# Patient Record
Sex: FEMALE | Race: AMERICAN INDIAN OR ALASKA NATIVE | NOT HISPANIC OR LATINO | Employment: UNEMPLOYED | URBAN - METROPOLITAN AREA
[De-identification: names, ages, dates, MRNs, and addresses within clinical notes are randomized per-mention and may not be internally consistent; named-entity substitution may affect disease eponyms.]

---

## 2017-09-04 ENCOUNTER — HOSPITAL ENCOUNTER (INPATIENT)
Facility: HOSPITAL | Age: 22
LOS: 3 days | Discharge: HOME | DRG: 052 | End: 2017-09-09
Attending: EMERGENCY MEDICINE | Admitting: INTERNAL MEDICINE
Payer: COMMERCIAL

## 2017-09-04 ENCOUNTER — APPOINTMENT (EMERGENCY)
Dept: CT IMAGING | Facility: HOSPITAL | Age: 22
DRG: 052 | End: 2017-09-04
Payer: COMMERCIAL

## 2017-09-04 ENCOUNTER — APPOINTMENT (EMERGENCY)
Dept: RADIOLOGY | Facility: HOSPITAL | Age: 22
DRG: 052 | End: 2017-09-04
Payer: COMMERCIAL

## 2017-09-04 DIAGNOSIS — N17.9 AKI (ACUTE KIDNEY INJURY) (HCC): ICD-10-CM

## 2017-09-04 DIAGNOSIS — R41.0 CONFUSION: ICD-10-CM

## 2017-09-04 DIAGNOSIS — R79.89 ELEVATED SERUM CREATININE: ICD-10-CM

## 2017-09-04 DIAGNOSIS — R51.9 HEADACHE: ICD-10-CM

## 2017-09-04 DIAGNOSIS — S09.90XA HEAD INJURY: ICD-10-CM

## 2017-09-04 DIAGNOSIS — F44.9 CONVERSION DISORDER: ICD-10-CM

## 2017-09-04 DIAGNOSIS — R41.82 ALTERED MENTAL STATUS: Primary | ICD-10-CM

## 2017-09-04 LAB
ALBUMIN SERPL BCP-MCNC: 3.5 G/DL (ref 3.5–5)
ALP SERPL-CCNC: 82 U/L (ref 46–116)
ALT SERPL W P-5'-P-CCNC: 21 U/L (ref 12–78)
AMPHETAMINES SERPL QL SCN: NEGATIVE
ANION GAP SERPL CALCULATED.3IONS-SCNC: 9 MMOL/L (ref 4–13)
AST SERPL W P-5'-P-CCNC: 17 U/L (ref 5–45)
BACTERIA UR QL AUTO: ABNORMAL /HPF
BARBITURATES UR QL: NEGATIVE
BASOPHILS # BLD AUTO: 0.02 THOUSANDS/ΜL (ref 0–0.1)
BASOPHILS NFR BLD AUTO: 0 % (ref 0–1)
BENZODIAZ UR QL: NEGATIVE
BILIRUB SERPL-MCNC: 0.2 MG/DL (ref 0.2–1)
BILIRUB UR QL STRIP: NEGATIVE
BUN SERPL-MCNC: 11 MG/DL (ref 5–25)
CALCIUM SERPL-MCNC: 9.2 MG/DL (ref 8.3–10.1)
CHLORIDE SERPL-SCNC: 105 MMOL/L (ref 100–108)
CLARITY UR: CLEAR
CO2 SERPL-SCNC: 28 MMOL/L (ref 21–32)
COCAINE UR QL: NEGATIVE
COLOR UR: YELLOW
CREAT SERPL-MCNC: 1.36 MG/DL (ref 0.6–1.3)
CRP SERPL QL: 14.1 MG/L
EOSINOPHIL # BLD AUTO: 0.13 THOUSAND/ΜL (ref 0–0.61)
EOSINOPHIL NFR BLD AUTO: 2 % (ref 0–6)
ERYTHROCYTE [DISTWIDTH] IN BLOOD BY AUTOMATED COUNT: 15.6 % (ref 11.6–15.1)
GFR SERPL CREATININE-BSD FRML MDRD: 65 ML/MIN/1.73SQ M
GLUCOSE SERPL-MCNC: 91 MG/DL (ref 65–140)
GLUCOSE UR STRIP-MCNC: NEGATIVE MG/DL
HCG UR QL: NEGATIVE
HCT VFR BLD AUTO: 37.3 % (ref 34.8–46.1)
HGB BLD-MCNC: 11.7 G/DL (ref 11.5–15.4)
HGB UR QL STRIP.AUTO: ABNORMAL
HIV 1+2 AB+HIV1 P24 AG SERPL QL IA: NORMAL
HIV1 P24 AG SER QL: NORMAL
HYALINE CASTS #/AREA URNS LPF: ABNORMAL /LPF
KETONES UR STRIP-MCNC: NEGATIVE MG/DL
LEUKOCYTE ESTERASE UR QL STRIP: NEGATIVE
LYMPHOCYTES # BLD AUTO: 1.51 THOUSANDS/ΜL (ref 0.6–4.47)
LYMPHOCYTES NFR BLD AUTO: 25 % (ref 14–44)
MCH RBC QN AUTO: 20.3 PG (ref 26.8–34.3)
MCHC RBC AUTO-ENTMCNC: 31.4 G/DL (ref 31.4–37.4)
MCV RBC AUTO: 65 FL (ref 82–98)
METHADONE UR QL: NEGATIVE
MONOCYTES # BLD AUTO: 0.51 THOUSAND/ΜL (ref 0.17–1.22)
MONOCYTES NFR BLD AUTO: 9 % (ref 4–12)
MUCOUS THREADS UR QL AUTO: ABNORMAL
NEUTROPHILS # BLD AUTO: 3.8 THOUSANDS/ΜL (ref 1.85–7.62)
NEUTS SEG NFR BLD AUTO: 64 % (ref 43–75)
NITRITE UR QL STRIP: NEGATIVE
NON-SQ EPI CELLS URNS QL MICRO: ABNORMAL /HPF
OPIATES UR QL SCN: NEGATIVE
PCP UR QL: NEGATIVE
PH UR STRIP.AUTO: 5.5 [PH] (ref 4.5–8)
PLATELET # BLD AUTO: 278 THOUSANDS/UL (ref 149–390)
PMV BLD AUTO: 11.2 FL (ref 8.9–12.7)
POTASSIUM SERPL-SCNC: 3.9 MMOL/L (ref 3.5–5.3)
PROT SERPL-MCNC: 7.6 G/DL (ref 6.4–8.2)
PROT UR STRIP-MCNC: >=300 MG/DL
RBC # BLD AUTO: 5.75 MILLION/UL (ref 3.81–5.12)
RBC #/AREA URNS AUTO: ABNORMAL /HPF
SODIUM SERPL-SCNC: 142 MMOL/L (ref 136–145)
SP GR UR STRIP.AUTO: >=1.03 (ref 1–1.03)
THC UR QL: NEGATIVE
TSH SERPL DL<=0.05 MIU/L-ACNC: 1.72 UIU/ML (ref 0.46–3.98)
UROBILINOGEN UR QL STRIP.AUTO: 1 E.U./DL
WBC # BLD AUTO: 5.97 THOUSAND/UL (ref 4.31–10.16)
WBC #/AREA URNS AUTO: ABNORMAL /HPF

## 2017-09-04 PROCEDURE — 84443 ASSAY THYROID STIM HORMONE: CPT | Performed by: EMERGENCY MEDICINE

## 2017-09-04 PROCEDURE — 71020 HB CHEST X-RAY 2VW FRONTAL&LATL: CPT

## 2017-09-04 PROCEDURE — 36415 COLL VENOUS BLD VENIPUNCTURE: CPT | Performed by: EMERGENCY MEDICINE

## 2017-09-04 PROCEDURE — 86140 C-REACTIVE PROTEIN: CPT | Performed by: PHYSICIAN ASSISTANT

## 2017-09-04 PROCEDURE — 80307 DRUG TEST PRSMV CHEM ANLYZR: CPT | Performed by: EMERGENCY MEDICINE

## 2017-09-04 PROCEDURE — 70450 CT HEAD/BRAIN W/O DYE: CPT

## 2017-09-04 PROCEDURE — 86592 SYPHILIS TEST NON-TREP QUAL: CPT | Performed by: PHYSICIAN ASSISTANT

## 2017-09-04 PROCEDURE — 80053 COMPREHEN METABOLIC PANEL: CPT | Performed by: EMERGENCY MEDICINE

## 2017-09-04 PROCEDURE — 85025 COMPLETE CBC W/AUTO DIFF WBC: CPT | Performed by: EMERGENCY MEDICINE

## 2017-09-04 PROCEDURE — 99285 EMERGENCY DEPT VISIT HI MDM: CPT

## 2017-09-04 PROCEDURE — 85652 RBC SED RATE AUTOMATED: CPT | Performed by: PHYSICIAN ASSISTANT

## 2017-09-04 PROCEDURE — 81001 URINALYSIS AUTO W/SCOPE: CPT | Performed by: EMERGENCY MEDICINE

## 2017-09-04 PROCEDURE — 87806 HIV AG W/HIV1&2 ANTB W/OPTIC: CPT | Performed by: EMERGENCY MEDICINE

## 2017-09-04 PROCEDURE — 81025 URINE PREGNANCY TEST: CPT | Performed by: EMERGENCY MEDICINE

## 2017-09-04 RX ORDER — MAGNESIUM HYDROXIDE/ALUMINUM HYDROXICE/SIMETHICONE 120; 1200; 1200 MG/30ML; MG/30ML; MG/30ML
30 SUSPENSION ORAL EVERY 6 HOURS PRN
Status: DISCONTINUED | OUTPATIENT
Start: 2017-09-04 | End: 2017-09-09 | Stop reason: HOSPADM

## 2017-09-04 RX ORDER — METOCLOPRAMIDE HYDROCHLORIDE 5 MG/ML
5 INJECTION INTRAMUSCULAR; INTRAVENOUS EVERY 6 HOURS PRN
Status: DISCONTINUED | OUTPATIENT
Start: 2017-09-04 | End: 2017-09-09 | Stop reason: HOSPADM

## 2017-09-04 RX ORDER — ACETAMINOPHEN 325 MG/1
650 TABLET ORAL EVERY 6 HOURS PRN
Status: DISCONTINUED | OUTPATIENT
Start: 2017-09-04 | End: 2017-09-09 | Stop reason: HOSPADM

## 2017-09-04 RX ORDER — ONDANSETRON 2 MG/ML
4 INJECTION INTRAMUSCULAR; INTRAVENOUS EVERY 6 HOURS PRN
Status: DISCONTINUED | OUTPATIENT
Start: 2017-09-04 | End: 2017-09-09 | Stop reason: HOSPADM

## 2017-09-04 RX ORDER — DOCUSATE SODIUM 100 MG/1
100 CAPSULE, LIQUID FILLED ORAL 2 TIMES DAILY
Status: DISCONTINUED | OUTPATIENT
Start: 2017-09-04 | End: 2017-09-09 | Stop reason: HOSPADM

## 2017-09-04 RX ORDER — MECLIZINE HCL 12.5 MG/1
12.5 TABLET ORAL EVERY 8 HOURS PRN
Status: DISCONTINUED | OUTPATIENT
Start: 2017-09-04 | End: 2017-09-09 | Stop reason: HOSPADM

## 2017-09-04 RX ORDER — SODIUM CHLORIDE 9 MG/ML
75 INJECTION, SOLUTION INTRAVENOUS CONTINUOUS
Status: DISCONTINUED | OUTPATIENT
Start: 2017-09-04 | End: 2017-09-07

## 2017-09-04 RX ADMIN — ONDANSETRON 4 MG: 2 INJECTION INTRAMUSCULAR; INTRAVENOUS at 21:50

## 2017-09-04 RX ADMIN — SODIUM CHLORIDE 75 ML/HR: 0.9 INJECTION, SOLUTION INTRAVENOUS at 21:37

## 2017-09-04 RX ADMIN — METOCLOPRAMIDE 5 MG: 5 INJECTION, SOLUTION INTRAMUSCULAR; INTRAVENOUS at 23:07

## 2017-09-04 RX ADMIN — SODIUM CHLORIDE 1000 ML: 0.9 INJECTION, SOLUTION INTRAVENOUS at 19:52

## 2017-09-04 RX ADMIN — MECLIZINE 12.5 MG: 12.5 TABLET ORAL at 23:07

## 2017-09-05 ENCOUNTER — APPOINTMENT (OUTPATIENT)
Dept: MRI IMAGING | Facility: HOSPITAL | Age: 22
DRG: 052 | End: 2017-09-05
Payer: COMMERCIAL

## 2017-09-05 LAB
ALBUMIN SERPL BCP-MCNC: 2.8 G/DL (ref 3.5–5)
ALP SERPL-CCNC: 68 U/L (ref 46–116)
ALT SERPL W P-5'-P-CCNC: 16 U/L (ref 12–78)
ANION GAP SERPL CALCULATED.3IONS-SCNC: 9 MMOL/L (ref 4–13)
AST SERPL W P-5'-P-CCNC: 15 U/L (ref 5–45)
BASOPHILS # BLD AUTO: 0.01 THOUSANDS/ΜL (ref 0–0.1)
BASOPHILS NFR BLD AUTO: 0 % (ref 0–1)
BILIRUB SERPL-MCNC: 0.2 MG/DL (ref 0.2–1)
BUN SERPL-MCNC: 11 MG/DL (ref 5–25)
CALCIUM SERPL-MCNC: 8.7 MG/DL (ref 8.3–10.1)
CHLORIDE SERPL-SCNC: 108 MMOL/L (ref 100–108)
CO2 SERPL-SCNC: 24 MMOL/L (ref 21–32)
CREAT SERPL-MCNC: 1.25 MG/DL (ref 0.6–1.3)
EOSINOPHIL # BLD AUTO: 0.11 THOUSAND/ΜL (ref 0–0.61)
EOSINOPHIL NFR BLD AUTO: 2 % (ref 0–6)
ERYTHROCYTE [DISTWIDTH] IN BLOOD BY AUTOMATED COUNT: 15.4 % (ref 11.6–15.1)
ERYTHROCYTE [SEDIMENTATION RATE] IN BLOOD: 20 MM/HOUR (ref 0–20)
FOLATE SERPL-MCNC: 19 NG/ML (ref 3.1–17.5)
GFR SERPL CREATININE-BSD FRML MDRD: 72 ML/MIN/1.73SQ M
GLUCOSE SERPL-MCNC: 91 MG/DL (ref 65–140)
HCT VFR BLD AUTO: 34.5 % (ref 34.8–46.1)
HGB BLD-MCNC: 10.7 G/DL (ref 11.5–15.4)
LYMPHOCYTES # BLD AUTO: 1.32 THOUSANDS/ΜL (ref 0.6–4.47)
LYMPHOCYTES NFR BLD AUTO: 25 % (ref 14–44)
MCH RBC QN AUTO: 20.3 PG (ref 26.8–34.3)
MCHC RBC AUTO-ENTMCNC: 31 G/DL (ref 31.4–37.4)
MCV RBC AUTO: 65 FL (ref 82–98)
MONOCYTES # BLD AUTO: 0.41 THOUSAND/ΜL (ref 0.17–1.22)
MONOCYTES NFR BLD AUTO: 8 % (ref 4–12)
NEUTROPHILS # BLD AUTO: 3.34 THOUSANDS/ΜL (ref 1.85–7.62)
NEUTS SEG NFR BLD AUTO: 65 % (ref 43–75)
PLATELET # BLD AUTO: 249 THOUSANDS/UL (ref 149–390)
PMV BLD AUTO: 10.9 FL (ref 8.9–12.7)
POTASSIUM SERPL-SCNC: 4 MMOL/L (ref 3.5–5.3)
PROT SERPL-MCNC: 6.4 G/DL (ref 6.4–8.2)
RBC # BLD AUTO: 5.28 MILLION/UL (ref 3.81–5.12)
RPR SER QL: NORMAL
SODIUM SERPL-SCNC: 141 MMOL/L (ref 136–145)
VIT B12 SERPL-MCNC: 406 PG/ML (ref 100–900)
WBC # BLD AUTO: 5.19 THOUSAND/UL (ref 4.31–10.16)

## 2017-09-05 PROCEDURE — 85025 COMPLETE CBC W/AUTO DIFF WBC: CPT | Performed by: PHYSICIAN ASSISTANT

## 2017-09-05 PROCEDURE — 82746 ASSAY OF FOLIC ACID SERUM: CPT | Performed by: PHYSICIAN ASSISTANT

## 2017-09-05 PROCEDURE — 80053 COMPREHEN METABOLIC PANEL: CPT | Performed by: PHYSICIAN ASSISTANT

## 2017-09-05 PROCEDURE — 70551 MRI BRAIN STEM W/O DYE: CPT

## 2017-09-05 PROCEDURE — 82607 VITAMIN B-12: CPT | Performed by: PHYSICIAN ASSISTANT

## 2017-09-05 RX ORDER — KETOROLAC TROMETHAMINE 30 MG/ML
30 INJECTION, SOLUTION INTRAMUSCULAR; INTRAVENOUS EVERY 8 HOURS SCHEDULED
Status: DISPENSED | OUTPATIENT
Start: 2017-09-05 | End: 2017-09-07

## 2017-09-05 RX ORDER — DIPHENHYDRAMINE HYDROCHLORIDE 50 MG/ML
25 INJECTION INTRAMUSCULAR; INTRAVENOUS EVERY 8 HOURS
Status: DISCONTINUED | OUTPATIENT
Start: 2017-09-05 | End: 2017-09-09 | Stop reason: HOSPADM

## 2017-09-05 RX ORDER — MAGNESIUM SULFATE HEPTAHYDRATE 40 MG/ML
2 INJECTION, SOLUTION INTRAVENOUS
Status: DISCONTINUED | OUTPATIENT
Start: 2017-09-05 | End: 2017-09-06

## 2017-09-05 RX ORDER — METOCLOPRAMIDE HYDROCHLORIDE 5 MG/ML
10 INJECTION INTRAMUSCULAR; INTRAVENOUS EVERY 8 HOURS SCHEDULED
Status: DISPENSED | OUTPATIENT
Start: 2017-09-05 | End: 2017-09-07

## 2017-09-05 RX ADMIN — KETOROLAC TROMETHAMINE 30 MG: 30 INJECTION, SOLUTION INTRAMUSCULAR at 18:33

## 2017-09-05 RX ADMIN — DIPHENHYDRAMINE HYDROCHLORIDE 25 MG: 50 INJECTION, SOLUTION INTRAMUSCULAR; INTRAVENOUS at 18:32

## 2017-09-05 RX ADMIN — VALPROATE SODIUM 1000 MG: 100 INJECTION, SOLUTION INTRAVENOUS at 20:44

## 2017-09-05 RX ADMIN — METOCLOPRAMIDE 10 MG: 5 INJECTION, SOLUTION INTRAMUSCULAR; INTRAVENOUS at 18:32

## 2017-09-05 RX ADMIN — MAGNESIUM SULFATE HEPTAHYDRATE 2 G: 40 INJECTION, SOLUTION INTRAVENOUS at 22:16

## 2017-09-05 RX ADMIN — SODIUM CHLORIDE 75 ML/HR: 0.9 INJECTION, SOLUTION INTRAVENOUS at 11:39

## 2017-09-06 ENCOUNTER — APPOINTMENT (INPATIENT)
Dept: RADIOLOGY | Facility: HOSPITAL | Age: 22
DRG: 052 | End: 2017-09-06
Payer: COMMERCIAL

## 2017-09-06 LAB
APPEARANCE CSF: NORMAL
APTT PPP: 31 SECONDS (ref 23–35)
GLUCOSE CSF-MCNC: 51 MG/DL (ref 50–80)
GRAM STN SPEC: NORMAL
INR PPP: 0.99 (ref 0.86–1.16)
LYMPHOCYTES NFR CSF MANUAL: 100 %
PROT CSF-MCNC: 22 MG/DL (ref 15–45)
PROTHROMBIN TIME: 13.4 SECONDS (ref 12.1–14.4)
RBC # CSF MANUAL: 1 UL (ref 0–10)
TOTAL CELLS COUNTED BLD: NO
TOTAL CELLS COUNTED SPEC: 2
TUBE # CSF: 4
WBC # CSF AUTO: 1 /UL (ref 0–5)

## 2017-09-06 PROCEDURE — 89050 BODY FLUID CELL COUNT: CPT | Performed by: NURSE PRACTITIONER

## 2017-09-06 PROCEDURE — 87532 HHV-6 DNA AMP PROBE: CPT | Performed by: NURSE PRACTITIONER

## 2017-09-06 PROCEDURE — 87498 ENTEROVIRUS PROBE&REVRS TRNS: CPT | Performed by: NURSE PRACTITIONER

## 2017-09-06 PROCEDURE — 86341 ISLET CELL ANTIBODY: CPT

## 2017-09-06 PROCEDURE — 87798 DETECT AGENT NOS DNA AMP: CPT | Performed by: NURSE PRACTITIONER

## 2017-09-06 PROCEDURE — 85730 THROMBOPLASTIN TIME PARTIAL: CPT | Performed by: NURSE PRACTITIONER

## 2017-09-06 PROCEDURE — 87529 HSV DNA AMP PROBE: CPT | Performed by: NURSE PRACTITIONER

## 2017-09-06 PROCEDURE — 87653 STREP B DNA AMP PROBE: CPT | Performed by: NURSE PRACTITIONER

## 2017-09-06 PROCEDURE — 83519 RIA NONANTIBODY: CPT | Performed by: NURSE PRACTITIONER

## 2017-09-06 PROCEDURE — 86255 FLUORESCENT ANTIBODY SCREEN: CPT

## 2017-09-06 PROCEDURE — 89051 BODY FLUID CELL COUNT: CPT | Performed by: NURSE PRACTITIONER

## 2017-09-06 PROCEDURE — 87070 CULTURE OTHR SPECIMN AEROBIC: CPT | Performed by: NURSE PRACTITIONER

## 2017-09-06 PROCEDURE — 62270 DX LMBR SPI PNXR: CPT

## 2017-09-06 PROCEDURE — 87496 CYTOMEG DNA AMP PROBE: CPT | Performed by: NURSE PRACTITIONER

## 2017-09-06 PROCEDURE — 82945 GLUCOSE OTHER FLUID: CPT | Performed by: NURSE PRACTITIONER

## 2017-09-06 PROCEDURE — 87476 LYME DIS DNA AMP PROBE: CPT | Performed by: NURSE PRACTITIONER

## 2017-09-06 PROCEDURE — 009U3ZX DRAINAGE OF SPINAL CANAL, PERCUTANEOUS APPROACH, DIAGNOSTIC: ICD-10-PCS | Performed by: RADIOLOGY

## 2017-09-06 PROCEDURE — 85610 PROTHROMBIN TIME: CPT | Performed by: NURSE PRACTITIONER

## 2017-09-06 PROCEDURE — 84157 ASSAY OF PROTEIN OTHER: CPT | Performed by: NURSE PRACTITIONER

## 2017-09-06 RX ORDER — SODIUM CHLORIDE 9 MG/ML
125 INJECTION, SOLUTION INTRAVENOUS CONTINUOUS
Status: DISPENSED | OUTPATIENT
Start: 2017-09-06 | End: 2017-09-06

## 2017-09-06 RX ORDER — LORAZEPAM 2 MG/ML
1 INJECTION INTRAMUSCULAR
Status: DISCONTINUED | OUTPATIENT
Start: 2017-09-06 | End: 2017-09-09 | Stop reason: HOSPADM

## 2017-09-06 RX ADMIN — KETOROLAC TROMETHAMINE 30 MG: 30 INJECTION, SOLUTION INTRAMUSCULAR at 02:11

## 2017-09-06 RX ADMIN — SODIUM CHLORIDE 75 ML/HR: 0.9 INJECTION, SOLUTION INTRAVENOUS at 02:23

## 2017-09-06 RX ADMIN — DIPHENHYDRAMINE HYDROCHLORIDE 25 MG: 50 INJECTION, SOLUTION INTRAMUSCULAR; INTRAVENOUS at 02:11

## 2017-09-06 RX ADMIN — SODIUM CHLORIDE 125 ML/HR: 0.9 INJECTION, SOLUTION INTRAVENOUS at 16:53

## 2017-09-06 RX ADMIN — DOCUSATE SODIUM 100 MG: 100 CAPSULE, LIQUID FILLED ORAL at 09:28

## 2017-09-06 RX ADMIN — METOCLOPRAMIDE 10 MG: 5 INJECTION, SOLUTION INTRAMUSCULAR; INTRAVENOUS at 02:11

## 2017-09-07 PROBLEM — R41.0 CONFUSION: Status: ACTIVE | Noted: 2017-09-07

## 2017-09-07 PROBLEM — R79.89 ELEVATED SERUM CREATININE: Status: ACTIVE | Noted: 2017-09-07

## 2017-09-07 LAB
C GATTII+NEOFOR DNA CSF QL NAA+NON-PROBE: NOT DETECTED
CMV DNA CSF QL NAA+NON-PROBE: NOT DETECTED
E COLI K1 DNA CSF QL NAA+NON-PROBE: NOT DETECTED
EV RNA CSF QL NAA+NON-PROBE: NOT DETECTED
GP B STREP DNA CSF QL NAA+NON-PROBE: NOT DETECTED
HAEM INFLU DNA CSF QL NAA+NON-PROBE: NOT DETECTED
HHV6 DNA CSF QL NAA+NON-PROBE: NOT DETECTED
HSV1 DNA CSF QL NAA+NON-PROBE: NOT DETECTED
HSV2 DNA CSF QL NAA+NON-PROBE: NOT DETECTED
L MONOCYTOG DNA CSF QL NAA+NON-PROBE: NOT DETECTED
N MEN DNA CSF QL NAA+NON-PROBE: NOT DETECTED
PARECHOVIRUS A RNA CSF QL NAA+NON-PROBE: NOT DETECTED
S PNEUM DNA CSF QL NAA+NON-PROBE: NOT DETECTED
VZV DNA CSF QL NAA+NON-PROBE: NOT DETECTED

## 2017-09-07 RX ORDER — KETOROLAC TROMETHAMINE 30 MG/ML
30 INJECTION, SOLUTION INTRAMUSCULAR; INTRAVENOUS EVERY 8 HOURS SCHEDULED
Status: COMPLETED | OUTPATIENT
Start: 2017-09-07 | End: 2017-09-09

## 2017-09-07 RX ORDER — METOCLOPRAMIDE HYDROCHLORIDE 5 MG/ML
10 INJECTION INTRAMUSCULAR; INTRAVENOUS EVERY 8 HOURS SCHEDULED
Status: COMPLETED | OUTPATIENT
Start: 2017-09-07 | End: 2017-09-09

## 2017-09-07 RX ADMIN — METOCLOPRAMIDE 10 MG: 5 INJECTION, SOLUTION INTRAMUSCULAR; INTRAVENOUS at 18:29

## 2017-09-07 RX ADMIN — KETOROLAC TROMETHAMINE 30 MG: 30 INJECTION, SOLUTION INTRAMUSCULAR at 18:26

## 2017-09-07 RX ADMIN — DIPHENHYDRAMINE HYDROCHLORIDE 25 MG: 50 INJECTION, SOLUTION INTRAMUSCULAR; INTRAVENOUS at 21:26

## 2017-09-07 RX ADMIN — DIPHENHYDRAMINE HYDROCHLORIDE 25 MG: 50 INJECTION, SOLUTION INTRAMUSCULAR; INTRAVENOUS at 12:04

## 2017-09-07 RX ADMIN — MECLIZINE 12.5 MG: 12.5 TABLET ORAL at 21:26

## 2017-09-07 RX ADMIN — ACETAMINOPHEN 650 MG: 325 TABLET ORAL at 16:47

## 2017-09-07 RX ADMIN — METOCLOPRAMIDE 10 MG: 5 INJECTION, SOLUTION INTRAMUSCULAR; INTRAVENOUS at 10:00

## 2017-09-07 RX ADMIN — KETOROLAC TROMETHAMINE 30 MG: 30 INJECTION, SOLUTION INTRAMUSCULAR at 12:04

## 2017-09-07 RX ADMIN — ACETAMINOPHEN 650 MG: 325 TABLET ORAL at 08:44

## 2017-09-08 RX ORDER — BUTALBITAL, ACETAMINOPHEN AND CAFFEINE 50; 325; 40 MG/1; MG/1; MG/1
1 TABLET ORAL EVERY 4 HOURS PRN
Status: DISCONTINUED | OUTPATIENT
Start: 2017-09-08 | End: 2017-09-09 | Stop reason: HOSPADM

## 2017-09-08 RX ADMIN — KETOROLAC TROMETHAMINE 30 MG: 30 INJECTION, SOLUTION INTRAMUSCULAR at 17:34

## 2017-09-08 RX ADMIN — METOCLOPRAMIDE 10 MG: 5 INJECTION, SOLUTION INTRAMUSCULAR; INTRAVENOUS at 01:11

## 2017-09-08 RX ADMIN — KETOROLAC TROMETHAMINE 30 MG: 30 INJECTION, SOLUTION INTRAMUSCULAR at 09:07

## 2017-09-08 RX ADMIN — DIPHENHYDRAMINE HYDROCHLORIDE 25 MG: 50 INJECTION, SOLUTION INTRAMUSCULAR; INTRAVENOUS at 17:35

## 2017-09-08 RX ADMIN — METOCLOPRAMIDE 10 MG: 5 INJECTION, SOLUTION INTRAMUSCULAR; INTRAVENOUS at 09:08

## 2017-09-08 RX ADMIN — DIPHENHYDRAMINE HYDROCHLORIDE 25 MG: 50 INJECTION, SOLUTION INTRAMUSCULAR; INTRAVENOUS at 01:10

## 2017-09-08 RX ADMIN — METOCLOPRAMIDE 10 MG: 5 INJECTION, SOLUTION INTRAMUSCULAR; INTRAVENOUS at 17:34

## 2017-09-08 RX ADMIN — ACETAMINOPHEN 650 MG: 325 TABLET ORAL at 16:00

## 2017-09-08 RX ADMIN — KETOROLAC TROMETHAMINE 30 MG: 30 INJECTION, SOLUTION INTRAMUSCULAR at 01:10

## 2017-09-08 RX ADMIN — DIPHENHYDRAMINE HYDROCHLORIDE 25 MG: 50 INJECTION, SOLUTION INTRAMUSCULAR; INTRAVENOUS at 09:08

## 2017-09-09 VITALS
TEMPERATURE: 98.4 F | BODY MASS INDEX: 40.91 KG/M2 | WEIGHT: 239.64 LBS | OXYGEN SATURATION: 100 % | HEIGHT: 64 IN | DIASTOLIC BLOOD PRESSURE: 75 MMHG | SYSTOLIC BLOOD PRESSURE: 180 MMHG | RESPIRATION RATE: 18 BRPM | HEART RATE: 75 BPM

## 2017-09-09 LAB
ANION GAP SERPL CALCULATED.3IONS-SCNC: 10 MMOL/L (ref 4–13)
BACTERIA CSF CULT: NO GROWTH
BASOPHILS # BLD AUTO: 0.01 THOUSANDS/ΜL (ref 0–0.1)
BASOPHILS NFR BLD AUTO: 0 % (ref 0–1)
BUN SERPL-MCNC: 16 MG/DL (ref 5–25)
CALCIUM SERPL-MCNC: 9.2 MG/DL (ref 8.3–10.1)
CHLORIDE SERPL-SCNC: 104 MMOL/L (ref 100–108)
CO2 SERPL-SCNC: 26 MMOL/L (ref 21–32)
CREAT SERPL-MCNC: 1.36 MG/DL (ref 0.6–1.3)
EOSINOPHIL # BLD AUTO: 0.11 THOUSAND/ΜL (ref 0–0.61)
EOSINOPHIL NFR BLD AUTO: 2 % (ref 0–6)
ERYTHROCYTE [DISTWIDTH] IN BLOOD BY AUTOMATED COUNT: 15.6 % (ref 11.6–15.1)
GFR SERPL CREATININE-BSD FRML MDRD: 65 ML/MIN/1.73SQ M
GLUCOSE SERPL-MCNC: 78 MG/DL (ref 65–140)
HCT VFR BLD AUTO: 37.2 % (ref 34.8–46.1)
HGB BLD-MCNC: 11.8 G/DL (ref 11.5–15.4)
LYMPHOCYTES # BLD AUTO: 1.19 THOUSANDS/ΜL (ref 0.6–4.47)
LYMPHOCYTES NFR BLD AUTO: 21 % (ref 14–44)
MCH RBC QN AUTO: 20.4 PG (ref 26.8–34.3)
MCHC RBC AUTO-ENTMCNC: 31.7 G/DL (ref 31.4–37.4)
MCV RBC AUTO: 64 FL (ref 82–98)
MONOCYTES # BLD AUTO: 0.6 THOUSAND/ΜL (ref 0.17–1.22)
MONOCYTES NFR BLD AUTO: 11 % (ref 4–12)
NEUTROPHILS # BLD AUTO: 3.83 THOUSANDS/ΜL (ref 1.85–7.62)
NEUTS SEG NFR BLD AUTO: 66 % (ref 43–75)
PLATELET # BLD AUTO: 271 THOUSANDS/UL (ref 149–390)
PMV BLD AUTO: 11.3 FL (ref 8.9–12.7)
POTASSIUM SERPL-SCNC: 3.9 MMOL/L (ref 3.5–5.3)
RBC # BLD AUTO: 5.78 MILLION/UL (ref 3.81–5.12)
SODIUM SERPL-SCNC: 140 MMOL/L (ref 136–145)
WBC # BLD AUTO: 5.74 THOUSAND/UL (ref 4.31–10.16)

## 2017-09-09 PROCEDURE — 80048 BASIC METABOLIC PNL TOTAL CA: CPT | Performed by: INTERNAL MEDICINE

## 2017-09-09 PROCEDURE — 85025 COMPLETE CBC W/AUTO DIFF WBC: CPT | Performed by: INTERNAL MEDICINE

## 2017-09-09 RX ORDER — MECLIZINE HCL 12.5 MG/1
12.5 TABLET ORAL EVERY 8 HOURS PRN
Qty: 30 TABLET | Refills: 0 | Status: SHIPPED | OUTPATIENT
Start: 2017-09-09 | End: 2018-09-15

## 2017-09-09 RX ADMIN — METOCLOPRAMIDE 10 MG: 5 INJECTION, SOLUTION INTRAMUSCULAR; INTRAVENOUS at 02:09

## 2017-09-09 RX ADMIN — KETOROLAC TROMETHAMINE 30 MG: 30 INJECTION, SOLUTION INTRAMUSCULAR at 08:57

## 2017-09-09 RX ADMIN — KETOROLAC TROMETHAMINE 30 MG: 30 INJECTION, SOLUTION INTRAMUSCULAR at 02:09

## 2017-09-09 RX ADMIN — DIPHENHYDRAMINE HYDROCHLORIDE 25 MG: 50 INJECTION, SOLUTION INTRAMUSCULAR; INTRAVENOUS at 02:09

## 2017-09-09 RX ADMIN — DIPHENHYDRAMINE HYDROCHLORIDE 25 MG: 50 INJECTION, SOLUTION INTRAMUSCULAR; INTRAVENOUS at 08:57

## 2017-09-09 RX ADMIN — METOCLOPRAMIDE 10 MG: 5 INJECTION, SOLUTION INTRAMUSCULAR; INTRAVENOUS at 08:57

## 2017-09-10 LAB — B BURGDOR DNA SPEC QL NAA+PROBE: NEGATIVE

## 2017-09-17 ENCOUNTER — HOSPITAL ENCOUNTER (EMERGENCY)
Facility: HOSPITAL | Age: 22
Discharge: HOME | End: 2017-09-17
Attending: EMERGENCY MEDICINE | Admitting: EMERGENCY MEDICINE
Payer: COMMERCIAL

## 2017-09-17 VITALS
RESPIRATION RATE: 16 BRPM | DIASTOLIC BLOOD PRESSURE: 81 MMHG | OXYGEN SATURATION: 99 % | WEIGHT: 244.27 LBS | HEART RATE: 94 BPM | BODY MASS INDEX: 41.7 KG/M2 | SYSTOLIC BLOOD PRESSURE: 147 MMHG | HEIGHT: 64 IN | TEMPERATURE: 98.4 F

## 2017-09-17 DIAGNOSIS — S00.01XA SCALP ABRASION, INITIAL ENCOUNTER: ICD-10-CM

## 2017-09-17 DIAGNOSIS — S09.90XA HEAD INJURY, INITIAL ENCOUNTER: Primary | ICD-10-CM

## 2017-09-17 PROCEDURE — 90471 IMMUNIZATION ADMIN: CPT

## 2017-09-17 PROCEDURE — 99282 EMERGENCY DEPT VISIT SF MDM: CPT

## 2017-09-17 PROCEDURE — 90715 TDAP VACCINE 7 YRS/> IM: CPT | Performed by: EMERGENCY MEDICINE

## 2017-09-17 RX ORDER — ALBUTEROL SULFATE 90 UG/1
2 AEROSOL, METERED RESPIRATORY (INHALATION) EVERY 6 HOURS PRN
COMMUNITY
End: 2020-01-23 | Stop reason: SDUPTHER

## 2017-09-17 RX ORDER — GINSENG 100 MG
1 CAPSULE ORAL ONCE
Status: COMPLETED | OUTPATIENT
Start: 2017-09-17 | End: 2017-09-17

## 2017-09-17 RX ADMIN — BACITRACIN ZINC 1 SMALL APPLICATION: 500 OINTMENT TOPICAL at 03:18

## 2017-09-17 RX ADMIN — TETANUS TOXOID, REDUCED DIPHTHERIA TOXOID AND ACELLULAR PERTUSSIS VACCINE, ADSORBED 0.5 ML: 5; 2.5; 8; 8; 2.5 SUSPENSION INTRAMUSCULAR at 03:18

## 2017-09-17 NOTE — DISCHARGE INSTRUCTIONS
Head Injury   WHAT YOU NEED TO KNOW:   A head injury is most often caused by a blow to the head  This may occur from a fall, bicycle injury, sports injury, being struck in the head, or a motor vehicle accident  DISCHARGE INSTRUCTIONS:   Call 911 or have someone else call for any of the following:   · You cannot be woken  · You have a seizure  · You stop responding to others or you faint  · You have blurry or double vision  · Your speech becomes slurred or confused  · You have arm or leg weakness, loss of feeling, or new problems with coordination  · Your pupils are larger than usual or one pupil is a different size than the other  · You have blood or clear fluid coming out of your ears or nose  Return to the emergency department if:   · You have repeated or forceful vomiting  · You feel confused  · Your headache gets worse or becomes severe  · You or someone caring for you notices that you are harder to wake than usual   Contact your healthcare provider if:   · Your symptoms last longer than 6 weeks after the injury  · You have questions or concerns about your condition or care  Medicines:   · Acetaminophen  decreases pain  Acetaminophen is available without a doctor's order  Ask how much to take and how often to take it  Follow directions  Acetaminophen can cause liver damage if not taken correctly  · Take your medicine as directed  Contact your healthcare provider if you think your medicine is not helping or if you have side effects  Tell him or her if you are allergic to any medicine  Keep a list of the medicines, vitamins, and herbs you take  Include the amounts, and when and why you take them  Bring the list or the pill bottles to follow-up visits  Carry your medicine list with you in case of an emergency  Self-care:   · Rest  or do quiet activities for 24 to 48 hours  Limit your time watching TV, using the computer, or doing tasks that require a lot of thinking  Slowly return to your normal activities as directed  Do not play sports or do activities that may cause you to get hit in the head  Ask your healthcare provider when you can return to sports  · Apply ice  on your head for 15 to 20 minutes every hour or as directed  Use an ice pack, or put crushed ice in a plastic bag  Cover it with a towel before you apply it to your skin  Ice helps prevent tissue damage and decreases swelling and pain  · Have someone stay with you for 24 hours  or as directed  This person can monitor you for complications and call 230  When you are awake the person should ask you a few questions to see if you are thinking clearly  An example would be to ask your name or your address  Prevent another head injury:   · Wear a helmet that fits properly  Do this when you play sports, or ride a bike, scooter, or skateboard  Helmets help decrease your risk of a serious head injury  Talk to your healthcare provider about other ways you can protect yourself if you play sports  · Wear your seat belt every time you are in a car  This helps to decrease your risk for a head injury if you are in a car accident  Follow up with your healthcare provider as directed:  Write down your questions so you remember to ask them during your visits  © 2017 [address] Information is for End User's use only and may not be sold, redistributed or otherwise used for commercial purposes  All illustrations and images included in CareNotes® are the copyrighted property of [employer]  or [employer] Neil  The above information is an  only  It is not intended as medical advice for individual conditions or treatments  Talk to your doctor, nurse or pharmacist before following any medical regimen to see if it is safe and effective for you  Abrasion   WHAT YOU NEED TO KNOW:   An abrasion is a scrape on your skin  It happens when your skin rubs against a rough surface   Some examples of an abrasion include rug burn, a skinned elbow, or road rash  Abrasions can be many shapes and sizes  The wound may hurt, bleed, bruise, or swell  DISCHARGE INSTRUCTIONS:   Return to the emergency department if:   · The bleeding does not stop after 10 minutes of firm pressure  · You cannot rinse one or more foreign objects out of your wound  · You have red streaks on your skin coming from your wound  Contact your healthcare provider if:   · You have a fever or chills  · Your abrasion is red, warm, swollen, or draining pus  · You have questions or concerns about your condition or care  Care for your abrasion:   · Wash your hands and dry them with a clean towel  · Press a clean cloth against your wound to stop any bleeding  · Rinse your wound with a lot of clean water  Do not use harsh soap, alcohol, or iodine solutions  · Use a clean, wet cloth to remove any objects, such as small pieces of rocks or dirt  · Rub antibiotic ointment on your wound  This may help prevent infection and help your wound heal     · Cover the wound with a non-stick bandage  Change the bandage daily, and if gets wet or dirty  Follow up with your healthcare provider as directed:  Write down your questions so you remember to ask them during your visits  © 2017 [address]  Information is for End User's use only and may not be sold, redistributed or otherwise used for commercial purposes  All illustrations and images included in CareNotes® are the copyrighted property of [employer]  or [employer] Neil  The above information is an  only  It is not intended as medical advice for individual conditions or treatments  Talk to your doctor, nurse or pharmacist before following any medical regimen to see if it is safe and effective for you
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
## 2017-09-20 LAB — MISCELLANEOUS LAB TEST RESULT: NORMAL

## 2017-09-22 LAB — MISCELLANEOUS LAB TEST RESULT: NORMAL

## 2018-08-31 ENCOUNTER — OFFICE VISIT (OUTPATIENT)
Dept: URGENT CARE | Age: 23
End: 2018-08-31
Payer: COMMERCIAL

## 2018-08-31 VITALS
DIASTOLIC BLOOD PRESSURE: 82 MMHG | TEMPERATURE: 97.7 F | RESPIRATION RATE: 20 BRPM | WEIGHT: 244 LBS | HEIGHT: 63 IN | HEART RATE: 87 BPM | OXYGEN SATURATION: 99 % | BODY MASS INDEX: 43.23 KG/M2 | SYSTOLIC BLOOD PRESSURE: 160 MMHG

## 2018-08-31 DIAGNOSIS — M25.562 ACUTE PAIN OF LEFT KNEE: ICD-10-CM

## 2018-08-31 DIAGNOSIS — S83.92XA SPRAIN OF LEFT KNEE, UNSPECIFIED LIGAMENT, INITIAL ENCOUNTER: Primary | ICD-10-CM

## 2018-08-31 PROCEDURE — 99213 OFFICE O/P EST LOW 20 MIN: CPT | Performed by: FAMILY MEDICINE

## 2018-08-31 RX ORDER — FLUTICASONE PROPIONATE 110 UG/1
1 AEROSOL, METERED RESPIRATORY (INHALATION) 2 TIMES DAILY
COMMUNITY
Start: 2015-05-12 | End: 2019-01-30 | Stop reason: SDUPTHER

## 2018-08-31 RX ORDER — ALBUTEROL SULFATE 90 UG/1
2 AEROSOL, METERED RESPIRATORY (INHALATION)
COMMUNITY
Start: 2015-05-12 | End: 2019-01-30 | Stop reason: SDUPTHER
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
## 2018-09-01 NOTE — PATIENT INSTRUCTIONS
Rest injured body part  Ice 10-15 minutes off and on every 2-3 hours while awake for 48 hours after injury  After 48 hours you may start using warm compresses if appropriate  Compression:  ACE wrap for compression and support as needed (if indicated)  DO NOT wear ACE wrap to bed  Elevate injured body part as able to help decrease pain and swelling  May take Tylenol as needed for pain  Cane to help ambulate  Recommend you follow up with your PCP or an orthopedist for further evaluation of knee pain if no improvement over next 7-10 days  Addendum:  In light of patient's history of prior knee problems, recommend that she get in with orthopedist for further evaluation and treatment

## 2018-09-15 ENCOUNTER — APPOINTMENT (EMERGENCY)
Dept: RADIOLOGY | Facility: HOSPITAL | Age: 23
End: 2018-09-15
Payer: COMMERCIAL

## 2018-09-15 ENCOUNTER — HOSPITAL ENCOUNTER (EMERGENCY)
Facility: HOSPITAL | Age: 23
Discharge: HOME | End: 2018-09-15
Attending: EMERGENCY MEDICINE | Admitting: EMERGENCY MEDICINE
Payer: COMMERCIAL

## 2018-09-15 VITALS
DIASTOLIC BLOOD PRESSURE: 93 MMHG | RESPIRATION RATE: 20 BRPM | WEIGHT: 244 LBS | HEART RATE: 91 BPM | OXYGEN SATURATION: 98 % | TEMPERATURE: 99.5 F | BODY MASS INDEX: 43.22 KG/M2 | SYSTOLIC BLOOD PRESSURE: 163 MMHG

## 2018-09-15 DIAGNOSIS — M25.569 KNEE PAIN, ACUTE: Primary | ICD-10-CM

## 2018-09-15 PROCEDURE — 73560 X-RAY EXAM OF KNEE 1 OR 2: CPT

## 2018-09-15 PROCEDURE — 99283 EMERGENCY DEPT VISIT LOW MDM: CPT

## 2018-09-15 RX ORDER — IBUPROFEN 600 MG/1
600 TABLET ORAL EVERY 8 HOURS PRN
Qty: 15 TABLET | Refills: 0 | Status: ON HOLD | OUTPATIENT
Start: 2018-09-15 | End: 2020-01-24

## 2018-09-16 NOTE — DISCHARGE INSTRUCTIONS
Knee Pain   WHAT YOU NEED TO KNOW:   Knee pain may start suddenly, or it may be a long-term problem  You may have pain on the side, front, or back of your knee  You may have knee stiffness and swelling  You may hear popping sounds or feel like your knee is giving way or locking up as you walk  You may feel pain when you sit, stand, walk, or climb up and down stairs  Knee pain can be caused by conditions such as obesity, inflammation, or strains or tears in ligaments or tendons  DISCHARGE INSTRUCTIONS:   Follow up with your healthcare provider within 24 hours or as directed: You may need follow-up treatments, such as steroid injections to decrease pain  Write down your questions so you remember to ask them during your visits  Self-care:   · Rest  your knee so it can heal  Limit activities that increase your pain  · Ice  can help reduce swelling  Wrap ice in a towel and put it on your knee for as long and as often as directed  · Compression  with a brace or bandage can help reduce swelling  Use a brace or bandage only as directed  · Elevation  helps decrease pain and swelling  Elevate your knee while you are sitting or lying down  Prop your leg on pillows to keep your knee above the level of your heart  Medicines:   · NSAIDs  help decrease swelling and pain or fever  This medicine is available with or without a doctor's order  NSAIDs can cause stomach bleeding or kidney problems in certain people  If you take blood thinner medicine, always ask your healthcare provider if NSAIDs are safe for you  Always read the medicine label and follow directions  · Acetaminophen  decreases pain and fever  It is available without a doctor's order  Ask how much to take and when to take it  Follow directions  Acetaminophen can cause liver damage if not taken correctly  · Take your medicine as directed  Contact your healthcare provider if you think your medicine is not helping or if you have side effects   Tell him or her if you are allergic to any medicine  Keep a list of the medicines, vitamins, and herbs you take  Include the amounts, and when and why you take them  Bring the list or the pill bottles to follow-up visits  Carry your medicine list with you in case of an emergency  Exercise as directed: You may need to see a physical therapist or do recommended exercises to improve movement and decrease your pain  You may be directed to walk, swim, or ride a bike  Follow your exercise plan exactly as directed to avoid further injury  Contact your healthcare provider if:   · You have questions or concerns about your condition or care  Return to the emergency department if:   · Your pain is worse, even after treatment  · You cannot bend or straighten your leg completely  · The swelling around your knee does not go down even with treatment  · Your knee is painful and hot to the touch  © 2017 [address] Information is for End User's use only and may not be sold, redistributed or otherwise used for commercial purposes  All illustrations and images included in CareNotes® are the copyrighted property of [employer]  or [employer] Neil  The above information is an  only  It is not intended as medical advice for individual conditions or treatments  Talk to your doctor, nurse or pharmacist before following any medical regimen to see if it is safe and effective for you  Hypertension   WHAT YOU NEED TO KNOW:   Hypertension is high blood pressure (BP)  Your BP is the force of your blood moving against the walls of your arteries  Normal BP is less than 120/80  Prehypertension is between 120/80 and 139/89  Hypertension is 140/90 or higher  Hypertension causes your BP to get so high that your heart has to work much harder than normal  This can damage your heart  You can control hypertension with a healthy lifestyle or medicines   A controlled blood pressure helps protect your organs, such as your heart, lungs, brain, and kidneys  DISCHARGE INSTRUCTIONS:   Call 911 for any of the following:   · You have discomfort in your chest that feels like squeezing, pressure, fullness, or pain  · You become confused or have difficulty speaking  · You suddenly feel lightheaded or have trouble breathing  · You have pain or discomfort in your back, neck, jaw, stomach, or arm  Return to the emergency department if:   · You have a severe headache or vision loss  · You have weakness in an arm or leg  Contact your healthcare provider if:   · You feel faint, dizzy, confused, or drowsy  · You have been taking your BP medicine and your BP is still higher than your healthcare provider says it should be  · You have questions or concerns about your condition or care  Medicines: You may  need any of the following:  · Medicine  may be used to help lower your BP  You may need more than one type of medicine  Take the medicine exactly as directed  · Diuretics  help decrease extra fluid that collects in your body  This will help lower your BP  You may urinate more often while you take this medicine  · Cholesterol medicine  helps lower your cholesterol level  A low cholesterol level helps prevent heart disease and makes it easier to control your blood pressure  · Take your medicine as directed  Contact your healthcare provider if you think your medicine is not helping or if you have side effects  Tell him or her if you are allergic to any medicine  Keep a list of the medicines, vitamins, and herbs you take  Include the amounts, and when and why you take them  Bring the list or the pill bottles to follow-up visits  Carry your medicine list with you in case of an emergency  Follow up with your healthcare provider as directed: You will need to return to have your BP checked and to have other lab tests done  Write down your questions so you remember to ask them during your visits  Manage hypertension:  Talk with your healthcare provider about these and other ways to manage hypertension:  · Check your BP at home  Sit and rest for 5 minutes before you take your BP  Extend your arm and support it on a flat surface  Your arm should be at the same level as your heart  Follow the directions that came with your BP monitor  If possible, take at least 2 BP readings each time  Take your BP at least twice a day at the same times each day, such as morning and evening  Keep a record of your BP readings and bring it to your follow-up visits  Ask your healthcare provider what your BP should be  · Limit sodium (salt) as directed  Too much sodium can affect your fluid balance  Check labels to find low-sodium or no-salt-added foods  Some low-sodium foods use potassium salts for flavor  Too much potassium can also cause health problems  Your healthcare provider will tell you how much sodium and potassium are safe for you to have in a day  He or she may recommend that you limit sodium to 2,300 mg a day  · Follow the meal plan recommended by your healthcare provider  A dietitian or your provider can give you more information on low-sodium plans or the DASH (Dietary Approaches to Stop Hypertension) eating plan  The DASH plan is low in sodium, unhealthy fats, and total fat  It is high in potassium, calcium, and fiber  · Exercise to maintain a healthy weight  Exercise at least 30 minutes per day, on most days of the week  This will help decrease your blood pressure  Ask your healthcare provider about the best exercise plan for you  · Decrease stress  This may help lower your BP  Learn ways to relax, such as deep breathing or listening to music  · Limit alcohol  Women should limit alcohol to 1 drink a day  Men should limit alcohol to 2 drinks a day  A drink of alcohol is 12 ounces of beer, 5 ounces of wine, or 1½ ounces of liquor  · Do not smoke    Nicotine and other chemicals in cigarettes and cigars can increase your BP and also cause lung damage  Ask your healthcare provider for information if you currently smoke and need help to quit  E-cigarettes or smokeless tobacco still contain nicotine  Talk to your healthcare provider before you use these products  · Manage any other health conditions you have  Health conditions such as diabetes can increase your risk for hypertension  Follow your healthcare provider's instructions and take all your medicines as directed  © 2017 [address]  Information is for End User's use only and may not be sold, redistributed or otherwise used for commercial purposes  All illustrations and images included in CareNotes® are the copyrighted property of [employer]  or [employer] Neil  The above information is an  only  It is not intended as medical advice for individual conditions or treatments  Talk to your doctor, nurse or pharmacist before following any medical regimen to see if it is safe and effective for you

## 2018-09-16 NOTE — ED PROVIDER NOTES
History  Chief Complaint   Patient presents with    Knee Pain     Pt  presents to the ED with complaints of lef tknee pain that began today when she was walking with her brother  Pt  heard a crack and felt severe pain  Pt  stated that she has had prior problems with this knee but is concerned that it has worsened  History provided by:  Patient  Knee Pain   Location:  Knee  Time since incident:  1 hour  Injury: yes    Knee location:  L knee  Pain details:     Quality:  Aching    Radiates to:  Does not radiate    Severity:  Moderate    Onset quality:  Sudden    Duration:  1 hour    Timing:  Constant    Progression:  Unchanged  Chronicity:  Recurrent  Dislocation: no    Foreign body present:  No foreign bodies  Prior injury to area:  Yes  Relieved by:  None tried  Worsened by:  Bearing weight and activity  Ineffective treatments:  None tried  Associated symptoms: decreased ROM and stiffness    Associated symptoms: no fever, no muscle weakness and no swelling    Risk factors: obesity    Risk factors: no concern for non-accidental trauma, no frequent fractures, no known bone disorder and no recent illness        Prior to Admission Medications   Prescriptions Last Dose Informant Patient Reported? Taking? albuterol (PROVENTIL HFA,VENTOLIN HFA) 90 mcg/act inhaler   Yes No   Sig: Inhale 2 puffs every 6 (six) hours as needed for wheezing   albuterol (VENTOLIN HFA) 90 mcg/act inhaler   Yes No   Sig: Inhale 2 puffs   fluticasone (FLOVENT HFA) 110 MCG/ACT inhaler   Yes No   Sig: Inhale 1 puff 2 (two) times a day      Facility-Administered Medications: None       Past Medical History:   Diagnosis Date    Asthma     Eczema        Past Surgical History:   Procedure Laterality Date    CHOLECYSTECTOMY         History reviewed  No pertinent family history  I have reviewed and agree with the history as documented      Social History   Substance Use Topics    Smoking status: Never Smoker    Smokeless tobacco: Never Used  Alcohol use No        Review of Systems   Constitutional: Positive for activity change  Negative for chills and fever  HENT: Negative for congestion, dental problem, ear discharge, ear pain, mouth sores, postnasal drip, rhinorrhea and sore throat  Eyes: Negative for pain, discharge, redness and itching  Genitourinary: Negative for dysuria, frequency and urgency  Musculoskeletal: Positive for arthralgias, gait problem and stiffness  Negative for joint swelling  Skin: Negative for color change, pallor and wound  All other systems reviewed and are negative  Physical Exam  Physical Exam   Constitutional: She is oriented to person, place, and time  She appears well-developed and well-nourished  No distress  HENT:   Head: Normocephalic and atraumatic  Right Ear: External ear normal    Left Ear: External ear normal    Nose: Nose normal    Eyes: Conjunctivae are normal  Right eye exhibits no discharge  Left eye exhibits no discharge  Pulmonary/Chest: Effort normal    Musculoskeletal:   Examination of the left knee-it is atraumatic upon inspection  There is decreased range of motion secondary to pain and guarding  Patient has a positive patellar grind in addition sign  There is no joint line tenderness palpated  There is a negative anterior posterior drawer, negative Lachman's sign, collaterals are intact  Neurological: She is alert and oriented to person, place, and time  Skin: Skin is warm  Capillary refill takes less than 2 seconds  She is not diaphoretic  Psychiatric: She has a normal mood and affect  Her behavior is normal  Judgment and thought content normal    Nursing note and vitals reviewed        Vital Signs  ED Triage Vitals [09/15/18 2121]   Temperature Pulse Respirations Blood Pressure SpO2   99 5 °F (37 5 °C) 91 20 163/93 98 %      Temp Source Heart Rate Source Patient Position - Orthostatic VS BP Location FiO2 (%)   Oral Monitor Sitting Right arm --      Pain Score Worst Possible Pain           Vitals:    09/15/18 2121   BP: 163/93   Pulse: 91   Patient Position - Orthostatic VS: Sitting       Visual Acuity      ED Medications  Medications - No data to display    Diagnostic Studies  Results Reviewed     None                 XR knee 1 or 2 views left   ED Interpretation by Yuly Pardo PA-C (09/15 2203)   ADAM left knee                 Procedures  Procedures       Phone Contacts  ED Phone Contact    ED Course                               MDM  Number of Diagnoses or Management Options  Knee pain, acute: new and requires workup     Amount and/or Complexity of Data Reviewed  Tests in the radiology section of CPT®: ordered and reviewed  Tests in the medicine section of CPT®: ordered and reviewed    Risk of Complications, Morbidity, and/or Mortality  Presenting problems: moderate  Diagnostic procedures: moderate  Management options: moderate  General comments: Patient presents to the emergency room with an exacerbation of chronic left knee pain  She was seen and examined  X-rays were obtained which revealed a spiking of the tibial spines an early arthritis  I did discuss in great detail with the patient about go losing weight in regards to her knee pain as well as her elevated blood pressure  She will have a recheck blood pressure with her physician in 2 days  She will follow up with sports medicine for her chronic knee pain  She may continue to ambulate with her cane  She may continue with her Ace bandage for comfort  She was given a prescription for Motrin to take 3 times a day as needed for pain      Patient Progress  Patient progress: stable    CritCare Time    Disposition  Final diagnoses:   Knee pain, acute - Left knee     Time reflects when diagnosis was documented in both MDM as applicable and the Disposition within this note     Time User Action Codes Description Comment    9/15/2018 10:04 PM Kamille Howell Add [[id number]] Knee pain, acute     9/15/2018 10:04 PM Gigi Alamo [[id number]] Knee pain, acute Left knee      ED Disposition     ED Disposition Condition Comment    Discharge  [place] discharge to home/self care  Condition at discharge: Good        Follow-up Information     Follow up With Specialties Details Why Slime Wilkinson MD Sports Medicine, Orthopedic Surgery Schedule an appointment as soon as possible for a visit in 2 days  [id number] [address]  [facility]  [phone]      Your physician  In 2 days Repeat blood pressure           Discharge Medication List as of 9/15/2018 10:06 PM      START taking these medications    Details   ibuprofen (MOTRIN) 600 mg tablet Take 1 tablet (600 mg total) by mouth every 8 (eight) hours as needed for moderate pain for up to 15 doses, Starting Sat 9/15/2018, Normal         CONTINUE these medications which have NOT CHANGED    Details   !! albuterol (PROVENTIL HFA,VENTOLIN HFA) 90 mcg/act inhaler Inhale 2 puffs every 6 (six) hours as needed for wheezing, Historical Med      !! albuterol (VENTOLIN HFA) 90 mcg/act inhaler Inhale 2 puffs, Starting Tue 5/12/2015, Historical Med      fluticasone (FLOVENT HFA) 110 MCG/ACT inhaler Inhale 1 puff 2 (two) times a day, Starting Tue 5/12/2015, Historical Med       !! - Potential duplicate medications found  Please discuss with provider  No discharge procedures on file      ED Provider  Electronically Signed by           David Chow PA-C  09/15/18 8331

## 2018-11-01 ENCOUNTER — APPOINTMENT (EMERGENCY)
Dept: RADIOLOGY | Facility: HOSPITAL | Age: 23
End: 2018-11-01
Payer: COMMERCIAL

## 2018-11-01 ENCOUNTER — HOSPITAL ENCOUNTER (EMERGENCY)
Facility: HOSPITAL | Age: 23
Discharge: HOME | End: 2018-11-01
Attending: EMERGENCY MEDICINE | Admitting: EMERGENCY MEDICINE
Payer: COMMERCIAL

## 2018-11-01 VITALS
SYSTOLIC BLOOD PRESSURE: 185 MMHG | TEMPERATURE: 97.9 F | DIASTOLIC BLOOD PRESSURE: 113 MMHG | OXYGEN SATURATION: 100 % | BODY MASS INDEX: 43.72 KG/M2 | HEART RATE: 68 BPM | WEIGHT: 246.8 LBS | RESPIRATION RATE: 18 BRPM

## 2018-11-01 DIAGNOSIS — J45.901 ASTHMA EXACERBATION: Primary | ICD-10-CM

## 2018-11-01 DIAGNOSIS — I10 HYPERTENSION: ICD-10-CM

## 2018-11-01 PROCEDURE — 94640 AIRWAY INHALATION TREATMENT: CPT

## 2018-11-01 PROCEDURE — 99283 EMERGENCY DEPT VISIT LOW MDM: CPT

## 2018-11-01 PROCEDURE — 71046 X-RAY EXAM CHEST 2 VIEWS: CPT

## 2018-11-01 RX ORDER — PREDNISONE 20 MG/1
60 TABLET ORAL ONCE
Status: COMPLETED | OUTPATIENT
Start: 2018-11-01 | End: 2018-11-01

## 2018-11-01 RX ORDER — PREDNISONE 20 MG/1
60 TABLET ORAL DAILY
Qty: 9 TABLET | Refills: 0 | Status: SHIPPED | OUTPATIENT
Start: 2018-11-02 | End: 2018-11-05

## 2018-11-01 RX ORDER — IPRATROPIUM BROMIDE AND ALBUTEROL SULFATE 2.5; .5 MG/3ML; MG/3ML
3 SOLUTION RESPIRATORY (INHALATION)
Status: DISCONTINUED | OUTPATIENT
Start: 2018-11-01 | End: 2018-11-02 | Stop reason: HOSPADM

## 2018-11-01 RX ADMIN — PREDNISONE 60 MG: 20 TABLET ORAL at 21:54

## 2018-11-01 RX ADMIN — IPRATROPIUM BROMIDE AND ALBUTEROL SULFATE 3 ML: .5; 3 SOLUTION RESPIRATORY (INHALATION) at 22:42

## 2018-11-01 RX ADMIN — IPRATROPIUM BROMIDE AND ALBUTEROL SULFATE 3 ML: .5; 3 SOLUTION RESPIRATORY (INHALATION) at 21:54

## 2018-11-02 NOTE — ED PROVIDER NOTES
History  Chief Complaint   Patient presents with    Asthma     PT reports "I have asthma problems, SOB  I took my albuerol and it didnt work" PT c/o lightheadedness     61-year-old female with past medical history of asthma (no prior intubations or ICU stays), eczema, who presents to emergency department for shortness of breath which she feels is consistent with her previous episodes of asthma exacerbation  Patient states that she was walking to the bus stop this morning at 8 o'clock, which she gradually began to feel short of breath  States that the  shortness of breath has gradually got worse throughout the day, and she does complain of chest tightness and intermittent nonproductive cough  Patient has to attempted to relieve the shortness of breath use of her albuterol pump, which she only used twice today, with the last occurrence at 5:00 p m  Albuterol pump has only helped mildly  Denies fevers, chills, nasal congestion, rhinorrhea, sore throat  Patient suspects that this episode of asthma exacerbation is due to the sudden changes in weather  History provided by:  Patient   used: No        Prior to Admission Medications   Prescriptions Last Dose Informant Patient Reported? Taking? albuterol (PROVENTIL HFA,VENTOLIN HFA) 90 mcg/act inhaler   Yes No   Sig: Inhale 2 puffs every 6 (six) hours as needed for wheezing   albuterol (VENTOLIN HFA) 90 mcg/act inhaler   Yes No   Sig: Inhale 2 puffs   fluticasone (FLOVENT HFA) 110 MCG/ACT inhaler   Yes No   Sig: Inhale 1 puff 2 (two) times a day   ibuprofen (MOTRIN) 600 mg tablet   No No   Sig: Take 1 tablet (600 mg total) by mouth every 8 (eight) hours as needed for moderate pain for up to 15 doses      Facility-Administered Medications: None       Past Medical History:   Diagnosis Date    Asthma     Eczema        Past Surgical History:   Procedure Laterality Date    CHOLECYSTECTOMY         History reviewed   No pertinent family history  I have reviewed and agree with the history as documented  Social History   Substance Use Topics    Smoking status: Never Smoker    Smokeless tobacco: Never Used    Alcohol use No        Review of Systems   Constitutional: Negative for chills and fever  HENT: Negative for congestion, ear pain, rhinorrhea, sore throat and trouble swallowing  Respiratory: Positive for cough, chest tightness and shortness of breath  Negative for apnea, wheezing and stridor  Cardiovascular: Negative for chest pain, palpitations and leg swelling  Gastrointestinal: Negative for abdominal pain, constipation, diarrhea, nausea and vomiting  Genitourinary: Negative for dysuria, flank pain, frequency, hematuria and urgency  Musculoskeletal: Negative for arthralgias, back pain, gait problem, joint swelling, myalgias, neck pain and neck stiffness  Skin: Negative for color change, pallor, rash and wound  Neurological: Negative for dizziness, syncope, weakness, light-headedness, numbness and headaches  Psychiatric/Behavioral: Negative  Physical Exam  Physical Exam   Constitutional: She is oriented to person, place, and time  She appears well-developed and well-nourished  No distress  HENT:   Head: Normocephalic and atraumatic  Mouth/Throat: Oropharynx is clear and moist    Eyes: Pupils are equal, round, and reactive to light  Conjunctivae and EOM are normal    Neck: Normal range of motion  Neck supple  Cardiovascular: Normal rate, regular rhythm and intact distal pulses  Pulmonary/Chest: Effort normal  No respiratory distress  She has no wheezes  She has no rales  She exhibits no tenderness  Is a mild decrease air flow to bilateral lungs secondary to decreased in lung expansion  Abdominal: Soft  There is no tenderness  Musculoskeletal: Normal range of motion  She exhibits no edema or tenderness  Neurological: She is alert and oriented to person, place, and time   No cranial nerve deficit or sensory deficit  She exhibits normal muscle tone  Coordination normal    Skin: Skin is warm and dry  Capillary refill takes less than 2 seconds  She is not diaphoretic  Psychiatric: She has a normal mood and affect  Her behavior is normal    Nursing note and vitals reviewed  Vital Signs  ED Triage Vitals [11/01/18 2125]   Temperature Pulse Respirations Blood Pressure SpO2   97 9 °F (36 6 °C) 68 18 (!) 213/86 100 %      Temp Source Heart Rate Source Patient Position - Orthostatic VS BP Location FiO2 (%)   Oral Monitor Sitting Left arm --      Pain Score       --           Vitals:    11/01/18 2125 11/01/18 2145   BP: (!) 213/86 (!) 185/113   Pulse: 68 68   Patient Position - Orthostatic VS: Sitting        Visual Acuity      ED Medications  Medications   ipratropium-albuterol (DUO-NEB) 0 5-2 5 mg/3 mL inhalation solution 3 mL (3 mL Nebulization Given 11/1/18 2154)   ipratropium-albuterol (DUO-NEB) 0 5-2 5 mg/3 mL inhalation solution 3 mL (3 mL Nebulization Given 11/1/18 2242)   predniSONE tablet 60 mg (60 mg Oral Given 11/1/18 2154)       Diagnostic Studies  Results Reviewed     None                 XR chest pa & lateral   ED Interpretation by Elia Gilford, PA-C (11/01 2145)   No acute cardiopulmonary disease  Procedures  Procedures       Phone Contacts  ED Phone Contact    ED Course  ED Course as of Nov 01 2341   Thu Nov 01, 2018   2155 Repeat in the ED prior to medications is 185/113  On chart review, patient has been 170s/90s during previous visits  She is not currently on any blood pressure medications  Blood Pressure: (!) 213/86                               MDM  Number of Diagnoses or Management Options  Asthma exacerbation:   Diagnosis management comments: Differential Diagnosis includes but is not limited to:  Asthma exacerbation, allergies, bronchitis  Patient given 2 DuoNeb to the emergency department, which she states helped with shortness of breath    Patient also started on prednisone in the emergency department despite no wheezing, as she feels that this is similar to the early stages of her previous episodes of asthma exacerbation  Chest x-ray showed no acute cardiopulmonary disease  She was discharged home with vital signs showing oxygen saturation of 100% on room air  Not tachycardic or tachypneic  Patient's blood pressure was also noted to be elevated at 185/113  A previous chart review, her blood pressures have continuously read high with her systolic in the 439T or higher  Discussed these findings with the patient, and she has been encouraged to follow up with primary care doctor for blood pressure and asthma management as she does not have a PMD at this time  Amount and/or Complexity of Data Reviewed  Tests in the radiology section of CPT®: ordered and reviewed  Independent visualization of images, tracings, or specimens: yes      CritCare Time    Disposition  Final diagnoses:   Asthma exacerbation   Hypertension     Time reflects when diagnosis was documented in both MDM as applicable and the Disposition within this note     Time User Action Codes Description Comment    11/1/2018 10:46 PM Kamini Aleman [[id number]] Asthma exacerbation     11/1/2018 11:41 PM Taylor, [address] Hypertension       ED Disposition     ED Disposition Condition Comment    Discharge  [place] discharge to home/self care      Condition at discharge: Good        Follow-up Information     Follow up With Specialties Details Why Contact Info Additional [address] In 1 week As needed, If symptoms worsen for asthma and blood pressure check [address] [place] [phone]  [address] [place] [address], [phone]          Discharge Medication List as of 11/1/2018 10:49 PM      START taking these medications    Details   predniSONE 20 mg tablet Take 3 tablets (60 mg total) by mouth daily for 3 days, Starting Fri 11/2/2018, Until Mon 11/5/2018, Print         CONTINUE these medications which have NOT CHANGED    Details   !! albuterol (PROVENTIL HFA,VENTOLIN HFA) 90 mcg/act inhaler Inhale 2 puffs every 6 (six) hours as needed for wheezing, Historical Med      !! albuterol (VENTOLIN HFA) 90 mcg/act inhaler Inhale 2 puffs, Starting Tue 5/12/2015, Historical Med      fluticasone (FLOVENT HFA) 110 MCG/ACT inhaler Inhale 1 puff 2 (two) times a day, Starting Tue 5/12/2015, Historical Med      ibuprofen (MOTRIN) 600 mg tablet Take 1 tablet (600 mg total) by mouth every 8 (eight) hours as needed for moderate pain for up to 15 doses, Starting Sat 9/15/2018, Normal       !! - Potential duplicate medications found  Please discuss with provider  No discharge procedures on file      ED Provider  Electronically Signed by           David Alvarado PA-C  11/01/18 5283

## 2018-11-02 NOTE — DISCHARGE INSTRUCTIONS
Asthma, Ambulatory Care   GENERAL INFORMATION:   Asthma  is a lung disease that makes breathing difficult  Chronic inflammation and reactions to triggers narrow the airways in your lungs  Asthma can become life-threatening if it is not managed  Common symptoms include the following:   · Coughing     · Wheezing     · Shortness of breath     · Chest tightness  Seek immediate care for the following symptoms:   · Severe shortness of breath    · Blue or gray lips or nails    · Skin around your neck and ribs pulls in with each breath    · Shortness of breath, even after you take your short-term medicine as directed     · Peak flow numbers in the red zone of your asthma action plan  Treatment for asthma  will depend on how severe it is  Medicine may decrease inflammation, open airways, and make it easier to breathe  Medicines may be inhaled, taken as a pill, or injected  Short-term medicines relieve your symptoms quickly  Long-term medicines are used to prevent future attacks  You may also need medicine to help control your allergies  Manage and prevent future asthma attacks:   · Follow your asthma action pan  This is a written plan that you and your healthcare provider create  It explains which medicine you need and when to change doses if necessary  It also explains how you can monitor symptoms and use a peak flow meter  The meter measures how well your lungs are working  · Manage other health conditions , such as allergies, acid reflux, and sleep apnea  · Identify and avoid triggers  These may include pets, dust mites, mold, and cockroaches  · Do not smoke and avoid others who smoke  If you smoke, it is never too late to quit  Ask your healthcare provider if you need help quitting  · Ask about a flu vaccine  The flu can make your asthma worse  You may need a yearly flu shot  Follow up with your healthcare provider as directed:   You will need to return to make sure your medicine is working and your symptoms are controlled  You may be referred to an asthma or allergy specialist  Julito Fischer may be asked to keep a record of your peak flow values and bring it with you to your appointments  Write down your questions so you remember to ask them during your visits  CARE AGREEMENT:   You have the right to help plan your care  Learn about your health condition and how it may be treated  Discuss treatment options with your caregivers to decide what care you want to receive  You always have the right to refuse treatment  The above information is an  only  It is not intended as medical advice for individual conditions or treatments  Talk to your doctor, nurse or pharmacist before following any medical regimen to see if it is safe and effective for you  © 2014 [address] is for End User's use only and may not be sold, redistributed or otherwise used for commercial purposes  All illustrations and images included in CareNotes® are the copyrighted property of A D A M , [employer]  or Michael Neil

## 2019-01-21 ENCOUNTER — APPOINTMENT (EMERGENCY)
Dept: RADIOLOGY | Facility: HOSPITAL | Age: 24
End: 2019-01-21
Payer: COMMERCIAL

## 2019-01-21 ENCOUNTER — HOSPITAL ENCOUNTER (EMERGENCY)
Facility: HOSPITAL | Age: 24
Discharge: HOME | End: 2019-01-22
Attending: EMERGENCY MEDICINE
Payer: COMMERCIAL

## 2019-01-21 VITALS
DIASTOLIC BLOOD PRESSURE: 87 MMHG | RESPIRATION RATE: 20 BRPM | OXYGEN SATURATION: 100 % | SYSTOLIC BLOOD PRESSURE: 154 MMHG | TEMPERATURE: 98.9 F | HEART RATE: 66 BPM

## 2019-01-21 DIAGNOSIS — J45.901 ACUTE ASTHMA EXACERBATION: Primary | ICD-10-CM

## 2019-01-21 PROCEDURE — 71046 X-RAY EXAM CHEST 2 VIEWS: CPT

## 2019-01-21 PROCEDURE — 99283 EMERGENCY DEPT VISIT LOW MDM: CPT

## 2019-01-21 PROCEDURE — 94640 AIRWAY INHALATION TREATMENT: CPT

## 2019-01-21 RX ORDER — ALBUTEROL SULFATE 2.5 MG/3ML
5 SOLUTION RESPIRATORY (INHALATION) ONCE
Status: COMPLETED | OUTPATIENT
Start: 2019-01-21 | End: 2019-01-21

## 2019-01-21 RX ADMIN — PREDNISONE 50 MG: 10 TABLET ORAL at 23:26

## 2019-01-21 RX ADMIN — ALBUTEROL SULFATE 5 MG: 2.5 SOLUTION RESPIRATORY (INHALATION) at 23:26

## 2019-01-21 RX ADMIN — IPRATROPIUM BROMIDE 0.5 MG: 0.5 SOLUTION RESPIRATORY (INHALATION) at 23:26

## 2019-01-22 RX ORDER — ALBUTEROL SULFATE 2.5 MG/3ML
2.5 SOLUTION RESPIRATORY (INHALATION) EVERY 6 HOURS PRN
Qty: 30 ML | Refills: 0 | Status: SHIPPED | OUTPATIENT
Start: 2019-01-22 | End: 2019-01-30 | Stop reason: SDUPTHER

## 2019-01-22 RX ORDER — PREDNISONE 20 MG/1
20 TABLET ORAL 2 TIMES DAILY WITH MEALS
Qty: 6 TABLET | Refills: 0 | Status: SHIPPED | OUTPATIENT
Start: 2019-01-22 | End: 2019-01-24

## 2019-01-22 NOTE — DISCHARGE INSTRUCTIONS
Asthma   WHAT YOU NEED TO KNOW:   Asthma is a lung disease that makes breathing difficult  Chronic inflammation and reactions to triggers narrow the airways in the lungs  Asthma can become life-threatening if it is not managed  DISCHARGE INSTRUCTIONS:   Return to the emergency department if:   · You have severe shortness of breath  · Your lips or nails turn blue or gray  · The skin around your neck and ribs pulls in with each breath  · You have shortness of breath, even after you take your short-term medicine as directed  · Your peak flow numbers are in the red zone of your AAP  Contact your healthcare provider if:   · You run out of medicine before your next refill is due  · Your symptoms get worse  · You need to take more medicine than usual to control your symptoms  · You have questions or concerns about your condition or care  Medicines:   · Medicines  decrease inflammation, open airways, and make it easier to breathe  Medicines may be inhaled, taken as a pill, or injected  Short-term medicines relieve your symptoms quickly  Long-term medicines are used to prevent future attacks  You may also need medicine to help control your allergies  Ask your healthcare provider for more information about the medicine you are given and how to take it safely  · Take your medicine as directed  Contact your healthcare provider if you think your medicine is not helping or if you have side effects  Tell him of her if you are allergic to any medicine  Keep a list of the medicines, vitamins, and herbs you take  Include the amounts, and when and why you take them  Bring the list or the pill bottles to follow-up visits  Carry your medicine list with you in case of an emergency  Follow up with your healthcare provider as directed: You will need to return to make sure your medicine is working and your symptoms are controlled   You may be referred to an asthma specialist  Bhavna Bean may be asked to keep a record of your peak flow values and bring it with you to your appointments  Write down your questions so you remember to ask them during your visits  Manage your symptoms and prevent future attacks:   · Follow your Asthma Action Plan (AAP)  This is a written plan that you and your healthcare provider create  It explains which medicine you need and when to change doses if necessary  It also explains how you can monitor symptoms and use a peak flow meter  The meter measures how well your lungs are working  · Manage other health conditions , such as allergies, acid reflux, and sleep apnea  · Identify and avoid triggers  These may include pets, dust mites, mold, and cockroaches  · Do not smoke or be around others who smoke  Nicotine and other chemicals in cigarettes and cigars can cause lung damage  Ask your healthcare provider for information if you currently smoke and need help to quit  E-cigarettes or smokeless tobacco still contain nicotine  Talk to your healthcare provider before you use these products  · Ask about the flu vaccine  The flu can make your asthma worse  You may need a yearly flu shot  © 2017 [facility] Information is for End User's use only and may not be sold, redistributed or otherwise used for commercial purposes  All illustrations and images included in CareNotes® are the copyrighted property of A D A M [employer]  or Michael Neil  The above information is an  only  It is not intended as medical advice for individual conditions or treatments  Talk to your doctor, nurse or pharmacist before following any medical regimen to see if it is safe and effective for you

## 2019-01-24 NOTE — ED PROVIDER NOTES
Pt Name: Bull Alvarado  MRN: [id number]  YOB: 1995  Age/Sex: 26 y o  female  Date of evaluation: 1/21/2019  PCP: Hai Mccollum, [address] [place] [address]    Chief Complaint   Patient presents with    Asthma     Pt c/o asthma flare up since last wednesday  Pt used inhaler PTA but denies relief  HPI    Randee Madrigal presents to the Emergency Department complaining of asthma  72-year-old female presents due to asthma  Patient reports over the past few days she has been experiencing difficulty breathing, states this is consistent with her prior asthma attacks/exacerbations  Patient reports she has been using her albuterol inhaler more often with minimal relief, approximately 3 times per day, states that she has been experiencing some cough, though no URI symptoms  Patient reports typical asthma triggers of cold weather, weather changes  Patient reports nebulizer at home, though does not have any more albuterol  Patient reports associated chest tightness, though denies chest pain, palpitations, dizziness, lightheadedness, syncope/near-syncope feeling  Denies sputum, sinus pressure/pain, nasal congestion, and no other complaints at this time          History provided by:  Patient and parent   used: No    Asthma   Location:  N/A  Quality:  N/A  Severity:  Moderate  Onset quality:  Gradual  Timing:  Constant  Progression:  Waxing and waning  Chronicity:  Recurrent  Context:  See HPI  Relieved by:  Nothing  Worsened by:  Nothing  Ineffective treatments:  Albuterol inhaler  Associated symptoms: cough, shortness of breath and wheezing    Associated symptoms: no abdominal pain, no chest pain, no congestion, no diarrhea, no ear pain, no fatigue, no fever, no headaches, no loss of consciousness, no myalgias, no nausea, no rash, no rhinorrhea, no sore throat and no vomiting    Cough:     Cough characteristics:  Non-productive    Severity:  Moderate    Onset quality:  Gradual Timing:  Intermittent    Progression:  Waxing and waning    Chronicity:  New  Shortness of breath:     Severity:  Mild    Onset quality:  Gradual    Timing:  Intermittent    Progression:  Waxing and waning        Past Medical and Surgical History    Past Medical History:   Diagnosis Date    Asthma     Eczema        Past Surgical History:   Procedure Laterality Date    CHOLECYSTECTOMY         History reviewed  No pertinent family history  Social History   Substance Use Topics    Smoking status: Never Smoker    Smokeless tobacco: Never Used    Alcohol use No              Allergies    No Known Allergies    Home Medications:    Prior to Admission medications    Medication Sig Start Date End Date Taking? Authorizing Provider   albuterol (2 5 mg/3 mL) 0 083 % nebulizer solution Take 1 vial (2 5 mg total) by nebulization every 6 (six) hours as needed for wheezing or shortness of breath 1/22/19   Charlie Krishna PA-C   albuterol (PROVENTIL HFA,VENTOLIN HFA) 90 mcg/act inhaler Inhale 2 puffs every 6 (six) hours as needed for wheezing    Historical Provider, MD   albuterol (VENTOLIN HFA) 90 mcg/act inhaler Inhale 2 puffs 5/12/15   Historical Provider, MD   fluticasone (FLOVENT HFA) 110 MCG/ACT inhaler Inhale 1 puff 2 (two) times a day 5/12/15   Historical Provider, MD   ibuprofen (MOTRIN) 600 mg tablet Take 1 tablet (600 mg total) by mouth every 8 (eight) hours as needed for moderate pain for up to 15 doses 9/15/18   Tulio Sousa PA-C   predniSONE 20 mg tablet Take 1 tablet (20 mg total) by mouth 2 (two) times a day with meals for 2 days 1/22/19 1/24/19  Charlie Krishna PA-C           Review of Systems    Review of Systems   Constitutional: Negative for activity change, appetite change, chills, diaphoresis, fatigue and fever  HENT: Negative for congestion, dental problem, ear discharge, ear pain, nosebleeds, rhinorrhea, sinus pain, sinus pressure, sore throat, tinnitus and trouble swallowing      Eyes: Negative for pain, discharge, redness, itching and visual disturbance  Respiratory: Positive for cough, shortness of breath and wheezing  Negative for apnea, choking, chest tightness and stridor  Cardiovascular: Negative for chest pain, palpitations and leg swelling  Gastrointestinal: Negative for abdominal distention, abdominal pain, anal bleeding, blood in stool, constipation, diarrhea, nausea and vomiting  Genitourinary: Negative for decreased urine volume, difficulty urinating, dysuria and hematuria  Musculoskeletal: Negative for arthralgias, back pain, gait problem, joint swelling, myalgias, neck pain and neck stiffness  Skin: Negative for rash and wound  Neurological: Negative for dizziness, loss of consciousness, speech difficulty, light-headedness, numbness and headaches  All other systems reviewed and negative  Physical Exam      ED Triage Vitals [01/21/19 2259]   Temperature Pulse Respirations Blood Pressure SpO2   98 9 °F (37 2 °C) 66 20 154/87 100 %      Temp Source Heart Rate Source Patient Position - Orthostatic VS BP Location FiO2 (%)   Oral Monitor Sitting Right arm --      Pain Score       --               Physical Exam   Constitutional: She is oriented to person, place, and time  She appears well-developed and well-nourished  No distress  HENT:   Head: Normocephalic and atraumatic  Right Ear: External ear normal    Left Ear: External ear normal    Nose: Nose normal    Mouth/Throat: Oropharynx is clear and moist  No oropharyngeal exudate  Eyes: Pupils are equal, round, and reactive to light  Conjunctivae and EOM are normal    Neck: Normal range of motion  Neck supple  No JVD present  Cardiovascular: Normal rate, regular rhythm, normal heart sounds and intact distal pulses  Exam reveals no gallop and no friction rub  No murmur heard  Pulmonary/Chest: Effort normal  No stridor  No respiratory distress  She has wheezes  She has no rales   She exhibits no tenderness  Neurological: She is alert and oriented to person, place, and time  Skin: Skin is warm  Capillary refill takes less than 2 seconds  No rash noted  Nursing note and vitals reviewed  Assessment and Plan    Hugo Ganser is a 26 y o  female who presents with Asthma  Physical examination remarkable for Wheeze  Differential diagnosis (not completely inclusive) includes Asthma vs Bronchitis vs PNA vs Cardiac etiology  Plan will be to perform diagnostic testing and treat symptomatically  MDM  Number of Diagnoses or Management Options  Acute asthma exacerbation: new, needed workup  Diagnosis management comments: 21-year-old female presents due to asthma  Patient reports over the past few days she has been experiencing difficulty breathing, states this is consistent with her prior asthma attacks/exacerbations  Patient reports she has been using her albuterol inhaler more often with minimal relief, approximately 3 times per day, states that she has been experiencing some cough, though no URI symptoms  Patient reports typical asthma triggers of cold weather, weather changes  Patient reports nebulizer at home, though does not have any more albuterol  Patient reports associated chest tightness, though denies chest pain, palpitations, dizziness, lightheadedness, syncope/near-syncope feeling  Denies sputum, sinus pressure/pain, nasal congestion, and no other complaints at this time  Vital signs reviewed and within normal limits  Exam reveals wheeze in all lung fields, though no other significant findings  Will check chest x-ray to rule out pneumonia, acute process, give symptomatic relief of DuoNeb, steroid  Reassess           Amount and/or Complexity of Data Reviewed  Tests in the radiology section of CPT®: ordered and reviewed  Obtain history from someone other than the patient: yes  Review and summarize past medical records: yes  Independent visualization of images, tracings, or specimens: yes    Risk of Complications, Morbidity, and/or Mortality  Presenting problems: moderate  Diagnostic procedures: moderate  Management options: moderate    Patient Progress  Patient progress: improved      Diagnostic Results    All labs reviewed and utilized in the medical decision making process    Radiology:    XR chest 2 views   ED Interpretation   No acute cardiopulmonary disease      Final Result      No acute cardiopulmonary disease  Workstation performed: [id number]             All radiology studies independently viewed by me and interpreted by the radiologist     Procedure    Procedures    CritCare Time      ED Course of Care and Re-Assessments    ED Course as of Jan 24 0348 Mon Jan 21, 2019   2330 Pt seen and evaluated with mother present, wheezing audible on breath, no retractions though SAT at 100%, will give breathing tx, steroid and evaluate further afterward  200 Pt reports she has improved breathing, CXR without acute cardiopulmonary disease, still with mild wheeze though mid-way through her neb  Will reassess  Medications   albuterol inhalation solution 5 mg (5 mg Nebulization Given 1/21/19 2326)   ipratropium (ATROVENT) 0 02 % inhalation solution 0 5 mg (0 5 mg Nebulization Given 1/21/19 2326)   predniSONE tablet 50 mg (50 mg Oral Given 1/21/19 2326)           FINAL IMPRESSION    Final diagnoses:   Acute asthma exacerbation         DISPOSITION/PLAN      Time reflects when diagnosis was documented in both MDM as applicable and the Disposition within this note     Time User Action Codes Description Comment    1/22/2019 12:25 AM Shanice Lawton Add [J45 [id number]] Acute asthma exacerbation       ED Disposition     ED Disposition Condition Comment    Discharge  [place] discharge to home/self care      Condition at discharge: Good        Follow-up Information     Follow up With Specialties Details Why Contact Info Additional Information    SUZAN Vee Nurse Practitioner  As needed [facility] [address] [facility]       [address] Emergency Department Emergency Medicine  If symptoms worsen [address] [facility] [address] [phone] AN ED, [address], [place], [place], [address]            PATIENT REFERRED TO:    Joon Ho, [address]  [phone]      As needed    [facility]  [address]  [phone]    If symptoms worsen      DISCHARGE MEDICATIONS:    Discharge Medication List as of 1/22/2019 12:27 AM      START taking these medications    Details   albuterol (2 5 mg/3 mL) 0 083 % nebulizer solution Take 1 vial (2 5 mg total) by nebulization every 6 (six) hours as needed for wheezing or shortness of breath, Starting Tue 1/22/2019, Print      predniSONE 20 mg tablet Take 1 tablet (20 mg total) by mouth 2 (two) times a day with meals for 2 days, Starting Tue 1/22/2019, Until Thu 1/24/2019, Print         CONTINUE these medications which have NOT CHANGED    Details   !! albuterol (PROVENTIL HFA,VENTOLIN HFA) 90 mcg/act inhaler Inhale 2 puffs every 6 (six) hours as needed for wheezing, Historical Med      !! albuterol (VENTOLIN HFA) 90 mcg/act inhaler Inhale 2 puffs, Starting Tue 5/12/2015, Historical Med      fluticasone (FLOVENT HFA) 110 MCG/ACT inhaler Inhale 1 puff 2 (two) times a day, Starting Tue 5/12/2015, Historical Med      ibuprofen (MOTRIN) 600 mg tablet Take 1 tablet (600 mg total) by mouth every 8 (eight) hours as needed for moderate pain for up to 15 doses, Starting Sat 9/15/2018, Normal       !! - Potential duplicate medications found  Please discuss with provider  No discharge procedures on file           MARGUERITE Zavala PA-C  01/24/19 1460

## 2019-01-30 ENCOUNTER — OFFICE VISIT (OUTPATIENT)
Dept: FAMILY MEDICINE CLINIC | Facility: CLINIC | Age: 24
End: 2019-01-30

## 2019-01-30 VITALS
SYSTOLIC BLOOD PRESSURE: 126 MMHG | HEIGHT: 64 IN | HEART RATE: 96 BPM | RESPIRATION RATE: 16 BRPM | WEIGHT: 257.2 LBS | TEMPERATURE: 98.3 F | DIASTOLIC BLOOD PRESSURE: 72 MMHG | BODY MASS INDEX: 43.91 KG/M2

## 2019-01-30 DIAGNOSIS — Z23 ENCOUNTER FOR IMMUNIZATION: ICD-10-CM

## 2019-01-30 DIAGNOSIS — L20.84 INTRINSIC ECZEMA: ICD-10-CM

## 2019-01-30 DIAGNOSIS — M25.562 CHRONIC PAIN OF LEFT KNEE: ICD-10-CM

## 2019-01-30 DIAGNOSIS — L70.0 ACNE VULGARIS: ICD-10-CM

## 2019-01-30 DIAGNOSIS — J45.21 MILD INTERMITTENT ASTHMA WITH ACUTE EXACERBATION: Primary | ICD-10-CM

## 2019-01-30 DIAGNOSIS — G89.29 CHRONIC PAIN OF LEFT KNEE: ICD-10-CM

## 2019-01-30 PROBLEM — L67.8 ABNORMAL FACIAL HAIR: Status: ACTIVE | Noted: 2019-01-30

## 2019-01-30 PROCEDURE — 90471 IMMUNIZATION ADMIN: CPT | Performed by: FAMILY MEDICINE

## 2019-01-30 PROCEDURE — 90686 IIV4 VACC NO PRSV 0.5 ML IM: CPT | Performed by: FAMILY MEDICINE

## 2019-01-30 PROCEDURE — 99214 OFFICE O/P EST MOD 30 MIN: CPT | Performed by: FAMILY MEDICINE

## 2019-01-30 RX ORDER — ALBUTEROL SULFATE 2.5 MG/3ML
2.5 SOLUTION RESPIRATORY (INHALATION) EVERY 6 HOURS PRN
Qty: 30 ML | Refills: 0 | Status: SHIPPED | OUTPATIENT
Start: 2019-01-30 | End: 2020-01-22 | Stop reason: SDUPTHER

## 2019-01-30 RX ORDER — ALBUTEROL SULFATE 90 UG/1
2 AEROSOL, METERED RESPIRATORY (INHALATION) EVERY 6 HOURS PRN
Qty: 1 INHALER | Refills: 11 | Status: SHIPPED | OUTPATIENT
Start: 2019-01-30 | End: 2020-10-08 | Stop reason: SDUPTHER

## 2019-01-30 RX ORDER — FLUTICASONE PROPIONATE 110 UG/1
1 AEROSOL, METERED RESPIRATORY (INHALATION) 2 TIMES DAILY
Qty: 1 INHALER | Refills: 11 | Status: SHIPPED | OUTPATIENT
Start: 2019-01-30 | End: 2021-12-25 | Stop reason: SDUPTHER

## 2019-01-30 NOTE — PROGRESS NOTES
Assessment/Plan:    Acne vulgaris  Referral to derm    Asthma  Restarted Flovent and refilled KEYANA  Education given regarding use of inhalers  Use Flovent daily to prevent exacerbations and carry rescue inhaler at all times  Use nebulizer as needed during exacerbation    Left knee pain  Referral given to orthopaedics    Intrinsic eczema  Lotion daily   OTC hydrocortisone if needed for flare         Problem List Items Addressed This Visit     Asthma - Primary     Restarted Flovent and refilled KEYANA  Education given regarding use of inhalers  Use Flovent daily to prevent exacerbations and carry rescue inhaler at all times  Use nebulizer as needed during exacerbation         Relevant Medications    fluticasone (FLOVENT HFA) 110 MCG/ACT inhaler    albuterol (2 5 mg/3 mL) 0 083 % nebulizer solution    albuterol (VENTOLIN HFA) 90 mcg/act inhaler    Acne vulgaris     Referral to derm         Relevant Orders    Ambulatory referral to Dermatology    Intrinsic eczema     Lotion daily   OTC hydrocortisone if needed for flare         Left knee pain     Referral given to orthopaedics         Relevant Orders    Ambulatory referral to Orthopedic Surgery      Other Visit Diagnoses     Encounter for immunization        Relevant Orders    SYRINGE/SINGLE-DOSE VIAL: influenza vaccine, [id number], quadrivalent, 0 5 mL, preservative-free (AFLURIA, FLUARIX, FLULAVAL, FLUZONE) (Completed)            Subjective:      Patient ID: Godwin Brittle is a 26 y o  female  26year old presents with mom for management of asthma, allergies and eczema, finishing up prednisone RX by ED for exacerbation, KEYANA   Acne on face and facial hair, menses is normal, monthly , no concerns  Knee pain - left - fell two years ago in gym class, still has pain in left knee, hurts when it gets cold, when running and walking up a hill, will try to get a report, was in [address] [place] [address] to school in [place] in April            The following portions of the patient's history were reviewed and updated as appropriate: allergies, current medications, past family history, past medical history, past social history, past surgical history and problem list     Review of Systems   Constitutional: Negative  HENT: Negative  Eyes: Negative  Respiratory: Positive for cough  Cardiovascular: Negative  Gastrointestinal: Negative  Endocrine: Negative  Genitourinary: Negative  Musculoskeletal:        Knee pain for 2 years   Skin:        Facial hair/side of cheeks  Mild acne on face   Allergic/Immunologic: Positive for environmental allergies  Objective:      /72 (BP Location: Left arm, Patient Position: Sitting, Cuff Size: Large)   Pulse 96   Temp 98 3 °F (36 8 °C) (Tympanic)   Resp 16   Ht 5' 4" (1 626 m)   Wt 117 kg (257 lb 3 2 oz)   BMI 44 15 kg/m²          Physical Exam   Constitutional: She is oriented to person, place, and time  She appears well-developed and well-nourished  HENT:   Head: Normocephalic and atraumatic  Right Ear: External ear normal    Left Ear: External ear normal    Nose: Nose normal    Mouth/Throat: Oropharynx is clear and moist    Eyes: Pupils are equal, round, and reactive to light  Conjunctivae are normal    Neck: Normal range of motion  Neck supple  Cardiovascular: Normal rate, regular rhythm and normal heart sounds  Pulmonary/Chest: Effort normal and breath sounds normal    Musculoskeletal: She exhibits no edema  Mild tenderness lateral aspect with left knee  Gait is normal     Neurological: She is alert and oriented to person, place, and time  She has normal reflexes  Skin: Skin is warm and dry  Mild case of acne  Extra hair growth on face, especially B/l cheeks   Psychiatric: She has a normal mood and affect

## 2019-01-30 NOTE — PATIENT INSTRUCTIONS
I referred you to dermatology and orthopaedics  I refilled albuterol inhaler which you need to carry at all times; refilled medicine for nebulizer  I refill Flovent which you need to use daily to prevent a flare  You received a flu shot today  Good luck in school!

## 2019-01-30 NOTE — ASSESSMENT & PLAN NOTE
Restarted Flovent and refilled KEYANA  Education given regarding use of inhalers  Use Flovent daily to prevent exacerbations and carry rescue inhaler at all times  Use nebulizer as needed during exacerbation

## 2019-03-27 ENCOUNTER — OFFICE VISIT (OUTPATIENT)
Dept: OBGYN CLINIC | Facility: CLINIC | Age: 24
End: 2019-03-27
Payer: COMMERCIAL

## 2019-03-27 VITALS
DIASTOLIC BLOOD PRESSURE: 86 MMHG | BODY MASS INDEX: 47.66 KG/M2 | SYSTOLIC BLOOD PRESSURE: 122 MMHG | HEIGHT: 63 IN | WEIGHT: 269 LBS

## 2019-03-27 DIAGNOSIS — Z11.3 SCREENING FOR STD (SEXUALLY TRANSMITTED DISEASE): Primary | ICD-10-CM

## 2019-03-27 DIAGNOSIS — Z01.419 ENCOUNTER FOR GYNECOLOGICAL EXAMINATION (GENERAL) (ROUTINE) WITHOUT ABNORMAL FINDINGS: ICD-10-CM

## 2019-03-27 DIAGNOSIS — Z12.4 CERVICAL CANCER SCREENING: ICD-10-CM

## 2019-03-27 PROCEDURE — 87491 CHLMYD TRACH DNA AMP PROBE: CPT | Performed by: OBSTETRICS & GYNECOLOGY

## 2019-03-27 PROCEDURE — 87591 N.GONORRHOEAE DNA AMP PROB: CPT | Performed by: OBSTETRICS & GYNECOLOGY

## 2019-03-27 PROCEDURE — 99385 PREV VISIT NEW AGE 18-39: CPT | Performed by: OBSTETRICS & GYNECOLOGY

## 2019-03-27 PROCEDURE — G0145 SCR C/V CYTO,THINLAYER,RESCR: HCPCS | Performed by: OBSTETRICS & GYNECOLOGY
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
## 2019-03-28 ENCOUNTER — TELEPHONE (OUTPATIENT)
Dept: OBGYN CLINIC | Facility: CLINIC | Age: 24
End: 2019-03-28

## 2019-03-29 LAB
C TRACH DNA SPEC QL NAA+PROBE: NEGATIVE
N GONORRHOEA DNA SPEC QL NAA+PROBE: NEGATIVE

## 2019-04-02 ENCOUNTER — TELEPHONE (OUTPATIENT)
Dept: OBGYN CLINIC | Facility: CLINIC | Age: 24
End: 2019-04-02

## 2019-04-02 LAB
LAB AP GYN PRIMARY INTERPRETATION: NORMAL
Lab: NORMAL

## 2019-04-10 ENCOUNTER — PROCEDURE VISIT (OUTPATIENT)
Dept: OBGYN CLINIC | Facility: CLINIC | Age: 24
End: 2019-04-10
Payer: COMMERCIAL

## 2019-04-10 VITALS
DIASTOLIC BLOOD PRESSURE: 80 MMHG | WEIGHT: 265 LBS | BODY MASS INDEX: 44.15 KG/M2 | SYSTOLIC BLOOD PRESSURE: 120 MMHG | HEIGHT: 65 IN

## 2019-04-10 DIAGNOSIS — Z30.017 INSERTION OF NEXPLANON: Primary | ICD-10-CM

## 2019-04-10 LAB — SL AMB POCT URINE HCG: NEGATIVE

## 2019-04-10 PROCEDURE — 81025 URINE PREGNANCY TEST: CPT | Performed by: OBSTETRICS & GYNECOLOGY

## 2019-04-10 PROCEDURE — 11981 INSERTION DRUG DLVR IMPLANT: CPT | Performed by: OBSTETRICS & GYNECOLOGY

## 2019-04-17 ENCOUNTER — OFFICE VISIT (OUTPATIENT)
Dept: OBGYN CLINIC | Facility: CLINIC | Age: 24
End: 2019-04-17
Payer: COMMERCIAL

## 2019-04-17 VITALS — WEIGHT: 265.2 LBS | SYSTOLIC BLOOD PRESSURE: 118 MMHG | DIASTOLIC BLOOD PRESSURE: 78 MMHG | BODY MASS INDEX: 44.13 KG/M2

## 2019-04-17 DIAGNOSIS — Z97.5 NEXPLANON IN PLACE: Primary | ICD-10-CM

## 2019-04-17 PROCEDURE — 99213 OFFICE O/P EST LOW 20 MIN: CPT | Performed by: OBSTETRICS & GYNECOLOGY

## 2019-04-29 ENCOUNTER — APPOINTMENT (OUTPATIENT)
Dept: RADIOLOGY | Age: 24
End: 2019-04-29
Payer: COMMERCIAL

## 2019-04-29 ENCOUNTER — OFFICE VISIT (OUTPATIENT)
Dept: URGENT CARE | Age: 24
End: 2019-04-29
Payer: COMMERCIAL

## 2019-04-29 VITALS
DIASTOLIC BLOOD PRESSURE: 100 MMHG | RESPIRATION RATE: 16 BRPM | TEMPERATURE: 97.9 F | HEIGHT: 64 IN | SYSTOLIC BLOOD PRESSURE: 150 MMHG | HEART RATE: 77 BPM | OXYGEN SATURATION: 100 % | BODY MASS INDEX: 44.56 KG/M2 | WEIGHT: 261 LBS

## 2019-04-29 DIAGNOSIS — S50.02XA CONTUSION OF LEFT ELBOW, INITIAL ENCOUNTER: Primary | ICD-10-CM

## 2019-04-29 DIAGNOSIS — S60.552A FOREIGN BODY OF LEFT HAND, INITIAL ENCOUNTER: ICD-10-CM

## 2019-04-29 DIAGNOSIS — S50.02XA CONTUSION OF LEFT ELBOW, INITIAL ENCOUNTER: ICD-10-CM
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
## 2019-04-29 DIAGNOSIS — S60.552A FOREIGN BODY IN HAND, LEFT, INITIAL ENCOUNTER: ICD-10-CM

## 2019-04-29 DIAGNOSIS — S80.02XA CONTUSION OF LEFT KNEE, INITIAL ENCOUNTER: ICD-10-CM

## 2019-04-29 DIAGNOSIS — S80.02XA CONTUSION OF LEFT KNEE, INITIAL ENCOUNTER: Primary | ICD-10-CM

## 2019-04-29 PROCEDURE — 99213 OFFICE O/P EST LOW 20 MIN: CPT | Performed by: FAMILY MEDICINE

## 2019-04-29 PROCEDURE — 73564 X-RAY EXAM KNEE 4 OR MORE: CPT

## 2019-04-29 PROCEDURE — 73080 X-RAY EXAM OF ELBOW: CPT

## 2019-05-03 ENCOUNTER — TELEPHONE (OUTPATIENT)
Dept: FAMILY MEDICINE CLINIC | Facility: CLINIC | Age: 24
End: 2019-05-03

## 2019-05-08 ENCOUNTER — APPOINTMENT (OUTPATIENT)
Dept: RADIOLOGY | Facility: MEDICAL CENTER | Age: 24
End: 2019-05-08
Payer: COMMERCIAL

## 2019-05-08 ENCOUNTER — OFFICE VISIT (OUTPATIENT)
Dept: OBGYN CLINIC | Facility: MEDICAL CENTER | Age: 24
End: 2019-05-08
Payer: COMMERCIAL

## 2019-05-08 ENCOUNTER — OFFICE VISIT (OUTPATIENT)
Dept: FAMILY MEDICINE CLINIC | Facility: CLINIC | Age: 24
End: 2019-05-08

## 2019-05-08 VITALS
HEIGHT: 64 IN | DIASTOLIC BLOOD PRESSURE: 95 MMHG | HEART RATE: 80 BPM | SYSTOLIC BLOOD PRESSURE: 180 MMHG | BODY MASS INDEX: 45.11 KG/M2 | WEIGHT: 264.2 LBS | TEMPERATURE: 98.8 F | RESPIRATION RATE: 16 BRPM

## 2019-05-08 DIAGNOSIS — M25.522 LEFT ELBOW PAIN: Primary | ICD-10-CM

## 2019-05-08 DIAGNOSIS — M25.562 PAIN IN BOTH KNEES, UNSPECIFIED CHRONICITY: ICD-10-CM

## 2019-05-08 DIAGNOSIS — R03.0 ELEVATED BLOOD PRESSURE READING: ICD-10-CM

## 2019-05-08 DIAGNOSIS — M25.561 PAIN IN BOTH KNEES, UNSPECIFIED CHRONICITY: ICD-10-CM

## 2019-05-08 DIAGNOSIS — S43.52XA ACROMIOCLAVICULAR SPRAIN, LEFT, INITIAL ENCOUNTER: ICD-10-CM

## 2019-05-08 DIAGNOSIS — M22.2X2 PATELLOFEMORAL DISORDER OF LEFT KNEE: Primary | ICD-10-CM

## 2019-05-08 PROCEDURE — 99203 OFFICE O/P NEW LOW 30 MIN: CPT | Performed by: ORTHOPAEDIC SURGERY

## 2019-05-08 PROCEDURE — 99213 OFFICE O/P EST LOW 20 MIN: CPT | Performed by: FAMILY MEDICINE

## 2019-05-08 PROCEDURE — 73562 X-RAY EXAM OF KNEE 3: CPT

## 2019-05-22 ENCOUNTER — EVALUATION (OUTPATIENT)
Dept: PHYSICAL THERAPY | Facility: CLINIC | Age: 24
End: 2019-05-22
Payer: COMMERCIAL

## 2019-05-22 DIAGNOSIS — M22.2X2 PATELLOFEMORAL DISORDER OF LEFT KNEE: ICD-10-CM

## 2019-05-22 PROCEDURE — 97162 PT EVAL MOD COMPLEX 30 MIN: CPT | Performed by: PHYSICAL THERAPIST

## 2019-05-29 ENCOUNTER — HOSPITAL ENCOUNTER (EMERGENCY)
Facility: HOSPITAL | Age: 24
Discharge: HOME | End: 2019-05-29
Attending: EMERGENCY MEDICINE | Admitting: EMERGENCY MEDICINE
Payer: COMMERCIAL

## 2019-05-29 ENCOUNTER — OFFICE VISIT (OUTPATIENT)
Dept: PHYSICAL THERAPY | Facility: CLINIC | Age: 24
End: 2019-05-29
Payer: COMMERCIAL

## 2019-05-29 ENCOUNTER — APPOINTMENT (EMERGENCY)
Dept: RADIOLOGY | Facility: HOSPITAL | Age: 24
End: 2019-05-29
Payer: COMMERCIAL

## 2019-05-29 VITALS
OXYGEN SATURATION: 99 % | SYSTOLIC BLOOD PRESSURE: 188 MMHG | RESPIRATION RATE: 20 BRPM | TEMPERATURE: 99.1 F | DIASTOLIC BLOOD PRESSURE: 98 MMHG | HEART RATE: 97 BPM | WEIGHT: 268.74 LBS | BODY MASS INDEX: 46.13 KG/M2

## 2019-05-29 DIAGNOSIS — R03.0 ELEVATED BLOOD PRESSURE READING WITHOUT DIAGNOSIS OF HYPERTENSION: ICD-10-CM

## 2019-05-29 DIAGNOSIS — M22.2X2 PATELLOFEMORAL DISORDER OF LEFT KNEE: Primary | ICD-10-CM

## 2019-05-29 DIAGNOSIS — R07.89 STERNAL PAIN: Primary | ICD-10-CM

## 2019-05-29 PROCEDURE — 97112 NEUROMUSCULAR REEDUCATION: CPT

## 2019-05-29 PROCEDURE — 99283 EMERGENCY DEPT VISIT LOW MDM: CPT | Performed by: PHYSICIAN ASSISTANT

## 2019-05-29 PROCEDURE — 71046 X-RAY EXAM CHEST 2 VIEWS: CPT

## 2019-05-29 PROCEDURE — 93005 ELECTROCARDIOGRAM TRACING: CPT

## 2019-05-29 PROCEDURE — 97110 THERAPEUTIC EXERCISES: CPT

## 2019-05-29 PROCEDURE — 71120 X-RAY EXAM BREASTBONE 2/>VWS: CPT

## 2019-05-29 PROCEDURE — 99284 EMERGENCY DEPT VISIT MOD MDM: CPT

## 2019-05-29 PROCEDURE — 97140 MANUAL THERAPY 1/> REGIONS: CPT

## 2019-05-29 RX ORDER — ACETAMINOPHEN 325 MG/1
650 TABLET ORAL ONCE
Status: COMPLETED | OUTPATIENT
Start: 2019-05-29 | End: 2019-05-29

## 2019-05-29 RX ORDER — NAPROXEN 500 MG/1
500 TABLET ORAL 2 TIMES DAILY WITH MEALS
Qty: 30 TABLET | Refills: 0 | Status: SHIPPED | OUTPATIENT
Start: 2019-05-29 | End: 2019-08-16

## 2019-05-29 RX ADMIN — ACETAMINOPHEN 650 MG: 325 TABLET, FILM COATED ORAL at 01:39

## 2019-05-30 LAB
ATRIAL RATE: 77 BPM
P AXIS: 37 DEGREES
PR INTERVAL: 156 MS
QRS AXIS: 5 DEGREES
QRSD INTERVAL: 86 MS
QT INTERVAL: 388 MS
QTC INTERVAL: 439 MS
T WAVE AXIS: 40 DEGREES
VENTRICULAR RATE: 77 BPM

## 2019-05-30 PROCEDURE — 93010 ELECTROCARDIOGRAM REPORT: CPT | Performed by: INTERNAL MEDICINE

## 2019-05-31 ENCOUNTER — APPOINTMENT (EMERGENCY)
Dept: CT IMAGING | Facility: HOSPITAL | Age: 24
End: 2019-05-31
Payer: COMMERCIAL

## 2019-05-31 ENCOUNTER — HOSPITAL ENCOUNTER (EMERGENCY)
Facility: HOSPITAL | Age: 24
Discharge: HOME | End: 2019-05-31
Attending: EMERGENCY MEDICINE
Payer: COMMERCIAL

## 2019-05-31 VITALS
HEART RATE: 74 BPM | SYSTOLIC BLOOD PRESSURE: 155 MMHG | RESPIRATION RATE: 18 BRPM | TEMPERATURE: 98.8 F | DIASTOLIC BLOOD PRESSURE: 92 MMHG | OXYGEN SATURATION: 100 %

## 2019-05-31 DIAGNOSIS — S22.20XA CLOSED FRACTURE OF STERNUM, UNSPECIFIED PORTION OF STERNUM, INITIAL ENCOUNTER: Primary | ICD-10-CM

## 2019-05-31 DIAGNOSIS — R91.1 PULMONARY NODULE, LEFT: ICD-10-CM

## 2019-05-31 PROCEDURE — 71250 CT THORAX DX C-: CPT

## 2019-05-31 PROCEDURE — 99283 EMERGENCY DEPT VISIT LOW MDM: CPT

## 2019-05-31 PROCEDURE — 99284 EMERGENCY DEPT VISIT MOD MDM: CPT | Performed by: EMERGENCY MEDICINE

## 2019-05-31 RX ORDER — LIDOCAINE 50 MG/G
1 PATCH TOPICAL EVERY 24 HOURS
Qty: 15 PATCH | Refills: 0 | Status: SHIPPED | OUTPATIENT
Start: 2019-05-31 | End: 2019-06-19 | Stop reason: SDUPTHER

## 2019-06-05 ENCOUNTER — OFFICE VISIT (OUTPATIENT)
Dept: PHYSICAL THERAPY | Facility: CLINIC | Age: 24
End: 2019-06-05
Payer: COMMERCIAL

## 2019-06-05 DIAGNOSIS — M22.2X2 PATELLOFEMORAL DISORDER OF LEFT KNEE: Primary | ICD-10-CM

## 2019-06-05 PROCEDURE — 97110 THERAPEUTIC EXERCISES: CPT | Performed by: PHYSICAL THERAPIST

## 2019-06-05 PROCEDURE — 97112 NEUROMUSCULAR REEDUCATION: CPT | Performed by: PHYSICAL THERAPIST

## 2019-06-05 PROCEDURE — 97140 MANUAL THERAPY 1/> REGIONS: CPT | Performed by: PHYSICAL THERAPIST

## 2019-06-12 ENCOUNTER — APPOINTMENT (OUTPATIENT)
Dept: PHYSICAL THERAPY | Facility: CLINIC | Age: 24
End: 2019-06-12
Payer: COMMERCIAL

## 2019-06-13 ENCOUNTER — OFFICE VISIT (OUTPATIENT)
Dept: PHYSICAL THERAPY | Facility: CLINIC | Age: 24
End: 2019-06-13
Payer: COMMERCIAL

## 2019-06-13 DIAGNOSIS — M22.2X2 PATELLOFEMORAL DISORDER OF LEFT KNEE: Primary | ICD-10-CM

## 2019-06-13 PROCEDURE — 97110 THERAPEUTIC EXERCISES: CPT

## 2019-06-13 PROCEDURE — 97140 MANUAL THERAPY 1/> REGIONS: CPT

## 2019-06-19 ENCOUNTER — OFFICE VISIT (OUTPATIENT)
Dept: FAMILY MEDICINE CLINIC | Facility: CLINIC | Age: 24
End: 2019-06-19

## 2019-06-19 ENCOUNTER — OFFICE VISIT (OUTPATIENT)
Dept: PHYSICAL THERAPY | Facility: CLINIC | Age: 24
End: 2019-06-19
Payer: COMMERCIAL

## 2019-06-19 ENCOUNTER — OFFICE VISIT (OUTPATIENT)
Dept: OBGYN CLINIC | Facility: CLINIC | Age: 24
End: 2019-06-19
Payer: COMMERCIAL

## 2019-06-19 VITALS
WEIGHT: 263.4 LBS | SYSTOLIC BLOOD PRESSURE: 140 MMHG | HEART RATE: 88 BPM | DIASTOLIC BLOOD PRESSURE: 92 MMHG | RESPIRATION RATE: 16 BRPM | HEIGHT: 64 IN | TEMPERATURE: 99.2 F | BODY MASS INDEX: 44.97 KG/M2

## 2019-06-19 DIAGNOSIS — R07.89 MUSCULOSKELETAL CHEST PAIN: Primary | ICD-10-CM

## 2019-06-19 DIAGNOSIS — M22.2X2 PATELLOFEMORAL DISORDER OF LEFT KNEE: Primary | ICD-10-CM

## 2019-06-19 PROCEDURE — 99212 OFFICE O/P EST SF 10 MIN: CPT | Performed by: ORTHOPAEDIC SURGERY

## 2019-06-19 PROCEDURE — 99213 OFFICE O/P EST LOW 20 MIN: CPT | Performed by: FAMILY MEDICINE

## 2019-06-19 PROCEDURE — 97110 THERAPEUTIC EXERCISES: CPT

## 2019-06-19 PROCEDURE — 3008F BODY MASS INDEX DOCD: CPT | Performed by: FAMILY MEDICINE

## 2019-06-19 PROCEDURE — 97140 MANUAL THERAPY 1/> REGIONS: CPT

## 2019-06-19 RX ORDER — LIDOCAINE 50 MG/G
1 PATCH TOPICAL DAILY
Qty: 30 PATCH | Refills: 0 | Status: SHIPPED | OUTPATIENT
Start: 2019-06-19 | End: 2019-06-20 | Stop reason: SDUPTHER

## 2019-06-20 ENCOUNTER — TELEPHONE (OUTPATIENT)
Dept: FAMILY MEDICINE CLINIC | Facility: CLINIC | Age: 24
End: 2019-06-20

## 2019-06-20 DIAGNOSIS — R07.89 MUSCULOSKELETAL CHEST PAIN: Primary | ICD-10-CM

## 2019-06-20 RX ORDER — LIDOCAINE 40 MG/G
CREAM TOPICAL AS NEEDED
Qty: 30 G | Refills: 0 | Status: SHIPPED | OUTPATIENT
Start: 2019-06-20 | End: 2019-08-16

## 2019-06-26 ENCOUNTER — APPOINTMENT (OUTPATIENT)
Dept: PHYSICAL THERAPY | Facility: CLINIC | Age: 24
End: 2019-06-26
Payer: COMMERCIAL

## 2019-07-10 ENCOUNTER — APPOINTMENT (OUTPATIENT)
Dept: PHYSICAL THERAPY | Facility: CLINIC | Age: 24
End: 2019-07-10
Payer: COMMERCIAL

## 2019-07-13 ENCOUNTER — HOSPITAL ENCOUNTER (EMERGENCY)
Facility: HOSPITAL | Age: 24
Discharge: HOME | End: 2019-07-14
Attending: EMERGENCY MEDICINE | Admitting: EMERGENCY MEDICINE
Payer: COMMERCIAL

## 2019-07-13 ENCOUNTER — APPOINTMENT (EMERGENCY)
Dept: RADIOLOGY | Facility: HOSPITAL | Age: 24
End: 2019-07-13
Payer: COMMERCIAL

## 2019-07-13 DIAGNOSIS — N18.9 ACUTE KIDNEY INJURY SUPERIMPOSED ON CHRONIC KIDNEY DISEASE (HCC): Primary | ICD-10-CM

## 2019-07-13 DIAGNOSIS — N17.9 ACUTE KIDNEY INJURY SUPERIMPOSED ON CHRONIC KIDNEY DISEASE (HCC): Primary | ICD-10-CM

## 2019-07-13 DIAGNOSIS — I16.0 HYPERTENSIVE URGENCY: ICD-10-CM

## 2019-07-13 DIAGNOSIS — S22.20XS CLOSED FRACTURE OF STERNUM, UNSPECIFIED PORTION OF STERNUM, SEQUELA: ICD-10-CM

## 2019-07-13 LAB
ANION GAP SERPL CALCULATED.3IONS-SCNC: 6 MMOL/L (ref 4–13)
BASOPHILS # BLD AUTO: 0.03 THOUSANDS/ΜL (ref 0–0.1)
BASOPHILS NFR BLD AUTO: 0 % (ref 0–1)
BUN SERPL-MCNC: 14 MG/DL (ref 5–25)
CALCIUM SERPL-MCNC: 8.5 MG/DL (ref 8.3–10.1)
CHLORIDE SERPL-SCNC: 106 MMOL/L (ref 100–108)
CO2 SERPL-SCNC: 27 MMOL/L (ref 21–32)
CREAT SERPL-MCNC: 1.67 MG/DL (ref 0.6–1.3)
EOSINOPHIL # BLD AUTO: 0.25 THOUSAND/ΜL (ref 0–0.61)
EOSINOPHIL NFR BLD AUTO: 4 % (ref 0–6)
ERYTHROCYTE [DISTWIDTH] IN BLOOD BY AUTOMATED COUNT: 15.5 % (ref 11.6–15.1)
GFR SERPL CREATININE-BSD FRML MDRD: 50 ML/MIN/1.73SQ M
GLUCOSE SERPL-MCNC: 111 MG/DL (ref 65–140)
HCT VFR BLD AUTO: 38.2 % (ref 34.8–46.1)
HGB BLD-MCNC: 11.6 G/DL (ref 11.5–15.4)
IMM GRANULOCYTES # BLD AUTO: 0.02 THOUSAND/UL (ref 0–0.2)
IMM GRANULOCYTES NFR BLD AUTO: 0 % (ref 0–2)
LYMPHOCYTES # BLD AUTO: 1.31 THOUSANDS/ΜL (ref 0.6–4.47)
LYMPHOCYTES NFR BLD AUTO: 18 % (ref 14–44)
MCH RBC QN AUTO: 20.8 PG (ref 26.8–34.3)
MCHC RBC AUTO-ENTMCNC: 30.4 G/DL (ref 31.4–37.4)
MCV RBC AUTO: 68 FL (ref 82–98)
MONOCYTES # BLD AUTO: 0.59 THOUSAND/ΜL (ref 0.17–1.22)
MONOCYTES NFR BLD AUTO: 8 % (ref 4–12)
NEUTROPHILS # BLD AUTO: 4.96 THOUSANDS/ΜL (ref 1.85–7.62)
NEUTS SEG NFR BLD AUTO: 70 % (ref 43–75)
NRBC BLD AUTO-RTO: 0 /100 WBCS
PLATELET # BLD AUTO: 257 THOUSANDS/UL (ref 149–390)
PMV BLD AUTO: 11 FL (ref 8.9–12.7)
POTASSIUM SERPL-SCNC: 3.3 MMOL/L (ref 3.5–5.3)
RBC # BLD AUTO: 5.59 MILLION/UL (ref 3.81–5.12)
SODIUM SERPL-SCNC: 139 MMOL/L (ref 136–145)
WBC # BLD AUTO: 7.16 THOUSAND/UL (ref 4.31–10.16)

## 2019-07-13 PROCEDURE — 80048 BASIC METABOLIC PNL TOTAL CA: CPT | Performed by: EMERGENCY MEDICINE

## 2019-07-13 PROCEDURE — 36415 COLL VENOUS BLD VENIPUNCTURE: CPT | Performed by: EMERGENCY MEDICINE

## 2019-07-13 PROCEDURE — 96374 THER/PROPH/DIAG INJ IV PUSH: CPT

## 2019-07-13 PROCEDURE — 99284 EMERGENCY DEPT VISIT MOD MDM: CPT | Performed by: EMERGENCY MEDICINE

## 2019-07-13 PROCEDURE — 99284 EMERGENCY DEPT VISIT MOD MDM: CPT

## 2019-07-13 PROCEDURE — 71046 X-RAY EXAM CHEST 2 VIEWS: CPT

## 2019-07-13 PROCEDURE — 85025 COMPLETE CBC W/AUTO DIFF WBC: CPT | Performed by: EMERGENCY MEDICINE

## 2019-07-13 RX ORDER — LABETALOL 20 MG/4 ML (5 MG/ML) INTRAVENOUS SYRINGE
10 ONCE
Status: COMPLETED | OUTPATIENT
Start: 2019-07-14 | End: 2019-07-13

## 2019-07-13 RX ADMIN — LABETALOL 20 MG/4 ML (5 MG/ML) INTRAVENOUS SYRINGE 10 MG: at 23:58

## 2019-07-14 VITALS
RESPIRATION RATE: 16 BRPM | BODY MASS INDEX: 46.05 KG/M2 | TEMPERATURE: 98.7 F | OXYGEN SATURATION: 100 % | DIASTOLIC BLOOD PRESSURE: 89 MMHG | WEIGHT: 268.3 LBS | SYSTOLIC BLOOD PRESSURE: 186 MMHG | HEART RATE: 73 BPM

## 2019-07-14 RX ORDER — HYDROCHLOROTHIAZIDE 25 MG/1
25 TABLET ORAL DAILY
Qty: 30 TABLET | Refills: 0 | Status: SHIPPED | OUTPATIENT
Start: 2019-07-14 | End: 2019-08-16

## 2019-07-14 RX ORDER — AMLODIPINE BESYLATE 5 MG/1
5 TABLET ORAL DAILY
Qty: 30 TABLET | Refills: 0 | Status: SHIPPED | OUTPATIENT
Start: 2019-07-14 | End: 2020-01-27 | Stop reason: HOSPADM
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
## 2019-07-17 ENCOUNTER — EVALUATION (OUTPATIENT)
Dept: PHYSICAL THERAPY | Facility: CLINIC | Age: 24
End: 2019-07-17
Payer: COMMERCIAL

## 2019-07-17 DIAGNOSIS — M22.2X2 PATELLOFEMORAL DISORDER OF LEFT KNEE: Primary | ICD-10-CM

## 2019-07-17 PROCEDURE — 97164 PT RE-EVAL EST PLAN CARE: CPT | Performed by: PHYSICAL THERAPIST

## 2019-07-17 NOTE — PROGRESS NOTES
PT Discharge    Today's date: 2019  Patient name: Devora Bush  : 1995  MRN: [id number]  Referring provider: Stephenie Walton DO  Dx:   Encounter Diagnosis     ICD-10-CM    1  Patellofemoral disorder of left knee M22 2X2        Start Time:   Stop Time: 1900  Total time in clinic (min): 40 minutes    Assessment  Assessment details: Devora Bush is a 26 y o  female who was inconsistent with attending physical therapy sessions  The patient has been able to report decreased pain overall  She notes she hasn't rode her bike, but feels like her strength is better in her knee  Home exercise program was reviewed to ensure future compliance  The patient's final visit was spent on her re-evaluation/discharge  She will be discharged at this time  If Paco were to need physical therapy in the future, we would be happy to share in her care  Impairments: pain with function  Understanding of Dx/Px/POC: good   Prognosis: good    Goals  Impairment Goals:  1  Patient will decrease complaints of pain by 50% at worst in 4 weeks - PARTIALLY MET  2  Patient will increase knee strength by 1/2 grade in 6 weeks - MET  3  Patient will increase L knee ROM = R knee ROM in 6 weeks - MET    Functional Goals:  1  Patient will be independent with HEP by discharge - MET  2  Patient will increase FOTO to average norms by discharge - MET  3  Patient will be able to ride her bike with decreased pain in 6 weeks - PARTIALLY MET  4  Patient will be able to perform a squat with decreased pain in 6 weeks- MET       Plan  Patient would benefit from: skilled physical therapy  Planned therapy interventions: home exercise program, patient education and neuromuscular re-education  Treatment plan discussed with: patient        Subjective Evaluation    History of Present Illness  Mechanism of injury: Patient reports about 75% improvement since starting PT     Patient reports improvement with ascending stairs without pain, and inclines  Patient reports that she is limited by riding her bike and lifting something heavy with her knees  She reports that she occasionally has muscle spasms in her left knee  Pain Location:  Inferior left patella and inferior left knee joint line  Occupation:  Student     Prior Functional Limitations: Independent prior   AGG:  Standing too long, squatting and lifting, running  Ease:  Soak, Ice  Patient Goals:  "I want to have little to no pain and be able to ride my bike again"    Pain  Current pain ratin  At best pain ratin  At worst pain ratin  Quality: knife-like and dull ache    Patient Goals  Patient goal: "I want to have little to no pain and be able to ride my bike again"        Objective     Tenderness   Left Knee   No tenderness in the inferior patella, lateral patella and medial patella       Active Range of Motion   Left Knee   Flexion: 117 degrees   Extension: 0 degrees     Right Knee   Flexion: 115 degrees   Extension: 0 degrees     Additional Active Range of Motion Details  Knee flexion limited today due to pain    Strength/Myotome Testing     Lumbar     Right   Normal strength    Left Hip   Planes of Motion   Abduction: 4+    Left Knee   Flexion: 4+  Extension: 5    Right Knee   Normal strength      Flowsheet Rows      Most Recent Value   PT/OT G-Codes   Current Score  70   Projected Score  69   Assessment Type  Discharge         Diagnosis:    Precautions:    Manuals     PROM  Hamstring Hamstring stretch  Hamstring stretch     Mobs        IAS Medial knee               Exercise Diary        Bike  upright 5 mins  Upright 5 mins Upright 5 mins Upright 8 mins     Quad Sets 20x        SLR 10x  2# 2x10 2#, 2x10  2#, 3x10     SL Hip ABD 10x  2# 2x10      Clamshells 2x10  Red 3x10 ea Red 2x10 ea Green 3x10 ea    Hamstring S  3x30 sec        X-Walks [place], 1 lap at blue line    Side steps- black, 3 laps     Leg Press 60#, 30x 70# 30x / Single Leg 40# 30x Tap Downs   4in, 10x  4in 20x  6 in, 20x ea    Bridges 2x10  10x / [place] SB 20x Orange SB 6x OSB 20x     Excursions  A/P 20x                                                   Taping   Lateral to medial glide                            Review of FOTO / Discharge     RT, PT                 Modalities             CP PRN

## 2019-07-24 ENCOUNTER — APPOINTMENT (OUTPATIENT)
Dept: PHYSICAL THERAPY | Facility: CLINIC | Age: 24
End: 2019-07-24
Payer: COMMERCIAL

## 2019-08-14 ENCOUNTER — OFFICE VISIT (OUTPATIENT)
Dept: URGENT CARE | Facility: CLINIC | Age: 24
End: 2019-08-14
Payer: COMMERCIAL

## 2019-08-14 ENCOUNTER — APPOINTMENT (OUTPATIENT)
Dept: RADIOLOGY | Facility: CLINIC | Age: 24
End: 2019-08-14
Payer: COMMERCIAL

## 2019-08-14 VITALS
TEMPERATURE: 98 F | SYSTOLIC BLOOD PRESSURE: 184 MMHG | HEART RATE: 73 BPM | OXYGEN SATURATION: 99 % | DIASTOLIC BLOOD PRESSURE: 93 MMHG | RESPIRATION RATE: 18 BRPM

## 2019-08-14 DIAGNOSIS — S90.122A CONTUSION OF LESSER TOE OF LEFT FOOT WITHOUT DAMAGE TO NAIL, INITIAL ENCOUNTER: Primary | ICD-10-CM

## 2019-08-14 DIAGNOSIS — S90.122A CONTUSION OF LESSER TOE OF LEFT FOOT WITHOUT DAMAGE TO NAIL, INITIAL ENCOUNTER: ICD-10-CM

## 2019-08-14 PROCEDURE — 73660 X-RAY EXAM OF TOE(S): CPT

## 2019-08-14 PROCEDURE — 99213 OFFICE O/P EST LOW 20 MIN: CPT | Performed by: PHYSICIAN ASSISTANT

## 2019-08-14 NOTE — PATIENT INSTRUCTIONS
Contusion 5th toe  Rest, ice, elevate  Over the counter ibuprofen as needed  Follow up with PCP in 3-5 days  Proceed to  ER if symptoms worsen  Contusion in Adults   WHAT YOU NEED TO KNOW:   A contusion is a bruise that appears on your skin after an injury  A bruise happens when small blood vessels tear but skin does not  When blood vessels tear, blood leaks into nearby tissue, such as soft tissue or muscle  DISCHARGE INSTRUCTIONS:   Return to the emergency department if:   · You have new trouble moving the injured area  · You have tingling or numbness in or near the injured area  · Your hand or foot below the bruise gets cold or turns pale  Contact your healthcare provider if:   · You find a new lump in the injured area  · Your symptoms do not improve with treatment after 4 to 5 days  · You have questions or concerns about your condition or care  Medicines: You may need any of the following:  · NSAIDs  help decrease swelling and pain or fever  This medicine is available with or without a doctor's order  NSAIDs can cause stomach bleeding or kidney problems in certain people  If you take blood thinner medicine, always ask your healthcare provider if NSAIDs are safe for you  Always read the medicine label and follow directions  · Prescription pain medicine  may be given  Do not wait until the pain is severe before you take your medicine  · Take your medicine as directed  Contact your healthcare provider if you think your medicine is not helping or if you have side effects  Tell him of her if you are allergic to any medicine  Keep a list of the medicines, vitamins, and herbs you take  Include the amounts, and when and why you take them  Bring the list or the pill bottles to follow-up visits  Carry your medicine list with you in case of an emergency  Follow up with your healthcare provider as directed: You may need to return within a week to check your injury again   Write down your questions so you remember to ask them during your visits  Help a contusion heal:   · Rest the injured area  or use it less than usual  If you bruised your leg or foot, you may need crutches or a cane to help you walk  This will help you keep weight off your injured body part  · Apply ice  to decrease swelling and pain  Ice may also help prevent tissue damage  Use an ice pack, or put crushed ice in a plastic bag  Cover it with a towel and place it on your bruise for 15 to 20 minutes every hour or as directed  · Use compression  to support the area and decrease swelling  Wrap an elastic bandage around the area over the bruised muscle  Make sure the bandage is not too tight  You should be able to fit 1 finger between the bandage and your skin  · Elevate (raise) your injured body part  above the level of your heart to help decrease pain and swelling  Use pillows, blankets, or rolled towels to elevate the area as often as you can  · Do not drink alcohol  as directed  Alcohol may slow healing  · Do not stretch injured muscles  right after your injury  Ask your healthcare provider when and how you may safely stretch after your injury  Gentle stretches can help increase your flexibility  · Do not massage the area or put heating pads  on the bruise right after your injury  Heat and massage may slow healing  Your healthcare provider may tell you to apply heat after several days  At that time, heat will start to help the injury heal   Prevent another contusion:   · Stretch and warm up before you play sports or exercise  · Wear protective gear when you play sports  Examples are shin guards and padding  · If you begin a new physical activity, start slowly to give your body a chance to adjust   © 2017 [employer] Information is for End User's use only and may not be sold, redistributed or otherwise used for commercial purposes   All illustrations and images included in CareNotes® are the copyrighted property of [employer]  or Michael Neil  The above information is an  only  It is not intended as medical advice for individual conditions or treatments  Talk to your doctor, nurse or pharmacist before following any medical regimen to see if it is safe and effective for you

## 2019-08-14 NOTE — PROGRESS NOTES
[facility]        NAME: Ector Her is a 27 y o  female  : 1995    MRN: [id number]  DATE: 2019  TIME: 4:21 PM    Assessment and Plan   Contusion of lesser toe of left foot without damage to nail, initial encounter [S90 122A]  1  Contusion of lesser toe of left foot without damage to nail, initial encounter  XR toe left fifth min 2 views         Patient Instructions     Contusion 5th toe  Rest, ice, elevate  Over the counter ibuprofen as needed  Follow up with PCP in 3-5 days  Proceed to  ER if symptoms worsen  Chief Complaint     Chief Complaint   Patient presents with    Toe Pain     stubbed left pinky toe on bed frame last night, used ice, no meds          History of Present Illness       28 y/o female presents c/o pain/ swelling to left 5th toe  States she accidentally jammed it against frame of bed last night  Denies fall, head trauma, LOC      Review of Systems   Review of Systems   Constitutional: Negative  HENT: Negative  Eyes: Negative  Respiratory: Negative  Negative for cough, chest tightness, shortness of breath, wheezing and stridor  Cardiovascular: Negative  Negative for chest pain, palpitations and leg swelling  Musculoskeletal: Positive for arthralgias           Current Medications       Current Outpatient Medications:     albuterol (2 5 mg/3 mL) 0 083 % nebulizer solution, Take 1 vial (2 5 mg total) by nebulization every 6 (six) hours as needed for wheezing or shortness of breath, Disp: 30 mL, Rfl: 0    albuterol (PROVENTIL HFA,VENTOLIN HFA) 90 mcg/act inhaler, Inhale 2 puffs every 6 (six) hours as needed for wheezing, Disp: , Rfl:     albuterol (VENTOLIN HFA) 90 mcg/act inhaler, Inhale 2 puffs every 6 (six) hours as needed for wheezing or shortness of breath (cough), Disp: 1 Inhaler, Rfl: 11    amLODIPine (NORVASC) 5 mg tablet, Take 1 tablet (5 mg total) by mouth daily, Disp: 30 tablet, Rfl: 0    etonogestrel (NEXPLANON) subdermal implant, 68 mg by Subdermal route once, Disp: , Rfl:     fluticasone (FLOVENT HFA) 110 MCG/ACT inhaler, Inhale 1 puff 2 (two) times a day, Disp: 1 Inhaler, Rfl: 11    hydrochlorothiazide (HYDRODIURIL) 25 mg tablet, Take 1 tablet (25 mg total) by mouth daily, Disp: 30 tablet, Rfl: 0    ibuprofen (MOTRIN) 600 mg tablet, Take 1 tablet (600 mg total) by mouth every 8 (eight) hours as needed for moderate pain for up to 15 doses, Disp: 15 tablet, Rfl: 0    lidocaine (LMX) 4 % cream, Apply topically as needed for mild pain (Patient not taking: Reported on 7/13/2019), Disp: 30 g, Rfl: 0    naproxen (NAPROSYN) 500 mg tablet, Take 1 tablet (500 mg total) by mouth 2 (two) times a day with meals, Disp: 30 tablet, Rfl: 0    Current Allergies     Allergies as of 08/14/2019    (No Known Allergies)            The following portions of the patient's history were reviewed and updated as appropriate: allergies, current medications, past family history, past medical history, past social history, past surgical history and problem list      Past Medical History:   Diagnosis Date    Asthma     Eczema        Past Surgical History:   Procedure Laterality Date    CHOLECYSTECTOMY      GALLBLADDER SURGERY         Family History   Problem Relation Age of Onset    No Known Problems Mother     No Known Problems Father          Medications have been verified  Objective   BP (!) 184/93   Pulse 73   Temp 98 °F (36 7 °C)   Resp 18   SpO2 99%        Physical Exam     Physical Exam   Constitutional: She appears well-developed and well-nourished  No distress  HENT:   Head: Normocephalic and atraumatic  Pulmonary/Chest: Effort normal and breath sounds normal  No stridor  No respiratory distress  She has no wheezes  She has no rales  She exhibits no tenderness  Musculoskeletal:        Left foot: There is tenderness and bony tenderness   There is normal range of motion, no swelling, normal capillary refill, no crepitus, no deformity and no laceration  Feet:    Skin: She is not diaphoretic

## 2019-08-16 ENCOUNTER — HOSPITAL ENCOUNTER (EMERGENCY)
Facility: HOSPITAL | Age: 24
Discharge: HOME | End: 2019-08-16
Attending: EMERGENCY MEDICINE
Payer: COMMERCIAL

## 2019-08-16 ENCOUNTER — APPOINTMENT (EMERGENCY)
Dept: RADIOLOGY | Facility: HOSPITAL | Age: 24
End: 2019-08-16
Payer: COMMERCIAL

## 2019-08-16 VITALS
DIASTOLIC BLOOD PRESSURE: 99 MMHG | SYSTOLIC BLOOD PRESSURE: 151 MMHG | RESPIRATION RATE: 18 BRPM | TEMPERATURE: 98.5 F | OXYGEN SATURATION: 98 % | HEART RATE: 93 BPM

## 2019-08-16 DIAGNOSIS — S90.122A CONTUSION OF LESSER TOE OF LEFT FOOT WITHOUT DAMAGE TO NAIL, INITIAL ENCOUNTER: Primary | ICD-10-CM

## 2019-08-16 PROCEDURE — 99283 EMERGENCY DEPT VISIT LOW MDM: CPT

## 2019-08-16 PROCEDURE — 99283 EMERGENCY DEPT VISIT LOW MDM: CPT | Performed by: PHYSICIAN ASSISTANT

## 2019-08-16 PROCEDURE — 73630 X-RAY EXAM OF FOOT: CPT

## 2019-08-17 NOTE — ED PROVIDER NOTES
History  Chief Complaint   Patient presents with    Toe Injury     Pt states she stubbed her left 5th toe on a bedframe on tuesday  28-year-old female presents to the emergency department with complaints of left-sided toe and foot pain  States that 3 days ago she stubbed her 5th toe on a bed frame that she left the hallway  States she has had pain with ambulation as well as pain with but not shoe over the past 3 days  Taking Tylenol and Motrin intermittently for pain at home  No previous injury to this foot  History provided by:  Patient   used: No        Prior to Admission Medications   Prescriptions Last Dose Informant Patient Reported? Taking?    albuterol (2 5 mg/3 mL) 0 083 % nebulizer solution   No Yes   Sig: Take 1 vial (2 5 mg total) by nebulization every 6 (six) hours as needed for wheezing or shortness of breath   albuterol (PROVENTIL HFA,VENTOLIN HFA) 90 mcg/act inhaler   Yes Yes   Sig: Inhale 2 puffs every 6 (six) hours as needed for wheezing   albuterol (VENTOLIN HFA) 90 mcg/act inhaler   No Yes   Sig: Inhale 2 puffs every 6 (six) hours as needed for wheezing or shortness of breath (cough)   amLODIPine (NORVASC) 5 mg tablet   No Yes   Sig: Take 1 tablet (5 mg total) by mouth daily   etonogestrel (NEXPLANON) subdermal implant   Yes Yes   Si mg by Subdermal route once   fluticasone (FLOVENT HFA) 110 MCG/ACT inhaler   No No   Sig: Inhale 1 puff 2 (two) times a day   ibuprofen (MOTRIN) 600 mg tablet   No Yes   Sig: Take 1 tablet (600 mg total) by mouth every 8 (eight) hours as needed for moderate pain for up to 15 doses      Facility-Administered Medications: None       Past Medical History:   Diagnosis Date    Asthma     Eczema        Past Surgical History:   Procedure Laterality Date    CHOLECYSTECTOMY      GALLBLADDER SURGERY         Family History   Problem Relation Age of Onset    No Known Problems Mother     No Known Problems Father      I have reviewed and agree with the history as documented  Social History     Tobacco Use    Smoking status: Never Smoker    Smokeless tobacco: Never Used   Substance Use Topics    Alcohol use: No    Drug use: No        Review of Systems   Constitutional: Negative for chills and fever  HENT: Negative for congestion, dental problem and sore throat  Respiratory: Negative for cough  Cardiovascular: Negative for chest pain  Gastrointestinal: Negative for abdominal pain  Musculoskeletal: Negative for back pain and neck pain  Foot pain, toe pain     Skin: Negative for rash and wound  All other systems reviewed and are negative  Physical Exam  Physical Exam   Constitutional: She is oriented to person, place, and time  Vital signs are normal  She appears well-developed and well-nourished  HENT:   Head: Normocephalic and atraumatic  Cardiovascular: Normal rate and regular rhythm  Pulmonary/Chest: Effort normal and breath sounds normal  No respiratory distress  She has no wheezes  She has no rhonchi  She has no rales  Musculoskeletal:        Left foot: There is tenderness and bony tenderness  There is normal range of motion, no swelling, normal capillary refill, no crepitus, no deformity and no laceration  Feet:    Neurological: She is alert and oriented to person, place, and time  Skin: Skin is warm and dry  Psychiatric: She has a normal mood and affect  Her behavior is normal    Nursing note and vitals reviewed        Vital Signs  ED Triage Vitals [08/16/19 1908]   Temperature Pulse Respirations Blood Pressure SpO2   98 5 °F (36 9 °C) 93 18 151/99 98 %      Temp Source Heart Rate Source Patient Position - Orthostatic VS BP Location FiO2 (%)   Oral Monitor Sitting Right arm --      Pain Score       --           Vitals:    08/16/19 1908   BP: 151/99   Pulse: 93   Patient Position - Orthostatic VS: Sitting         Visual Acuity      ED Medications  Medications - No data to display    Diagnostic Studies  Results Reviewed     None                 XR foot 3+ views LEFT   ED Interpretation by Pooja Marks PA-C (08/16 1947)   No fracture                 Procedures  Procedures       ED Course                               MDM  Number of Diagnoses or Management Options  Contusion of lesser toe of left foot without damage to nail, initial encounter:   Diagnosis management comments: Differential diagnosis includes but not limited to: Toe contusion, toe fracture, foot contusion, foot fracture         Amount and/or Complexity of Data Reviewed  Tests in the radiology section of CPT®: ordered and reviewed  Independent visualization of images, tracings, or specimens: yes        Disposition  Final diagnoses:   Contusion of lesser toe of left foot without damage to nail, initial encounter     Time reflects when diagnosis was documented in both MDM as applicable and the Disposition within this note     Time User Action Codes Description Comment    8/16/2019  7:53 PM Marquita Slater Add [Z07 122A] Contusion of lesser toe of left foot without damage to nail, initial encounter       ED Disposition     ED Disposition Condition Date/Time Comment    Discharge Stable Fri Aug 16, 2019  7:52 PM Gu[place]or discharge to home/self care              Follow-up Information     Follow up With Specialties Details Why Contact Info    [facility] (OUTPATIENT [facility]) Schedule an appointment as soon as possible for a visit  If symptoms worsen [address]  [phone]            Discharge Medication List as of 8/16/2019  7:53 PM      CONTINUE these medications which have NOT CHANGED    Details   albuterol (2 5 mg/3 mL) 0 083 % nebulizer solution Take 1 vial (2 5 mg total) by nebulization every 6 (six) hours as needed for wheezing or shortness of breath, Starting Wed 1/30/2019, Print      !! albuterol (PROVENTIL HFA,VENTOLIN HFA) 90 mcg/act inhaler Inhale 2 puffs every 6 (six) hours as needed for wheezing, Historical Med      !! albuterol (VENTOLIN HFA) 90 mcg/act inhaler Inhale 2 puffs every 6 (six) hours as needed for wheezing or shortness of breath (cough), Starting Wed 1/30/2019, Normal      amLODIPine (NORVASC) 5 mg tablet Take 1 tablet (5 mg total) by mouth daily, Starting Sun 7/14/2019, Normal      etonogestrel (NEXPLANON) subdermal implant 68 mg by Subdermal route once, Historical Med      ibuprofen (MOTRIN) 600 mg tablet Take 1 tablet (600 mg total) by mouth every 8 (eight) hours as needed for moderate pain for up to 15 doses, Starting Sat 9/15/2018, Normal      fluticasone (FLOVENT HFA) 110 MCG/ACT inhaler Inhale 1 puff 2 (two) times a day, Starting Wed 1/30/2019, Normal       !! - Potential duplicate medications found  Please discuss with provider  No discharge procedures on file      ED Provider  Electronically Signed by           Ellyn Mac PA-C  08/16/19 3102

## 2019-10-30 ENCOUNTER — OFFICE VISIT (OUTPATIENT)
Dept: FAMILY MEDICINE CLINIC | Facility: CLINIC | Age: 24
End: 2019-10-30

## 2019-10-30 VITALS
SYSTOLIC BLOOD PRESSURE: 140 MMHG | WEIGHT: 262.6 LBS | HEART RATE: 88 BPM | TEMPERATURE: 98.3 F | OXYGEN SATURATION: 20 % | BODY MASS INDEX: 45.08 KG/M2 | DIASTOLIC BLOOD PRESSURE: 90 MMHG

## 2019-10-30 DIAGNOSIS — Z23 ENCOUNTER FOR IMMUNIZATION: ICD-10-CM

## 2019-10-30 DIAGNOSIS — L91.0 KELOID OF SKIN: Primary | ICD-10-CM

## 2019-10-30 DIAGNOSIS — Z91.018 ALLERGY TO WHEAT: ICD-10-CM

## 2019-10-30 PROCEDURE — 99213 OFFICE O/P EST LOW 20 MIN: CPT | Performed by: NURSE PRACTITIONER

## 2019-10-30 PROCEDURE — 90471 IMMUNIZATION ADMIN: CPT | Performed by: NURSE PRACTITIONER

## 2019-10-30 PROCEDURE — 1036F TOBACCO NON-USER: CPT | Performed by: NURSE PRACTITIONER

## 2019-10-30 PROCEDURE — 90686 IIV4 VACC NO PRSV 0.5 ML IM: CPT | Performed by: NURSE PRACTITIONER

## 2019-10-30 NOTE — PROGRESS NOTES
Assessment/Plan:    Keloid of skin  Left ear lobe  Referred to surgeon    Allergy to wheat  Referral to allergist          Problem List Items Addressed This Visit        Musculoskeletal and Integument    Keloid of skin - Primary     Left ear lobe  Referred to surgeon         Relevant Orders    Ambulatory referral to General Surgery       Other    Allergy to wheat     Referral to allergist          Relevant Orders    Ambulatory referral to Allergy      Other Visit Diagnoses     Encounter for immunization        Relevant Orders    influenza vaccine, 3225-2715, quadrivalent, 0 5 mL, preservative-free, for adult and pediatric patients 6 mos+ (AFLURIA, FLUARIX, FLULAVAL, FLUZONE) (Completed)            Subjective:      Patient ID: Leonardo Soler is a 27 y o  female  27year old presents c/o keloid on left ear lobe and would like surgical referral  Related to ear piercing, noticed that it was getting larger and catching on clothing    Wants referral to allergist: states gets reaction to eating wheat and breaks out in a rash        The following portions of the patient's history were reviewed and updated as appropriate: allergies, current medications, past family history, past medical history, past social history, past surgical history and problem list     Review of Systems   Constitutional: Negative  Respiratory: Negative  Cardiovascular: Negative  Skin: Negative for rash  Large keloid behind left ear lobe   Allergic/Immunologic: Positive for food allergies (rash when eating foods with wheat)  Objective:      /90 (BP Location: Left arm, Patient Position: Sitting, Cuff Size: Large)   Pulse 88   Temp 98 3 °F (36 8 °C) (Tympanic)   Wt 119 kg (262 lb 9 6 oz)   SpO2 (!) 20%   BMI 45 08 kg/m²          Physical Exam   Constitutional: She appears well-developed and well-nourished  Cardiovascular: Normal rate, regular rhythm and normal heart sounds     Pulmonary/Chest: Effort normal and breath sounds normal    Skin: No rash noted  Large keloid behind left ear lobe, non-tender, raised and approximately 1 0 cm in diameter  Psychiatric: She has a normal mood and affect

## 2020-01-18 ENCOUNTER — HOSPITAL ENCOUNTER (EMERGENCY)
Facility: HOSPITAL | Age: 25
Discharge: HOME | End: 2020-01-18
Attending: EMERGENCY MEDICINE
Payer: COMMERCIAL

## 2020-01-18 ENCOUNTER — APPOINTMENT (EMERGENCY)
Dept: RADIOLOGY | Facility: HOSPITAL | Age: 25
End: 2020-01-18
Payer: COMMERCIAL

## 2020-01-18 VITALS
HEIGHT: 64 IN | WEIGHT: 262.35 LBS | BODY MASS INDEX: 44.79 KG/M2 | HEART RATE: 72 BPM | OXYGEN SATURATION: 99 % | DIASTOLIC BLOOD PRESSURE: 100 MMHG | SYSTOLIC BLOOD PRESSURE: 171 MMHG | TEMPERATURE: 98.8 F | RESPIRATION RATE: 14 BRPM

## 2020-01-18 DIAGNOSIS — J06.9 URI (UPPER RESPIRATORY INFECTION): Primary | ICD-10-CM

## 2020-01-18 DIAGNOSIS — R05.9 COUGH: ICD-10-CM

## 2020-01-18 LAB
FLUAV RNA NPH QL NAA+PROBE: NORMAL
FLUBV RNA NPH QL NAA+PROBE: NORMAL
RSV RNA NPH QL NAA+PROBE: NORMAL
S PYO DNA THROAT QL NAA+PROBE: NORMAL

## 2020-01-18 PROCEDURE — 99284 EMERGENCY DEPT VISIT MOD MDM: CPT

## 2020-01-18 PROCEDURE — 99283 EMERGENCY DEPT VISIT LOW MDM: CPT | Performed by: PHYSICIAN ASSISTANT

## 2020-01-18 PROCEDURE — 94640 AIRWAY INHALATION TREATMENT: CPT

## 2020-01-18 PROCEDURE — 87631 RESP VIRUS 3-5 TARGETS: CPT | Performed by: PHYSICIAN ASSISTANT

## 2020-01-18 PROCEDURE — 71046 X-RAY EXAM CHEST 2 VIEWS: CPT

## 2020-01-18 PROCEDURE — 87651 STREP A DNA AMP PROBE: CPT | Performed by: PHYSICIAN ASSISTANT

## 2020-01-18 RX ORDER — ALBUTEROL SULFATE 90 UG/1
2 AEROSOL, METERED RESPIRATORY (INHALATION) ONCE
Status: COMPLETED | OUTPATIENT
Start: 2020-01-18 | End: 2020-01-18

## 2020-01-18 RX ORDER — GUAIFENESIN 600 MG
600 TABLET, EXTENDED RELEASE 12 HR ORAL ONCE
Status: COMPLETED | OUTPATIENT
Start: 2020-01-18 | End: 2020-01-18

## 2020-01-18 RX ORDER — IBUPROFEN 400 MG/1
400 TABLET ORAL EVERY 6 HOURS PRN
Qty: 12 TABLET | Refills: 0 | Status: SHIPPED | OUTPATIENT
Start: 2020-01-18 | End: 2020-01-27 | Stop reason: HOSPADM

## 2020-01-18 RX ORDER — LORATADINE 10 MG/1
10 TABLET ORAL ONCE
Status: COMPLETED | OUTPATIENT
Start: 2020-01-18 | End: 2020-01-18

## 2020-01-18 RX ORDER — IPRATROPIUM BROMIDE AND ALBUTEROL SULFATE 2.5; .5 MG/3ML; MG/3ML
3 SOLUTION RESPIRATORY (INHALATION)
Status: DISCONTINUED | OUTPATIENT
Start: 2020-01-18 | End: 2020-01-19 | Stop reason: HOSPADM

## 2020-01-18 RX ORDER — GUAIFENESIN 600 MG
600 TABLET, EXTENDED RELEASE 12 HR ORAL EVERY 12 HOURS SCHEDULED
Qty: 6 TABLET | Refills: 0 | Status: SHIPPED | OUTPATIENT
Start: 2020-01-18 | End: 2020-01-21

## 2020-01-18 RX ORDER — LORATADINE 10 MG/1
10 TABLET ORAL DAILY
Qty: 7 TABLET | Refills: 0 | Status: SHIPPED | OUTPATIENT
Start: 2020-01-18 | End: 2021-04-21

## 2020-01-18 RX ADMIN — LORATADINE 10 MG: 10 TABLET ORAL at 20:49

## 2020-01-18 RX ADMIN — ALBUTEROL SULFATE 2 PUFF: 90 AEROSOL, METERED RESPIRATORY (INHALATION) at 21:48

## 2020-01-18 RX ADMIN — GUAIFENESIN 600 MG: 600 TABLET, EXTENDED RELEASE ORAL at 20:49

## 2020-01-18 RX ADMIN — IPRATROPIUM BROMIDE AND ALBUTEROL SULFATE 3 ML: 2.5; .5 SOLUTION RESPIRATORY (INHALATION) at 20:50

## 2020-01-19 NOTE — ED NOTES
Pt  aware that urine specimen necessary  Pt  reported that she was unable to go and was unwilling to try at this time        Harry Lopez RN  01/18/20 2949

## 2020-01-19 NOTE — ED PROVIDER NOTES
History  Chief Complaint   Patient presents with    Cough     cough x 1 week    Diarrhea     pt reports 3 episodes of loose stool, reports abd pain assoc with onset of bowel movement  Patient is a 46-year-old female with history of asthma, and cholecystectomy the presents emergency department with intermittent hacking productive cough with green sputum for 1 week  Patient has associated symptomatology of right nose beginning with the current ED presentation of cough  Patient has additional concern of 3 lose stools; nonbloody  Patient denies laxative use  Patient denies recent flu shot  Patient denies palliative factors with provocative factors of swallowing  Patient denies not effective treatment of orange juice  Patient denies fevers, chills, nausea, vomiting  Patient denies diarrhea, constipation urinary symptoms  Patient denies recent fall or recent trauma  Patient affirms recent sick contacts; relatives  Patient denies recent travel  Patient denies recent antibiotic use  Patient denies recent laxative use  Patient denies recent hospital stay  Patient denies chest pain or shortness of breath, and abdominal pain  History provided by:  Patient   used: No    Cough   Cough characteristics:  Productive  Sputum characteristics:  Green  Severity:  Mild  Onset quality:  Gradual  Duration:  1 week  Timing:  Intermittent  Progression:  Worsening  Chronicity:  New  Smoker: no    Context: sick contacts    Context: not animal exposure, not upper respiratory infection, not weather changes and not with activity    Relieved by:  Nothing  Worsened by:  Nothing  Ineffective treatments: Orange juice    Associated symptoms: no chest pain, no chills, no diaphoresis, no ear fullness, no ear pain, no eye discharge, no fever, no headaches, no myalgias, no rash, no rhinorrhea, no shortness of breath, no sinus congestion, no sore throat, no weight loss and no wheezing    Risk factors: no recent infection and no recent travel        Prior to Admission Medications   Prescriptions Last Dose Informant Patient Reported? Taking? albuterol (2 5 mg/3 mL) 0 083 % nebulizer solution   No No   Sig: Take 1 vial (2 5 mg total) by nebulization every 6 (six) hours as needed for wheezing or shortness of breath   albuterol (PROVENTIL HFA,VENTOLIN HFA) 90 mcg/act inhaler   Yes No   Sig: Inhale 2 puffs every 6 (six) hours as needed for wheezing   albuterol (VENTOLIN HFA) 90 mcg/act inhaler   No No   Sig: Inhale 2 puffs every 6 (six) hours as needed for wheezing or shortness of breath (cough)   amLODIPine (NORVASC) 5 mg tablet   No No   Sig: Take 1 tablet (5 mg total) by mouth daily   etonogestrel (NEXPLANON) subdermal implant   Yes No   Si mg by Subdermal route once   fluticasone (FLOVENT HFA) 110 MCG/ACT inhaler   No No   Sig: Inhale 1 puff 2 (two) times a day   ibuprofen (MOTRIN) 600 mg tablet   No No   Sig: Take 1 tablet (600 mg total) by mouth every 8 (eight) hours as needed for moderate pain for up to 15 doses      Facility-Administered Medications: None       Past Medical History:   Diagnosis Date    Asthma     Eczema        Past Surgical History:   Procedure Laterality Date    CHOLECYSTECTOMY      GALLBLADDER SURGERY         Family History   Problem Relation Age of Onset    No Known Problems Mother     No Known Problems Father      I have reviewed and agree with the history as documented  Social History     Tobacco Use    Smoking status: Never Smoker    Smokeless tobacco: Never Used   Substance Use Topics    Alcohol use: No    Drug use: No        Review of Systems   Constitutional: Negative for activity change, appetite change, chills, diaphoresis, fever and weight loss  HENT: Negative for congestion, ear pain, postnasal drip, rhinorrhea, sinus pressure, sinus pain, sore throat and tinnitus  Eyes: Negative for photophobia, discharge and visual disturbance     Respiratory: Positive for cough  Negative for chest tightness, shortness of breath and wheezing  Cardiovascular: Negative for chest pain and palpitations  Gastrointestinal: Negative for abdominal pain, constipation, diarrhea, nausea and vomiting  Genitourinary: Negative for difficulty urinating, dysuria, flank pain, frequency and urgency  Musculoskeletal: Negative for back pain, gait problem, myalgias, neck pain and neck stiffness  Skin: Negative for pallor and rash  Allergic/Immunologic: Negative for environmental allergies and food allergies  Neurological: Negative for dizziness, weakness, numbness and headaches  Psychiatric/Behavioral: Negative for confusion  All other systems reviewed and are negative  Physical Exam  Physical Exam   Constitutional: She is oriented to person, place, and time  She appears well-developed and well-nourished  She is active and cooperative  Non-toxic appearance  She does not have a sickly appearance  She does not appear ill  BP (!) 171/100   Pulse 72   Temp 98 8 °F (37 1 °C) (Oral)   Resp 14   Ht 5' 4" (1 626 m)   Wt 119 kg (262 lb 5 6 oz)   SpO2 99%   BMI 45 03 kg/m²      HENT:   Head: Normocephalic and atraumatic  Right Ear: Hearing, tympanic membrane, external ear and ear canal normal  No drainage, swelling or tenderness  No mastoid tenderness  No decreased hearing is noted  Left Ear: Hearing, tympanic membrane, external ear and ear canal normal  No drainage, swelling or tenderness  No mastoid tenderness  No decreased hearing is noted  Nose: Mucosal edema and rhinorrhea present  Mouth/Throat: Uvula is midline, oropharynx is clear and moist and mucous membranes are normal    Eyes: Pupils are equal, round, and reactive to light  Conjunctivae, EOM and lids are normal  Right eye exhibits no discharge  Left eye exhibits no discharge  Neck: Trachea normal, normal range of motion, full passive range of motion without pain and phonation normal  Neck supple   No JVD present  No tracheal tenderness, no spinous process tenderness and no muscular tenderness present  No neck rigidity  No tracheal deviation and normal range of motion present  Cardiovascular: Normal rate, regular rhythm, normal heart sounds, intact distal pulses and normal pulses  Pulses:       Radial pulses are 2+ on the right side, and 2+ on the left side  Posterior tibial pulses are 2+ on the right side, and 2+ on the left side  Pulmonary/Chest: Effort normal and breath sounds normal  No stridor  She has no decreased breath sounds  She has no wheezes  She has no rhonchi  She has no rales  She exhibits no tenderness, no bony tenderness and no crepitus  Abdominal: Soft  Bowel sounds are normal  She exhibits no distension  There is no tenderness  There is no rigidity, no rebound, no guarding and no CVA tenderness  Musculoskeletal: Normal range of motion  Lymphadenopathy:        Head (right side): No submental, no submandibular, no tonsillar, no preauricular, no posterior auricular and no occipital adenopathy present  Head (left side): No submental, no submandibular, no tonsillar, no preauricular, no posterior auricular and no occipital adenopathy present  She has no cervical adenopathy  Right cervical: No superficial cervical, no deep cervical and no posterior cervical adenopathy present  Left cervical: No superficial cervical, no deep cervical and no posterior cervical adenopathy present  Neurological: She is alert and oriented to person, place, and time  She has normal strength and normal reflexes  No sensory deficit  GCS eye subscore is 4  GCS verbal subscore is 5  GCS motor subscore is 6  Reflex Scores:       Patellar reflexes are 2+ on the right side and 2+ on the left side  Skin: Skin is warm and intact  Capillary refill takes less than 2 seconds  She is not diaphoretic  Psychiatric: She has a normal mood and affect   Her speech is normal and behavior is normal  Judgment and thought content normal  Cognition and memory are normal    Nursing note and vitals reviewed        Vital Signs  ED Triage Vitals [01/18/20 2023]   Temperature Pulse Respirations Blood Pressure SpO2   98 8 °F (37 1 °C) 72 14 (!) 171/100 99 %      Temp Source Heart Rate Source Patient Position - Orthostatic VS BP Location FiO2 (%)   Oral -- -- -- --      Pain Score       8           Vitals:    01/18/20 2023   BP: (!) 171/100   Pulse: 72         Visual Acuity      ED Medications  Medications   guaiFENesin (MUCINEX) 12 hr tablet 600 mg (600 mg Oral Given 1/18/20 2049)   loratadine (CLARITIN) tablet 10 mg (10 mg Oral Given 1/18/20 2049)   albuterol (PROVENTIL HFA,VENTOLIN HFA) inhaler 2 puff (2 puffs Inhalation Given 1/18/20 2148)       Diagnostic Studies  Results Reviewed     Procedure Component Value Units Date/Time    Influenza A/B and RSV PCR [[id number]]  (Normal) Collected:  01/18/20 2045    Lab Status:  Final result Specimen:  Nasopharyngeal Swab Updated:  01/18/20 2131     INFLUENZA A PCR None Detected     INFLUENZA B PCR None Detected     RSV PCR None Detected    Strep A PCR [[id number]]  (Normal) Collected:  01/18/20 2045    Lab Status:  Final result Specimen:  Throat Updated:  01/18/20 2123     STREP A PCR None Detected                 XR chest 2 views   ED Interpretation by Jaquelin Salcido PA-C (01/18 2133)   No acute cardiopulmonary disease on initial read in the ED                 Procedures  Procedures         ED Course  ED Course as of Jan 19 0029   Sat Jan 18, 2020 2036 Blood Pressure(!): 171/100   2036 Temperature: 98 8 °F (37 1 °C)   2036 Temp Source: Oral   2036 Pulse: 72   2036 Respirations: 14   2036 SpO2: 99 %   2057 Blood Pressure(!): 171/100   2131 INFLU A PCR: None Detected   2131 INFLU B PCR: None Detected   2131 RSV PCR: None Detected   2131 STREP A PCR: None Detected   2140 Patient with verbal refusal of offered pregnancy test                                  MDM  Number of Diagnoses or Management Options  Cough: new and does not require workup  URI (upper respiratory infection): new and does not require workup     Amount and/or Complexity of Data Reviewed  Clinical lab tests: ordered and reviewed  Tests in the radiology section of CPT®: ordered and reviewed    Risk of Complications, Morbidity, and/or Mortality  Presenting problems: low  Diagnostic procedures: low  Management options: low    Patient Progress  Patient progress: stable  Patient is a 62-year-old female with history of asthma, and cholecystectomy the presents emergency department with intermittent hacking productive cough with green sputum for 1 week  Patient has associated symptomatology of right nose beginning with the current ED presentation of cough  Hemodynamically stable and afebrile  Urine pregnancy negative  Rapid strep negative and rapid flu A/B and RSV negative  Chest x-ray with no acute cardiopulmonary disease  Delivered DuoNeb, Mucinex, Motrin, and Claritin in the emergency department; patient verbalized decrease in cough symptomatology status post medication delivery  Prescribed Mucinex, Motrin, Claritin and counseled patient medication administration and side effects  Consume plenty of fluids electrolytes  Continue daily humidifiers  Delivered albuterol inhaler the patient given patient stated that her Ventolin inhaler at home was nearly empty  Patient with mechanical understanding of Ventolin inhaler use  Follow-up with PCP  Follow up with emergency department symptoms persist or exacerbate  Patient demonstrates verbal understanding of all clinical laboratory imaging findings, discharge instructions, follow-up with verbalized agreement with current treatment plan            Disposition  Final diagnoses:   URI (upper respiratory infection)   Cough     Time reflects when diagnosis was documented in both MDM as applicable and the Disposition within this note     Time User Action Codes Description Comment 1/18/2020  9:39 PM Brooks Crystal Add [J06 9] URI (upper respiratory infection)     1/18/2020  9:39 PM Brooks Crystal Add [R05] Cough       ED Disposition     ED Disposition Condition Date/Time Comment    Discharge Stable Sat Jan 18, 2020  9:39 PM [place] discharge to home/self care  Follow-up Information     Follow up With Specialties Details Why Contact Info Additional Information    SUZAN Garcia Nurse Practitioner Call in 1 week for further evaluation of symptoms [address] [facility] [address] [place] [id number]       [facility] Emergency Department Emergency Medicine Go to  As needed [address] [id number] [phone] [facility], [address], [place], [place], [address]          Discharge Medication List as of 1/18/2020  9:43 PM      START taking these medications    Details   guaiFENesin (MUCINEX) 600 mg 12 hr tablet Take 1 tablet (600 mg total) by mouth every 12 (twelve) hours for 3 days, Starting Sat 1/18/2020, Until Tue 1/21/2020, Print      !! ibuprofen (MOTRIN) 400 mg tablet Take 1 tablet (400 mg total) by mouth every 6 (six) hours as needed for mild pain for up to 3 days, Starting Sat 1/18/2020, Until Tue 1/21/2020, Print      loratadine (CLARITIN) 10 mg tablet Take 1 tablet (10 mg total) by mouth daily for 7 days, Starting Sat 1/18/2020, Until Sat 1/25/2020, Print       !! - Potential duplicate medications found  Please discuss with provider        CONTINUE these medications which have NOT CHANGED    Details   albuterol (2 5 mg/3 mL) 0 083 % nebulizer solution Take 1 vial (2 5 mg total) by nebulization every 6 (six) hours as needed for wheezing or shortness of breath, Starting Wed 1/30/2019, Print      !! albuterol (PROVENTIL HFA,VENTOLIN HFA) 90 mcg/act inhaler Inhale 2 puffs every 6 (six) hours as needed for wheezing, Historical Med      !! albuterol (VENTOLIN HFA) 90 mcg/act inhaler Inhale 2 puffs every 6 (six) hours as needed for wheezing or shortness of breath (cough), Starting Wed 1/30/2019, Normal      amLODIPine (NORVASC) 5 mg tablet Take 1 tablet (5 mg total) by mouth daily, Starting Sun 7/14/2019, Normal      etonogestrel (NEXPLANON) subdermal implant 68 mg by Subdermal route once, Historical Med      fluticasone (FLOVENT HFA) 110 MCG/ACT inhaler Inhale 1 puff 2 (two) times a day, Starting Wed 1/30/2019, Normal      !! ibuprofen (MOTRIN) 600 mg tablet Take 1 tablet (600 mg total) by mouth every 8 (eight) hours as needed for moderate pain for up to 15 doses, Starting Sat 9/15/2018, Normal       !! - Potential duplicate medications found  Please discuss with provider  No discharge procedures on file      ED Provider  Electronically Signed by           Nitza Ingram PA-C  01/19/20 [address] [facility], [place]  01/19/20 9924

## 2020-01-19 NOTE — DISCHARGE INSTRUCTIONS
Take Motrin, Mucinex, and Claritin as indicated  Consume plenty fluids and electrolytes  Follow-up with PCP  Follow up with emergency department symptoms persist or exacerbate

## 2020-01-22 DIAGNOSIS — J45.21 MILD INTERMITTENT ASTHMA WITH ACUTE EXACERBATION: ICD-10-CM

## 2020-01-23 ENCOUNTER — APPOINTMENT (EMERGENCY)
Dept: CT IMAGING | Facility: HOSPITAL | Age: 25
DRG: 469 | End: 2020-01-23
Payer: COMMERCIAL

## 2020-01-23 ENCOUNTER — HOSPITAL ENCOUNTER (INPATIENT)
Facility: HOSPITAL | Age: 25
LOS: 4 days | Discharge: HOME | DRG: 469 | End: 2020-01-27
Attending: EMERGENCY MEDICINE | Admitting: INTERNAL MEDICINE
Payer: COMMERCIAL

## 2020-01-23 ENCOUNTER — APPOINTMENT (EMERGENCY)
Dept: RADIOLOGY | Facility: HOSPITAL | Age: 25
DRG: 469 | End: 2020-01-23
Payer: COMMERCIAL

## 2020-01-23 DIAGNOSIS — N18.9 ACUTE-ON-CHRONIC KIDNEY INJURY (HCC): ICD-10-CM

## 2020-01-23 DIAGNOSIS — R55 SYNCOPE: ICD-10-CM

## 2020-01-23 DIAGNOSIS — I10 HYPERTENSION, UNSPECIFIED TYPE: ICD-10-CM

## 2020-01-23 DIAGNOSIS — R51.9 HEADACHE: ICD-10-CM

## 2020-01-23 DIAGNOSIS — I16.0 HYPERTENSIVE URGENCY: Primary | ICD-10-CM

## 2020-01-23 DIAGNOSIS — N18.9 ACUTE KIDNEY INJURY SUPERIMPOSED ON CHRONIC KIDNEY DISEASE (HCC): ICD-10-CM

## 2020-01-23 DIAGNOSIS — D64.9 ANEMIA: ICD-10-CM

## 2020-01-23 DIAGNOSIS — N17.9 ACUTE-ON-CHRONIC KIDNEY INJURY (HCC): ICD-10-CM

## 2020-01-23 DIAGNOSIS — N17.9 ACUTE KIDNEY INJURY SUPERIMPOSED ON CHRONIC KIDNEY DISEASE (HCC): ICD-10-CM

## 2020-01-23 LAB
ALBUMIN SERPL BCP-MCNC: 3.3 G/DL (ref 3.5–5)
ALP SERPL-CCNC: 105 U/L (ref 46–116)
ALT SERPL W P-5'-P-CCNC: 20 U/L (ref 12–78)
AMORPH URATE CRY URNS QL MICRO: ABNORMAL /HPF
ANION GAP SERPL CALCULATED.3IONS-SCNC: 8 MMOL/L (ref 4–13)
APTT PPP: 30 SECONDS (ref 23–37)
AST SERPL W P-5'-P-CCNC: 14 U/L (ref 5–45)
BACTERIA UR QL AUTO: ABNORMAL /HPF
BASOPHILS # BLD AUTO: 0.03 THOUSANDS/ΜL (ref 0–0.1)
BASOPHILS NFR BLD AUTO: 1 % (ref 0–1)
BILIRUB SERPL-MCNC: 0.15 MG/DL (ref 0.2–1)
BILIRUB UR QL STRIP: NEGATIVE
BUN SERPL-MCNC: 16 MG/DL (ref 5–25)
CALCIUM SERPL-MCNC: 9.1 MG/DL (ref 8.3–10.1)
CHLORIDE SERPL-SCNC: 104 MMOL/L (ref 100–108)
CLARITY UR: ABNORMAL
CO2 SERPL-SCNC: 28 MMOL/L (ref 21–32)
COLOR UR: YELLOW
CREAT SERPL-MCNC: 2.39 MG/DL (ref 0.6–1.3)
D DIMER PPP FEU-MCNC: 0.95 UG/ML FEU
EOSINOPHIL # BLD AUTO: 0.22 THOUSAND/ΜL (ref 0–0.61)
EOSINOPHIL NFR BLD AUTO: 4 % (ref 0–6)
ERYTHROCYTE [DISTWIDTH] IN BLOOD BY AUTOMATED COUNT: 15.4 % (ref 11.6–15.1)
EXT PREG TEST URINE: NEGATIVE
EXT. CONTROL ED NAV: NORMAL
FLUAV RNA NPH QL NAA+PROBE: NORMAL
FLUBV RNA NPH QL NAA+PROBE: NORMAL
GFR SERPL CREATININE-BSD FRML MDRD: 32 ML/MIN/1.73SQ M
GLUCOSE SERPL-MCNC: 88 MG/DL (ref 65–140)
GLUCOSE UR STRIP-MCNC: NEGATIVE MG/DL
HCT VFR BLD AUTO: 41.7 % (ref 34.8–46.1)
HGB BLD-MCNC: 12.5 G/DL (ref 11.5–15.4)
HGB UR QL STRIP.AUTO: NEGATIVE
IMM GRANULOCYTES # BLD AUTO: 0.01 THOUSAND/UL (ref 0–0.2)
IMM GRANULOCYTES NFR BLD AUTO: 0 % (ref 0–2)
INR PPP: 1 (ref 0.84–1.19)
KETONES UR STRIP-MCNC: NEGATIVE MG/DL
LEUKOCYTE ESTERASE UR QL STRIP: NEGATIVE
LYMPHOCYTES # BLD AUTO: 1.9 THOUSANDS/ΜL (ref 0.6–4.47)
LYMPHOCYTES NFR BLD AUTO: 33 % (ref 14–44)
MCH RBC QN AUTO: 20.6 PG (ref 26.8–34.3)
MCHC RBC AUTO-ENTMCNC: 30 G/DL (ref 31.4–37.4)
MCV RBC AUTO: 69 FL (ref 82–98)
MONOCYTES # BLD AUTO: 0.58 THOUSAND/ΜL (ref 0.17–1.22)
MONOCYTES NFR BLD AUTO: 10 % (ref 4–12)
MUCOUS THREADS UR QL AUTO: ABNORMAL
NEUTROPHILS # BLD AUTO: 2.98 THOUSANDS/ΜL (ref 1.85–7.62)
NEUTS SEG NFR BLD AUTO: 52 % (ref 43–75)
NITRITE UR QL STRIP: NEGATIVE
NON-SQ EPI CELLS URNS QL MICRO: ABNORMAL /HPF
NRBC BLD AUTO-RTO: 0 /100 WBCS
PH UR STRIP.AUTO: 7 [PH]
PLATELET # BLD AUTO: 257 THOUSANDS/UL (ref 149–390)
PMV BLD AUTO: 11 FL (ref 8.9–12.7)
POTASSIUM SERPL-SCNC: 4.4 MMOL/L (ref 3.5–5.3)
PROT SERPL-MCNC: 7.3 G/DL (ref 6.4–8.2)
PROT UR STRIP-MCNC: ABNORMAL MG/DL
PROTHROMBIN TIME: 12.6 SECONDS (ref 11.6–14.5)
RBC # BLD AUTO: 6.08 MILLION/UL (ref 3.81–5.12)
RBC #/AREA URNS AUTO: ABNORMAL /HPF
RSV RNA NPH QL NAA+PROBE: NORMAL
SODIUM SERPL-SCNC: 140 MMOL/L (ref 136–145)
SP GR UR STRIP.AUTO: 1.01 (ref 1–1.03)
TROPONIN I SERPL-MCNC: <0.02 NG/ML
UROBILINOGEN UR QL STRIP.AUTO: 0.2 E.U./DL
WBC # BLD AUTO: 5.72 THOUSAND/UL (ref 4.31–10.16)
WBC #/AREA URNS AUTO: ABNORMAL /HPF

## 2020-01-23 PROCEDURE — 87631 RESP VIRUS 3-5 TARGETS: CPT | Performed by: EMERGENCY MEDICINE

## 2020-01-23 PROCEDURE — 81001 URINALYSIS AUTO W/SCOPE: CPT | Performed by: EMERGENCY MEDICINE

## 2020-01-23 PROCEDURE — 82570 ASSAY OF URINE CREATININE: CPT | Performed by: NURSE PRACTITIONER

## 2020-01-23 PROCEDURE — 83036 HEMOGLOBIN GLYCOSYLATED A1C: CPT | Performed by: NURSE PRACTITIONER

## 2020-01-23 PROCEDURE — 36415 COLL VENOUS BLD VENIPUNCTURE: CPT | Performed by: EMERGENCY MEDICINE

## 2020-01-23 PROCEDURE — 84100 ASSAY OF PHOSPHORUS: CPT | Performed by: NURSE PRACTITIONER

## 2020-01-23 PROCEDURE — 85610 PROTHROMBIN TIME: CPT | Performed by: EMERGENCY MEDICINE

## 2020-01-23 PROCEDURE — 80053 COMPREHEN METABOLIC PANEL: CPT | Performed by: EMERGENCY MEDICINE

## 2020-01-23 PROCEDURE — 85379 FIBRIN DEGRADATION QUANT: CPT | Performed by: EMERGENCY MEDICINE

## 2020-01-23 PROCEDURE — 81025 URINE PREGNANCY TEST: CPT | Performed by: EMERGENCY MEDICINE

## 2020-01-23 PROCEDURE — 96361 HYDRATE IV INFUSION ADD-ON: CPT

## 2020-01-23 PROCEDURE — 93005 ELECTROCARDIOGRAM TRACING: CPT

## 2020-01-23 PROCEDURE — 85025 COMPLETE CBC W/AUTO DIFF WBC: CPT | Performed by: EMERGENCY MEDICINE

## 2020-01-23 PROCEDURE — 70450 CT HEAD/BRAIN W/O DYE: CPT

## 2020-01-23 PROCEDURE — 80307 DRUG TEST PRSMV CHEM ANLYZR: CPT | Performed by: NURSE PRACTITIONER

## 2020-01-23 PROCEDURE — 96360 HYDRATION IV INFUSION INIT: CPT

## 2020-01-23 PROCEDURE — 99285 EMERGENCY DEPT VISIT HI MDM: CPT | Performed by: EMERGENCY MEDICINE

## 2020-01-23 PROCEDURE — 84484 ASSAY OF TROPONIN QUANT: CPT | Performed by: EMERGENCY MEDICINE

## 2020-01-23 PROCEDURE — 84156 ASSAY OF PROTEIN URINE: CPT | Performed by: NURSE PRACTITIONER

## 2020-01-23 PROCEDURE — 85730 THROMBOPLASTIN TIME PARTIAL: CPT | Performed by: EMERGENCY MEDICINE

## 2020-01-23 PROCEDURE — 71046 X-RAY EXAM CHEST 2 VIEWS: CPT

## 2020-01-23 PROCEDURE — 99285 EMERGENCY DEPT VISIT HI MDM: CPT

## 2020-01-23 RX ORDER — HEPARIN SODIUM 10000 [USP'U]/100ML
3-30 INJECTION, SOLUTION INTRAVENOUS
Status: DISCONTINUED | OUTPATIENT
Start: 2020-01-23 | End: 2020-01-24

## 2020-01-23 RX ORDER — HYDRALAZINE HYDROCHLORIDE 20 MG/ML
5 INJECTION INTRAMUSCULAR; INTRAVENOUS ONCE
Status: COMPLETED | OUTPATIENT
Start: 2020-01-23 | End: 2020-01-23

## 2020-01-23 RX ORDER — HEPARIN SODIUM 1000 [USP'U]/ML
4800 INJECTION, SOLUTION INTRAVENOUS; SUBCUTANEOUS AS NEEDED
Status: DISCONTINUED | OUTPATIENT
Start: 2020-01-23 | End: 2020-01-24

## 2020-01-23 RX ORDER — HEPARIN SODIUM 1000 [USP'U]/ML
9600 INJECTION, SOLUTION INTRAVENOUS; SUBCUTANEOUS ONCE
Status: COMPLETED | OUTPATIENT
Start: 2020-01-23 | End: 2020-01-23

## 2020-01-23 RX ORDER — HEPARIN SODIUM 1000 [USP'U]/ML
9600 INJECTION, SOLUTION INTRAVENOUS; SUBCUTANEOUS AS NEEDED
Status: DISCONTINUED | OUTPATIENT
Start: 2020-01-23 | End: 2020-01-24

## 2020-01-23 RX ORDER — ALBUTEROL SULFATE 2.5 MG/3ML
2.5 SOLUTION RESPIRATORY (INHALATION) EVERY 6 HOURS PRN
Qty: 30 ML | Refills: 0 | Status: SHIPPED | OUTPATIENT
Start: 2020-01-23 | End: 2020-04-18 | Stop reason: SDUPTHER

## 2020-01-23 RX ADMIN — SODIUM CHLORIDE 1000 ML: 0.9 INJECTION, SOLUTION INTRAVENOUS at 21:23

## 2020-01-23 RX ADMIN — HYDRALAZINE HYDROCHLORIDE 5 MG: 20 INJECTION INTRAMUSCULAR; INTRAVENOUS at 23:41

## 2020-01-23 RX ADMIN — HEPARIN SODIUM AND DEXTROSE 18 UNITS/KG/HR: 10000; 5 INJECTION INTRAVENOUS at 23:40

## 2020-01-23 RX ADMIN — HEPARIN SODIUM 9600 UNITS: 1000 INJECTION INTRAVENOUS; SUBCUTANEOUS at 23:39

## 2020-01-24 ENCOUNTER — APPOINTMENT (INPATIENT)
Dept: ULTRASOUND IMAGING | Facility: HOSPITAL | Age: 25
DRG: 469 | End: 2020-01-24
Payer: COMMERCIAL

## 2020-01-24 ENCOUNTER — APPOINTMENT (INPATIENT)
Dept: NUCLEAR MEDICINE | Facility: HOSPITAL | Age: 25
DRG: 469 | End: 2020-01-24
Payer: COMMERCIAL

## 2020-01-24 PROBLEM — R55 SYNCOPE: Status: ACTIVE | Noted: 2020-01-24

## 2020-01-24 PROBLEM — I10 HYPERTENSION: Status: ACTIVE | Noted: 2020-01-24

## 2020-01-24 PROBLEM — J06.9 URI (UPPER RESPIRATORY INFECTION): Status: ACTIVE | Noted: 2020-01-24

## 2020-01-24 PROBLEM — N18.9 ACUTE KIDNEY INJURY SUPERIMPOSED ON CHRONIC KIDNEY DISEASE (HCC): Status: ACTIVE | Noted: 2017-09-04

## 2020-01-24 LAB
AMPHETAMINES SERPL QL SCN: NEGATIVE
ANION GAP SERPL CALCULATED.3IONS-SCNC: 10 MMOL/L (ref 4–13)
APTT PPP: 81 SECONDS (ref 23–37)
APTT PPP: >210 SECONDS (ref 23–37)
BARBITURATES UR QL: NEGATIVE
BASOPHILS # BLD AUTO: 0.02 THOUSANDS/ΜL (ref 0–0.1)
BASOPHILS NFR BLD AUTO: 0 % (ref 0–1)
BENZODIAZ UR QL: NEGATIVE
BUN SERPL-MCNC: 19 MG/DL (ref 5–25)
CALCIUM SERPL-MCNC: 7.7 MG/DL (ref 8.3–10.1)
CHLORIDE SERPL-SCNC: 112 MMOL/L (ref 100–108)
CO2 SERPL-SCNC: 20 MMOL/L (ref 21–32)
COCAINE UR QL: NEGATIVE
CREAT SERPL-MCNC: 1.68 MG/DL (ref 0.6–1.3)
CREAT UR-MCNC: 166 MG/DL
EOSINOPHIL # BLD AUTO: 0.29 THOUSAND/ΜL (ref 0–0.61)
EOSINOPHIL NFR BLD AUTO: 4 % (ref 0–6)
ERYTHROCYTE [DISTWIDTH] IN BLOOD BY AUTOMATED COUNT: 15.2 % (ref 11.6–15.1)
EST. AVERAGE GLUCOSE BLD GHB EST-MCNC: 108 MG/DL
GFR SERPL CREATININE-BSD FRML MDRD: 49 ML/MIN/1.73SQ M
GLUCOSE SERPL-MCNC: 95 MG/DL (ref 65–140)
HBA1C MFR BLD: 5.4 % (ref 4.2–6.3)
HCT VFR BLD AUTO: 34.8 % (ref 34.8–46.1)
HGB BLD-MCNC: 10.3 G/DL (ref 11.5–15.4)
IMM GRANULOCYTES # BLD AUTO: 0.01 THOUSAND/UL (ref 0–0.2)
IMM GRANULOCYTES NFR BLD AUTO: 0 % (ref 0–2)
LYMPHOCYTES # BLD AUTO: 2.72 THOUSANDS/ΜL (ref 0.6–4.47)
LYMPHOCYTES NFR BLD AUTO: 40 % (ref 14–44)
MCH RBC QN AUTO: 20.3 PG (ref 26.8–34.3)
MCHC RBC AUTO-ENTMCNC: 29.6 G/DL (ref 31.4–37.4)
MCV RBC AUTO: 69 FL (ref 82–98)
METHADONE UR QL: NEGATIVE
MONOCYTES # BLD AUTO: 0.56 THOUSAND/ΜL (ref 0.17–1.22)
MONOCYTES NFR BLD AUTO: 8 % (ref 4–12)
NEUTROPHILS # BLD AUTO: 3.22 THOUSANDS/ΜL (ref 1.85–7.62)
NEUTS SEG NFR BLD AUTO: 48 % (ref 43–75)
NRBC BLD AUTO-RTO: 0 /100 WBCS
OPIATES UR QL SCN: NEGATIVE
PCP UR QL: NEGATIVE
PHOSPHATE SERPL-MCNC: 3.3 MG/DL (ref 2.7–4.5)
PLATELET # BLD AUTO: 239 THOUSANDS/UL (ref 149–390)
PMV BLD AUTO: 10.9 FL (ref 8.9–12.7)
POTASSIUM SERPL-SCNC: 3.8 MMOL/L (ref 3.5–5.3)
PROT UR-MCNC: 179 MG/DL
PROT/CREAT UR: 1.08 MG/G{CREAT} (ref 0–0.1)
RBC # BLD AUTO: 5.08 MILLION/UL (ref 3.81–5.12)
SODIUM SERPL-SCNC: 142 MMOL/L (ref 136–145)
THC UR QL: NEGATIVE
WBC # BLD AUTO: 6.82 THOUSAND/UL (ref 4.31–10.16)

## 2020-01-24 PROCEDURE — 84165 PROTEIN E-PHORESIS SERUM: CPT | Performed by: PATHOLOGY

## 2020-01-24 PROCEDURE — 76770 US EXAM ABDO BACK WALL COMP: CPT

## 2020-01-24 PROCEDURE — 99223 1ST HOSP IP/OBS HIGH 75: CPT | Performed by: HOSPITALIST

## 2020-01-24 PROCEDURE — 80048 BASIC METABOLIC PNL TOTAL CA: CPT | Performed by: NURSE PRACTITIONER

## 2020-01-24 PROCEDURE — 85730 THROMBOPLASTIN TIME PARTIAL: CPT | Performed by: PHYSICIAN ASSISTANT

## 2020-01-24 PROCEDURE — 85730 THROMBOPLASTIN TIME PARTIAL: CPT | Performed by: INTERNAL MEDICINE

## 2020-01-24 PROCEDURE — 78582 LUNG VENTILAT&PERFUS IMAGING: CPT

## 2020-01-24 PROCEDURE — 85025 COMPLETE CBC W/AUTO DIFF WBC: CPT | Performed by: NURSE PRACTITIONER

## 2020-01-24 PROCEDURE — 99223 1ST HOSP IP/OBS HIGH 75: CPT | Performed by: INTERNAL MEDICINE

## 2020-01-24 PROCEDURE — 93975 VASCULAR STUDY: CPT

## 2020-01-24 PROCEDURE — 84166 PROTEIN E-PHORESIS/URINE/CSF: CPT | Performed by: PATHOLOGY

## 2020-01-24 PROCEDURE — 93975 VASCULAR STUDY: CPT | Performed by: SURGERY

## 2020-01-24 PROCEDURE — A9558 XE133 XENON 10MCI: HCPCS

## 2020-01-24 PROCEDURE — A9540 TC99M MAA: HCPCS

## 2020-01-24 PROCEDURE — 84166 PROTEIN E-PHORESIS/URINE/CSF: CPT | Performed by: NURSE PRACTITIONER

## 2020-01-24 RX ORDER — AMLODIPINE BESYLATE 5 MG/1
5 TABLET ORAL DAILY
Status: DISCONTINUED | OUTPATIENT
Start: 2020-01-24 | End: 2020-01-24

## 2020-01-24 RX ORDER — DOCUSATE SODIUM 100 MG/1
100 CAPSULE, LIQUID FILLED ORAL 2 TIMES DAILY PRN
Status: DISCONTINUED | OUTPATIENT
Start: 2020-01-24 | End: 2020-01-27 | Stop reason: HOSPADM

## 2020-01-24 RX ORDER — ONDANSETRON 2 MG/ML
4 INJECTION INTRAMUSCULAR; INTRAVENOUS EVERY 6 HOURS PRN
Status: DISCONTINUED | OUTPATIENT
Start: 2020-01-24 | End: 2020-01-27 | Stop reason: HOSPADM

## 2020-01-24 RX ORDER — GUAIFENESIN 600 MG
1200 TABLET, EXTENDED RELEASE 12 HR ORAL EVERY 12 HOURS SCHEDULED
Status: DISCONTINUED | OUTPATIENT
Start: 2020-01-24 | End: 2020-01-27 | Stop reason: HOSPADM

## 2020-01-24 RX ORDER — BENZONATATE 100 MG/1
100 CAPSULE ORAL 3 TIMES DAILY PRN
Status: DISCONTINUED | OUTPATIENT
Start: 2020-01-24 | End: 2020-01-27 | Stop reason: HOSPADM

## 2020-01-24 RX ORDER — ALBUTEROL SULFATE 90 UG/1
2 AEROSOL, METERED RESPIRATORY (INHALATION) EVERY 6 HOURS PRN
Status: DISCONTINUED | OUTPATIENT
Start: 2020-01-24 | End: 2020-01-27 | Stop reason: HOSPADM

## 2020-01-24 RX ORDER — ACETAMINOPHEN 325 MG/1
650 TABLET ORAL EVERY 6 HOURS PRN
Status: DISCONTINUED | OUTPATIENT
Start: 2020-01-24 | End: 2020-01-27 | Stop reason: HOSPADM

## 2020-01-24 RX ORDER — FLUTICASONE PROPIONATE 110 UG/1
1 AEROSOL, METERED RESPIRATORY (INHALATION) 2 TIMES DAILY
Status: DISCONTINUED | OUTPATIENT
Start: 2020-01-24 | End: 2020-01-27 | Stop reason: HOSPADM

## 2020-01-24 RX ORDER — ALBUTEROL SULFATE 2.5 MG/3ML
2.5 SOLUTION RESPIRATORY (INHALATION) EVERY 6 HOURS PRN
Status: DISCONTINUED | OUTPATIENT
Start: 2020-01-24 | End: 2020-01-27 | Stop reason: HOSPADM

## 2020-01-24 RX ORDER — LORATADINE 10 MG/1
10 TABLET ORAL DAILY
Status: DISCONTINUED | OUTPATIENT
Start: 2020-01-24 | End: 2020-01-27 | Stop reason: HOSPADM

## 2020-01-24 RX ORDER — AMLODIPINE BESYLATE 5 MG/1
5 TABLET ORAL DAILY
Status: DISCONTINUED | OUTPATIENT
Start: 2020-01-24 | End: 2020-01-26

## 2020-01-24 RX ORDER — SODIUM CHLORIDE 9 MG/ML
125 INJECTION, SOLUTION INTRAVENOUS ONCE
Status: COMPLETED | OUTPATIENT
Start: 2020-01-24 | End: 2020-01-24

## 2020-01-24 RX ORDER — HEPARIN SODIUM 5000 [USP'U]/ML
5000 INJECTION, SOLUTION INTRAVENOUS; SUBCUTANEOUS EVERY 8 HOURS SCHEDULED
Status: DISCONTINUED | OUTPATIENT
Start: 2020-01-24 | End: 2020-01-26

## 2020-01-24 RX ADMIN — HEPARIN SODIUM 5000 UNITS: 5000 INJECTION INTRAVENOUS; SUBCUTANEOUS at 21:02

## 2020-01-24 RX ADMIN — SODIUM CHLORIDE 125 ML/HR: 0.9 INJECTION, SOLUTION INTRAVENOUS at 02:09

## 2020-01-24 RX ADMIN — HEPARIN SODIUM AND DEXTROSE 15 UNITS/KG/HR: 10000; 5 INJECTION INTRAVENOUS at 13:13

## 2020-01-24 RX ADMIN — FLUTICASONE PROPIONATE 1 PUFF: 110 AEROSOL, METERED RESPIRATORY (INHALATION) at 08:23

## 2020-01-24 RX ADMIN — FLUTICASONE PROPIONATE 1 PUFF: 110 AEROSOL, METERED RESPIRATORY (INHALATION) at 18:02

## 2020-01-24 RX ADMIN — LORATADINE 10 MG: 10 TABLET ORAL at 08:23

## 2020-01-24 RX ADMIN — AMLODIPINE BESYLATE 5 MG: 5 TABLET ORAL at 08:23

## 2020-01-24 RX ADMIN — GUAIFENESIN 1200 MG: 600 TABLET, EXTENDED RELEASE ORAL at 08:23

## 2020-01-24 RX ADMIN — GUAIFENESIN 1200 MG: 600 TABLET, EXTENDED RELEASE ORAL at 21:02

## 2020-01-24 RX ADMIN — HEPARIN SODIUM 5000 UNITS: 5000 INJECTION INTRAVENOUS; SUBCUTANEOUS at 18:01

## 2020-01-24 NOTE — ASSESSMENT & PLAN NOTE
Creatinine upon admission:  2 39  Prior July 2019:  1 67  Does not follow with a nephrologist  Per chart review, patient has been hypertensive during previous hospitalizations and office visits  She was started on amlodipine July 2019 by emergency department provider, however, she no longer takes this medication  She is not able to tell me why  Will restart amlodipine  Patient reports shoulder injury (12/5/19) for which she has been taking Advil  She reports taking 2 tablets every 6 hours as needed  Reports normal urine output     Urinalysis:  2+ protein  Follow-up A1c, phosphorous, urine protein/creat  Check VAS renal artery   Consult nephrology

## 2020-01-24 NOTE — UTILIZATION REVIEW
Notification of Inpatient Admission/Inpatient Authorization Request   This is a Notification of Inpatient Admission for [address]  Be advised that this patient was admitted to our facility under Inpatient Status  Contact Ruth Ann Last at [phone] for additional admission information  [facility]  DEDICATED [phone]  Patient Name:   Phillip Parker   YOB: 1995       [address]   [address]:   [place] Pulse  Tax ID: [id number]  NPI: [id number] Attending Provider/NPI: Izabel Osborne [[id number]]   Attending Physician:  JUAN Osborne  [facility] Practioner ID- [id number]  [address] [place] [address]  Phone 1: [phone]  Fax: [phone]   Place of Service Code: 24     Place of Service Name:  [address] [place] [address]   Start Date: 1/23/20 2316     Discharge Date & Time: No discharge date for patient encounter  Type of Admission: Inpatient Status Discharge Disposition   (if discharged): Home/Self Care   Patient Diagnoses: Syncope [R55]  Hypertensive urgency [I16 0]  Acute-on-chronic kidney injury ([facility] [address] ) [N17 9, N18 9]     Orders: Admission Orders (From admission, onward)     Ordered        01/23/20 2316  Inpatient Admission  Once                    Assigned Utilization Review Contact: Ruth Ann Last  Utilization   Network Utilization Review Department  Phone: [phone]; Fax [phone]  Email: [email or web address]   ATTENTION PAYERS: Please call the assigned Utilization  directly with any questions or concerns ALL voicemails in the department are confidential  Send all requests for admission clinical reviews, approved or denied determinations and any other requests to dedicated fax number belonging to the campus where the patient is receiving treatment

## 2020-01-24 NOTE — ASSESSMENT & PLAN NOTE
· 212/115 on arrival to ED  S/p one dose hydralazine in ED    Most recent /81  · Restart amlodipine, with hold parameter for SBP < 130

## 2020-01-24 NOTE — ED NOTES
Patient transported to [address] [place] [address], [address] [place] [address]  01/23/20 4156

## 2020-01-24 NOTE — CONSULTS
[address] 27 y o  female MRN: [id number]  Unit/Bed#: S -01 Encounter: [id number]    ASSESSMENT and PLAN:  1  Acute kidney injury (POA):  · Creatinine baseline unclear  Prior creatinine 1 6 7 July 2019  Previous to that patient had acute kidney injury September 2017  · Creatinine 2 39 on admission improving to 1 89 with volume administration  A1c 5 4  · Etiology:  May be related to uncontrolled hypertension which can cause a pressure naturesis  Chronic use of NSAIDs which can contribute to pre renal injury and may also cause an interstitial nephritis which is not supported by current urine study studies  No peripheral eosinophilia Improvement of renal function with volume supports pre renal   Concern for underlying disease/CKD due to prior elevated creatinine on studies in July and presence of proteinuria  Doubt post renal   May need to check renal ultrasound to further assess  · Plan:  · Hold further IV fluids-adequately hydrated  · Check renal ultrasound to obtain baseline studies and evaluate for acute pathology  · May need renal biopsy  · Adequate control of blood pressure essential  · Avoid over-correction with goal systolic [id number] at this time  · No further NSAIDs  2  CKD:  Likely presence of underlying disease due to longstanding hypertension and NSAID use  Also concern for FSGS in the setting of elevated BMI  3  Accelerated hypertension:    · Started on amlodipine 10 mg daily  Received a dose of hydralazine in the emergency with improvement in blood pressure readings  · Renal artery Doppler evaluation results pending  · Avoid rapid correction of blood pressure  · Workup for secondary causes  · ABNER in the differential  · Continue amlodipine 10 mg daily  · Plan: Will need to be initiated on an angiotensin receptor blocker or ACE-inhibitor looking forward  Hold at this time due to acute kidney injury  4  Anemia:  Chronic, Microcytic  · Patient reports lengthy menstrual periods every month     May be related to chronic blood loss  · Check iron studies  · Workup in progress  5  Proteinuria:    · Somewhat chronic although previous urinalysis showed elevated specific gravity therefore protein levels may be skewed  · Current urinalysis specific gravity 1 015 with protein excretion 2+  · Urine protein creatinine ratio approximately 1 g  · Check SPEP, UPEP  6  Asthma:    · No evidence of acute exacerbation  Last on prednisone in early December  Management per primary team  7  Shortness of breath/questionable syncopal episode:    · Empirically started on heparin  V/Q scan pending  · CT of the head negative for acute pathology  · Prior CT May 2019 shows small pulmonary nodule  HISTORY OF PRESENT ILLNESS:  Requesting Physician: Lowell Zapata MD  Reason for Consult:  Acute kidney injury    Cat Devries is a 27 y o  female with a history of uncontrolled hypertension at least since 2017, asthma, chronic NSAID use, and contraceptive implant who was admitted to [facility] after presenting with shortness of breath and dizziness  Family reports that patient became somnolent and unresponsive on the way to the emergency room  Upon arrival to the hospital patient was mentating appropriately  She has a past history of acute kidney injury during hospitalization September 2017 in the setting of accelerated hypertension head and acute traumatic injury  Following that hospitalization patient had 1 set of labs July 13, 2019 and at that time creatinine 1 67  She presents with a creatinine of 2 39 in the setting of accelerated hypertension prompting nephrology consult  Patient was seen and examined  BMI 45  She appears euvolemic  She is no longer dizzy or lightheaded  She denies blood in her urine or foamy urine  She states that there was no change in her urine output before admission but now she seems to be urinating more    She states that she has menses 2-2 and half weeks every month  She has been chronically using NSAIDs from prior injury in 2017  She has not been on antihypertensives-there was prior records that she was started on amlodipine and HCTZ but these medications have not been continued  She is unaware of history of chronic kidney disease  She reports no knowledge of family history of hypertension or kidney disease  She states that she is able to sleep through the night  Her mother tells her she snores when she is ill  Prior traumatic injury to the forehead     No family available for further discussion    PAST MEDICAL HISTORY:  Past Medical History:   Diagnosis Date    Asthma     Eczema        PAST SURGICAL HISTORY:  Past Surgical History:   Procedure Laterality Date    CHOLECYSTECTOMY      GALLBLADDER SURGERY         ALLERGIES:  Allergies   Allergen Reactions    Wheat Bran Itching       SOCIAL HISTORY:  Social History     Substance and Sexual Activity   Alcohol Use No     Social History     Substance and Sexual Activity   Drug Use No     Social History     Tobacco Use   Smoking Status Never Smoker   Smokeless Tobacco Never Used       FAMILY HISTORY:  Family History   Problem Relation Age of Onset    No Known Problems Mother     No Known Problems Father        MEDICATIONS:    Current Facility-Administered Medications:     acetaminophen (TYLENOL) tablet 650 mg, 650 mg, Oral, Q6H PRN, SUZAN Lester    albuterol (PROVENTIL HFA,VENTOLIN HFA) inhaler 2 puff, 2 puff, Inhalation, Q6H PRN, SUZAN Lester    albuterol inhalation solution 2 5 mg, 2 5 mg, Nebulization, Q6H PRN, SUZAN Lester    amLODIPine (NORVASC) tablet 5 mg, 5 mg, Oral, Daily, SUZAN Lester, 5 mg at 01/24/20 6466    benzonatate (TESSALON PERLES) capsule 100 mg, 100 mg, Oral, TID PRN, SUZAN Lester    docusate sodium (COLACE) capsule 100 mg, 100 mg, Oral, BID PRN, SUZAN Lester    fluticasone (FLOVENT HFA) 110 MCG/ACT inhaler 1 puff, 1 puff, Inhalation, BID, Ashwini SUZAN Douglas, 1 puff at 01/24/20 0579    guaiFENesin (MUCINEX) 12 hr tablet 1,200 mg, 1,200 mg, Oral, Q12H [facility], Ashwini SUZAN Douglas, 1,200 mg at 01/24/20 0960    heparin (porcine) 25,000 units in 250 mL infusion (premix), 3-30 Units/kg/hr (Order-Specific), Intravenous, Titrated, Zuleika White, DO, Last Rate: 18 mL/hr at 01/24/20 0822, 15 Units/kg/hr at 01/24/20 0163    heparin (porcine) injection 4,800 Units, 4,800 Units, Intravenous, PRN, Zuleika White, DO    heparin (porcine) injection 9,600 Units, 9,600 Units, Intravenous, PRN, Zuleika White, DO    loratadine (CLARITIN) tablet 10 mg, 10 mg, Oral, Daily, SUZAN York, 10 mg at 01/24/20 5137    ondansetron (ZOFRAN) injection 4 mg, 4 mg, Intravenous, Q6H PRN, SUZAN York    REVIEW OF SYSTEMS:  Constitutional:  Reported being more tired than usual on the day of admission  HENT:  Intermittent dry cough  Eyes:  Wears eyeglasses  Respiratory:  Positive for cough, nonproductive  Shortness of breath  No wheezing  Cardiovascular: Negative for chest pain, palpitations and leg swelling  Gastrointestinal: Negative for abdominal pain, constipation, diarrhea, nausea and vomiting  Genitourinary: No dysuria, hematuria  Endocrine: Negative for polyuria  Musculoskeletal: Negative for arthralgias-chronic shoulder pain,   Skin: Negative for rash  Neurological: Negative for focal weakness, headaches  Positive for dizziness, lightheadedness  Hematological: Negative for easy bruising  Psychiatric/Behavioral: Negative for confusion  All the systems were reviewed and were negative except as documented on the HPI      PHYSICAL EXAM:  Current Weight: Weight - Scale: 121 kg (266 lb 9 6 oz)  First Weight: Weight - Scale: 123 kg (270 lb 1 oz)  Vitals:    01/24/20 0026 01/24/20 0139 01/24/20 0535 01/24/20 0700   BP: 142/81   169/82   BP Location: Right arm   Left arm   Pulse: 85   78   Resp: 18   18   Temp: 97 8 °F (36 6 °C)   98 4 °F (36 9 °C) TempSrc: Oral   Oral   SpO2: 98%   98%   Weight: 122 kg (269 lb 12 8 oz) 122 kg (268 lb 15 4 oz) 121 kg (266 lb 9 6 oz)    Height: 5' 4" (1 626 m)          Intake/Output Summary (Last 24 hours) at 1/24/2020 1141  Last data filed at 1/24/2020 0534  Gross per 24 hour   Intake 1000 ml   Output 500 ml   Net 500 ml     Physical Exam   Constitutional: She is oriented to person, place, and time  She appears well-developed and well-nourished  No distress  HENT:   Head: Normocephalic and atraumatic  Mouth/Throat: Oropharynx is clear and moist    Eyes: Conjunctivae and EOM are normal    Neck: Normal range of motion  Neck supple  No JVD present  No tracheal deviation present  Cardiovascular: Normal rate, regular rhythm and intact distal pulses  Exam reveals no gallop and no friction rub  No murmur heard  Pulmonary/Chest: Effort normal  No accessory muscle usage or stridor  No tachypnea and no bradypnea  No respiratory distress  She has decreased breath sounds in the right lower field and the left lower field  She has no wheezes  She has no rhonchi  She has no rales  Abdominal: Soft  Bowel sounds are normal  She exhibits no distension and no mass  There is no tenderness  There is no rebound and no guarding  Musculoskeletal: Normal range of motion  She exhibits no edema, tenderness or deformity  Neurological: She is alert and oriented to person, place, and time  Skin: Skin is warm and dry  No rash noted  She is not diaphoretic  No erythema  No pallor  Psychiatric: She has a normal mood and affect   Her behavior is normal         Invasive Devices:      Lab Results:   Results from last 7 days   Lab Units 01/24/20  0539 01/23/20  2123   WBC Thousand/uL 6 82 5 72   HEMOGLOBIN g/dL 10 3* 12 5   HEMATOCRIT % 34 8 41 7   PLATELETS Thousands/uL 239 257   POTASSIUM mmol/L 3 8 4 4   CHLORIDE mmol/L 112* 104   CO2 mmol/L 20* 28   BUN mg/dL 19 16   CREATININE mg/dL 1 68* 2 39*   CALCIUM mg/dL 7 7* 9 1   PHOSPHORUS mg/dL --  3 3   ALK PHOS U/L  --  105   ALT U/L  --  20   AST U/L  --  14     Other Studies:

## 2020-01-24 NOTE — ED PROVIDER NOTES
History  Chief Complaint   Patient presents with    Syncope     Patient unresponsive in car after feeling lightheaded  Cough x2 weeks  + fever at home, + nasal congestion  No other complaints  Patient reports cp and now states that she was sob prior to syncope  28-year-old female presents the emergency department with her mother for evaluation of possible syncopal event  Patient's mother provides most history and does not give the patient much opportunity to respond  Per mother the patient was in the backseat of the car traveling to a [facility]  She had a dry cough which has been present for approximately 2 weeks  Patient reports that she was sitting in the backseat and felt short of breath and very lightheaded  She was also feeling very tired  Is unclear if she fell asleep wreck she had a syncopal event but when the mother drove up to the hospital she had difficulty arousing the patient  Patient's youngest sibling had a cold recently and the entire household has come down with similar symptoms of cough and congestion  Patient states that 2 days ago she did have a fever of 100 5  Patient denies nausea, vomiting, diarrhea  No chest pain  Patient does have a history of asthma  She did feel like she was slightly wheezing  She has had a cough recently with blood-tinged sputum  Patient's last menstrual cycle was 2 weeks ago  She has the Nexplanon implant in her left arm  Patient also reports a recent left shoulder injury at work  History provided by:  Patient, medical records and parent   used: No    Syncope   Episode history:  Single  Most recent episode:   Today  Timing:  Unable to specify  Progression:  Resolved  Chronicity:  New  Context: dehydration and sitting down    Context: not standing up    Witnessed: yes    Relieved by:  Nothing  Worsened by:  Nothing  Ineffective treatments:  None tried  Associated symptoms: difficulty breathing, dizziness, fever, malaise/fatigue, shortness of breath and weakness    Associated symptoms: no anxiety, no chest pain, no focal weakness, no headaches, no nausea and no palpitations        Prior to Admission Medications   Prescriptions Last Dose Informant Patient Reported? Taking? albuterol (2 5 mg/3 mL) 0 083 % nebulizer solution   No No   Sig: Take 1 vial (2 5 mg total) by nebulization every 6 (six) hours as needed for wheezing or shortness of breath   albuterol (VENTOLIN HFA) 90 mcg/act inhaler   No No   Sig: Inhale 2 puffs every 6 (six) hours as needed for wheezing or shortness of breath (cough)   amLODIPine (NORVASC) 5 mg tablet   No No   Sig: Take 1 tablet (5 mg total) by mouth daily   etonogestrel (NEXPLANON) subdermal implant   Yes No   Si mg by Subdermal route once   fluticasone (FLOVENT HFA) 110 MCG/ACT inhaler   No No   Sig: Inhale 1 puff 2 (two) times a day   ibuprofen (MOTRIN) 400 mg tablet   No No   Sig: Take 1 tablet (400 mg total) by mouth every 6 (six) hours as needed for mild pain for up to 3 days   loratadine (CLARITIN) 10 mg tablet   No No   Sig: Take 1 tablet (10 mg total) by mouth daily for 7 days      Facility-Administered Medications: None       Past Medical History:   Diagnosis Date    Asthma     Eczema        Past Surgical History:   Procedure Laterality Date    CHOLECYSTECTOMY      GALLBLADDER SURGERY         Family History   Problem Relation Age of Onset    No Known Problems Mother     No Known Problems Father      I have reviewed and agree with the history as documented  Social History     Tobacco Use    Smoking status: Never Smoker    Smokeless tobacco: Never Used   Substance Use Topics    Alcohol use: No    Drug use: No        Review of Systems   Constitutional: Positive for fatigue, fever and malaise/fatigue  Negative for appetite change  Respiratory: Positive for shortness of breath  Cardiovascular: Positive for syncope  Negative for chest pain and palpitations  Gastrointestinal: Negative for nausea  Genitourinary: Negative for dysuria  Musculoskeletal: Negative for back pain  Neurological: Positive for dizziness, weakness and light-headedness  Negative for focal weakness and headaches  All other systems reviewed and are negative  Physical Exam  Physical Exam   Constitutional: She is oriented to person, place, and time  She appears well-developed and well-nourished  No distress  HENT:   Head: Normocephalic  Nose: Nose normal    Mouth/Throat: Oropharynx is clear and moist  No oropharyngeal exudate  Eyes: Pupils are equal, round, and reactive to light  Conjunctivae and EOM are normal    Neck: Normal range of motion  Neck supple  Cardiovascular: Normal rate, regular rhythm, normal heart sounds and intact distal pulses  Pulmonary/Chest: Effort normal and breath sounds normal    Abdominal: Soft  Bowel sounds are normal  She exhibits no distension  There is no tenderness  There is no rebound and no guarding  Musculoskeletal: Normal range of motion  She exhibits no edema, tenderness or deformity  Lymphadenopathy:     She has no cervical adenopathy  Neurological: She is alert and oriented to person, place, and time  She has normal strength and normal reflexes  No cranial nerve deficit or sensory deficit  She exhibits normal muscle tone  Coordination and gait normal    Skin: Skin is warm, dry and intact  No rash noted  Psychiatric: Her speech is normal and behavior is normal  Judgment and thought content normal  Her mood appears anxious  Cognition and memory are normal    Flat affect   Nursing note and vitals reviewed        Vital Signs  ED Triage Vitals [01/23/20 2058]   Temperature Pulse Respirations Blood Pressure SpO2   97 7 °F (36 5 °C) 80 20 (!) 212/115 98 %      Temp Source Heart Rate Source Patient Position - Orthostatic VS BP Location FiO2 (%)   Oral Monitor Lying Right arm --      Pain Score       Worst Possible Pain           Vitals: 01/25/20 1500 01/25/20 2200 01/26/20 0700 01/26/20 1452   BP: 143/80 168/98 154/87 146/82   Pulse: 83 90 81 91   Patient Position - Orthostatic VS: Lying Lying Lying Sitting         Visual Acuity      ED Medications  Medications   fluticasone (FLOVENT HFA) 110 MCG/ACT inhaler 1 puff (1 puff Inhalation Given 1/26/20 1732)   loratadine (CLARITIN) tablet 10 mg (10 mg Oral Given 1/26/20 0846)   albuterol inhalation solution 2 5 mg (has no administration in time range)   albuterol (PROVENTIL HFA,VENTOLIN HFA) inhaler 2 puff (has no administration in time range)   docusate sodium (COLACE) capsule 100 mg (has no administration in time range)   ondansetron (ZOFRAN) injection 4 mg (has no administration in time range)   acetaminophen (TYLENOL) tablet 650 mg (has no administration in time range)   guaiFENesin (MUCINEX) 12 hr tablet 1,200 mg (1,200 mg Oral Given 1/26/20 0846)   benzonatate (TESSALON PERLES) capsule 100 mg (has no administration in time range)   iron polysaccharides (FERREX) capsule 150 mg (150 mg Oral Given 1/26/20 1128)   amLODIPine (NORVASC) tablet 10 mg (has no administration in time range)   heparin (porcine) subcutaneous injection 5,000 Units (5,000 Units Subcutaneous Given 1/26/20 1429)   sodium chloride 0 9 % bolus 1,000 mL (0 mL Intravenous Stopped 1/23/20 2340)   hydrALAZINE (APRESOLINE) injection 5 mg (5 mg Intravenous Given 1/23/20 2341)   heparin (porcine) injection 9,600 Units (9,600 Units Intravenous Given 1/23/20 2339)   sodium chloride 0 9 % infusion (125 mL/hr Intravenous New Bag 1/24/20 0209)       Diagnostic Studies  Results Reviewed     Procedure Component Value Units Date/Time    Hemoglobin A1C w/ EAG Estimation [[id number]] Collected:  01/23/20 2123    Lab Status:  Final result Specimen:  Blood from Arm, Right Updated:  01/24/20 0547     Hemoglobin A1C 5 4 %       mg/dl     Rapid drug screen, urine [[id number]]  (Normal) Collected:  01/23/20 4287    Lab Status:  Final result Specimen:  Urine Updated:  01/24/20 0304     Amph/Meth UR Negative     Barbiturate Ur Negative     Benzodiazepine Urine Negative     Cocaine Urine Negative     Methadone Urine Negative     Opiate Urine Negative     PCP Ur Negative     THC Urine Negative    Narrative:       FOR MEDICAL PURPOSES ONLY  IF CONFIRMATION NEEDED PLEASE CONTACT THE LAB WITHIN 5 DAYS      Drug Screen Cutoff Levels:  AMPHETAMINE/METHAMPHETAMINES  1000 ng/mL  BARBITURATES     200 ng/mL  BENZODIAZEPINES     200 ng/mL  COCAINE      300 ng/mL  METHADONE      300 ng/mL  OPIATES      300 ng/mL  PHENCYCLIDINE     25 ng/mL  THC       50 ng/mL      Protein / creatinine ratio, urine [[id number]]  (Abnormal) Collected:  01/23/20 2138    Lab Status:  Final result Specimen:  Urine Updated:  01/24/20 0300     Creatinine, Ur 166 0 mg/dL      Protein Urine Random 179 mg/dL      Prot/Creat Ratio, Ur 1 08    Phosphorus [[id number]]  (Normal) Collected:  01/23/20 2123    Lab Status:  Final result Specimen:  Blood from Arm, Right Updated:  01/24/20 0226     Phosphorus 3 3 mg/dL     Protime-INR [[id number]]  (Normal) Collected:  01/23/20 2123    Lab Status:  Final result Specimen:  Blood from Arm, Right Updated:  01/23/20 2341     Protime 12 6 seconds      INR 1 00    APTT [[id number]]  (Normal) Collected:  01/23/20 2123    Lab Status:  Final result Specimen:  Blood from Arm, Right Updated:  01/23/20 2341     PTT 30 seconds     Influenza A/B and RSV PCR [[id number]]  (Normal) Collected:  01/23/20 2123    Lab Status:  Final result Specimen:  Nose Updated:  01/23/20 2241     INFLUENZA A PCR None Detected     INFLUENZA B PCR None Detected     RSV PCR None Detected    Urine Microscopic [[id number]]  (Abnormal) Collected:  01/23/20 2206    Lab Status:  Final result Specimen:  Urine, Clean Catch Updated:  01/23/20 2224     RBC, UA 0-1 /hpf      WBC, UA 2-4 /hpf      Epithelial Cells Moderate /hpf      Bacteria, UA Occasional /hpf      AMORPH URATES Occasional /hpf MUCUS THREADS Occasional    UA w Reflex to Microscopic w Reflex to Culture [[id number]]  (Abnormal) Collected:  01/23/20 2206    Lab Status:  Final result Specimen:  Urine, Clean Catch Updated:  01/23/20 2214     Color, UA Yellow     Clarity, UA Slightly Cloudy     Specific Gravity, UA 1 015     pH, UA 7 0     Leukocytes, UA Negative     Nitrite, UA Negative     Protein,  (2+) mg/dl      Glucose, UA Negative mg/dl      Ketones, UA Negative mg/dl      Urobilinogen, UA 0 2 E U /dl      Bilirubin, UA Negative     Blood, UA Negative    POCT pregnancy, urine [[id number]]  (Normal) Resulted:  01/23/20 2211    Lab Status:  Final result Updated:  01/23/20 2211     EXT PREG TEST UR (Ref: Negative) negative     Control valid    Comprehensive metabolic panel [[id number]]  (Abnormal) Collected:  01/23/20 2123    Lab Status:  Final result Specimen:  Blood from Arm, Right Updated:  01/23/20 2157     Sodium 140 mmol/L      Potassium 4 4 mmol/L      Chloride 104 mmol/L      CO2 28 mmol/L      ANION GAP 8 mmol/L      BUN 16 mg/dL      Creatinine 2 39 mg/dL      Glucose 88 mg/dL      Calcium 9 1 mg/dL      AST 14 U/L      ALT 20 U/L      Alkaline Phosphatase 105 U/L      Total Protein 7 3 g/dL      Albumin 3 3 g/dL      Total Bilirubin 0 15 mg/dL      eGFR 32 ml/min/1 73sq m     Narrative:       Megamartinez guidelines for Chronic Kidney Disease (CKD):     Stage 1 with normal or high GFR (GFR > 90 mL/min/1 73 square meters)    Stage 2 Mild CKD (GFR = 60-89 mL/min/1 73 square meters)    Stage 3A Moderate CKD (GFR = 45-59 mL/min/1 73 square meters)    Stage 3B Moderate CKD (GFR = 30-44 mL/min/1 73 square meters)    Stage 4 Severe CKD (GFR = 15-29 mL/min/1 73 square meters)    Stage 5 End Stage CKD (GFR <15 mL/min/1 73 square meters)  Note: GFR calculation is accurate only with a steady state creatinine    Troponin I [[id number]]  (Normal) Collected:  01/23/20 2123    Lab Status:  Final result Specimen: Blood from Arm, Right Updated:  01/23/20 2157     Troponin I <0 02 ng/mL     D-Dimer [[id number]]  (Abnormal) Collected:  01/23/20 2123    Lab Status:  Final result Specimen:  Blood from Arm, Right Updated:  01/23/20 2152     D-Dimer, Quant 0 95 ug/ml FEU     CBC and differential [[id number]]  (Abnormal) Collected:  01/23/20 2123    Lab Status:  Final result Specimen:  Blood from Arm, Right Updated:  01/23/20 2142     WBC 5 72 Thousand/uL      RBC 6 08 Million/uL      Hemoglobin 12 5 g/dL      Hematocrit 41 7 %      MCV 69 fL      MCH 20 6 pg      MCHC 30 0 g/dL      RDW 15 4 %      MPV 11 0 fL      Platelets 062 Thousands/uL      nRBC 0 /100 WBCs      Neutrophils Relative 52 %      Immat GRANS % 0 %      Lymphocytes Relative 33 %      Monocytes Relative 10 %      Eosinophils Relative 4 %      Basophils Relative 1 %      Neutrophils Absolute 2 98 Thousands/µL      Immature Grans Absolute 0 01 Thousand/uL      Lymphocytes Absolute 1 90 Thousands/µL      Monocytes Absolute 0 58 Thousand/µL      Eosinophils Absolute 0 22 Thousand/µL      Basophils Absolute 0 03 Thousands/µL                  US kidney and bladder   Final Result by Ryan Miller MD (01/24 1639)         1  Small echogenic kidneys bilaterally consistent with medical renal disease  No obstructive uropathy noted  Workstation performed: [id number]         NM lung ventilation / perfusion   Final Result by Jovany Sparks MD (01/24 1349)      No evidence of pulmonary embolism  Workstation performed: [id number]         VAS renal artery complete   Final Result by Isidoro Steen MD (01/24 1624)      CT head without contrast   Final Result by Yayo Rocha MD (01/23 2313)      1  No acute intracranial hemorrhage, midline shift, or mass effect  2   Extensive paranasal sinus mucosal thickening, correlate for sinusitis                    Workstation performed: [id number]         XR chest 2 views   Final Result by Landon Cervantes MD (01/24 2397)      No acute cardiopulmonary disease              Workstation performed: [id number]         IR consult    (Results Pending)              Procedures  ECG 12 Lead Documentation Only  Date/Time: 1/23/2020 9:49 PM  Performed by: Lamonte Leon DO  Authorized by: Lamonte Leon DO     Indications / Diagnosis:  Syncope  ECG reviewed by me, the ED Provider: yes    Patient location:  ED  Previous ECG:     Previous ECG:  Compared to current    Comparison ECG info:  5/19    Similarity:  No change  Interpretation:     Interpretation: normal    Rate:     ECG rate:  87    ECG rate assessment: normal    Rhythm:     Rhythm: sinus rhythm    Ectopy:     Ectopy: none    QRS:     QRS axis:  Normal  Conduction:     Conduction: normal    ST segments:     ST segments:  Normal  T waves:     T waves: normal               ED Course               PERC Rule for PE      Most Recent Value   PERC Rule for PE   Age >=50  0 Filed at: 01/23/2020 2116   HR >=100  0 Filed at: 01/23/2020 2116   O2 Sat on room air < 95%  0 Filed at: 01/23/2020 2116   History of PE or DVT  0 Filed at: 01/23/2020 2116   Recent trauma or surgery  0 Filed at: 01/23/2020 2116   Hemoptysis  1 Filed at: 01/23/2020 2116   Exogenous estrogen  0 Filed at: 01/23/2020 2116   Unilateral leg swelling  0 Filed at: 01/23/2020 2116   PERC Rule for PE Results  1 Filed at: 01/23/2020 2116                      MDM  Number of Diagnoses or Management Options  Acute-on-chronic kidney injury [facility]): new and requires workup  Hypertensive urgency: new and requires workup  Syncope: new and requires workup     Amount and/or Complexity of Data Reviewed  Clinical lab tests: ordered and reviewed  Tests in the radiology section of CPT®: ordered  Tests in the medicine section of CPT®: reviewed and ordered  Decide to obtain previous medical records or to obtain history from someone other than the patient: yes  Discuss the patient with other providers: yes  Independent visualization of images, tracings, or specimens: yes    Patient Progress  Patient progress: stable        Disposition  Final diagnoses:   Hypertensive urgency   Syncope   Acute-on-chronic kidney injury ([facility] )     Time reflects when diagnosis was documented in both MDM as applicable and the Disposition within this note     Time User Action Codes Description Comment    1/23/2020 11:14 PM White, Zuleika Add [I16 0] Hypertensive urgency     1/23/2020 11:14 PM White, Zuleika Add [R55] Syncope     1/23/2020 11:15 PM White, Zuleika Add [N17 9] ANDRE (acute kidney injury) ([facility] )     1/23/2020 11:15 PM White, Zuleika Remove [N17 9] ANDRE (acute kidney injury) ([facility] )     1/23/2020 11:15 PM White, Zuleika Add [N17 9,  N18 9] Acute-on-chronic kidney injury ([facility] )     1/23/2020 11:37 PM Earnesteen Keon Add [N17 9] ANDRE (acute kidney injury) ([facility] )     1/23/2020 11:37 PM Earnesteen Keon Remove [N17 9] ANDRE (acute kidney injury) ([facility] )     1/24/2020  1:55 AM Earnesteen Keon Modify [R55] Syncope     1/26/2020 11:24 AM Josph BarneyKarlene Add [D64 9] Anemia     1/26/2020 11:24 AM Karlene Velasquez Add [I10] Hypertension, unspecified type     1/26/2020 11:24 AM Viveca Swaledale Add [R51] Headache       ED Disposition     ED Disposition Condition Date/Time Comment    Admit Stable Thu Jan 23, 2020 11:14 PM Case was discussed with PHONG Harper and the patient's admission status was agreed to be Admission Status: inpatient status to the service of Dr Alisson Lawton     Follow up With Specialties Details Why Contact Info    SUZAN Hussein Nurse Practitioner Schedule an appointment as soon as possible for a visit in 1 week(s)  [address]      Sonia Saldivar MD Nephrology Call in 1 day(s) Call 1st thing tomorrow morning to arrange an office appointment [address] [facility]    [address] [place] [address] [phone]            Current Discharge Medication List      START taking these medications    Details   acetaminophen (TYLENOL) 325 mg tablet Take 2 tablets (650 mg total) by mouth every 6 (six) hours as needed for mild pain or headaches  Qty: 30 tablet, Refills: 0    Associated Diagnoses: Headache      docusate sodium (COLACE) 100 mg capsule Take 1 capsule (100 mg total) by mouth 2 (two) times a day as needed for constipation  Qty: 10 capsule, Refills: 0    Associated Diagnoses: Anemia      iron polysaccharides (FERREX) 150 mg capsule Take 1 capsule (150 mg total) by mouth daily  Qty: 30 capsule, Refills: 0    Associated Diagnoses: Anemia         CONTINUE these medications which have CHANGED    Details   amLODIPine (NORVASC) 10 mg tablet Take 1 tablet (10 mg total) by mouth daily  Qty: 30 tablet, Refills: 0    Associated Diagnoses: Hypertension, unspecified type         CONTINUE these medications which have NOT CHANGED    Details   albuterol (2 5 mg/3 mL) 0 083 % nebulizer solution Take 1 vial (2 5 mg total) by nebulization every 6 (six) hours as needed for wheezing or shortness of breath  Qty: 30 mL, Refills: 0    Associated Diagnoses: Mild intermittent asthma with acute exacerbation      albuterol (VENTOLIN HFA) 90 mcg/act inhaler Inhale 2 puffs every 6 (six) hours as needed for wheezing or shortness of breath (cough)  Qty: 1 Inhaler, Refills: 11    Associated Diagnoses: Mild intermittent asthma with acute exacerbation      etonogestrel (NEXPLANON) subdermal implant 68 mg by Subdermal route once      fluticasone (FLOVENT HFA) 110 MCG/ACT inhaler Inhale 1 puff 2 (two) times a day  Qty: 1 Inhaler, Refills: 11    Associated Diagnoses: Mild intermittent asthma with acute exacerbation      loratadine (CLARITIN) 10 mg tablet Take 1 tablet (10 mg total) by mouth daily for 7 days  Qty: 7 tablet, Refills: 0    Associated Diagnoses: Cough         STOP taking these medications       ibuprofen (MOTRIN) 400 mg tablet Comments:   Reason for Stopping:             Outpatient Discharge Orders   Discharge Diet     Activity as tolerated       ED Provider  Electronically Signed by           Jemima Wilcox,   01/26/20 1856

## 2020-01-24 NOTE — MALNUTRITION/BMI
This medical record reflects one or more clinical indicators suggestive of malnutrition and/or morbid obesity  BMI Findings:  BMI Classifications: Morbid Obesity 45-49 9     Body mass index is 45 76 kg/m²  See Nutrition note dated 1/24/20 for additional details  Completed nutrition assessment is viewable in the nutrition documentation

## 2020-01-24 NOTE — PLAN OF CARE
Problem: Potential for Falls  Goal: Patient will remain free of falls  Description  INTERVENTIONS:  - Assess patient frequently for physical needs  -  Identify cognitive and physical deficits and behaviors that affect risk of falls    -  [place] fall precautions as indicated by assessment   - Educate patient/family on patient safety including physical limitations  - Instruct patient to call for assistance with activity based on assessment  - Modify environment to reduce risk of injury  - Consider OT/PT consult to assist with strengthening/mobility  Outcome: Progressing     Problem: PAIN - ADULT  Goal: Verbalizes/displays adequate comfort level or baseline comfort level  Description  Interventions:  - Encourage patient to monitor pain and request assistance  - Assess pain using appropriate pain scale  - Administer analgesics based on type and severity of pain and evaluate response  - Implement non-pharmacological measures as appropriate and evaluate response  - Consider cultural and social influences on pain and pain management  - Notify physician/advanced practitioner if interventions unsuccessful or patient reports new pain  Outcome: Progressing     Problem: INFECTION - ADULT  Goal: Absence or prevention of progression during hospitalization  Description  INTERVENTIONS:  - Assess and monitor for signs and symptoms of infection  - Monitor lab/diagnostic results  - Monitor all insertion sites, i e  indwelling lines, tubes, and drains  - Monitor endotracheal if appropriate and nasal secretions for changes in amount and color  - [place] appropriate cooling/warming therapies per order  - Administer medications as ordered  - Instruct and encourage patient and family to use good hand hygiene technique  - Identify and instruct in appropriate isolation precautions for identified infection/condition  Outcome: Progressing  Goal: Absence of fever/infection during neutropenic period  Description  INTERVENTIONS:  - Monitor WBC    Outcome: Progressing     Problem: SAFETY ADULT  Goal: Maintain or return to baseline ADL function  Description  INTERVENTIONS:  -  Assess patient's ability to carry out ADLs; assess patient's baseline for ADL function and identify physical deficits which impact ability to perform ADLs (bathing, care of mouth/teeth, toileting, grooming, dressing, etc )  - Assess/evaluate cause of self-care deficits   - Assess range of motion  - Assess patient's mobility; develop plan if impaired  - Assess patient's need for assistive devices and provide as appropriate  - Encourage maximum independence but intervene and supervise when necessary  - Involve family in performance of ADLs  - Assess for home care needs following discharge   - Consider OT consult to assist with ADL evaluation and planning for discharge  - Provide patient education as appropriate  Outcome: Progressing  Goal: Maintain or return mobility status to optimal level  Description  INTERVENTIONS:  - Assess patient's baseline mobility status (ambulation, transfers, stairs, etc )    - Identify cognitive and physical deficits and behaviors that affect mobility  - Identify mobility aids required to assist with transfers and/or ambulation (gait belt, sit-to-stand, lift, walker, cane, etc )  - [place] fall precautions as indicated by assessment  - Record patient progress and toleration of activity level on Mobility SBAR; progress patient to next Phase/Stage  - Instruct patient to call for assistance with activity based on assessment  - Consider rehabilitation consult to assist with strengthening/weightbearing, etc   Outcome: Progressing     Problem: DISCHARGE PLANNING  Goal: Discharge to home or other facility with appropriate resources  Description  INTERVENTIONS:  - Identify barriers to discharge w/patient and caregiver  - Arrange for needed discharge resources and transportation as appropriate  - Identify discharge learning needs (meds, wound care, etc )  - Arrange for interpretive services to assist at discharge as needed  - Refer to Case Management Department for coordinating discharge planning if the patient needs post-hospital services based on physician/advanced practitioner order or complex needs related to functional status, cognitive ability, or social support system  Outcome: Progressing     Problem: Knowledge Deficit  Goal: Patient/family/caregiver demonstrates understanding of disease process, treatment plan, medications, and discharge instructions  Description  Complete learning assessment and assess knowledge base    Interventions:  - Provide teaching at level of understanding  - Provide teaching via preferred learning methods  Outcome: Progressing

## 2020-01-24 NOTE — ASSESSMENT & PLAN NOTE
· Home regimen: fluticasone BID, albuterol PRN    She reports using albuterol inhaler >5x daily due to recent URI   · Will start nebs PRN   · Will hold on systemic steroids at this time as she is without wheezes

## 2020-01-24 NOTE — UTILIZATION REVIEW
Initial Clinical Review    Admission: Date/Time/Statement: Inpatient Admission Orders (From admission, onward)     Ordered        01/23/20 2316  Inpatient Admission  Once                   Orders Placed This Encounter   Procedures    Inpatient Admission     Standing Status:   Standing     Number of Occurrences:   1     Order Specific Question:   Admitting Physician     Answer:   Perri Crigler [[id number]]     Order Specific Question:   Level of Care     Answer:   Med Surg [[id number]]     Order Specific Question:   Estimated length of stay     Answer:   More than 2 Midnights     Order Specific Question:   Certification     Answer:   I certify that inpatient services are medically necessary for this patient for a duration of greater than two midnights  See H&P and MD Progress Notes for additional information about the patient's course of treatment  ED Arrival Information     Expected Arrival Acuity Means of Arrival Escorted By Service Admission Type    - 1/23/2020 20:53 Emergent Walk-In Family Member Hospitalist Emergency    Arrival Complaint    cough        Chief Complaint   Patient presents with    Syncope     Patient unresponsive in car after feeling lightheaded  Cough x2 weeks  + fever at home, + nasal congestion  No other complaints  Patient reports cp and now states that she was sob prior to syncope  Assessment/Plan: 27 yr old female to the ed from home  with sob, presyncopal  Event  and chest pressure that happened earlier this pm while she was riding in the back seat of car  She has had recent uri and coughing yellow sputum with ocassional blood tinge  She has had a temp of 100 5 for two days   When she arrived at the ed her bp 212/115 htn urgency and received a dose of hydralazine   pe suspected and   due to anita will need vq scan  , she takes multiple doses of advil a day  Plan is consult nephrology- who said to check renal u /s 2nd  nsaid use   Urine + for protein , suspect underlying chronic kidney disease  Suggest renal biopsy as an op  Check protein electophoresis, iron studies, stool for ob  She is on heparin drip for possible pe  She is admitted as an inpatient with syncope, monitor off of abx at this time    ED Triage Vitals [01/23/20 2058]   Temperature Pulse Respirations Blood Pressure SpO2   97 7 °F (36 5 °C) 80 20 (!) 212/115 98 %      Temp Source Heart Rate Source Patient Position - Orthostatic VS BP Location FiO2 (%)   Oral Monitor Lying Right arm --      Pain Score       Worst Possible Pain        Wt Readings from Last 1 Encounters:   01/24/20 121 kg (266 lb 9 6 oz)     Additional Vital Signs:   0700  98 4 °F (36 9 °C)  78  18  169/82  118  98 %  None (Room air)  Lying   01/24/20 0026  97 8 °F (36 6 °C)  85  18  142/81    98 %  None (Room air)  Sitting   01/23/20 2330    74  18  178/81Abnormal     100 %  None (Room air)  Sitting   01/23/20 2300    78  20  186/88Abnormal     100 %  None (Room air)  Sitting   01/23/20 2212              None (Room air)     01/23/20 2200    62  20  164/77             Pertinent Labs/Diagnostic Test Results:   Results from last 7 days   Lab Units 01/24/20  0539 01/23/20 2123   WBC Thousand/uL 6 82 5 72   HEMOGLOBIN g/dL 10 3* 12 5   HEMATOCRIT % 34 8 41 7   PLATELETS Thousands/uL 239 257   NEUTROS ABS Thousands/µL 3 22 2 98         Results from last 7 days   Lab Units 01/24/20  0539 01/23/20 2123   SODIUM mmol/L 142 140   POTASSIUM mmol/L 3 8 4 4   CHLORIDE mmol/L 112* 104   CO2 mmol/L 20* 28   ANION GAP mmol/L 10 8   BUN mg/dL 19 16   CREATININE mg/dL 1 68* 2 39*   EGFR ml/min/1 73sq m 49 32   CALCIUM mg/dL 7 7* 9 1   PHOSPHORUS mg/dL  --  3 3     Results from last 7 days   Lab Units 01/23/20  2123   AST U/L 14   ALT U/L 20   ALK PHOS U/L 105   TOTAL PROTEIN g/dL 7 3   ALBUMIN g/dL 3 3*   TOTAL BILIRUBIN mg/dL 0 15*         Results from last 7 days   Lab Units 01/24/20  0539 01/23/20  2123   GLUCOSE RANDOM mg/dL 95 88         Results from last 7 days Lab Units 01/23/20 2123   HEMOGLOBIN A1C % 5 4   EAG mg/dl 108     Results from last 7 days   Lab Units 01/23/20 2123   TROPONIN I ng/mL <0 02     Results from last 7 days   Lab Units 01/23/20 2123   D-DIMER QUANTITATIVE ug/ml FEU 0 95*     Results from last 7 days   Lab Units 01/24/20  0539 01/23/20 2123   PROTIME seconds  --  12 6   INR   --  1 00   PTT seconds >210* 30     Results from last 7 days   Lab Units 01/23/20 2206 01/23/20 2138   CLARITY UA  Slightly Cloudy  --    COLOR UA  Yellow  --    SPEC GRAV UA  1 015  --    PH UA  7 0  --    GLUCOSE UA mg/dl Negative  --    KETONES UA mg/dl Negative  --    BLOOD UA  Negative  --    PROTEIN UA mg/dl 100 (2+)*  --    NITRITE UA  Negative  --    BILIRUBIN UA  Negative  --    UROBILINOGEN UA E U /dl 0 2  --    LEUKOCYTES UA  Negative  --    WBC UA /hpf 2-4*  --    RBC UA /hpf 0-1*  --    BACTERIA UA /hpf Occasional  --    EPITHELIAL CELLS WET PREP /hpf Moderate*  --    MUCUS THREADS  Occasional*  --    CREATININE UR mg/dL  --  166 0   PROTEIN UR mg/dL  --  179   PROT/CREAT RATIO UR   --  1 08*     Results from last 7 days   Lab Units 01/23/20 2123 01/18/20 2045   INFLUENZA A PCR  None Detected None Detected   RSV PCR  None Detected None Detected     Results from last 7 days   Lab Units 01/23/20 2138   AMPH/METH  Negative   BARBITURATE UR  Negative   BENZODIAZEPINE UR  Negative   COCAINE UR  Negative   METHADONE URINE  Negative   OPIATE UR  Negative   PCP UR  Negative   THC UR  Negative    ct of head-1   No acute intracranial hemorrhage, midline shift, or mass effect  2   Extensive paranasal sinus mucosal thickening, correlate for sinusitis  -  Chest x-ray-unremarkable  ED Treatment:   Medication Administration from 01/23/2020 2053 to 01/24/2020 0019       Date/Time Order Dose Route Action Comments     01/23/2020 2123 sodium chloride 0 9 % bolus 1,000 mL 1,000 mL Intravenous New Bag      01/23/2020 2341 hydrALAZINE (APRESOLINE) injection 5 mg 5 mg Intravenous Given      01/23/2020 2339 heparin (porcine) injection 9,600 Units 9,600 Units Intravenous Given         Past Medical History:   Diagnosis Date    Asthma     Eczema      Present on Admission:   Asthma   Acute kidney injury superimposed on chronic kidney disease ([facility] )      Admitting Diagnosis: Syncope [R55]  Hypertensive urgency [I16 0]  Acute-on-chronic kidney injury ([address] ) [N17 9, N18 9]  Age/Sex: 27 y o  female  Admission Orders:  Scheduled Medications:    Medications:  amLODIPine 5 mg Oral Daily   fluticasone 1 puff Inhalation BID   guaiFENesin 1,200 mg Oral Q12H [address]   loratadine 10 mg Oral Daily     Continuous IV Infusions:    heparin (porcine) 3-30 Units/kg/hr (Order-Specific) Intravenous Titrated     PRN Meds:    acetaminophen 650 mg Oral Q6H PRN   albuterol 2 puff Inhalation Q6H PRN   albuterol 2 5 mg Nebulization Q6H PRN   benzonatate 100 mg Oral TID PRN   docusate sodium 100 mg Oral BID PRN   heparin (porcine) 4,800 Units Intravenous PRN   heparin (porcine) 9,600 Units Intravenous PRN   ondansetron 4 mg Intravenous Q6H PRN   bladder scan q shift  Daily wt  I&0  telm  U/s kidney and bladder       IP CONSULT TO NEPHROLOGY    Network Utilization Review Department  [email or web address]  ATTENTION: Please call with any questions or concerns to [phone] and carefully listen to the prompts so that you are directed to the right person  All voicemails are confidential   Martita Morales all requests for admission clinical reviews, approved or denied determinations and any other requests to dedicated fax number below belonging to the campus where the patient is receiving treatment   List of dedicated fax numbers for the Facilities:  FACILITY NAME UR FAX NUMBER   ADMISSION DENIALS (Administrative/Medical Necessity) [phone]   [address] (Maternity/NICU/Pediatrics) [phone]   Anthony Grupo [address] [phone]   Thiago Hector [facility] [phone]   [facility] [address] [facility] [phone]   [facility]  [phone]   [address] [facility]  [phone]   [address] [place] [address] [address] [facility] [phone]

## 2020-01-24 NOTE — ASSESSMENT & PLAN NOTE
· Presents to ED with possible syncope, shortness of breath, chest pressure, light headedness that began earlier this evening while she was riding in the back seat of the car  She notes a fever of 100 5 about 2 days ago  She reports productive cough with yellow and green sputum that is slightly blood tinged at times  She does report upper respiratory illness that began a few days ago  · Nexplanon LUE since April 2019  · D-dimer 0 95, unable to have CTA due to kidney function  V/Q scan ordered  · Denies any prior PE/DVT, clotting disorders, seizure hx  Denies family hx of PE/DVT, clotting disorders, SCD, miscarriages  · Denies drug use  Follow up UDS      · Troponin <0 02  · Telemetry   · IV fluids

## 2020-01-24 NOTE — H&P
H&P- Madan Cook 1995, 27 y o  female MRN: [id number]    Unit/Bed#: S -01 Encounter: [id number]    Primary Care Provider: SUZAN Byrne   Date and time admitted to hospital: 1/23/2020  8:54 PM        * Syncope  Assessment & Plan  · Presents to ED with possible syncope, shortness of breath, chest pressure, light headedness that began earlier this evening while she was riding in the back seat of the car  She notes a fever of 100 5 about 2 days ago  She reports productive cough with yellow and green sputum that is slightly blood tinged at times  She does report upper respiratory illness that began a few days ago  · Nexplanon LUE since April 2019  · D-dimer 0 95, unable to have CTA due to kidney function  V/Q scan ordered  · Denies any prior PE/DVT, clotting disorders, seizure hx  Denies family hx of PE/DVT, clotting disorders, SCD, miscarriages  · Denies drug use  Follow up UDS  · Troponin <0 02  · Telemetry   · IV fluids    URI (upper respiratory infection)  Assessment & Plan  · Afebrile without leukocytosis  · Monitor off abx   · Supportive care     Acute kidney injury superimposed on chronic kidney disease [facility])  Assessment & Plan  Creatinine upon admission:  2 39  Prior July 2019:  1 67  Does not follow with a nephrologist  Per chart review, patient has been hypertensive during previous hospitalizations and office visits  She was started on amlodipine July 2019 by emergency department provider, however, she no longer takes this medication  She is not able to tell me why  Will restart amlodipine  Patient reports shoulder injury (12/5/19) for which she has been taking Advil  She reports taking 2 tablets every 6 hours as needed  Reports normal urine output  Urinalysis:  2+ protein  Follow-up A1c, phosphorous, urine protein/creat  Check VAS renal artery   Consult nephrology     Hypertension  Assessment & Plan  · 212/115 on arrival to ED    S/p one dose hydralazine in ED   Most recent /81  · Restart amlodipine, with hold parameter for SBP < 130     Asthma  Assessment & Plan  · Home regimen: fluticasone BID, albuterol PRN  She reports using albuterol inhaler >5x daily due to recent URI   · Will start nebs PRN   · Will hold on systemic steroids at this time as she is without wheezes    VTE Prophylaxis: Heparin Drip  / reason for no mechanical VTE prophylaxis await VQ scan   Code Status: Level 1  POLST: POLST is not applicable to this patient  Discussion with family: patient  Declined udpate to her mother since it is late  Anticipated Length of Stay:  Patient will be admitted on an Inpatient basis with an anticipated length of stay of  Greater than 2 midnights  Justification for Hospital Stay: IV heparin drip, work up for possible PE     Total Time for Visit, including Counseling / Coordination of Care: 45 minutes  Greater than 50% of this total time spent on direct patient counseling and coordination of care  Chief Complaint:   Shortness of breath, presyncopal event, lightheadedness, chest pressure    History of Present Illness:    Tonya Kirkpatrick is a 27 y o  female who presents with shortness of breath, presyncopal event, lightheadedness, chest pressure that occurred earlier this evening while she was riding in the back seat of her car with her mom  Patient reports recent cold/URI  She reports a temperature of 100 5° about 2 days ago  She reports yellow sputum that is blood tinged at times  Past medical history significant for hypertension, asthma, eczema, Nexplanon implant placed April 2019  Appears to have some developmental delays  Patient denies any family history of clotting disorders, sudden cardiac death, problems with pregnancy  On arrival to emergency department, patient was with hypertensive urgency with blood pressure 212/115  She received a dose of hydralazine in the emergency department  Most recent blood pressure is 142/81    She also presented with an acute kidney injury as well as elevated D-dimer  Due to kidney function, we are unable to evaluate PE with CTA  She will require a V/Q scan tomorrow  Review of chart reveals that patient's blood pressure has been uncontrolled for at least the last few months  She was started on amlodipine in July of 2019 by the emergency department provider  She reports she is no longer taking this medication but is unable to tell me why  She does not check her blood pressure at home  She does not follow with a nephrologist   She does report taking Advil multiple times per day due to a shoulder injury that occurred in beginning of December  Patient will be admitted as an inpatient  Consult to Nephrology is requested  Review of Systems:    Review of Systems   Constitutional: Positive for fever  Negative for activity change, chills and unexpected weight change  HENT: Positive for congestion  Negative for ear pain, sinus pain and sore throat  Eyes: Negative  Respiratory: Positive for cough, chest tightness and shortness of breath  Negative for wheezing  Cardiovascular: Negative  Gastrointestinal: Negative  Genitourinary: Negative for difficulty urinating, dysuria, flank pain, frequency, hematuria and urgency  Musculoskeletal: Positive for arthralgias (L shoulder)  Skin: Negative  Neurological: Positive for syncope and light-headedness  Negative for dizziness, tremors, seizures, facial asymmetry, speech difficulty, weakness, numbness and headaches  Hematological: Negative  Psychiatric/Behavioral: Negative  Past Medical and Surgical History:     Past Medical History:   Diagnosis Date    Asthma     Eczema        Past Surgical History:   Procedure Laterality Date    CHOLECYSTECTOMY      GALLBLADDER SURGERY         Meds/Allergies:    Prior to Admission medications    Medication Sig Start Date End Date Taking?  Authorizing Provider   albuterol (2 5 mg/3 mL) 0 083 % nebulizer solution Take 1 vial (2 5 mg total) by nebulization every 6 (six) hours as needed for wheezing or shortness of breath 1/23/20   SUZAN Gudino   albuterol (VENTOLIN HFA) 90 mcg/act inhaler Inhale 2 puffs every 6 (six) hours as needed for wheezing or shortness of breath (cough) 1/30/19   SUZAN Gudino   amLODIPine (NORVASC) 5 mg tablet Take 1 tablet (5 mg total) by mouth daily 7/14/19   Chris Zafar DO   etonogestrel (NEXPLANON) subdermal implant 68 mg by Subdermal route once    Historical Provider, MD   fluticasone (FLOVENT HFA) 110 MCG/ACT inhaler Inhale 1 puff 2 (two) times a day 1/30/19   SUZAN Gudino   ibuprofen (MOTRIN) 400 mg tablet Take 1 tablet (400 mg total) by mouth every 6 (six) hours as needed for mild pain for up to 3 days 1/18/20 1/21/20  Lilia Meyers PA-C   loratadine (CLARITIN) 10 mg tablet Take 1 tablet (10 mg total) by mouth daily for 7 days 1/18/20 1/25/20  Lilia Meyers PA-C   ibuprofen (MOTRIN) 600 mg tablet Take 1 tablet (600 mg total) by mouth every 8 (eight) hours as needed for moderate pain for up to 15 doses 9/15/18 1/24/20  Maria Fernanda Smith PA-C     I have reviewed home medications with patient personally  Allergies:    Allergies   Allergen Reactions    Wheat Bran Itching       Social History:     Marital Status: Single       Substance Use History:   Social History     Substance and Sexual Activity   Alcohol Use No     Social History     Tobacco Use   Smoking Status Never Smoker   Smokeless Tobacco Never Used     Social History     Substance and Sexual Activity   Drug Use No       Family History:  Siblings:  Asthma  Father:  Eczema  Mother:  Asthma    Physical Exam:     Vitals:   Blood Pressure: 142/81 (01/24/20 0026)  Pulse: 85 (01/24/20 0026)  Temperature: 97 8 °F (36 6 °C) (01/24/20 0026)  Temp Source: Oral (01/24/20 0026)  Respirations: 18 (01/24/20 0026)  Height: 5' 4" (162 6 cm) (01/24/20 0026)  Weight - Scale: 122 kg (268 lb 15 4 oz) (01/24/20 0139)  SpO2: 98 % (01/24/20 0026)    Physical Exam   Constitutional: She is oriented to person, place, and time  No distress  HENT:   Head: Normocephalic and atraumatic  Eyes: Pupils are equal, round, and reactive to light  EOM are normal  No scleral icterus  Neck: Normal range of motion  Neck supple  Cardiovascular: Normal rate, regular rhythm, normal heart sounds and intact distal pulses  Exam reveals no friction rub  No murmur heard  Pulmonary/Chest: Effort normal and breath sounds normal  No respiratory distress  She has no wheezes  She has no rales  Abdominal: Soft  Bowel sounds are normal  She exhibits no distension and no mass  There is no tenderness  There is no guarding  obese   Musculoskeletal: Normal range of motion  She exhibits no edema  Neurological: She is alert and oriented to person, place, and time  She has normal strength  She displays no tremor  No cranial nerve deficit or sensory deficit  GCS eye subscore is 4  GCS verbal subscore is 5  GCS motor subscore is 6  Skin: Skin is warm and dry  Capillary refill takes less than 2 seconds  Additional Data:     Lab Results: I have personally reviewed pertinent reports  Results from last 7 days   Lab Units 01/23/20 2123   WBC Thousand/uL 5 72   HEMOGLOBIN g/dL 12 5   HEMATOCRIT % 41 7   PLATELETS Thousands/uL 257   NEUTROS PCT % 52   LYMPHS PCT % 33   MONOS PCT % 10   EOS PCT % 4     Results from last 7 days   Lab Units 01/23/20 2123   SODIUM mmol/L 140   POTASSIUM mmol/L 4 4   CHLORIDE mmol/L 104   CO2 mmol/L 28   BUN mg/dL 16   CREATININE mg/dL 2 39*   ANION GAP mmol/L 8   CALCIUM mg/dL 9 1   ALBUMIN g/dL 3 3*   TOTAL BILIRUBIN mg/dL 0 15*   ALK PHOS U/L 105   ALT U/L 20   AST U/L 14   GLUCOSE RANDOM mg/dL 88     Results from last 7 days   Lab Units 01/23/20  2123   INR  1 00                   Imaging: I have personally reviewed pertinent reports        CT head without contrast   Final Result by Nivia Reardon MD (01/23 2313)      1  No acute intracranial hemorrhage, midline shift, or mass effect  2   Extensive paranasal sinus mucosal thickening, correlate for sinusitis  Workstation performed: [id number]         XR chest 2 views    (Results Pending)   NM inpatient order    (Results Pending)   VAS renal artery complete    (Results Pending)       EKG, Pathology, and Other Studies Reviewed on Admission:   · EKG:  Normal sinus rhythm, heart rate 87    Allscripts / Epic Records Reviewed: Yes     ** Please Note: This note has been constructed using a voice recognition system   **

## 2020-01-25 PROBLEM — E66.01 MORBID OBESITY WITH BMI OF 40.0-44.9, ADULT (HCC): Status: ACTIVE | Noted: 2020-01-25

## 2020-01-25 LAB
ANION GAP SERPL CALCULATED.3IONS-SCNC: 10 MMOL/L (ref 4–13)
ATRIAL RATE: 87 BPM
BASOPHILS # BLD AUTO: 0.03 THOUSANDS/ΜL (ref 0–0.1)
BASOPHILS NFR BLD AUTO: 1 % (ref 0–1)
BLD SMEAR INTERP: NORMAL
BUN SERPL-MCNC: 16 MG/DL (ref 5–25)
C3 SERPL-MCNC: 135 MG/DL (ref 90–180)
C4 SERPL-MCNC: 29 MG/DL (ref 10–40)
CALCIUM SERPL-MCNC: 9.3 MG/DL (ref 8.3–10.1)
CHLORIDE SERPL-SCNC: 107 MMOL/L (ref 100–108)
CO2 SERPL-SCNC: 22 MMOL/L (ref 21–32)
CORTIS SERPL-MCNC: 14.3 UG/DL
CREAT SERPL-MCNC: 1.95 MG/DL (ref 0.6–1.3)
EOSINOPHIL # BLD AUTO: 0.23 THOUSAND/ΜL (ref 0–0.61)
EOSINOPHIL NFR BLD AUTO: 4 % (ref 0–6)
ERYTHROCYTE [DISTWIDTH] IN BLOOD BY AUTOMATED COUNT: 15.7 % (ref 11.6–15.1)
FERRITIN SERPL-MCNC: 65 NG/ML (ref 8–388)
GFR SERPL CREATININE-BSD FRML MDRD: 41 ML/MIN/1.73SQ M
GLUCOSE SERPL-MCNC: 91 MG/DL (ref 65–140)
HCT VFR BLD AUTO: 39.1 % (ref 34.8–46.1)
HEMOCCULT STL QL: NEGATIVE
HGB BLD-MCNC: 11.4 G/DL (ref 11.5–15.4)
IGA SERPL-MCNC: 150 MG/DL (ref 70–400)
IGG SERPL-MCNC: 938 MG/DL (ref 700–1600)
IGM SERPL-MCNC: 97 MG/DL (ref 40–230)
IMM GRANULOCYTES # BLD AUTO: 0.01 THOUSAND/UL (ref 0–0.2)
IMM GRANULOCYTES NFR BLD AUTO: 0 % (ref 0–2)
IRON SATN MFR SERPL: 18 %
IRON SERPL-MCNC: 69 UG/DL (ref 50–170)
LYMPHOCYTES # BLD AUTO: 1.69 THOUSANDS/ΜL (ref 0.6–4.47)
LYMPHOCYTES NFR BLD AUTO: 28 % (ref 14–44)
MCH RBC QN AUTO: 20.1 PG (ref 26.8–34.3)
MCHC RBC AUTO-ENTMCNC: 29.2 G/DL (ref 31.4–37.4)
MCV RBC AUTO: 69 FL (ref 82–98)
MONOCYTES # BLD AUTO: 0.51 THOUSAND/ΜL (ref 0.17–1.22)
MONOCYTES NFR BLD AUTO: 9 % (ref 4–12)
NEUTROPHILS # BLD AUTO: 3.54 THOUSANDS/ΜL (ref 1.85–7.62)
NEUTS SEG NFR BLD AUTO: 58 % (ref 43–75)
NRBC BLD AUTO-RTO: 0 /100 WBCS
P AXIS: 31 DEGREES
PLATELET # BLD AUTO: 266 THOUSANDS/UL (ref 149–390)
PMV BLD AUTO: 10.7 FL (ref 8.9–12.7)
POTASSIUM SERPL-SCNC: 4.3 MMOL/L (ref 3.5–5.3)
PR INTERVAL: 152 MS
QRS AXIS: 9 DEGREES
QRSD INTERVAL: 82 MS
QT INTERVAL: 410 MS
QTC INTERVAL: 493 MS
RBC # BLD AUTO: 5.67 MILLION/UL (ref 3.81–5.12)
SODIUM SERPL-SCNC: 139 MMOL/L (ref 136–145)
T WAVE AXIS: 34 DEGREES
TIBC SERPL-MCNC: 379 UG/DL (ref 250–450)
TSH SERPL DL<=0.05 MIU/L-ACNC: 1.54 UIU/ML (ref 0.36–3.74)
VENTRICULAR RATE: 87 BPM
WBC # BLD AUTO: 6.01 THOUSAND/UL (ref 4.31–10.16)

## 2020-01-25 PROCEDURE — 82533 TOTAL CORTISOL: CPT | Performed by: INTERNAL MEDICINE

## 2020-01-25 PROCEDURE — 99232 SBSQ HOSP IP/OBS MODERATE 35: CPT | Performed by: PHYSICIAN ASSISTANT

## 2020-01-25 PROCEDURE — 82272 OCCULT BLD FECES 1-3 TESTS: CPT | Performed by: INTERNAL MEDICINE

## 2020-01-25 PROCEDURE — 82088 ASSAY OF ALDOSTERONE: CPT | Performed by: NURSE PRACTITIONER

## 2020-01-25 PROCEDURE — 82784 ASSAY IGA/IGD/IGG/IGM EACH: CPT | Performed by: INTERNAL MEDICINE

## 2020-01-25 PROCEDURE — 82728 ASSAY OF FERRITIN: CPT | Performed by: NURSE PRACTITIONER

## 2020-01-25 PROCEDURE — 84443 ASSAY THYROID STIM HORMONE: CPT | Performed by: NURSE PRACTITIONER

## 2020-01-25 PROCEDURE — 93010 ELECTROCARDIOGRAM REPORT: CPT | Performed by: INTERNAL MEDICINE

## 2020-01-25 PROCEDURE — 86160 COMPLEMENT ANTIGEN: CPT | Performed by: INTERNAL MEDICINE

## 2020-01-25 PROCEDURE — 83835 ASSAY OF METANEPHRINES: CPT | Performed by: NURSE PRACTITIONER

## 2020-01-25 PROCEDURE — 80074 ACUTE HEPATITIS PANEL: CPT | Performed by: INTERNAL MEDICINE

## 2020-01-25 PROCEDURE — 83540 ASSAY OF IRON: CPT | Performed by: NURSE PRACTITIONER

## 2020-01-25 PROCEDURE — 83550 IRON BINDING TEST: CPT | Performed by: NURSE PRACTITIONER

## 2020-01-25 PROCEDURE — 84244 ASSAY OF RENIN: CPT | Performed by: NURSE PRACTITIONER

## 2020-01-25 PROCEDURE — 99232 SBSQ HOSP IP/OBS MODERATE 35: CPT | Performed by: INTERNAL MEDICINE

## 2020-01-25 PROCEDURE — 82384 ASSAY THREE CATECHOLAMINES: CPT | Performed by: NURSE PRACTITIONER

## 2020-01-25 PROCEDURE — 85025 COMPLETE CBC W/AUTO DIFF WBC: CPT | Performed by: INTERNAL MEDICINE

## 2020-01-25 PROCEDURE — 80048 BASIC METABOLIC PNL TOTAL CA: CPT | Performed by: INTERNAL MEDICINE

## 2020-01-25 PROCEDURE — 84165 PROTEIN E-PHORESIS SERUM: CPT | Performed by: NURSE PRACTITIONER

## 2020-01-25 RX ADMIN — LORATADINE 10 MG: 10 TABLET ORAL at 08:26

## 2020-01-25 RX ADMIN — HEPARIN SODIUM 5000 UNITS: 5000 INJECTION INTRAVENOUS; SUBCUTANEOUS at 06:13

## 2020-01-25 RX ADMIN — FLUTICASONE PROPIONATE 1 PUFF: 110 AEROSOL, METERED RESPIRATORY (INHALATION) at 17:19

## 2020-01-25 RX ADMIN — HEPARIN SODIUM 5000 UNITS: 5000 INJECTION INTRAVENOUS; SUBCUTANEOUS at 14:25

## 2020-01-25 RX ADMIN — HEPARIN SODIUM 5000 UNITS: 5000 INJECTION INTRAVENOUS; SUBCUTANEOUS at 21:16

## 2020-01-25 RX ADMIN — GUAIFENESIN 1200 MG: 600 TABLET, EXTENDED RELEASE ORAL at 21:16

## 2020-01-25 RX ADMIN — FLUTICASONE PROPIONATE 1 PUFF: 110 AEROSOL, METERED RESPIRATORY (INHALATION) at 08:27

## 2020-01-25 RX ADMIN — GUAIFENESIN 1200 MG: 600 TABLET, EXTENDED RELEASE ORAL at 08:26

## 2020-01-25 RX ADMIN — AMLODIPINE BESYLATE 5 MG: 5 TABLET ORAL at 08:26

## 2020-01-25 NOTE — PLAN OF CARE
Problem: Potential for Falls  Goal: Patient will remain free of falls  Description  INTERVENTIONS:  - Assess patient frequently for physical needs  -  Identify cognitive and physical deficits and behaviors that affect risk of falls    -  [place] fall precautions as indicated by assessment   - Educate patient/family on patient safety including physical limitations  - Instruct patient to call for assistance with activity based on assessment  - Modify environment to reduce risk of injury  - Consider OT/PT consult to assist with strengthening/mobility  Outcome: Progressing     Problem: PAIN - ADULT  Goal: Verbalizes/displays adequate comfort level or baseline comfort level  Description  Interventions:  - Encourage patient to monitor pain and request assistance  - Assess pain using appropriate pain scale  - Administer analgesics based on type and severity of pain and evaluate response  - Implement non-pharmacological measures as appropriate and evaluate response  - Consider cultural and social influences on pain and pain management  - Notify physician/advanced practitioner if interventions unsuccessful or patient reports new pain  Outcome: Progressing     Problem: SAFETY ADULT  Goal: Maintain or return to baseline ADL function  Description  INTERVENTIONS:  -  Assess patient's ability to carry out ADLs; assess patient's baseline for ADL function and identify physical deficits which impact ability to perform ADLs (bathing, care of mouth/teeth, toileting, grooming, dressing, etc )  - Assess/evaluate cause of self-care deficits   - Assess range of motion  - Assess patient's mobility; develop plan if impaired  - Assess patient's need for assistive devices and provide as appropriate  - Encourage maximum independence but intervene and supervise when necessary  - Involve family in performance of ADLs  - Assess for home care needs following discharge   - Consider OT consult to assist with ADL evaluation and planning for discharge  - Provide patient education as appropriate  Outcome: Progressing  Goal: Maintain or return mobility status to optimal level  Description  INTERVENTIONS:  - Assess patient's baseline mobility status (ambulation, transfers, stairs, etc )    - Identify cognitive and physical deficits and behaviors that affect mobility  - Identify mobility aids required to assist with transfers and/or ambulation (gait belt, sit-to-stand, lift, walker, cane, etc )  - [place] fall precautions as indicated by assessment  - Record patient progress and toleration of activity level on Mobility SBAR; progress patient to next Phase/Stage  - Instruct patient to call for assistance with activity based on assessment  - Consider rehabilitation consult to assist with strengthening/weightbearing, etc   Outcome: Progressing     Problem: DISCHARGE PLANNING  Goal: Discharge to home or other facility with appropriate resources  Description  INTERVENTIONS:  - Identify barriers to discharge w/patient and caregiver  - Arrange for needed discharge resources and transportation as appropriate  - Identify discharge learning needs (meds, wound care, etc )  - Arrange for interpretive services to assist at discharge as needed  - Refer to Case Management Department for coordinating discharge planning if the patient needs post-hospital services based on physician/advanced practitioner order or complex needs related to functional status, cognitive ability, or social support system  Outcome: Progressing     Problem: Knowledge Deficit  Goal: Patient/family/caregiver demonstrates understanding of disease process, treatment plan, medications, and discharge instructions  Description  Complete learning assessment and assess knowledge base    Interventions:  - Provide teaching at level of understanding  - Provide teaching via preferred learning methods  Outcome: Progressing

## 2020-01-25 NOTE — ASSESSMENT & PLAN NOTE
Creatinine upon admission:  2 39  Prior July 2019:  1 67  Does not follow with a nephrologist outpatient  Patient reported shoulder injury (12/5/19) for which she had been taking Advil  Patient advised to avoid all NSAID use  Urinalysis:  2+ protein ? FSGS  Renal ultrasound= "Small echogenic kidneys bilaterally consistent with medical renal disease  No obstructive uropathy noted   no hydronephrosis"  Consulted nephrology-multiple blood work studies are pending and patient was recommended to have an inpatient renal artery biopsy on Monday

## 2020-01-25 NOTE — ASSESSMENT & PLAN NOTE
· With hypertensive urgency, blood pressure 212/115 on arrival to ED  S/p one dose hydralazine in ED      · Blood pressure is now running more consistently in the 801-520 systolic range  · Known history of blood pressure since age 27  · Patient was previously prescribed but was not compliant with taking amlodipine 5 mg daily; this was resumed on admission

## 2020-01-25 NOTE — H&P (VIEW-ONLY)
NEPHROLOGY PROGRESS NOTE   Michaela Simeon 27 y o  female MRN: [id number]  Unit/Bed#: S -01 Encounter: [id number]  Reason for Consult:  Abnormal renal function test/acute kidney injury    ASSESSMENT and PLAN:    1 ) Acute kidney injury versus progression of underlying chronic kidney disease  --present on admission  --creatinine has trended up to 1 9 but with better blood pressure control  --has underlying renal dysfunction has her creatinine was 1 67 in July of last year and in 2017 her creatinine was about 1 36  --renal ultrasound shows the right kidney measuring 9 4 cm in the left kidney measuring 9 cm  It should be noted that the right kidney back in 2015 measure 12 8 cm  Both kidneys appear to be echogenic and consistent with chronic kidney disease  There is no evidence of hydronephrosis  --could be possibly obesity related FSGS, 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  --urinalysis was negative for blood but showed 2+ protein    The urine protein creatinine ratio which may have not been in steady state is sub nephrotic at 1 08  --there may not be an active GN, but I suspect she has underlying chronic kidney disease  --will need Nephrology follow-up as an outpatient  --I will complete the serologic evaluation despite the lack of hematuria  --plan for renal biopsy  --patient agreeable to the renal biopsy  --discussed with primary team     2 ) Chronic kidney disease III  --creatinine in July of last year was 1 67 and in 2017 was 1 3  --possible progression of underlying disease  --excessive NSAID use with longstanding and uncontrolled hypertension, she is also obese  --reports no family history but I do have a concern of a possible APO-L1 associated nephropathy     3 ) Hypertensive urgency  --as far as I can see she has had hypertension since the age of 27  --will require a secondary workup for hypertension  --though I suspect that her hypertension in this case is likely related to her obesity and possibly underlying chronic kidney disease  --renal artery Doppler does not show any evidence of renal artery stenosis  The renal vascular resistive index of the right kidney 0 67 and the left kidney is 0 74   --TSH 1 539  --follow-up a m  Cortisol  --follow-up metanephrines and catecholamines  --follow-up renin and aldosterone  --urine toxicology was negative     4 ) Anemia  --chronic with a low MCV  --hemoglobin has stabilized and at her baseline  --follow-up serum protein electrophoresis with a free light chain assay ratio  --follow-up iron studies  --hemolysis smear shows no schistocytes  --follow up stool studies  --may have a history of heavy menstrual periods     5 ) Shortness of breath  --V/Q scan was negative for pulmonary embolism  --chest x-ray is clear  --may have been secondary to hypertensive urgency      SUBJECTIVE / INTERVAL HISTORY:    No overnight events  No chest pain or shortness of breath    OBJECTIVE:  Current Weight: Weight - Scale: 118 kg (261 lb 3 2 oz)  Vitals:    01/24/20 1500 01/24/20 2200 01/25/20 0600 01/25/20 0700   BP: 146/92 140/83  145/73   BP Location: Left arm Left arm  Right arm   Pulse: 99 79  79   Resp: 20 18  18   Temp: 98 9 °F (37 2 °C) 98 2 °F (36 8 °C)  98 1 °F (36 7 °C)   TempSrc: Oral Oral  Oral   SpO2: 98% 99%  98%   Weight:   118 kg (261 lb 3 2 oz)    Height:           Intake/Output Summary (Last 24 hours) at 1/25/2020 1043  Last data filed at 1/25/2020 0600  Gross per 24 hour   Intake 980 ml   Output 1750 ml   Net -770 ml       Review of Systems:    12 point ROS has been reviewed  Physical Exam   Constitutional: She is oriented to person, place, and time  She appears well-developed and well-nourished  No distress  HENT:   Head: Normocephalic and atraumatic  Eyes: Pupils are equal, round, and reactive to light  No scleral icterus  Neck: Normal range of motion  Neck supple     Cardiovascular: Normal rate, regular rhythm and normal heart sounds  Exam reveals no gallop and no friction rub  No murmur heard  Pulmonary/Chest: Effort normal and breath sounds normal  No respiratory distress  She has no wheezes  She has no rales  She exhibits no tenderness  Abdominal: Soft  Bowel sounds are normal  She exhibits no distension  There is no tenderness  There is no rebound  Musculoskeletal: Normal range of motion  She exhibits no edema  Neurological: She is alert and oriented to person, place, and time  Skin: No rash noted  She is not diaphoretic  Psychiatric: She has a normal mood and affect  Nursing note and vitals reviewed        Medications:    Current Facility-Administered Medications:     acetaminophen (TYLENOL) tablet 650 mg, 650 mg, Oral, Q6H PRN, Sherl Antu, CRNP    albuterol (PROVENTIL HFA,VENTOLIN HFA) inhaler 2 puff, 2 puff, Inhalation, Q6H PRN, Sherl Antu, CRNP    albuterol inhalation solution 2 5 mg, 2 5 mg, Nebulization, Q6H PRN, Sherl Antu, CRNP    amLODIPine (NORVASC) tablet 5 mg, 5 mg, Oral, Daily, Sherl Antu, CRNP, 5 mg at 01/25/20 3275    benzonatate (TESSALON PERLES) capsule 100 mg, 100 mg, Oral, TID PRN, Sherl Antu, CRNP    docusate sodium (COLACE) capsule 100 mg, 100 mg, Oral, BID PRN, Sherl Antu, CRNP    fluticasone (FLOVENT HFA) 110 MCG/ACT inhaler 1 puff, 1 puff, Inhalation, BID, Sherl Antu, CRNP, 1 puff at 01/25/20 0827    guaiFENesin (MUCINEX) 12 hr tablet 1,200 mg, 1,200 mg, Oral, Q12H [address], Sherl Antu, CRNP, 1,200 mg at 01/25/20 4302    heparin (porcine) subcutaneous injection 5,000 Units, 5,000 Units, Subcutaneous, Q8H [address], Concetta Taylor MD, 5,000 Units at 01/25/20 8049    loratadine (CLARITIN) tablet 10 mg, 10 mg, Oral, Daily, Sherl Antu, CRNP, 10 mg at 01/25/20 0826    ondansetron (ZOFRAN) injection 4 mg, 4 mg, Intravenous, Q6H PRN, SUZAN Vazquez    Laboratory Results:  Results from last 7 days   Lab Units 01/25/20  0520 01/24/20  0539 01/23/20  0487 WBC Thousand/uL 6 01 6 82 5 72   HEMOGLOBIN g/dL 11 4* 10 3* 12 5   HEMATOCRIT % 39 1 34 8 41 7   PLATELETS Thousands/uL 266 239 257   POTASSIUM mmol/L 4 3 3 8 4 4   CHLORIDE mmol/L 107 112* 104   CO2 mmol/L 22 20* 28   BUN mg/dL 16 19 16   CREATININE mg/dL 1 95* 1 68* 2 39*   CALCIUM mg/dL 9 3 7 7* 9 1   PHOSPHORUS mg/dL  --   --  3 3

## 2020-01-25 NOTE — PROGRESS NOTES
NEPHROLOGY PROGRESS NOTE   Tamiko Simeon 27 y o  female MRN: [id number]  Unit/Bed#: S -01 Encounter: [id number]  Reason for Consult:  Abnormal renal function test/acute kidney injury    ASSESSMENT and PLAN:    1 ) Acute kidney injury versus progression of underlying chronic kidney disease  --present on admission  --creatinine has trended up to 1 9 but with better blood pressure control  --has underlying renal dysfunction has her creatinine was 1 67 in July of last year and in 2017 her creatinine was about 1 36  --renal ultrasound shows the right kidney measuring 9 4 cm in the left kidney measuring 9 cm  It should be noted that the right kidney back in 2015 measure 12 8 cm  Both kidneys appear to be echogenic and consistent with chronic kidney disease  There is no evidence of hydronephrosis  --could be possibly obesity related FSGS, 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  --urinalysis was negative for blood but showed 2+ protein    The urine protein creatinine ratio which may have not been in steady state is sub nephrotic at 1 08  --there may not be an active GN, but I suspect she has underlying chronic kidney disease  --will need Nephrology follow-up as an outpatient  --I will complete the serologic evaluation despite the lack of hematuria  --plan for renal biopsy  --patient agreeable to the renal biopsy  --discussed with primary team     2 ) Chronic kidney disease III  --creatinine in July of last year was 1 67 and in 2017 was 1 3  --possible progression of underlying disease  --excessive NSAID use with longstanding and uncontrolled hypertension, she is also obese  --reports no family history but I do have a concern of a possible APO-L1 associated nephropathy     3 ) Hypertensive urgency  --as far as I can see she has had hypertension since the age of 27  --will require a secondary workup for hypertension  --though I suspect that her hypertension in this case is likely related to her obesity and possibly underlying chronic kidney disease  --renal artery Doppler does not show any evidence of renal artery stenosis  The renal vascular resistive index of the right kidney 0 67 and the left kidney is 0 74   --TSH 1 539  --follow-up a m  Cortisol  --follow-up metanephrines and catecholamines  --follow-up renin and aldosterone  --urine toxicology was negative     4 ) Anemia  --chronic with a low MCV  --hemoglobin has stabilized and at her baseline  --follow-up serum protein electrophoresis with a free light chain assay ratio  --follow-up iron studies  --hemolysis smear shows no schistocytes  --follow up stool studies  --may have a history of heavy menstrual periods     5 ) Shortness of breath  --V/Q scan was negative for pulmonary embolism  --chest x-ray is clear  --may have been secondary to hypertensive urgency      SUBJECTIVE / INTERVAL HISTORY:    No overnight events  No chest pain or shortness of breath    OBJECTIVE:  Current Weight: Weight - Scale: 118 kg (261 lb 3 2 oz)  Vitals:    01/24/20 1500 01/24/20 2200 01/25/20 0600 01/25/20 0700   BP: 146/92 140/83  145/73   BP Location: Left arm Left arm  Right arm   Pulse: 99 79  79   Resp: 20 18  18   Temp: 98 9 °F (37 2 °C) 98 2 °F (36 8 °C)  98 1 °F (36 7 °C)   TempSrc: Oral Oral  Oral   SpO2: 98% 99%  98%   Weight:   118 kg (261 lb 3 2 oz)    Height:           Intake/Output Summary (Last 24 hours) at 1/25/2020 1043  Last data filed at 1/25/2020 0600  Gross per 24 hour   Intake 980 ml   Output 1750 ml   Net -770 ml       Review of Systems:    12 point ROS has been reviewed  Physical Exam   Constitutional: She is oriented to person, place, and time  She appears well-developed and well-nourished  No distress  HENT:   Head: Normocephalic and atraumatic  Eyes: Pupils are equal, round, and reactive to light  No scleral icterus  Neck: Normal range of motion  Neck supple     Cardiovascular: Normal rate, regular rhythm and normal heart sounds  Exam reveals no gallop and no friction rub  No murmur heard  Pulmonary/Chest: Effort normal and breath sounds normal  No respiratory distress  She has no wheezes  She has no rales  She exhibits no tenderness  Abdominal: Soft  Bowel sounds are normal  She exhibits no distension  There is no tenderness  There is no rebound  Musculoskeletal: Normal range of motion  She exhibits no edema  Neurological: She is alert and oriented to person, place, and time  Skin: No rash noted  She is not diaphoretic  Psychiatric: She has a normal mood and affect  Nursing note and vitals reviewed        Medications:    Current Facility-Administered Medications:     acetaminophen (TYLENOL) tablet 650 mg, 650 mg, Oral, Q6H PRN, SUZAN Jeter    albuterol (PROVENTIL HFA,VENTOLIN HFA) inhaler 2 puff, 2 puff, Inhalation, Q6H PRN, SUZAN Jeter    albuterol inhalation solution 2 5 mg, 2 5 mg, Nebulization, Q6H PRN, SUZAN Jeter    amLODIPine (NORVASC) tablet 5 mg, 5 mg, Oral, Daily, SUZAN Jeter, 5 mg at 01/25/20 3442    benzonatate (TESSALON PERLES) capsule 100 mg, 100 mg, Oral, TID PRN, SUZAN Jeter    docusate sodium (COLACE) capsule 100 mg, 100 mg, Oral, BID PRN, SUZAN Jeter    fluticasone (FLOVENT HFA) 110 MCG/ACT inhaler 1 puff, 1 puff, Inhalation, BID, SUZAN Jeter, 1 puff at 01/25/20 0827    guaiFENesin (MUCINEX) 12 hr tablet 1,200 mg, 1,200 mg, Oral, Q12H [facility], SUZAN Jeter, 1,200 mg at 01/25/20 2016    heparin (porcine) subcutaneous injection 5,000 Units, 5,000 Units, Subcutaneous, Q8H [facility], Clementina Moe MD, 5,000 Units at 01/25/20 0429    loratadine (CLARITIN) tablet 10 mg, 10 mg, Oral, Daily, SUZAN Jeter, 10 mg at 01/25/20 0826    ondansetron (ZOFRAN) injection 4 mg, 4 mg, Intravenous, Q6H PRN, Karis Alston, CRNP    Laboratory Results:  Results from last 7 days   Lab Units 01/25/20  0520 01/24/20  0539 01/23/20  6254 WBC Thousand/uL 6 01 6 82 5 72   HEMOGLOBIN g/dL 11 4* 10 3* 12 5   HEMATOCRIT % 39 1 34 8 41 7   PLATELETS Thousands/uL 266 239 257   POTASSIUM mmol/L 4 3 3 8 4 4   CHLORIDE mmol/L 107 112* 104   CO2 mmol/L 22 20* 28   BUN mg/dL 16 19 16   CREATININE mg/dL 1 95* 1 68* 2 39*   CALCIUM mg/dL 9 3 7 7* 9 1   PHOSPHORUS mg/dL  --   --  3 3

## 2020-01-25 NOTE — PROGRESS NOTES
Progress Note - Rola Vaz Cailin 1995, 27 y o  female MRN: [id number]    Unit/Bed#: S -01 Encounter: [id number]    Primary Care Provider: SUZAN Martinez Cera   Date and time admitted to hospital: 1/23/2020  8:54 PM  * Near syncope  Assessment & Plan  · Presented to ED with near syncope, shortness of breath, chest pressure, light headedness that began while riding in the back seat of the car  · She was noting infectious symptoms including fever of 100 5 about 2 days prior, productive cough with yellow and green sputum that is slightly blood tinged at times  · D-dimer 0 95, unable to have CTA due to kidney function  V/Q scan negative for PE  Heparin gtt ordered was then stopped  · Tele unremarkable, will d/c    Hypertension  Assessment & Plan  · With hypertensive urgency, blood pressure 212/115 on arrival to ED  S/p one dose hydralazine in ED  · Blood pressure is now running more consistently in the 003-580 systolic range  · Known history of blood pressure since age 27  · Patient was previously prescribed but was not compliant with taking amlodipine 5 mg daily; this was resumed on admission    Acute kidney injury superimposed on chronic kidney disease [facility])  Assessment & Plan  Creatinine upon admission:  2 39  Prior July 2019:  1 67  Does not follow with a nephrologist outpatient  Patient reported shoulder injury (12/5/19) for which she had been taking Advil  Patient advised to avoid all NSAID use  Urinalysis:  2+ protein ? FSGS  Renal ultrasound= "Small echogenic kidneys bilaterally consistent with medical renal disease  No obstructive uropathy noted  no hydronephrosis"  Consulted nephrology-multiple blood work studies are pending and patient was recommended to have an inpatient renal artery biopsy on Monday      Asthma  Assessment & Plan  · Home regimen: fluticasone BID, albuterol PRN    She reports using albuterol inhaler >5x daily due to recent URI   · Will start nebs PRN   · Will hold on systemic steroids at this time as she is without wheezes    Morbid obesity with BMI of 40 0-44 9, adult (HCC)  Assessment & Plan  · BMI 44 8  Patient needs weight loss    URI (upper respiratory infection)  Assessment & Plan  · Afebrile without leukocytosis  · Monitor off abx   · Supportive care     VTE Pharmacologic Prophylaxis:   Pharmacologic: Heparin  Mechanical VTE Prophylaxis in Place: No    Patient Centered Rounds: I have performed bedside rounds with nursing staff today  Discussions with Specialists or Other Care Team Provider:  Spoke with my attending and directly with Nephrology    Education and Discussions with Family / Patient: confirmed permission from patient to update her mom  I called her mom with update, she has multiple concerns about the renal biopsy and  states she won't agree to it for her daughter until she gets more information about the biopsy  I did attempt to her answer her questions and correct her misinformation about the procedure but she was not satisfied  I was able to reach the IR doctor on call who is willing to call and explain the procedure further over the weekend since she will be too busy on Monday to take calls  Time Spent for Care: 30 minutes  More than 50% of total time spent on counseling and coordination of care as described above  Current Length of Stay: 2 day(s)    Current Patient Status: Inpatient   Certification Statement: The patient will continue to require additional inpatient hospital stay due to Patient was recommended to have a renal biopsy which cannot be performed until Monday    Discharge Plan:  Possible discharge after the renal biopsy if she remains stable without acute rise    Code Status: Level 1 - Full Code    Subjective:   Patient reports she is having a little headache intermittently but declines needing Tylenol at this time, and reports it does not seem to be related to her blood pressure    She is not currently reporting any increased shortness of breath or wheezing that is different than her usual asthma  She is not reporting any chest pain or dizziness  She has not had any fever or chills but reports some postnasal drip  She has had good urine output  Objective:     Vitals:   Temp (24hrs), Av 4 °F (36 9 °C), Min:98 1 °F (36 7 °C), Max:98 9 °F (37 2 °C)    Temp:  [98 1 °F (36 7 °C)-98 9 °F (37 2 °C)] 98 1 °F (36 7 °C)  HR:  [79-99] 79  Resp:  [18-20] 18  BP: (140-146)/(73-92) 145/73  SpO2:  [98 %-99 %] 98 %  Body mass index is 44 83 kg/m²  Input and Output Summary (last 24 hours): Intake/Output Summary (Last 24 hours) at 2020 1133  Last data filed at 2020 0600  Gross per 24 hour   Intake 980 ml   Output 1750 ml   Net -770 ml     Telemetry unremarkable  Physical Exam:     Physical Exam   Constitutional: She appears well-developed and well-nourished  No distress  HENT:   Head: Normocephalic  Eyes: Conjunctivae are normal  Right eye exhibits no discharge  Left eye exhibits no discharge  No scleral icterus  Cardiovascular: Normal rate, regular rhythm and normal heart sounds  Exam reveals no gallop and no friction rub  No murmur heard  Pulmonary/Chest: Effort normal and breath sounds normal  No stridor  No respiratory distress  She has no wheezes  She has no rales  Abdominal: Soft  Bowel sounds are normal  She exhibits no distension  There is no tenderness  There is no guarding  obese   Neurological: She is alert  Skin: Skin is warm and dry  No rash noted  She is not diaphoretic  No erythema  No pallor  Psychiatric: She has a normal mood and affect  Her behavior is normal  Thought content normal    Vitals reviewed      Telemetry reviewed    Additional Data:     Labs:    Results from last 7 days   Lab Units 20  0520   WBC Thousand/uL 6 01   HEMOGLOBIN g/dL 11 4*   HEMATOCRIT % 39 1   PLATELETS Thousands/uL 266   NEUTROS PCT % 58   LYMPHS PCT % 28   MONOS PCT % 9   EOS PCT % 4     Results from last 7 days   Lab Units 01/25/20  0520  01/23/20 2123   SODIUM mmol/L 139   < > 140   POTASSIUM mmol/L 4 3   < > 4 4   CHLORIDE mmol/L 107   < > 104   CO2 mmol/L 22   < > 28   BUN mg/dL 16   < > 16   CREATININE mg/dL 1 95*   < > 2 39*   ANION GAP mmol/L 10   < > 8   CALCIUM mg/dL 9 3   < > 9 1   ALBUMIN g/dL  --   --  3 3*   TOTAL BILIRUBIN mg/dL  --   --  0 15*   ALK PHOS U/L  --   --  105   ALT U/L  --   --  20   AST U/L  --   --  14   GLUCOSE RANDOM mg/dL 91   < > 88    < > = values in this interval not displayed  Results from last 7 days   Lab Units 01/23/20 2123   INR  1 00         Results from last 7 days   Lab Units 01/23/20 2123   HEMOGLOBIN A1C % 5 4           * I Have Reviewed All Lab Data Listed Above  * Additional Pertinent Lab Tests Reviewed: [facility] 68 Admission Reviewed    Imaging:    Imaging Reports Reviewed Today Include:   Imaging Personally Reviewed by Myself Includes:      Recent Cultures (last 7 days):           Last 24 Hours Medication List:     Current Facility-Administered Medications:  acetaminophen 650 mg Oral Q6H PRN [place] Gautam, CRNP   albuterol 2 puff Inhalation Q6H PRN Bella Gautam, CRNP   albuterol 2 5 mg Nebulization Q6H PRN [place] Gautam, CRNP   amLODIPine 5 mg Oral Daily [place] Gautam, CRNP   benzonatate 100 mg Oral TID PRN Bella Gautam, CRNP   docusate sodium 100 mg Oral BID PRN [place] Gautam, CRNP   fluticasone 1 puff Inhalation BID Bella Gautam, CRNP   guaiFENesin 1,200 mg Oral Q12H [facility] HOME [place] Gautam, CRNP   heparin (porcine) 5,000 Units Subcutaneous [facility] Diogenes Carrera MD   loratadine 10 mg Oral Daily [place] Gautam, BJNP   ondansetron 4 mg Intravenous Q6H PRN Bella Gautam, CRNP        Today, Patient Was Seen By: Zaid Chapman PA-C    ** Please Note: Dictation voice to text software may have been used in the creation of this document   **

## 2020-01-25 NOTE — TREATMENT PLAN
Renal    Discussed with the patient's mother over the phone at length  She is confused on what exactly is going on  I explained to her that this patient has renal dysfunction that has been ongoing for some time now and that she has hypertension for some time  I explained to her that we have a list of differential diagnosis but we need a confirm diagnosis and hence the renal biopsy  She still needs to think about this

## 2020-01-25 NOTE — ASSESSMENT & PLAN NOTE
· Presented to ED with near syncope, shortness of breath, chest pressure, light headedness that began while riding in the back seat of the car  · She was noting infectious symptoms including fever of 100 5 about 2 days prior, productive cough with yellow and green sputum that is slightly blood tinged at times  · D-dimer 0 95, unable to have CTA due to kidney function  V/Q scan negative for PE   Heparin gtt ordered was then stopped  · Tele unremarkable, will d/c

## 2020-01-26 LAB
ALBUMIN SERPL BCP-MCNC: 3.1 G/DL (ref 3.5–5)
ANION GAP SERPL CALCULATED.3IONS-SCNC: 11 MMOL/L (ref 4–13)
BUN SERPL-MCNC: 16 MG/DL (ref 5–25)
CALCIUM SERPL-MCNC: 9.6 MG/DL (ref 8.3–10.1)
CHLORIDE SERPL-SCNC: 104 MMOL/L (ref 100–108)
CO2 SERPL-SCNC: 24 MMOL/L (ref 21–32)
CREAT SERPL-MCNC: 1.97 MG/DL (ref 0.6–1.3)
GFR SERPL CREATININE-BSD FRML MDRD: 41 ML/MIN/1.73SQ M
GLUCOSE SERPL-MCNC: 88 MG/DL (ref 65–140)
INR PPP: 1.09 (ref 0.84–1.19)
PHOSPHATE SERPL-MCNC: 4.2 MG/DL (ref 2.7–4.5)
POTASSIUM SERPL-SCNC: 4.4 MMOL/L (ref 3.5–5.3)
PROTHROMBIN TIME: 13.5 SECONDS (ref 11.6–14.5)
SODIUM SERPL-SCNC: 139 MMOL/L (ref 136–145)

## 2020-01-26 PROCEDURE — 86038 ANTINUCLEAR ANTIBODIES: CPT | Performed by: INTERNAL MEDICINE

## 2020-01-26 PROCEDURE — 99232 SBSQ HOSP IP/OBS MODERATE 35: CPT | Performed by: PHYSICIAN ASSISTANT

## 2020-01-26 PROCEDURE — 86255 FLUORESCENT ANTIBODY SCREEN: CPT | Performed by: INTERNAL MEDICINE

## 2020-01-26 PROCEDURE — 83520 IMMUNOASSAY QUANT NOS NONAB: CPT | Performed by: INTERNAL MEDICINE

## 2020-01-26 PROCEDURE — 87389 HIV-1 AG W/HIV-1&-2 AB AG IA: CPT | Performed by: INTERNAL MEDICINE

## 2020-01-26 PROCEDURE — 85610 PROTHROMBIN TIME: CPT | Performed by: INTERNAL MEDICINE

## 2020-01-26 PROCEDURE — 80069 RENAL FUNCTION PANEL: CPT | Performed by: INTERNAL MEDICINE

## 2020-01-26 PROCEDURE — 99232 SBSQ HOSP IP/OBS MODERATE 35: CPT | Performed by: INTERNAL MEDICINE

## 2020-01-26 RX ORDER — DOCUSATE SODIUM 100 MG/1
100 CAPSULE, LIQUID FILLED ORAL 2 TIMES DAILY PRN
Qty: 10 CAPSULE | Refills: 0
Start: 2020-01-26 | End: 2021-03-26

## 2020-01-26 RX ORDER — HEPARIN SODIUM 5000 [USP'U]/ML
5000 INJECTION, SOLUTION INTRAVENOUS; SUBCUTANEOUS EVERY 8 HOURS SCHEDULED
Status: DISPENSED | OUTPATIENT
Start: 2020-01-26 | End: 2020-01-27

## 2020-01-26 RX ORDER — IRON POLYSACCHARIDE COMPLEX 150 MG
150 CAPSULE ORAL DAILY
Status: DISCONTINUED | OUTPATIENT
Start: 2020-01-26 | End: 2020-01-27 | Stop reason: HOSPADM

## 2020-01-26 RX ORDER — AMLODIPINE BESYLATE 10 MG/1
10 TABLET ORAL DAILY
Qty: 30 TABLET | Refills: 0 | Status: SHIPPED | OUTPATIENT
Start: 2020-01-27 | End: 2020-03-06 | Stop reason: SDUPTHER

## 2020-01-26 RX ORDER — IRON POLYSACCHARIDE COMPLEX 150 MG
150 CAPSULE ORAL DAILY
Qty: 30 CAPSULE | Refills: 0 | Status: SHIPPED | OUTPATIENT
Start: 2020-01-26 | End: 2021-03-26

## 2020-01-26 RX ORDER — ACETAMINOPHEN 325 MG/1
650 TABLET ORAL EVERY 6 HOURS PRN
Qty: 30 TABLET | Refills: 0
Start: 2020-01-26

## 2020-01-26 RX ORDER — AMLODIPINE BESYLATE 10 MG/1
10 TABLET ORAL DAILY
Status: DISCONTINUED | OUTPATIENT
Start: 2020-01-27 | End: 2020-01-27 | Stop reason: HOSPADM

## 2020-01-26 RX ADMIN — AMLODIPINE BESYLATE 5 MG: 5 TABLET ORAL at 08:46

## 2020-01-26 RX ADMIN — GUAIFENESIN 1200 MG: 600 TABLET, EXTENDED RELEASE ORAL at 08:46

## 2020-01-26 RX ADMIN — FLUTICASONE PROPIONATE 1 PUFF: 110 AEROSOL, METERED RESPIRATORY (INHALATION) at 17:32

## 2020-01-26 RX ADMIN — FLUTICASONE PROPIONATE 1 PUFF: 110 AEROSOL, METERED RESPIRATORY (INHALATION) at 08:46

## 2020-01-26 RX ADMIN — HEPARIN SODIUM 5000 UNITS: 5000 INJECTION INTRAVENOUS; SUBCUTANEOUS at 05:43

## 2020-01-26 RX ADMIN — Medication 150 MG: at 11:28

## 2020-01-26 RX ADMIN — LORATADINE 10 MG: 10 TABLET ORAL at 08:46

## 2020-01-26 RX ADMIN — GUAIFENESIN 1200 MG: 600 TABLET, EXTENDED RELEASE ORAL at 22:05

## 2020-01-26 RX ADMIN — HEPARIN SODIUM 5000 UNITS: 5000 INJECTION INTRAVENOUS; SUBCUTANEOUS at 14:29

## 2020-01-26 NOTE — PLAN OF CARE
Problem: Potential for Falls  Goal: Patient will remain free of falls  Description  INTERVENTIONS:  - Assess patient frequently for physical needs  -  Identify cognitive and physical deficits and behaviors that affect risk of falls    -  [place] fall precautions as indicated by assessment   - Educate patient/family on patient safety including physical limitations  - Instruct patient to call for assistance with activity based on assessment  - Modify environment to reduce risk of injury  - Consider OT/PT consult to assist with strengthening/mobility  Outcome: Progressing     Problem: PAIN - ADULT  Goal: Verbalizes/displays adequate comfort level or baseline comfort level  Description  Interventions:  - Encourage patient to monitor pain and request assistance  - Assess pain using appropriate pain scale  - Administer analgesics based on type and severity of pain and evaluate response  - Implement non-pharmacological measures as appropriate and evaluate response  - Consider cultural and social influences on pain and pain management  - Notify physician/advanced practitioner if interventions unsuccessful or patient reports new pain  Outcome: Progressing     Problem: SAFETY ADULT  Goal: Maintain or return to baseline ADL function  Description  INTERVENTIONS:  -  Assess patient's ability to carry out ADLs; assess patient's baseline for ADL function and identify physical deficits which impact ability to perform ADLs (bathing, care of mouth/teeth, toileting, grooming, dressing, etc )  - Assess/evaluate cause of self-care deficits   - Assess range of motion  - Assess patient's mobility; develop plan if impaired  - Assess patient's need for assistive devices and provide as appropriate  - Encourage maximum independence but intervene and supervise when necessary  - Involve family in performance of ADLs  - Assess for home care needs following discharge   - Consider OT consult to assist with ADL evaluation and planning for discharge  - Provide patient education as appropriate  Outcome: Progressing  Goal: Maintain or return mobility status to optimal level  Description  INTERVENTIONS:  - Assess patient's baseline mobility status (ambulation, transfers, stairs, etc )    - Identify cognitive and physical deficits and behaviors that affect mobility  - Identify mobility aids required to assist with transfers and/or ambulation (gait belt, sit-to-stand, lift, walker, cane, etc )  - [place] fall precautions as indicated by assessment  - Record patient progress and toleration of activity level on Mobility SBAR; progress patient to next Phase/Stage  - Instruct patient to call for assistance with activity based on assessment  - Consider rehabilitation consult to assist with strengthening/weightbearing, etc   Outcome: Progressing     Problem: DISCHARGE PLANNING  Goal: Discharge to home or other facility with appropriate resources  Description  INTERVENTIONS:  - Identify barriers to discharge w/patient and caregiver  - Arrange for needed discharge resources and transportation as appropriate  - Identify discharge learning needs (meds, wound care, etc )  - Arrange for interpretive services to assist at discharge as needed  - Refer to Case Management Department for coordinating discharge planning if the patient needs post-hospital services based on physician/advanced practitioner order or complex needs related to functional status, cognitive ability, or social support system  Outcome: Progressing     Problem: Knowledge Deficit  Goal: Patient/family/caregiver demonstrates understanding of disease process, treatment plan, medications, and discharge instructions  Description  Complete learning assessment and assess knowledge base    Interventions:  - Provide teaching at level of understanding  - Provide teaching via preferred learning methods  Outcome: Progressing     Problem: METABOLIC, FLUID AND ELECTROLYTES - ADULT  Goal: Fluid balance maintained  Description  INTERVENTIONS:  - Monitor labs   - Monitor I/O and WT  - Instruct patient on fluid and nutrition as appropriate  - Assess for signs & symptoms of volume excess or deficit  Outcome: Progressing     Problem: HEMATOLOGIC - ADULT  Goal: Maintains hematologic stability  Description  INTERVENTIONS  - Assess for signs and symptoms of bleeding or hemorrhage  - Monitor labs  - Administer supportive blood products/factors as ordered and appropriate  Outcome: Progressing

## 2020-01-26 NOTE — PROGRESS NOTES
NEPHROLOGY PROGRESS NOTE   Shantel Simeon 27 y o  female MRN: [id number]  Unit/Bed#: S -01 Encounter: [id number]  Reason for Consult:  Hypertensive emergency abnormal renal function test    ASSESSMENT and PLAN:    1 ) Acute kidney injury versus progression of underlying chronic kidney disease  --present on admission  --creatinine stable today at 1 9  --has underlying renal dysfunction has her creatinine was 1 67 in July of last year and in 2017 her creatinine was about 1 36  --renal ultrasound shows the right kidney measuring 9 4 cm in the left kidney measuring 9 cm  It should be noted that the right kidney back in 2015 measure 12 8 cm  Both kidneys appear to be echogenic and consistent with chronic kidney disease  There is no evidence of hydronephrosis  --could be possibly obesity related FSGS, 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 vs APO-L1 associated nephropathy  --urinalysis was negative for blood but showed 2+ protein   The urine protein creatinine ratio which may have not been in steady state is sub nephrotic at 1 08  --there may not be an active GN, but I suspect she has underlying chronic kidney disease  --will need Nephrology follow-up as an outpatient  --follow-up HIV, anti-glomerular basement membrane antibodies, LUCY, ANCA, hepatitis panel  --hemolysis smear was negative for schistocytes, complement C3 and C4 within normal limits, IgG IgA and IgM were normal  --I spoke to the patient's mother yesterday, she had concerns about the renal biopsy as a lot of this is new for her  She was rather shocked of what was going on  She seems unsure about the renal biopsy  I spoke to the patient this morning  The patient seems willing to go for the renal biopsy but knows that her mother is unsure  After discussions she will discuss it with her mother and then we can decide on the biopsy    At this time I will cancel the IR consult for the biopsy  --I explained to the mother that it would be important as well as the patient to get a biopsy to get a definitive diagnosis as well as prognosis of her underlying renal dysfunction      2 ) Chronic kidney disease III  --creatinine in July of last year was 1 67 and in 2017 was 1 3  --possible progression of underlying disease  --excessive NSAID use with longstanding and uncontrolled hypertension, she is also obese  --reports no family history but I do have a concern of a possible APO-L1 associated nephropathy     3 ) Hypertensive urgency  --as far as I can see she has had hypertension since the age of 27  --will require a secondary workup for hypertension  --though I suspect that her hypertension in this case is likely related to her obesity and possibly underlying chronic kidney disease  --renal artery Doppler does not show any evidence of renal artery stenosis  The renal vascular resistive index of the right kidney 0 67 and the left kidney is 0 74   --TSH 1 539  --a m  Cortisol level was normal at 14  --follow-up metanephrines and catecholamines  --follow-up renin and aldosterone  --urine toxicology was negative  --increase amlodipine to 10 mg daily     4 ) Anemia  --chronic with a low MCV  --hemoglobin has stabilized and at her baseline  --follow-up serum protein electrophoresis with a free light chain assay ratio  --iron saturation 18, ferritin 65, TIBC 379  --stool occult negative x1  --hemolysis smear shows no schistocytes  --may have a history of heavy menstrual periods  --start Niferex     5 ) Shortness of breath  --V/Q scan was negative for pulmonary embolism  --chest x-ray is clear  --may have been secondary to hypertensive urgency      SUBJECTIVE / INTERVAL HISTORY:    No overnight events      OBJECTIVE:  Current Weight: Weight - Scale: 116 kg (256 lb 12 8 oz)  Vitals:    01/25/20 1500 01/25/20 2200 01/26/20 0534 01/26/20 0700   BP: 143/80 168/98  154/87   BP Location: Left arm Left arm Left arm   Pulse: 83 90  81   Resp: 18 20  19   Temp: 98 4 °F (36 9 °C) 98 2 °F (36 8 °C)  98 2 °F (36 8 °C)   TempSrc: Oral Oral  Oral   SpO2: 98% 98%  96%   Weight:   116 kg (256 lb 12 8 oz)    Height:           Intake/Output Summary (Last 24 hours) at 1/26/2020 0941  Last data filed at 1/26/2020 0453  Gross per 24 hour   Intake 720 ml   Output 1650 ml   Net -930 ml       Review of Systems:    12 point ROS has been reviewed  Physical Exam   Constitutional: She is oriented to person, place, and time  She appears well-developed and well-nourished  No distress  HENT:   Head: Normocephalic and atraumatic  Eyes: Pupils are equal, round, and reactive to light  No scleral icterus  Neck: Normal range of motion  Neck supple  Cardiovascular: Normal rate, regular rhythm and normal heart sounds  Exam reveals no gallop and no friction rub  No murmur heard  Pulmonary/Chest: Effort normal and breath sounds normal  No respiratory distress  She has no wheezes  She has no rales  She exhibits no tenderness  Abdominal: Soft  Bowel sounds are normal  She exhibits no distension  There is no tenderness  There is no rebound  Musculoskeletal: Normal range of motion  She exhibits no edema  Neurological: She is alert and oriented to person, place, and time  Skin: No rash noted  She is not diaphoretic  Psychiatric: She has a normal mood and affect  Nursing note and vitals reviewed        Medications:    Current Facility-Administered Medications:     acetaminophen (TYLENOL) tablet 650 mg, 650 mg, Oral, Q6H PRN, Bella Gautam, CRNP    albuterol (PROVENTIL HFA,VENTOLIN HFA) inhaler 2 puff, 2 puff, Inhalation, Q6H PRN, Bella Gautam, CRNP    albuterol inhalation solution 2 5 mg, 2 5 mg, Nebulization, Q6H PRN, Bella Gautam, CRNP    amLODIPine (NORVASC) tablet 5 mg, 5 mg, Oral, Daily, Bella Gautam, CRNP, 5 mg at 01/26/20 0846    benzonatate (TESSALON PERLES) capsule 100 mg, 100 mg, Oral, TID PRN, Bella Gautam, CRNP    docusate sodium (COLACE) capsule 100 mg, 100 mg, Oral, BID PRN, SUZAN Gomez    fluticasone (FLOVENT HFA) 110 MCG/ACT inhaler 1 puff, 1 puff, Inhalation, BID, SUZAN Gomez, 1 puff at 01/26/20 0846    guaiFENesin (MUCINEX) 12 hr tablet 1,200 mg, 1,200 mg, Oral, Q12H [facility] HOME, SUZAN Gomez, 1,200 mg at 01/26/20 0846    heparin (porcine) subcutaneous injection 5,000 Units, 5,000 Units, Subcutaneous, Q8H [facility], Jen Bautista MD, 5,000 Units at 01/26/20 0543    loratadine (CLARITIN) tablet 10 mg, 10 mg, Oral, Daily, SUZAN Gomez, 10 mg at 01/26/20 0846    ondansetron (ZOFRAN) injection 4 mg, 4 mg, Intravenous, Q6H PRN, SUZAN Gomez    Laboratory Results:  Results from last 7 days   Lab Units 01/26/20  0443 01/25/20  0520 01/24/20  0539 01/23/20  2123   WBC Thousand/uL  --  6 01 6 82 5 72   HEMOGLOBIN g/dL  --  11 4* 10 3* 12 5   HEMATOCRIT %  --  39 1 34 8 41 7   PLATELETS Thousands/uL  --  266 239 257   POTASSIUM mmol/L 4 4 4 3 3 8 4 4   CHLORIDE mmol/L 104 107 112* 104   CO2 mmol/L 24 22 20* 28   BUN mg/dL 16 16 19 16   CREATININE mg/dL 1 97* 1 95* 1 68* 2 39*   CALCIUM mg/dL 9 6 9 3 7 7* 9 1   PHOSPHORUS mg/dL 4 2  --   --  3 3

## 2020-01-26 NOTE — ASSESSMENT & PLAN NOTE
Creatinine upon admission:  2 39  Prior July 2019:  1 67  Currently:1 97  Does not follow with a nephrologist outpatient  Patient reported shoulder injury (12/5/19) for which she had been taking Advil  Patient advised to avoid all NSAID use  Urinalysis:  2+ protein ? FSGS  Renal ultrasound= "Small echogenic kidneys bilaterally consistent with medical renal disease  No obstructive uropathy noted  no hydronephrosis"  Consulted nephrology-multiple blood work studies are pending  Patient was recommended to stay to have an inpatient renal biopsy on Monday (could not be done until over the weekend)  Extensive discussions ensued between myself, Nephrology, Interventional Radiology, patient's mother and with the patient herself  Patient's mother apparently remains reluctant to have her daughter proceed with the kidney biopsy but could not express specifically what concerns or questions she still has regarding the procedure    Patient initially told me she was not going to proceed if her mom was not in agreement with it but a short time later changed her mind and now wishes to proceed with biopsy even if her mom does not agree

## 2020-01-26 NOTE — PROGRESS NOTES
Progress Note - Michael Russmadhu 1995, 27 y o  female MRN: [id number]    Unit/Bed#: S -01 Encounter: [id number]    Primary Care Provider: SUZAN Oliver   Date and time admitted to hospital: 1/23/2020  8:54 PM  Acute kidney injury superimposed on chronic kidney disease [facility])  Assessment & Plan  Creatinine upon admission:  2 39  Prior July 2019:  1 67  Currently:1 97  Does not follow with a nephrologist outpatient  Patient reported shoulder injury (12/5/19) for which she had been taking Advil  Patient advised to avoid all NSAID use  Urinalysis:  2+ protein ? FSGS  Renal ultrasound= "Small echogenic kidneys bilaterally consistent with medical renal disease  No obstructive uropathy noted  no hydronephrosis"  Consulted nephrology-multiple blood work studies are pending  Patient was recommended to stay to have an inpatient renal biopsy on Monday (could not be done until over the weekend)  Extensive discussions ensued between myself, Nephrology, Interventional Radiology, patient's mother and with the patient herself  Patient's mother apparently remains reluctant to have her daughter proceed with the kidney biopsy but could not express specifically what concerns or questions she still has regarding the procedure  Patient initially told me she was not going to proceed if her mom was not in agreement with it but a short time later changed her mind and now wishes to proceed with biopsy even if her mom does not agree      * Near syncope  Assessment & Plan  · Presented to ED with near syncope, shortness of breath, chest pressure, light headedness that began while riding in the back seat of the car  · Notes feeling improved with no further events of lightheadedness  · She was noting infectious symptoms including fever of 100 5 about 2 days prior, productive cough with yellow and green sputum that is slightly blood tinged at times  · D-dimer 0 95, unable to have CTA due to kidney function   V/Q scan negative for PE  Heparin gtt ordered was then stopped  · Tele unremarkable    Hypertension  Assessment & Plan  · With hypertensive urgency, blood pressure 212/115 on arrival to ED  S/p one dose hydralazine in ED  · Blood pressure is now running more consistently in the 627-290 systolic range  · Known history of blood pressure since age 27  · Patient was previously prescribed but was not compliant with taking amlodipine 5 mg daily; this was resumed on admission and increased to 10 mg daily    Asthma  Assessment & Plan  · Home regimen: fluticasone BID, albuterol PRN  She reports using albuterol inhaler >5x daily due to recent URI   · Will hold on systemic steroids at this time as she is without wheezes    Morbid obesity with BMI of 40 0-44 9, adult (HCC)  Assessment & Plan  · BMI 44 8  Patient needs weight loss    URI (upper respiratory infection)  Assessment & Plan  · Afebrile without leukocytosis  · Monitor off abx   · Supportive care --patient is well-appearing    VTE Pharmacologic Prophylaxis:   Pharmacologic: Heparin--hold doses after today for biopsy tomorrow  Mechanical VTE Prophylaxis in Place: No    Patient Centered Rounds: I have performed bedside rounds with nursing staff today  Discussions with Specialists or Other Care Team Provider: renal    Education and Discussions with Family / Patient:  Spoke with patient on 2 separate occasions this morning, all questions and concerns addressed    Time Spent for Care: 30 min  More than 50% of total time spent on counseling and coordination of care as described above      Current Length of Stay: 3 day(s)    Current Patient Status: Inpatient   Certification Statement: The patient will continue to require additional inpatient hospital stay due to Patient needs inpatient renal biopsy per discussion with Nephrology    Discharge Plan:  Discharge home after biopsy has been performed and when cleared by Nephrology    Code Status: Level 1 - Full Code    Subjective: Patient denies any dizziness, lightheadedness, increased shortness of breath or cough, fever, chills, pain, difficulty passing urine  Objective:     Vitals:   Temp (24hrs), Av 3 °F (36 8 °C), Min:98 2 °F (36 8 °C), Max:98 4 °F (36 9 °C)    Temp:  [98 2 °F (36 8 °C)-98 4 °F (36 9 °C)] 98 2 °F (36 8 °C)  HR:  [81-90] 81  Resp:  [18-20] 19  BP: (143-168)/(80-98) 154/87  SpO2:  [96 %-98 %] 96 %  Body mass index is 44 08 kg/m²  Input and Output Summary (last 24 hours): Intake/Output Summary (Last 24 hours) at 2020 1144  Last data filed at 2020 0900  Gross per 24 hour   Intake 1070 ml   Output 2050 ml   Net -980 ml       Physical Exam:     Physical Exam   Constitutional: She appears well-developed and well-nourished  No distress  Eyes: Conjunctivae are normal  Right eye exhibits no discharge  Left eye exhibits no discharge  No scleral icterus  Cardiovascular: Normal rate, regular rhythm and normal heart sounds  Exam reveals no gallop and no friction rub  No murmur heard  Pulmonary/Chest: Effort normal and breath sounds normal  No stridor  No respiratory distress  She has no wheezes  She has no rales  Abdominal: Soft  She exhibits no distension  There is no tenderness  There is no guarding  obese   Musculoskeletal: She exhibits no edema  Neurological: She is alert  Awake alert interactive, no confusion   Skin: Skin is warm and dry  No rash noted  She is not diaphoretic  No erythema  No pallor  Psychiatric: She has a normal mood and affect  Vitals reviewed        Additional Data:     Labs:    Results from last 7 days   Lab Units 20  0520   WBC Thousand/uL 6 01   HEMOGLOBIN g/dL 11 4*   HEMATOCRIT % 39 1   PLATELETS Thousands/uL 266   NEUTROS PCT % 58   LYMPHS PCT % 28   MONOS PCT % 9   EOS PCT % 4     Results from last 7 days   Lab Units 20  0443  20  2123   SODIUM mmol/L 139   < > 140   POTASSIUM mmol/L 4 4   < > 4 4   CHLORIDE mmol/L 104   < > 104   CO2 mmol/L 24   < > 28   BUN mg/dL 16   < > 16   CREATININE mg/dL 1 97*   < > 2 39*   ANION GAP mmol/L 11   < > 8   CALCIUM mg/dL 9 6   < > 9 1   ALBUMIN g/dL 3 1*  --  3 3*   TOTAL BILIRUBIN mg/dL  --   --  0 15*   ALK PHOS U/L  --   --  105   ALT U/L  --   --  20   AST U/L  --   --  14   GLUCOSE RANDOM mg/dL 88   < > 88    < > = values in this interval not displayed  Results from last 7 days   Lab Units 01/26/20  0443   INR  1 09         Results from last 7 days   Lab Units 01/23/20  2123   HEMOGLOBIN A1C % 5 4           * I Have Reviewed All Lab Data Listed Above  * Additional Pertinent Lab Tests Reviewed: Is[address]ell 68 Admission Reviewed    Imaging:    Imaging Reports Reviewed Today Include:   Imaging Personally Reviewed by Myself Includes:      Recent Cultures (last 7 days):           Last 24 Hours Medication List:     Current Facility-Administered Medications:  acetaminophen 650 mg Oral Q6H PRN Chyna Pickerel, CRNP   albuterol 2 puff Inhalation Q6H PRN Chyna Pickerel, CRNP   albuterol 2 5 mg Nebulization Q6H PRN Chyna Pickerel, CRNP   [START ON 1/27/2020] amLODIPine 10 mg Oral Daily Joaquin Burrell MD   benzonatate 100 mg Oral TID PRN Chyna Pickerel, CRNP   docusate sodium 100 mg Oral BID PRN Chyna Pickerel, CRNP   fluticasone 1 puff Inhalation BID Chyna Pickerel, CRNP   guaiFENesin 1,200 mg Oral Q12H [address] SUZAN Hendrickson   heparin (porcine) 5,000 Units Subcutaneous Q8H [address] Guillermina Velasquez PA-C   iron polysaccharides 150 mg Oral Daily Joaquin Burrell MD   loratadine 10 mg Oral Daily Chyna Pickerel, SUZAN   ondansetron 4 mg Intravenous Q6H PRN Chyna Deandreerel, BJNP        Today, Patient Was Seen By: Moreno Mcnally PA-C    ** Please Note: Dictation voice to text software may have been used in the creation of this document   **

## 2020-01-26 NOTE — ASSESSMENT & PLAN NOTE
· Presented to ED with near syncope, shortness of breath, chest pressure, light headedness that began while riding in the back seat of the car  · Notes feeling improved with no further events of lightheadedness  · She was noting infectious symptoms including fever of 100 5 about 2 days prior, productive cough with yellow and green sputum that is slightly blood tinged at times  · D-dimer 0 95, unable to have CTA due to kidney function  V/Q scan negative for PE   Heparin gtt ordered was then stopped  · Tele unremarkable

## 2020-01-26 NOTE — DISCHARGE INSTR - AVS FIRST PAGE
Dear Lisandro Holland,     It was our pleasure to care for you here at [facility]  It is our hope that we were always able to exceed the expected standards for your care during your stay  You were hospitalized due to kidney disease, possibly acute versus chronic  You were cared for on the 3rd floor by Leslie Medellin PA-C under the service of Silvia Cerrato MD with the [address] [place] [address] [facility] who covers for your primary care physician (PCP), SUZAN Amezcua, while you were hospitalized  If you have any questions or concerns related to this hospitalization, you may contact us at [phone]  For follow up as well as any medication refills, we recommend that you follow up with your primary care physician  A registered nurse will reach out to you by phone within a few days after your discharge to answer any additional questions that you may have after going home  However, at this time we provide for you here, the most important instructions / recommendations at discharge:     · Notable Medication Adjustments -   · Norvasc 10 mg daily was added for high blood pressure  · NSAID medications were stopped  · Testing Required after Discharge -   · Repeat kidney labs in 1 week    · Important follow up information -   · Call the kidney doctor listed for an appointment as soon as possible  · Work on healthy weight loss   · Other Instructions -     · Do not take NSAID medications  · Please review this entire after visit summary as additional general instructions including medication list, appointments, activity, diet, any pertinent wound care, and other additional recommendations from your care team that may be provided for you        Sincerely,     Leslie Medellin PA-C

## 2020-01-26 NOTE — TREATMENT PLAN
Nephrology    I was notified by the medicine team that patient will not proceed with a renal biopsy unless her mother is agreeable  As the renal function is stable on blood pressure overall improved they will plan to discharge her  She will need outpatient follow-up to follow up on the secondary workup of her hypertension and underlying renal dysfunction with serologies      I strongly recommended a biopsy to get a definitive diagnosis as the renal function appears to have worsened over the last few years    I have sent a message to our office to set up a follow-up

## 2020-01-26 NOTE — TREATMENT PLAN
Nephrology    Was notified by the medicine team just now S patient was being planned for discharge with removal of her IV  The patient herself reported to the medicine team that she changed her mind and would pursue the renal biopsy and does not care for mother gets upset  Patient's discharge will be canceled and will pursue a renal biopsy  The patient is 27year old female, of sound mind would bleed can make her own medical decisions  I believe it is important to pursue a renal biopsy in this case to get a definitive diagnosis as well as for prognostic value  I believe this situation with the mother needs to be closely monitored

## 2020-01-26 NOTE — ASSESSMENT & PLAN NOTE
· Home regimen: fluticasone BID, albuterol PRN    She reports using albuterol inhaler >5x daily due to recent URI   · Will hold on systemic steroids at this time as she is without wheezes

## 2020-01-26 NOTE — ASSESSMENT & PLAN NOTE
· With hypertensive urgency, blood pressure 212/115 on arrival to ED  S/p one dose hydralazine in ED      · Blood pressure is now running more consistently in the 448-107 systolic range  · Known history of blood pressure since age 27  · Patient was previously prescribed but was not compliant with taking amlodipine 5 mg daily; this was resumed on admission and increased to 10 mg daily

## 2020-01-27 VITALS
RESPIRATION RATE: 18 BRPM | BODY MASS INDEX: 44.04 KG/M2 | WEIGHT: 257.94 LBS | SYSTOLIC BLOOD PRESSURE: 121 MMHG | OXYGEN SATURATION: 98 % | HEIGHT: 64 IN | DIASTOLIC BLOOD PRESSURE: 79 MMHG | HEART RATE: 82 BPM | TEMPERATURE: 98.4 F

## 2020-01-27 PROBLEM — D64.9 ANEMIA: Status: ACTIVE | Noted: 2020-01-27

## 2020-01-27 LAB
ANION GAP SERPL CALCULATED.3IONS-SCNC: 10 MMOL/L (ref 4–13)
BUN SERPL-MCNC: 19 MG/DL (ref 5–25)
CALCIUM SERPL-MCNC: 9.1 MG/DL (ref 8.3–10.1)
CHLORIDE SERPL-SCNC: 104 MMOL/L (ref 100–108)
CO2 SERPL-SCNC: 25 MMOL/L (ref 21–32)
CREAT SERPL-MCNC: 2.19 MG/DL (ref 0.6–1.3)
GFR SERPL CREATININE-BSD FRML MDRD: 36 ML/MIN/1.73SQ M
GLUCOSE SERPL-MCNC: 96 MG/DL (ref 65–140)
HAV IGM SER QL: NORMAL
HBV CORE IGM SER QL: NORMAL
HBV SURFACE AG SER QL: NORMAL
HCV AB SER QL: NORMAL
HIV 1+2 AB+HIV1 P24 AG SERPL QL IA: NORMAL
POTASSIUM SERPL-SCNC: 4.2 MMOL/L (ref 3.5–5.3)
RYE IGE QN: NEGATIVE
SODIUM SERPL-SCNC: 139 MMOL/L (ref 136–145)

## 2020-01-27 PROCEDURE — 99232 SBSQ HOSP IP/OBS MODERATE 35: CPT | Performed by: INTERNAL MEDICINE

## 2020-01-27 PROCEDURE — NC001 PR NO CHARGE: Performed by: STUDENT IN AN ORGANIZED HEALTH CARE EDUCATION/TRAINING PROGRAM

## 2020-01-27 PROCEDURE — 99239 HOSP IP/OBS DSCHRG MGMT >30: CPT | Performed by: NURSE PRACTITIONER

## 2020-01-27 PROCEDURE — 80048 BASIC METABOLIC PNL TOTAL CA: CPT | Performed by: PHYSICIAN ASSISTANT

## 2020-01-27 RX ADMIN — GUAIFENESIN 1200 MG: 600 TABLET, EXTENDED RELEASE ORAL at 09:27

## 2020-01-27 RX ADMIN — Medication 150 MG: at 09:27

## 2020-01-27 RX ADMIN — FLUTICASONE PROPIONATE 1 PUFF: 110 AEROSOL, METERED RESPIRATORY (INHALATION) at 09:27

## 2020-01-27 RX ADMIN — LORATADINE 10 MG: 10 TABLET ORAL at 09:27

## 2020-01-27 NOTE — PROGRESS NOTES
NEPHROLOGY PROGRESS NOTE   Néstor Simeon 27 y o  female MRN: [id number]  Unit/Bed#: S -01 Encounter: [id number]  Reason for Consult: ANDRE/CKD/HTN    ASSESSMENT/PLAN:  1  Acute Kidney Injury, POA- Unclear but potentially related to uncontrolled blood pressure with chronic NSAID use vs other intrinsic disease  - renal ultrasound: bilateral small diffusely echogenic kidneys (R 9 4x4 2cm / L 9 0x4 0cm); no hydronephrosis  - no peripheral eosinophilia  - workup: HIV _, anti GBM _, LUCY _, ANCA _, hemolysis smear negative for schistocytes, C3/C4 normal, IgG/IgA/IgM normal, hepatitis negative  - renal biopsy scheduled for 1/29/2020 at [facility]  2  Chronic Kidney Disease stage III- Unclear baseline creatinine but etiology possibly due to longstanding hypertension with chronic NSAID use vs FSGS  Plan for renal biopsy on 1/29/2020  Discussed with IR today  3  Hypertension- BP now much more controlled  Continue amlodipine 10mg daily and follow a low salt diet  - workup: renal artery doppler negative, am cortisol 14, tsh 1 5, metanephrines __, catecholamines __, renin __, aldosterone __  4  Anemia- likely secondary to iron deficiency, continue oral iron supplement  - iron saturation 18% with ferritin of 65  - workup: SPEP & UPEP pending  5  Proteinuria, non nephrotic- follow up renal biopsy  - UPC ratio 1g  - workup pending as above    Disposition:  Okay to discharge from a renal standpoint  Office appointment scheduled for 2/5 at 4:10pm  IR biopsy outpatient @ [facility] 1/29 at 7:30am arrival, 9am procedure    Discussed case with Dr Jose Maria Franco, [facility], and IR    SUBJECTIVE:  Patient without acute complaints except for a stuffy nose  I explained plan for outpatient biopsy and office follow up with us  She understands and is agreeable      OBJECTIVE:  Current Weight: Weight - Scale: 117 kg (257 lb 15 oz)  Vitals:    01/26/20 2225 01/27/20 0500 01/27/20 0600 01/27/20 0700   BP: 146/92   121/79   BP Location: Left arm   Left arm   Pulse: 94   82   Resp: 18   18   Temp: 98 °F (36 7 °C)   98 4 °F (36 9 °C)   TempSrc: Oral   Oral   SpO2: 98%   98%   Weight:  117 kg (257 lb 9 6 oz) 117 kg (257 lb 15 oz)    Height:           Intake/Output Summary (Last 24 hours) at 1/27/2020 1111  Last data filed at 1/26/2020 1425  Gross per 24 hour   Intake 350 ml   Output    Net 350 ml     General: NAD  Skin: no rash  Eyes: anicteric  ENMT: mm moist  Neck: no masses  Respiratory: CTAB  Cardiac: RRR  Extremities: no edema  GI: soft nt nd  Neuro: alert awake  Psych: mood and affect appropriate    Medications:    Current Facility-Administered Medications:     acetaminophen (TYLENOL) tablet 650 mg, 650 mg, Oral, Q6H PRN, SUZAN Lester    albuterol (PROVENTIL HFA,VENTOLIN HFA) inhaler 2 puff, 2 puff, Inhalation, Q6H PRN, SUZAN Lester    albuterol inhalation solution 2 5 mg, 2 5 mg, Nebulization, Q6H PRN, SUZAN Lester    amLODIPine (NORVASC) tablet 10 mg, 10 mg, Oral, Daily, Tayler Escobedo MD, Stopped at 01/27/20 9009    benzonatate (TESSALON PERLES) capsule 100 mg, 100 mg, Oral, TID PRN, SUZAN Lester    docusate sodium (COLACE) capsule 100 mg, 100 mg, Oral, BID PRN, SUZAN Lester    fluticasone (FLOVENT HFA) 110 MCG/ACT inhaler 1 puff, 1 puff, Inhalation, BID, SUZAN Lester, 1 puff at 01/27/20 6316    guaiFENesin (MUCINEX) 12 hr tablet 1,200 mg, 1,200 mg, Oral, Q12H [address], SUZAN Lester, 1,200 mg at 01/27/20 5641    iron polysaccharides (FERREX) capsule 150 mg, 150 mg, Oral, Daily, Tayler Escobedo MD, 150 mg at 01/27/20 7985    loratadine (CLARITIN) tablet 10 mg, 10 mg, Oral, Daily, SUZAN Lester, 10 mg at 01/27/20 1030    ondansetron (ZOFRAN) injection 4 mg, 4 mg, Intravenous, Q6H PRN, SUZAN Lester    Laboratory Results:  Results from last 7 days   Lab Units 01/27/20  0607 01/26/20  0443 01/25/20  0520 01/24/20  0539 01/23/20  2123   WBC Thousand/uL  --   --  6 01 6 82 5 72   HEMOGLOBIN g/dL  --   --  11 4* 10 3* 12 5   HEMATOCRIT %  --   --  39 1 34 8 41 7   PLATELETS Thousands/uL  --   --  266 239 257   POTASSIUM mmol/L 4 2 4 4 4 3 3 8 4 4   CHLORIDE mmol/L 104 104 107 112* 104   CO2 mmol/L 25 24 22 20* 28   BUN mg/dL 19 16 16 19 16   CREATININE mg/dL 2 19* 1 97* 1 95* 1 68* 2 39*   CALCIUM mg/dL 9 1 9 6 9 3 7 7* 9 1   PHOSPHORUS mg/dL  --  4 2  --   --  3 3

## 2020-01-27 NOTE — ASSESSMENT & PLAN NOTE
Creatinine upon admission:  2 39  Prior July 2019:  1 67  Currently:1 97  Does not follow with a nephrologist outpatient  Unclear etiology but potentially related to uncontrolled blood pressure with chronic NSAID use versus other intrinsic disease  Patient reported shoulder injury (12/5/19) for which she had been taking Advil  Patient advised to avoid all NSAID use  Urinalysis:  2+ protein ? FSGS  Renal ultrasound= "Small echogenic kidneys bilaterally consistent with medical renal disease  No obstructive uropathy noted  no hydronephrosis"  Consulted nephrology-multiple blood work studies are pending  Patient is cleared for discharge from a renal perspective    She is to obtain outpatient blood work, undergo a renal biopsy at [facility] on 1/29 at 9am, and follow up with Nephro in the office on 2/5/2020  Avoid NSAID  Increase Norvasc to 10 mg daily for better blood pressure control

## 2020-01-27 NOTE — ASSESSMENT & PLAN NOTE
· With hypertensive urgency, blood pressure 212/115 on arrival to ED  S/p one dose hydralazine in ED      · Blood pressure is now running more consistently in the 679-029 systolic range  · Known history of blood pressure since age 27  · Patient was previously prescribed but was not compliant with taking amlodipine 5 mg daily; this was resumed on admission and increased to 10 mg daily

## 2020-01-27 NOTE — DISCHARGE INSTRUCTIONS
Acute kidney injury:    · Renal biopsy scheduled for 1/29/2020 and 9am at [place] [address]    Please arrive at 7:30am  · Obtain blood work and follow-up with nephrology in the office on 2/5/2020 at 4:10pm  · Avoid Motrin, Ibuprofen, Advil, Aleve, and aspirin 1 possible    High blood pressure:    · Increase Norvasc to 10 mg daily    Iron deficiency anemia:    · Take an iron supplement daily with Colace for a stool softener as iron will make you constipated

## 2020-01-27 NOTE — ASSESSMENT & PLAN NOTE
Likely iron deficiency anemia  · Continue oral iron supplement  · Follow-up SPEP and UPEP  · Outpatient Nephrology follow-up

## 2020-01-27 NOTE — PLAN OF CARE
Problem: Potential for Falls  Goal: Patient will remain free of falls  Description  INTERVENTIONS:  - Assess patient frequently for physical needs  -  Identify cognitive and physical deficits and behaviors that affect risk of falls    -  [place] fall precautions as indicated by assessment   - Educate patient/family on patient safety including physical limitations  - Instruct patient to call for assistance with activity based on assessment  - Modify environment to reduce risk of injury  - Consider OT/PT consult to assist with strengthening/mobility  Outcome: Progressing     Problem: PAIN - ADULT  Goal: Verbalizes/displays adequate comfort level or baseline comfort level  Description  Interventions:  - Encourage patient to monitor pain and request assistance  - Assess pain using appropriate pain scale  - Administer analgesics based on type and severity of pain and evaluate response  - Implement non-pharmacological measures as appropriate and evaluate response  - Consider cultural and social influences on pain and pain management  - Notify physician/advanced practitioner if interventions unsuccessful or patient reports new pain  Outcome: Progressing     Problem: SAFETY ADULT  Goal: Maintain or return to baseline ADL function  Description  INTERVENTIONS:  -  Assess patient's ability to carry out ADLs; assess patient's baseline for ADL function and identify physical deficits which impact ability to perform ADLs (bathing, care of mouth/teeth, toileting, grooming, dressing, etc )  - Assess/evaluate cause of self-care deficits   - Assess range of motion  - Assess patient's mobility; develop plan if impaired  - Assess patient's need for assistive devices and provide as appropriate  - Encourage maximum independence but intervene and supervise when necessary  - Involve family in performance of ADLs  - Assess for home care needs following discharge   - Consider OT consult to assist with ADL evaluation and planning for discharge  - Provide patient education as appropriate  Outcome: Progressing  Goal: Maintain or return mobility status to optimal level  Description  INTERVENTIONS:  - Assess patient's baseline mobility status (ambulation, transfers, stairs, etc )    - Identify cognitive and physical deficits and behaviors that affect mobility  - Identify mobility aids required to assist with transfers and/or ambulation (gait belt, sit-to-stand, lift, walker, cane, etc )  - [place] fall precautions as indicated by assessment  - Record patient progress and toleration of activity level on Mobility SBAR; progress patient to next Phase/Stage  - Instruct patient to call for assistance with activity based on assessment  - Consider rehabilitation consult to assist with strengthening/weightbearing, etc   Outcome: Progressing     Problem: DISCHARGE PLANNING  Goal: Discharge to home or other facility with appropriate resources  Description  INTERVENTIONS:  - Identify barriers to discharge w/patient and caregiver  - Arrange for needed discharge resources and transportation as appropriate  - Identify discharge learning needs (meds, wound care, etc )  - Arrange for interpretive services to assist at discharge as needed  - Refer to Case Management Department for coordinating discharge planning if the patient needs post-hospital services based on physician/advanced practitioner order or complex needs related to functional status, cognitive ability, or social support system  Outcome: Progressing     Problem: Knowledge Deficit  Goal: Patient/family/caregiver demonstrates understanding of disease process, treatment plan, medications, and discharge instructions  Description  Complete learning assessment and assess knowledge base    Interventions:  - Provide teaching at level of understanding  - Provide teaching via preferred learning methods  Outcome: Progressing     Problem: METABOLIC, FLUID AND ELECTROLYTES - ADULT  Goal: Fluid balance maintained  Description  INTERVENTIONS:  - Monitor labs   - Monitor I/O and WT  - Instruct patient on fluid and nutrition as appropriate  - Assess for signs & symptoms of volume excess or deficit  Outcome: Progressing     Problem: HEMATOLOGIC - ADULT  Goal: Maintains hematologic stability  Description  INTERVENTIONS  - Assess for signs and symptoms of bleeding or hemorrhage  - Monitor labs  - Administer supportive blood products/factors as ordered and appropriate  Outcome: Progressing

## 2020-01-27 NOTE — PLAN OF CARE
Problem: PAIN - ADULT  Goal: Verbalizes/displays adequate comfort level or baseline comfort level  Description  Interventions:  - Encourage patient to monitor pain and request assistance  - Assess pain using appropriate pain scale  - Administer analgesics based on type and severity of pain and evaluate response  - Implement non-pharmacological measures as appropriate and evaluate response  - Consider cultural and social influences on pain and pain management  - Notify physician/advanced practitioner if interventions unsuccessful or patient reports new pain  Outcome: Progressing     Problem: SAFETY ADULT  Goal: Maintain or return to baseline ADL function  Description  INTERVENTIONS:  -  Assess patient's ability to carry out ADLs; assess patient's baseline for ADL function and identify physical deficits which impact ability to perform ADLs (bathing, care of mouth/teeth, toileting, grooming, dressing, etc )  - Assess/evaluate cause of self-care deficits   - Assess range of motion  - Assess patient's mobility; develop plan if impaired  - Assess patient's need for assistive devices and provide as appropriate  - Encourage maximum independence but intervene and supervise when necessary  - Involve family in performance of ADLs  - Assess for home care needs following discharge   - Consider OT consult to assist with ADL evaluation and planning for discharge  - Provide patient education as appropriate  Outcome: Progressing  Goal: Maintain or return mobility status to optimal level  Description  INTERVENTIONS:  - Assess patient's baseline mobility status (ambulation, transfers, stairs, etc )    - Identify cognitive and physical deficits and behaviors that affect mobility  - Identify mobility aids required to assist with transfers and/or ambulation (gait belt, sit-to-stand, lift, walker, cane, etc )  - Bruce fall precautions as indicated by assessment  - Record patient progress and toleration of activity level on Mobility SBAR; progress patient to next Phase/Stage  - Instruct patient to call for assistance with activity based on assessment  - Consider rehabilitation consult to assist with strengthening/weightbearing, etc   Outcome: Progressing     Problem: DISCHARGE PLANNING  Goal: Discharge to home or other facility with appropriate resources  Description  INTERVENTIONS:  - Identify barriers to discharge w/patient and caregiver  - Arrange for needed discharge resources and transportation as appropriate  - Identify discharge learning needs (meds, wound care, etc )  - Arrange for interpretive services to assist at discharge as needed  - Refer to Case Management Department for coordinating discharge planning if the patient needs post-hospital services based on physician/advanced practitioner order or complex needs related to functional status, cognitive ability, or social support system  Outcome: Progressing     Problem: Knowledge Deficit  Goal: Patient/family/caregiver demonstrates understanding of disease process, treatment plan, medications, and discharge instructions  Description  Complete learning assessment and assess knowledge base    Interventions:  - Provide teaching at level of understanding  - Provide teaching via preferred learning methods  Outcome: Progressing     Problem: METABOLIC, FLUID AND ELECTROLYTES - ADULT  Goal: Fluid balance maintained  Description  INTERVENTIONS:  - Monitor labs   - Monitor I/O and WT  - Instruct patient on fluid and nutrition as appropriate  - Assess for signs & symptoms of volume excess or deficit  Outcome: Progressing     Problem: HEMATOLOGIC - ADULT  Goal: Maintains hematologic stability  Description  INTERVENTIONS  - Assess for signs and symptoms of bleeding or hemorrhage  - Monitor labs  - Administer supportive blood products/factors as ordered and appropriate  Outcome: Progressing

## 2020-01-27 NOTE — DISCHARGE SUMMARY
Discharge- Darell Greenfield 1995, 27 y o  female MRN: [id number]    Unit/Bed#: S -01 Encounter: [id number]    Primary Care Provider: SUZAN Fernandez   Date and time admitted to hospital: 1/23/2020  8:54 PM        * Near syncope  Assessment & Plan  · Presented to ED with near syncope, shortness of breath, chest pressure, light headedness that began while riding in the back seat of the car  · Notes feeling improved with no further events of lightheadedness  · She was noting infectious symptoms including fever of 100 5 about 2 days prior, productive cough with yellow and green sputum that is slightly blood tinged at times  · D-dimer 0 95, unable to have CTA due to kidney function  V/Q scan negative for PE  Heparin gtt ordered was then stopped  · Tele unremarkable  · Recommend remaining well hydrated controlling blood pressure    Acute kidney injury superimposed on chronic kidney disease [facility])  Assessment & Plan  Creatinine upon admission:  2 39  Prior July 2019:  1 67  Currently:1 97  Does not follow with a nephrologist outpatient  Unclear etiology but potentially related to uncontrolled blood pressure with chronic NSAID use versus other intrinsic disease  Patient reported shoulder injury (12/5/19) for which she had been taking Advil  Patient advised to avoid all NSAID use  Urinalysis:  2+ protein ? FSGS  Renal ultrasound= "Small echogenic kidneys bilaterally consistent with medical renal disease  No obstructive uropathy noted  no hydronephrosis"  Consulted nephrology-multiple blood work studies are pending  Patient is cleared for discharge from a renal perspective  She is to obtain outpatient blood work, undergo a renal biopsy at [facility] on 1/29 at 9am, and follow up with Nephro in the office on 2/5/2020  Avoid NSAID  Increase Norvasc to 10 mg daily for better blood pressure control    Hypertension  Assessment & Plan  · With hypertensive urgency, blood pressure 212/115 on arrival to ED    S/p one dose hydralazine in ED  · Blood pressure is now running more consistently in the 422-758 systolic range  · Known history of blood pressure since age 27  · Patient was previously prescribed but was not compliant with taking amlodipine 5 mg daily; this was resumed on admission and increased to 10 mg daily    Anemia  Assessment & Plan  Likely iron deficiency anemia  · Continue oral iron supplement  · Follow-up SPEP and UPEP  · Outpatient Nephrology follow-up    Morbid obesity with BMI of 40 0-44 9, adult ([facility] [address] )  Assessment & Plan  · BMI 44 8  Patient needs weight loss    URI (upper respiratory infection)  Assessment & Plan  · Afebrile without leukocytosis  · Monitor off abx   · Supportive care --patient is well-appearing    Asthma  Assessment & Plan  · Home regimen: fluticasone BID, albuterol PRN  She reports using albuterol inhaler >5x daily due to recent URI   · Will hold on systemic steroids at this time as she is without wheezes        Discharging Physician / Practitioner: Mari Roland  PCP: Mari Augustin  Admission Date:   Admission Orders (From admission, onward)     Ordered        01/23/20 2316  Inpatient Admission  Once                   Discharge Date: 01/27/20    Resolved Problems  Date Reviewed: 1/27/2020    None          Consultations During Hospital Stay:  · Nephrology     Procedures Performed:   · CXR: No acute cardiopulmonary disease  · CT head: 1  No acute intracranial hemorrhage, midline shift, or mass effect  2   Extensive paranasal sinus mucosal thickening, correlate for sinusitis  · Renal artery doppler: No evidence of significant arterial occlusive disease in the main renal artery bilaterally  · VQ scan: No evidence of pulmonary embolism  · Renal US: 1  Small echogenic kidneys bilaterally consistent with medical renal disease  No obstructive uropathy noted       Significant Findings / Test Results:   · ANDRE 2 39 -> 2 19  · CKD  · Anemia  · HTN  · Proteinuria    Incidental Findings:   · As above      Test Results Pending at Discharge (will require follow up): · None     Outpatient Tests Requested:  · Per renal - labs and outpatient renal biopsy     Complications:  None     Reason for Admission: ANDRE    Hospital Course:     Ravinder Mccord is a 27 y o  female patient with a past medical history including chronic kidney disease, hypertension and asthma who originally presented to the hospital on 1/23/2020 due to shortness of breath, presyncopal event, lightheadedness, and chest pressure  She reported a recent cold/upper respiratory tract infection  Workup in the emergency room revealed hypertensive urgency with a blood pressure of 212/115 and an acute kidney injury with a creatinine of 2 39  Patient was admitted for further evaluation and treatment  Nephrology was following  Creatinine improved with hydration  Patient's blood pressure improved with increasing Norvasc from 5 mg to 10 mg  She has been advised to discontinue NSAIDs  She is to follow-up as an outpatient with Nephrology  She will need outpatient blood work and a renal biopsy which will be arranged for this week  Please see above list of diagnoses and related plan for additional information  Condition at Discharge: stable     Discharge Day Visit / Exam:     Subjective:  Resting comfortably out of bed in chair  No complaints  Eating well  No headache, dizziness, lightheadedness  Wants to go home today  Vitals: Blood Pressure: 121/79 (01/27/20 0700)  Pulse: 82 (01/27/20 0700)  Temperature: 98 4 °F (36 9 °C) (01/27/20 0700)  Temp Source: Oral (01/27/20 0700)  Respirations: 18 (01/27/20 0700)  Height: 5' 4" (162 6 cm) (01/24/20 0026)  Weight - Scale: 117 kg (257 lb 15 oz) (01/27/20 0600)  SpO2: 98 % (01/27/20 0700)  Exam:   Physical Exam   Constitutional: She is oriented to person, place, and time  She appears well-developed  No distress  Pleasant female resting comfortably out of bed in chair on room air, mildly obese   HENT:   Head: Normocephalic  Neck: Normal range of motion  Cardiovascular: Normal rate and regular rhythm  Pulmonary/Chest: Effort normal  No accessory muscle usage  No tachypnea  No respiratory distress  She has decreased breath sounds in the right lower field and the left lower field  She has no wheezes  She has no rhonchi  She has no rales  Abdominal: Soft  Bowel sounds are normal  She exhibits no distension  There is no tenderness  Musculoskeletal: Normal range of motion  She exhibits no edema  Neurological: She is alert and oriented to person, place, and time  Skin: Skin is warm and dry  Capillary refill takes less than 2 seconds  She is not diaphoretic  Psychiatric: She has a normal mood and affect  Judgment normal    Nursing note and vitals reviewed  Discussion with Family:  Called mom a but her cell phone mailbox was full on was unable to leave a voicemail  Will hopefully see and talk to mom when she comes to  patient  Discharge instructions/Information to patient and family:   See after visit summary for information provided to patient and family  Provisions for Follow-Up Care:  See after visit summary for information related to follow-up care and any pertinent home health orders  Disposition:     Home    For Discharges to [facility] SNF:   · Not Applicable to this Patient - Not Applicable to this Patient    Planned Readmission: None     Discharge Statement:  I spent > 30 minutes discharging the patient  This time was spent on the day of discharge  I had direct contact with the patient on the day of discharge  Greater than 50% of the total time was spent examining patient, answering all patient questions, arranging and discussing plan of care with patient as well as directly providing post-discharge instructions    Additional time then spent on discharge activities  Discharge Medications:  See after visit summary for reconciled discharge medications provided to patient and family        ** Please Note: This note has been constructed using a voice recognition system **

## 2020-01-27 NOTE — ASSESSMENT & PLAN NOTE
· Presented to ED with near syncope, shortness of breath, chest pressure, light headedness that began while riding in the back seat of the car  · Notes feeling improved with no further events of lightheadedness  · She was noting infectious symptoms including fever of 100 5 about 2 days prior, productive cough with yellow and green sputum that is slightly blood tinged at times  · D-dimer 0 95, unable to have CTA due to kidney function  V/Q scan negative for PE   Heparin gtt ordered was then stopped  · Tele unremarkable  · Recommend remaining well hydrated controlling blood pressure

## 2020-01-27 NOTE — PROGRESS NOTES
Interventional Radiology Telephone Consultation Note:    I was called by Dr J Luis Lopes to discuss the care of Aden  I have been been consulted to determine the appropriate procedure, whether or not a procedure can and should be performed, and to place the correct procedure orders  This is a woman with a new ANDRE of unclear etiology  IR was consulted regarding possible nontargeted renal biopsy  At this point, there is no indication for an inpatient biopsy, specifically if this will delay discharge  I have reached out to my schedulers to arrange an expedited outpatient biopsy, which should take place in the next few days  Dr J Luis Lopes is agreeable to this plan  I spent 21 minutes in coordinating this patient's clinical care, reviewing old imaging, notes, and laboratory values, coordinating with our schedulers, and conveying information back to the primary team     Thank you for allowing me to participate in the care of Aden  Please don't hesitate to call, text, email, or TigerText with any questions  Dana Mac MD  Interventional Radiologist  [facility]  Personal Cell: [phone]  [email or web address]

## 2020-01-28 ENCOUNTER — TELEPHONE (OUTPATIENT)
Dept: RADIOLOGY | Facility: HOSPITAL | Age: 25
End: 2020-01-28

## 2020-01-28 ENCOUNTER — TRANSITIONAL CARE MANAGEMENT (OUTPATIENT)
Dept: FAMILY MEDICINE CLINIC | Facility: CLINIC | Age: 25
End: 2020-01-28

## 2020-01-28 LAB
ALBUMIN SERPL ELPH-MCNC: 3.35 G/DL (ref 3.5–5)
ALBUMIN SERPL ELPH-MCNC: 54.1 % (ref 52–65)
ALBUMIN UR ELPH-MCNC: 77.8 %
ALPHA1 GLOB MFR UR ELPH: 4.7 %
ALPHA1 GLOB SERPL ELPH-MCNC: 0.35 G/DL (ref 0.1–0.4)
ALPHA1 GLOB SERPL ELPH-MCNC: 5.7 % (ref 2.5–5)
ALPHA2 GLOB MFR UR ELPH: 3.7 %
ALPHA2 GLOB SERPL ELPH-MCNC: 0.87 G/DL (ref 0.4–1.2)
ALPHA2 GLOB SERPL ELPH-MCNC: 14.1 % (ref 7–13)
B-GLOBULIN MFR UR ELPH: 5.8 %
BETA GLOB ABNORMAL SERPL ELPH-MCNC: 0.36 G/DL (ref 0.4–0.8)
BETA1 GLOB SERPL ELPH-MCNC: 5.8 % (ref 5–13)
BETA2 GLOB SERPL ELPH-MCNC: 5.6 % (ref 2–8)
BETA2+GAMMA GLOB SERPL ELPH-MCNC: 0.35 G/DL (ref 0.2–0.5)
GAMMA GLOB ABNORMAL SERPL ELPH-MCNC: 0.91 G/DL (ref 0.5–1.6)
GAMMA GLOB MFR UR ELPH: 8 %
GAMMA GLOB SERPL ELPH-MCNC: 14.7 % (ref 12–22)
IGG/ALB SER: 1.18 {RATIO} (ref 1.1–1.8)
PROT PATTERN SERPL ELPH-IMP: ABNORMAL
PROT PATTERN UR ELPH-IMP: ABNORMAL
PROT SERPL-MCNC: 6.2 G/DL (ref 6.4–8.2)
PROT UR-MCNC: 116 MG/DL

## 2020-01-28 RX ORDER — SODIUM CHLORIDE 9 MG/ML
75 INJECTION, SOLUTION INTRAVENOUS CONTINUOUS
Status: CANCELLED | OUTPATIENT
Start: 2020-01-28

## 2020-01-29 ENCOUNTER — HOSPITAL ENCOUNTER (OUTPATIENT)
Dept: RADIOLOGY | Facility: HOSPITAL | Age: 25
Discharge: HOME | End: 2020-01-29
Attending: STUDENT IN AN ORGANIZED HEALTH CARE EDUCATION/TRAINING PROGRAM | Admitting: STUDENT IN AN ORGANIZED HEALTH CARE EDUCATION/TRAINING PROGRAM
Payer: COMMERCIAL

## 2020-01-29 VITALS
RESPIRATION RATE: 18 BRPM | SYSTOLIC BLOOD PRESSURE: 137 MMHG | OXYGEN SATURATION: 100 % | DIASTOLIC BLOOD PRESSURE: 85 MMHG | HEART RATE: 88 BPM

## 2020-01-29 LAB
C-ANCA TITR SER IF: ABNORMAL TITER
GBM AB SER IA-ACNC: 2 UNITS (ref 0–20)
MYELOPEROXIDASE AB SER IA-ACNC: <9 U/ML (ref 0–9)
P-ANCA ATYPICAL TITR SER IF: ABNORMAL TITER
P-ANCA TITR SER IF: ABNORMAL TITER
PROTEINASE3 AB SER IA-ACNC: 5.4 U/ML (ref 0–3.5)

## 2020-01-29 PROCEDURE — 99152 MOD SED SAME PHYS/QHP 5/>YRS: CPT | Performed by: RADIOLOGY

## 2020-01-29 PROCEDURE — 99153 MOD SED SAME PHYS/QHP EA: CPT

## 2020-01-29 PROCEDURE — 88305 TISSUE EXAM BY PATHOLOGIST: CPT | Performed by: INTERNAL MEDICINE

## 2020-01-29 PROCEDURE — 88350 IMFLUOR EA ADDL 1ANTB STN PX: CPT | Performed by: INTERNAL MEDICINE

## 2020-01-29 PROCEDURE — 50200 RENAL BIOPSY PERQ: CPT | Performed by: RADIOLOGY

## 2020-01-29 PROCEDURE — 76942 ECHO GUIDE FOR BIOPSY: CPT | Performed by: RADIOLOGY

## 2020-01-29 PROCEDURE — 99152 MOD SED SAME PHYS/QHP 5/>YRS: CPT

## 2020-01-29 PROCEDURE — 88346 IMFLUOR 1ST 1ANTB STAIN PX: CPT | Performed by: INTERNAL MEDICINE

## 2020-01-29 PROCEDURE — 88300 SURGICAL PATH GROSS: CPT | Performed by: PATHOLOGY

## 2020-01-29 PROCEDURE — 50200 RENAL BIOPSY PERQ: CPT

## 2020-01-29 PROCEDURE — 88348 ELECTRON MICROSCOPY DX: CPT | Performed by: INTERNAL MEDICINE

## 2020-01-29 PROCEDURE — 77012 CT SCAN FOR NEEDLE BIOPSY: CPT

## 2020-01-29 PROCEDURE — 88313 SPECIAL STAINS GROUP 2: CPT | Performed by: INTERNAL MEDICINE

## 2020-01-29 RX ORDER — SODIUM CHLORIDE 9 MG/ML
75 INJECTION, SOLUTION INTRAVENOUS CONTINUOUS
Status: DISCONTINUED | OUTPATIENT
Start: 2020-01-29 | End: 2020-01-29 | Stop reason: HOSPADM

## 2020-01-29 RX ORDER — DIPHENHYDRAMINE HYDROCHLORIDE 50 MG/ML
INJECTION INTRAMUSCULAR; INTRAVENOUS CODE/TRAUMA/SEDATION MEDICATION
Status: COMPLETED | OUTPATIENT
Start: 2020-01-29 | End: 2020-01-29

## 2020-01-29 RX ORDER — MIDAZOLAM HYDROCHLORIDE 2 MG/2ML
INJECTION, SOLUTION INTRAMUSCULAR; INTRAVENOUS CODE/TRAUMA/SEDATION MEDICATION
Status: COMPLETED | OUTPATIENT
Start: 2020-01-29 | End: 2020-01-29

## 2020-01-29 RX ORDER — FENTANYL CITRATE 50 UG/ML
INJECTION, SOLUTION INTRAMUSCULAR; INTRAVENOUS CODE/TRAUMA/SEDATION MEDICATION
Status: COMPLETED | OUTPATIENT
Start: 2020-01-29 | End: 2020-01-29

## 2020-01-29 RX ORDER — LIDOCAINE WITH 8.4% SOD BICARB 0.9%(10ML)
SYRINGE (ML) INJECTION CODE/TRAUMA/SEDATION MEDICATION
Status: COMPLETED | OUTPATIENT
Start: 2020-01-29 | End: 2020-01-29

## 2020-01-29 RX ADMIN — FENTANYL CITRATE 50 MCG: 50 INJECTION, SOLUTION INTRAMUSCULAR; INTRAVENOUS at 13:17

## 2020-01-29 RX ADMIN — FENTANYL CITRATE 50 MCG: 50 INJECTION, SOLUTION INTRAMUSCULAR; INTRAVENOUS at 13:06

## 2020-01-29 RX ADMIN — MIDAZOLAM 1 MG: 1 INJECTION INTRAMUSCULAR; INTRAVENOUS at 13:15

## 2020-01-29 RX ADMIN — FENTANYL CITRATE 50 MCG: 50 INJECTION, SOLUTION INTRAMUSCULAR; INTRAVENOUS at 13:28

## 2020-01-29 RX ADMIN — MIDAZOLAM 1 MG: 1 INJECTION INTRAMUSCULAR; INTRAVENOUS at 13:26

## 2020-01-29 RX ADMIN — MIDAZOLAM 1 MG: 1 INJECTION INTRAMUSCULAR; INTRAVENOUS at 13:11

## 2020-01-29 RX ADMIN — FENTANYL CITRATE 50 MCG: 50 INJECTION, SOLUTION INTRAMUSCULAR; INTRAVENOUS at 13:01

## 2020-01-29 RX ADMIN — MIDAZOLAM 1 MG: 1 INJECTION INTRAMUSCULAR; INTRAVENOUS at 13:01

## 2020-01-29 RX ADMIN — DIPHENHYDRAMINE HYDROCHLORIDE 50 MG: 50 INJECTION, SOLUTION INTRAMUSCULAR; INTRAVENOUS at 13:08

## 2020-01-29 RX ADMIN — MIDAZOLAM 1 MG: 1 INJECTION INTRAMUSCULAR; INTRAVENOUS at 13:06

## 2020-01-29 RX ADMIN — SODIUM CHLORIDE 75 ML/HR: 0.9 INJECTION, SOLUTION INTRAVENOUS at 11:21

## 2020-01-29 RX ADMIN — LIDOCAINE HYDROCHLORIDE 10 ML: 10 INJECTION, SOLUTION INFILTRATION; PERINEURAL at 13:14

## 2020-01-29 RX ADMIN — MIDAZOLAM 1 MG: 1 INJECTION INTRAMUSCULAR; INTRAVENOUS at 13:20

## 2020-01-29 NOTE — DISCHARGE INSTRUCTIONS
Percutaneous Kidney Biopsy   WHAT YOU NEED TO KNOW:   A percutaneous kidney biopsy is a procedure to remove a small sample of kidney tissue  It may also be done to check for kidney disease or cancer  DISCHARGE INSTRUCTIONS:   Follow up with your healthcare provider as directed:  Write down your questions so you remember to ask them during your visits  Wound care: The Band-Aid may be removed in 24 hours  For more information:   · National Kidney and Urologic Diseases Information Clearinghouse  missy [address] [address]  Phone: 8- [phone] - [phone]  Web Address: http://kidney  niddk nih gov/   Care after your procedure:    1  Limit your activities for 36 hours after your biopsy  2  No driving day of biopsy  3  Return to your normal diet  Flat sips of flat soda helps with mild nausea  4  Remove band-aid or dressing 24 hours after procedure  Contact Interventional Radiology at [phone]    [facility] PATIENTS: Contact Interventional Radiology at [phone]) Comfort Pisano PATIENTS: Contact Interventional Radiology at [phone]) if:    1  Difficulty breathing, nausea or vomiting  2  You feel weak or dizzy  3  Chills or fever above 101 degrees F      4  You have severe pain in your abdomen or where your procedure was done  5  You have blood in your urine  You urinate small amounts or not at all  6  Develop any redness, swelling, heat, unusual drainage, heavy bruising or bleeding from biopsy site

## 2020-01-29 NOTE — SEDATION DOCUMENTATION
Left kidney biopsy done by Dr Yeni Martinez  Patient tolerated well and denies pain  Four hour bedrest per Dr Siva Little  Report given to short stay RN

## 2020-01-29 NOTE — INTERVAL H&P NOTE
Update: (This section must be completed if the H&P was completed greater than 24 hrs to procedure or admission)    H&P reviewed  After examining the patient, I find no changed to the H&P since it had been written  LMP 01/09/2020 (Approximate)      Patient re-evaluated   Accept as history and physical     Assessment/Plan     28 yo female with ANDRE of unknown etiology suspected from ibuprofen for image guided renal core biopsy    Ulises Demarco MD/January 29, 2020/12:37 PM

## 2020-01-29 NOTE — BRIEF OP NOTE (RAD/CATH)
CT NEEDLE BIOPSY KIDNEY Procedure Note    PATIENT NAME: Manjinder Payment  : 1995  MRN: [id number]    Pre-op Diagnosis: No diagnosis found  Post-op Diagnosis: No diagnosis found  Surgeon:   Lizy Yin MD  Assistants:     No qualified resident was available, Resident is only observing    Estimated Blood Loss: minimal  Findings: CT guided renal core biopsy for [place] kit with 18 gauge core needle x 3 and Dstat      Specimens: Left kidney    Complications:  none    Anesthesia: Conscious sedation and Local    Lizy Yin MD     Date: 2020  Time: 1:35 PM

## 2020-01-30 LAB — ALDOST SERPL-MCNC: 11.6 NG/DL (ref 0–30)

## 2020-01-31 LAB
METANEPH FREE SERPL-MCNC: <10 PG/ML (ref 0–62)
NORMETANEPHRINE SERPL-MCNC: 27 PG/ML (ref 0–145)
RENIN PLAS-CCNC: 0.94 NG/ML/HR (ref 0.17–5.38)

## 2020-02-01 LAB
DOPAMINE 24H UR-MRATE: <30 PG/ML (ref 0–48)
EPINEPH PLAS-MCNC: <15 PG/ML (ref 0–62)
NOREPINEPH PLAS-MCNC: 151 PG/ML (ref 0–874)

## 2020-02-03 ENCOUNTER — APPOINTMENT (OUTPATIENT)
Dept: LAB | Facility: HOSPITAL | Age: 25
End: 2020-02-03
Payer: COMMERCIAL

## 2020-02-03 DIAGNOSIS — N17.9 ACUTE KIDNEY INJURY SUPERIMPOSED ON CHRONIC KIDNEY DISEASE (HCC): ICD-10-CM

## 2020-02-03 DIAGNOSIS — N18.9 ACUTE KIDNEY INJURY SUPERIMPOSED ON CHRONIC KIDNEY DISEASE (HCC): ICD-10-CM

## 2020-02-03 LAB
ANION GAP SERPL CALCULATED.3IONS-SCNC: 7 MMOL/L (ref 4–13)
BUN SERPL-MCNC: 16 MG/DL (ref 5–25)
CALCIUM SERPL-MCNC: 9.2 MG/DL (ref 8.3–10.1)
CHLORIDE SERPL-SCNC: 109 MMOL/L (ref 100–108)
CO2 SERPL-SCNC: 23 MMOL/L (ref 21–32)
CREAT SERPL-MCNC: 1.98 MG/DL (ref 0.6–1.3)
GFR SERPL CREATININE-BSD FRML MDRD: 40 ML/MIN/1.73SQ M
GLUCOSE SERPL-MCNC: 86 MG/DL (ref 65–140)
POTASSIUM SERPL-SCNC: 3.6 MMOL/L (ref 3.5–5.3)
SODIUM SERPL-SCNC: 139 MMOL/L (ref 136–145)

## 2020-02-03 PROCEDURE — 36415 COLL VENOUS BLD VENIPUNCTURE: CPT

## 2020-02-03 PROCEDURE — 80048 BASIC METABOLIC PNL TOTAL CA: CPT

## 2020-02-04 NOTE — PROGRESS NOTES
Assessment and Plan:    Juan Moreno was seen today for follow-up  Diagnoses and all orders for this visit:    Stage 3 chronic kidney disease ([facility] [address] )  -     Ambulatory referral to ckd education program; Future  -     Magnesium; Future  -     Phosphorus; Future  -     Protein / creatinine ratio, urine; Future  -     CBC; Future  -     PTH, intact; Future  -     Vitamin D 25 hydroxy; Future  -     Comprehensive metabolic panel; Future  -     Urinalysis with microscopic; Future    Benign hypertension with CKD (chronic kidney disease) stage III ([facility])  -     labetalol (NORMODYNE) 100 mg tablet;  Take 1 tablet (100 mg total) by mouth 2 (two) times a day    Iron deficiency anemia, unspecified iron deficiency anemia type    Persistent proteinuria      Chronic Kidney Disease stage III  - renal biopsy 1/29/2020: severe diffuse global glomerulosclerosis, severe tubular atrophy with interstitial fibrosis and inflammation, mild arteriosclerosis leading to the likely cause of renal dysfunction being hypertensive arterionephrosclerosis and chronic tubulointerstitial nephropathy (? due to NSAID use)  - renal ultrasound: bilateral small diffusely echogenic kidneys (R 9 4x4 2cm / L 9 0x4 0cm); no hydronephrosis  - workup: HIV nonreactive, anti GBM negative, LUCY negative, ANCA negative but WI-3 elevated to 5 4 hemolysis smear negative for schistocytes, C3/C4 normal, IgG/IgA/IgM normal, hepatitis negative, no peripheral eosinophilia  - continue to monitor renal function to trend baseline creatinine but currently baseline creatinine appears to be around 2  - electrolytes stable  - schedule kidney smart class    Hypertension  - antihypertensive regimen includes amlodipine 10mg daily  - follow a low sodium diet  - avoid nonsteroidal medications such as advil, aleve, ibuprofen, motrin, naproxen, mobic, indomethacin, celebrex  - workup: renal artery doppler negative, am cortisol 14, tsh 1 5, metanephrines normal, catecholamines negative, renin 0 935, aldosterone 11 6  - BP elevated today, add labetalol 100mg twice a day  - obtain an OMRON BP cuff and log your blood pressures at home    Anemia  - likely secondary to iron deficiency, continue oral iron supplement  - iron saturation 18% with ferritin of 65  - workup: SPEP & UPEP negative    Proteinuria, non nephrotic  - UPC ratio 1g  - workup as above    Bone Mineral Disorder  - check studies before next visit    INSTRUCTIONS  - get OMRON blood pressure cuff (large size)  - take blood pressures twice a day and record  - start labetalol 100mg twice a day  - kidney smart class to be scheduled  - low salt diet  - weight loss  - blood work before next appointment    Follow up with Dr Ada Wallace in 1 month  Please call the office with any questions or concerns  Reason for Visit: Follow-up (hospital follow up)    HPI: Neptali Akers is a 27 y o  female who is here for follow up after hospitalization at [address] [place] [address] in January for chest pain and shortness of breath  She was diagnosed with hypertensive urgency with a BP of 212/115  She also has a history of chronic kidney disease and hypertension  She was taking chronic NSAIDs prior to coming into the hospital   She underwent a renal biopsy as an outpatient on 1/29/20  She denies all complaints of shortness of breath, headache, blurry vision, lightheadedness, dizziness, N/V/D, LE edema, urinary retention  She states she does not eat frozen TV dinners or chinese food  She tries to follow a relatively healthy diet  She was taking ibuprofen at least weekly since the age of 14  She denies knowledge of any kidney disease in her family  She is compliant with taking her medications  She helps take care of her niece and siblings as well as works and is helping her mother repair her kitchen  We discussed progression of CKD during this visit      ROS: A complete review of systems was performed and was negative unless otherwise noted in the history of present illness  Allergies:   Wheat bran    Medications:     Current Outpatient Medications:     acetaminophen (TYLENOL) 325 mg tablet, Take 2 tablets (650 mg total) by mouth every 6 (six) hours as needed for mild pain or headaches, Disp: 30 tablet, Rfl: 0    albuterol (2 5 mg/3 mL) 0 083 % nebulizer solution, Take 1 vial (2 5 mg total) by nebulization every 6 (six) hours as needed for wheezing or shortness of breath, Disp: 30 mL, Rfl: 0    albuterol (VENTOLIN HFA) 90 mcg/act inhaler, Inhale 2 puffs every 6 (six) hours as needed for wheezing or shortness of breath (cough), Disp: 1 Inhaler, Rfl: 11    amLODIPine (NORVASC) 10 mg tablet, Take 1 tablet (10 mg total) by mouth daily, Disp: 30 tablet, Rfl: 0    etonogestrel (NEXPLANON) subdermal implant, 68 mg by Subdermal route once, Disp: , Rfl:     fluticasone (FLOVENT HFA) 110 MCG/ACT inhaler, Inhale 1 puff 2 (two) times a day, Disp: 1 Inhaler, Rfl: 11    guaiFENesin (MUCINEX PO), Take by mouth, Disp: , Rfl:     iron polysaccharides (FERREX) 150 mg capsule, Take 1 capsule (150 mg total) by mouth daily, Disp: 30 capsule, Rfl: 0    docusate sodium (COLACE) 100 mg capsule, Take 1 capsule (100 mg total) by mouth 2 (two) times a day as needed for constipation (Patient not taking: Reported on 2/5/2020), Disp: 10 capsule, Rfl: 0    labetalol (NORMODYNE) 100 mg tablet, Take 1 tablet (100 mg total) by mouth 2 (two) times a day, Disp: 60 tablet, Rfl: 3    loratadine (CLARITIN) 10 mg tablet, Take 1 tablet (10 mg total) by mouth daily for 7 days, Disp: 7 tablet, Rfl: 0    Past Medical History:   Diagnosis Date    Asthma     Eczema      Past Surgical History:   Procedure Laterality Date    CHOLECYSTECTOMY      CT NEEDLE BIOPSY KIDNEY  1/29/2020    GALLBLADDER SURGERY       Family History   Problem Relation Age of Onset    No Known Problems Mother     No Known Problems Father       reports that she has never smoked   She has never used smokeless tobacco  She reports that she does not drink alcohol or use drugs  Physical Exam:   Vitals:    02/05/20 1346 02/05/20 1410   BP:  160/90   BP Location:  Right arm   Patient Position:  Sitting   Cuff Size:  Large   Pulse:  68   Weight: 119 kg (263 lb)    Height: 5' 4" (1 626 m)      Body mass index is 45 14 kg/m²  General: NAD  Neuro: AAO  Skin: no rash  Eyes: anicteric  ENMT: mm moist  Neck: no masses  Respiratory: CTAB  Cardiovascular: RRR  Extremities: no edema  Gastrointestinal: soft nt nd    Procedure:  No results found for this or any previous visit  Labs reviewed      Lab Results   Component Value Date    GLUCOSE 86 06/12/2015    CALCIUM 9 2 02/03/2020     06/12/2015    K 3 6 02/03/2020    CO2 23 02/03/2020     (H) 02/03/2020    BUN 16 02/03/2020    CREATININE 1 98 (H) 02/03/2020       Results from last 7 days   Lab Units 02/03/20  1512   POTASSIUM mmol/L 3 6   CHLORIDE mmol/L 109*   CO2 mmol/L 23   BUN mg/dL 16   CREATININE mg/dL 1 98*   CALCIUM mg/dL 9 2

## 2020-02-05 ENCOUNTER — OFFICE VISIT (OUTPATIENT)
Dept: NEPHROLOGY | Facility: CLINIC | Age: 25
End: 2020-02-05
Payer: COMMERCIAL

## 2020-02-05 VITALS
HEART RATE: 68 BPM | WEIGHT: 263 LBS | HEIGHT: 64 IN | DIASTOLIC BLOOD PRESSURE: 90 MMHG | SYSTOLIC BLOOD PRESSURE: 160 MMHG | BODY MASS INDEX: 44.9 KG/M2

## 2020-02-05 DIAGNOSIS — R80.1 PERSISTENT PROTEINURIA: ICD-10-CM

## 2020-02-05 DIAGNOSIS — N18.30 BENIGN HYPERTENSION WITH CKD (CHRONIC KIDNEY DISEASE) STAGE III (HCC): ICD-10-CM

## 2020-02-05 DIAGNOSIS — I12.9 BENIGN HYPERTENSION WITH CKD (CHRONIC KIDNEY DISEASE) STAGE III (HCC): ICD-10-CM

## 2020-02-05 DIAGNOSIS — D50.9 IRON DEFICIENCY ANEMIA, UNSPECIFIED IRON DEFICIENCY ANEMIA TYPE: ICD-10-CM

## 2020-02-05 DIAGNOSIS — N18.30 STAGE 3 CHRONIC KIDNEY DISEASE (HCC): Primary | ICD-10-CM

## 2020-02-05 PROCEDURE — 1111F DSCHRG MED/CURRENT MED MERGE: CPT | Performed by: PHYSICIAN ASSISTANT

## 2020-02-05 PROCEDURE — 3080F DIAST BP >= 90 MM HG: CPT | Performed by: PHYSICIAN ASSISTANT

## 2020-02-05 PROCEDURE — 99214 OFFICE O/P EST MOD 30 MIN: CPT | Performed by: PHYSICIAN ASSISTANT

## 2020-02-05 PROCEDURE — 1036F TOBACCO NON-USER: CPT | Performed by: PHYSICIAN ASSISTANT

## 2020-02-05 PROCEDURE — 3077F SYST BP >= 140 MM HG: CPT | Performed by: PHYSICIAN ASSISTANT

## 2020-02-05 PROCEDURE — 3008F BODY MASS INDEX DOCD: CPT | Performed by: PHYSICIAN ASSISTANT

## 2020-02-05 RX ORDER — LABETALOL 100 MG/1
100 TABLET, FILM COATED ORAL 2 TIMES DAILY
Qty: 60 TABLET | Refills: 3 | Status: SHIPPED | OUTPATIENT
Start: 2020-02-05 | End: 2021-11-19 | Stop reason: SDUPTHER

## 2020-02-05 NOTE — PATIENT INSTRUCTIONS
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 due to NSAID use)  - renal ultrasound: bilateral small diffusely echogenic kidneys (R 9 4x4 2cm / L 9 0x4 0cm); no hydronephrosis  - workup: HIV nonreactive, anti GBM negative, LUCY negative, ANCA negative but FL-3 elevated to 5 4 hemolysis smear negative for schistocytes, C3/C4 normal, IgG/IgA/IgM normal, hepatitis negative, no peripheral eosinophilia  - continue to monitor renal function to trend baseline creatinine but currently baseline creatinine appears to be around 2  - electrolytes stable    Hypertension  - antihypertensive regimen includes amlodipine 10mg daily  - follow a low sodium diet  - avoid nonsteroidal medications such as advil, aleve, ibuprofen, motrin, naproxen, mobic, indomethacin, celebrex  - workup: renal artery doppler negative, am cortisol 14, tsh 1 5, metanephrines normal, catecholamines negative, renin 0 935, aldosterone 11 6    Anemia  - likely secondary to iron deficiency, continue oral iron supplement  - iron saturation 18% with ferritin of 65  - workup: SPEP & UPEP negative    Proteinuria, non nephrotic  - UPC ratio 1g  - workup as above    Bone Mineral Disorder  - check studies before next visit    INSTRUCTIONS  - get OMRON blood pressure cuff (large size)  - take blood pressures twice a day and record  - start labetalol 100mg twice a day  - kidney smart class to be scheduled  - low salt diet  - weight loss  - blood work before next appointment    Follow up with Dr Rosa Elena Emery in 1 month  Please call the office with any questions or concerns

## 2020-02-11 LAB — SCAN RESULT: NORMAL

## 2020-02-28 ENCOUNTER — TELEPHONE (OUTPATIENT)
Dept: FAMILY MEDICINE CLINIC | Facility: CLINIC | Age: 25
End: 2020-02-28

## 2020-02-28 NOTE — TELEPHONE ENCOUNTER
Voice message requesting refill of Amlodopine 10mg  Last seen October 2019, needs BP f/u  Please call to schedule  Thanks!

## 2020-03-02 ENCOUNTER — OFFICE VISIT (OUTPATIENT)
Dept: PLASTIC SURGERY | Facility: CLINIC | Age: 25
End: 2020-03-02
Payer: COMMERCIAL

## 2020-03-02 VITALS
WEIGHT: 265 LBS | SYSTOLIC BLOOD PRESSURE: 163 MMHG | RESPIRATION RATE: 18 BRPM | HEIGHT: 64 IN | HEART RATE: 64 BPM | BODY MASS INDEX: 45.24 KG/M2 | TEMPERATURE: 98.8 F | DIASTOLIC BLOOD PRESSURE: 96 MMHG

## 2020-03-02 DIAGNOSIS — L91.0 KELOID SCAR: Primary | ICD-10-CM

## 2020-03-02 PROCEDURE — 1036F TOBACCO NON-USER: CPT | Performed by: PHYSICIAN ASSISTANT

## 2020-03-02 PROCEDURE — 1111F DSCHRG MED/CURRENT MED MERGE: CPT | Performed by: PHYSICIAN ASSISTANT

## 2020-03-02 PROCEDURE — 3077F SYST BP >= 140 MM HG: CPT | Performed by: PHYSICIAN ASSISTANT

## 2020-03-02 PROCEDURE — 3080F DIAST BP >= 90 MM HG: CPT | Performed by: PHYSICIAN ASSISTANT

## 2020-03-02 PROCEDURE — 3008F BODY MASS INDEX DOCD: CPT | Performed by: PHYSICIAN ASSISTANT

## 2020-03-02 PROCEDURE — 99204 OFFICE O/P NEW MOD 45 MIN: CPT | Performed by: PHYSICIAN ASSISTANT

## 2020-03-02 NOTE — PROGRESS NOTES
Assessment/Plan:  Oliver Lopez is a 60-year-old female who presents for evaluation of a left posterior ear keloid scar  She is self-referred  Please see HPI  I discussed with her that her best chance to prevent recurrence be for excision with concurrent radiation treatment  She agreed  We will arrange for consultation with Radiation Oncology  I discussed with her excision of the left posterior ear keloid scar including flap reconstruction  We discussed with the patient the options, benefits, and risks of surgery such as anesthesia, bleeding, infection, scarring and the need for additional procedures  Consent was obtained and all questions answered to her satisfaction  We will plan for surgery at her earliest convenience  Diagnoses and all orders for this visit:    Keloid scar          Subjective:      Patient ID: Lisandro Holland is a 27 y o  female  HPI   She reports having a keloid scar behind the left ear that is been present for several years  She states that she had her ears pierced back when she was 19  She did have an we pierced several times  Shortly after the last piercing she developed a keloid scar  She did not try any topical treatments or injections  She does report that it is painful at times it frequently gets caught on her clothing  The following portions of the patient's history were reviewed and updated as appropriate: She  has a past medical history of Asthma and Eczema  She  has a past surgical history that includes Cholecystectomy; Gallbladder surgery; and CT needle biopsy kidney (1/29/2020)  Her family history includes No Known Problems in her father and mother  She  reports that she has never smoked  She has never used smokeless tobacco  She reports that she does not drink alcohol or use drugs       Review of Systems   HENT: Negative for hearing loss  Eyes: Negative for visual disturbance  Respiratory: Negative for shortness of breath      Cardiovascular: Negative for chest pain  Gastrointestinal: Negative for abdominal pain, blood in stool, constipation, diarrhea, nausea and vomiting  Genitourinary: Negative for hematuria  Musculoskeletal: Negative for gait problem  Skin:        As per HPI  Neurological: Negative for seizures and headaches  Hematological: Does not bruise/bleed easily  Psychiatric/Behavioral: The patient is not nervous/anxious  Objective: There were no vitals taken for this visit  Physical Exam   Constitutional: She is oriented to person, place, and time  She appears well-developed and well-nourished  No distress  HENT:   Head: Normocephalic and atraumatic  Eyes: Pupils are equal, round, and reactive to light  EOM are normal  No scleral icterus  Neck: Neck supple  No tracheal deviation present  No thyromegaly present  Cardiovascular: Normal rate and regular rhythm  Exam reveals no gallop and no friction rub  No murmur heard  Pulmonary/Chest: Effort normal and breath sounds normal  She has no wheezes  She has no rales  Abdominal: Soft  Bowel sounds are normal  She exhibits no distension  There is no tenderness  There is no rebound and no guarding  Musculoskeletal: Normal range of motion  Lymphadenopathy:     She has no cervical adenopathy  Neurological: She is alert and oriented to person, place, and time  No cranial nerve deficit  Skin:   Left posterior ear keloid scar measuring 1x1 5cm and tender to exam  Please see photo  Psychiatric: She has a normal mood and affect

## 2020-03-06 DIAGNOSIS — I10 HYPERTENSION, UNSPECIFIED TYPE: ICD-10-CM

## 2020-03-06 RX ORDER — AMLODIPINE BESYLATE 10 MG/1
10 TABLET ORAL DAILY
Qty: 30 TABLET | Refills: 0 | Status: SHIPPED | OUTPATIENT
Start: 2020-03-06 | End: 2020-04-22 | Stop reason: SDUPTHER

## 2020-03-06 NOTE — TELEPHONE ENCOUNTER
Reviewed note from Nephrology 2/5/20 and needs FU, BP was elevated and started on another medication  We can refill but needs to schedule appointment soon

## 2020-03-06 NOTE — TELEPHONE ENCOUNTER
This still hasn't been addressed, pt is having trouble finding time to come in but said she will call us to schedule can 30 day supply be sent?

## 2020-03-12 PROBLEM — L91.0 HYPERTROPHIC SCAR: Status: ACTIVE | Noted: 2020-03-12

## 2020-03-15 ENCOUNTER — OFFICE VISIT (OUTPATIENT)
Dept: URGENT CARE | Age: 25
End: 2020-03-15
Payer: COMMERCIAL

## 2020-03-15 VITALS
OXYGEN SATURATION: 97 % | RESPIRATION RATE: 18 BRPM | SYSTOLIC BLOOD PRESSURE: 130 MMHG | DIASTOLIC BLOOD PRESSURE: 70 MMHG | HEIGHT: 64 IN | HEART RATE: 73 BPM | TEMPERATURE: 98.5 F | BODY MASS INDEX: 44.73 KG/M2 | WEIGHT: 262 LBS

## 2020-03-15 DIAGNOSIS — R21 RASH: Primary | ICD-10-CM

## 2020-03-15 PROCEDURE — 99213 OFFICE O/P EST LOW 20 MIN: CPT | Performed by: NURSE PRACTITIONER

## 2020-03-15 RX ORDER — PREDNISONE 20 MG/1
20 TABLET ORAL 2 TIMES DAILY WITH MEALS
Qty: 10 TABLET | Refills: 0 | Status: SHIPPED | OUTPATIENT
Start: 2020-03-15 | End: 2020-03-20

## 2020-03-16 ENCOUNTER — OFFICE VISIT (OUTPATIENT)
Dept: NEPHROLOGY | Facility: CLINIC | Age: 25
End: 2020-03-16
Payer: COMMERCIAL

## 2020-03-16 VITALS
BODY MASS INDEX: 44.9 KG/M2 | DIASTOLIC BLOOD PRESSURE: 78 MMHG | WEIGHT: 263 LBS | SYSTOLIC BLOOD PRESSURE: 122 MMHG | HEIGHT: 64 IN

## 2020-03-16 DIAGNOSIS — E66.01 MORBID OBESITY WITH BMI OF 40.0-44.9, ADULT (HCC): ICD-10-CM

## 2020-03-16 DIAGNOSIS — I12.9 BENIGN HYPERTENSION WITH CKD (CHRONIC KIDNEY DISEASE) STAGE III (HCC): Primary | ICD-10-CM

## 2020-03-16 DIAGNOSIS — N18.30 STAGE 3 CHRONIC KIDNEY DISEASE (HCC): ICD-10-CM

## 2020-03-16 DIAGNOSIS — D50.9 IRON DEFICIENCY ANEMIA, UNSPECIFIED IRON DEFICIENCY ANEMIA TYPE: ICD-10-CM

## 2020-03-16 DIAGNOSIS — N18.30 BENIGN HYPERTENSION WITH CKD (CHRONIC KIDNEY DISEASE) STAGE III (HCC): Primary | ICD-10-CM

## 2020-03-16 PROCEDURE — 1036F TOBACCO NON-USER: CPT | Performed by: INTERNAL MEDICINE

## 2020-03-16 PROCEDURE — 3074F SYST BP LT 130 MM HG: CPT | Performed by: INTERNAL MEDICINE

## 2020-03-16 PROCEDURE — 3008F BODY MASS INDEX DOCD: CPT | Performed by: INTERNAL MEDICINE

## 2020-03-16 PROCEDURE — 1111F DSCHRG MED/CURRENT MED MERGE: CPT | Performed by: INTERNAL MEDICINE

## 2020-03-16 PROCEDURE — 99214 OFFICE O/P EST MOD 30 MIN: CPT | Performed by: INTERNAL MEDICINE

## 2020-03-16 PROCEDURE — 3078F DIAST BP <80 MM HG: CPT | Performed by: INTERNAL MEDICINE

## 2020-03-16 RX ORDER — BLOOD PRESSURE TEST KIT
KIT MISCELLANEOUS DAILY
Qty: 1 EACH | Refills: 0 | Status: SHIPPED | OUTPATIENT
Start: 2020-03-16 | End: 2020-06-20 | Stop reason: SDUPTHER

## 2020-03-16 NOTE — PATIENT INSTRUCTIONS
Acute Rash   WHAT YOU NEED TO KNOW:   A rash is irritation, redness, or itchiness in the skin or mucus membranes  Mucus membranes are areas such as the lining of your nose or throat  Acute means the rash starts suddenly, worsens quickly, and lasts a short time  An acute rash may be caused by a disease, such as hepatitis or vasculitis  The rash may be a reaction to something you are allergic to, such as certain foods, or latex  Certain medicines, including antibiotics, NSAIDs, prescription pain medicine, and aspirin can also cause a rash  DISCHARGE INSTRUCTIONS:   Return to the emergency department if:   · You have sudden trouble breathing or chest pain  · You are vomiting, have a headache or muscle aches, and your throat hurts  Contact your healthcare provider if:   · You have a fever  · You get open wounds from scratching your skin, or you have a wound that is red, swollen, or painful  · Your rash lasts longer than 3 months  · You have swelling or pain in your joints  · You have questions or concerns about your condition or care  Medicines:  If your rash does not go away on its own, you may need the following medicines:  · Antihistamines  may be given to help decrease itching  · Steroids  may be given to decrease inflammation  · Antibiotics  help fight or prevent a bacterial infection  · Take your medicine as directed  Contact your healthcare provider if you think your medicine is not helping or if you have side effects  Tell him of her if you are allergic to any medicine  Keep a list of the medicines, vitamins, and herbs you take  Include the amounts, and when and why you take them  Bring the list or the pill bottles to follow-up visits  Carry your medicine list with you in case of an emergency  Prevent a rash or care for your skin when you have a rash:  Dry skin can lead to more problems  Do not scratch your skin if it itches  You may cause a skin infection by scratching   The following may prevent dry skin, and help your skin look better:  · Use thick cream lotions or petroleum jelly to help soothe your rash  These products work well on areas with thick skin, such as your feet  Cool compresses may also be used to soothe your skin  Apply a cool compress or a cool, wet towel, and then cover it with a dry towel  · Use lukewarm water when you bathe  Hot water may damage your skin more  Pat your skin dry  Do not rub your skin with a towel  · Use detergents, soaps, shampoos, and bubble baths made for sensitive skin  Wear clothes that are made of cotton instead of nylon or wool  Cotton is softer, so it will not hurt your skin as much  Follow up with your healthcare provider as directed: You may need to see a dermatologist if healthcare providers do not know what is causing your rash  You may also need to see a dermatologist if your rash does not get better even with treatment  You may need to see a dietitian if you have allergies to foods  Write down your questions so you remember to ask them during your visits  © 2017 [facility] Information is for End User's use only and may not be sold, redistributed or otherwise used for commercial purposes  All illustrations and images included in CareNotes® are the copyrighted property of [employer]  or Michael [employer]  The above information is an  only  It is not intended as medical advice for individual conditions or treatments  Talk to your doctor, nurse or pharmacist before following any medical regimen to see if it is safe and effective for you

## 2020-03-16 NOTE — PROGRESS NOTES
NEPHROLOGY OFFICE VISIT   Yadira Simeon 27 y o  female MRN: [id number]  3/16/2020    Reason for Visit:  Chronic kidney disease    History of Present Illness (HPI):    I had the pleasure of seeing Scarlet Maguire) today in the renal clinic for the continued management of chronic kidney disease  She was last seen by me in the hospital and most recently by our physician assistant few weeks ago  She was recently in urgent care for rash on her arms  She was given a prescription for prednisone which she has not started as of yet  She was due for some blood work also for this visit but has not obtained that blood work as of yet  Reports no shortness of breath  Has not yet got a blood pressure cuff at this time  Reports no dizziness or lightheadedness  No chest pain  I did speak to her attending a kidney Smart class  We also briefly discussed about renal transplant potentially in the future for renal function was to worsen  ASSESSMENT and PLAN:      1 ) Chronic Kidney Disease Stage III  --Imaging:  Right kidney 9 4 cm, left kidney 9 cm  It should be noted that there was a decrease in the right kidney size compared to 2015 ultrasound where it was 12 8 cm  Both kidneys are diffusely echogenic consistent with chronic kidney disease  --Urinalysis:  Negative blood  2+ protein  --Proteinuria:  Her last urine protein creatinine ratio was 1 08  --Baseline creatinine: 1 7-2 3 mg/dL  --Etiology:  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  --serologies: HIV nonreactive, anti GBM negative, LUCY negative, ANCA negative but AK-3 elevated to 5 4 hemolysis smear negative for schistocytes, C3/C4 normal, IgG/IgA/IgM normal, hepatitis negative, no peripheral eosinophilia  --Biopsy Proven:  Diffuse global glomerular sclerosis which is severe with severe tubular atrophy interstitial fibrosis and interstitial inflammation  Mild arterial sclerosis    Severe chronic pathologic changes, irreversible with poor prognostic sign  --Status:  Renal function stable from early February  She is due for repeat blood work  --Management:  Follow-up repeat blood work  Weight loss strongly encouraged  Refer to Chronic Kidney Disease education class  Blood pressure control  Avoid all NSAIDs     --Reducing Cardiovascular Risk Factors:  Simvastatin+ Ezetimibe reduced atherosclerotic events in CKD (SHARP trial); Low dose aspirin safe (if no contraindications exist); smoking is an independent risk factor for Chronic Kidney Disease and progression, strongly recommend smoking cessation    2 ) Hypertension  -- Stage II (American College of Cardiology/American Heart Association)  -- with underlying chronic kidney disease and morbid obesity  -- Body mass index is 45 14 kg/m²  -- Volume status: Euvolemic  -- Etiology: Morbid obesity, underlying chronic kidney disease  -- Secondary Work-Up:  Renal artery Doppler was negative, a m   Cortisol 14, TSH 1 5, normal metanephrines and catecholamine levels, renin 0 935, Aldosterone 11 6  -- Target Goal: < 130/80 (ACC/AHA, CKD with proteinuria, CKD in diabetics) ; if tolerated can target SBP < 120 mmHg in high cardiovascular risk ( Age > 75, CKD, CVD, No Diabetics SPRINT)  -- Lifestyle Modifications: DASH Diet, Weight Loss for ideal body weight, 45 mins of cardiovascular exercise 3 times a week as tolerated, and if no contraindications exist, smoking cessation, limit alcohol use, avoid NSAIDS, monitor blood pressure at home  -- Status: Blood pressure at target  -- Current antihypertensive regiment:  Amlodipine 10 mg daily, labetalol 100 mg twice a day  -- Changes:  Continue current regiment, weight loss strongly encourage    3 ) Morbid obesity  --BMI 45  --educated on a diet exercise weight loss program      PATIENT INSTRUCTIONS:    Patient Instructions   COVID-19 Home Care Guidelines    Your healthcare provider and/or public health staff have evaluated you and have determined that you do not need to be hospitalized at this time  At this time you can be isolated at home where you will be monitored by staff from your local or state health department  You should carefully follow the prevention and isolation steps below until a healthcare provider or local or state health department says that you can return to your normal activities  Stay home except to get medical care    People who are mildly ill with COVID-19 are able to isolate at home during their illness  You should restrict activities outside your home, except for getting medical care  Do not go to work, school, or public areas  Avoid using public transportation, ride-sharing, or taxis  Separate yourself from other people and animals in your home    People: As much as possible, you should stay in a specific room and away from other people in your home  Also, you should use a separate bathroom, if available  Animals: You should restrict contact with pets and other animals while you are sick with COVID-19, just like you would around other people  Although there have not been reports of pets or other animals becoming sick with COVID-19, it is still recommended that people sick with COVID-19 limit contact with animals until more information is known about the virus  When possible, have another member of your household care for your animals while you are sick  If you are sick with COVID-19, avoid contact with your pet, including petting, snuggling, being kissed or licked, and sharing food  If you must care for your pet or be around animals while you are sick, wash your hands before and after you interact with pets and wear a facemask  See COVID-19 and Animals for more information  Call ahead before visiting your doctor    If you have a medical appointment, call the healthcare provider and tell them that you have or may have COVID-19   This will help the healthcare providers office take steps to keep other people from getting infected or exposed  Wear a facemask    You should wear a facemask when you are around other people (e g , sharing a room or vehicle) or pets and before you enter a healthcare providers office  If you are not able to wear a facemask (for example, because it causes trouble breathing), then people who live with you should not stay in the same room with you, or they should wear a facemask if they enter your room  Cover your coughs and sneezes    Cover your mouth and nose with a tissue when you cough or sneeze  Throw used tissues in a lined trash can  Immediately wash your hands with soap and water for at least 20 seconds or, if soap and water are not available, clean your hands with an alcohol-based hand  that contains at least 60% alcohol  Clean your hands often    Wash your hands often with soap and water for at least 20 seconds, especially after blowing your nose, coughing, or sneezing; going to the bathroom; and before eating or preparing food  If soap and water are not readily available, use an alcohol-based hand  with at least 60% alcohol, covering all surfaces of your hands and rubbing them together until they feel dry  Soap and water are the best option if hands are visibly dirty  Avoid touching your eyes, nose, and mouth with unwashed hands  Avoid sharing personal household items    You should not share dishes, drinking glasses, cups, eating utensils, towels, or bedding with other people or pets in your home  After using these items, they should be washed thoroughly with soap and water  Clean all high-touch surfaces everyday    High touch surfaces include counters, tabletops, doorknobs, bathroom fixtures, toilets, phones, keyboards, tablets, and bedside tables  Also, clean any surfaces that may have blood, stool, or body fluids on them  Use a household cleaning spray or wipe, according to the label instructions   Labels contain instructions for safe and effective use of the cleaning product including precautions you should take when applying the product, such as wearing gloves and making sure you have good ventilation during use of the product  Monitor your symptoms    Seek prompt medical attention if your illness is worsening (e g , difficulty breathing)  Before seeking care, call your healthcare provider and tell them that you have, or are being evaluated for, COVID-19  Put on a facemask before you enter the facility  These steps will help the healthcare providers office to keep other people in the office or waiting room from getting infected or exposed  Ask your healthcare provider to call the local or state health department  Persons who are placed under active monitoring or facilitated self-monitoring should follow instructions provided by their local health department or occupational health professionals, as appropriate  If you have a medical emergency and need to call 911, notify the dispatch personnel that you have, or are being evaluated for COVID-19  If possible, put on a facemask before emergency medical services arrive  Discontinuing home isolation    Patients with confirmed COVID-19 should remain under home isolation precautions until the risk of secondary transmission to others is thought to be low  The decision to discontinue home isolation precautions should be made on a case-by-case basis, in consultation with healthcare providers and [facility] and [facility] health departments      Source: [employer]    1 )  Low 2 g sodium diet    2 )  Monitor weights at home    3 )  Avoid NSAIDs (ibuprofen, Motrin, Advil, Aleve, naproxen)    4 )  Monitor blood pressure at home, call if blood pressure greater than 150/90 persistently              Orders Placed This Encounter   Procedures    Comprehensive metabolic panel     Standing Status:   Future     Standing Expiration Date:   3/16/2021    CBC Standing Status:   Future     Standing Expiration Date:   3/16/2021    PTH, intact     Standing Status:   Future     Standing Expiration Date:   3/16/2021    Vitamin D 25 hydroxy     Standing Status:   Future     Standing Expiration Date:   3/16/2021    Protein / creatinine ratio, urine     Standing Status:   Future     Standing Expiration Date:   3/16/2021    Iron Saturation %     Standing Status:   Future     Standing Expiration Date:   3/16/2021    Ferritin     Standing Status:   Future     Standing Expiration Date:   3/16/2021    Ambulatory referral to ckd education program     Standing Status:   Future     Standing Expiration Date:   3/16/2021     Referral Priority:   Routine     Referral Type:   Program     Referral Reason:   Specialty Services Required     Number of Visits Requested:   1     Expiration Date:   3/16/2021       OBJECTIVE:  Current Weight: Weight - Scale: 119 kg (263 lb)  Vitals:    03/16/20 1303 03/16/20 1326   BP:  122/78   Weight: 119 kg (263 lb)    Height: 5' 4" (1 626 m)     Body mass index is 45 14 kg/m²  REVIEW OF SYSTEMS:    Review of Systems   Constitutional: Negative for activity change and fever  Respiratory: Negative for cough, chest tightness, shortness of breath and wheezing  Cardiovascular: Negative for chest pain and leg swelling  Gastrointestinal: Negative for abdominal pain, diarrhea, nausea and vomiting  Endocrine: Negative for polyuria  Genitourinary: Negative for difficulty urinating, dysuria, flank pain, frequency and urgency  Skin: Negative for rash  Neurological: Negative for dizziness, syncope, light-headedness and headaches  PHYSICAL EXAM:      Physical Exam   Constitutional: She is oriented to person, place, and time  She appears well-developed and well-nourished  No distress  HENT:   Head: Normocephalic and atraumatic  Eyes: Pupils are equal, round, and reactive to light  No scleral icterus  Neck: Normal range of motion   Neck supple  Cardiovascular: Normal rate, regular rhythm and normal heart sounds  Exam reveals no gallop and no friction rub  No murmur heard  Pulmonary/Chest: Effort normal and breath sounds normal  No respiratory distress  She has no wheezes  She has no rales  She exhibits no tenderness  Abdominal: Soft  Bowel sounds are normal  She exhibits no distension  There is no tenderness  There is no rebound  Musculoskeletal: Normal range of motion  She exhibits no edema  Neurological: She is alert and oriented to person, place, and time  Skin: No rash noted  She is not diaphoretic  Psychiatric: She has a normal mood and affect  Nursing note and vitals reviewed        Medications:    Current Outpatient Medications:     albuterol (2 5 mg/3 mL) 0 083 % nebulizer solution, Take 1 vial (2 5 mg total) by nebulization every 6 (six) hours as needed for wheezing or shortness of breath, Disp: 30 mL, Rfl: 0    albuterol (VENTOLIN HFA) 90 mcg/act inhaler, Inhale 2 puffs every 6 (six) hours as needed for wheezing or shortness of breath (cough), Disp: 1 Inhaler, Rfl: 11    amLODIPine (NORVASC) 10 mg tablet, Take 1 tablet (10 mg total) by mouth daily, Disp: 30 tablet, Rfl: 0    etonogestrel (NEXPLANON) subdermal implant, 68 mg by Subdermal route once, Disp: , Rfl:     fluticasone (FLOVENT HFA) 110 MCG/ACT inhaler, Inhale 1 puff 2 (two) times a day, Disp: 1 Inhaler, Rfl: 11    iron polysaccharides (FERREX) 150 mg capsule, Take 1 capsule (150 mg total) by mouth daily, Disp: 30 capsule, Rfl: 0    labetalol (NORMODYNE) 100 mg tablet, Take 1 tablet (100 mg total) by mouth 2 (two) times a day, Disp: 60 tablet, Rfl: 3    predniSONE 20 mg tablet, Take 1 tablet (20 mg total) by mouth 2 (two) times a day with meals for 5 days, Disp: 10 tablet, Rfl: 0    acetaminophen (TYLENOL) 325 mg tablet, Take 2 tablets (650 mg total) by mouth every 6 (six) hours as needed for mild pain or headaches, Disp: 30 tablet, Rfl: 0    Blood Pressure KIT, by Does not apply route daily, Disp: 1 each, Rfl: 0    docusate sodium (COLACE) 100 mg capsule, Take 1 capsule (100 mg total) by mouth 2 (two) times a day as needed for constipation (Patient not taking: Reported on 3/16/2020), Disp: 10 capsule, Rfl: 0    guaiFENesin (MUCINEX PO), Take by mouth, Disp: , Rfl:     loratadine (CLARITIN) 10 mg tablet, Take 1 tablet (10 mg total) by mouth daily for 7 days, Disp: 7 tablet, Rfl: 0    Laboratory Results:        Invalid input(s): ALBUMIN    Results for orders placed or performed in visit on 85/55/85   Basic metabolic panel   Result Value Ref Range    Sodium 139 136 - 145 mmol/L    Potassium 3 6 3 5 - 5 3 mmol/L    Chloride 109 (H) 100 - 108 mmol/L    CO2 23 21 - 32 mmol/L    ANION GAP 7 4 - 13 mmol/L    BUN 16 5 - 25 mg/dL    Creatinine 1 98 (H) 0 60 - 1 30 mg/dL    Glucose 86 65 - 140 mg/dL    Calcium 9 2 8 3 - 10 1 mg/dL    eGFR 40 ml/min/1 73sq m

## 2020-03-16 NOTE — PROGRESS NOTES
[address]        NAME: Adri Cruz is a 27 y o  female  : 1995    MRN: [id number]  DATE: March 15, 2020  TIME: 8:23 PM    Assessment and Plan   Rash [R21]  1  Rash  predniSONE 20 mg tablet         Patient Instructions     Take prednisone as directed  If rash persists follow-up with dermatologist  Follow up with PCP in 5-7 days  Proceed to  ER if symptoms worsen  Chief Complaint     Chief Complaint   Patient presents with    Rash     Pt states he rface was itching for 2 days now, also in her arms  Pt has tried benadryl  History of Present Illness       HPI   Presents to clinic with complaint of rash on the face and arms  Duration 2 days  States she has allergy to wheat and it bread that contain wheat  States then has had rash on the face and on her arms  The rash is persisting  Denies any shortness of breath, wheezing or dizziness  No trouble swallowing  Review of Systems   Review of Systems   Constitutional: Negative for chills and fever  HENT: Negative for trouble swallowing  Respiratory: Negative for shortness of breath and wheezing  Cardiovascular: Negative for chest pain  Neurological: Negative for dizziness and headaches           Current Medications       Current Outpatient Medications:     acetaminophen (TYLENOL) 325 mg tablet, Take 2 tablets (650 mg total) by mouth every 6 (six) hours as needed for mild pain or headaches, Disp: 30 tablet, Rfl: 0    albuterol (2 5 mg/3 mL) 0 083 % nebulizer solution, Take 1 vial (2 5 mg total) by nebulization every 6 (six) hours as needed for wheezing or shortness of breath, Disp: 30 mL, Rfl: 0    albuterol (VENTOLIN HFA) 90 mcg/act inhaler, Inhale 2 puffs every 6 (six) hours as needed for wheezing or shortness of breath (cough), Disp: 1 Inhaler, Rfl: 11    amLODIPine (NORVASC) 10 mg tablet, Take 1 tablet (10 mg total) by mouth daily, Disp: 30 tablet, Rfl: 0    docusate sodium (COLACE) 100 mg capsule, Take 1 capsule (100 mg total) by mouth 2 (two) times a day as needed for constipation, Disp: 10 capsule, Rfl: 0    etonogestrel (NEXPLANON) subdermal implant, 68 mg by Subdermal route once, Disp: , Rfl:     fluticasone (FLOVENT HFA) 110 MCG/ACT inhaler, Inhale 1 puff 2 (two) times a day, Disp: 1 Inhaler, Rfl: 11    guaiFENesin (MUCINEX PO), Take by mouth, Disp: , Rfl:     iron polysaccharides (FERREX) 150 mg capsule, Take 1 capsule (150 mg total) by mouth daily, Disp: 30 capsule, Rfl: 0    labetalol (NORMODYNE) 100 mg tablet, Take 1 tablet (100 mg total) by mouth 2 (two) times a day, Disp: 60 tablet, Rfl: 3    loratadine (CLARITIN) 10 mg tablet, Take 1 tablet (10 mg total) by mouth daily for 7 days, Disp: 7 tablet, Rfl: 0    predniSONE 20 mg tablet, Take 1 tablet (20 mg total) by mouth 2 (two) times a day with meals for 5 days, Disp: 10 tablet, Rfl: 0    Current Allergies     Allergies as of 03/15/2020 - Reviewed 03/15/2020   Allergen Reaction Noted    Wheat bran Itching 12/19/2019            The following portions of the patient's history were reviewed and updated as appropriate: allergies, current medications, past family history, past medical history, past social history, past surgical history and problem list      Past Medical History:   Diagnosis Date    Asthma     Eczema        Past Surgical History:   Procedure Laterality Date    CHOLECYSTECTOMY      CT NEEDLE BIOPSY KIDNEY  1/29/2020    GALLBLADDER SURGERY         Family History   Problem Relation Age of Onset    No Known Problems Mother     No Known Problems Father          Medications have been verified  Objective   /70   Pulse 73   Temp 98 5 °F (36 9 °C) (Oral)   Resp 18   Ht 5' 4" (1 626 m)   Wt 119 kg (262 lb)   LMP 03/14/2020   SpO2 97%   BMI 44 97 kg/m²        Physical Exam     Physical Exam   Constitutional: She appears well-developed  No distress  Cardiovascular: Normal rate and regular rhythm     Pulmonary/Chest: Effort normal and breath sounds normal  No respiratory distress  She has no wheezes  Lymphadenopathy:     She has no cervical adenopathy  Skin: Rash (Rashes on the arm and face and some on the neck  2 millimeters in diameter  Elevated  No redness or drainage  No erythema ) noted

## 2020-03-16 NOTE — PATIENT INSTRUCTIONS
[address]    Your healthcare provider and/or public health staff have evaluated you and have determined that you do not need to be hospitalized at this time  At this time you can be isolated at home where you will be monitored by staff from your local or state health department  You should carefully follow the prevention and isolation steps below until a healthcare provider or local or state health department says that you can return to your normal activities  Stay home except to get medical care    People who are mildly ill with COVID-19 are able to isolate at home during their illness  You should restrict activities outside your home, except for getting medical care  Do not go to work, school, or public areas  Avoid using public transportation, ride-sharing, or taxis  Separate yourself from other people and animals in your home    People: As much as possible, you should stay in a specific room and away from other people in your home  Also, you should use a separate bathroom, if available  Animals: You should restrict contact with pets and other animals while you are sick with COVID-19, just like you would around other people  Although there have not been reports of pets or other animals becoming sick with COVID-19, it is still recommended that people sick with COVID-19 limit contact with animals until more information is known about the virus  When possible, have another member of your household care for your animals while you are sick  If you are sick with COVID-19, avoid contact with your pet, including petting, snuggling, being kissed or licked, and sharing food  If you must care for your pet or be around animals while you are sick, wash your hands before and after you interact with pets and wear a facemask  See COVID-19 and Animals for more information      Call ahead before visiting your doctor    If you have a medical appointment, call the healthcare provider and tell them that you have or may have COVID-19  This will help the healthcare providers office take steps to keep other people from getting infected or exposed  Wear a facemask    You should wear a facemask when you are around other people (e g , sharing a room or vehicle) or pets and before you enter a healthcare providers office  If you are not able to wear a facemask (for example, because it causes trouble breathing), then people who live with you should not stay in the same room with you, or they should wear a facemask if they enter your room  Cover your coughs and sneezes    Cover your mouth and nose with a tissue when you cough or sneeze  Throw used tissues in a lined trash can  Immediately wash your hands with soap and water for at least 20 seconds or, if soap and water are not available, clean your hands with an alcohol-based hand  that contains at least 60% alcohol  Clean your hands often    Wash your hands often with soap and water for at least 20 seconds, especially after blowing your nose, coughing, or sneezing; going to the bathroom; and before eating or preparing food  If soap and water are not readily available, use an alcohol-based hand  with at least 60% alcohol, covering all surfaces of your hands and rubbing them together until they feel dry  Soap and water are the best option if hands are visibly dirty  Avoid touching your eyes, nose, and mouth with unwashed hands  Avoid sharing personal household items    You should not share dishes, drinking glasses, cups, eating utensils, towels, or bedding with other people or pets in your home  After using these items, they should be washed thoroughly with soap and water  Clean all high-touch surfaces everyday    High touch surfaces include counters, tabletops, doorknobs, bathroom fixtures, toilets, phones, keyboards, tablets, and bedside tables  Also, clean any surfaces that may have blood, stool, or body fluids on them   Use a household cleaning spray or wipe, according to the label instructions  Labels contain instructions for safe and effective use of the cleaning product including precautions you should take when applying the product, such as wearing gloves and making sure you have good ventilation during use of the product  Monitor your symptoms    Seek prompt medical attention if your illness is worsening (e g , difficulty breathing)  Before seeking care, call your healthcare provider and tell them that you have, or are being evaluated for, COVID-19  Put on a facemask before you enter the facility  These steps will help the healthcare providers office to keep other people in the office or waiting room from getting infected or exposed  Ask your healthcare provider to call the local or state health department  Persons who are placed under active monitoring or facilitated self-monitoring should follow instructions provided by their local health department or occupational health professionals, as appropriate  If you have a medical emergency and need to call 911, notify the dispatch personnel that you have, or are being evaluated for COVID-19  If possible, put on a facemask before emergency medical services arrive  Discontinuing home isolation    Patients with confirmed COVID-19 should remain under home isolation precautions until the risk of secondary transmission to others is thought to be low  The decision to discontinue home isolation precautions should be made on a case-by-case basis, in consultation with healthcare providers and [facility] health departments      Source: [employer]    1 )  Low 2 g sodium diet    2 )  Monitor weights at home    3 )  Avoid NSAIDs (ibuprofen, Motrin, Advil, Aleve, naproxen)    4 )  Monitor blood pressure at home, call if blood pressure greater than 150/90 persistently

## 2020-04-06 ENCOUNTER — TELEPHONE (OUTPATIENT)
Dept: FAMILY MEDICINE CLINIC | Facility: CLINIC | Age: 25
End: 2020-04-06

## 2020-04-10 ENCOUNTER — TELEPHONE (OUTPATIENT)
Dept: NEPHROLOGY | Facility: CLINIC | Age: 25
End: 2020-04-10

## 2020-04-15 ENCOUNTER — TELEPHONE (OUTPATIENT)
Dept: NEPHROLOGY | Facility: CLINIC | Age: 25
End: 2020-04-15

## 2020-04-15 ENCOUNTER — HOSPITAL ENCOUNTER (EMERGENCY)
Facility: HOSPITAL | Age: 25
Discharge: HOME | End: 2020-04-15
Attending: EMERGENCY MEDICINE | Admitting: EMERGENCY MEDICINE
Payer: COMMERCIAL

## 2020-04-15 VITALS
BODY MASS INDEX: 45.41 KG/M2 | RESPIRATION RATE: 22 BRPM | DIASTOLIC BLOOD PRESSURE: 103 MMHG | SYSTOLIC BLOOD PRESSURE: 176 MMHG | TEMPERATURE: 98.7 F | WEIGHT: 264.55 LBS | OXYGEN SATURATION: 99 % | HEART RATE: 82 BPM

## 2020-04-15 DIAGNOSIS — J45.901 ASTHMA EXACERBATION: Primary | ICD-10-CM

## 2020-04-15 PROCEDURE — 99283 EMERGENCY DEPT VISIT LOW MDM: CPT

## 2020-04-15 PROCEDURE — 94644 CONT INHLJ TX 1ST HOUR: CPT

## 2020-04-15 PROCEDURE — 99284 EMERGENCY DEPT VISIT MOD MDM: CPT | Performed by: EMERGENCY MEDICINE

## 2020-04-15 RX ORDER — SODIUM CHLORIDE FOR INHALATION 0.9 %
12 VIAL, NEBULIZER (ML) INHALATION ONCE
Status: COMPLETED | OUTPATIENT
Start: 2020-04-15 | End: 2020-04-15

## 2020-04-15 RX ADMIN — ALBUTEROL SULFATE 10 MG: 2.5 SOLUTION RESPIRATORY (INHALATION) at 19:10

## 2020-04-15 RX ADMIN — ISODIUM CHLORIDE 12 ML: 0.03 SOLUTION RESPIRATORY (INHALATION) at 19:10

## 2020-04-15 RX ADMIN — IPRATROPIUM BROMIDE 1 MG: 0.5 SOLUTION RESPIRATORY (INHALATION) at 19:10

## 2020-04-18 DIAGNOSIS — J45.21 MILD INTERMITTENT ASTHMA WITH ACUTE EXACERBATION: ICD-10-CM

## 2020-04-20 RX ORDER — ALBUTEROL SULFATE 2.5 MG/3ML
2.5 SOLUTION RESPIRATORY (INHALATION) EVERY 6 HOURS PRN
Qty: 30 ML | Refills: 0 | Status: SHIPPED | OUTPATIENT
Start: 2020-04-20 | End: 2020-06-20 | Stop reason: SDUPTHER

## 2020-04-22 DIAGNOSIS — I10 HYPERTENSION, UNSPECIFIED TYPE: ICD-10-CM

## 2020-04-22 RX ORDER — AMLODIPINE BESYLATE 10 MG/1
10 TABLET ORAL DAILY
Qty: 30 TABLET | Refills: 5 | Status: SHIPPED | OUTPATIENT
Start: 2020-04-22 | End: 2020-06-20 | Stop reason: SDUPTHER

## 2020-05-28 ENCOUNTER — APPOINTMENT (OUTPATIENT)
Dept: URGENT CARE | Facility: CLINIC | Age: 25
End: 2020-05-28
Payer: OTHER MISCELLANEOUS

## 2020-05-28 PROCEDURE — 99213 OFFICE O/P EST LOW 20 MIN: CPT | Performed by: FAMILY MEDICINE

## 2020-06-02 ENCOUNTER — APPOINTMENT (OUTPATIENT)
Dept: URGENT CARE | Facility: CLINIC | Age: 25
End: 2020-06-02
Payer: OTHER MISCELLANEOUS

## 2020-06-02 PROCEDURE — 99213 OFFICE O/P EST LOW 20 MIN: CPT | Performed by: NURSE PRACTITIONER

## 2020-06-20 DIAGNOSIS — N18.30 BENIGN HYPERTENSION WITH CKD (CHRONIC KIDNEY DISEASE) STAGE III (HCC): ICD-10-CM

## 2020-06-20 DIAGNOSIS — I12.9 BENIGN HYPERTENSION WITH CKD (CHRONIC KIDNEY DISEASE) STAGE III (HCC): ICD-10-CM

## 2020-06-20 DIAGNOSIS — I10 HYPERTENSION, UNSPECIFIED TYPE: ICD-10-CM

## 2020-06-20 DIAGNOSIS — J45.21 MILD INTERMITTENT ASTHMA WITH ACUTE EXACERBATION: ICD-10-CM

## 2020-06-20 DIAGNOSIS — N18.30 STAGE 3 CHRONIC KIDNEY DISEASE (HCC): ICD-10-CM

## 2020-06-22 RX ORDER — AMLODIPINE BESYLATE 10 MG/1
10 TABLET ORAL DAILY
Qty: 30 TABLET | Refills: 0 | Status: SHIPPED | OUTPATIENT
Start: 2020-06-22 | End: 2021-12-07

## 2020-06-22 RX ORDER — ALBUTEROL SULFATE 2.5 MG/3ML
2.5 SOLUTION RESPIRATORY (INHALATION) EVERY 6 HOURS PRN
Qty: 30 ML | Refills: 0 | Status: SHIPPED | OUTPATIENT
Start: 2020-06-22 | End: 2020-10-08 | Stop reason: SDUPTHER

## 2020-06-24 RX ORDER — BLOOD PRESSURE TEST KIT
KIT MISCELLANEOUS DAILY
Qty: 1 EACH | Refills: 0 | Status: SHIPPED | OUTPATIENT
Start: 2020-06-24

## 2020-07-08 ENCOUNTER — TELEPHONE (OUTPATIENT)
Dept: FAMILY MEDICINE CLINIC | Facility: CLINIC | Age: 25
End: 2020-07-08

## 2020-07-09 ENCOUNTER — OFFICE VISIT (OUTPATIENT)
Dept: FAMILY MEDICINE CLINIC | Facility: CLINIC | Age: 25
End: 2020-07-09
Payer: COMMERCIAL

## 2020-07-09 VITALS
WEIGHT: 265.9 LBS | DIASTOLIC BLOOD PRESSURE: 100 MMHG | SYSTOLIC BLOOD PRESSURE: 150 MMHG | OXYGEN SATURATION: 99 % | RESPIRATION RATE: 18 BRPM | BODY MASS INDEX: 45.4 KG/M2 | TEMPERATURE: 97.8 F | HEIGHT: 64 IN | HEART RATE: 97 BPM

## 2020-07-09 DIAGNOSIS — Z13.220 SCREENING FOR LIPID DISORDERS: ICD-10-CM

## 2020-07-09 DIAGNOSIS — N18.30 STAGE 3 CHRONIC KIDNEY DISEASE (HCC): ICD-10-CM

## 2020-07-09 DIAGNOSIS — J45.909 ASTHMA, UNSPECIFIED ASTHMA SEVERITY, UNSPECIFIED WHETHER COMPLICATED, UNSPECIFIED WHETHER PERSISTENT: Primary | ICD-10-CM

## 2020-07-09 DIAGNOSIS — F07.81 POST CONCUSSION SYNDROME: ICD-10-CM

## 2020-07-09 DIAGNOSIS — N18.30 BENIGN HYPERTENSION WITH CKD (CHRONIC KIDNEY DISEASE) STAGE III (HCC): ICD-10-CM

## 2020-07-09 DIAGNOSIS — I12.9 BENIGN HYPERTENSION WITH CKD (CHRONIC KIDNEY DISEASE) STAGE III (HCC): ICD-10-CM

## 2020-07-09 PROBLEM — S06.0X0A CONCUSSION WITH NO LOSS OF CONSCIOUSNESS: Status: ACTIVE | Noted: 2020-07-09

## 2020-07-09 PROCEDURE — 3080F DIAST BP >= 90 MM HG: CPT | Performed by: FAMILY MEDICINE

## 2020-07-09 PROCEDURE — 3077F SYST BP >= 140 MM HG: CPT | Performed by: FAMILY MEDICINE

## 2020-07-09 PROCEDURE — 1036F TOBACCO NON-USER: CPT | Performed by: FAMILY MEDICINE

## 2020-07-09 PROCEDURE — 3008F BODY MASS INDEX DOCD: CPT | Performed by: FAMILY MEDICINE

## 2020-07-09 PROCEDURE — 99214 OFFICE O/P EST MOD 30 MIN: CPT | Performed by: FAMILY MEDICINE

## 2020-07-09 NOTE — PROGRESS NOTES
Family Medicine New Patient Office Visit  Coye Jeans 23 y o  female   MRN: [id number] : 1995  ENCOUNTER: 2020 4:24 PM    Assessment and Plan   Asthma  · Controlled on current regimen  Continue to use Fluticasone twice daily and albuterol inhaler prn  · Pt has not needed rescue inhaler but maybe once more month if she over does it a bit  · Refills of Albuterol Inhaler 90 mcg were entered previously, pt will check at pharmacy and call back if needed  Benign hypertension with CKD (chronic kidney disease) stage III (HCC)  · B/P in office today 172/102, second reading 150/100  · Pt did not take b/p meds today  · Discussed tracking b/p at home with home cuff which she can get at the pharmacy  · Discussed importance of remembering to take b/p medications  · Will consider hydralazine at follow up if there is no change  · Discussed lifestyle changes could be discussed at next visit if she is interested  · Follow up in our office for recheck in two weeks  Post concussion syndrome  · Hx of two prior concussions - last one being 4 years ago  · Pt having headaches and short term memory loss episodes  · Discussed cognitive therapy and exercises with puzzles and games  · Referral to Occupational therapy for baseline testing and therapy  · Will follow up with patient next week if she has not received call from OT  Chief Complaint     Chief Complaint   Patient presents with   [address]     wants help with memory loss  states she has had memory loss every 3 weeks since 2nd concussion at age 25       History of Present Illness   Raven Shabazz is a 23y o -year-old female who presents today to [facility] care  Patient has past medical history of asthma, hypertension, and CKD stage 3 and concussion  Asthma:  Pt has been dx with asthma since she was baby  Pt states last asthma attack was about 2 years ago  She has needed nebulizer about 1x per month   Pts rescue inhaler is   Pt uses Flovent as directed daily  Pt has had no night time symptoms  She states shortness of breath maybe once per month  Pt denies any current symptoms  Hypertension:  Pt states she has been taking her amlodipine 10 mg PO daily as prescribed and labetelol 100 mg PO twice per day as prescribed  Pt admits to missing meds today - BP in office 172/102  Pt states eats mainly lean cuisine meals for lunch and states dinner is usually a sandwich  Pt does get to walk a lot, approx one hour per day watching her 11year old cousin  Pt does not have b/p cuff at home and thus does not monitor at home  Pt was seen by Nephrology for CKD stage 3 in 2020 who continued current b/p meds  Post Concussion:  Pt states her mother is worried she is having some memory loss  Pt states it does not seem to be long-term, but more of a short term loss of conversations or recent events  She has hx of two concussions - last one was 4 years ago  Pt denies any recent trauma to her head  Pt admits she does get headaches a few times a month lasting up to 1 hour  Pt states there is no real inciting event and she does not know when they will come on  When she gets them they are throbbing, and usually on one side of her head  As there progress they wrap the around head to feel like "tight helmet "  Pt denies nausea, vomiting  Pt admits to occasional sensitivity to light and noise during the headaches  Pt also admits to having glasses, however they are not up to date and needs new prescription  She take tylenol for her headaches sometimes which does seem to help usually  Review of Systems   Review of Systems   Constitutional: Negative for activity change, appetite change, fatigue and fever  HENT: Negative for congestion, hearing loss, sinus pain, sore throat and tinnitus  Eyes: Negative for photophobia, pain and visual disturbance  Respiratory: Negative for cough, shortness of breath and wheezing  Cardiovascular: Negative for chest pain, palpitations and leg swelling  Gastrointestinal: Negative for abdominal pain, constipation, diarrhea, nausea and vomiting  Endocrine: Negative for polyuria  Genitourinary: Negative for dysuria, menstrual problem and urgency  Skin: Negative for rash  Neurological: Positive for headaches  Negative for dizziness, speech difficulty and numbness  Short term memory loss  Psychiatric/Behavioral: Negative for suicidal ideas  The patient is not nervous/anxious  Active Problem List     Patient Active Problem List   Diagnosis    Asthma    Altered mental status    Headache    Head injury    Acute kidney injury superimposed on chronic kidney disease ([facility] [address] )    Confusion    Elevated serum creatinine    Abnormal facial hair    Acne vulgaris    Intrinsic eczema    Left knee pain    Patellofemoral disorder of left knee    Musculoskeletal chest pain    Keloid of skin    Allergy to wheat    Near syncope    Benign hypertension with CKD (chronic kidney disease) stage III ([facility])    URI (upper respiratory infection)    Morbid obesity with BMI of 40 0-44 9, adult ([facility])    Iron deficiency anemia    Stage 3 chronic kidney disease ([facility])    Persistent proteinuria    Post concussion syndrome       Past Medical History, Past Surgical History, Family History, and Social History were reviewed and updated today as appropriate  Objective   /100   Pulse 97   Temp 97 8 °F (36 6 °C) (Tympanic)   Resp 18   Ht 5' 3 5" (1 613 m)   Wt 121 kg (265 lb 14 4 oz)   SpO2 99%   BMI 46 36 kg/m²     Physical Exam   Constitutional: She is oriented to person, place, and time  She appears well-developed and well-nourished  No distress  HENT:   Head: Normocephalic and atraumatic  Nose: Nose normal    Eyes: Pupils are equal, round, and reactive to light  Conjunctivae and EOM are normal  Right eye exhibits no discharge  Left eye exhibits no discharge   No scleral icterus  Neck: Normal range of motion  No tracheal deviation present  Cardiovascular: Normal rate, regular rhythm, normal heart sounds and intact distal pulses  Exam reveals no gallop and no friction rub  No murmur heard  Pulmonary/Chest: Effort normal and breath sounds normal  No stridor  No respiratory distress  She has no wheezes  She has no rales  Abdominal: Soft  Bowel sounds are normal  She exhibits no distension and no mass  There is no tenderness  There is no guarding  Musculoskeletal: She exhibits no edema or tenderness  Neurological: She is alert and oriented to person, place, and time  No cranial nerve deficit  Skin: Skin is warm and dry  Capillary refill takes less than 2 seconds  No rash noted  She is not diaphoretic  No erythema  Psychiatric: She has a normal mood and affect  Thought content normal    Nursing note and vitals reviewed        Pertinent Laboratory/Diagnostic Studies:  Lab Results   Component Value Date    GLUCOSE 86 06/12/2015    BUN 16 02/03/2020    CREATININE 1 98 (H) 02/03/2020    CALCIUM 9 2 02/03/2020     06/12/2015    K 3 6 02/03/2020    CO2 23 02/03/2020     (H) 02/03/2020     Lab Results   Component Value Date    ALT 20 01/23/2020    AST 14 01/23/2020    ALKPHOS 105 01/23/2020    BILITOT 0 36 02/25/2015       Lab Results   Component Value Date    WBC 6 01 01/25/2020    HGB 11 4 (L) 01/25/2020    HCT 39 1 01/25/2020    MCV 69 (L) 01/25/2020     01/25/2020       No results found for: TSH    No results found for: CHOL  No results found for: TRIG  No results found for: HDL  No results found for: [address]  Lab Results   Component Value Date    HGBA1C 5 4 01/23/2020       Results for orders placed or performed in visit on 64/10/77   Basic metabolic panel   Result Value Ref Range    Sodium 139 136 - 145 mmol/L    Potassium 3 6 3 5 - 5 3 mmol/L    Chloride 109 (H) 100 - 108 mmol/L    CO2 23 21 - 32 mmol/L    ANION GAP 7 4 - 13 mmol/L    BUN 16 5 - 25 mg/dL    Creatinine 1 98 (H) 0 60 - 1 30 mg/dL    Glucose 86 65 - 140 mg/dL    Calcium 9 2 8 3 - 10 1 mg/dL    eGFR 40 ml/min/1 73sq m       Orders Placed This Encounter   Procedures    Lipid panel    Ambulatory referral to Occupational Therapy         Current Medications     Current Outpatient Medications   Medication Sig Dispense Refill    acetaminophen (TYLENOL) 325 mg tablet Take 2 tablets (650 mg total) by mouth every 6 (six) hours as needed for mild pain or headaches 30 tablet 0    albuterol (2 5 mg/3 mL) 0 083 % nebulizer solution Take 1 vial (2 5 mg total) by nebulization every 6 (six) hours as needed for wheezing or shortness of breath 30 mL 0    albuterol (VENTOLIN HFA) 90 mcg/act inhaler Inhale 2 puffs every 6 (six) hours as needed for wheezing or shortness of breath (cough) 1 Inhaler 11    amLODIPine (NORVASC) 10 mg tablet Take 1 tablet (10 mg total) by mouth daily 30 tablet 0    etonogestrel (NEXPLANON) subdermal implant 68 mg by Subdermal route once      fluticasone (FLOVENT HFA) 110 MCG/ACT inhaler Inhale 1 puff 2 (two) times a day 1 Inhaler 11    guaiFENesin (MUCINEX PO) Take by mouth      iron polysaccharides (FERREX) 150 mg capsule Take 1 capsule (150 mg total) by mouth daily 30 capsule 0    labetalol (NORMODYNE) 100 mg tablet Take 1 tablet (100 mg total) by mouth 2 (two) times a day 60 tablet 3    Blood Pressure KIT by Does not apply route daily (Patient not taking: Reported on 7/9/2020) 1 each 0    docusate sodium (COLACE) 100 mg capsule Take 1 capsule (100 mg total) by mouth 2 (two) times a day as needed for constipation (Patient not taking: Reported on 3/16/2020) 10 capsule 0    loratadine (CLARITIN) 10 mg tablet Take 1 tablet (10 mg total) by mouth daily for 7 days 7 tablet 0     No current facility-administered medications for this visit          ALLERGIES:  Allergies   Allergen Reactions    Wheat Bran Itching       Health Maintenance     Health Maintenance   Topic Date Due    Pneumococcal Vaccine: Pediatrics (0 to 5 Years) and At-Risk Patients (6 to 59 Years) (1 of 3 - PCV13) 05/24/2003    Hepatitis A Vaccine (2 of 2 - 2-dose series) 12/28/2013    BMI: Followup Plan  05/24/2015    Annual Physical  03/27/2020    Depression Screening PHQ  05/22/2020    Influenza Vaccine  07/01/2020    BMI: Adult  07/09/2021    Cervical Cancer Screening  03/27/2022    DTaP,Tdap,and Td Vaccines (8 - Td) 09/17/2027    Pneumococcal Vaccine: 65+ Years (1 of 2 - PCV13) 05/24/2062    HIV Screening  Completed    Hepatitis C Screening  Completed    HIB Vaccine  Completed    Hepatitis B Vaccine  Completed    IPV Vaccine  Completed    HPV Vaccine  Completed    Meningococcal ACWY Vaccine  Aged Out     Immunization History   Administered Date(s) Administered    DTaP / HiB 1995, 1995, 1995, 12/30/1998, 06/04/2001    DTaP 5 1995, 1995, 1997, 12/30/1998, 06/04/2001    H1N1, All Formulations 01/18/2010    HPV Quadrivalent 01/18/2010, 03/19/2010, 06/22/2012    Hep A, adult 06/28/2013    Hep B, adult 1995, 1995, 1995    Hepatitis B 12/17/2001    IPV 1995, 1995, 1997, 12/30/1998, 06/04/2001    Influenza TIV (IM) 01/06/2009    Influenza, injectable, quadrivalent, preservative free 0 5 mL 01/30/2019, 10/30/2019    MMR 07/01/1998, 06/04/2001    Meningococcal, Unknown Serogroups 01/18/2010, 06/28/2013    Polio, Unspecified 1995, 1997, 1997, 12/30/1998, 06/04/2001    Tdap 01/18/2010, 09/17/2017    Varicella 07/01/1998, 01/18/2010         Andrew Patel,    [address]  7/9/2020  4:24 PM    Parts of this note were dictated using [employer] dictation software and may have sounds-like errors due to variation in pronunciation

## 2020-07-09 NOTE — ASSESSMENT & PLAN NOTE
· B/P in office today 172/102, second reading 150/100  · Pt did not take b/p meds today  · Discussed tracking b/p at home with home cuff which she can get at the pharmacy  · Discussed importance of remembering to take b/p medications  · Will consider hydralazine at follow up if there is no change  · Discussed lifestyle changes could be discussed at next visit if she is interested  · Follow up in our office for recheck in two weeks

## 2020-07-09 NOTE — ASSESSMENT & PLAN NOTE
· Controlled on current regimen  Continue to use Fluticasone twice daily and albuterol inhaler prn  · Pt has not needed rescue inhaler but maybe once more month if she over does it a bit  · Refills of Albuterol Inhaler 90 mcg were entered previously, pt will check at pharmacy and call back if needed

## 2020-07-09 NOTE — ASSESSMENT & PLAN NOTE
· Hx of two prior concussions - last one being 4 years ago  · Pt having headaches and short term memory loss episodes  · Discussed cognitive therapy and exercises with puzzles and games  · Referral to Occupational therapy for baseline testing and therapy  · Will follow up with patient next week if she has not received call from OT

## 2020-07-27 ENCOUNTER — EVALUATION (OUTPATIENT)
Dept: OCCUPATIONAL THERAPY | Facility: CLINIC | Age: 25
End: 2020-07-27
Payer: COMMERCIAL

## 2020-07-27 ENCOUNTER — TRANSCRIBE ORDERS (OUTPATIENT)
Dept: OCCUPATIONAL THERAPY | Facility: CLINIC | Age: 25
End: 2020-07-27

## 2020-07-27 DIAGNOSIS — F07.81 POST CONCUSSION SYNDROME: Primary | ICD-10-CM

## 2020-07-27 DIAGNOSIS — F07.81 POSTCONCUSSION SYNDROME: Primary | ICD-10-CM

## 2020-07-27 PROCEDURE — 97166 OT EVAL MOD COMPLEX 45 MIN: CPT

## 2020-07-27 NOTE — PROGRESS NOTES
Occupational Therapy Concussion Evaluation:    Today's Date: 2020  Patient Name: Carrie Bronson  : 1995  MRN: [id number]  Referring Provider: Zuleyma Garcia DO  Dx: Post concussion syndrome [F07 81]    Active Problem List:   Patient Active Problem List   Diagnosis    Asthma    Altered mental status    Headache    Head injury    Acute kidney injury superimposed on chronic kidney disease ([facility] [address] )    Confusion    Elevated serum creatinine    Abnormal facial hair    Acne vulgaris    Intrinsic eczema    Left knee pain    Patellofemoral disorder of left knee    Musculoskeletal chest pain    Keloid of skin    Allergy to wheat    Near syncope    Benign hypertension with CKD (chronic kidney disease) stage III ([facility] [address] )    URI (upper respiratory infection)    Morbid obesity with BMI of 40 0-44 9, adult (HCC)    Iron deficiency anemia    Stage 3 chronic kidney disease (HCC)    Persistent proteinuria    Post concussion syndrome     Past Medical Hx:   Past Medical History:   Diagnosis Date    Asthma     Chronic kidney disease     Eczema     Hypertension     Kidney disease      Past Surgical Hx:   Past Surgical History:   Procedure Laterality Date    CHOLECYSTECTOMY      CT NEEDLE BIOPSY KIDNEY  2020    GALLBLADDER SURGERY        Pain Levels:   Restin    With Activity:  0    Subjective/Patient Goal: "My memory is bad"    History of Present Illness:  Pt is a pleasant, active, employed full time 23 y o  female seen for OT eval s/p referred to [address] [place] [address] s/p seen by PCP for f/u re: asthma, HTN, CKD  And post concussion syndrome  Pt reports most recent concussion was 4 years ago, persistent memory loss, c/o HA, photophobia, phonophobia, now seen for OT eval, currently dx'd w/ post concussion syndrome, comorbidities as listed above      Lifestyle Performance Model:  Autonomy: Pt was I w/ I/ADLS, does not drive originally from [facility] has been studying for her 's test though cannot pass "because I can't remember anything it's annoying", & required no use of DME PTA  Reciprocal Relationships: Supportive 24 y/o brother  Service to Others: 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  Intrinsic Gratification: Enjoys watching tv, Star wars  Home Setup: Pt lives in [place] w/ parents and younger brother  Objective  Impairments Section:   1  Convergence Insufficiency Symptom Survey (CISS): 13/60 PASS    2  Concussion Cognitive Checklist:  *Patient indicated that she is experiencing difficulties in the following areas:    · Memory: Remembering the date/day of the week    · Attention: Keeping your attention/concentrating on a task    · Processing: Responding to questions in a timely manner     · Executive Functions: Organizing your thoughts    · Communication: Word finding in conversation and Expressing thoughts and ideas into writing    · Visual: Change in handwriting (i e , sloppier)    · Emotional: Sleep changes    · Increased Sensitivities to: Noise, Touch and Smell     3  Contextual Memory Test (CMT): Pt reports has not noticed a change in her memory, rates her memory capacity at 75%, if studied 20 objects for 90 seconds would recall 20 of them, would have recalled 20 of them pre injury, frequently forgets things that happened the day before 50% of the time, forgets important details 25% of the time, frequently forgets things people have told her 25% of the time, frequently forgets things that happened a few minutes ago 25% of the time, would not remember facts about this form a week from now  IMMEDIATE RECALL:   14/20  score falls  in WFL/WNL range    DELAYED RECALL:  12/20 score falls in suspect deficit range   RECOGNITION:  20/20 with 0 confabulations resulting in score 20/20     4   [place] Cognitive Assessment Version 8 1 (MoCA V8 1)  Visuospatial/executive functioning:  3/5  Naming:  3/3  Memory: 1st trial:  4/5, 2nd trial:   Attention/concentration: 22  List of letters: 0/1  Serial Seven Subtraction:  3/3 w/ 1 errors  Language/sentence repetition:  2  Language Fluency:  0/1  Abstract/Correlational Thinkin/2  Delayed Recall:    Orientation:     Memory Index Score: 15/15  MoCA V1 8 1 Raw Score:  25/30, MIS:  15/15, indicative of mild neurocognitive impairments  5  Vision Screening Recording Form:   vision screen: + glasses for reading only not present for vision screen  near acuity: R 20/30, L 20/30  binocularity far: orthophoria  binocularity near: orthophoria  red green fusion: PLRG @ 2"  near point of convergence: diplopia @ 2"  yaquelin string: alignment  Pursuits: smooth all planes  Saccades: accurate all planes  ocular ROM: intact/full  visual perceptual midline shift: @ midline and @ horizon    6  Anxiety/Depression:  Pt engaged in the Neuro QOL Anxiety Short Form and Neuro QOL Depression Short Form in order to screen for anxiety and depressive s/s  Pt reported the following:  Anxiety- Short Form:   --used to measure anxious s/s  For scoring purposes, scores of 25+ indicate the threshold for treatment per neurology/SLIM  Score:  Pt most often chose the response never when asked about feeling anxious s/s  Depression- Short Form:   --used to measure depressive s/s  For scoring purposes, scores of 25+ indicate the threshold for treatment per neurology/SLIM  Score:  Pt most often chose the response never when asked about feeling depressive s/s  Assessment/Plan  Occupational Therapy Skilled Analysis Assessment and Plan of Care:  Pt requires overall mod I for ADLs/self care and mod I for fx'l mobility w/o DME   Pt is currently demonstrating the following occupational deficits: limited 2* flat affect, depressed mood, psychogenic conversation w/ poor volition to skew results, difficulty w/ working memory, STM/immediate delayed recall, attention/concentration, divided attention, EF skills, mental manipulation, auditory processing  Based on the aforementioned OT evaluation, functional performance deficits, and assessments, pt has been identified as a moderate complexity evaluation  Pt to continue to benefit from outpatient skilled OT services to address the following goals 2x/wk Short Term Goals for 4 weeks (2020), Long Term Goals for 8 weeks (2020), and POC to  w/in 90 days (10/27/2020) with special focus on self-care management, pt education,  and VM training as well as motor training to improve above defiicits and enhance overall QOL/function      Goals:  Short Term Goals: (4 weeks)  Cognition:  - Pt will increase auditory processing to take notes while listening in multi-modal environment symptom free at baseline performance for improved role performance, once returned as applicable  - Pt will increase attention to 2+ tasks for improved role performance (once returned) and engagement in salient tasks as applicable  - Pt will increase temporal awareness for keeping to schedule within 5 min increments, recall appointments, functional addition of time with 80% accuracy  - Pt will increase verbal and written direction following with processing time of <2 min and 80% accuracy for improved role performance, once returned as applicable  - Pt will demo good carryover of internal and external memory aides for improved recall of daily events, improved executive functioning with 80% accuracy  - Pt will increase insight into deficits for improved carryover of recommendations/ accommodations  Long Term Goals: (8 weeks)  Cognition:  - Pt will increase attention to 3+ tasks for improved divided attention with occupational roles and pre driving roles as applicable  - Pt will increase verbal and written direction following with processing time of <1 min and 100% accuracy  - Pt will tolerate multimodal envt x 60 min symptom free for return to occupational roles  - Pt will demo with decreased anxiety and frustration for improved insight into process and rate of recovery      INTERVENTION COMMENTS:  Diagnosis: PCS  Precautions:near syncope, HTN w/ CKD, anemia  FOTO: 93 with 7% limitation brain injury  Insurance: [employer] JAZMYN  1 of _ visits, PN due 8/27/2020    Thank you for the consult!   Please call if you have any questions: [phone]  Jasmine Aguilar, SCOT, EULOGIO, OTR/L, C-GCM, CSRS, CBIS  Director of Outpatient Neuro Occupational Therapy

## 2020-07-27 NOTE — LETTER
2020    Bhavna Sosa DO  [address] [address]    Patient: Ahsan Alvarado   YOB: 1995   Date of Visit: 2020     Encounter Diagnosis     ICD-10-CM    1  Post concussion syndrome F07 81 Ambulatory referral to Occupational Therapy       Dear Dr Shira Alvarado: Thank you for your recent referral of Ahsan Alvarado  Please review the attached evaluation summary from [facility] recent visit  Please verify that you agree with the plan of care by signing the attached order  If you have any questions or concerns, please do not hesitate to call  I sincerely appreciate the opportunity to share in the care of one of your patients and hope to have another opportunity to work with you in the near future  Sincerely,    Gloria Ngo OT      Referring Provider:     I certify that I have read the below Plan of Care and certify the need for these services furnished under this plan of treatment while under my care                      Bhavna Sosa DO  [address] [address]  VIA Facsimile: [phone]            Occupational Therapy Concussion Evaluation:    Today's Date: 2020  Patient Name: Ahsan Alvarado  : 1995  MRN: [id number]  Referring Provider: Sherman Anderson DO  Dx: Post concussion syndrome [F07 81]    Active Problem List:   Patient Active Problem List   Diagnosis    Asthma    Altered mental status    Headache    Head injury    Acute kidney injury superimposed on chronic kidney disease ([address] )    Confusion    Elevated serum creatinine    Abnormal facial hair    Acne vulgaris    Intrinsic eczema    Left knee pain    Patellofemoral disorder of left knee    Musculoskeletal chest pain    Keloid of skin    Allergy to wheat    Near syncope    Benign hypertension with CKD (chronic kidney disease) stage III ([address] )    URI (upper respiratory infection)    Morbid obesity with BMI of 40 0-44 9, adult [facility])    Iron deficiency anemia    Stage 3 chronic kidney disease (HCC)    Persistent proteinuria    Post concussion syndrome     Past Medical Hx:   Past Medical History:   Diagnosis Date    Asthma     Chronic kidney disease     Eczema     Hypertension     Kidney disease      Past Surgical Hx:   Past Surgical History:   Procedure Laterality Date    CHOLECYSTECTOMY      CT NEEDLE BIOPSY KIDNEY  2020    GALLBLADDER SURGERY        Pain Levels:   Restin    With Activity:  0    Subjective/Patient Goal: "My memory is bad"    History of Present Illness:  Pt is a pleasant, active, employed full time 23 y o  female seen for OT eval s/p referred to [address] [place] [address] s/p seen by PCP for f/u re: asthma, HTN, CKD  And post concussion syndrome  Pt reports most recent concussion was 4 years ago, persistent memory loss, c/o HA, photophobia, phonophobia, now seen for OT eval, currently dx'd w/ post concussion syndrome, comorbidities as listed above  Lifestyle Performance Model:  Autonomy: Pt was I w/ I/ADLS, does not drive originally from [facility] has been studying for her 's test though cannot pass "because I can't remember anything it's annoying", & required no use of DME PTA  Reciprocal Relationships: Supportive 26 y/o brother  Service to Others: 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  Intrinsic Gratification: Enjoys watching tv, Star wars  Home Setup: Pt lives in [place] w/ parents and younger brother  Objective  Impairments Section:   1  Convergence Insufficiency Symptom Survey (CISS):  PASS    2   Concussion Cognitive Checklist:  *Patient indicated that she is experiencing difficulties in the following areas:    · Memory: Remembering the date/day of the week    · Attention: Keeping your attention/concentrating on a task    · Processing: Responding to questions in a timely manner     · Executive Functions: Organizing your thoughts    · Communication: Word finding in conversation and Expressing thoughts and ideas into writing    · Visual: Change in handwriting (i e , sloppier)    · Emotional: Sleep changes    · Increased Sensitivities to: Noise, Touch and Smell     3  Contextual Memory Test (CMT): Pt reports has not noticed a change in her memory, rates her memory capacity at 75%, if studied 20 objects for 90 seconds would recall 20 of them, would have recalled 20 of them pre injury, frequently forgets things that happened the day before 50% of the time, forgets important details 25% of the time, frequently forgets things people have told her 25% of the time, frequently forgets things that happened a few minutes ago 25% of the time, would not remember facts about this form a week from now  IMMEDIATE RECALL:     score falls  in WFL/WNL range    DELAYED RECALL:   score falls in suspect deficit range   RECOGNITION:  20/20 with 0 confabulations resulting in score 20/20     4  [place] Cognitive Assessment Version 8 1 (MoCA V8 1)  Visuospatial/executive functioning:  3/5  Naming:  3/3  Memory: 1st trial:  5, 2nd trial:    Attention/concentration: 2/2  List of letters: 0/1  Serial Seven Subtraction:  3/3 w/ 1 errors  Language/sentence repetition:  2/2  Language Fluency:  0/1  Abstract/Correlational Thinkin/2  Delayed Recall:    Orientation:     Memory Index Score: 1515  MoCA V1 8 1 Raw Score:  25/30, MIS:  15/15, indicative of mild neurocognitive impairments  5  Vision Screening Recording Form:   vision screen: + glasses for reading only not present for vision screen  near acuity: R 20/30, L 20/30  binocularity far: orthophoria  binocularity near: orthophoria  red green fusion: PLRG @ 2"  near point of convergence: diplopia @ 2"  yaquelin string: alignment  Pursuits: smooth all planes  Saccades: accurate all planes  ocular ROM: intact/full  visual perceptual midline shift: @ midline and @ horizon    6   Anxiety/Depression:  Pt engaged in the Neuro QOL Anxiety Short Form and Neuro QOL Depression Short Form in order to screen for anxiety and depressive s/s  Pt reported the following:  Anxiety- Short Form:   --used to measure anxious s/s  For scoring purposes, scores of 25+ indicate the threshold for treatment per neurology/SLIM  Score:  Pt most often chose the response never when asked about feeling anxious s/s  Depression- Short Form:   --used to measure depressive s/s  For scoring purposes, scores of 25+ indicate the threshold for treatment per neurology/SLIM  Score:  Pt most often chose the response never when asked about feeling depressive s/s  Assessment/Plan  Occupational Therapy Skilled Analysis Assessment and Plan of Care:  Pt requires overall mod I for ADLs/self care and mod I for fx'l mobility w/o DME  Pt is currently demonstrating the following occupational deficits: limited 2* flat affect, depressed mood, psychogenic conversation w/ poor volition to skew results, difficulty w/ working memory, STM/immediate delayed recall, attention/concentration, divided attention, EF skills, mental manipulation, auditory processing  Based on the aforementioned OT evaluation, functional performance deficits, and assessments, pt has been identified as a moderate complexity evaluation  Pt to continue to benefit from outpatient skilled OT services to address the following goals 2x/wk Short Term Goals for 4 weeks (2020), Long Term Goals for 8 weeks (2020), and POC to  w/in 90 days (10/27/2020) with special focus on self-care management, pt education,  and VM training as well as motor training to improve above defiicits and enhance overall QOL/function      Goals:  Short Term Goals: (4 weeks)  Cognition:  - Pt will increase auditory processing to take notes while listening in multi-modal environment symptom free at baseline performance for improved role performance, once returned as applicable  - Pt will increase attention to 2+ tasks for improved role performance (once returned) and engagement in salient tasks as applicable  - Pt will increase temporal awareness for keeping to schedule within 5 min increments, recall appointments, functional addition of time with 80% accuracy  - Pt will increase verbal and written direction following with processing time of <2 min and 80% accuracy for improved role performance, once returned as applicable  - Pt will demo good carryover of internal and external memory aides for improved recall of daily events, improved executive functioning with 80% accuracy  - Pt will increase insight into deficits for improved carryover of recommendations/ accommodations  Long Term Goals: (8 weeks)  Cognition:  - Pt will increase attention to 3+ tasks for improved divided attention with occupational roles and pre driving roles as applicable  - Pt will increase verbal and written direction following with processing time of <1 min and 100% accuracy  - Pt will tolerate multimodal envt x 60 min symptom free for return to occupational roles  - Pt will demo with decreased anxiety and frustration for improved insight into process and rate of recovery      INTERVENTION COMMENTS:  Diagnosis: PCS  Precautions:near syncope, HTN w/ CKD, anemia  FOTO: 93 with 7% limitation brain injury  Ins[place]: Shashi Harley [place] [facility] [place]  1 of _ visits, PN due 8/27/2020    Thank you for the consult!   Please call if you have any questions: [phone]  Tay Grover OTLAURA, EULOGIO, OTR/L, C-GCM, CSRS, CBIS  Director of Outpatient Neuro Occupational Therapy

## 2020-08-05 ENCOUNTER — OFFICE VISIT (OUTPATIENT)
Dept: OCCUPATIONAL THERAPY | Facility: CLINIC | Age: 25
End: 2020-08-05
Payer: COMMERCIAL

## 2020-08-06 ENCOUNTER — OFFICE VISIT (OUTPATIENT)
Dept: OCCUPATIONAL THERAPY | Facility: CLINIC | Age: 25
End: 2020-08-06
Payer: COMMERCIAL

## 2020-08-06 DIAGNOSIS — F07.81 POST CONCUSSION SYNDROME: Primary | ICD-10-CM

## 2020-08-06 PROCEDURE — 97150 GROUP THERAPEUTIC PROCEDURES: CPT

## 2020-08-06 PROCEDURE — 97535 SELF CARE MNGMENT TRAINING: CPT

## 2020-08-06 NOTE — PROGRESS NOTES
Daily Note     Today's date: 2020  Patient name: Cale Cates  : 1995  MRN: [id number]  Referring provider: Leonel Hodgkins, DO  Dx:   Encounter Diagnosis   Name Primary?  Post concussion syndrome Yes                  Subjective: "This confuses me"  Objective: See treatment diary below    Pt participated in skilled OT focusing on attention, memory and direction follow in multimodal environment lending to improved functional cognitive abilities in work and home environment  Pt engaged in matrix activity initiating task with shape card placed on slant board facilitating board to table copy w/memory recall to hold place on card followed by alternating attention between visual closure/discrimination cards  Difficulty for pt to hold place on cards utilizing markers as visual cue      IQ fit initiating task with starter card 1, therapist graded activity to card 7 focusing on multi step direction follow, sustained attention and problem solving w/new task learning  Assessment: Tolerated treatment well  Pt reported 0/10 HA upon arrival to OT session increasing to 5/10 post session as reported by pt  Pt initiated task requiring min assist for comprehension of direction follow, pt demo-independent follow though of task  Pt progressed from card 1 to 7 completing IQ fit w/confusion noted with directionality of puzzle pieces during assembly  Patient would benefit from continued OT      Plan: Continued skilled OT per POC with focus on memory strategy education and relaxation techniques for anxiety mngt      INTERVENTION COMMENTS:  Diagnosis: PCS  Precautions:near syncope, HTN w/ CKD, anemia  FOTO: 93 with 7% limitation brain injury  Insurance: Cristel Lebron MA O  2 of _ visits, PN due 2020:  100-125-1:1  [id number]  [phone]

## 2020-08-07 ENCOUNTER — OFFICE VISIT (OUTPATIENT)
Dept: OCCUPATIONAL THERAPY | Facility: CLINIC | Age: 25
End: 2020-08-07
Payer: COMMERCIAL

## 2020-08-07 DIAGNOSIS — F07.81 POST CONCUSSION SYNDROME: Primary | ICD-10-CM

## 2020-08-07 PROCEDURE — 97535 SELF CARE MNGMENT TRAINING: CPT

## 2020-08-07 NOTE — PROGRESS NOTES
Daily Note     Today's date: 2020  Patient name: Janice Euceda  : 1995  MRN: [id number]  Referring provider: Jonnathan Gonzales, DO  Dx:   Encounter Diagnosis   Name Primary?  Post concussion syndrome Yes                  Subjective: "My mom asked me what I had to eat at [facility] and I couldn't remember"  Objective: See treatment diary below    Pt participated in skilled OT focusing on memory recall utilizing internal/external memory strategies to improve functional abilities in home and work environment  Educated pt on internal/external memory strategies focusing, chunking utilizing categorization, memory recall of beach scene  to facilitate improvement in immediate and delayed recall and mind maps and spider diagrams recalling details of story using 5 W's  Assessment: Tolerated treatment well  Pt demo G memory recall chunking w/categorization, 12/12 items  Memory recall of beach scene w/90 sec study time for, pt able to recall 14/15 items utilizing chunking/categorization strategy   Pt issued memory book to trial, instructed to bring along next session for review/ Patient would benefit from continued OT    Plan: Continued skilled OT per POC     INTERVENTION COMMENTS:  Diagnosis: PCS  Precautions:near syncope, HTN w/ CKD, anemia  FOTO: 93 with 7% limitation brain injury  Insurance: [place] MA JAZMYN  3 of_ visits, PN due 2020

## 2020-08-10 ENCOUNTER — TELEPHONE (OUTPATIENT)
Dept: FAMILY MEDICINE CLINIC | Facility: CLINIC | Age: 25
End: 2020-08-10

## 2020-08-11 ENCOUNTER — OFFICE VISIT (OUTPATIENT)
Dept: URGENT CARE | Facility: CLINIC | Age: 25
End: 2020-08-11
Payer: COMMERCIAL

## 2020-08-11 NOTE — PROGRESS NOTES
Occupational Therapy Daily Note:    Today's date: 2020  Patient name: Coye Jeans  : 1995  MRN: [id number]  Referring provider: Casper Morris DO  Dx:   Encounter Diagnosis   Name Primary?  Post concussion syndrome Yes     Subjective: "I'm tired"  Objective: Of note, pt arrived 20 minutes late to session  Pt seen for OT treatment session focusing on working memory, sequencing, problem solving, visual tracking, scanning, pursuits, screen tolerance, direction following, and attention concentration via BITS: visual scanning, pursuits, trailmaking  Assessment: Tolerated treatment well  Patient would benefit from continued skilled OT  Plan: Continued skilled OT per POC      INTERVENTION COMMENTS:  Diagnosis: PCS  Precautions:near syncope, HTN w/ CKD, anemia  FOTO: 93 with 7% limitation brain injury  Insurance: [facility]  4 of10 visits, PN due 2020    Thank you for the consult!   Please call if you have any questions: [phone]  Marylee Squires, OTD, EULOGIO, OTR/L, C-GCM, CSRS, CBIS  Director of Outpatient Neuro Occupational Therapy

## 2020-08-13 ENCOUNTER — HOSPITAL ENCOUNTER (EMERGENCY)
Facility: HOSPITAL | Age: 25
Discharge: HOME | End: 2020-08-14
Attending: EMERGENCY MEDICINE
Payer: COMMERCIAL

## 2020-08-13 ENCOUNTER — APPOINTMENT (EMERGENCY)
Dept: RADIOLOGY | Facility: HOSPITAL | Age: 25
End: 2020-08-13
Payer: COMMERCIAL

## 2020-08-13 ENCOUNTER — OFFICE VISIT (OUTPATIENT)
Dept: OCCUPATIONAL THERAPY | Facility: CLINIC | Age: 25
End: 2020-08-13
Payer: COMMERCIAL

## 2020-08-13 DIAGNOSIS — M25.522 LEFT ELBOW PAIN: ICD-10-CM

## 2020-08-13 DIAGNOSIS — M25.532 LEFT WRIST PAIN: ICD-10-CM

## 2020-08-13 DIAGNOSIS — R03.0 ELEVATED BLOOD PRESSURE READING: ICD-10-CM

## 2020-08-13 DIAGNOSIS — N18.30 STAGE 3 CHRONIC KIDNEY DISEASE (HCC): ICD-10-CM

## 2020-08-13 DIAGNOSIS — F07.81 POST CONCUSSION SYNDROME: Primary | ICD-10-CM

## 2020-08-13 DIAGNOSIS — J45.901 ASTHMA EXACERBATION: Primary | ICD-10-CM

## 2020-08-13 DIAGNOSIS — I10 HYPERTENSION: ICD-10-CM

## 2020-08-13 LAB
ALBUMIN SERPL BCP-MCNC: 3.3 G/DL (ref 3.5–5)
ALP SERPL-CCNC: 107 U/L (ref 46–116)
ALT SERPL W P-5'-P-CCNC: 17 U/L (ref 12–78)
ANION GAP SERPL CALCULATED.3IONS-SCNC: 9 MMOL/L (ref 4–13)
AST SERPL W P-5'-P-CCNC: 21 U/L (ref 5–45)
BASOPHILS # BLD AUTO: 0.02 THOUSANDS/ΜL (ref 0–0.1)
BASOPHILS NFR BLD AUTO: 0 % (ref 0–1)
BILIRUB SERPL-MCNC: 0.2 MG/DL (ref 0.2–1)
BUN SERPL-MCNC: 15 MG/DL (ref 5–25)
CALCIUM SERPL-MCNC: 8.6 MG/DL (ref 8.3–10.1)
CHLORIDE SERPL-SCNC: 106 MMOL/L (ref 100–108)
CO2 SERPL-SCNC: 26 MMOL/L (ref 21–32)
CREAT SERPL-MCNC: 2.55 MG/DL (ref 0.6–1.3)
EOSINOPHIL # BLD AUTO: 0.22 THOUSAND/ΜL (ref 0–0.61)
EOSINOPHIL NFR BLD AUTO: 4 % (ref 0–6)
ERYTHROCYTE [DISTWIDTH] IN BLOOD BY AUTOMATED COUNT: 15.7 % (ref 11.6–15.1)
GFR SERPL CREATININE-BSD FRML MDRD: 26 ML/MIN/1.73SQ M
GLUCOSE SERPL-MCNC: 74 MG/DL (ref 65–140)
HCT VFR BLD AUTO: 36.8 % (ref 34.8–46.1)
HGB BLD-MCNC: 11 G/DL (ref 11.5–15.4)
IMM GRANULOCYTES # BLD AUTO: 0.01 THOUSAND/UL (ref 0–0.2)
IMM GRANULOCYTES NFR BLD AUTO: 0 % (ref 0–2)
LYMPHOCYTES # BLD AUTO: 1.62 THOUSANDS/ΜL (ref 0.6–4.47)
LYMPHOCYTES NFR BLD AUTO: 26 % (ref 14–44)
MCH RBC QN AUTO: 20.7 PG (ref 26.8–34.3)
MCHC RBC AUTO-ENTMCNC: 29.9 G/DL (ref 31.4–37.4)
MCV RBC AUTO: 69 FL (ref 82–98)
MONOCYTES # BLD AUTO: 0.41 THOUSAND/ΜL (ref 0.17–1.22)
MONOCYTES NFR BLD AUTO: 7 % (ref 4–12)
NEUTROPHILS # BLD AUTO: 4.07 THOUSANDS/ΜL (ref 1.85–7.62)
NEUTS SEG NFR BLD AUTO: 63 % (ref 43–75)
NRBC BLD AUTO-RTO: 0 /100 WBCS
PLATELET # BLD AUTO: 256 THOUSANDS/UL (ref 149–390)
PMV BLD AUTO: 11.7 FL (ref 8.9–12.7)
POTASSIUM SERPL-SCNC: 3.7 MMOL/L (ref 3.5–5.3)
PROT SERPL-MCNC: 7 G/DL (ref 6.4–8.2)
RBC # BLD AUTO: 5.31 MILLION/UL (ref 3.81–5.12)
SODIUM SERPL-SCNC: 141 MMOL/L (ref 136–145)
TROPONIN I SERPL-MCNC: <0.02 NG/ML
WBC # BLD AUTO: 6.35 THOUSAND/UL (ref 4.31–10.16)

## 2020-08-13 PROCEDURE — 81025 URINE PREGNANCY TEST: CPT | Performed by: PHYSICIAN ASSISTANT

## 2020-08-13 PROCEDURE — 85025 COMPLETE CBC W/AUTO DIFF WBC: CPT | Performed by: PHYSICIAN ASSISTANT

## 2020-08-13 PROCEDURE — 96365 THER/PROPH/DIAG IV INF INIT: CPT

## 2020-08-13 PROCEDURE — 94640 AIRWAY INHALATION TREATMENT: CPT

## 2020-08-13 PROCEDURE — 84484 ASSAY OF TROPONIN QUANT: CPT | Performed by: PHYSICIAN ASSISTANT

## 2020-08-13 PROCEDURE — 97112 NEUROMUSCULAR REEDUCATION: CPT

## 2020-08-13 PROCEDURE — 93005 ELECTROCARDIOGRAM TRACING: CPT

## 2020-08-13 PROCEDURE — 99284 EMERGENCY DEPT VISIT MOD MDM: CPT | Performed by: PHYSICIAN ASSISTANT

## 2020-08-13 PROCEDURE — 99284 EMERGENCY DEPT VISIT MOD MDM: CPT

## 2020-08-13 PROCEDURE — 36415 COLL VENOUS BLD VENIPUNCTURE: CPT | Performed by: PHYSICIAN ASSISTANT

## 2020-08-13 PROCEDURE — 96361 HYDRATE IV INFUSION ADD-ON: CPT

## 2020-08-13 PROCEDURE — 80053 COMPREHEN METABOLIC PANEL: CPT | Performed by: PHYSICIAN ASSISTANT

## 2020-08-13 PROCEDURE — 71045 X-RAY EXAM CHEST 1 VIEW: CPT

## 2020-08-13 RX ORDER — ALBUTEROL SULFATE 2.5 MG/3ML
SOLUTION RESPIRATORY (INHALATION)
Status: COMPLETED
Start: 2020-08-13 | End: 2020-08-13

## 2020-08-13 RX ORDER — PREDNISONE 20 MG/1
60 TABLET ORAL ONCE
Status: COMPLETED | OUTPATIENT
Start: 2020-08-13 | End: 2020-08-13

## 2020-08-13 RX ORDER — MAGNESIUM SULFATE HEPTAHYDRATE 40 MG/ML
2 INJECTION, SOLUTION INTRAVENOUS ONCE
Status: COMPLETED | OUTPATIENT
Start: 2020-08-13 | End: 2020-08-13

## 2020-08-13 RX ORDER — HYDRALAZINE HYDROCHLORIDE 10 MG/1
10 TABLET, FILM COATED ORAL ONCE
Status: COMPLETED | OUTPATIENT
Start: 2020-08-14 | End: 2020-08-14

## 2020-08-13 RX ORDER — LABETALOL 100 MG/1
100 TABLET, FILM COATED ORAL ONCE
Status: DISCONTINUED | OUTPATIENT
Start: 2020-08-14 | End: 2020-08-13

## 2020-08-13 RX ORDER — IPRATROPIUM BROMIDE AND ALBUTEROL SULFATE 2.5; .5 MG/3ML; MG/3ML
3 SOLUTION RESPIRATORY (INHALATION)
Status: DISCONTINUED | OUTPATIENT
Start: 2020-08-13 | End: 2020-08-14

## 2020-08-13 RX ORDER — ALBUTEROL SULFATE 90 UG/1
2 AEROSOL, METERED RESPIRATORY (INHALATION) ONCE
Status: COMPLETED | OUTPATIENT
Start: 2020-08-13 | End: 2020-08-13

## 2020-08-13 RX ADMIN — SODIUM CHLORIDE 1000 ML: 0.9 INJECTION, SOLUTION INTRAVENOUS at 23:30

## 2020-08-13 RX ADMIN — PREDNISONE 60 MG: 20 TABLET ORAL at 22:36

## 2020-08-13 RX ADMIN — ALBUTEROL SULFATE 2 PUFF: 90 AEROSOL, METERED RESPIRATORY (INHALATION) at 22:37

## 2020-08-13 RX ADMIN — IPRATROPIUM BROMIDE AND ALBUTEROL SULFATE 3 ML: 2.5; .5 SOLUTION RESPIRATORY (INHALATION) at 22:22

## 2020-08-13 RX ADMIN — MAGNESIUM SULFATE IN WATER 2 G: 40 INJECTION, SOLUTION INTRAVENOUS at 22:49

## 2020-08-13 RX ADMIN — IPRATROPIUM BROMIDE: 0.5 SOLUTION RESPIRATORY (INHALATION) at 22:30

## 2020-08-13 RX ADMIN — ALBUTEROL SULFATE: 2.5 SOLUTION RESPIRATORY (INHALATION) at 22:30

## 2020-08-14 ENCOUNTER — APPOINTMENT (EMERGENCY)
Dept: RADIOLOGY | Facility: HOSPITAL | Age: 25
End: 2020-08-14
Payer: COMMERCIAL

## 2020-08-14 VITALS
HEART RATE: 70 BPM | OXYGEN SATURATION: 99 % | SYSTOLIC BLOOD PRESSURE: 170 MMHG | DIASTOLIC BLOOD PRESSURE: 90 MMHG | HEIGHT: 64 IN | BODY MASS INDEX: 45.84 KG/M2 | RESPIRATION RATE: 18 BRPM | TEMPERATURE: 98.3 F | WEIGHT: 268.52 LBS

## 2020-08-14 LAB
ANION GAP SERPL CALCULATED.3IONS-SCNC: 7 MMOL/L (ref 4–13)
ATRIAL RATE: 64 BPM
BUN SERPL-MCNC: 16 MG/DL (ref 5–25)
CALCIUM SERPL-MCNC: 8.4 MG/DL (ref 8.3–10.1)
CHLORIDE SERPL-SCNC: 108 MMOL/L (ref 100–108)
CO2 SERPL-SCNC: 25 MMOL/L (ref 21–32)
CREAT SERPL-MCNC: 2.4 MG/DL (ref 0.6–1.3)
EXT PREG TEST URINE: NEGATIVE
EXT. CONTROL ED NAV: NORMAL
GFR SERPL CREATININE-BSD FRML MDRD: 28 ML/MIN/1.73SQ M
GLUCOSE SERPL-MCNC: 123 MG/DL (ref 65–140)
P AXIS: 44 DEGREES
POTASSIUM SERPL-SCNC: 3.5 MMOL/L (ref 3.5–5.3)
PR INTERVAL: 166 MS
QRS AXIS: -3 DEGREES
QRSD INTERVAL: 82 MS
QT INTERVAL: 440 MS
QTC INTERVAL: 453 MS
SODIUM SERPL-SCNC: 140 MMOL/L (ref 136–145)
T WAVE AXIS: 27 DEGREES
VENTRICULAR RATE: 64 BPM

## 2020-08-14 PROCEDURE — 73110 X-RAY EXAM OF WRIST: CPT

## 2020-08-14 PROCEDURE — 80048 BASIC METABOLIC PNL TOTAL CA: CPT | Performed by: PHYSICIAN ASSISTANT

## 2020-08-14 PROCEDURE — 73080 X-RAY EXAM OF ELBOW: CPT

## 2020-08-14 PROCEDURE — 96361 HYDRATE IV INFUSION ADD-ON: CPT

## 2020-08-14 PROCEDURE — 93010 ELECTROCARDIOGRAM REPORT: CPT | Performed by: INTERNAL MEDICINE

## 2020-08-14 PROCEDURE — 36415 COLL VENOUS BLD VENIPUNCTURE: CPT | Performed by: PHYSICIAN ASSISTANT

## 2020-08-14 RX ORDER — PREDNISONE 20 MG/1
40 TABLET ORAL DAILY
Qty: 8 TABLET | Refills: 0 | Status: SHIPPED | OUTPATIENT
Start: 2020-08-14 | End: 2020-08-18

## 2020-08-14 RX ADMIN — HYDRALAZINE HYDROCHLORIDE 10 MG: 10 TABLET, FILM COATED ORAL at 00:12

## 2020-08-14 NOTE — DISCHARGE INSTRUCTIONS
Take prednisone as indicated  Use Ventolin inhaler as instructed by ED RN  Take antihypertensives as instructed by PCP  Follow-up with PCP  Follow up with emergency department symptoms persist or exacerbate

## 2020-08-14 NOTE — ED PROVIDER NOTES
History  Chief Complaint   Patient presents with    Asthma     Pt states "I'm having an asthma attack " Hasnt used inhaler/nebs due to not being able to get them filled  did not take BP meds today  Patient is a 27-year-old female with history of asthma, chronic kidney disease, hypertension and cholecystectomy that presents emergency department with gradual onset shortness of breath and chest tightness beginning 3 hours prior to current ED presentation  Patient denies associated symptomatology  Patient states that she was around individuals who had on strong perfume and patient also reported that it got progressively harder to breathe  Patient denies having rescue inhaler  Patient verbalizes symptoms similar to current with history of asthma exacerbations  Patient denies currently on steroids  Patient denies new medications  Patient denies cough or upper respiratory tract infection of symptomatology  Patient denies of factors with provocative factors of activity  Patient denies not effective treatment  Patient's fevers, chills, nausea, vomiting, diarrhea, constipation urinary symptoms  Patient's recent fall or recent trauma  Patient sick contacts or travel  Patient denies chest pain, shortness breath, abdominal pain  History provided by:  Patient   used: No    Asthma   Severity:  Mild  Onset quality:  Gradual  Duration:  3 hours  Timing:  Constant  Progression:  Worsening  Chronicity:  Recurrent  Context:  Patient states that she was around individuals who had on strong perfume and patient also reported that it got progressively harder to breathe  Relieved by:  Nothing  Worsened by:   Activity  Ineffective treatments:  Nothing  Associated symptoms: shortness of breath    Associated symptoms: no abdominal pain, no chest pain, no congestion, no cough, no diarrhea, no fever, no headaches, no nausea, no rash, no rhinorrhea, no sore throat and no vomiting        Prior to Admission Medications   Prescriptions Last Dose Informant Patient Reported? Taking?    Blood Pressure KIT  Self No No   Sig: by Does not apply route daily   Patient not taking: Reported on 2020   acetaminophen (TYLENOL) 325 mg tablet  Self No No   Sig: Take 2 tablets (650 mg total) by mouth every 6 (six) hours as needed for mild pain or headaches   albuterol (2 5 mg/3 mL) 0 083 % nebulizer solution  Self No No   Sig: Take 1 vial (2 5 mg total) by nebulization every 6 (six) hours as needed for wheezing or shortness of breath   albuterol (VENTOLIN HFA) 90 mcg/act inhaler  Self No No   Sig: Inhale 2 puffs every 6 (six) hours as needed for wheezing or shortness of breath (cough)   amLODIPine (NORVASC) 10 mg tablet  Self No No   Sig: Take 1 tablet (10 mg total) by mouth daily   docusate sodium (COLACE) 100 mg capsule  Self No No   Sig: Take 1 capsule (100 mg total) by mouth 2 (two) times a day as needed for constipation   Patient not taking: Reported on 3/16/2020   etonogestrel (NEXPLANON) subdermal implant  Self Yes No   Si mg by Subdermal route once   fluticasone (FLOVENT HFA) 110 MCG/ACT inhaler  Self No No   Sig: Inhale 1 puff 2 (two) times a day   guaiFENesin (MUCINEX PO)  Self Yes No   Sig: Take by mouth   iron polysaccharides (FERREX) 150 mg capsule  Self No No   Sig: Take 1 capsule (150 mg total) by mouth daily   labetalol (NORMODYNE) 100 mg tablet  Self No No   Sig: Take 1 tablet (100 mg total) by mouth 2 (two) times a day   loratadine (CLARITIN) 10 mg tablet   No No   Sig: Take 1 tablet (10 mg total) by mouth daily for 7 days      Facility-Administered Medications: None       Past Medical History:   Diagnosis Date    Asthma     Chronic kidney disease     Eczema     Hypertension     Kidney disease        Past Surgical History:   Procedure Laterality Date    CHOLECYSTECTOMY      CT NEEDLE BIOPSY KIDNEY  2020    GALLBLADDER SURGERY         Family History   Problem Relation Age of Onset    Asthma Mother     No Known Problems Father      I have reviewed and agree with the history as documented  E-Cigarette/Vaping    E-Cigarette Use Never User      E-Cigarette/Vaping Substances    Nicotine No     THC No     CBD No     Flavoring No     Other No     Unknown No      Social History     Tobacco Use    Smoking status: Never Smoker    Smokeless tobacco: Never Used   Substance Use Topics    Alcohol use: Yes     Frequency: Monthly or less     Drinks per session: 1 or 2     Binge frequency: Never     Comment: occassionally on social events    Drug use: No       Review of Systems   Constitutional: Negative for activity change, appetite change, chills and fever  HENT: Negative for congestion, postnasal drip, rhinorrhea, sinus pressure, sinus pain, sore throat and tinnitus  Eyes: Negative for photophobia and visual disturbance  Respiratory: Positive for chest tightness and shortness of breath  Negative for cough  Cardiovascular: Negative for chest pain and palpitations  Gastrointestinal: Negative for abdominal pain, constipation, diarrhea, nausea and vomiting  Genitourinary: Negative for difficulty urinating, dysuria, flank pain, frequency and urgency  Musculoskeletal: Negative for back pain, gait problem, neck pain and neck stiffness  Skin: Negative for pallor and rash  Allergic/Immunologic: Negative for environmental allergies and food allergies  Neurological: Negative for dizziness, weakness, numbness and headaches  Psychiatric/Behavioral: Negative for confusion  All other systems reviewed and are negative  Physical Exam  Physical Exam  Vitals signs and nursing note reviewed  Constitutional:       General: She is awake  Appearance: Normal appearance  She is well-developed  She is not ill-appearing, toxic-appearing or diaphoretic  HENT:      Head: Normocephalic and atraumatic  Right Ear: Hearing and external ear normal  No decreased hearing noted   No drainage, swelling or tenderness  No mastoid tenderness  Left Ear: Hearing and external ear normal  No decreased hearing noted  No drainage, swelling or tenderness  No mastoid tenderness  Nose: Nose normal       Mouth/Throat:      Lips: Pink  Mouth: Mucous membranes are moist       Pharynx: Oropharynx is clear  Uvula midline  Eyes:      General: Lids are normal  Vision grossly intact  Right eye: No discharge  Left eye: No discharge  Extraocular Movements: Extraocular movements intact  Conjunctiva/sclera: Conjunctivae normal       Pupils: Pupils are equal, round, and reactive to light  Neck:      Musculoskeletal: Full passive range of motion without pain, normal range of motion and neck supple  Normal range of motion  No neck rigidity, spinous process tenderness or muscular tenderness  Vascular: No JVD  Trachea: Trachea and phonation normal  No tracheal tenderness or tracheal deviation  Cardiovascular:      Rate and Rhythm: Normal rate and regular rhythm  Pulses: Normal pulses  Radial pulses are 2+ on the right side and 2+ on the left side  Posterior tibial pulses are 2+ on the right side and 2+ on the left side  Heart sounds: Normal heart sounds  Pulmonary:      Effort: Pulmonary effort is normal       Breath sounds: Normal breath sounds  No stridor  No decreased breath sounds, wheezing, rhonchi or rales  Chest:      Chest wall: No tenderness  Abdominal:      General: Abdomen is flat  Bowel sounds are normal  There is no distension  Palpations: Abdomen is soft  Abdomen is not rigid  Tenderness: There is no abdominal tenderness  There is no guarding or rebound  Musculoskeletal: Normal range of motion  Right lower leg: Normal       Left lower leg: Normal    Lymphadenopathy:      Head:      Right side of head: No submental, submandibular, tonsillar, preauricular, posterior auricular or occipital adenopathy  Left side of head: No submental, submandibular, tonsillar, preauricular, posterior auricular or occipital adenopathy  Cervical: No cervical adenopathy  Right cervical: No superficial, deep or posterior cervical adenopathy  Left cervical: No superficial, deep or posterior cervical adenopathy  Skin:     General: Skin is warm  Capillary Refill: Capillary refill takes less than 2 seconds  Neurological:      General: No focal deficit present  Mental Status: She is alert and oriented to person, place, and time  GCS: GCS eye subscore is 4  GCS verbal subscore is 5  GCS motor subscore is 6  Sensory: No sensory deficit  Deep Tendon Reflexes: Reflexes are normal and symmetric  Reflex Scores:       Patellar reflexes are 2+ on the right side and 2+ on the left side  Psychiatric:         Mood and Affect: Mood normal          Speech: Speech normal          Behavior: Behavior normal  Behavior is cooperative  Thought Content:  Thought content normal          Judgment: Judgment normal          Vital Signs  ED Triage Vitals   Temperature Pulse Respirations Blood Pressure SpO2   08/13/20 2237 08/13/20 2147 08/13/20 2147 08/13/20 2147 08/13/20 2147   98 5 °F (36 9 °C) 75 18 (!) 208/117 99 %      Temp Source Heart Rate Source Patient Position - Orthostatic VS BP Location FiO2 (%)   08/13/20 2237 08/13/20 2147 08/13/20 2147 08/13/20 2147 --   Oral Monitor Sitting Right arm       Pain Score       08/13/20 2237       3           Vitals:    08/13/20 2330 08/14/20 0000 08/14/20 0101 08/14/20 0207   BP: (S) (!) 201/96 (S) (!) 220/99 (!) 179/99 170/90   Pulse: 68 70  70   Patient Position - Orthostatic VS: Sitting Sitting Sitting Sitting         Visual Acuity      ED Medications  Medications   albuterol (2 5 mg/3 mL) 0 083 % inhalation solution **ADS Override Pull** (  Given 8/13/20 2230)   ipratropium (ATROVENT) 0 02 % inhalation solution **ADS Override Pull** (  Given 8/13/20 2230) magnesium sulfate 2 g/50 mL IVPB (premix) 2 g (0 g Intravenous Stopped 8/13/20 2314)   predniSONE tablet 60 mg (60 mg Oral Given 8/13/20 2236)   albuterol (PROVENTIL HFA,VENTOLIN HFA) inhaler 2 puff (2 puffs Inhalation Given 8/13/20 2237)   sodium chloride 0 9 % bolus 1,000 mL (0 mL Intravenous Stopped 8/14/20 0133)   hydrALAZINE (APRESOLINE) tablet 10 mg (10 mg Oral Given 8/14/20 0012)       Diagnostic Studies  Results Reviewed     Procedure Component Value Units Date/Time    Basic metabolic panel [[id number]]  (Abnormal) Collected:  08/14/20 0140    Lab Status:  Final result Specimen:  Blood Updated:  08/14/20 0152     Sodium 140 mmol/L      Potassium 3 5 mmol/L      Chloride 108 mmol/L      CO2 25 mmol/L      ANION GAP 7 mmol/L      BUN 16 mg/dL      Creatinine 2 40 mg/dL      Glucose 123 mg/dL      Calcium 8 4 mg/dL      eGFR 28 ml/min/1 73sq m     Narrative:       Karen guidelines for Chronic Kidney Disease (CKD):     Stage 1 with normal or high GFR (GFR > 90 mL/min/1 73 square meters)    Stage 2 Mild CKD (GFR = 60-89 mL/min/1 73 square meters)    Stage 3A Moderate CKD (GFR = 45-59 mL/min/1 73 square meters)    Stage 3B Moderate CKD (GFR = 30-44 mL/min/1 73 square meters)    Stage 4 Severe CKD (GFR = 15-29 mL/min/1 73 square meters)    Stage 5 End Stage CKD (GFR <15 mL/min/1 73 square meters)  Note: GFR calculation is accurate only with a steady state creatinine    POCT pregnancy, urine [[id number]]  (Normal) Resulted:  08/14/20 0048    Lab Status:  Final result Updated:  08/14/20 0048     EXT PREG TEST UR (Ref: Negative) Negative     Control Valid    Troponin I [[id number]]  (Normal) Collected:  08/13/20 2214    Lab Status:  Final result Specimen:  Blood from Arm, Right Updated:  08/13/20 2252     Troponin I <0 02 ng/mL     Comprehensive metabolic panel [[id number]]  (Abnormal) Collected:  08/13/20 2214    Lab Status:  Final result Specimen:  Blood from Arm, Right Updated: 08/13/20 2249     Sodium 141 mmol/L      Potassium 3 7 mmol/L      Chloride 106 mmol/L      CO2 26 mmol/L      ANION GAP 9 mmol/L      BUN 15 mg/dL      Creatinine 2 55 mg/dL      Glucose 74 mg/dL      Calcium 8 6 mg/dL      AST 21 U/L      ALT 17 U/L      Alkaline Phosphatase 107 U/L      Total Protein 7 0 g/dL      Albumin 3 3 g/dL      Total Bilirubin 0 20 mg/dL      eGFR 26 ml/min/1 73sq m     Narrative:       National Kidney Disease Foundation guidelines for Chronic Kidney Disease (CKD):     Stage 1 with normal or high GFR (GFR > 90 mL/min/1 73 square meters)    Stage 2 Mild CKD (GFR = 60-89 mL/min/1 73 square meters)    Stage 3A Moderate CKD (GFR = 45-59 mL/min/1 73 square meters)    Stage 3B Moderate CKD (GFR = 30-44 mL/min/1 73 square meters)    Stage 4 Severe CKD (GFR = 15-29 mL/min/1 73 square meters)    Stage 5 End Stage CKD (GFR <15 mL/min/1 73 square meters)  Note: GFR calculation is accurate only with a steady state creatinine    CBC and differential [[id number]]  (Abnormal) Collected:  08/13/20 2214    Lab Status:  Final result Specimen:  Blood from Arm, Right Updated:  08/13/20 2224     WBC 6 35 Thousand/uL      RBC 5 31 Million/uL      Hemoglobin 11 0 g/dL      Hematocrit 36 8 %      MCV 69 fL      MCH 20 7 pg      MCHC 29 9 g/dL      RDW 15 7 %      MPV 11 7 fL      Platelets 760 Thousands/uL      nRBC 0 /100 WBCs      Neutrophils Relative 63 %      Immat GRANS % 0 %      Lymphocytes Relative 26 %      Monocytes Relative 7 %      Eosinophils Relative 4 %      Basophils Relative 0 %      Neutrophils Absolute 4 07 Thousands/µL      Immature Grans Absolute 0 01 Thousand/uL      Lymphocytes Absolute 1 62 Thousands/µL      Monocytes Absolute 0 41 Thousand/µL      Eosinophils Absolute 0 22 Thousand/µL      Basophils Absolute 0 02 Thousands/µL                  XR wrist 3+ views LEFT   ED Interpretation by Veto Miller PA-C (08/14 0041)   No acute osseous abnormality on initial read      XR elbow 3+ vw LEFT   ED Interpretation by Sonny Chin PA-C (08/14 0041)   No acute osseous abnormality on initial read      XR chest 1 view portable   ED Interpretation by Sonny Chin PA-C (08/14 2610)   No acute osseous abnormality on initial read           no acute cardiopulmonary disease on initial read of chest x-ray      Procedures  Procedures         ED Course  ED Course as of Aug 14 0245   Thu Aug 13, 2020   2222 ECG with normal sinus rhythm; ventricular rate 64      Fri Aug 14, 2020   0002 Patient verbalized missing labetalol and amlodipine doses today      0037 Discussed at length proper dosing of antihypertensive medications for patient  Patient states that she had missed her daily dose of antihypertensive medications  Deliver 10 mg of hydralazine and will trend blood pressures  0144 Blood Pressure(!): 179/99   0154 Creatinine(!): 2 40       US AUDIT      Most Recent Value   Initial Alcohol Screen: US AUDIT-C    1  How often do you have a drink containing alcohol?  0 Filed at: 08/13/2020 2207   2  How many drinks containing alcohol do you have on a typical day you are drinking? 0 Filed at: 08/13/2020 2207   3a  Male UNDER 65: How often do you have five or more drinks on one occasion? 0 Filed at: 08/13/2020 2207   3b  FEMALE Any Age, or MALE 65+: How often do you have 4 or more drinks on one occassion? 0 Filed at: 08/13/2020 2207   Audit-C Score  0 Filed at: 08/13/2020 2207                  CELY/DAST-10      Most Recent Value   How many times in the past year have you    Used an illegal drug or used a prescription medication for non-medical reasons?   Never Filed at: 08/13/2020 2207                    Wells' Criteria for PE      Most Recent Value   Wells' Criteria for PE   Clinical signs and symptoms of DVT  0 Filed at: 08/14/2020 0038   PE is primary diagnosis or equally likely  0 Filed at: 08/14/2020 0038   HR >100  0 Filed at: 08/14/2020 0038   Immobilization at least 3 days or Surgery in the previous 4 weeks  0 Filed at: 08/14/2020 0038   Previous, objectively diagnosed PE or DVT  0 Filed at: 08/14/2020 0038   Hemoptysis  0 Filed at: 08/14/2020 0038   Malignancy with treatment within 6 months or palliative  0 Filed at: 08/14/2020 6296   Judith Lambkarlo' Criteria Total  0 Filed at: 08/14/2020 0038                  MDM  Number of Diagnoses or Management Options  Asthma exacerbation: new and does not require workup  Elevated blood pressure reading: new and does not require workup  Hypertension:   Left elbow pain: new and does not require workup  Left wrist pain: new and does not require workup  Stage 3 chronic kidney disease ([facility] [address] ):      Amount and/or Complexity of Data Reviewed  Clinical lab tests: ordered and reviewed  Tests in the radiology section of CPT®: ordered and reviewed  Review and summarize past medical records: yes  Independent visualization of images, tracings, or specimens: yes    Risk of Complications, Morbidity, and/or Mortality  Presenting problems: moderate  Diagnostic procedures: moderate  Management options: low    Patient Progress  Patient progress: stable    Patient is a 57-year-old female with history of asthma, chronic kidney disease, hypertension and cholecystectomy that presents emergency department with gradual onset shortness of breath and chest tightness beginning 3 hours prior to current ED presentation  Patient denies associated symptomatology  Patient initially with blood pressures systolically between 405-423; to which 10 hydralazine delivered  Patient also verbalized that she had not taken her daily dose of antihypertensive medications today  Patient has history of CKD with initial creatinine of 2 55 which responded to 1 L bolus of normal saline solution with final serum creatinine 2 4     Delivered DuoNeb, magnesium, prednisone, and normal saline solution; patient verbalized decrease in chest tightness symptomatology status post medication delivery  Negative wells doubt PE; and patient responded well to aforementioned treatment  Chest x-ray with no acute cardiopulmonary disease on initial read  Patient also verbalized that she had fallen a couple weeks prior to current ED presentation requested elbow and wrist x-rays of left upper extremity; left elbow and left wrist with no acute osseous abnormality  Negative urine pregnancy  Patient with ED to hospital admission 01/23/2020 for hypertensive urgency and was delivered 1 dose of hydralazine in ED with control of pressures and was found that patient has not been compliant with taking amlodipine 5 mg daily and increased to 10 mg daily of amlodipine  Prescribed prednisone and counseled patient medication administration and side effects  Patient demonstrates verbal understanding and mechanical understanding of Ventolin inhaler use  Follow-up with PCP  Follow up emergency department symptoms persist or exacerbate  Patient demonstrates verbal understanding all clinical laboratory imaging findings, discharge instructions, follow-up and verbalized agreement with current treatment plan      Disposition  Final diagnoses:   Asthma exacerbation   Elevated blood pressure reading   Stage 3 [facility] ([facility] )   Hypertension   Left wrist pain   Left elbow pain     Time reflects when diagnosis was documented in both MDM as applicable and the Disposition within this note     Time User Action Codes Description Comment    8/14/2020  1:54 AM Loman Isacc Add [J45 901] Asthma exacerbation     8/14/2020  1:55 AM Loman Isacc Add [R03 0] Elevated blood pressure reading     8/14/2020  1:55 AM Loman Isacc Add [N18 3] Stage 3 chronic kidney disease ([facility] [place] [address] )     8/14/2020  1:56 AM Loman Isacc Add [I10] Hypertension     8/14/2020  1:58 AM Loman Isacc Add [M25 532] Left wrist pain     8/14/2020  1:58 AM Loman Isacc Add [M25 522] Left elbow pain       ED Disposition     ED Disposition Condition Date/Time Comment    Discharge Stable Fri Aug 14, 2020  1:54 QIAN Samaniego discharge to home/self care              Follow-up Information     Follow up With Specialties Details Why Contact Info Additional [facility] Medicine Call in 1 week for further evaluation of symptoms [address] [id number]  [id number] , [facility] [address], [place], [place], [address] Emergency Department Emergency Medicine Go to  As needed [address]  [phone] [facility] ED, [address], [place], [address] [facility], [address]          Discharge Medication List as of 8/14/2020  1:58 AM      START taking these medications    Details   predniSONE 20 mg tablet Take 2 tablets (40 mg total) by mouth daily for 4 days, Starting Fri 8/14/2020, Until Tue 8/18/2020, Normal         CONTINUE these medications which have NOT CHANGED    Details   acetaminophen (TYLENOL) 325 mg tablet Take 2 tablets (650 mg total) by mouth every 6 (six) hours as needed for mild pain or headaches, Starting Sun 1/26/2020, No Print      albuterol (2 5 mg/3 mL) 0 083 % nebulizer solution Take 1 vial (2 5 mg total) by nebulization every 6 (six) hours as needed for wheezing or shortness of breath, Starting Mon 6/22/2020, Normal      albuterol (VENTOLIN HFA) 90 mcg/act inhaler Inhale 2 puffs every 6 (six) hours as needed for wheezing or shortness of breath (cough), Starting Wed 1/30/2019, Normal      amLODIPine (NORVASC) 10 mg tablet Take 1 tablet (10 mg total) by mouth daily, Starting Mon 6/22/2020, Normal      Blood Pressure KIT by Does not apply route daily, Starting Wed 6/24/2020, Normal      docusate sodium (COLACE) 100 mg capsule Take 1 capsule (100 mg total) by mouth 2 (two) times a day as needed for constipation, Starting Sun 1/26/2020, No Print      etonogestrel (NEXPLANON) subdermal implant 68 mg by Subdermal route once, Historical Med fluticasone (FLOVENT HFA) 110 MCG/ACT inhaler Inhale 1 puff 2 (two) times a day, Starting Wed 1/30/2019, Normal      guaiFENesin (MUCINEX PO) Take by mouth, Historical Med      iron polysaccharides (FERREX) 150 mg capsule Take 1 capsule (150 mg total) by mouth daily, Starting Sun 1/26/2020, Normal      labetalol (NORMODYNE) 100 mg tablet Take 1 tablet (100 mg total) by mouth 2 (two) times a day, Starting Wed 2/5/2020, Normal      loratadine (CLARITIN) 10 mg tablet Take 1 tablet (10 mg total) by mouth daily for 7 days, Starting Sat 1/18/2020, Until Sat 1/25/2020, Print           No discharge procedures on file      PDMP Review     None          ED Provider  Electronically Signed by           Vincent Allison PA-C  08/14/20 1744

## 2020-08-18 ENCOUNTER — OFFICE VISIT (OUTPATIENT)
Dept: OCCUPATIONAL THERAPY | Facility: CLINIC | Age: 25
End: 2020-08-18
Payer: COMMERCIAL

## 2020-08-18 DIAGNOSIS — F07.81 POST CONCUSSION SYNDROME: Primary | ICD-10-CM

## 2020-08-18 PROCEDURE — 97530 THERAPEUTIC ACTIVITIES: CPT

## 2020-08-18 NOTE — PROGRESS NOTES
Daily Note     Today's date: 2020  Patient name: Avtar Burnham  : 1995  MRN: [id number]  Referring provider: Petty Lopez DO  Dx:   Encounter Diagnosis   Name Primary?  Post concussion syndrome Yes                  Subjective: "I noticed at work my memory is getting better"  Objective: See treatment diary below    Pt participated in skilled OT focusing on working memory, immediate/delayed memory recall, sustained/alternating attention and verbal/written direction follow in multimodal environment to improve occupational performance in home and work environment  Auditory processing with 3 min delayed recall of 4 word sequences w/ sentence formation engaging in mind manipulation task while at same time completing [employer] "Project Planning" task by placing items in graphed boxes per written direction follow  Assessment: Tolerated treatment well  Pt able to terminate task with 3 min timed delay and switch attention to alternate activity w/o cues demo-G immediate recall to sequence 4 words into sentences followed by delayed recall  Pt completed planning activity following written direction attending to task with environmental stimuli        Patient would benefit from continued OT      Plan: Continued skilled OT per POC     INTERVENTION COMMENTS:  Diagnosis: PCS  Precautions:near syncope, HTN w/ CKD, anemia  FOTO: 93 with 7% limitation brain injury  Insurance: [employer]  5 of10 visits, PN due 2020

## 2020-08-20 ENCOUNTER — OFFICE VISIT (OUTPATIENT)
Dept: OCCUPATIONAL THERAPY | Facility: CLINIC | Age: 25
End: 2020-08-20
Payer: COMMERCIAL

## 2020-08-20 DIAGNOSIS — F07.81 POST CONCUSSION SYNDROME: Primary | ICD-10-CM

## 2020-08-20 PROCEDURE — 97530 THERAPEUTIC ACTIVITIES: CPT

## 2020-08-20 NOTE — PROGRESS NOTES
Daily Note     Today's date: 2020  Patient name: Ricardo Ellison  : 1995  MRN: [id number]  Referring provider: Latonia Lo DO  Dx:   Encounter Diagnosis   Name Primary?  Post concussion syndrome Yes       Start Time:   Stop Time:   Total time in clinic (min): 45 minutes    Subjective:       Objective: See treatment diary below    Pt participated in skilled OT focusing on memory and attention to increase functional cognitive skills to improve safety awareness and accuracy in worker role  Auditory processing with 3 min delayed recall to short story focusing on sequencing details of story for 6 trials simutanouly engaging in "on the dot" performing dual tasking  Assessment: Tolerated treatment well  Pt attended to dual tasking successfully initiating recall of story w/50% accuracy for 1st trial recalling 2nd half of story, increasing recall in sequential order w/each trial w/min cues    Patient would benefit from continued OT      Plan: Continued skilled OT per POC     INTERVENTION COMMENTS:  Diagnosis: PCS  Precautions:near syncope, HTN w/ CKD, anemia  FOTO: 93 with 7% limitation brain injury  Insurance: [employer]  7 [id number], PN due 2020

## 2020-08-25 ENCOUNTER — OFFICE VISIT (OUTPATIENT)
Dept: OCCUPATIONAL THERAPY | Facility: CLINIC | Age: 25
End: 2020-08-25
Payer: COMMERCIAL

## 2020-08-25 DIAGNOSIS — F07.81 POST CONCUSSION SYNDROME: Primary | ICD-10-CM

## 2020-08-25 PROCEDURE — 97530 THERAPEUTIC ACTIVITIES: CPT

## 2020-08-25 NOTE — PROGRESS NOTES
Daily Note     Today's date: 2020  Patient name: Lee Tobar  : 1995  MRN: [id number]  Referring provider: Oksana Becker DO  Dx:   Encounter Diagnosis   Name Primary?  Post concussion syndrome Yes       Start Time: 1140  Stop Time: 6465  Total time in clinic (min): 55 minutes    Subjective: "I'm not good with knowing cities"  Objective: See treatment diary below    Pt participated in skilled OT focusing on memory recall, direction follow and attention to improve functional cog skills and improve engagement in lifes roles  Pt engaged in Organize the Hour activity focusing on task organization, verbal direction follow, sustained/alternating attention and immediate memory recall in multimodal environment  Assessment: Tolerated treatment well  Pt organized 9/9 tasks per verbal direction follow with G follow through  Pt demo-difficulty to follow verbal directions w/frequent repeat when completing word puzzles  Visual memory recall, 4/4 boxed information with 30 sec study time  Pt completed 3/9 activities on 40 min   Patient would benefit from continued OT    Plan: Continued skilled OT per POC     INTERVENTION COMMENTS:  Diagnosis: PCS  Precautions:near syncope, HTN w/ CKD, anemia  FOTO: 93 with 7% limitation brain injury  Insurance: [employer] JAZMYN  9 [id number] JUAN ANTONIO PN due 2020

## 2020-08-27 ENCOUNTER — EVALUATION (OUTPATIENT)
Dept: OCCUPATIONAL THERAPY | Facility: CLINIC | Age: 25
End: 2020-08-27
Payer: COMMERCIAL

## 2020-08-27 ENCOUNTER — TRANSCRIBE ORDERS (OUTPATIENT)
Dept: OCCUPATIONAL THERAPY | Facility: CLINIC | Age: 25
End: 2020-08-27

## 2020-08-27 DIAGNOSIS — F07.81 POST CONCUSSION SYNDROME: Primary | ICD-10-CM

## 2020-08-27 PROCEDURE — 97530 THERAPEUTIC ACTIVITIES: CPT

## 2020-08-27 NOTE — LETTER
2020    Alex Samson DO  [address] [address]    Patient: Lee Tobar   YOB: 1995   Date of Visit: 2020     Encounter Diagnosis     ICD-10-CM    1  Post concussion syndrome  F07 81        Dear Dr Tyler Sake: Thank you for your recent referral of Lee Tobar  Please review the attached evaluation summary from [facility] recent visit  Please verify that you agree with the plan of care by signing the attached order  If you have any questions or concerns, please do not hesitate to call  I sincerely appreciate the opportunity to share in the care of one of your patients and hope to have another opportunity to work with you in the near future  Sincerely,    Maddy Chandra OT      Referring Provider:     I certify that I have read the below Plan of Care and certify the need for these services furnished under this plan of treatment while under my care                      Alex Samson DO  [address] [address]  VIA Facsimile: [phone]         Occupational Therapy Concussion Re- Evaluation:    Today's Date: 2020  Patient Name: Lee Tobar  : 1995  MRN: [id number]  Referring Provider: Oksana Becker DO  Dx: Post concussion syndrome [F07 81]    Active Problem List:   Patient Active Problem List   Diagnosis    Asthma    Altered mental status    Headache    Head injury    Acute kidney injury superimposed on chronic kidney disease ([address] )    Confusion    Elevated serum creatinine    Abnormal facial hair    Acne vulgaris    Intrinsic eczema    Left knee pain    Patellofemoral disorder of left knee    Musculoskeletal chest pain    Keloid of skin    Allergy to wheat    Near syncope    Benign hypertension with CKD (chronic kidney disease) stage III ([address] )    URI (upper respiratory infection)    Morbid obesity with BMI of 40 0-44 9, adult (HCC)    Iron deficiency anemia    Stage 3 chronic kidney disease (HCC)    Persistent proteinuria    Post concussion syndrome     Past Medical Hx:   Past Medical History:   Diagnosis Date    Asthma     Chronic kidney disease     Eczema     Hypertension     Kidney disease      Past Surgical Hx:   Past Surgical History:   Procedure Laterality Date    CHOLECYSTECTOMY      CT NEEDLE BIOPSY KIDNEY  2020    GALLBLADDER SURGERY        Pain Levels:   Restin    With Activity:  0    Subjective/Patient Goal: "I feel like my memory got better but its not perfect"      Objective  Impairments Section:   1  Convergence Insufficiency Symptom Survey (CISS):  PASS    2  Concussion Cognitive Checklist:  *Patient indicated that she is experiencing difficulties in the following areas:    · Memory: Remembering the date/day of the week    · Attention: Keeping your attention/concentrating on a task    · Processing: Responding to questions in a timely manner     · Executive Functions: Organizing your thoughts    · Communication: Word finding in conversation and Expressing thoughts and ideas into writing    · Visual: Change in handwriting (i e , sloppier)    · Emotional: Sleep changes    · Increased Sensitivities to: Noise, Touch and Smell     3  Contextual Memory Test (CMT): Pt reports has not noticed a change in her memory, rates her memory capacity at 75%, if studied 20 objects for 90 seconds would recall 20 of them, would have recalled 20 of them pre injury, frequently forgets things that happened the day before 50% of the time, forgets important details 25% of the time, frequently forgets things people have told her 25% of the time, frequently forgets things that happened a few minutes ago 25% of the time, would not remember facts about this form a week from now      IMMEDIATE RECALL:   16/20  score falls  in WFL/WNL range    DELAYED RECALL:  14/20 score falls in wfl/wnl deficit range   RECOGNITION:  20/20 with 0 confabulations resulting in score      4  [place] Cognitive Assessment Version 8 2 (MoCA V8 2)  Visuospatial/executive functionin/5  Naming:  3/3  Memory: 1st trial:  , 2nd trial:    Attention/concentration:   List of letters:   Serial Seven Subtraction:  0/3 w/ 4 errors  Language/sentence repetition:  1/2  Language Fluency:  0/1  Abstract/Correlational Thinkin/2  Delayed Recall:    Orientation:     Memory Index Score: 13/15  MoCA V1 8 2 Raw Score:  , MIS:  13/15, indicative of mild neurocognitive impairments  5  Vision Screening Recording Form:   Defer 2* WNL testing on IE      Assessment/Plan  Occupational Therapy Skilled Analysis Assessment and Plan of Care:  Pt arrived 30 min late to OT re-evaluation and progress update today  Pt has been consistently at least 10-15 minutes late or more to OT appointments with little improvement in attendance despite multiple education sessions and direction  Pt reports that since participation in skilled OT she has noticed a "little" improvement in her memory stating "you guys are helping me remember more"  Pt following standardized tests of CMT and MOCA and demo improvement in visual memory but inc difficulties with  mental manipulation, processing, and EF skills  Pt will continue to benefit from skilled OT services to address goals as listed below  Pt edu on attendance policy and agreeable to adjust tx times in future to ensure attendance          Goals:  Short Term Goals: (4 weeks)  Cognition:  - Pt will increase auditory processing to take notes while listening in multi-modal environment symptom free at baseline performance for improved role performance, once returned as applicable: NMET  - Pt will increase attention to 2+ tasks for improved role performance (once returned) and engagement in salient tasks as applicable: NOT MET  - Pt will increase temporal awareness for keeping OT to schedule within 5 min increments, recall appointments, functional addition of time with 80% accuracy: PARTIALLY MET  - Pt will increase verbal and written direction following with processing time of <2 min and 80% accuracy for improved role performance, once returned as applicable: NOT MET  - Pt will demo good carryover of internal and external memory aides for improved recall of daily events, improved executive functioning with 80% accuracy: MET  - Pt will increase insight into deficits for improved carryover of recommendations/ accommodations: MET  Long Term Goals: (8 weeks)  Cognition:  - Pt will increase attention to 3+ tasks for improved divided attention with occupational roles and pre driving roles as applicable: NOT MET  - Pt will increase verbal and written direction following with processing time of <1 min and 100% accuracy: NOT MET  - Pt will tolerate multimodal envt x 60 min symptom free for return to occupational roles: NOT MET  - Pt will demo with decreased anxiety and frustration for improved insight into process and rate of recovery: NOT MET      INTERVENTION COMMENTS:    Diagnosis: PCS  Precautions:near syncope, HTN w/ CKD, anemia  FOTO: 93 with 7% limitation brain injury  Insurance: [employer]  1 [id number] [facility] [place] 8/27/2020

## 2020-08-27 NOTE — PROGRESS NOTES
Occupational Therapy Concussion Re- Evaluation:    Today's Date: 2020  Patient Name: Bull Alvarado  : 1995  MRN: [id number]  Referring Provider: Yue Viera DO  Dx: Post concussion syndrome [F07 81]    Active Problem List:   Patient Active Problem List   Diagnosis    Asthma    Altered mental status    Headache    Head injury    Acute kidney injury superimposed on chronic kidney disease ([facility] [address] [facility] )    Confusion    Elevated serum creatinine    Abnormal facial hair    Acne vulgaris    Intrinsic eczema    Left knee pain    Patellofemoral disorder of left knee    Musculoskeletal chest pain    Keloid of skin    Allergy to wheat    Near syncope    Benign hypertension with CKD (chronic kidney disease) stage III ([facility] [address] )    URI (upper respiratory infection)    Morbid obesity with BMI of 40 0-44 9, adult (HCC)    Iron deficiency anemia    Stage 3 chronic kidney disease (HCC)    Persistent proteinuria    Post concussion syndrome     Past Medical Hx:   Past Medical History:   Diagnosis Date    Asthma     Chronic kidney disease     Eczema     Hypertension     Kidney disease      Past Surgical Hx:   Past Surgical History:   Procedure Laterality Date    CHOLECYSTECTOMY      CT NEEDLE BIOPSY KIDNEY  2020    GALLBLADDER SURGERY        Pain Levels:   Restin    With Activity:  0    Subjective/Patient Goal: "I feel like my memory got better but its not perfect"      Objective  Impairments Section:   1  Convergence Insufficiency Symptom Survey (CISS):  PASS    2   Concussion Cognitive Checklist:  *Patient indicated that she is experiencing difficulties in the following areas:    · Memory: Remembering the date/day of the week    · Attention: Keeping your attention/concentrating on a task    · Processing: Responding to questions in a timely manner     · Executive Functions: Organizing your thoughts    · Communication: Word finding in conversation and Expressing thoughts and ideas into writing    · Visual: Change in handwriting (i e , sloppier)    · Emotional: Sleep changes    · Increased Sensitivities to: Noise, Touch and Smell     3  Contextual Memory Test (CMT): Pt reports has not noticed a change in her memory, rates her memory capacity at 75%, if studied 20 objects for 90 seconds would recall 20 of them, would have recalled 20 of them pre injury, frequently forgets things that happened the day before 50% of the time, forgets important details 25% of the time, frequently forgets things people have told her 25% of the time, frequently forgets things that happened a few minutes ago 25% of the time, would not remember facts about this form a week from now  IMMEDIATE RECALL:     score falls  in WFL/WNL range    DELAYED RECALL:   score falls in wfl/wnl deficit range   RECOGNITION:   with 0 confabulations resulting in score 20/20     4  [place] Cognitive Assessment Version 8 2 (MoCA V8 2)  Visuospatial/executive functionin/5  Naming:  3/3  Memory: 1st trial:  4/5, 2nd trial:  4/5  Attention/concentration: 1/2  List of letters: 1/1  Serial Seven Subtraction:  0/3 w/ 4 errors  Language/sentence repetition:  1/2  Language Fluency:  0/1  Abstract/Correlational Thinkin/2  Delayed Recall:  4/5  Orientation:  5/6   Memory Index Score: 13/15  MoCA V1 8 2 Raw Score:  22/30, MIS:  13/15, indicative of mild neurocognitive impairments  5  Vision Screening Recording Form:   Defer 2* WNL testing on IE      Assessment/Plan  Occupational Therapy Skilled Analysis Assessment and Plan of Care:  Pt arrived 30 min late to OT re-evaluation and progress update today  Pt has been consistently at least 10-15 minutes late or more to OT appointments with little improvement in attendance despite multiple education sessions and direction   Pt reports that since participation in skilled OT she has noticed a "little" improvement in her memory stating "you guys are helping me remember more"  Pt following standardized tests of CMT and MOCA and demo improvement in visual memory but inc difficulties with  mental manipulation, processing, and EF skills  Pt will continue to benefit from skilled OT services to address goals as listed below  Pt edu on attendance policy and agreeable to adjust tx times in future to ensure attendance          Goals:  Short Term Goals: (4 weeks)  Cognition:  - Pt will increase auditory processing to take notes while listening in multi-modal environment symptom free at baseline performance for improved role performance, once returned as applicable: NMET  - Pt will increase attention to 2+ tasks for improved role performance (once returned) and engagement in salient tasks as applicable: NOT MET  - Pt will increase temporal awareness for keeping OT to schedule within 5 min increments, recall appointments, functional addition of time with 80% accuracy: PARTIALLY MET  - Pt will increase verbal and written direction following with processing time of <2 min and 80% accuracy for improved role performance, once returned as applicable: NOT MET  - Pt will demo good carryover of internal and external memory aides for improved recall of daily events, improved executive functioning with 80% accuracy: MET  - Pt will increase insight into deficits for improved carryover of recommendations/ accommodations: MET  Long Term Goals: (8 weeks)  Cognition:  - Pt will increase attention to 3+ tasks for improved divided attention with occupational roles and pre driving roles as applicable: NOT MET  - Pt will increase verbal and written direction following with processing time of <1 min and 100% accuracy: NOT MET  - Pt will tolerate multimodal envt x 60 min symptom free for return to occupational roles: NOT MET  - Pt will demo with decreased anxiety and frustration for improved insight into process and rate of recovery: NOT MET      INTERVENTION COMMENTS:    Diagnosis: PCS  Precautions:near syncope, HTN w/ CKD, anemia  FOTO: 93 with 7% limitation brain injury  Insurance: [employer] JAZMYN  5 [id number] JOSÉ LUIS BREWER due 8/27/2020

## 2020-08-29 ENCOUNTER — APPOINTMENT (EMERGENCY)
Dept: RADIOLOGY | Facility: HOSPITAL | Age: 25
End: 2020-08-29
Payer: COMMERCIAL

## 2020-08-29 ENCOUNTER — HOSPITAL ENCOUNTER (EMERGENCY)
Facility: HOSPITAL | Age: 25
Discharge: HOME | End: 2020-08-29
Attending: EMERGENCY MEDICINE
Payer: COMMERCIAL

## 2020-08-29 VITALS
DIASTOLIC BLOOD PRESSURE: 97 MMHG | SYSTOLIC BLOOD PRESSURE: 178 MMHG | TEMPERATURE: 98.2 F | OXYGEN SATURATION: 100 % | RESPIRATION RATE: 18 BRPM | HEART RATE: 92 BPM

## 2020-08-29 DIAGNOSIS — S90.129A TOE CONTUSION: Primary | ICD-10-CM

## 2020-08-29 PROCEDURE — 99283 EMERGENCY DEPT VISIT LOW MDM: CPT

## 2020-08-29 PROCEDURE — 99284 EMERGENCY DEPT VISIT MOD MDM: CPT | Performed by: EMERGENCY MEDICINE

## 2020-08-29 PROCEDURE — 73630 X-RAY EXAM OF FOOT: CPT

## 2020-08-29 NOTE — DISCHARGE INSTRUCTIONS
DIAGNOSIS; RIGHT 2ND TOE CONTUSION- BRUISE    - ACTIVITY AS TOLERATED     - CAN TAEP TOES TOGETHER AS NEEDED FOR SUPPORT    - FOR PAIN- OVER THE COUNTER TYLENOL 500 MG EVERY 4 HRS    - IF YOU ARE STILL  HAVING SIGNIFICANT PAIN IN TOES AFTER SEVERAL WEEKS - PLEASE CALL  [facility] TO SCHEDULE AN APPOINTMENT

## 2020-09-01 ENCOUNTER — OFFICE VISIT (OUTPATIENT)
Dept: OCCUPATIONAL THERAPY | Facility: CLINIC | Age: 25
End: 2020-09-01
Payer: COMMERCIAL

## 2020-09-01 DIAGNOSIS — F07.81 POST CONCUSSION SYNDROME: Primary | ICD-10-CM

## 2020-09-01 PROCEDURE — 97530 THERAPEUTIC ACTIVITIES: CPT

## 2020-09-01 PROCEDURE — 97150 GROUP THERAPEUTIC PROCEDURES: CPT

## 2020-09-01 NOTE — PROGRESS NOTES
Daily Note     Today's date: 2020  Patient name: Gustabo Garber  : 1995  MRN: [id number]  Referring provider: Art Vo, DO  Dx:   Encounter Diagnosis   Name Primary?  Post concussion syndrome Yes                  Subjective: "I just did a case like this with my professor"  Objective: See treatment diary below    Pt participated in skilled OT focusing on attention, memory recall, direction follow and problem solving improving functional cog engaging in worker role and college education  Pt engaged in visual memory and sustained attention task with 1 min study time to compare/contrast between 2 pictures followed by deductive reasoning activity and reading comprehension with immediate recall to answer questions  Assessment: Tolerated treatment well  Pt presented with G comprehension, attention and memory recall engaging in "crime scene" puzzles relating to field of study in college   Patient would benefit from continued OT      Plan: Continued skilled OT per POC     INTERVENTION COMMENTS:     Diagnosis: PCS  Precautions:near syncope, HTN w/ CKD, anemia  FOTO: 93 with 7% limitation brain injury  Insurance: [facility]  3 [id number], PN due 2020:  [id number]  [id number]  [id number]

## 2020-09-01 NOTE — ED PROVIDER NOTES
History  Chief Complaint   Patient presents with    Toe Injury     Patient reports injuring 2nd toe on right foot 1 week ago on the stairs  Took tylenol at 2pm today  25 yr frmae bending injury to r 2nd/3rd toe 1 week ago c/o pain mostly in 2 nd toe-- no other comps or injuries      History provided by:  Patient   used: No        Prior to Admission Medications   Prescriptions Last Dose Informant Patient Reported? Taking?    Blood Pressure KIT  Self No No   Sig: by Does not apply route daily   Patient not taking: Reported on 2020   acetaminophen (TYLENOL) 325 mg tablet  Self No No   Sig: Take 2 tablets (650 mg total) by mouth every 6 (six) hours as needed for mild pain or headaches   albuterol (2 5 mg/3 mL) 0 083 % nebulizer solution  Self No No   Sig: Take 1 vial (2 5 mg total) by nebulization every 6 (six) hours as needed for wheezing or shortness of breath   albuterol (VENTOLIN HFA) 90 mcg/act inhaler  Self No No   Sig: Inhale 2 puffs every 6 (six) hours as needed for wheezing or shortness of breath (cough)   amLODIPine (NORVASC) 10 mg tablet  Self No No   Sig: Take 1 tablet (10 mg total) by mouth daily   docusate sodium (COLACE) 100 mg capsule  Self No No   Sig: Take 1 capsule (100 mg total) by mouth 2 (two) times a day as needed for constipation   Patient not taking: Reported on 3/16/2020   etonogestrel (NEXPLANON) subdermal implant  Self Yes No   Si mg by Subdermal route once   fluticasone (FLOVENT HFA) 110 MCG/ACT inhaler  Self No No   Sig: Inhale 1 puff 2 (two) times a day   guaiFENesin (MUCINEX PO)  Self Yes No   Sig: Take by mouth   iron polysaccharides (FERREX) 150 mg capsule  Self No No   Sig: Take 1 capsule (150 mg total) by mouth daily   labetalol (NORMODYNE) 100 mg tablet  Self No No   Sig: Take 1 tablet (100 mg total) by mouth 2 (two) times a day   loratadine (CLARITIN) 10 mg tablet   No No   Sig: Take 1 tablet (10 mg total) by mouth daily for 7 days Facility-Administered Medications: None       Past Medical History:   Diagnosis Date    Asthma     Chronic kidney disease     Eczema     Hypertension     Kidney disease        Past Surgical History:   Procedure Laterality Date    CHOLECYSTECTOMY      CT NEEDLE BIOPSY KIDNEY  1/29/2020    GALLBLADDER SURGERY         Family History   Problem Relation Age of Onset    Asthma Mother     No Known Problems Father      I have reviewed and agree with the history as documented  E-Cigarette/Vaping    E-Cigarette Use Never User      E-Cigarette/Vaping Substances    Nicotine No     THC No     CBD No     Flavoring No     Other No     Unknown No      Social History     Tobacco Use    Smoking status: Never Smoker    Smokeless tobacco: Never Used   Substance Use Topics    Alcohol use: Yes     Frequency: Monthly or less     Drinks per session: 1 or 2     Binge frequency: Never     Comment: occassionally on social events    Drug use: No       Review of Systems   Constitutional: Negative  HENT: Negative  Eyes: Negative  Respiratory: Negative  Cardiovascular: Negative  Gastrointestinal: Negative  Endocrine: Negative  Genitourinary: Negative  Musculoskeletal: Negative  R 2nd/3rdtoe injury   Skin: Negative  Allergic/Immunologic: Negative  Neurological: Negative  Hematological: Negative  Psychiatric/Behavioral: Negative  Physical Exam  Physical Exam  Vitals signs and nursing note reviewed  Constitutional:       General: She is not in acute distress  Appearance: Normal appearance  She is obese  She is not ill-appearing, toxic-appearing or diaphoretic  Comments: avss- htnsive-- pulse ox 100 % on ra- interpretation is normal- no intervention    Musculoskeletal:      Comments: r ankle/foot- nt- mild r 2nd / 3rd toe tenderness- no deformity -normal rom/normal sensation/ cap refill   Neurological:      Mental Status: She is alert           Vital Signs  ED Triage Vitals [08/29/20 1534]   Temperature Pulse Respirations Blood Pressure SpO2   98 2 °F (36 8 °C) 92 18 (!) 178/97 100 %      Temp Source Heart Rate Source Patient Position - Orthostatic VS BP Location FiO2 (%)   Oral Monitor Sitting Right arm --      Pain Score       6           Vitals:    08/29/20 1534   BP: (!) 178/97   Pulse: 92   Patient Position - Orthostatic VS: Sitting         Visual Acuity      ED Medications  Medications - No data to display    Diagnostic Studies  Results Reviewed     None                 XR foot 3+ vw right   ED Interpretation by Keke Lucio MD (08/29 1645)   NO FX      Final Result by Dean Kong MD (08/29 1926)      No acute osseous abnormality  Workstation performed: [id number]                    Procedures  Procedures         ED Course  ED Course as of Sep 01 1104   Sat Aug 29, 2020   1644 R FOOT - XRAY -ATT 2ND/3RD TOES- NO FX                                                 MDM      Disposition  Final diagnoses: Toe contusion     Time reflects when diagnosis was documented in both MDM as applicable and the Disposition within this note     Time User Action Codes Description Comment    8/29/2020  4:45 PM Carlee Jack Add [L85 877Z] Toe contusion       ED Disposition     ED Disposition Condition Date/Time Comment    Discharge Stable Sat Aug 29, 2020  4:45 PM [place] discharge to home/self care              Follow-up Information     Follow up With Specialties Details Why Contact Info    [facility] (OUTPATIENT [facility]) Call  As needed [address] [place] [address]  [phone]            Discharge Medication List as of 8/29/2020  4:47 PM      CONTINUE these medications which have NOT CHANGED    Details   acetaminophen (TYLENOL) 325 mg tablet Take 2 tablets (650 mg total) by mouth every 6 (six) hours as needed for mild pain or headaches, Starting Sun 1/26/2020, No Print      albuterol (2 5 mg/3 mL) 0 083 % nebulizer solution Take 1 vial (2 5 mg total) by nebulization every 6 (six) hours as needed for wheezing or shortness of breath, Starting Mon 6/22/2020, Normal      albuterol (VENTOLIN HFA) 90 mcg/act inhaler Inhale 2 puffs every 6 (six) hours as needed for wheezing or shortness of breath (cough), Starting Wed 1/30/2019, Normal      amLODIPine (NORVASC) 10 mg tablet Take 1 tablet (10 mg total) by mouth daily, Starting Mon 6/22/2020, Normal      Blood Pressure KIT by Does not apply route daily, Starting Wed 6/24/2020, Normal      docusate sodium (COLACE) 100 mg capsule Take 1 capsule (100 mg total) by mouth 2 (two) times a day as needed for constipation, Starting Sun 1/26/2020, No Print      etonogestrel (NEXPLANON) subdermal implant 68 mg by Subdermal route once, Historical Med      fluticasone (FLOVENT HFA) 110 MCG/ACT inhaler Inhale 1 puff 2 (two) times a day, Starting Wed 1/30/2019, Normal      guaiFENesin (MUCINEX PO) Take by mouth, Historical Med      iron polysaccharides (FERREX) 150 mg capsule Take 1 capsule (150 mg total) by mouth daily, Starting Sun 1/26/2020, Normal      labetalol (NORMODYNE) 100 mg tablet Take 1 tablet (100 mg total) by mouth 2 (two) times a day, Starting Wed 2/5/2020, Normal      loratadine (CLARITIN) 10 mg tablet Take 1 tablet (10 mg total) by mouth daily for 7 days, Starting Sat 1/18/2020, Until Sat 1/25/2020, Print           No discharge procedures on file      PDMP Review     None          ED Provider  Electronically Signed by           Tim Dc MD  09/01/20 5873

## 2020-09-03 ENCOUNTER — OFFICE VISIT (OUTPATIENT)
Dept: URGENT CARE | Facility: CLINIC | Age: 25
End: 2020-09-03
Payer: COMMERCIAL

## 2020-09-03 ENCOUNTER — OFFICE VISIT (OUTPATIENT)
Dept: OCCUPATIONAL THERAPY | Facility: CLINIC | Age: 25
End: 2020-09-03
Payer: COMMERCIAL

## 2020-09-03 VITALS
OXYGEN SATURATION: 100 % | SYSTOLIC BLOOD PRESSURE: 148 MMHG | RESPIRATION RATE: 16 BRPM | WEIGHT: 268.8 LBS | BODY MASS INDEX: 45.89 KG/M2 | HEIGHT: 64 IN | TEMPERATURE: 98.4 F | HEART RATE: 84 BPM | DIASTOLIC BLOOD PRESSURE: 81 MMHG

## 2020-09-03 DIAGNOSIS — F07.81 POST CONCUSSION SYNDROME: Primary | ICD-10-CM

## 2020-09-03 DIAGNOSIS — S99.922A FOOT INJURY, LEFT, INITIAL ENCOUNTER: Primary | ICD-10-CM

## 2020-09-03 PROCEDURE — 97530 THERAPEUTIC ACTIVITIES: CPT

## 2020-09-03 PROCEDURE — 99213 OFFICE O/P EST LOW 20 MIN: CPT | Performed by: NURSE PRACTITIONER

## 2020-09-03 NOTE — PROGRESS NOTES
[facility]        NAME: Han Dumas is a 23 y o  female  : 1995    MRN: [id number]  DATE: September 3, 2020  TIME: 3:35 PM    Assessment and Plan   Foot injury, left, initial encounter [O18 092Z]  1  Foot injury, left, initial encounter  XR foot 3+ vw left    Ambulatory referral to Orthopedic Surgery         Patient Instructions     Use crutches, wear soft cast boot, avoid weight on foot until cleared by ortho  Motrin as needed for pain  ER for worsening  Chief Complaint     Chief Complaint   Patient presents with    Foot Injury     left foot injury sustained when a full paint can fell on her foot  History of Present Illness         Complaining of left foot pain since injuring two days ago  Full can of 1 gallon paint fell approximately 3 ft while at home, landing on left lateral aspect of foot  Significant pain since, entirety of lateral aspect, increased with weight bearing  Possible swelling, not sure  No bleeding  Intermittent mild numbness of pinky toe  No numbness otherwise  No pain elsewhere in foot, including dorsal midfoot, heel  No ankle pain  Rates pain 8/10 at present (seated)  Taking Tylenol with minimal benefit  No ice or heat  No prior foot injuries  Review of Systems   Review of Systems   Constitutional: Negative for fever  Musculoskeletal: Positive for arthralgias  Skin: Negative for wound           Current Medications       Current Outpatient Medications:     acetaminophen (TYLENOL) 325 mg tablet, Take 2 tablets (650 mg total) by mouth every 6 (six) hours as needed for mild pain or headaches, Disp: 30 tablet, Rfl: 0    albuterol (2 5 mg/3 mL) 0 083 % nebulizer solution, Take 1 vial (2 5 mg total) by nebulization every 6 (six) hours as needed for wheezing or shortness of breath, Disp: 30 mL, Rfl: 0    albuterol (VENTOLIN HFA) 90 mcg/act inhaler, Inhale 2 puffs every 6 (six) hours as needed for wheezing or shortness of breath (cough), Disp: 1 Inhaler, Rfl: 11    amLODIPine (NORVASC) 10 mg tablet, Take 1 tablet (10 mg total) by mouth daily, Disp: 30 tablet, Rfl: 0    Blood Pressure KIT, by Does not apply route daily, Disp: 1 each, Rfl: 0    docusate sodium (COLACE) 100 mg capsule, Take 1 capsule (100 mg total) by mouth 2 (two) times a day as needed for constipation, Disp: 10 capsule, Rfl: 0    etonogestrel (NEXPLANON) subdermal implant, 68 mg by Subdermal route once, Disp: , Rfl:     fluticasone (FLOVENT HFA) 110 MCG/ACT inhaler, Inhale 1 puff 2 (two) times a day, Disp: 1 Inhaler, Rfl: 11    guaiFENesin (MUCINEX PO), Take by mouth, Disp: , Rfl:     iron polysaccharides (FERREX) 150 mg capsule, Take 1 capsule (150 mg total) by mouth daily, Disp: 30 capsule, Rfl: 0    labetalol (NORMODYNE) 100 mg tablet, Take 1 tablet (100 mg total) by mouth 2 (two) times a day, Disp: 60 tablet, Rfl: 3    loratadine (CLARITIN) 10 mg tablet, Take 1 tablet (10 mg total) by mouth daily for 7 days, Disp: 7 tablet, Rfl: 0    Current Allergies     Allergies as of 09/03/2020 - Reviewed 09/03/2020   Allergen Reaction Noted    Wheat bran Itching 12/19/2019            The following portions of the patient's history were reviewed and updated as appropriate: allergies, current medications, past family history, past medical history, past social history, past surgical history and problem list      Past Medical History:   Diagnosis Date    Asthma     Chronic kidney disease     Eczema     Hypertension     Kidney disease        Past Surgical History:   Procedure Laterality Date    CHOLECYSTECTOMY      CT NEEDLE BIOPSY KIDNEY  1/29/2020    GALLBLADDER SURGERY         Family History   Problem Relation Age of Onset    Asthma Mother     No Known Problems Father          Medications have been verified          Objective   /81   Pulse 84   Temp 98 4 °F (36 9 °C)   Resp 16   Ht 5' 4" (1 626 m)   Wt 122 kg (268 lb 12 8 oz)   LMP 08/05/2020 SpO2 100%   BMI 46 14 kg/m²        Physical Exam     Physical Exam  Constitutional:       General: She is not in acute distress  Appearance: She is not ill-appearing or diaphoretic  Pulmonary:      Effort: Pulmonary effort is normal    Musculoskeletal:         General: Tenderness present  No deformity  Comments: Left foot with normal appearance  No swelling, bruising, deformity  Tender along the entirety of lateral aspect of foot, from calcaneous to 5th phalanx  Most prominent mid foot (lateral aspect of 5th metatarsal)  Less tender dorsally and on plantar surface  No dorsal midfoot, ankle tenderness  No tenderness to other digits of left foot  Normal, painless ankle ROM  Significantly decreased AROM flexion left 5th digit, with pain  Sensation, temperature, color, intact to 5th digit and elsewhere in foot  Neurological:      Mental Status: She is alert  Sensory: No sensory deficit     Psychiatric:         Mood and Affect: Mood normal

## 2020-09-03 NOTE — PATIENT INSTRUCTIONS
Use crutches, wear soft cast boot, avoid weight on foot until cleared by ortho  Motrin as needed for pain  ER for worsening

## 2020-09-03 NOTE — PROGRESS NOTES
Daily Note     Today's date: 9/3/2020  Patient name: Jeana Carrera  : 1995  MRN: [id number]  Referring provider: Brannon Marshall, DO  Dx:   Encounter Diagnosis   Name Primary?  Post concussion syndrome Yes                  Subjective: " Oh, I can do this "      Objective: See treatment diary below    Pt arrived 15 min late to OT session  Pt participated in skilled OT focusing on immediate and working memory, sustained/alternating attention, direction follow and task initiation with follow through to improve functional cog for successful return to school  Pt initiated session with continuation of OTH demo-G recall to independently follow through from where she ended last session  Pt required additional time to complete alternating attention task w/cog load  BITS engaging in memory task sequencing 5 items w/o difficulty followed by design copy using template w/5 sec flash time with min difficulty copying more complex designs  Assessment: Tolerated treatment fair   Patient would benefit from continued OT      Plan: Continued skilled OT per POC     Diagnosis: PCS  Precautions:near syncope, HTN w/ CKD, anemia  FOTO: 93 with 7% limitation brain injury  Insurance: [employer]  1 of 6 visits, PN due 2020

## 2020-09-05 ENCOUNTER — HOSPITAL ENCOUNTER (EMERGENCY)
Facility: HOSPITAL | Age: 25
Discharge: HOME | End: 2020-09-06
Attending: EMERGENCY MEDICINE
Payer: COMMERCIAL

## 2020-09-05 DIAGNOSIS — S90.32XD CONTUSION OF LEFT FOOT, SUBSEQUENT ENCOUNTER: Primary | ICD-10-CM

## 2020-09-05 PROCEDURE — 99283 EMERGENCY DEPT VISIT LOW MDM: CPT

## 2020-09-06 ENCOUNTER — APPOINTMENT (EMERGENCY)
Dept: RADIOLOGY | Facility: HOSPITAL | Age: 25
End: 2020-09-06
Payer: COMMERCIAL

## 2020-09-06 VITALS
TEMPERATURE: 98.6 F | SYSTOLIC BLOOD PRESSURE: 171 MMHG | RESPIRATION RATE: 18 BRPM | WEIGHT: 274.03 LBS | BODY MASS INDEX: 46.78 KG/M2 | OXYGEN SATURATION: 100 % | DIASTOLIC BLOOD PRESSURE: 97 MMHG | HEART RATE: 99 BPM | HEIGHT: 64 IN

## 2020-09-06 PROCEDURE — 99282 EMERGENCY DEPT VISIT SF MDM: CPT | Performed by: EMERGENCY MEDICINE

## 2020-09-06 PROCEDURE — 73630 X-RAY EXAM OF FOOT: CPT

## 2020-09-06 RX ORDER — ACETAMINOPHEN 325 MG/1
975 TABLET ORAL ONCE
Status: COMPLETED | OUTPATIENT
Start: 2020-09-06 | End: 2020-09-06

## 2020-09-06 RX ADMIN — ACETAMINOPHEN 975 MG: 325 TABLET, FILM COATED ORAL at 00:38

## 2020-09-06 NOTE — ED PROVIDER NOTES
History  Chief Complaint   Patient presents with    Foot Injury     Pt presents to the ED with c/o left foot pain/injury  Dropped a paint can on left foot this past tuesday, seen at urgent care and told it was bruised  Pt having continued pain, took motrin 1600 today  Patient returns to the emergency department for evaluation of increased left dorsal foot pain  Patient was seen 2 days ago at local urgent care after she dropped a gal paint can on her left foot  She had x-rays done which showed no fracture in the foot bones  She was placed in a postop shoe and given crutches  She has been taken Tylenol with only minimal relief  She refuses stronger pain medicine  Today she bag and the same foot on a car door while exiting the car  She thinks that exacerbated her pain  She has no other orthopedic complaints at this time  Prior to Admission Medications   Prescriptions Last Dose Informant Patient Reported? Taking?    Blood Pressure KIT  Self No No   Sig: by Does not apply route daily   acetaminophen (TYLENOL) 325 mg tablet  Self No No   Sig: Take 2 tablets (650 mg total) by mouth every 6 (six) hours as needed for mild pain or headaches   albuterol (2 5 mg/3 mL) 0 083 % nebulizer solution  Self No No   Sig: Take 1 vial (2 5 mg total) by nebulization every 6 (six) hours as needed for wheezing or shortness of breath   albuterol (VENTOLIN HFA) 90 mcg/act inhaler  Self No No   Sig: Inhale 2 puffs every 6 (six) hours as needed for wheezing or shortness of breath (cough)   amLODIPine (NORVASC) 10 mg tablet  Self No No   Sig: Take 1 tablet (10 mg total) by mouth daily   docusate sodium (COLACE) 100 mg capsule  Self No No   Sig: Take 1 capsule (100 mg total) by mouth 2 (two) times a day as needed for constipation   etonogestrel (NEXPLANON) subdermal implant  Self Yes No   Si mg by Subdermal route once   fluticasone (FLOVENT HFA) 110 MCG/ACT inhaler  Self No No   Sig: Inhale 1 puff 2 (two) times a day guaiFENesin (MUCINEX PO)  Self Yes No   Sig: Take by mouth   iron polysaccharides (FERREX) 150 mg capsule  Self No No   Sig: Take 1 capsule (150 mg total) by mouth daily   labetalol (NORMODYNE) 100 mg tablet  Self No No   Sig: Take 1 tablet (100 mg total) by mouth 2 (two) times a day   loratadine (CLARITIN) 10 mg tablet   No No   Sig: Take 1 tablet (10 mg total) by mouth daily for 7 days      Facility-Administered Medications: None       Past Medical History:   Diagnosis Date    Asthma     Chronic kidney disease     Eczema     Hypertension     Kidney disease        Past Surgical History:   Procedure Laterality Date    CHOLECYSTECTOMY      CT NEEDLE BIOPSY KIDNEY  1/29/2020    GALLBLADDER SURGERY         Family History   Problem Relation Age of Onset    Asthma Mother     No Known Problems Father      I have reviewed and agree with the history as documented  E-Cigarette/Vaping    E-Cigarette Use Never User      E-Cigarette/Vaping Substances    Nicotine No     THC No     CBD No     Flavoring No     Other No     Unknown No      Social History     Tobacco Use    Smoking status: Never Smoker    Smokeless tobacco: Never Used   Substance Use Topics    Alcohol use: Yes     Frequency: Monthly or less     Drinks per session: 1 or 2     Binge frequency: Never     Comment: occassionally on social events    Drug use: No       Review of Systems   Constitutional: Negative  HENT: Negative  Eyes: Negative  Respiratory: Negative  Cardiovascular: Negative  Gastrointestinal: Negative  Endocrine: Negative  Genitourinary: Negative  Musculoskeletal: Positive for arthralgias and gait problem  Skin: Negative  Allergic/Immunologic: Negative  Neurological: Negative for weakness and numbness  Hematological: Negative  Does not bruise/bleed easily  Psychiatric/Behavioral: Negative  Physical Exam  Physical Exam  Vitals signs and nursing note reviewed     Constitutional: General: She is not in acute distress  Appearance: Normal appearance  She is not ill-appearing, toxic-appearing or diaphoretic  Musculoskeletal:         General: Tenderness present  No swelling, deformity or signs of injury  Right lower leg: No edema  Left lower leg: No edema  Comments: Tenderness to the dorsum of left foot the 4th and 5th metatarsal area  Mild ecchymosis  No gross bony deformity  Normal cap refill  Skin:     General: Skin is warm  Capillary Refill: Capillary refill takes less than 2 seconds  Coloration: Skin is not jaundiced or pale  Findings: No bruising, erythema, lesion or rash  Neurological:      General: No focal deficit present  Mental Status: She is alert  Mental status is at baseline  Cranial Nerves: No cranial nerve deficit  Sensory: No sensory deficit  Motor: No weakness  Coordination: Coordination normal       Gait: Gait normal       Deep Tendon Reflexes: Reflexes normal    Psychiatric:         Mood and Affect: Mood normal          Behavior: Behavior normal          Thought Content:  Thought content normal          Judgment: Judgment normal          Vital Signs  ED Triage Vitals   Temperature Pulse Respirations Blood Pressure SpO2   09/06/20 0000 09/05/20 2359 09/05/20 2359 09/06/20 0000 09/05/20 2359   98 6 °F (37 °C) 99 18 (!) 171/97 100 %      Temp Source Heart Rate Source Patient Position - Orthostatic VS BP Location FiO2 (%)   09/05/20 2359 09/05/20 2359 -- 09/05/20 2359 --   Oral Monitor  Right arm       Pain Score       09/06/20 0038       Worst Possible Pain           Vitals:    09/05/20 2359 09/06/20 0000   BP:  (!) 171/97   Pulse: 99          Visual Acuity      ED Medications  Medications   acetaminophen (TYLENOL) tablet 975 mg (975 mg Oral Given 9/6/20 0038)       Diagnostic Studies  Results Reviewed     None                 XR foot 3+ views LEFT   ED Interpretation by Ben Gregorio MD (09/06 0049)   No acute disease  No obvious fracture dislocation  Procedures  Procedures         ED Course  ED Course as of Sep 06 0101   Ambika Negro Sep 06, 2020   0032 Tylenol ordered for this patient  Repeat x-ray shows no fracture  Patient be referred to orthopedics and she is advised to continue using postop shoe along with crutches  MDM      Disposition  Final diagnoses:   Contusion of left foot, subsequent encounter     Time reflects when diagnosis was documented in both MDM as applicable and the Disposition within this note     Time User Action Codes Description Comment    9/6/2020 12:32 AM Darion Sadler Add [S90 32XD] Contusion of left foot, subsequent encounter       ED Disposition     ED Disposition Condition Date/Time Comment    Discharge Stable Sun Sep 6, 2020 12:33 AM [place] discharge to home/self care  Follow-up Information     Follow up With Specialties Details Why [address], DO Orthopedic Surgery Schedule an appointment as soon as possible for a visit  Call Orthopedics on Tuesday to schedule follow-up appointment  [address]  [address]            Patient's Medications   Discharge Prescriptions    No medications on file     No discharge procedures on file      PDMP Review     None          ED Provider  Electronically Signed by           Romana Gonzalez MD  09/06/20 5773

## 2020-09-08 ENCOUNTER — APPOINTMENT (OUTPATIENT)
Dept: OCCUPATIONAL THERAPY | Facility: CLINIC | Age: 25
End: 2020-09-08
Payer: COMMERCIAL

## 2020-09-08 NOTE — PROGRESS NOTES
Occupational Therapy Daily Note:    Today's date: 2020  Patient name: Fannie Wheeler  : 1995  MRN: [id number]  Referring provider: Travis Baca DO  Dx: No diagnosis found  Subjective: ***    Objective: Pt seen for OT treatment session focusing on ***    Assessment: Tolerated treatment {Tolerated treatment :[id number]}  Patient would benefit from continued skilled OT  Plan: Continued skilled OT per POC  INTERVENTION COMMENTS:  Diagnosis: ***  Precautions: ***  FOTO: ***  Insurance: ***  1 of *** visits, PN due ***    Thank you for the consult!   Please call if you have any questions: [phone]  Rico Guan, OTLAURA, EULOGIO, OTR/L, C-GCM, CSRS, CBIS  Director of Outpatient Neuro Occupational Therapy

## 2020-09-09 ENCOUNTER — OFFICE VISIT (OUTPATIENT)
Dept: OCCUPATIONAL THERAPY | Facility: CLINIC | Age: 25
End: 2020-09-09
Payer: COMMERCIAL

## 2020-09-09 DIAGNOSIS — F07.81 POST CONCUSSION SYNDROME: Primary | ICD-10-CM

## 2020-09-09 PROCEDURE — 97535 SELF CARE MNGMENT TRAINING: CPT

## 2020-09-09 PROCEDURE — 97150 GROUP THERAPEUTIC PROCEDURES: CPT

## 2020-09-09 NOTE — PROGRESS NOTES
Daily Note     Today's date: 2020  Patient name: Abrahan Bustos  : 1995  MRN: [id number]  Referring provider: Nataly Ibarra DO  Dx:   Encounter Diagnosis   Name Primary?  Post concussion syndrome Yes       Start Time: 1135  Stop Time: 1235  Total time in clinic (min): 60 minutes    Subjective: "A paint can fell on my foot so now I have to wear this shoe"  Objective: See treatment diary below    Pt participated in skilled OT focusing on memory recall and attention in multimodal envorionment  Pt completed OTH with mod assist for word [place] completion,  visual cues required  Pt completed deductive reasoning puzzle w/2 errors, additional time for task completion  Pt attended to activities in multimodal environment  Assessment: Tolerated treatment well   Patient would benefit from continued OT      Plan: Continued skilled OT per POC     Diagnosis: PCS  Precautions:near syncope, HTN w/ CKD, anemia  FOTO: 93 with 7% limitation brain injury  Insurance: [place] [employer]  2 of 6 visits, PN due 2020:  [id number]  [id number]

## 2020-09-10 ENCOUNTER — OFFICE VISIT (OUTPATIENT)
Dept: OCCUPATIONAL THERAPY | Facility: CLINIC | Age: 25
End: 2020-09-10
Payer: COMMERCIAL

## 2020-09-10 DIAGNOSIS — F07.81 POST CONCUSSION SYNDROME: Primary | ICD-10-CM

## 2020-09-10 PROCEDURE — 97112 NEUROMUSCULAR REEDUCATION: CPT

## 2020-09-10 NOTE — PROGRESS NOTES
Occupational Therapy Daily Note:    Today's date: 9/10/2020  Patient name: Norma Cota  : 1995  MRN: [id number]  Referring provider: Elver Perez DO  Dx:   Encounter Diagnosis   Name Primary?  Post concussion syndrome Yes       Subjective: "I hope I did it right"  Objective: Pt seen for OT treatment session focusing on logical solutions, deductive reasoning, problem solving, EF skills, temporal awareness and orientation t/o Functional reasoning worksheets: map task, Angelita's schedule, and completion of prior deductive reasoning worksheets  Pt able to complete all tasks w/ 90% accuracy w/o need for cues  Assessment: Tolerated treatment well  Patient would benefit from continued skilled OT  Plan: Continued skilled OT per POC  Diagnosis: PCS  Precautions:near syncope, HTN w/ CKD, anemia  FOTO: 93 with 7% limitation brain injury  Insurance: [facility]  3 of 6 visits, PN due 2020    Thank you for the consult!   Please call if you have any questions: [phone]  SCOT Houser MBA, OTR/L, C-GCM, CSRS, CBIS  Director of Outpatient Neuro Occupational Therapy

## 2020-09-14 NOTE — PROGRESS NOTES
Occupational Therapy Daily Note:    Today's date: 9/15/2020  Patient name: Bull Alvarado  : 1995  MRN: [id number]  Referring provider: Yue Viera DO  Dx:   Encounter Diagnosis   Name Primary?  Post concussion syndrome Yes       Subjective: "I hurt my foot and have to use these crutches now"    Objective: Pt seen for OT treatment session focusing on working memory, sustained attention, and fxnl problem solving/deductive reasoning  Pt engaged in Intel task to address visual auditory memory and sustained attention in cluttered environment  Deductive/logical reasoning word puzzles to address working memory, multistep direction following/processing  Assessment: Tolerated treatment well  Pt completed repeat/rehearse strategy to recall 3-4 items to search with >95% recall  Pt required overall Min VCs for deductive reasoning task with improved attention to task today as compared to previous OT tx sessions Patient would benefit from continued skilled OT  Plan: Continued skilled OT per POC      INTERVENTION COMMENTS:  Diagnosis: PCS  Precautions:near syncope, HTN w/ CKD, anemia  FOTO: 93 with 7% limitation brain injury  Insurance: [employer]  3 of 6 visits, PN due -      [id number]: group  [id number]: 1:1  [id number]: unsup

## 2020-09-15 ENCOUNTER — OFFICE VISIT (OUTPATIENT)
Dept: OCCUPATIONAL THERAPY | Facility: CLINIC | Age: 25
End: 2020-09-15
Payer: COMMERCIAL

## 2020-09-15 DIAGNOSIS — F07.81 POST CONCUSSION SYNDROME: Primary | ICD-10-CM

## 2020-09-15 PROCEDURE — 97530 THERAPEUTIC ACTIVITIES: CPT

## 2020-09-15 PROCEDURE — 97150 GROUP THERAPEUTIC PROCEDURES: CPT

## 2020-09-18 ENCOUNTER — OFFICE VISIT (OUTPATIENT)
Dept: FAMILY MEDICINE CLINIC | Facility: CLINIC | Age: 25
End: 2020-09-18
Payer: COMMERCIAL

## 2020-09-18 VITALS
HEIGHT: 64 IN | BODY MASS INDEX: 45.93 KG/M2 | TEMPERATURE: 99.2 F | HEART RATE: 95 BPM | DIASTOLIC BLOOD PRESSURE: 90 MMHG | WEIGHT: 269 LBS | SYSTOLIC BLOOD PRESSURE: 160 MMHG | OXYGEN SATURATION: 100 %

## 2020-09-18 DIAGNOSIS — I12.9 BENIGN HYPERTENSION WITH CKD (CHRONIC KIDNEY DISEASE) STAGE III (HCC): ICD-10-CM

## 2020-09-18 DIAGNOSIS — N18.30 STAGE 3 CHRONIC KIDNEY DISEASE (HCC): ICD-10-CM

## 2020-09-18 DIAGNOSIS — M79.672 FOOT PAIN, LEFT: Primary | ICD-10-CM

## 2020-09-18 DIAGNOSIS — N18.30 BENIGN HYPERTENSION WITH CKD (CHRONIC KIDNEY DISEASE) STAGE III (HCC): ICD-10-CM

## 2020-09-18 PROCEDURE — 99213 OFFICE O/P EST LOW 20 MIN: CPT | Performed by: FAMILY MEDICINE

## 2020-09-18 RX ORDER — BLOOD PRESSURE TEST KIT
KIT MISCELLANEOUS DAILY
Qty: 1 EACH | Refills: 0 | Status: CANCELLED | OUTPATIENT
Start: 2020-09-18

## 2020-09-18 RX ORDER — HYDROCORTISONE 0.5 %
CREAM (GRAM) TOPICAL
Qty: 30 G | Refills: 0 | Status: SHIPPED | OUTPATIENT
Start: 2020-09-18 | End: 2021-11-17 | Stop reason: HOSPADM

## 2020-09-18 NOTE — PROGRESS NOTES
Assessment/Plan:       Problem List Items Addressed This Visit        Other    Foot pain, left - Primary     -Blunt trauma with paint can falling on L dorsal foot 9/1/20  -XRs done 9/3/20 and 9/6/20 both unremarkable  -Patient complaining of persistent pain despite using Tylenol, boot and crutches  -No point tenderness on exam, no swelling, but patient reports pain at rest and with use  -Low suspicion for occult fracture, but patient at risk for bone mineral density disease 2/2 CKD  -If no improvement in 2 weeks, will make referral to Orthopedics and consider MRI  -Bengay sent to pharmacy, advised on proper application of ice, tylenol dosing for analgesia  -Discussed she may change crutches to cane if she wishes, WBAT   -However, encouraged mobility to avoid stiffness from lack of use         Relevant Medications    Menthol-Methyl Salicylate (NASH BOWLES GREASELESS) 10-15 % greaseless cream            Subjective:      Patient ID: Carrie Bronson is a 23 y o  female  HPI    -L foot pain s/p injury: On 9/1/20, patient says she bumped into can fell from hip height, paint can fell onto side of the L foot and pinkie toe, went to urgent care on 9/3/20 and XR done 9/3/20 unremarkable  A few days later she also hit her foot on the door, and wanted to make sure she didn't have any fractures so she went to ED; repeat XR 9/6/20 also unremarkable  Has been wearing boot and using crutches as directed by ED  Says Tylenol not helping; cannot take NSAIDs due to Stage III CKD (last Cr 2 40, follows with Nephrology, thought to be related to HTN)  Reports around the house not using crutches and says she fell twice when not using crutches, but would like to change to a cane, which she has at home  Wearing boot but not at night, has tried without the boot and feels unsteady on her feet  Reports foot is painful at rest and and trying to put ice on it and it doesn't help   Tries to move it around but says hurts too much, but she can bear weight for a bit  Also asks about unemployment forms, would like to apply, but says her mother is too busy to help her with it  The following portions of the patient's history were reviewed and updated as appropriate: allergies, current medications, past family history, past medical history, past social history, past surgical history and problem list     Review of Systems   Constitutional: Negative for chills and fever  Respiratory: Negative for chest tightness and shortness of breath  Cardiovascular: Negative for chest pain, palpitations and leg swelling  Gastrointestinal: Negative for abdominal pain, diarrhea, nausea and vomiting  Musculoskeletal: Positive for arthralgias and gait problem  Neurological: Negative for syncope, weakness and light-headedness  All other systems reviewed and are negative  Objective:    /90   Pulse 95   Temp 99 2 °F (37 3 °C)   Ht 5' 4" (1 626 m)   Wt 122 kg (269 lb)   SpO2 100%   BMI 46 17 kg/m²      Physical Exam  Vitals signs reviewed  Constitutional:       General: She is not in acute distress  Appearance: She is obese  HENT:      Head: Normocephalic and atraumatic  Eyes:      General:         Right eye: No discharge  Left eye: No discharge  Conjunctiva/sclera: Conjunctivae normal    Musculoskeletal: Normal range of motion  General: No swelling or deformity  Right lower leg: No edema  Left lower leg: No edema  Comments: L foot without point tenderness or swelling, mild tenderness over diffuse dorsal surface of foot but no ecchymoses, no gross abnormalities but does appear patient often weight bears more medially due to valgus deformity of knees 2/2 obese habitus, as calluses seen along lateral surface of L foot  Able to move all toes with FROM but patient unwilling to weight bear more than a few steps due to subjective pain   Skin:     General: Skin is warm and dry        Capillary Refill: Capillary refill takes less than 2 seconds  Neurological:      Mental Status: She is alert and oriented to person, place, and time  Mental status is at baseline  Sensory: No sensory deficit  Psychiatric:         Mood and Affect: Mood normal          Thought Content:  Thought content normal            No point tenderness to palpation

## 2020-09-22 ENCOUNTER — OFFICE VISIT (OUTPATIENT)
Dept: OCCUPATIONAL THERAPY | Facility: CLINIC | Age: 25
End: 2020-09-22
Payer: COMMERCIAL

## 2020-09-22 DIAGNOSIS — F07.81 POST CONCUSSION SYNDROME: Primary | ICD-10-CM

## 2020-09-22 PROCEDURE — 97530 THERAPEUTIC ACTIVITIES: CPT

## 2020-09-22 PROCEDURE — 97150 GROUP THERAPEUTIC PROCEDURES: CPT

## 2020-09-22 NOTE — PROGRESS NOTES
Daily Note     Today's date: 2020  Patient name: Bull Alvarado  : 1995  MRN: [id number]  Referring provider: Yue Viera DO  Dx:   Encounter Diagnosis   Name Primary?  Post concussion syndrome Yes                  Subjective: "I think I still need therapy here "      Objective: See treatment diary below    Pt participated in skilled OT focusing on attention, memory recall and direction follow to improve safety awareness and fxl cog in home and work environment  Pt completed "family tree" and mind maipulation task adding and subtracting letters for word/phrase formation without difficulty  Pt engaged in [place] focusing on visual memory with difficulty for recall of more complex shapes  Encouraged pt to utilize coding strategy with min improvement for carryover  Assessment: Tolerated treatment well  Patient would benefit from continued OT      Plan: Continued skilled OT per POC focusing on dual tasking       INTERVENTION COMMENTS:  Diagnosis: PCS  Precautions:near syncope, HTN w/ CKD, anemia  FOTO: 93 with 7% limitation brain injury  Insurance: [facility]  4 of 6 visits, PN due -      :  [id number]:1  [id number]-TW  [id number]:1

## 2020-09-24 ENCOUNTER — OFFICE VISIT (OUTPATIENT)
Dept: OCCUPATIONAL THERAPY | Facility: CLINIC | Age: 25
End: 2020-09-24
Payer: COMMERCIAL

## 2020-09-24 DIAGNOSIS — F07.81 POST CONCUSSION SYNDROME: Primary | ICD-10-CM

## 2020-09-24 PROCEDURE — 97535 SELF CARE MNGMENT TRAINING: CPT

## 2020-09-24 PROCEDURE — 97530 THERAPEUTIC ACTIVITIES: CPT

## 2020-09-24 NOTE — PROGRESS NOTES
Occupational Therapy Daily Note:    Today's date: 2020  Patient name: Cassidy Patel  : 1995  MRN: [id number]  Referring provider: Tariq Lagunas,   Dx:   Encounter Diagnosis   Name Primary?  Post concussion syndrome Yes       Subjective: "I Would try to do some things at home"    Objective: Pt seen for OT treatment session focusing on sustained attention, working memory, visual memory, and education on memory strategies  Pt engaged in "Criminal Minds" visual memory worksheet provided with 1 min study time, followed by deductive reason worksheet during 2 min time delay  to inc visual recall and sustained visual/cognitive attention  Pt completed 3 trails with <30% accuracy on initial trial, improving to 100% accuracy with repetition  Calendar Study worksheets to address STM, utilization of memory strategies to inc pt's indep and engagement in ADL/IADL fxn  Pt provided with calendars  With uprgraded details with education on associations/story telling memory strategies  Pt provided with 1 min study time and able to answer open ended questions with >90% accuracy  Assessment: Tolerated treatment well  Provided with HEP for logical reasoning and working memory  Pt able to initiate use of association strategies with G success  Patient would benefit from continued skilled OT  Plan: Continued skilled OT per POC      INTERVENTION COMMENTS:  Diagnosis: PCS  Precautions:near syncope, HTN w/ CKD, anemia  FOTO: 93 with 7% limitation brain injury  Insurance: [employer]  5 of 6 visits, PN due -

## 2020-09-28 NOTE — PROGRESS NOTES
Occupational Therapy Progress Note #2:    Today's Date: 2020  Patient Name: Han Dumas  : 1995  MRN: [id number]  Referring Provider: Aleta Squires DO  Dx: Post concussion syndrome [F07 81]    Active Problem List:   Patient Active Problem List   Diagnosis    Asthma    Altered mental status    Headache    Head injury    Acute kidney injury superimposed on chronic kidney disease ([facility] [facility] )    Confusion    Elevated serum creatinine    Abnormal facial hair    Acne vulgaris    Intrinsic eczema    Left knee pain    Patellofemoral disorder of left knee    Musculoskeletal chest pain    Keloid of skin    Allergy to wheat    Near syncope    Benign hypertension with CKD (chronic kidney disease) stage III ([facility] [address] )    URI (upper respiratory infection)    Morbid obesity with BMI of 40 0-44 9, adult (HCC)    Iron deficiency anemia    Stage 3 chronic kidney disease (HCC)    Persistent proteinuria    Post concussion syndrome    Foot pain, left     Past Medical Hx:   Past Medical History:   Diagnosis Date    Asthma     Chronic kidney disease     Eczema     Hypertension     Kidney disease      Past Surgical Hx:   Past Surgical History:   Procedure Laterality Date    CHOLECYSTECTOMY      CT NEEDLE BIOPSY KIDNEY  2020    GALLBLADDER SURGERY        Pain Levels:  Restin    With Activity:  10    Subjective/Patient Goal: "I still think I need a little more"    Objective  Impairments Section:   Cognition:  1   Cognitive Checklist:  *Patient indicated that she is experiencing difficulties in the following areas:    · Memory: Remembering what people have told you, Remembering the date/day of the week and Keeping track of your appointments    · Attention: Keeping your attention/concentrating on a task and Losing your train of thought    · Processing: Processing new information  and Responding to questions in a timely manner     · Executive Functions: Organizing your thoughts and Self-correcting or recognizing mistakes    · Communication: Word finding in conversation and Expressing thoughts and ideas fluently    · Visual: Losing spot on the page when reading and Change in handwriting (i e , sloppier)    · Emotional: Increased anxiety and Increased depression    · Increased Sensitivities to: Noise and Crowds or busy environments    2  Logan Cognitive Assessment (MoCA)    SCORE DEFICIT RANGE Comments                                         COGNITIVE FXN                    MOCA (8 2)         Education Level > 12 years     Visuospatial/executive functioning      Naming 3/3     Memory: 1st trial:      Memory: 2nd trial:      Attention/concentration      List of letters:       Serial Seven Subtraction: 3/3     Language/sentence repetition:      Language Fluency:  0     Abstract/Correlational Thinkin/2     Delayed Recall:      Orientation:      Memory Index Score 13/15                                        Total Score /30 WFL/WNL neurocognitive functioning             3  Contextual Memory Test (CMT)  CONTEXTUAL MEMORY TEST (CMT)        Pt reports has noticed a change in her memory, rates her memory capacity at 25%, if studied 20 objects for 90 seconds would recall 16 of them, would have recalled 19 of them pre injury, frequently forgets things that happened the day before 50% of the time, forgets important details 25% of the time, frequently forgets things people have told her 25% of the time, frequently forgets things that happened a few minutes ago 25% of the time, would remember facts about this form a week from now  Immediate Recall  WFL/WNL neurocognitive functioning    Delayed Recall  Suspect deficit range    Visual Recognition 20/20 with 0 errors = 20/20                4  Vision Screen: Deferred 2* WFL/WNL on I E      Assessment/Plan  Occupational Therapy Skilled Analysis Assessment and Plan of Care:  Pt seen for OT re-eval/progress note/status update  Pt continues to demonstrate mild distractibility, difficulty w/ attention/concentration, STM/immediate delayed recall, EF skills, temporal orientation, auditory processing  Continue to recommend ongoing treatment to address the following goals 2x/wk for 2 more weeks to address divided attention, working memory, EF skills, high level processing, stations in preparation for d/c in 2 weeks      Goals:  Short Term Goals: (4 weeks)  Cognition:  · Pt will increase auditory processing to take notes while listening in multi-modal environment symptom free at baseline performance for improved role performance, once returned as applicable: MET  · Pt will increase attention to 2+ tasks for improved role performance (once returned) and engagement in salient tasks as applicable: MET  · Pt will increase temporal awareness for keeping OT to schedule within 5 min increments, recall appointments, functional addition of time with 80% accuracy: MET  · Pt will increase verbal and written direction following with processing time of <2 min and 80% accuracy for improved role performance, once returned as applicable: MET  · Pt will demo good carryover of internal and external memory aides for improved recall of daily events, improved executive functioning with 80% accuracy: MET  · Pt will increase insight into deficits for improved carryover of recommendations/ accommodations: MET  Long Term Goals: (8 weeks)  Cognition:  · Pt will increase attention to 3+ tasks for improved divided attention with occupational roles and pre driving roles as applicable: PARTIALLY MET  · Pt will increase verbal and written direction following with processing time of <1 min and 100% accuracy: PARTIALLY MET  · Pt will tolerate multimodal envt x 60 min symptom free for return to occupational roles: PARTIALLY MET  · Pt will demo with decreased anxiety and frustration for improved insight into process and rate of recovery: PARTIALLY MET    INTERVENTION COMMENTS:  Diagnosis: PCS  Precautions:near syncope, HTN w/ CKD, anemia  FOTO: 77 with 23% limitation brain injury  [facility]  6 of 6 visits, PN due 10/15/2020    Thank you for the consult!   Please call if you have any questions: [phone]  Tay Grover, OTD, OTR/L, C-GCM, CSRS  Director of Outpatient Neuro Occupational Therapy

## 2020-09-29 ENCOUNTER — EVALUATION (OUTPATIENT)
Dept: OCCUPATIONAL THERAPY | Facility: CLINIC | Age: 25
End: 2020-09-29
Payer: COMMERCIAL

## 2020-09-29 DIAGNOSIS — F07.81 POST CONCUSSION SYNDROME: Primary | ICD-10-CM

## 2020-09-29 PROCEDURE — 97530 THERAPEUTIC ACTIVITIES: CPT

## 2020-10-06 ENCOUNTER — OFFICE VISIT (OUTPATIENT)
Dept: OCCUPATIONAL THERAPY | Facility: CLINIC | Age: 25
End: 2020-10-06
Payer: COMMERCIAL

## 2020-10-06 DIAGNOSIS — F07.81 POST CONCUSSION SYNDROME: Primary | ICD-10-CM

## 2020-10-06 PROCEDURE — 97530 THERAPEUTIC ACTIVITIES: CPT

## 2020-10-08 ENCOUNTER — OFFICE VISIT (OUTPATIENT)
Dept: FAMILY MEDICINE CLINIC | Facility: CLINIC | Age: 25
End: 2020-10-08
Payer: COMMERCIAL

## 2020-10-08 VITALS
HEIGHT: 64 IN | HEART RATE: 98 BPM | BODY MASS INDEX: 45.75 KG/M2 | TEMPERATURE: 97.6 F | DIASTOLIC BLOOD PRESSURE: 110 MMHG | RESPIRATION RATE: 18 BRPM | SYSTOLIC BLOOD PRESSURE: 184 MMHG | WEIGHT: 268 LBS

## 2020-10-08 DIAGNOSIS — I12.9 BENIGN HYPERTENSION WITH CKD (CHRONIC KIDNEY DISEASE) STAGE III (HCC): ICD-10-CM

## 2020-10-08 DIAGNOSIS — N18.30 BENIGN HYPERTENSION WITH CKD (CHRONIC KIDNEY DISEASE) STAGE III (HCC): ICD-10-CM

## 2020-10-08 DIAGNOSIS — Z62.819 HISTORY OF ABUSE IN CHILDHOOD: ICD-10-CM

## 2020-10-08 DIAGNOSIS — L91.0 KELOID: ICD-10-CM

## 2020-10-08 DIAGNOSIS — J45.20 MILD INTERMITTENT ASTHMA WITHOUT COMPLICATION: ICD-10-CM

## 2020-10-08 DIAGNOSIS — M79.672 FOOT PAIN, LEFT: Primary | ICD-10-CM

## 2020-10-08 PROCEDURE — 99214 OFFICE O/P EST MOD 30 MIN: CPT | Performed by: FAMILY MEDICINE

## 2020-10-08 RX ORDER — ALBUTEROL SULFATE 2.5 MG/3ML
2.5 SOLUTION RESPIRATORY (INHALATION) EVERY 6 HOURS PRN
Qty: 30 ML | Refills: 3 | Status: SHIPPED | OUTPATIENT
Start: 2020-10-08 | End: 2020-11-22 | Stop reason: SDUPTHER

## 2020-10-08 RX ORDER — ALBUTEROL SULFATE 90 UG/1
2 AEROSOL, METERED RESPIRATORY (INHALATION) EVERY 6 HOURS PRN
Qty: 1 INHALER | Refills: 11 | Status: SHIPPED | OUTPATIENT
Start: 2020-10-08 | End: 2021-07-24 | Stop reason: SDUPTHER

## 2020-10-09 ENCOUNTER — PATIENT OUTREACH (OUTPATIENT)
Dept: CASE MANAGEMENT | Facility: HOSPITAL | Age: 25
End: 2020-10-09

## 2020-10-13 ENCOUNTER — APPOINTMENT (OUTPATIENT)
Dept: OCCUPATIONAL THERAPY | Facility: CLINIC | Age: 25
End: 2020-10-13
Payer: COMMERCIAL

## 2020-10-15 ENCOUNTER — EVALUATION (OUTPATIENT)
Dept: OCCUPATIONAL THERAPY | Facility: CLINIC | Age: 25
End: 2020-10-15
Payer: COMMERCIAL

## 2020-10-15 ENCOUNTER — TRANSCRIBE ORDERS (OUTPATIENT)
Dept: OCCUPATIONAL THERAPY | Facility: CLINIC | Age: 25
End: 2020-10-15

## 2020-10-15 DIAGNOSIS — F07.81 POST CONCUSSION SYNDROME: Primary | ICD-10-CM

## 2020-10-15 PROCEDURE — 97530 THERAPEUTIC ACTIVITIES: CPT

## 2020-10-16 ENCOUNTER — TELEPHONE (OUTPATIENT)
Dept: FAMILY MEDICINE CLINIC | Facility: CLINIC | Age: 25
End: 2020-10-16

## 2020-10-20 ENCOUNTER — APPOINTMENT (OUTPATIENT)
Dept: OCCUPATIONAL THERAPY | Facility: CLINIC | Age: 25
End: 2020-10-20
Payer: COMMERCIAL

## 2020-10-23 ENCOUNTER — CONSULT (OUTPATIENT)
Dept: OBGYN CLINIC | Facility: CLINIC | Age: 25
End: 2020-10-23
Payer: COMMERCIAL

## 2020-10-23 VITALS
DIASTOLIC BLOOD PRESSURE: 99 MMHG | SYSTOLIC BLOOD PRESSURE: 159 MMHG | HEART RATE: 80 BPM | BODY MASS INDEX: 46 KG/M2 | HEIGHT: 64 IN

## 2020-10-23 DIAGNOSIS — M79.672 FOOT PAIN, LEFT: ICD-10-CM

## 2020-10-23 DIAGNOSIS — T14.8XXA CONTUSION OF BONE: Primary | ICD-10-CM

## 2020-10-23 DIAGNOSIS — S97.122A CRUSHING INJURY OF FIFTH TOE OF LEFT FOOT, INITIAL ENCOUNTER: ICD-10-CM

## 2020-10-23 PROCEDURE — 3080F DIAST BP >= 90 MM HG: CPT | Performed by: ORTHOPAEDIC SURGERY

## 2020-10-23 PROCEDURE — 1036F TOBACCO NON-USER: CPT | Performed by: ORTHOPAEDIC SURGERY

## 2020-10-23 PROCEDURE — 3077F SYST BP >= 140 MM HG: CPT | Performed by: ORTHOPAEDIC SURGERY

## 2020-10-23 PROCEDURE — 99243 OFF/OP CNSLTJ NEW/EST LOW 30: CPT | Performed by: ORTHOPAEDIC SURGERY

## 2020-11-09 ENCOUNTER — OPTICAL OFFICE (OUTPATIENT)
Dept: URBAN - METROPOLITAN AREA CLINIC 143 | Facility: CLINIC | Age: 25
Setting detail: OPHTHALMOLOGY
End: 2020-11-09
Payer: COMMERCIAL

## 2020-11-09 ENCOUNTER — DOCTOR'S OFFICE (OUTPATIENT)
Dept: URBAN - METROPOLITAN AREA CLINIC 136 | Facility: CLINIC | Age: 25
Setting detail: OPHTHALMOLOGY
End: 2020-11-09
Payer: COMMERCIAL

## 2020-11-09 DIAGNOSIS — H52.223: ICD-10-CM

## 2020-11-09 DIAGNOSIS — H52.13: ICD-10-CM

## 2020-11-09 PROCEDURE — 92015 DETERMINE REFRACTIVE STATE: CPT | Performed by: OPTOMETRIST

## 2020-11-09 PROCEDURE — V2020 VISION SVCS FRAMES PURCHASES: HCPCS | Performed by: OPTOMETRIST

## 2020-11-09 PROCEDURE — V2103 SPHEROCYLINDR 4.00D/12-2.00D: HCPCS | Performed by: OPTOMETRIST

## 2020-11-09 PROCEDURE — V2784 LENS POLYCARB OR EQUAL: HCPCS | Performed by: OPTOMETRIST

## 2020-11-09 PROCEDURE — 92004 COMPRE OPH EXAM NEW PT 1/>: CPT | Performed by: OPTOMETRIST

## 2020-11-09 ASSESSMENT — VISUAL ACUITY
OS_BCVA: 20/30+1
OD_BCVA: 20/25-2

## 2020-11-09 ASSESSMENT — REFRACTION_MANIFEST
OD_SPHERE: -0.50
OD_CYLINDER: -1.25
OD_AXIS: 180
OS_AXIS: 180
OS_VA1: 20/20
OD_VA1: 20/20
OS_CYLINDER: -0.75
OU_VA: 20/20
OS_SPHERE: -0.50

## 2020-11-09 ASSESSMENT — REFRACTION_AUTOREFRACTION
OD_SPHERE: -0.50
OS_AXIS: 176
OS_CYLINDER: -0.75
OD_CYLINDER: -1.50
OS_SPHERE: -0.50
OD_AXIS: 005

## 2020-11-09 ASSESSMENT — SPHEQUIV_DERIVED
OS_SPHEQUIV: -0.875
OS_SPHEQUIV: -0.875
OD_SPHEQUIV: -1.125
OD_SPHEQUIV: -1.25

## 2020-11-09 ASSESSMENT — CONFRONTATIONAL VISUAL FIELD TEST (CVF)
OD_FINDINGS: FULL
OS_FINDINGS: FULL

## 2020-11-22 DIAGNOSIS — J45.20 MILD INTERMITTENT ASTHMA WITHOUT COMPLICATION: ICD-10-CM

## 2020-11-23 RX ORDER — ALBUTEROL SULFATE 2.5 MG/3ML
2.5 SOLUTION RESPIRATORY (INHALATION) EVERY 6 HOURS PRN
Qty: 30 ML | Refills: 0 | Status: SHIPPED | OUTPATIENT
Start: 2020-11-23 | End: 2021-07-24 | Stop reason: SDUPTHER

## 2020-12-10 ENCOUNTER — TELEMEDICINE (OUTPATIENT)
Dept: FAMILY MEDICINE CLINIC | Facility: CLINIC | Age: 25
End: 2020-12-10
Payer: COMMERCIAL

## 2020-12-10 DIAGNOSIS — Z20.822 EXPOSURE TO COVID-19 VIRUS: Primary | ICD-10-CM

## 2020-12-10 PROCEDURE — 99212 OFFICE O/P EST SF 10 MIN: CPT | Performed by: FAMILY MEDICINE

## 2021-01-06 ENCOUNTER — OFFICE VISIT (OUTPATIENT)
Dept: URGENT CARE | Facility: CLINIC | Age: 26
End: 2021-01-06
Payer: COMMERCIAL

## 2021-01-06 VITALS
HEART RATE: 87 BPM | RESPIRATION RATE: 16 BRPM | DIASTOLIC BLOOD PRESSURE: 81 MMHG | WEIGHT: 275 LBS | HEIGHT: 64 IN | SYSTOLIC BLOOD PRESSURE: 165 MMHG | BODY MASS INDEX: 46.95 KG/M2 | TEMPERATURE: 97.7 F

## 2021-01-06 DIAGNOSIS — T30.0 BURN: Primary | ICD-10-CM

## 2021-01-06 PROCEDURE — 99213 OFFICE O/P EST LOW 20 MIN: CPT | Performed by: NURSE PRACTITIONER

## 2021-01-06 NOTE — PATIENT INSTRUCTIONS
Keep area clean and dry   Use soap and water  Apply bactroban ointment 3 times a day   Use non stick gauze to prevent skin from sticking to clothing   Avoid use of tape on breast  Monitor for signs of infection: redness or drainage- if these occur follow up with your PCP  Tylenol and Motrin as needed for pain    Second-Degree Burn   WHAT YOU NEED TO KNOW:   A second-degree burn is also called a partial-thickness burn  A second-degree burn occurs when the first layer and some of the second layer of skin are burned  A superficial second-degree burn usually heals within 2 to 3 weeks with some scarring  A deep second-degree burn can take longer to heal  A second-degree burn can also get worse after a few days and become a third-degree burn  DISCHARGE INSTRUCTIONS:   Return to the emergency department if:   · You have a fast heartbeat or breathing  · You are not urinating  Call your doctor or burn specialist if:   · You have a fever  · You have increased redness, numbness, or swelling in the burn area  · Your wound or bandage is leaking pus and has a bad smell  · Your pain does not get better, or gets worse, even after you take pain medicine  · You have a dry mouth or eyes  · You are overly thirsty or tired  · You have dark yellow urine or urinate less than usual     · You have a headache or feel dizzy  · You have questions or concerns about your condition or care  Medicines:   · Medicines  may be given to decrease pain, prevent infection, or help your burn heal  They may be given as a pill or as an ointment applied to your skin  · Take your medicine as directed  Contact your healthcare provider if you think your medicine is not helping or if you have side effects  Tell him or her if you are allergic to any medicine  Keep a list of the medicines, vitamins, and herbs you take  Include the amounts, and when and why you take them  Bring the list or the pill bottles to follow-up visits  Carry your medicine list with you in case of an emergency  Burn care:   · Wash your hands with soap and water  Dry your hands with a clean towel or paper towel  · Remove old bandages  You may need to soak the bandage in water before you remove it so it will not stick to your wound  · Gently clean the burned area daily with mild soap and water  Pat the area dry  Look for any swelling or redness around the burn  Do not break closed blisters  You may cause a skin infection  · Apply cream or ointment to the burn with a cotton swab  Place a nonstick bandage over your burn  · Wrap a layer of gauze around the bandage to hold it in place  The wrap should be snug but not tight  It is too tight if you feel tingling or lose feeling in that area  · Apply gentle pressure for a few minutes if bleeding occurs  · Elevate your burned arm or leg above the level of your heart as often as you can  This will help decrease swelling and pain  Prop your burned arm or leg on pillows or blankets to keep it elevated comfortably  Self-care:   · Drink liquids as directed  You may need to drink extra liquid to help prevent dehydration  Ask how much liquid to drink each day and which liquids are best for you  · Go to physical therapy, if directed  Your muscles and joints may not work well after a second-degree burn  A physical therapist teaches you exercises to help improve movement and strength, and to decrease pain  Prevent second-degree burns:   · Do not leave cups, mugs, or bowls containing hot liquids at the edge of a table  Keep pot handles turned away from the stove front  · Do not leave a lit cigarette  Make sure it is no longer lit  Then dispose of it safely  · Store dangerous items out of the reach of children  Store cigarette lighters, matches, and chemicals where children cannot reach them  Use child safety latches on the door of the safe storage area           · Keep your water heater setting to low or medium  (90°F to 120°F, or 32°C to 48°C)  · Wear sunscreen that has a sun protectant factor (SPF) of 15 or higher  The sunscreen should also have ultraviolet A (UVA) and ultraviolet B (UVB) protection  Follow the directions on the label when you use sunscreen  Put on more sunscreen if you are in the sun for more than an hour  Reapply sunscreen often if you go swimming or are sweating  Follow up with your doctor or burn specialist as directed: You may need to return to have your wound checked and your bandage changed  Write down your questions so you remember to ask them during your visits  © Copyright [address] Information is for End User's use only and may not be sold, redistributed or otherwise used for commercial purposes  All illustrations and images included in CareNotes® are the copyrighted property of A D A M [employer]  or Danitza Velazquez   The above information is an  only  It is not intended as medical advice for individual conditions or treatments  Talk to your doctor, nurse or pharmacist before following any medical regimen to see if it is safe and effective for you
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
## 2021-03-23 PROCEDURE — NC001 PR NO CHARGE: Performed by: PHYSICIAN ASSISTANT

## 2021-03-23 NOTE — H&P
Assessment/Plan:  Karen Dickinson is a 22-year-old female who presents for evaluation of a left posterior ear keloid scar  She is self-referred  Please see HPI  I discussed with her that her best chance to prevent recurrence be for excision with concurrent radiation treatment  She agreed  We will arrange for consultation with Radiation Oncology  I discussed with her excision of the left posterior ear keloid scar including flap reconstruction  We discussed with the patient the options, benefits, and risks of surgery such as anesthesia, bleeding, infection, scarring and the need for additional procedures  Consent was obtained and all questions answered to her satisfaction  We will plan for surgery at her earliest convenience       Diagnoses and all orders for this visit:     Keloid scar            Subjective:       Patient ID: Godwin Brittle is a 27 y o  female      HPI   She reports having a keloid scar behind the left ear that is been present for several years  She states that she had her ears pierced back when she was 19  She did have an we pierced several times  Shortly after the last piercing she developed a keloid scar  She did not try any topical treatments or injections  She does report that it is painful at times it frequently gets caught on her clothing      The following portions of the patient's history were reviewed and updated as appropriate: She  has a past medical history of Asthma and Eczema  She  has a past surgical history that includes Cholecystectomy; Gallbladder surgery; and CT needle biopsy kidney (1/29/2020)  Her family history includes No Known Problems in her father and mother  She  reports that she has never smoked  She has never used smokeless tobacco  She reports that she does not drink alcohol or use drugs        Review of Systems   HENT: Negative for hearing loss  Eyes: Negative for visual disturbance  Respiratory: Negative for shortness of breath      Cardiovascular: Negative for chest pain  Gastrointestinal: Negative for abdominal pain, blood in stool, constipation, diarrhea, nausea and vomiting  Genitourinary: Negative for hematuria  Musculoskeletal: Negative for gait problem  Skin:        As per HPI  Neurological: Negative for seizures and headaches  Hematological: Does not bruise/bleed easily  Psychiatric/Behavioral: The patient is not nervous/anxious            Objective:        There were no vitals taken for this visit             Physical Exam   Constitutional: She is oriented to person, place, and time  She appears well-developed and well-nourished  No distress  HENT:   Head: Normocephalic and atraumatic  Eyes: Pupils are equal, round, and reactive to light  EOM are normal  No scleral icterus  Neck: Neck supple  No tracheal deviation present  No thyromegaly present  Cardiovascular: Normal rate and regular rhythm  Exam reveals no gallop and no friction rub  No murmur heard  Pulmonary/Chest: Effort normal and breath sounds normal  She has no wheezes  She has no rales  Abdominal: Soft  Bowel sounds are normal  She exhibits no distension  There is no tenderness  There is no rebound and no guarding  Musculoskeletal: Normal range of motion  Lymphadenopathy:     She has no cervical adenopathy  Neurological: She is alert and oriented to person, place, and time  No cranial nerve deficit  Skin:   Left posterior ear keloid scar measuring 1x1 5cm and tender to exam  Please see photo  Psychiatric: She has a normal mood and affect
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
## 2021-03-26 DIAGNOSIS — Z91.89 AT RISK FOR OBSTRUCTIVE SLEEP APNEA: Primary | ICD-10-CM

## 2021-03-26 NOTE — PRE-PROCEDURE INSTRUCTIONS
Pre-Surgery Instructions:   Medication Instructions    acetaminophen (TYLENOL) 325 mg tablet -    albuterol (2 5 mg/3 mL) 0 083 % nebulizer solution -    albuterol (Ventolin HFA) 90 mcg/act inhaler -    amLODIPine (NORVASC) 10 mg tablet -    etonogestrel (NEXPLANON) subdermal implant -    fluticasone (FLOVENT HFA) 110 MCG/ACT inhaler -    labetalol (NORMODYNE) 100 mg tablet -    Menthol-Methyl Salicylate (NASH BOWLES GREASELESS) 10-15 % greaseless cream -    Pt is only having local anesthesia  [facility] preop instructions reviewed with pt  Pt will get surgical soap

## 2021-03-30 ENCOUNTER — HOSPITAL ENCOUNTER (OUTPATIENT)
Facility: HOSPITAL | Age: 26
Setting detail: OUTPATIENT SURGERY
End: 2021-03-30
Attending: SURGERY | Admitting: SURGERY
Payer: COMMERCIAL

## 2021-03-30 ENCOUNTER — HOSPITAL ENCOUNTER (OUTPATIENT)
Facility: HOSPITAL | Age: 26
Setting detail: OUTPATIENT SURGERY
Discharge: HOME | End: 2021-03-30
Attending: SURGERY | Admitting: SURGERY
Payer: COMMERCIAL

## 2021-03-30 VITALS
OXYGEN SATURATION: 100 % | RESPIRATION RATE: 18 BRPM | DIASTOLIC BLOOD PRESSURE: 114 MMHG | HEIGHT: 64 IN | HEART RATE: 84 BPM | BODY MASS INDEX: 46.95 KG/M2 | SYSTOLIC BLOOD PRESSURE: 181 MMHG | WEIGHT: 275 LBS | TEMPERATURE: 97.7 F

## 2021-03-30 VITALS
RESPIRATION RATE: 17 BRPM | HEART RATE: 62 BPM | TEMPERATURE: 98 F | OXYGEN SATURATION: 100 % | DIASTOLIC BLOOD PRESSURE: 106 MMHG | SYSTOLIC BLOOD PRESSURE: 222 MMHG

## 2021-03-30 PROCEDURE — 88302 TISSUE EXAM BY PATHOLOGIST: CPT | Performed by: PATHOLOGY

## 2021-03-30 PROCEDURE — 14060 TIS TRNFR E/N/E/L 10 SQ CM/<: CPT | Performed by: SURGERY

## 2021-03-30 RX ORDER — LIDOCAINE HYDROCHLORIDE AND EPINEPHRINE 10; 10 MG/ML; UG/ML
INJECTION, SOLUTION INFILTRATION; PERINEURAL AS NEEDED
Status: DISCONTINUED | OUTPATIENT
Start: 2021-03-30 | End: 2021-03-30 | Stop reason: HOSPADM

## 2021-03-30 RX ORDER — MAGNESIUM HYDROXIDE 1200 MG/15ML
LIQUID ORAL AS NEEDED
Status: DISCONTINUED | OUTPATIENT
Start: 2021-03-30 | End: 2021-03-30 | Stop reason: HOSPADM

## 2021-03-30 RX ORDER — GINSENG 100 MG
CAPSULE ORAL AS NEEDED
Status: DISCONTINUED | OUTPATIENT
Start: 2021-03-30 | End: 2021-03-30 | Stop reason: HOSPADM

## 2021-03-30 NOTE — INTERIM OP NOTE
POSTERIOR EAR EXCISION KELOID, FLAP RECONSTRUCTION  Postoperative Note  PATIENT NAME: Cale Cates  : 1995  MRN: [id number]  [facility] OR ROOM 02    Surgery Date: 3/30/2021    Preop Diagnosis:  * No pre-op diagnosis entered *    * No Diagnosis Codes entered *    Procedure(s) (LRB):  POSTERIOR EAR EXCISION KELOID, FLAP RECONSTRUCTION (Left)    Surgeon(s) and Role:     * Sandoval Bolden MD - Primary    Specimens:  ID Type Source Tests Collected by Time Destination   1 : Left ear keloid  Tissue Ear, Left TISSUE EXAM Sandoval Bolden MD 3/30/2021 1203        Estimated Blood Loss:   Minimal    Anesthesia Type:   Local     Findings:    None  Complications:   None    SIGNATURE: Barney Naylor PA-C   DATE: 2021   TIME: 12:07 PM

## 2021-03-30 NOTE — PROGRESS NOTES
Pt stated she did not take her BP medication this a m  RN instructed pt to take her BP medication as soon as she went home  Pt verbally agreed  Hypertension and control of htn was encouraged  Patient verbally agreed that controlling her blood pressure was important

## 2021-03-30 NOTE — DISCHARGE INSTRUCTIONS
[facility]  Dr Peoples [address], [address]  Phone: [phone]     Postoperative Instructions for Outpatient Surgery     These instructions are being provided by your doctor to give you basic guidelines during your post-op recovery  Please let our office know if your contact information has changed       Please call the office today for an appointment in 10-14 days for suture removal       Dressings:  Removed yellow Xeroform gauze in 24 hours  Then, apply bacitracin ointment 3 times daily for 3 days      Activity Restrictions:  Nothing strenuous for least 48 hours      Bathing:  May shower after dressing removal tomorrow  Pat incision dry afterwards      Medications:    Resume pre-op medications  You may take tylenol, aleve, or ibuprofen for pain control                 Other:  Elevate head of bed at night to sleep over the next 48 hours  May use ice a cold compress at 10 minutes intervals over the next 48 hours while awake

## 2021-03-30 NOTE — OP NOTE
OPERATIVE REPORT  PATIENT NAME: Radha Child    :  1995  MRN: [id number]  Pt Location: [facility] OR ROOM 02    SURGERY DATE: 3/30/2021    Surgeon(s) and Role:     Fidelia Smith MD - Primary    Preop Diagnosis:  * No pre-op diagnosis entered *    * No Diagnosis Codes entered *    Procedure:  1  Excision keloid left ear 2 6 cm 2  Adjacent tissue transfer/local flap reconstruction left ear defect 2 6 x 1 point 2 cm   Specimen(s):  ID Type Source Tests Collected by Time Destination   1 : Left ear keloid  Tissue Ear, Left TISSUE EXAM Gino Garcia MD 3/30/2021 1203        Estimated Blood Loss:   Minimal    Drains:  * No LDAs found *    Anesthesia Type:   Local    Operative Indications:  * No pre-op diagnosis entered *  Keloid left ear    Operative Findings:  As above    Complications:   None    Procedure and Technique:  Raven was seen preoperatively in the holding area and the surgical site was marked with her participation  I reviewed the planned procedure, as well as potential risks, complications limitations  We specifically discussed the benefit of radiation combined with excision to minimize the risk of recurrence  She understands, is not interested in radiation  She was taken to the operating room, the surgical site was prepped and draped in sterile fashion and a proper time-out was performed  2 5 loupe magnification was used to aid in visualization  The area was carefully marked for excision and W plasty/local flap repair  Local anesthesia was administered, the skin incisions were created with a 15 blade  These were carried down through the dermis and the keloid was excised utilizing a combination of dissection with a 15 blade, and curved iris scissors  Hemostasis was assured with the Bovie cautery and the wound was irrigated    The flaps were then interdigitated, and deep closure was accomplished utilizing 5 O Vicryl buried at the level of the deep dermis, after the flaps had been transposed into position, skin closure was then completed utilizing multiple interrupted 6 0 nylon sutures  The flap margins appeared to be fully viable and the wound was dressed with bacitracin and Xeroform  The patient was then transferred to the recovery area     I was present for the entire procedure    Patient Disposition:  PACU     SIGNATURE: Umair English MD  DATE: March 30, 2021  TIME: 12:32 PM

## 2021-03-31 ENCOUNTER — TELEPHONE (OUTPATIENT)
Dept: PLASTIC SURGERY | Facility: CLINIC | Age: 26
End: 2021-03-31

## 2021-03-31 NOTE — TELEPHONE ENCOUNTER
Patient called and asked if she had a weight restrictions  S/W Dr Dayami Turner and he stated that she did not have any weight restrictions  Called and informed patient and she verbalized understanding

## 2021-04-13 ENCOUNTER — OFFICE VISIT (OUTPATIENT)
Dept: PLASTIC SURGERY | Facility: CLINIC | Age: 26
End: 2021-04-13

## 2021-04-13 VITALS — TEMPERATURE: 96 F | BODY MASS INDEX: 45.82 KG/M2 | HEIGHT: 64 IN | WEIGHT: 268.38 LBS

## 2021-04-13 DIAGNOSIS — L91.0 KELOID: Primary | ICD-10-CM

## 2021-04-13 PROCEDURE — 99024 POSTOP FOLLOW-UP VISIT: CPT | Performed by: PHYSICIAN ASSISTANT

## 2021-04-13 NOTE — PROGRESS NOTES
Assessment/Plan:   Penny Sheth is a 61-year-old female who is 2 weeks status post excision of a posterior left ear keloid with local flap  Please see HPI  Pathology revealed a keloid scar  Sutures were removed today  She opted out of radiation therapy  She was given instructions on how to use silicone scar gel or patches  We will see her back in 4-6 weeks  Diagnoses and all orders for this visit:    Keloid          Subjective:     Patient ID: Hugo Ganser is a 23 y o  female  HPI   She denies any complaints regarding the left ear  Review of Systems   Skin:        As per HPI  Objective:     Physical Exam  Skin:     Comments: Left posterior ear incision is clean, dry and intact  Sutures were removed

## 2021-04-21 ENCOUNTER — HOSPITAL ENCOUNTER (EMERGENCY)
Facility: HOSPITAL | Age: 26
Discharge: HOME | End: 2021-04-21
Attending: EMERGENCY MEDICINE | Admitting: EMERGENCY MEDICINE
Payer: COMMERCIAL

## 2021-04-21 VITALS
SYSTOLIC BLOOD PRESSURE: 163 MMHG | RESPIRATION RATE: 16 BRPM | HEIGHT: 64 IN | HEART RATE: 61 BPM | TEMPERATURE: 98.2 F | BODY MASS INDEX: 45.33 KG/M2 | OXYGEN SATURATION: 100 % | WEIGHT: 265.5 LBS | DIASTOLIC BLOOD PRESSURE: 118 MMHG

## 2021-04-21 DIAGNOSIS — T78.40XA ALLERGIC REACTION: Primary | ICD-10-CM

## 2021-04-21 DIAGNOSIS — I10 UNCONTROLLED HYPERTENSION: ICD-10-CM

## 2021-04-21 PROCEDURE — 96374 THER/PROPH/DIAG INJ IV PUSH: CPT

## 2021-04-21 PROCEDURE — 96361 HYDRATE IV INFUSION ADD-ON: CPT

## 2021-04-21 PROCEDURE — 94640 AIRWAY INHALATION TREATMENT: CPT

## 2021-04-21 PROCEDURE — 99284 EMERGENCY DEPT VISIT MOD MDM: CPT | Performed by: EMERGENCY MEDICINE

## 2021-04-21 PROCEDURE — 96375 TX/PRO/DX INJ NEW DRUG ADDON: CPT

## 2021-04-21 PROCEDURE — 99283 EMERGENCY DEPT VISIT LOW MDM: CPT

## 2021-04-21 RX ORDER — METHYLPREDNISOLONE SODIUM SUCCINATE 125 MG/2ML
125 INJECTION, POWDER, LYOPHILIZED, FOR SOLUTION INTRAMUSCULAR; INTRAVENOUS ONCE
Status: COMPLETED | OUTPATIENT
Start: 2021-04-21 | End: 2021-04-21

## 2021-04-21 RX ORDER — FAMOTIDINE 20 MG/1
20 TABLET, FILM COATED ORAL 2 TIMES DAILY
Qty: 30 TABLET | Refills: 0 | Status: SHIPPED | OUTPATIENT
Start: 2021-04-21 | End: 2022-02-28

## 2021-04-21 RX ORDER — IPRATROPIUM BROMIDE AND ALBUTEROL SULFATE 2.5; .5 MG/3ML; MG/3ML
3 SOLUTION RESPIRATORY (INHALATION) ONCE
Status: COMPLETED | OUTPATIENT
Start: 2021-04-21 | End: 2021-04-21

## 2021-04-21 RX ORDER — CLONIDINE HYDROCHLORIDE 0.1 MG/1
0.2 TABLET ORAL ONCE
Status: COMPLETED | OUTPATIENT
Start: 2021-04-21 | End: 2021-04-21

## 2021-04-21 RX ORDER — DIPHENHYDRAMINE HYDROCHLORIDE 50 MG/ML
50 INJECTION INTRAMUSCULAR; INTRAVENOUS ONCE
Status: COMPLETED | OUTPATIENT
Start: 2021-04-21 | End: 2021-04-21

## 2021-04-21 RX ORDER — PREDNISONE 20 MG/1
40 TABLET ORAL DAILY
Qty: 10 TABLET | Refills: 0 | Status: SHIPPED | OUTPATIENT
Start: 2021-04-21 | End: 2021-04-26

## 2021-04-21 RX ADMIN — IPRATROPIUM BROMIDE AND ALBUTEROL SULFATE 3 ML: 2.5; .5 SOLUTION RESPIRATORY (INHALATION) at 21:27

## 2021-04-21 RX ADMIN — METHYLPREDNISOLONE SODIUM SUCCINATE 125 MG: 125 INJECTION, POWDER, FOR SOLUTION INTRAMUSCULAR; INTRAVENOUS at 21:27

## 2021-04-21 RX ADMIN — DIPHENHYDRAMINE HYDROCHLORIDE 50 MG: 50 INJECTION, SOLUTION INTRAMUSCULAR; INTRAVENOUS at 21:27

## 2021-04-21 RX ADMIN — CLONIDINE HYDROCHLORIDE 0.2 MG: 0.1 TABLET ORAL at 22:50

## 2021-04-21 RX ADMIN — FAMOTIDINE 20 MG: 10 INJECTION, SOLUTION INTRAVENOUS at 21:35

## 2021-04-21 RX ADMIN — SODIUM CHLORIDE 1000 ML: 0.9 INJECTION, SOLUTION INTRAVENOUS at 21:31

## 2021-04-22 NOTE — ED PROVIDER NOTES
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 Taking?    Blood Pressure KIT  Self No No   Sig: by Does not apply route daily   Patient not taking: Reported on 1/6/2021   Menthol-Methyl Salicylate (NASH BOWLES GREASELESS) 10-15 % greaseless cream Not Taking at Unknown time Self No No   Sig: Apply topically daily at bedtime   Patient not taking: Reported on 4/21/2021   acetaminophen (TYLENOL) 325 mg tablet Past Month at Unknown time Self No Yes   Sig: Take 2 tablets (650 mg total) by mouth every 6 (six) hours as needed for mild pain or headaches   albuterol (2 5 mg/3 mL) 0 083 % nebulizer solution More than a month at Unknown time Self No No   Sig: Take 1 vial (2 5 mg total) by nebulization every 6 (six) hours as needed for wheezing or shortness of breath   albuterol (Ventolin HFA) 90 mcg/act inhaler More than a month at Unknown time Self No No   Sig: Inhale 2 puffs every 6 (six) hours as needed for wheezing or shortness of breath (cough)   amLODIPine (NORVASC) 10 mg tablet 4/20/2021 at Unknown time Self No Yes   Sig: Take 1 tablet (10 mg total) by mouth daily   etonogestrel (NEXPLANON) subdermal implant Unknown at Unknown time Self Yes No   Si mg by Subdermal route once   fluticasone (FLOVENT HFA) 110 MCG/ACT inhaler Not Taking at Unknown time Self No No   Sig: Inhale 1 puff 2 (two) times a day   Patient not taking: Reported on 2021   labetalol (NORMODYNE) 100 mg tablet 2021 at Unknown time Self No Yes   Sig: Take 1 tablet (100 mg total) by mouth 2 (two) times a day   mupirocin (BACTROBAN) 2 % ointment Not Taking at Unknown time  No No   Sig: Apply topically 3 (three) times a day   Patient not taking: Reported on 2021      Facility-Administered Medications: None       Past Medical History:   Diagnosis Date    Asthma     Chronic kidney disease     Eczema     Headache     Hypertension     Wears glasses        Past Surgical History:   Procedure Laterality Date    CHOLECYSTECTOMY      CT NEEDLE BIOPSY KIDNEY  2020    KELOID EXCISION Left 3/30/2021    Procedure: POSTERIOR EAR EXCISION KELOID, FLAP RECONSTRUCTION;  Surgeon: Cheryl Valdes MD;  Location: [facility];  Service: Plastics       Family History   Problem Relation Age of Onset    Asthma Mother     No Known Problems Father      I have reviewed and agree with the history as documented  E-Cigarette/Vaping    E-Cigarette Use Never User      E-Cigarette/Vaping Substances    Nicotine No     THC No     CBD No     Flavoring No     Other No     Unknown No      Social History     Tobacco Use    Smoking status: Never Smoker    Smokeless tobacco: Never Used   Substance Use Topics    Alcohol use: Yes     Frequency: Monthly or less     Drinks per session: 1 or 2     Binge frequency: Never     Comment: occassionally on social events    Drug use: No       Review of Systems   Constitutional: Negative for activity change, appetite change, chills, diaphoresis, fatigue, fever and unexpected weight change     HENT: Negative for congestion, ear discharge, ear pain, mouth sores, sinus pressure, sinus pain, sneezing, sore throat, trouble swallowing and voice change  Eyes: Negative for photophobia, pain, discharge, redness, itching and visual disturbance  Respiratory: Positive for shortness of breath  Negative for cough and chest tightness  Cardiovascular: Negative for chest pain, palpitations and leg swelling  Gastrointestinal: Negative for abdominal pain, constipation, nausea and vomiting  Endocrine: Negative for cold intolerance, heat intolerance, polydipsia, polyphagia and polyuria  Genitourinary: Negative for decreased urine volume, difficulty urinating, dysuria, frequency, hematuria and urgency  Musculoskeletal: Negative for arthralgias, back pain, gait problem, joint swelling, myalgias, neck pain and neck stiffness  Skin: Negative for color change and rash  See HPI   Allergic/Immunologic: Negative for immunocompromised state  Neurological: Negative for dizziness, tremors, seizures, syncope, speech difficulty, weakness, light-headedness, numbness and headaches  Hematological: Does not bruise/bleed easily  Psychiatric/Behavioral: Negative for behavioral problems and suicidal ideas  Physical Exam  Physical Exam  Vitals signs and nursing note reviewed  Constitutional:       General: She is not in acute distress  Appearance: Normal appearance  She is well-developed  She is obese  She is not ill-appearing, toxic-appearing or diaphoretic  HENT:      Head: Normocephalic and atraumatic  Right Ear: Tympanic membrane, ear canal and external ear normal  There is no impacted cerumen  Left Ear: Tympanic membrane, ear canal and external ear normal  There is no impacted cerumen  Nose: Nose normal  No congestion or rhinorrhea  Mouth/Throat:      Mouth: Mucous membranes are moist       Pharynx: Oropharynx is clear  No oropharyngeal exudate or posterior oropharyngeal erythema  Eyes:      General: No scleral icterus  Right eye: No discharge  Left eye: No discharge        Extraocular Movements: Extraocular movements intact  Conjunctiva/sclera: Conjunctivae normal       Pupils: Pupils are equal, round, and reactive to light  Neck:      Musculoskeletal: Normal range of motion and neck supple  No neck rigidity or muscular tenderness  Thyroid: No thyromegaly  Vascular: No hepatojugular reflux or JVD  Trachea: No tracheal deviation  Cardiovascular:      Rate and Rhythm: Normal rate and regular rhythm  Pulses: Normal pulses  Carotid pulses are 2+ on the right side and 2+ on the left side  Radial pulses are 2+ on the right side and 2+ on the left side  Dorsalis pedis pulses are 2+ on the right side and 2+ on the left side  Posterior tibial pulses are 2+ on the right side and 2+ on the left side  Heart sounds: Normal heart sounds  No murmur  Pulmonary:      Effort: Pulmonary effort is normal  No accessory muscle usage or respiratory distress  Breath sounds: Normal breath sounds  No wheezing or rales  Chest:      Chest wall: No mass, deformity, tenderness, crepitus or edema  Abdominal:      General: Bowel sounds are normal  There is no distension or abdominal bruit  Palpations: Abdomen is soft  There is no hepatomegaly, splenomegaly or mass  Tenderness: There is no abdominal tenderness  There is no right CVA tenderness, left CVA tenderness, guarding or rebound  Hernia: No hernia is present  Musculoskeletal: Normal range of motion  General: No tenderness  Right lower leg: She exhibits no tenderness  No edema  Left lower leg: She exhibits no tenderness  No edema  Lymphadenopathy:      Cervical: No cervical adenopathy  Skin:     General: Skin is warm and dry  Capillary Refill: Capillary refill takes less than 2 seconds  Findings: No ecchymosis or rash  Neurological:      General: No focal deficit present  Mental Status: She is alert and oriented to person, place, and time        Cranial Nerves: No cranial nerve deficit  Sensory: No sensory deficit  Motor: No weakness or abnormal muscle tone  Deep Tendon Reflexes: Reflexes normal    Psychiatric:         Mood and Affect: Mood normal          Behavior: Behavior normal          Thought Content: Thought content normal          Judgment: Judgment normal          Vital Signs  ED Triage Vitals [04/21/21 2040]   Temperature Pulse Respirations Blood Pressure SpO2   98 2 °F (36 8 °C) 74 18 (!) 157/131 100 %      Temp Source Heart Rate Source Patient Position - Orthostatic VS BP Location FiO2 (%)   Oral Monitor Sitting Left arm --      Pain Score       9           Vitals:    04/21/21 2040 04/21/21 2250 04/21/21 2252   BP: (!) 157/131 (!) 163/118 (!) 163/118   Pulse: 74  61   Patient Position - Orthostatic VS: Sitting  Sitting         Visual Acuity      ED Medications  Medications   diphenhydrAMINE (BENADRYL) injection 50 mg (50 mg Intravenous Given 4/21/21 2127)   methylPREDNISolone sodium succinate (Solu-MEDROL) injection 125 mg (125 mg Intravenous Given 4/21/21 2127)   ipratropium-albuterol (DUO-NEB) 0 5-2 5 mg/3 mL inhalation solution 3 mL (3 mL Nebulization Given 4/21/21 2127)   famotidine (PEPCID) injection 20 mg (20 mg Intravenous Given 4/21/21 2135)   sodium chloride 0 9 % bolus 1,000 mL (0 mL Intravenous Stopped 4/21/21 2240)   cloNIDine (CATAPRES) tablet 0 2 mg (0 2 mg Oral Given 4/21/21 2250)       Diagnostic Studies  Results Reviewed     None                 No orders to display              Procedures  Procedures         ED Course                                           MDM  Number of Diagnoses or Management Options  Allergic reaction: new and requires workup  Uncontrolled hypertension: established and worsening  Diagnosis management comments: This is a 61-year-old female who presents emergency department reporting that she was having a allergic reaction to week even though she was states she was not near any week    On physical exam she had diminished bilateral air movement  Patient was given 1 DuoNeb with an increase in air movement  There is no wheezing  Medicated patient IV Benadryl, IV Pepcid and IV Solu-Medrol  Hydrated with a L of normal saline  Patient reported that she no longer felt the symptoms of the allergic reaction  Her airway remains clear  No edema of the uvula or the tongue  Patient was able to eat and drink prior to leaving  Ordered a burst of steroids and Pepcid for 7 days discharge  Patient's blood pressure remained elevated she had not taken her medications  Patient given clonidine 0 2 mg prior to discharge  Patient's blood pressure did start to improve at discharge  Patient was reexamined at this time and informed of laboratory and/or imaging results and was found to be stable for discharge  Return to emergency department criteria was reviewed with the patient who verbalized understanding and was agreeable to discharge and the treatment plan at this time  Portions of the record may have been created with voice recognition software  Occasional wrong word or "sound a like" substitutions may have occurred due to the inherent limitations of voice recognition software  Read the chart carefully and recognize, using context, where substitutions have occurred  Risk of Complications, Morbidity, and/or Mortality  Presenting problems: high  Diagnostic procedures: moderate  Management options: moderate    Patient Progress  Patient progress: improved      Disposition  Final diagnoses:    Allergic reaction   Uncontrolled hypertension     Time reflects when diagnosis was documented in both MDM as applicable and the Disposition within this note     Time User Action Codes Description Comment    4/21/2021 10:24 PM Chidi Aleman [T78 40XA] Allergic reaction     4/21/2021 10:44 PM Chidi Aleman [I10] Uncontrolled hypertension       ED Disposition     ED Disposition Condition Date/Time Comment    Discharge Stable Wed Apr 21, 2021 10:24 PM [place] discharge to home/self care  Follow-up Information    None         Discharge Medication List as of 4/21/2021 10:45 PM      START taking these medications    Details   famotidine (PEPCID) 20 mg tablet Take 1 tablet (20 mg total) by mouth 2 (two) times a day, Starting Wed 4/21/2021, Normal      predniSONE 20 mg tablet Take 2 tablets (40 mg total) by mouth daily for 5 days, Starting Wed 4/21/2021, Until Mon 4/26/2021, Normal         CONTINUE these medications which have NOT CHANGED    Details   acetaminophen (TYLENOL) 325 mg tablet Take 2 tablets (650 mg total) by mouth every 6 (six) hours as needed for mild pain or headaches, Starting Sun 1/26/2020, No Print      amLODIPine (NORVASC) 10 mg tablet Take 1 tablet (10 mg total) by mouth daily, Starting Mon 6/22/2020, Normal      labetalol (NORMODYNE) 100 mg tablet Take 1 tablet (100 mg total) by mouth 2 (two) times a day, Starting Wed 2/5/2020, Normal      albuterol (2 5 mg/3 mL) 0 083 % nebulizer solution Take 1 vial (2 5 mg total) by nebulization every 6 (six) hours as needed for wheezing or shortness of breath, Starting Mon 11/23/2020, Normal      albuterol (Ventolin HFA) 90 mcg/act inhaler Inhale 2 puffs every 6 (six) hours as needed for wheezing or shortness of breath (cough), Starting Thu 10/8/2020, Normal      Blood Pressure KIT by Does not apply route daily, Starting Wed 6/24/2020, Normal      etonogestrel (NEXPLANON) subdermal implant 68 mg by Subdermal route once, Historical Med      fluticasone (FLOVENT HFA) 110 MCG/ACT inhaler Inhale 1 puff 2 (two) times a day, Starting Wed 1/30/2019, Normal      Menthol-Methyl Salicylate (NASH BOWLES GREASELESS) 10-15 % greaseless cream Apply topically daily at bedtime, Starting Fri 9/18/2020, Normal      mupirocin (BACTROBAN) 2 % ointment Apply topically 3 (three) times a day, Starting Wed 1/6/2021, Normal           No discharge procedures on file      PDMP Review Value Time User    PDMP Reviewed  Yes 4/21/2021 10:24 PM Florencio Carrera MD          ED Provider  Electronically Signed by           Florencio Carrera MD  04/22/21 6275

## 2021-07-04 ENCOUNTER — HOSPITAL ENCOUNTER (EMERGENCY)
Facility: HOSPITAL | Age: 26
Discharge: HOME | End: 2021-07-04
Attending: EMERGENCY MEDICINE
Payer: COMMERCIAL

## 2021-07-04 ENCOUNTER — APPOINTMENT (EMERGENCY)
Dept: RADIOLOGY | Facility: HOSPITAL | Age: 26
End: 2021-07-04
Payer: COMMERCIAL

## 2021-07-04 VITALS — TEMPERATURE: 99 F | WEIGHT: 270.73 LBS | BODY MASS INDEX: 46.47 KG/M2 | OXYGEN SATURATION: 100 % | HEART RATE: 89 BPM

## 2021-07-04 DIAGNOSIS — S40.012A CONTUSION OF LEFT SHOULDER, INITIAL ENCOUNTER: Primary | ICD-10-CM

## 2021-07-04 PROCEDURE — 99282 EMERGENCY DEPT VISIT SF MDM: CPT | Performed by: EMERGENCY MEDICINE

## 2021-07-04 PROCEDURE — 73030 X-RAY EXAM OF SHOULDER: CPT

## 2021-07-04 PROCEDURE — 99283 EMERGENCY DEPT VISIT LOW MDM: CPT
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
## 2021-10-27 ENCOUNTER — HOSPITAL ENCOUNTER (EMERGENCY)
Facility: HOSPITAL | Age: 26
Discharge: HOME | End: 2021-10-27
Attending: EMERGENCY MEDICINE
Payer: COMMERCIAL

## 2021-10-27 VITALS
BODY MASS INDEX: 45.33 KG/M2 | WEIGHT: 265.5 LBS | SYSTOLIC BLOOD PRESSURE: 175 MMHG | HEIGHT: 64 IN | TEMPERATURE: 99 F | HEART RATE: 72 BPM | OXYGEN SATURATION: 100 % | DIASTOLIC BLOOD PRESSURE: 100 MMHG | RESPIRATION RATE: 18 BRPM

## 2021-10-27 DIAGNOSIS — R20.0 NUMBNESS AND TINGLING IN RIGHT HAND: Primary | ICD-10-CM

## 2021-10-27 DIAGNOSIS — R20.2 NUMBNESS AND TINGLING IN RIGHT HAND: Primary | ICD-10-CM

## 2021-10-27 DIAGNOSIS — I10 HYPERTENSION, UNSPECIFIED TYPE: ICD-10-CM

## 2021-10-27 PROCEDURE — 99283 EMERGENCY DEPT VISIT LOW MDM: CPT

## 2021-10-27 PROCEDURE — 99284 EMERGENCY DEPT VISIT MOD MDM: CPT | Performed by: EMERGENCY MEDICINE

## 2021-11-13 ENCOUNTER — APPOINTMENT (INPATIENT)
Dept: CT IMAGING | Facility: HOSPITAL | Age: 26
DRG: 141 | End: 2021-11-13
Payer: COMMERCIAL

## 2021-11-13 ENCOUNTER — APPOINTMENT (EMERGENCY)
Dept: RADIOLOGY | Facility: HOSPITAL | Age: 26
DRG: 141 | End: 2021-11-13
Payer: COMMERCIAL

## 2021-11-13 ENCOUNTER — HOSPITAL ENCOUNTER (INPATIENT)
Facility: HOSPITAL | Age: 26
LOS: 4 days | Discharge: HOME | DRG: 141 | End: 2021-11-17
Attending: EMERGENCY MEDICINE | Admitting: INTERNAL MEDICINE
Payer: COMMERCIAL

## 2021-11-13 DIAGNOSIS — R89.9 ABNORMAL LABORATORY TEST: ICD-10-CM

## 2021-11-13 DIAGNOSIS — I12.9 BENIGN HYPERTENSION WITH CKD (CHRONIC KIDNEY DISEASE) STAGE III (HCC): ICD-10-CM

## 2021-11-13 DIAGNOSIS — D50.9 IRON DEFICIENCY ANEMIA, UNSPECIFIED IRON DEFICIENCY ANEMIA TYPE: ICD-10-CM

## 2021-11-13 DIAGNOSIS — N18.30 BENIGN HYPERTENSION WITH CKD (CHRONIC KIDNEY DISEASE) STAGE III (HCC): ICD-10-CM

## 2021-11-13 DIAGNOSIS — J45.901 MILD ASTHMA WITH EXACERBATION, UNSPECIFIED WHETHER PERSISTENT: ICD-10-CM

## 2021-11-13 DIAGNOSIS — N18.9 ACUTE KIDNEY INJURY SUPERIMPOSED ON CHRONIC KIDNEY DISEASE (HCC): Primary | ICD-10-CM

## 2021-11-13 DIAGNOSIS — N17.9 ACUTE KIDNEY INJURY SUPERIMPOSED ON CHRONIC KIDNEY DISEASE (HCC): Primary | ICD-10-CM

## 2021-11-13 PROBLEM — R41.3 MEMORY LOSS: Status: ACTIVE | Noted: 2021-11-13

## 2021-11-13 PROBLEM — E79.0 HYPERURICEMIA: Status: ACTIVE | Noted: 2021-11-13

## 2021-11-13 PROBLEM — R74.8 ALKALINE PHOSPHATASE ELEVATION: Status: ACTIVE | Noted: 2021-11-13

## 2021-11-13 PROBLEM — E87.6 HYPOKALEMIA: Status: ACTIVE | Noted: 2021-11-13

## 2021-11-13 LAB
ALBUMIN SERPL BCP-MCNC: 3.6 G/DL (ref 3.5–5)
ALBUMIN SERPL BCP-MCNC: 3.8 G/DL (ref 3.5–5)
ALP SERPL-CCNC: 122 U/L (ref 46–116)
ALP SERPL-CCNC: 132 U/L (ref 46–116)
ALT SERPL W P-5'-P-CCNC: 17 U/L (ref 12–78)
ALT SERPL W P-5'-P-CCNC: 17 U/L (ref 12–78)
ANION GAP SERPL CALCULATED.3IONS-SCNC: 14 MMOL/L (ref 4–13)
ANION GAP SERPL CALCULATED.3IONS-SCNC: 15 MMOL/L (ref 4–13)
AST SERPL W P-5'-P-CCNC: 9 U/L (ref 5–45)
AST SERPL W P-5'-P-CCNC: 9 U/L (ref 5–45)
B-HCG SERPL-ACNC: <2 MIU/ML
BACTERIA UR QL AUTO: NORMAL /HPF
BASOPHILS # BLD AUTO: 0.03 THOUSANDS/ΜL (ref 0–0.1)
BASOPHILS NFR BLD AUTO: 0 % (ref 0–1)
BILIRUB SERPL-MCNC: 0.16 MG/DL (ref 0.2–1)
BILIRUB SERPL-MCNC: 0.19 MG/DL (ref 0.2–1)
BILIRUB UR QL STRIP: NEGATIVE
BLD SMEAR INTERP: NORMAL
BUN SERPL-MCNC: 19 MG/DL (ref 5–25)
BUN SERPL-MCNC: 19 MG/DL (ref 5–25)
C3 SERPL-MCNC: 144 MG/DL (ref 90–180)
C4 SERPL-MCNC: 35 MG/DL (ref 10–40)
CALCIUM SERPL-MCNC: 8.6 MG/DL (ref 8.3–10.1)
CALCIUM SERPL-MCNC: 8.6 MG/DL (ref 8.3–10.1)
CHLORIDE SERPL-SCNC: 105 MMOL/L (ref 100–108)
CHLORIDE SERPL-SCNC: 105 MMOL/L (ref 100–108)
CLARITY UR: CLEAR
CO2 SERPL-SCNC: 20 MMOL/L (ref 21–32)
CO2 SERPL-SCNC: 21 MMOL/L (ref 21–32)
COLOR UR: YELLOW
CREAT SERPL-MCNC: 4.11 MG/DL (ref 0.6–1.3)
CREAT SERPL-MCNC: 4.13 MG/DL (ref 0.6–1.3)
CREAT UR-MCNC: 100 MG/DL
EOSINOPHIL # BLD AUTO: 0.12 THOUSAND/ΜL (ref 0–0.61)
EOSINOPHIL NFR BLD AUTO: 2 % (ref 0–6)
ERYTHROCYTE [DISTWIDTH] IN BLOOD BY AUTOMATED COUNT: 16.1 % (ref 11.6–15.1)
ERYTHROCYTE [DISTWIDTH] IN BLOOD BY AUTOMATED COUNT: 16.4 % (ref 11.6–15.1)
EST. AVERAGE GLUCOSE BLD GHB EST-MCNC: 105 MG/DL
FERRITIN SERPL-MCNC: 104 NG/ML (ref 8–388)
GFR SERPL CREATININE-BSD FRML MDRD: 14 ML/MIN/1.73SQ M
GFR SERPL CREATININE-BSD FRML MDRD: 14 ML/MIN/1.73SQ M
GLUCOSE SERPL-MCNC: 174 MG/DL (ref 65–140)
GLUCOSE SERPL-MCNC: 91 MG/DL (ref 65–140)
GLUCOSE UR STRIP-MCNC: NEGATIVE MG/DL
HBA1C MFR BLD: 5.3 %
HCT VFR BLD AUTO: 33.7 % (ref 34.8–46.1)
HCT VFR BLD AUTO: 37.5 % (ref 34.8–46.1)
HGB BLD-MCNC: 10.1 G/DL (ref 11.5–15.4)
HGB BLD-MCNC: 10.8 G/DL (ref 11.5–15.4)
HGB UR QL STRIP.AUTO: ABNORMAL
HIV 1+2 AB+HIV1 P24 AG SERPL QL IA: NORMAL
HIV1 P24 AG SER QL: NORMAL
IMM GRANULOCYTES # BLD AUTO: 0.02 THOUSAND/UL (ref 0–0.2)
IMM GRANULOCYTES NFR BLD AUTO: 0 % (ref 0–2)
IRON SATN MFR SERPL: 11 % (ref 15–50)
IRON SERPL-MCNC: 23 UG/DL (ref 50–170)
KETONES UR STRIP-MCNC: NEGATIVE MG/DL
LEUKOCYTE ESTERASE UR QL STRIP: NEGATIVE
LYMPHOCYTES # BLD AUTO: 1.54 THOUSANDS/ΜL (ref 0.6–4.47)
LYMPHOCYTES NFR BLD AUTO: 19 % (ref 14–44)
MAGNESIUM SERPL-MCNC: 2.5 MG/DL (ref 1.6–2.6)
MCH RBC QN AUTO: 20.1 PG (ref 26.8–34.3)
MCH RBC QN AUTO: 20.7 PG (ref 26.8–34.3)
MCHC RBC AUTO-ENTMCNC: 28.8 G/DL (ref 31.4–37.4)
MCHC RBC AUTO-ENTMCNC: 30 G/DL (ref 31.4–37.4)
MCV RBC AUTO: 69 FL (ref 82–98)
MCV RBC AUTO: 70 FL (ref 82–98)
MONOCYTES # BLD AUTO: 0.6 THOUSAND/ΜL (ref 0.17–1.22)
MONOCYTES NFR BLD AUTO: 7 % (ref 4–12)
NEUTROPHILS # BLD AUTO: 5.9 THOUSANDS/ΜL (ref 1.85–7.62)
NEUTS SEG NFR BLD AUTO: 72 % (ref 43–75)
NITRITE UR QL STRIP: NEGATIVE
NON-SQ EPI CELLS URNS QL MICRO: NORMAL /HPF
NRBC BLD AUTO-RTO: 0 /100 WBCS
NT-PROBNP SERPL-MCNC: 369 PG/ML
PH UR STRIP.AUTO: 6 [PH]
PHOSPHATE SERPL-MCNC: 2.4 MG/DL (ref 2.7–4.5)
PLATELET # BLD AUTO: 260 THOUSANDS/UL (ref 149–390)
PLATELET # BLD AUTO: 269 THOUSANDS/UL (ref 149–390)
PMV BLD AUTO: 10.2 FL (ref 8.9–12.7)
PMV BLD AUTO: 11.2 FL (ref 8.9–12.7)
POTASSIUM SERPL-SCNC: 3.4 MMOL/L (ref 3.5–5.3)
POTASSIUM SERPL-SCNC: 3.9 MMOL/L (ref 3.5–5.3)
PROT SERPL-MCNC: 7.5 G/DL (ref 6.4–8.2)
PROT SERPL-MCNC: 7.8 G/DL (ref 6.4–8.2)
PROT UR STRIP-MCNC: ABNORMAL MG/DL
PROT UR-MCNC: 235 MG/DL
PROT/CREAT UR: 2.35 MG/G{CREAT} (ref 0–0.1)
RBC # BLD AUTO: 4.87 MILLION/UL (ref 3.81–5.12)
RBC # BLD AUTO: 5.38 MILLION/UL (ref 3.81–5.12)
RBC #/AREA URNS AUTO: NORMAL /HPF
SODIUM SERPL-SCNC: 140 MMOL/L (ref 136–145)
SODIUM SERPL-SCNC: 140 MMOL/L (ref 136–145)
SP GR UR STRIP.AUTO: 1.02 (ref 1–1.03)
TIBC SERPL-MCNC: 218 UG/DL (ref 250–450)
URATE SERPL-MCNC: 7.9 MG/DL (ref 2–6.8)
UROBILINOGEN UR QL STRIP.AUTO: 0.2 E.U./DL
WBC # BLD AUTO: 10.66 THOUSAND/UL (ref 4.31–10.16)
WBC # BLD AUTO: 8.21 THOUSAND/UL (ref 4.31–10.16)
WBC #/AREA URNS AUTO: NORMAL /HPF

## 2021-11-13 PROCEDURE — 80053 COMPREHEN METABOLIC PANEL: CPT | Performed by: INTERNAL MEDICINE

## 2021-11-13 PROCEDURE — 94644 CONT INHLJ TX 1ST HOUR: CPT

## 2021-11-13 PROCEDURE — 83520 IMMUNOASSAY QUANT NOS NONAB: CPT | Performed by: INTERNAL MEDICINE

## 2021-11-13 PROCEDURE — 94640 AIRWAY INHALATION TREATMENT: CPT

## 2021-11-13 PROCEDURE — 83540 ASSAY OF IRON: CPT | Performed by: INTERNAL MEDICINE

## 2021-11-13 PROCEDURE — 84100 ASSAY OF PHOSPHORUS: CPT | Performed by: INTERNAL MEDICINE

## 2021-11-13 PROCEDURE — 99223 1ST HOSP IP/OBS HIGH 75: CPT | Performed by: INTERNAL MEDICINE

## 2021-11-13 PROCEDURE — 83735 ASSAY OF MAGNESIUM: CPT | Performed by: INTERNAL MEDICINE

## 2021-11-13 PROCEDURE — 71045 X-RAY EXAM CHEST 1 VIEW: CPT

## 2021-11-13 PROCEDURE — 87806 HIV AG W/HIV1&2 ANTB W/OPTIC: CPT | Performed by: INTERNAL MEDICINE

## 2021-11-13 PROCEDURE — 99291 CRITICAL CARE FIRST HOUR: CPT | Performed by: EMERGENCY MEDICINE

## 2021-11-13 PROCEDURE — 94664 DEMO&/EVAL PT USE INHALER: CPT

## 2021-11-13 PROCEDURE — 94760 N-INVAS EAR/PLS OXIMETRY 1: CPT

## 2021-11-13 PROCEDURE — 83036 HEMOGLOBIN GLYCOSYLATED A1C: CPT | Performed by: INTERNAL MEDICINE

## 2021-11-13 PROCEDURE — 84702 CHORIONIC GONADOTROPIN TEST: CPT | Performed by: PHYSICIAN ASSISTANT

## 2021-11-13 PROCEDURE — 84550 ASSAY OF BLOOD/URIC ACID: CPT | Performed by: INTERNAL MEDICINE

## 2021-11-13 PROCEDURE — 82728 ASSAY OF FERRITIN: CPT | Performed by: INTERNAL MEDICINE

## 2021-11-13 PROCEDURE — 82570 ASSAY OF URINE CREATININE: CPT | Performed by: INTERNAL MEDICINE

## 2021-11-13 PROCEDURE — 83880 ASSAY OF NATRIURETIC PEPTIDE: CPT | Performed by: INTERNAL MEDICINE

## 2021-11-13 PROCEDURE — 99285 EMERGENCY DEPT VISIT HI MDM: CPT

## 2021-11-13 PROCEDURE — 83883 ASSAY NEPHELOMETRY NOT SPEC: CPT | Performed by: INTERNAL MEDICINE

## 2021-11-13 PROCEDURE — 85025 COMPLETE CBC W/AUTO DIFF WBC: CPT | Performed by: PHYSICIAN ASSISTANT

## 2021-11-13 PROCEDURE — 36415 COLL VENOUS BLD VENIPUNCTURE: CPT | Performed by: PHYSICIAN ASSISTANT

## 2021-11-13 PROCEDURE — 74176 CT ABD & PELVIS W/O CONTRAST: CPT

## 2021-11-13 PROCEDURE — 93005 ELECTROCARDIOGRAM TRACING: CPT

## 2021-11-13 PROCEDURE — 84156 ASSAY OF PROTEIN URINE: CPT | Performed by: INTERNAL MEDICINE

## 2021-11-13 PROCEDURE — 83550 IRON BINDING TEST: CPT | Performed by: INTERNAL MEDICINE

## 2021-11-13 PROCEDURE — 86255 FLUORESCENT ANTIBODY SCREEN: CPT | Performed by: INTERNAL MEDICINE

## 2021-11-13 PROCEDURE — 84165 PROTEIN E-PHORESIS SERUM: CPT | Performed by: PATHOLOGY

## 2021-11-13 PROCEDURE — G1004 CDSM NDSC: HCPCS

## 2021-11-13 PROCEDURE — 96375 TX/PRO/DX INJ NEW DRUG ADDON: CPT

## 2021-11-13 PROCEDURE — 99222 1ST HOSP IP/OBS MODERATE 55: CPT | Performed by: INTERNAL MEDICINE

## 2021-11-13 PROCEDURE — 86160 COMPLEMENT ANTIGEN: CPT | Performed by: INTERNAL MEDICINE

## 2021-11-13 PROCEDURE — 96361 HYDRATE IV INFUSION ADD-ON: CPT

## 2021-11-13 PROCEDURE — 80053 COMPREHEN METABOLIC PANEL: CPT | Performed by: PHYSICIAN ASSISTANT

## 2021-11-13 PROCEDURE — 84165 PROTEIN E-PHORESIS SERUM: CPT | Performed by: INTERNAL MEDICINE

## 2021-11-13 PROCEDURE — 96365 THER/PROPH/DIAG IV INF INIT: CPT

## 2021-11-13 PROCEDURE — 84166 PROTEIN E-PHORESIS/URINE/CSF: CPT | Performed by: PATHOLOGY

## 2021-11-13 PROCEDURE — 86038 ANTINUCLEAR ANTIBODIES: CPT | Performed by: INTERNAL MEDICINE

## 2021-11-13 PROCEDURE — 85027 COMPLETE CBC AUTOMATED: CPT | Performed by: INTERNAL MEDICINE

## 2021-11-13 PROCEDURE — 81001 URINALYSIS AUTO W/SCOPE: CPT | Performed by: EMERGENCY MEDICINE

## 2021-11-13 RX ORDER — METHYLPREDNISOLONE SODIUM SUCCINATE 125 MG/2ML
80 INJECTION, POWDER, LYOPHILIZED, FOR SOLUTION INTRAMUSCULAR; INTRAVENOUS DAILY
Status: DISCONTINUED | OUTPATIENT
Start: 2021-11-13 | End: 2021-11-13

## 2021-11-13 RX ORDER — FAMOTIDINE 20 MG/1
10 TABLET, FILM COATED ORAL DAILY
Status: DISCONTINUED | OUTPATIENT
Start: 2021-11-13 | End: 2021-11-17 | Stop reason: HOSPADM

## 2021-11-13 RX ORDER — POTASSIUM CHLORIDE 20 MEQ/1
40 TABLET, EXTENDED RELEASE ORAL ONCE
Status: DISCONTINUED | OUTPATIENT
Start: 2021-11-13 | End: 2021-11-13

## 2021-11-13 RX ORDER — HYDRALAZINE HYDROCHLORIDE 10 MG/1
10 TABLET, FILM COATED ORAL EVERY MORNING
Status: DISCONTINUED | OUTPATIENT
Start: 2021-11-13 | End: 2021-11-13

## 2021-11-13 RX ORDER — SODIUM CHLORIDE FOR INHALATION 0.9 %
3 VIAL, NEBULIZER (ML) INHALATION ONCE
Status: COMPLETED | OUTPATIENT
Start: 2021-11-13 | End: 2021-11-13

## 2021-11-13 RX ORDER — FERROUS SULFATE 325(65) MG
325 TABLET ORAL EVERY OTHER DAY
Status: DISCONTINUED | OUTPATIENT
Start: 2021-11-13 | End: 2021-11-15

## 2021-11-13 RX ORDER — LEVALBUTEROL INHALATION SOLUTION 0.63 MG/3ML
0.63 SOLUTION RESPIRATORY (INHALATION)
Status: DISCONTINUED | OUTPATIENT
Start: 2021-11-13 | End: 2021-11-14

## 2021-11-13 RX ORDER — ALBUTEROL SULFATE 90 UG/1
2 AEROSOL, METERED RESPIRATORY (INHALATION) ONCE
Status: DISCONTINUED | OUTPATIENT
Start: 2021-11-13 | End: 2021-11-13

## 2021-11-13 RX ORDER — AMLODIPINE BESYLATE 10 MG/1
10 TABLET ORAL DAILY
Status: DISCONTINUED | OUTPATIENT
Start: 2021-11-13 | End: 2021-11-15

## 2021-11-13 RX ORDER — HYDRALAZINE HYDROCHLORIDE 20 MG/ML
5 INJECTION INTRAMUSCULAR; INTRAVENOUS EVERY 6 HOURS PRN
Status: DISCONTINUED | OUTPATIENT
Start: 2021-11-13 | End: 2021-11-13

## 2021-11-13 RX ORDER — PREDNISONE 20 MG/1
40 TABLET ORAL DAILY
Status: DISCONTINUED | OUTPATIENT
Start: 2021-11-14 | End: 2021-11-17 | Stop reason: HOSPADM

## 2021-11-13 RX ORDER — HEPARIN SODIUM 5000 [USP'U]/ML
5000 INJECTION, SOLUTION INTRAVENOUS; SUBCUTANEOUS EVERY 8 HOURS SCHEDULED
Status: DISCONTINUED | OUTPATIENT
Start: 2021-11-13 | End: 2021-11-17 | Stop reason: HOSPADM

## 2021-11-13 RX ORDER — MAGNESIUM SULFATE HEPTAHYDRATE 40 MG/ML
2 INJECTION, SOLUTION INTRAVENOUS ONCE
Status: COMPLETED | OUTPATIENT
Start: 2021-11-13 | End: 2021-11-13

## 2021-11-13 RX ORDER — METOPROLOL TARTRATE 5 MG/5ML
5 INJECTION INTRAVENOUS EVERY 6 HOURS PRN
Status: DISCONTINUED | OUTPATIENT
Start: 2021-11-13 | End: 2021-11-17 | Stop reason: HOSPADM

## 2021-11-13 RX ORDER — POTASSIUM CHLORIDE 20 MEQ/1
20 TABLET, EXTENDED RELEASE ORAL ONCE
Status: COMPLETED | OUTPATIENT
Start: 2021-11-13 | End: 2021-11-13

## 2021-11-13 RX ADMIN — HEPARIN SODIUM 5000 UNITS: 5000 INJECTION INTRAVENOUS; SUBCUTANEOUS at 06:33

## 2021-11-13 RX ADMIN — IPRATROPIUM BROMIDE 0.5 MG: 0.5 SOLUTION RESPIRATORY (INHALATION) at 19:24

## 2021-11-13 RX ADMIN — POTASSIUM CHLORIDE 20 MEQ: 1500 TABLET, EXTENDED RELEASE ORAL at 09:03

## 2021-11-13 RX ADMIN — ISODIUM CHLORIDE 3 ML: 0.03 SOLUTION RESPIRATORY (INHALATION) at 00:50

## 2021-11-13 RX ADMIN — IPRATROPIUM BROMIDE 0.5 MG: 0.5 SOLUTION RESPIRATORY (INHALATION) at 14:26

## 2021-11-13 RX ADMIN — LEVALBUTEROL HYDROCHLORIDE 0.63 MG: 0.63 SOLUTION RESPIRATORY (INHALATION) at 09:06

## 2021-11-13 RX ADMIN — HEPARIN SODIUM 5000 UNITS: 5000 INJECTION INTRAVENOUS; SUBCUTANEOUS at 14:23

## 2021-11-13 RX ADMIN — LEVALBUTEROL HYDROCHLORIDE 0.63 MG: 0.63 SOLUTION RESPIRATORY (INHALATION) at 19:24

## 2021-11-13 RX ADMIN — FAMOTIDINE 10 MG: 20 TABLET ORAL at 09:04

## 2021-11-13 RX ADMIN — IPRATROPIUM BROMIDE 0.5 MG: 0.5 SOLUTION RESPIRATORY (INHALATION) at 09:05

## 2021-11-13 RX ADMIN — HYDRALAZINE HYDROCHLORIDE 5 MG: 20 INJECTION, SOLUTION INTRAMUSCULAR; INTRAVENOUS at 06:38

## 2021-11-13 RX ADMIN — LEVALBUTEROL HYDROCHLORIDE 0.63 MG: 0.63 SOLUTION RESPIRATORY (INHALATION) at 14:25

## 2021-11-13 RX ADMIN — ALBUTEROL SULFATE 10 MG: 2.5 SOLUTION RESPIRATORY (INHALATION) at 00:50

## 2021-11-13 RX ADMIN — SODIUM CHLORIDE 1000 ML: 0.9 INJECTION, SOLUTION INTRAVENOUS at 00:48

## 2021-11-13 RX ADMIN — SODIUM CHLORIDE, SODIUM LACTATE, POTASSIUM CHLORIDE, AND CALCIUM CHLORIDE 1000 ML: .6; .31; .03; .02 INJECTION, SOLUTION INTRAVENOUS at 03:32

## 2021-11-13 RX ADMIN — RACEPINEPHRINE HYDROCHLORIDE 0.5 ML: 11.25 SOLUTION RESPIRATORY (INHALATION) at 00:50

## 2021-11-13 RX ADMIN — HEPARIN SODIUM 5000 UNITS: 5000 INJECTION INTRAVENOUS; SUBCUTANEOUS at 22:45

## 2021-11-13 RX ADMIN — AMLODIPINE BESYLATE 10 MG: 10 TABLET ORAL at 09:04

## 2021-11-13 RX ADMIN — MAGNESIUM SULFATE IN WATER 2 G: 40 INJECTION, SOLUTION INTRAVENOUS at 00:48

## 2021-11-13 RX ADMIN — FERROUS SULFATE TAB 325 MG (65 MG ELEMENTAL FE) 325 MG: 325 (65 FE) TAB at 16:23

## 2021-11-13 RX ADMIN — METHYLPREDNISOLONE SODIUM SUCCINATE 80 MG: 125 INJECTION, POWDER, FOR SOLUTION INTRAMUSCULAR; INTRAVENOUS at 01:37

## 2021-11-13 RX ADMIN — METOPROLOL TARTRATE 5 MG: 5 INJECTION, SOLUTION INTRAVENOUS at 17:18

## 2021-11-13 RX ADMIN — METHYLPREDNISOLONE SODIUM SUCCINATE 80 MG: 125 INJECTION, POWDER, FOR SOLUTION INTRAMUSCULAR; INTRAVENOUS at 09:04

## 2021-11-14 ENCOUNTER — APPOINTMENT (INPATIENT)
Dept: NON INVASIVE DIAGNOSTICS | Facility: HOSPITAL | Age: 26
DRG: 141 | End: 2021-11-14
Payer: COMMERCIAL

## 2021-11-14 PROBLEM — E87.5 HYPERKALEMIA: Status: ACTIVE | Noted: 2021-11-14

## 2021-11-14 PROBLEM — D72.829 LEUKOCYTOSIS: Status: ACTIVE | Noted: 2021-11-14

## 2021-11-14 PROBLEM — E87.6 HYPOKALEMIA: Status: RESOLVED | Noted: 2021-11-13 | Resolved: 2021-11-14

## 2021-11-14 PROBLEM — R03.0 ELEVATED BP WITHOUT DIAGNOSIS OF HYPERTENSION: Status: ACTIVE | Noted: 2021-11-14

## 2021-11-14 LAB
ALBUMIN SERPL BCP-MCNC: 4.2 G/DL (ref 3.5–5)
ALP SERPL-CCNC: 129 U/L (ref 46–116)
ALT SERPL W P-5'-P-CCNC: 17 U/L (ref 12–78)
ANION GAP SERPL CALCULATED.3IONS-SCNC: 12 MMOL/L (ref 4–13)
AST SERPL W P-5'-P-CCNC: 11 U/L (ref 5–45)
BILIRUB SERPL-MCNC: 0.17 MG/DL (ref 0.2–1)
BUN SERPL-MCNC: 27 MG/DL (ref 5–25)
CALCIUM SERPL-MCNC: 9.7 MG/DL (ref 8.3–10.1)
CHLORIDE SERPL-SCNC: 104 MMOL/L (ref 100–108)
CO2 SERPL-SCNC: 19 MMOL/L (ref 21–32)
CREAT SERPL-MCNC: 4.44 MG/DL (ref 0.6–1.3)
ERYTHROCYTE [DISTWIDTH] IN BLOOD BY AUTOMATED COUNT: 16.7 % (ref 11.6–15.1)
GFR SERPL CREATININE-BSD FRML MDRD: 13 ML/MIN/1.73SQ M
GLUCOSE SERPL-MCNC: 106 MG/DL (ref 65–140)
HBV CORE AB SER QL: NORMAL
HBV CORE IGM SER QL: NORMAL
HBV SURFACE AG SER QL: NORMAL
HCT VFR BLD AUTO: 36.7 % (ref 34.8–46.1)
HCV AB SER QL: NORMAL
HGB BLD-MCNC: 10.7 G/DL (ref 11.5–15.4)
MCH RBC QN AUTO: 20.2 PG (ref 26.8–34.3)
MCHC RBC AUTO-ENTMCNC: 29.2 G/DL (ref 31.4–37.4)
MCV RBC AUTO: 69 FL (ref 82–98)
PLATELET # BLD AUTO: 351 THOUSANDS/UL (ref 149–390)
PMV BLD AUTO: 12 FL (ref 8.9–12.7)
POTASSIUM SERPL-SCNC: 5.4 MMOL/L (ref 3.5–5.3)
POTASSIUM SERPL-SCNC: 5.4 MMOL/L (ref 3.5–5.3)
POTASSIUM SERPL-SCNC: 6.3 MMOL/L (ref 3.5–5.3)
PROT SERPL-MCNC: 8.4 G/DL (ref 6.4–8.2)
RBC # BLD AUTO: 5.29 MILLION/UL (ref 3.81–5.12)
SODIUM SERPL-SCNC: 135 MMOL/L (ref 136–145)
WBC # BLD AUTO: 20.39 THOUSAND/UL (ref 4.31–10.16)

## 2021-11-14 PROCEDURE — 86704 HEP B CORE ANTIBODY TOTAL: CPT | Performed by: INTERNAL MEDICINE

## 2021-11-14 PROCEDURE — 86803 HEPATITIS C AB TEST: CPT | Performed by: INTERNAL MEDICINE

## 2021-11-14 PROCEDURE — 99232 SBSQ HOSP IP/OBS MODERATE 35: CPT | Performed by: INTERNAL MEDICINE

## 2021-11-14 PROCEDURE — 86705 HEP B CORE ANTIBODY IGM: CPT | Performed by: INTERNAL MEDICINE

## 2021-11-14 PROCEDURE — 93306 TTE W/DOPPLER COMPLETE: CPT | Performed by: INTERNAL MEDICINE

## 2021-11-14 PROCEDURE — 85027 COMPLETE CBC AUTOMATED: CPT | Performed by: INTERNAL MEDICINE

## 2021-11-14 PROCEDURE — 84132 ASSAY OF SERUM POTASSIUM: CPT

## 2021-11-14 PROCEDURE — 87340 HEPATITIS B SURFACE AG IA: CPT | Performed by: INTERNAL MEDICINE

## 2021-11-14 PROCEDURE — 93306 TTE W/DOPPLER COMPLETE: CPT

## 2021-11-14 PROCEDURE — 80053 COMPREHEN METABOLIC PANEL: CPT | Performed by: INTERNAL MEDICINE

## 2021-11-14 PROCEDURE — 94640 AIRWAY INHALATION TREATMENT: CPT

## 2021-11-14 PROCEDURE — 94760 N-INVAS EAR/PLS OXIMETRY 1: CPT

## 2021-11-14 PROCEDURE — 84166 PROTEIN E-PHORESIS/URINE/CSF: CPT | Performed by: INTERNAL MEDICINE

## 2021-11-14 RX ORDER — SODIUM POLYSTYRENE SULFONATE 4.1 MEQ/G
15 POWDER, FOR SUSPENSION ORAL; RECTAL ONCE
Status: COMPLETED | OUTPATIENT
Start: 2021-11-14 | End: 2021-11-14

## 2021-11-14 RX ORDER — LEVALBUTEROL INHALATION SOLUTION 0.63 MG/3ML
0.63 SOLUTION RESPIRATORY (INHALATION) EVERY 6 HOURS PRN
Status: DISCONTINUED | OUTPATIENT
Start: 2021-11-14 | End: 2021-11-17 | Stop reason: HOSPADM

## 2021-11-14 RX ORDER — CALCIUM GLUCONATE 20 MG/ML
1 INJECTION, SOLUTION INTRAVENOUS ONCE
Status: COMPLETED | OUTPATIENT
Start: 2021-11-14 | End: 2021-11-14

## 2021-11-14 RX ORDER — SODIUM POLYSTYRENE SULFONATE 4.1 MEQ/G
15 POWDER, FOR SUSPENSION ORAL; RECTAL ONCE
Status: DISCONTINUED | OUTPATIENT
Start: 2021-11-14 | End: 2021-11-14

## 2021-11-14 RX ORDER — DEXTROSE MONOHYDRATE 25 G/50ML
25 INJECTION, SOLUTION INTRAVENOUS ONCE
Status: COMPLETED | OUTPATIENT
Start: 2021-11-14 | End: 2021-11-14

## 2021-11-14 RX ORDER — ALBUTEROL SULFATE 2.5 MG/3ML
10 SOLUTION RESPIRATORY (INHALATION) ONCE
Status: DISCONTINUED | OUTPATIENT
Start: 2021-11-14 | End: 2021-11-14

## 2021-11-14 RX ADMIN — SODIUM BICARBONATE 50 MEQ: 84 INJECTION, SOLUTION INTRAVENOUS at 09:48

## 2021-11-14 RX ADMIN — AMLODIPINE BESYLATE 10 MG: 10 TABLET ORAL at 09:01

## 2021-11-14 RX ADMIN — PREDNISONE 40 MG: 20 TABLET ORAL at 09:01

## 2021-11-14 RX ADMIN — HEPARIN SODIUM 5000 UNITS: 5000 INJECTION INTRAVENOUS; SUBCUTANEOUS at 15:27

## 2021-11-14 RX ADMIN — FAMOTIDINE 10 MG: 20 TABLET ORAL at 09:01

## 2021-11-14 RX ADMIN — METOPROLOL TARTRATE 5 MG: 5 INJECTION, SOLUTION INTRAVENOUS at 00:45

## 2021-11-14 RX ADMIN — HEPARIN SODIUM 5000 UNITS: 5000 INJECTION INTRAVENOUS; SUBCUTANEOUS at 21:44

## 2021-11-14 RX ADMIN — DEXTROSE MONOHYDRATE 25 ML: 25 INJECTION, SOLUTION INTRAVENOUS at 09:11

## 2021-11-14 RX ADMIN — LEVALBUTEROL HYDROCHLORIDE 0.63 MG: 0.63 SOLUTION RESPIRATORY (INHALATION) at 08:35

## 2021-11-14 RX ADMIN — INSULIN HUMAN 10 UNITS: 100 INJECTION, SOLUTION PARENTERAL at 09:05

## 2021-11-14 RX ADMIN — HEPARIN SODIUM 5000 UNITS: 5000 INJECTION INTRAVENOUS; SUBCUTANEOUS at 06:17

## 2021-11-14 RX ADMIN — SODIUM POLYSTYRENE SULFONATE 15 G: 1 POWDER ORAL; RECTAL at 11:49

## 2021-11-14 RX ADMIN — CALCIUM GLUCONATE 1 G: 20 INJECTION, SOLUTION INTRAVENOUS at 09:04

## 2021-11-14 RX ADMIN — IPRATROPIUM BROMIDE 0.5 MG: 0.5 SOLUTION RESPIRATORY (INHALATION) at 08:34

## 2021-11-15 ENCOUNTER — TELEPHONE (OUTPATIENT)
Dept: NEPHROLOGY | Facility: CLINIC | Age: 26
End: 2021-11-15

## 2021-11-15 PROBLEM — E87.5 HYPERKALEMIA: Status: RESOLVED | Noted: 2021-11-14 | Resolved: 2021-11-15

## 2021-11-15 LAB
ALBUMIN SERPL ELPH-MCNC: 4.04 G/DL (ref 3.5–5)
ALBUMIN SERPL ELPH-MCNC: 55.4 % (ref 52–65)
ALBUMIN UR ELPH-MCNC: 78.7 %
ALPHA1 GLOB MFR UR ELPH: 4.8 %
ALPHA1 GLOB SERPL ELPH-MCNC: 0.42 G/DL (ref 0.1–0.4)
ALPHA1 GLOB SERPL ELPH-MCNC: 5.8 % (ref 2.5–5)
ALPHA2 GLOB MFR UR ELPH: 4.1 %
ALPHA2 GLOB SERPL ELPH-MCNC: 0.98 G/DL (ref 0.4–1.2)
ALPHA2 GLOB SERPL ELPH-MCNC: 13.4 % (ref 7–13)
ANION GAP SERPL CALCULATED.3IONS-SCNC: 12 MMOL/L (ref 4–13)
AORTIC ROOT: 3.2 CM
APICAL FOUR CHAMBER EJECTION FRACTION: 63 %
ASCENDING AORTA: 2.6 CM
B-GLOBULIN MFR UR ELPH: 5.2 %
BETA GLOB ABNORMAL SERPL ELPH-MCNC: 0.34 G/DL (ref 0.4–0.8)
BETA1 GLOB SERPL ELPH-MCNC: 4.6 % (ref 5–13)
BETA2 GLOB SERPL ELPH-MCNC: 5.2 % (ref 2–8)
BETA2+GAMMA GLOB SERPL ELPH-MCNC: 0.38 G/DL (ref 0.2–0.5)
BUN SERPL-MCNC: 39 MG/DL (ref 5–25)
CALCIUM SERPL-MCNC: 8.7 MG/DL (ref 8.3–10.1)
CHLORIDE SERPL-SCNC: 105 MMOL/L (ref 100–108)
CO2 SERPL-SCNC: 24 MMOL/L (ref 21–32)
CREAT SERPL-MCNC: 4.67 MG/DL (ref 0.6–1.3)
E WAVE DECELERATION TIME: 222 MS
ERYTHROCYTE [DISTWIDTH] IN BLOOD BY AUTOMATED COUNT: 16.6 % (ref 11.6–15.1)
FRACTIONAL SHORTENING: 31 % (ref 28–44)
GAMMA GLOB ABNORMAL SERPL ELPH-MCNC: 1.14 G/DL (ref 0.5–1.6)
GAMMA GLOB MFR UR ELPH: 7.2 %
GAMMA GLOB SERPL ELPH-MCNC: 15.6 % (ref 12–22)
GFR SERPL CREATININE-BSD FRML MDRD: 12 ML/MIN/1.73SQ M
GLUCOSE SERPL-MCNC: 97 MG/DL (ref 65–140)
HCT VFR BLD AUTO: 36.4 % (ref 34.8–46.1)
HGB BLD-MCNC: 10.5 G/DL (ref 11.5–15.4)
IGG/ALB SER: 1.24 {RATIO} (ref 1.1–1.8)
INTERVENTRICULAR SEPTUM IN DIASTOLE (PARASTERNAL SHORT AXIS VIEW): 1.3 CM
LEFT ATRIUM AREA SYSTOLE SINGLE PLANE A4C: 23.2 CM2
LEFT INTERNAL DIMENSION IN SYSTOLE: 3.4 CM (ref 2.1–4)
LEFT VENTRICULAR INTERNAL DIMENSION IN DIASTOLE: 4.9 CM (ref 8.76–13.06)
LEFT VENTRICULAR POSTERIOR WALL IN END DIASTOLE: 0.9 CM
LEFT VENTRICULAR STROKE VOLUME: 66 ML
MCH RBC QN AUTO: 20 PG (ref 26.8–34.3)
MCHC RBC AUTO-ENTMCNC: 28.8 G/DL (ref 31.4–37.4)
MCV RBC AUTO: 69 FL (ref 82–98)
MV E'TISSUE VEL-SEP: 9 CM/S
MV PEAK A VEL: 1.09 M/S
MV PEAK E VEL: 130 CM/S
MV STENOSIS PRESSURE HALF TIME: 0 MS
PLATELET # BLD AUTO: 308 THOUSANDS/UL (ref 149–390)
PMV BLD AUTO: 10.8 FL (ref 8.9–12.7)
POTASSIUM SERPL-SCNC: 4.5 MMOL/L (ref 3.5–5.3)
PROT PATTERN SERPL ELPH-IMP: ABNORMAL
PROT PATTERN UR ELPH-IMP: ABNORMAL
PROT SERPL-MCNC: 7.3 G/DL (ref 6.4–8.2)
PROT UR-MCNC: 115 MG/DL
PROTEINASE3 AB SER IA-ACNC: 3.6 U/ML (ref 0–3.5)
RBC # BLD AUTO: 5.25 MILLION/UL (ref 3.81–5.12)
RIGHT ATRIUM AREA SYSTOLE A4C: 12 CM2
RIGHT VENTRICLE ID DIMENSION: 2.6 CM
RYE IGE QN: NEGATIVE
SL CV LV EF: 60
SL CV PED ECHO LEFT VENTRICLE DIASTOLIC VOLUME (MOD BIPLANE) 2D: 112 ML
SL CV PED ECHO LEFT VENTRICLE SYSTOLIC VOLUME (MOD BIPLANE) 2D: 47 ML
SODIUM SERPL-SCNC: 141 MMOL/L (ref 136–145)
TRICUSPID VALVE PEAK REGURGITATION VELOCITY: 3.08 M/S
TRICUSPID VALVE S': 1.3 CM/S
TV PEAK GRADIENT: 38 MMHG
WBC # BLD AUTO: 11.82 THOUSAND/UL (ref 4.31–10.16)
Z-SCORE OF LEFT VENTRICULAR DIMENSION IN END SYSTOLE: -7.81

## 2021-11-15 PROCEDURE — 80048 BASIC METABOLIC PNL TOTAL CA: CPT | Performed by: INTERNAL MEDICINE

## 2021-11-15 PROCEDURE — 99233 SBSQ HOSP IP/OBS HIGH 50: CPT | Performed by: INTERNAL MEDICINE

## 2021-11-15 PROCEDURE — 85027 COMPLETE CBC AUTOMATED: CPT

## 2021-11-15 PROCEDURE — 99232 SBSQ HOSP IP/OBS MODERATE 35: CPT | Performed by: INTERNAL MEDICINE

## 2021-11-15 RX ORDER — AMLODIPINE BESYLATE 10 MG/1
10 TABLET ORAL DAILY
Status: DISCONTINUED | OUTPATIENT
Start: 2021-11-16 | End: 2021-11-17 | Stop reason: HOSPADM

## 2021-11-15 RX ORDER — SODIUM BICARBONATE 650 MG/1
650 TABLET ORAL
Status: DISCONTINUED | OUTPATIENT
Start: 2021-11-15 | End: 2021-11-17 | Stop reason: HOSPADM

## 2021-11-15 RX ORDER — FERROUS SULFATE 325(65) MG
325 TABLET ORAL
Status: DISCONTINUED | OUTPATIENT
Start: 2021-11-16 | End: 2021-11-17 | Stop reason: HOSPADM

## 2021-11-15 RX ADMIN — FAMOTIDINE 10 MG: 20 TABLET ORAL at 09:03

## 2021-11-15 RX ADMIN — HEPARIN SODIUM 5000 UNITS: 5000 INJECTION INTRAVENOUS; SUBCUTANEOUS at 15:00

## 2021-11-15 RX ADMIN — PREDNISONE 40 MG: 20 TABLET ORAL at 09:03

## 2021-11-15 RX ADMIN — FERROUS SULFATE TAB 325 MG (65 MG ELEMENTAL FE) 325 MG: 325 (65 FE) TAB at 09:03

## 2021-11-15 RX ADMIN — AMLODIPINE BESYLATE 10 MG: 10 TABLET ORAL at 09:03

## 2021-11-15 RX ADMIN — SODIUM BICARBONATE 650 MG: 650 TABLET ORAL at 12:14

## 2021-11-15 RX ADMIN — SODIUM BICARBONATE 650 MG: 650 TABLET ORAL at 16:55

## 2021-11-15 RX ADMIN — HEPARIN SODIUM 5000 UNITS: 5000 INJECTION INTRAVENOUS; SUBCUTANEOUS at 05:21

## 2021-11-15 RX ADMIN — HEPARIN SODIUM 5000 UNITS: 5000 INJECTION INTRAVENOUS; SUBCUTANEOUS at 21:36

## 2021-11-16 LAB
ANION GAP SERPL CALCULATED.3IONS-SCNC: 12 MMOL/L (ref 4–13)
ATRIAL RATE: 92 BPM
BUN SERPL-MCNC: 44 MG/DL (ref 5–25)
C-ANCA TITR SER IF: ABNORMAL TITER
CALCIUM SERPL-MCNC: 8.9 MG/DL (ref 8.3–10.1)
CHLORIDE SERPL-SCNC: 105 MMOL/L (ref 100–108)
CO2 SERPL-SCNC: 24 MMOL/L (ref 21–32)
CREAT SERPL-MCNC: 4.98 MG/DL (ref 0.6–1.3)
GFR SERPL CREATININE-BSD FRML MDRD: 11 ML/MIN/1.73SQ M
GLUCOSE SERPL-MCNC: 91 MG/DL (ref 65–140)
KAPPA LC FREE SER-MCNC: 83.8 MG/L (ref 3.3–19.4)
KAPPA LC FREE/LAMBDA FREE SER: 1.84 {RATIO} (ref 0.26–1.65)
LAMBDA LC FREE SERPL-MCNC: 45.6 MG/L (ref 5.7–26.3)
MYELOPEROXIDASE AB SER IA-ACNC: <9 U/ML (ref 0–9)
P AXIS: 20 DEGREES
P-ANCA ATYPICAL TITR SER IF: ABNORMAL TITER
P-ANCA TITR SER IF: ABNORMAL TITER
POTASSIUM SERPL-SCNC: 4.4 MMOL/L (ref 3.5–5.3)
PR INTERVAL: 132 MS
PROTEINASE3 AB SER IA-ACNC: 4.3 U/ML (ref 0–3.5)
QRS AXIS: 22 DEGREES
QRSD INTERVAL: 84 MS
QT INTERVAL: 348 MS
QTC INTERVAL: 430 MS
SODIUM SERPL-SCNC: 141 MMOL/L (ref 136–145)
T WAVE AXIS: 14 DEGREES
VENTRICULAR RATE: 92 BPM

## 2021-11-16 PROCEDURE — 93010 ELECTROCARDIOGRAM REPORT: CPT | Performed by: INTERNAL MEDICINE

## 2021-11-16 PROCEDURE — 99233 SBSQ HOSP IP/OBS HIGH 50: CPT | Performed by: INTERNAL MEDICINE

## 2021-11-16 PROCEDURE — 99232 SBSQ HOSP IP/OBS MODERATE 35: CPT | Performed by: HOSPITALIST

## 2021-11-16 PROCEDURE — 80048 BASIC METABOLIC PNL TOTAL CA: CPT | Performed by: INTERNAL MEDICINE

## 2021-11-16 RX ORDER — SODIUM CHLORIDE, SODIUM GLUCONATE, SODIUM ACETATE, POTASSIUM CHLORIDE, MAGNESIUM CHLORIDE, SODIUM PHOSPHATE, DIBASIC, AND POTASSIUM PHOSPHATE .53; .5; .37; .037; .03; .012; .00082 G/100ML; G/100ML; G/100ML; G/100ML; G/100ML; G/100ML; G/100ML
50 INJECTION, SOLUTION INTRAVENOUS CONTINUOUS
Status: DISCONTINUED | OUTPATIENT
Start: 2021-11-16 | End: 2021-11-17

## 2021-11-16 RX ORDER — LABETALOL 100 MG/1
100 TABLET, FILM COATED ORAL 2 TIMES DAILY
Status: DISCONTINUED | OUTPATIENT
Start: 2021-11-16 | End: 2021-11-17 | Stop reason: HOSPADM

## 2021-11-16 RX ADMIN — SODIUM BICARBONATE 650 MG: 650 TABLET ORAL at 17:00

## 2021-11-16 RX ADMIN — PREDNISONE 40 MG: 20 TABLET ORAL at 09:54

## 2021-11-16 RX ADMIN — AMLODIPINE BESYLATE 10 MG: 10 TABLET ORAL at 09:54

## 2021-11-16 RX ADMIN — LABETALOL HYDROCHLORIDE 100 MG: 100 TABLET ORAL at 09:53

## 2021-11-16 RX ADMIN — SODIUM CHLORIDE, SODIUM GLUCONATE, SODIUM ACETATE, POTASSIUM CHLORIDE, MAGNESIUM CHLORIDE, SODIUM PHOSPHATE, DIBASIC, AND POTASSIUM PHOSPHATE 50 ML/HR: .53; .5; .37; .037; .03; .012; .00082 INJECTION, SOLUTION INTRAVENOUS at 09:55

## 2021-11-16 RX ADMIN — HEPARIN SODIUM 5000 UNITS: 5000 INJECTION INTRAVENOUS; SUBCUTANEOUS at 14:29

## 2021-11-16 RX ADMIN — LABETALOL HYDROCHLORIDE 100 MG: 100 TABLET ORAL at 17:00

## 2021-11-16 RX ADMIN — FERROUS SULFATE TAB 325 MG (65 MG ELEMENTAL FE) 325 MG: 325 (65 FE) TAB at 09:53

## 2021-11-16 RX ADMIN — HEPARIN SODIUM 5000 UNITS: 5000 INJECTION INTRAVENOUS; SUBCUTANEOUS at 21:09

## 2021-11-16 RX ADMIN — SODIUM BICARBONATE 650 MG: 650 TABLET ORAL at 09:54

## 2021-11-16 RX ADMIN — HEPARIN SODIUM 5000 UNITS: 5000 INJECTION INTRAVENOUS; SUBCUTANEOUS at 05:50

## 2021-11-17 VITALS
HEART RATE: 76 BPM | DIASTOLIC BLOOD PRESSURE: 62 MMHG | HEIGHT: 64 IN | RESPIRATION RATE: 16 BRPM | SYSTOLIC BLOOD PRESSURE: 147 MMHG | BODY MASS INDEX: 44.53 KG/M2 | OXYGEN SATURATION: 98 % | WEIGHT: 260.8 LBS | TEMPERATURE: 98.6 F

## 2021-11-17 DIAGNOSIS — I12.9 BENIGN HYPERTENSION WITH CKD (CHRONIC KIDNEY DISEASE) STAGE III (HCC): Primary | ICD-10-CM

## 2021-11-17 DIAGNOSIS — N18.9 ACUTE KIDNEY INJURY SUPERIMPOSED ON CHRONIC KIDNEY DISEASE (HCC): ICD-10-CM

## 2021-11-17 DIAGNOSIS — N18.30 BENIGN HYPERTENSION WITH CKD (CHRONIC KIDNEY DISEASE) STAGE III (HCC): Primary | ICD-10-CM

## 2021-11-17 DIAGNOSIS — N17.9 ACUTE KIDNEY INJURY SUPERIMPOSED ON CHRONIC KIDNEY DISEASE (HCC): ICD-10-CM

## 2021-11-17 LAB
ALBUMIN SERPL BCP-MCNC: 3.4 G/DL (ref 3.5–5)
ALP SERPL-CCNC: 118 U/L (ref 46–116)
ALT SERPL W P-5'-P-CCNC: 14 U/L (ref 12–78)
ANION GAP SERPL CALCULATED.3IONS-SCNC: 13 MMOL/L (ref 4–13)
AST SERPL W P-5'-P-CCNC: 8 U/L (ref 5–45)
BILIRUB SERPL-MCNC: 0.19 MG/DL (ref 0.2–1)
BUN SERPL-MCNC: 44 MG/DL (ref 5–25)
CALCIUM ALBUM COR SERPL-MCNC: 9 MG/DL (ref 8.3–10.1)
CALCIUM SERPL-MCNC: 8.5 MG/DL (ref 8.3–10.1)
CHLORIDE SERPL-SCNC: 107 MMOL/L (ref 100–108)
CO2 SERPL-SCNC: 23 MMOL/L (ref 21–32)
CREAT SERPL-MCNC: 4.85 MG/DL (ref 0.6–1.3)
GFR SERPL CREATININE-BSD FRML MDRD: 12 ML/MIN/1.73SQ M
GLUCOSE SERPL-MCNC: 95 MG/DL (ref 65–140)
POTASSIUM SERPL-SCNC: 4.4 MMOL/L (ref 3.5–5.3)
PROT SERPL-MCNC: 7.2 G/DL (ref 6.4–8.2)
SODIUM SERPL-SCNC: 143 MMOL/L (ref 136–145)

## 2021-11-17 PROCEDURE — 80053 COMPREHEN METABOLIC PANEL: CPT | Performed by: INTERNAL MEDICINE

## 2021-11-17 PROCEDURE — 99232 SBSQ HOSP IP/OBS MODERATE 35: CPT | Performed by: INTERNAL MEDICINE

## 2021-11-17 PROCEDURE — 99239 HOSP IP/OBS DSCHRG MGMT >30: CPT | Performed by: HOSPITALIST

## 2021-11-17 RX ORDER — PREDNISONE 20 MG/1
40 TABLET ORAL DAILY
Qty: 2 TABLET | Refills: 0 | Status: SHIPPED | OUTPATIENT
Start: 2021-11-18 | End: 2021-11-19

## 2021-11-17 RX ORDER — SODIUM BICARBONATE 650 MG/1
650 TABLET ORAL
Qty: 60 TABLET | Refills: 0 | Status: SHIPPED | OUTPATIENT
Start: 2021-11-17 | End: 2022-01-14 | Stop reason: SDUPTHER

## 2021-11-17 RX ORDER — FERROUS SULFATE 325(65) MG
325 TABLET ORAL
Qty: 30 TABLET | Refills: 0 | Status: SHIPPED | OUTPATIENT
Start: 2021-11-18 | End: 2022-01-14 | Stop reason: SDUPTHER

## 2021-11-17 RX ADMIN — FAMOTIDINE 10 MG: 20 TABLET ORAL at 08:23

## 2021-11-17 RX ADMIN — SODIUM BICARBONATE 650 MG: 650 TABLET ORAL at 08:23

## 2021-11-17 RX ADMIN — AMLODIPINE BESYLATE 10 MG: 10 TABLET ORAL at 08:23

## 2021-11-17 RX ADMIN — HEPARIN SODIUM 5000 UNITS: 5000 INJECTION INTRAVENOUS; SUBCUTANEOUS at 05:39

## 2021-11-17 RX ADMIN — FERROUS SULFATE TAB 325 MG (65 MG ELEMENTAL FE) 325 MG: 325 (65 FE) TAB at 08:23

## 2021-11-17 RX ADMIN — PREDNISONE 40 MG: 20 TABLET ORAL at 08:23

## 2021-11-17 RX ADMIN — LABETALOL HYDROCHLORIDE 100 MG: 100 TABLET ORAL at 08:23

## 2021-11-18 ENCOUNTER — TRANSITIONAL CARE MANAGEMENT (OUTPATIENT)
Dept: FAMILY MEDICINE CLINIC | Facility: CLINIC | Age: 26
End: 2021-11-18

## 2021-11-19 RX ORDER — LABETALOL 100 MG/1
100 TABLET, FILM COATED ORAL 2 TIMES DAILY
Qty: 60 TABLET | Refills: 0 | Status: SHIPPED | OUTPATIENT
Start: 2021-11-19 | End: 2022-01-14 | Stop reason: SDUPTHER

## 2021-11-23 ENCOUNTER — TELEMEDICINE (OUTPATIENT)
Dept: FAMILY MEDICINE CLINIC | Facility: CLINIC | Age: 26
End: 2021-11-23
Payer: COMMERCIAL

## 2021-11-23 ENCOUNTER — TELEPHONE (OUTPATIENT)
Dept: FAMILY MEDICINE CLINIC | Facility: CLINIC | Age: 26
End: 2021-11-23

## 2021-11-23 DIAGNOSIS — N18.4 STAGE 4 CHRONIC KIDNEY DISEASE (HCC): Primary | ICD-10-CM

## 2021-11-23 DIAGNOSIS — J45.21 MILD INTERMITTENT ASTHMA WITH EXACERBATION: ICD-10-CM

## 2021-11-23 PROCEDURE — 99213 OFFICE O/P EST LOW 20 MIN: CPT | Performed by: FAMILY MEDICINE

## 2021-11-26 PROBLEM — N18.4 STAGE 4 CHRONIC KIDNEY DISEASE (HCC): Status: ACTIVE | Noted: 2020-02-05

## 2021-12-07 ENCOUNTER — TELEPHONE (OUTPATIENT)
Dept: NEPHROLOGY | Facility: CLINIC | Age: 26
End: 2021-12-07

## 2021-12-07 ENCOUNTER — OFFICE VISIT (OUTPATIENT)
Dept: NEPHROLOGY | Facility: CLINIC | Age: 26
End: 2021-12-07
Payer: COMMERCIAL

## 2021-12-07 VITALS
DIASTOLIC BLOOD PRESSURE: 88 MMHG | BODY MASS INDEX: 44.25 KG/M2 | HEART RATE: 92 BPM | WEIGHT: 259.2 LBS | HEIGHT: 64 IN | SYSTOLIC BLOOD PRESSURE: 146 MMHG

## 2021-12-07 DIAGNOSIS — I10 HYPERTENSION, UNSPECIFIED TYPE: ICD-10-CM

## 2021-12-07 DIAGNOSIS — N18.5 CKD (CHRONIC KIDNEY DISEASE) STAGE 5, GFR LESS THAN 15 ML/MIN (HCC): Primary | ICD-10-CM

## 2021-12-07 PROCEDURE — 3008F BODY MASS INDEX DOCD: CPT | Performed by: PHYSICIAN ASSISTANT

## 2021-12-07 PROCEDURE — 1111F DSCHRG MED/CURRENT MED MERGE: CPT | Performed by: PHYSICIAN ASSISTANT

## 2021-12-07 PROCEDURE — 99214 OFFICE O/P EST MOD 30 MIN: CPT | Performed by: PHYSICIAN ASSISTANT

## 2021-12-07 PROCEDURE — 1036F TOBACCO NON-USER: CPT | Performed by: PHYSICIAN ASSISTANT

## 2021-12-07 RX ORDER — AMLODIPINE BESYLATE 10 MG/1
5 TABLET ORAL 2 TIMES DAILY
Qty: 30 TABLET | Refills: 0 | Status: SHIPPED | OUTPATIENT
Start: 2021-12-07 | End: 2022-01-04

## 2021-12-16 DIAGNOSIS — I12.9 BENIGN HYPERTENSION WITH CKD (CHRONIC KIDNEY DISEASE) STAGE III (HCC): ICD-10-CM

## 2021-12-16 DIAGNOSIS — N18.30 BENIGN HYPERTENSION WITH CKD (CHRONIC KIDNEY DISEASE) STAGE III (HCC): ICD-10-CM

## 2021-12-17 DIAGNOSIS — N18.9 ACUTE KIDNEY INJURY SUPERIMPOSED ON CHRONIC KIDNEY DISEASE (HCC): ICD-10-CM

## 2021-12-17 DIAGNOSIS — N17.9 ACUTE KIDNEY INJURY SUPERIMPOSED ON CHRONIC KIDNEY DISEASE (HCC): ICD-10-CM

## 2021-12-25 DIAGNOSIS — J45.21 MILD INTERMITTENT ASTHMA WITH ACUTE EXACERBATION: ICD-10-CM

## 2022-01-03 RX ORDER — DEXAMETHASONE 4 MG/1
1 TABLET ORAL 2 TIMES DAILY
Qty: 12 G | Refills: 0 | Status: SHIPPED | OUTPATIENT
Start: 2022-01-03

## 2022-01-04 DIAGNOSIS — I10 HYPERTENSION, UNSPECIFIED TYPE: ICD-10-CM

## 2022-01-04 RX ORDER — AMLODIPINE BESYLATE 10 MG/1
TABLET ORAL
Qty: 30 TABLET | Refills: 0 | Status: SHIPPED | OUTPATIENT
Start: 2022-01-04 | End: 2022-01-28

## 2022-01-14 DIAGNOSIS — N17.9 ACUTE KIDNEY INJURY SUPERIMPOSED ON CHRONIC KIDNEY DISEASE (HCC): ICD-10-CM

## 2022-01-14 DIAGNOSIS — D50.9 IRON DEFICIENCY ANEMIA, UNSPECIFIED IRON DEFICIENCY ANEMIA TYPE: ICD-10-CM

## 2022-01-14 DIAGNOSIS — I12.9 BENIGN HYPERTENSION WITH CKD (CHRONIC KIDNEY DISEASE) STAGE III (HCC): ICD-10-CM

## 2022-01-14 DIAGNOSIS — N18.30 BENIGN HYPERTENSION WITH CKD (CHRONIC KIDNEY DISEASE) STAGE III (HCC): ICD-10-CM

## 2022-01-14 DIAGNOSIS — N18.9 ACUTE KIDNEY INJURY SUPERIMPOSED ON CHRONIC KIDNEY DISEASE (HCC): ICD-10-CM

## 2022-01-14 RX ORDER — SODIUM BICARBONATE 650 MG/1
650 TABLET ORAL
Qty: 60 TABLET | Refills: 0 | Status: SHIPPED | OUTPATIENT
Start: 2022-01-14 | End: 2022-02-24 | Stop reason: SDUPTHER

## 2022-01-14 RX ORDER — LABETALOL 100 MG/1
100 TABLET, FILM COATED ORAL 2 TIMES DAILY
Qty: 60 TABLET | Refills: 0 | Status: SHIPPED | OUTPATIENT
Start: 2022-01-14 | End: 2022-02-24 | Stop reason: SDUPTHER

## 2022-01-14 RX ORDER — FERROUS SULFATE 325(65) MG
325 TABLET ORAL
Qty: 30 TABLET | Refills: 0 | Status: SHIPPED | OUTPATIENT
Start: 2022-01-14 | End: 2022-02-24 | Stop reason: SDUPTHER

## 2022-01-14 NOTE — TELEPHONE ENCOUNTER
Patient requesting refill on the attached medications  Patient is completely out  Please advise  Thank you

## 2022-01-18 ENCOUNTER — OFFICE VISIT (OUTPATIENT)
Dept: URGENT CARE | Age: 27
End: 2022-01-18
Payer: MEDICARE

## 2022-01-18 ENCOUNTER — APPOINTMENT (OUTPATIENT)
Dept: RADIOLOGY | Age: 27
End: 2022-01-18
Payer: MEDICARE

## 2022-01-18 VITALS — RESPIRATION RATE: 18 BRPM | HEART RATE: 90 BPM | TEMPERATURE: 98.9 F | OXYGEN SATURATION: 99 %
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
## 2022-01-18 DIAGNOSIS — M54.50 ACUTE LEFT-SIDED LOW BACK PAIN WITHOUT SCIATICA: ICD-10-CM
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
## 2022-01-18 DIAGNOSIS — M62.838 MUSCLE SPASM: ICD-10-CM

## 2022-01-18 DIAGNOSIS — M25.552 LEFT HIP PAIN: ICD-10-CM

## 2022-01-18 PROCEDURE — 72100 X-RAY EXAM L-S SPINE 2/3 VWS: CPT

## 2022-01-18 PROCEDURE — 73502 X-RAY EXAM HIP UNI 2-3 VIEWS: CPT

## 2022-01-18 PROCEDURE — 99213 OFFICE O/P EST LOW 20 MIN: CPT

## 2022-01-18 RX ORDER — METHOCARBAMOL 500 MG/1
500 TABLET, FILM COATED ORAL 3 TIMES DAILY PRN
Qty: 25 TABLET | Refills: 0 | Status: SHIPPED | OUTPATIENT
Start: 2022-01-18 | End: 2022-06-18

## 2022-01-18 NOTE — PROGRESS NOTES
[facility]        NAME: Main Gaspar is a 27 y o  female  : 1995    MRN: [id number]  DATE: 2022  TIME: 3:02 PM    Assessment and Plan   Fall due to slipping on ice or snow, initial encounter [W00  9XXA]  1  Fall due to slipping on ice or snow, initial encounter  XR hip/pelv 2-3 vws left if performed    XR spine lumbar 2 or 3 views injury    methocarbamol (ROBAXIN) 500 mg tablet   2  Muscle spasm     3  Acute left-sided low back pain without sciatica     4  Left hip pain           Patient Instructions     Please take Tylenol as needed for hip and back pain  Please take muscle relaxer as discussed for muscle spasms  Follow up with PCP in 3-5 days  Proceed to  ER if symptoms worsen  Chief Complaint     Chief Complaint   Patient presents with    Back Pain     left    Fall     slipped on ice, this AM, denies hitting head and LOC     Hip Pain     left         History of Present Illness       Patient presenting with left hip and low back pain  Patient states approximately 9:00 a m  this morning she slipped and fell on ice  She denies hitting her head at the time of injury or losing consciousness  She states that she fell directly on her lower back and left hip  She denies any numbness or tingling in the area  She states she has full sensation in her left lower extremity  She describes the pain as aching and sharp at times  She states the pain is worse with movement  She denies any previous back or hip injuries  Review of Systems   Review of Systems   Constitutional: Negative for chills and fever  HENT: Negative for ear pain and sore throat  Eyes: Negative for pain and visual disturbance  Respiratory: Negative for cough and shortness of breath  Cardiovascular: Negative for chest pain and palpitations  Gastrointestinal: Negative for abdominal pain and vomiting  Genitourinary: Negative for dysuria and hematuria     Musculoskeletal: Positive for arthralgias and back pain  Skin: Negative for color change and rash  Neurological: Negative for dizziness, light-headedness and headaches  All other systems reviewed and are negative          Current Medications       Current Outpatient Medications:     acetaminophen (TYLENOL) 325 mg tablet, Take 2 tablets (650 mg total) by mouth every 6 (six) hours as needed for mild pain or headaches, Disp: 30 tablet, Rfl: 0    albuterol (2 5 mg/3 mL) 0 083 % nebulizer solution, Take 3 mL (2 5 mg total) by nebulization every 6 (six) hours as needed for wheezing or shortness of breath, Disp: 30 mL, Rfl: 0    albuterol (Ventolin HFA) 90 mcg/act inhaler, Inhale 2 puffs every 6 (six) hours as needed for wheezing or shortness of breath (cough), Disp: 18 g, Rfl: 0    amLODIPine (NORVASC) 10 mg tablet, TAKE 1/2 TABLET BY MOUTH 2 TIMES DAILY, Disp: 30 tablet, Rfl: 0    Blood Pressure KIT, by Does not apply route daily (Patient not taking: Reported on 12/7/2021 ), Disp: 1 each, Rfl: 0    etonogestrel (NEXPLANON) subdermal implant, 68 mg by Subdermal route once, Disp: , Rfl:     famotidine (PEPCID) 20 mg tablet, Take 1 tablet (20 mg total) by mouth 2 (two) times a day (Patient not taking: Reported on 11/13/2021 ), Disp: 30 tablet, Rfl: 0    ferrous sulfate 325 (65 Fe) mg tablet, Take 1 tablet (325 mg total) by mouth daily with breakfast, Disp: 30 tablet, Rfl: 0    fluticasone (Flovent HFA) 110 MCG/ACT inhaler, Inhale 1 puff 2 (two) times a day, Disp: 12 g, Rfl: 0    labetalol (NORMODYNE) 100 mg tablet, Take 1 tablet (100 mg total) by mouth 2 (two) times a day, Disp: 60 tablet, Rfl: 0    methocarbamol (ROBAXIN) 500 mg tablet, Take 1 tablet (500 mg total) by mouth 3 (three) times a day as needed for muscle spasms, Disp: 25 tablet, Rfl: 0    sodium bicarbonate 650 mg tablet, Take 1 tablet (650 mg total) by mouth 2 (two) times daily after meals, Disp: 60 tablet, Rfl: 0    Current Allergies     Allergies as of 01/18/2022 - Reviewed 01/18/2022   Allergen Reaction Noted    Wheat bran - food allergy Itching 12/19/2019            The following portions of the patient's history were reviewed and updated as appropriate: allergies, current medications, past family history, past medical history, past social history, past surgical history and problem list      Past Medical History:   Diagnosis Date    Asthma     Chronic kidney disease     Eczema     Headache     Hypertension     Wears glasses        Past Surgical History:   Procedure Laterality Date    CHOLECYSTECTOMY      CT NEEDLE BIOPSY KIDNEY  1/29/2020    KELOID EXCISION Left 3/30/2021    Procedure: POSTERIOR EAR EXCISION KELOID, FLAP RECONSTRUCTION;  Surgeon: Kiki Guzman MD;  [facility];  Service: Plastics       Family History   Problem Relation Age of Onset    Asthma Mother     No Known Problems Father          Medications have been verified  Objective   Pulse 90   Temp 98 9 °F (37 2 °C)   Resp 18   SpO2 99%        Physical Exam     Physical Exam  Vitals and nursing note reviewed  Constitutional:       General: She is not in acute distress  Appearance: Normal appearance  She is not ill-appearing  HENT:      Head: Normocephalic and atraumatic  Right Ear: Tympanic membrane normal       Left Ear: Tympanic membrane normal       Nose: No congestion or rhinorrhea  Mouth/Throat:      Mouth: Mucous membranes are moist       Pharynx: Oropharynx is clear  No oropharyngeal exudate or posterior oropharyngeal erythema  Eyes:      Extraocular Movements: Extraocular movements intact  Conjunctiva/sclera: Conjunctivae normal       Pupils: Pupils are equal, round, and reactive to light  Cardiovascular:      Rate and Rhythm: Normal rate and regular rhythm  Pulses: Normal pulses  Heart sounds: Normal heart sounds  No murmur heard  No friction rub  No gallop  Pulmonary:      Effort: No respiratory distress        Breath sounds: Normal breath sounds  No wheezing, rhonchi or rales  Abdominal:      General: Bowel sounds are normal       Palpations: Abdomen is soft  Tenderness: There is no abdominal tenderness  Musculoskeletal:         General: Tenderness and signs of injury present  Cervical back: Normal range of motion and neck supple  Lumbar back: Spasms and tenderness present  No swelling, signs of trauma, lacerations or bony tenderness  Normal range of motion  Negative right straight leg raise test and negative left straight leg raise test         Back:       Left hip: Tenderness present  No deformity  Decreased range of motion  Normal strength  Legs:    Skin:     General: Skin is warm and dry  Capillary Refill: Capillary refill takes less than 2 seconds  Neurological:      General: No focal deficit present  Mental Status: She is alert and oriented to person, place, and time  Psychiatric:         Mood and Affect: Mood normal          Behavior: Behavior normal      X-ray of lumbar spine and hip and pelvis reviewed  No acute osseous abnormalities noted  Awaiting final read per radiology department  Discussed with patient use of Tylenol and muscle relaxer as needed  Discussed following up with primary care provider if symptoms do not subside side in the next  Patient agreed with current plan or care  Patient states that she had mild tingling in her foot during x-ray while she was leaning on her left side  Spoke to patient about possible muscle spasm, and treatment for spasm  All questions and concerns were addressed at this time

## 2022-01-18 NOTE — PATIENT INSTRUCTIONS
Hip Pain   WHAT YOU NEED TO KNOW:   What causes hip pain? Hip pain can be caused by a number of conditions, such as bursitis, arthritis, or muscle or tendon strain  You may have swelling in the fluid-filled sacs that protect your muscles and tendons  Hip pain can also be caused by a lower back problem  Hip pain may be caused by trauma, playing sports, or running  Pain may start in your hip and go to your thigh, buttock, or groin  How can I manage hip pain? You may need x-rays to make sure there are no broken bones that need to be treated  · Rest  your injured hip so that it can heal  You may need to avoid putting any weight on your hip for at least 48 hours  Return to normal activities as directed  · Ice  the injury for 20 minutes every 4 hours, or as directed  Use an ice pack, or put crushed ice in a plastic bag  Cover it with a towel to protect your skin  Ice helps prevent tissue damage and decreases swelling and pain  · Elevate  your injured hip above the level of your heart as often as you can  This will help decrease swelling and pain  If possible, prop your hip and leg on pillows or blankets to keep the area elevated comfortably  · NSAIDs , such as ibuprofen, help decrease swelling, pain, and fever  This medicine is available with or without a doctor's order  NSAIDs can cause stomach bleeding or kidney problems in certain people  If you take blood thinner medicine, always ask your healthcare provider if NSAIDs are safe for you  Always read the medicine label and follow directions  · Maintain a healthy weight  Extra body weight can cause pressure and pain in your hip, knee, and ankle joints  Ask your healthcare provider how much you should weigh  Ask him or her to help you create a weight loss plan if you are overweight  · Use assistive devices as directed  You may need to use a cane or crutches  Assistive devices help decrease pain and pressure on your hip when you walk   Ask your healthcare provider for more information about assistive devices and how to use them correctly  When should I seek immediate care? · Your pain gets worse  · You have numbness in your leg or toes  · You cannot put any weight on or move your hip  When should I contact my healthcare provider? · You have a fever  · Your pain does not decrease, even after treatment  · You have questions or concerns about your condition or care  CARE AGREEMENT:   You have the right to help plan your care  Learn about your health condition and how it may be treated  Discuss treatment options with your healthcare providers to decide what care you want to receive  You always have the right to refuse treatment  The above information is an  only  It is not intended as medical advice for individual conditions or treatments  Talk to your doctor, nurse or pharmacist before following any medical regimen to see if it is safe and effective for you  © Copyright [employer] 2021 Information is for End User's use only and may not be sold, redistributed or otherwise used for commercial purposes  All illustrations and images included in CareNotes® are the copyrighted property of [employer]  or [facility] Lewis Velazquez     Acute Low Back Pain, Ambulatory Care   GENERAL INFORMATION:   Acute low back pain  is discomfort in your lower back area that lasts for less than 12 weeks  The word acute is used to describe pain that starts suddenly, worsens quickly, and lasts for a short time    Common symptoms include the following:   · Back stiffness or spasms    · Pain down the back or side of one leg    · Holding yourself in an unusual position or posture to decrease your back pain    · Not being able to find a sitting position that is comfortable    · Slow increase in your pain for 24 to 48 hours after you stress your back    · Tenderness on your lower back or severe pain when you move your back  Seek immediate care for the following symptoms:   · Severe pain    · Sudden stiffness and heaviness in both buttocks down to both legs    · Numbness or weakness in one leg, or pain in both legs    · Numbness in your genital area or across your lower back    · Unable to control your urine or bowel movements  Treatment for acute low back pain  may include any of the following:  · Medicines:      ¨ NSAIDs  help decrease swelling and pain or fever  This medicine is available with or without a doctor's order  NSAIDs can cause stomach bleeding or kidney problems in certain people  If you take blood thinner medicine, always ask your healthcare provider if NSAIDs are safe for you  Always read the medicine label and follow directions  ¨ Muscle relaxers  help decrease muscle spasms pain  ¨ Prescription pain medicine  may be given  Ask how to take this medicine safely  · Surgery  may be needed if your pain is severe and other treatments do not work  Surgery may be needed for conditions of the lumbar spine, such as herniated disc or spinal stenosis  Manage your symptoms:   · Sleep on a firm mattress  If you do not have a firm mattress, have someone move your mattress to the floor for a few days  A piece of plywood under your mattress can also help make it firmer  · Apply ice  on your lower back for 15 to 20 minutes every hour or as directed  Use an ice pack, or put crushed ice in a plastic bag  Cover it with a towel  Ice helps prevent tissue damage and decreases swelling and pain  You can alternate ice and heat  · Apply heat  on your lower back for 20 to 30 minutes every 2 hours for as many days as directed  Heat helps decrease pain and muscle spasms  · Go to physical therapy  A physical therapist teaches you exercises to help improve movement and strength, and to decrease pain  Prevent acute low back pain:   · Use proper body mechanics  ¨ Bend at the hips and knees when you  objects   Do not bend from the waist  Use your leg muscles as you lift the load  Do not use your back  Keep the object close to your chest as you lift it  Try not to twist or lift anything above your waist     ¨ Change your position often when you stand for long periods of time  Rest one foot on a small box or footrest, and then switch to the other foot often  ¨ Try not to sit for long periods of time  When you do, sit in a straight-backed chair with your feet flat on the floor  Never reach, pull, or push while you are sitting  · Exercise regularly  Warm up before you exercise  Do exercises that strengthen your back muscles  Ask about the best exercise plan for you  · Maintain a healthy weight  Ask your healthcare provider how much you should weigh  Ask him to help you create a weight loss plan if you are overweight  Follow up with your healthcare provider as directed:  Return for a follow-up visit if you still have pain after 1 to 3 weeks of treatment  You may need to visit an orthopedist if your back pain lasts more than 6 to 12 weeks  Write down your questions so you remember to ask them during your visits  CARE AGREEMENT:   You have the right to help plan your care  Learn about your health condition and how it may be treated  Discuss treatment options with your caregivers to decide what care you want to receive  You always have the right to refuse treatment  The above information is an  only  It is not intended as medical advice for individual conditions or treatments  Talk to your doctor, nurse or pharmacist before following any medical regimen to see if it is safe and effective for you  © 2014 [address] is for End User's use only and may not be sold, redistributed or otherwise used for commercial purposes  All illustrations and images included in CareNotes® are the copyrighted property of [employer]  or Michael Neil    Muscle Spasm   WHAT YOU NEED TO KNOW:   A muscle spasm is a sudden contraction of any muscle or group of muscles  A muscle cramp is a painful muscle spasm  Muscle cramps commonly occur after intense exercise or during pregnancy  They may also be caused by certain medications, dehydration, low calcium or magnesium levels, or another medical condition  DISCHARGE INSTRUCTIONS:   Medicines: You may need the following:  · NSAIDs  help decrease swelling and pain or fever  This medicine is available with or without a doctor's order  NSAIDs can cause stomach bleeding or kidney problems in certain people  If you take blood thinner medicine, always ask your healthcare provider if NSAIDs are safe for you  Always read the medicine label and follow directions  · Take your medicine as directed  Contact your healthcare provider if you think your medicine is not helping or if you have side effects  Tell him of her if you are allergic to any medicine  Keep a list of the medicines, vitamins, and herbs you take  Include the amounts, and when and why you take them  Bring the list or the pill bottles to follow-up visits  Carry your medicine list with you in case of an emergency  Follow up with your healthcare provider as directed: You may need other tests or treatment  You may also be referred to a physical therapist or other specialist  Write down your questions so you remember to ask them during your visits  Self-care:   · Stretch  your muscle to help relieve the cramp  It may be helpful to keep your muscle in the stretched position until the cramp is gone  · Apply heat  to help decrease pain and muscle spasms  Apply heat on the area for 20 to 30 minutes every 2 hours for as many days as directed  · Apply ice  to help decrease swelling and pain  Ice may also help prevent tissue damage  Use an ice pack, or put crushed ice in a plastic bag  Cover it with a towel and place it on your muscle for 15 to 20 minutes every hour or as directed      · Drink more liquids  to help prevent muscle cramps caused by dehydration  Sports drinks may help replace electrolytes you lose through sweat during exercise  Ask your healthcare provider how much liquid to drink each day and which liquids are best for you  · Eat healthy foods , such as fruits, vegetables, whole grains, low-fat dairy products, and lean proteins (meat, beans, and fish)  If you are pregnant, ask your healthcare provider about foods that are high in magnesium and sodium  They may help to relieve cramps during pregnancy  · Massage your muscle  to help relieve the cramp  · Take frequent deep breaths  until the cramp feels better  Lie down while you take the deep breaths so you do not get dizzy or lightheaded  Contact your healthcare provider if:   · You have signs of dehydration, such as a headache, dark yellow urine, dry eyes or mouth, or a fast heartbeat  · You have questions or concerns about your condition or care  Please go  to the emergency department if:   · You have warmth, swelling, or redness in the cramping muscle  · You have frequent or unrelieved muscle cramps in several different muscles  · You have muscle cramps with numbness, tingling, and burning in your hands and feet  © Copyright [employer] 2021 Information is for End User's use only and may not be sold, redistributed or otherwise used for commercial purposes  All illustrations and images included in CareNotes® are the copyrighted property of [employer]  or Danitza Mireles  The above information is an  only  It is not intended as medical advice for individual conditions or treatments  Talk to your doctor, nurse or pharmacist before following any medical regimen to see if it is safe and effective for you

## 2022-01-21 RX ORDER — SODIUM BICARBONATE 650 MG/1
650 TABLET ORAL
Qty: 60 TABLET | Refills: 0 | OUTPATIENT
Start: 2022-01-21

## 2022-01-21 NOTE — TELEPHONE ENCOUNTER
Heather Handley is not a patient of the  [facility]  No longer under the care of Dr Jessica Sanabria  Medication was reordered on 01/14/2022 by [facility]  Will refuse refill

## 2022-01-25 ENCOUNTER — OFFICE VISIT (OUTPATIENT)
Dept: FAMILY MEDICINE CLINIC | Facility: CLINIC | Age: 27
End: 2022-01-25
Payer: MEDICARE

## 2022-01-25 VITALS
BODY MASS INDEX: 44.05 KG/M2 | HEART RATE: 94 BPM | DIASTOLIC BLOOD PRESSURE: 80 MMHG | WEIGHT: 258 LBS | HEIGHT: 64 IN | TEMPERATURE: 98.4 F | SYSTOLIC BLOOD PRESSURE: 130 MMHG | OXYGEN SATURATION: 100 %

## 2022-01-25 DIAGNOSIS — N18.5 CHRONIC KIDNEY DISEASE (CKD), ACTIVE MEDICAL MANAGEMENT WITHOUT DIALYSIS, STAGE 5 (HCC): Primary | ICD-10-CM
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
## 2022-01-25 PROCEDURE — 3008F BODY MASS INDEX DOCD: CPT | Performed by: PHYSICIAN ASSISTANT

## 2022-01-25 PROCEDURE — 99213 OFFICE O/P EST LOW 20 MIN: CPT | Performed by: CHIROPRACTOR
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
## 2022-01-25 NOTE — ASSESSMENT & PLAN NOTE
Patient fell last Tuesday 1/18/21 and is having residual pain in her left hip, thigh, leg as well as her lower back on the left side  Plane films were negative for fracture  On examination patient shows spinous process, SI joint, and thigh tenderness on the left side  She is also exhibiting abnormal gait, limping as she walks and shortly difficulties moving in general   She also states that she gets left leg numbness after she sits for a long time  She states that this pain has been slowly getting better  Patient was counseled today that she should be icing and heating the spots that she feels pain, and that she should also should be stretching consistently  She was advised that taking hot baths may help as well  Follow-up in 4 weeks

## 2022-01-25 NOTE — ASSESSMENT & PLAN NOTE
Lab Results   Component Value Date    EGFR 12 11/17/2021    EGFR 11 11/16/2021    EGFR 12 11/15/2021    CREATININE 4 85 (H) 11/17/2021    CREATININE 4 98 (H) 11/16/2021    CREATININE 4 67 (H) 11/15/2021     Patient is here originally for follow-up on her kidney status  Patient  was diagnosed recently with stage 5 CKD, and was advised she needed dialysis or a transplant by nephrology  Patient was set to get lab work before coming into today's visit, however she did not complete the necessary lab work  She was advised today that she must be closely be monitored by her labs and that she should get these completed within the next week  Patient was advised and was told to follow closely with Nephrology  Patient's mother states that they have an appointment scheduled soon with the nephrologist to discuss dialysis and possible renal, transplant candidacy   Follow-up here in 4 weeks or PRN

## 2022-01-28 DIAGNOSIS — I10 HYPERTENSION, UNSPECIFIED TYPE: ICD-10-CM

## 2022-01-28 RX ORDER — AMLODIPINE BESYLATE 10 MG/1
TABLET ORAL
Qty: 30 TABLET | Refills: 0 | Status: SHIPPED | OUTPATIENT
Start: 2022-01-28 | End: 2022-02-21 | Stop reason: SDUPTHER

## 2022-02-02 RX ORDER — LABETALOL 100 MG/1
TABLET, FILM COATED ORAL
Qty: 60 TABLET | Refills: 0 | OUTPATIENT
Start: 2022-02-02

## 2022-02-02 NOTE — TELEPHONE ENCOUNTER
Angella Wade is not a patient of the [address]  No longer under the care of Dr Mel Sun  Requested prescription was reordered on 01/14/2022 by Koren Spatz  Will route to PCP

## 2022-02-21 DIAGNOSIS — I10 HYPERTENSION, UNSPECIFIED TYPE: ICD-10-CM

## 2022-02-23 ENCOUNTER — TELEPHONE (OUTPATIENT)
Dept: NEPHROLOGY | Facility: CLINIC | Age: 27
End: 2022-02-23

## 2022-02-23 RX ORDER — AMLODIPINE BESYLATE 10 MG/1
5 TABLET ORAL 2 TIMES DAILY
Qty: 30 TABLET | Refills: 0 | Status: SHIPPED | OUTPATIENT
Start: 2022-02-23 | End: 2022-03-22

## 2022-02-23 NOTE — TELEPHONE ENCOUNTER
Spoke with patient to schedule pre-transplant eval  Patient stated she is staring a new job soon and will schedule appt when she knows her work schedule

## 2022-02-24 DIAGNOSIS — I12.9 BENIGN HYPERTENSION WITH CKD (CHRONIC KIDNEY DISEASE) STAGE III (HCC): ICD-10-CM

## 2022-02-24 DIAGNOSIS — N18.9 ACUTE KIDNEY INJURY SUPERIMPOSED ON CHRONIC KIDNEY DISEASE (HCC): ICD-10-CM

## 2022-02-24 DIAGNOSIS — N18.30 BENIGN HYPERTENSION WITH CKD (CHRONIC KIDNEY DISEASE) STAGE III (HCC): ICD-10-CM

## 2022-02-24 DIAGNOSIS — D50.9 IRON DEFICIENCY ANEMIA, UNSPECIFIED IRON DEFICIENCY ANEMIA TYPE: ICD-10-CM

## 2022-02-24 DIAGNOSIS — N17.9 ACUTE KIDNEY INJURY SUPERIMPOSED ON CHRONIC KIDNEY DISEASE (HCC): ICD-10-CM

## 2022-02-24 RX ORDER — LABETALOL 100 MG/1
100 TABLET, FILM COATED ORAL 2 TIMES DAILY
Qty: 60 TABLET | Refills: 0 | Status: SHIPPED | OUTPATIENT
Start: 2022-02-24 | End: 2022-04-04

## 2022-02-24 RX ORDER — SODIUM BICARBONATE 650 MG/1
650 TABLET ORAL
Qty: 60 TABLET | Refills: 0 | Status: SHIPPED | OUTPATIENT
Start: 2022-02-24 | End: 2022-04-18

## 2022-02-25 ENCOUNTER — TELEPHONE (OUTPATIENT)
Dept: NEPHROLOGY | Facility: CLINIC | Age: 27
End: 2022-02-25

## 2022-02-25 RX ORDER — FERROUS SULFATE 325(65) MG
325 TABLET ORAL
Qty: 30 TABLET | Refills: 0 | Status: SHIPPED | OUTPATIENT
Start: 2022-02-25 | End: 2022-03-29

## 2022-02-25 NOTE — TELEPHONE ENCOUNTER
COVID Screening   When was screening done? Upon Check In    Does patient have any symptoms? no    Was patient told to let us know if they develop symptoms? yes    Patient voiced understanding   Appointment Confirmation   Person confirmed appointment with  If not patient, name of the person LM 2/25    Date and time of appointment 2/28  10:30    Patient acknowledged and will be at appointment? no    Did you advise the patient that they will need a urine sample if they are a new patient?  N/A    Did you advise the patient to bring their current medications for verification? (including any OTC) Yes    Additional Information

## 2022-02-28 ENCOUNTER — APPOINTMENT (OUTPATIENT)
Dept: LAB | Facility: CLINIC | Age: 27
End: 2022-02-28
Payer: MEDICARE

## 2022-02-28 ENCOUNTER — OFFICE VISIT (OUTPATIENT)
Dept: NEPHROLOGY | Facility: CLINIC | Age: 27
End: 2022-02-28
Payer: MEDICARE

## 2022-02-28 ENCOUNTER — TELEPHONE (OUTPATIENT)
Dept: NEPHROLOGY | Facility: CLINIC | Age: 27
End: 2022-02-28

## 2022-02-28 VITALS
WEIGHT: 253 LBS | HEIGHT: 64 IN | HEART RATE: 76 BPM | SYSTOLIC BLOOD PRESSURE: 148 MMHG | DIASTOLIC BLOOD PRESSURE: 90 MMHG | BODY MASS INDEX: 43.19 KG/M2

## 2022-02-28 DIAGNOSIS — E87.2 METABOLIC ACIDOSIS: ICD-10-CM

## 2022-02-28 DIAGNOSIS — R80.1 PERSISTENT PROTEINURIA: ICD-10-CM

## 2022-02-28 DIAGNOSIS — M89.9 CHRONIC KIDNEY DISEASE-MINERAL AND BONE DISORDER: ICD-10-CM

## 2022-02-28 DIAGNOSIS — N18.9 CHRONIC KIDNEY DISEASE-MINERAL AND BONE DISORDER: ICD-10-CM

## 2022-02-28 DIAGNOSIS — D50.9 IRON DEFICIENCY ANEMIA, UNSPECIFIED IRON DEFICIENCY ANEMIA TYPE: ICD-10-CM

## 2022-02-28 DIAGNOSIS — N18.5 CKD (CHRONIC KIDNEY DISEASE) STAGE 5, GFR LESS THAN 15 ML/MIN (HCC): ICD-10-CM

## 2022-02-28 DIAGNOSIS — N18.5 BENIGN HYPERTENSION WITH CKD (CHRONIC KIDNEY DISEASE) STAGE V (HCC): ICD-10-CM

## 2022-02-28 DIAGNOSIS — N18.5 CHRONIC KIDNEY DISEASE (CKD), ACTIVE MEDICAL MANAGEMENT WITHOUT DIALYSIS, STAGE 5 (HCC): Primary | ICD-10-CM

## 2022-02-28 DIAGNOSIS — E83.9 CHRONIC KIDNEY DISEASE-MINERAL AND BONE DISORDER: ICD-10-CM

## 2022-02-28 DIAGNOSIS — I12.0 BENIGN HYPERTENSION WITH CKD (CHRONIC KIDNEY DISEASE) STAGE V (HCC): ICD-10-CM

## 2022-02-28 PROBLEM — E87.20 METABOLIC ACIDOSIS: Status: ACTIVE | Noted: 2022-02-28

## 2022-02-28 LAB
ANION GAP SERPL CALCULATED.3IONS-SCNC: 13 MMOL/L (ref 4–13)
BUN SERPL-MCNC: 36 MG/DL (ref 5–25)
CALCIUM SERPL-MCNC: 6.9 MG/DL (ref 8.3–10.1)
CHLORIDE SERPL-SCNC: 105 MMOL/L (ref 100–108)
CO2 SERPL-SCNC: 21 MMOL/L (ref 21–32)
CREAT SERPL-MCNC: 7.48 MG/DL (ref 0.6–1.3)
CREAT UR-MCNC: 96 MG/DL
ERYTHROCYTE [DISTWIDTH] IN BLOOD BY AUTOMATED COUNT: 15 % (ref 11.6–15.1)
FERRITIN SERPL-MCNC: 173 NG/ML (ref 8–388)
GFR SERPL CREATININE-BSD FRML MDRD: 6 ML/MIN/1.73SQ M
GLUCOSE P FAST SERPL-MCNC: 85 MG/DL (ref 65–99)
HCT VFR BLD AUTO: 30 % (ref 34.8–46.1)
HGB BLD-MCNC: 9 G/DL (ref 11.5–15.4)
IRON SATN MFR SERPL: 24 % (ref 15–50)
IRON SERPL-MCNC: 44 UG/DL (ref 50–170)
MAGNESIUM SERPL-MCNC: 1.8 MG/DL (ref 1.6–2.6)
MCH RBC QN AUTO: 20.8 PG (ref 26.8–34.3)
MCHC RBC AUTO-ENTMCNC: 30 G/DL (ref 31.4–37.4)
MCV RBC AUTO: 69 FL (ref 82–98)
PHOSPHATE SERPL-MCNC: 4.9 MG/DL (ref 2.7–4.5)
PLATELET # BLD AUTO: 281 THOUSANDS/UL (ref 149–390)
PMV BLD AUTO: 11 FL (ref 8.9–12.7)
POTASSIUM SERPL-SCNC: 3.7 MMOL/L (ref 3.5–5.3)
PROT UR-MCNC: 302 MG/DL
PROT/CREAT UR: 3.15 MG/G{CREAT} (ref 0–0.1)
PTH-INTACT SERPL-MCNC: 643 PG/ML (ref 18.4–80.1)
RBC # BLD AUTO: 4.33 MILLION/UL (ref 3.81–5.12)
SODIUM SERPL-SCNC: 139 MMOL/L (ref 136–145)
TIBC SERPL-MCNC: 183 UG/DL (ref 250–450)
WBC # BLD AUTO: 7.12 THOUSAND/UL (ref 4.31–10.16)

## 2022-02-28 PROCEDURE — 80048 BASIC METABOLIC PNL TOTAL CA: CPT

## 2022-02-28 PROCEDURE — 82306 VITAMIN D 25 HYDROXY: CPT

## 2022-02-28 PROCEDURE — 83540 ASSAY OF IRON: CPT

## 2022-02-28 PROCEDURE — 83550 IRON BINDING TEST: CPT

## 2022-02-28 PROCEDURE — 83735 ASSAY OF MAGNESIUM: CPT

## 2022-02-28 PROCEDURE — 83970 ASSAY OF PARATHORMONE: CPT

## 2022-02-28 PROCEDURE — 82728 ASSAY OF FERRITIN: CPT

## 2022-02-28 PROCEDURE — 84156 ASSAY OF PROTEIN URINE: CPT

## 2022-02-28 PROCEDURE — 84100 ASSAY OF PHOSPHORUS: CPT

## 2022-02-28 PROCEDURE — 36415 COLL VENOUS BLD VENIPUNCTURE: CPT

## 2022-02-28 PROCEDURE — 85027 COMPLETE CBC AUTOMATED: CPT

## 2022-02-28 PROCEDURE — 99214 OFFICE O/P EST MOD 30 MIN: CPT | Performed by: PHYSICIAN ASSISTANT

## 2022-02-28 PROCEDURE — 82570 ASSAY OF URINE CREATININE: CPT

## 2022-02-28 NOTE — TELEPHONE ENCOUNTER
Nephrology    Patient underwent blood work this morning along with having her advanced care meeting this morning  Spoke to the mother at length, as her blood work came back showing a creatinine worsening to 7 4 mg/dL  Electrolytes are all stable  Mother reports to me that the patient is asymptomatic  Patient's mother expressed to me her concerned about renal replacement therapy and explained the importance about some of the complications that could arise without dialysis if indicated  She is also interested in donating her kidney to the daughter as well  She start a new job soon  I will send a message to transplant team to see if there is a earlier follow-up available  I told him to go the emergency room if she is starting to have any symptoms of uremia which I discussed with them

## 2022-02-28 NOTE — PATIENT INSTRUCTIONS
Chronic Kidney Disease stage 5- Repeat blood work pending  Kidney Smart Class: completed over the phone  Modality: unsure, consider TCU  Transplant: will set up with Dr Sae Schneider for evaluation     Hypertension- Antihypertensive regimen includes amlodipine 5mg twice a day and labetalol 100mg twice a day  Please avoid salt in your diet  Avoid NSAIDs  Lose weight  Check your blood pressures at home  We recommend OMRON blood pressure cuff and definitely a large arm cuff rather than a wrist cuff )  Goal blood pressure is 695/81      Metabolic Acidosis- She takes sodium bicarbonate tablets 650mg twice a day        Anemia- Continue to take an oral iron supplement daily      Bone Mineral Disorder- PTH and vitamin D pending from this morning      Proteinuria- UPC ratio pending from this morning  Follow up with Dr Garland Smalls in 1 month  Please call the office with any questions or concerns

## 2022-02-28 NOTE — PROGRESS NOTES
Assessment and Plan:    Goyo Bradley was seen today for follow-up  Diagnoses and all orders for this visit:    Chronic kidney disease (CKD), active medical management without dialysis, stage 5 ([address] )  -     CBC; Future  -     Phosphorus; Future  -     Magnesium; Future  -     PTH, intact; Future  -     Protein / creatinine ratio, urine; Future  -     Comprehensive metabolic panel; Future    Persistent proteinuria    Iron deficiency anemia, unspecified iron deficiency anemia type    Chronic kidney disease-mineral and bone disorder    Benign hypertension with CKD (chronic kidney disease) stage V (HCC)    Metabolic acidosis      Chronic Kidney Disease stage 5- Check blood work ASAP  Creatinine significantly elevated to 7 48 with stable BUN of 36 showing chronicity of disease  Electrolytes stable and within normal limits  Repeat blood work in 1 month  Renal biopsy 1/29/2020: severe diffuse global glomerulosclerosis, severe tubular atrophy with interstitial fibrosis and inflammation, mild arteriosclerosis leading to the likely cause of renal dysfunction being 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 due to NSAID use)  Workup: HIV nonreactive, anti GBM negative, LUCY negative, ANCA negative but GA-3 elevated to 5 4 hemolysis smear negative for schistocytes, C3/C4 normal, IgG/IgA/IgM normal, hepatitis negative, no peripheral eosinophilia  Kidney Smart Class: completed over the phone  Modality: unsure, consider TCU  Transplant: will set up with Dr Michelle Walsh for evaluation (mother declined to schedule due to not knowing her work schedule at this time, there are no appointments available in next two weeks for transplant evaluation)  Barriers: Patient has poor insight into her disease  Mother helps to manage however her mother is reluctant to plan for dialysis      Hypertension- Antihypertensive regimen includes amlodipine 5mg twice a day and labetalol 100mg twice a day    Please avoid salt in your diet  Avoid NSAIDs  Lose weight  Check your blood pressures at home  We recommend OMRON blood pressure cuff and definitely a large arm cuff rather than a wrist cuff )  Goal blood pressure is 130/80  Workup: renal artery doppler negative, am cortisol 14, tsh 1 5, metanephrines normal, catecholamines negative, renin 0 935, CUULYKCCVSF 18 6     Metabolic Acidosis- She takes sodium bicarbonate tablets 650mg twice a day        Anemia- Continue to take an oral iron supplement daily  HGB worsening  If iron stores remain low, consider IV iron at the infusion center        Bone Mineral Disorder- PTH and vitamin D pending from this morning  Phos slightly elevated  Will monitor  Recommend  Low phosphorus diet and if rises >5, would start binders      Proteinuria- UPC ratio pending from this morning  UPC ratio worsening  Unable to use RAAS inhibition with ACEI/ARB due to progressive and advanced kidney disease  Follow up with Dr Rios Jung in 1 month  Please call the office with any questions or concerns  Addendum: Reveiwed blood work which resulted after patient left  See above in red  Noted Dr Ceferino Cota phone call to patient's mother earlier today  Reason for Visit: Follow-up (CKD5)    HPI: Chloe Martinez is a 27 y o  female who is here for follow up of advanced chronic kidney disease  She last saw me in December 2021  She had her blood work drawn only 30 minutes prior to her appointment today  She denies acute complaints such as headache, lightheadedness, shortness of breath, chest pain, abdominal pain, N/V/D, urinary complaints, LE edema, burps or hiccups, bad taste in her mouth, decreased appetite, worsening fatigue, tremors  She has no acute uremic symptoms  We discussed the eventual need for dialysis and my recommendation for her to do PD at home since she is so young and would like to continue to work    I discussed the indications for dialysis with her mother and informed them of the unfortunate reality that besides transplant, this is all that is available for kidney failure at this time  We discussed that they have yet to schedule a transplant evaluation and they need to do this first if she would like to be a transplant candidate in the future  ROS: A complete review of systems was performed and was negative unless otherwise noted in the history of present illness      Allergies:   Wheat bran - food allergy    Medications:     Current Outpatient Medications:     acetaminophen (TYLENOL) 325 mg tablet, Take 2 tablets (650 mg total) by mouth every 6 (six) hours as needed for mild pain or headaches, Disp: 30 tablet, Rfl: 0    albuterol (2 5 mg/3 mL) 0 083 % nebulizer solution, Take 3 mL (2 5 mg total) by nebulization every 6 (six) hours as needed for wheezing or shortness of breath, Disp: 30 mL, Rfl: 0    albuterol (Ventolin HFA) 90 mcg/act inhaler, Inhale 2 puffs every 6 (six) hours as needed for wheezing or shortness of breath (cough), Disp: 18 g, Rfl: 0    amLODIPine (NORVASC) 10 mg tablet, Take 0 5 tablets (5 mg total) by mouth 2 (two) times a day, Disp: 30 tablet, Rfl: 0    etonogestrel (NEXPLANON) subdermal implant, 68 mg by Subdermal route once, Disp: , Rfl:     ferrous sulfate 325 (65 Fe) mg tablet, Take 1 tablet (325 mg total) by mouth daily with breakfast, Disp: 30 tablet, Rfl: 0    fluticasone (Flovent HFA) 110 MCG/ACT inhaler, Inhale 1 puff 2 (two) times a day, Disp: 12 g, Rfl: 0    labetalol (NORMODYNE) 100 mg tablet, Take 1 tablet (100 mg total) by mouth 2 (two) times a day, Disp: 60 tablet, Rfl: 0    methocarbamol (ROBAXIN) 500 mg tablet, Take 1 tablet (500 mg total) by mouth 3 (three) times a day as needed for muscle spasms, Disp: 25 tablet, Rfl: 0    sodium bicarbonate 650 mg tablet, Take 1 tablet (650 mg total) by mouth 2 (two) times daily after meals, Disp: 60 tablet, Rfl: 0    Blood Pressure KIT, by Does not apply route daily (Patient not taking: Reported on 12/7/2021 ), Disp: 1 each, Rfl: 0    Past Medical History:   Diagnosis Date    Asthma     Chronic kidney disease     Eczema     Headache     Hypertension     Wears glasses      Past Surgical History:   Procedure Laterality Date    CHOLECYSTECTOMY      CT NEEDLE BIOPSY KIDNEY  1/29/2020    KELOID EXCISION Left 3/30/2021    Procedure: POSTERIOR EAR EXCISION KELOID, FLAP RECONSTRUCTION;  Surgeon: Galina Vyas MD;  Location: [facility];  Service: Plastics     Family History   Problem Relation Age of Onset    Asthma Mother     No Known Problems Father       reports that she has never smoked  She has never used smokeless tobacco  She reports current alcohol use  She reports that she does not use drugs  Physical Exam:   Vitals:    02/28/22 1022 02/28/22 1043   BP:  148/90   BP Location:  Right arm   Patient Position:  Sitting   Cuff Size:  Large   Pulse:  76   Weight: 115 kg (253 lb)    Height: 5' 4" (1 626 m)      Body mass index is 43 43 kg/m²  General: NAD  Neuro: AAO  Skin: no rash  Eyes: anicteric  ENMT: mm moist  Neck: no masses  Respiratory: CTAB  Cardiovascular: RRR  Extremities: no edema  Gastrointestinal: soft nt nd    Procedure:  No results found for this or any previous visit  Lab Results   Component Value Date    GLUCOSE 86 06/12/2015    CALCIUM 6 9 (L) 02/28/2022     06/12/2015    K 3 7 02/28/2022    CO2 21 02/28/2022     02/28/2022    BUN 36 (H) 02/28/2022    CREATININE 7 48 (H) 02/28/2022   I have personally reviewed the blood work as stated above and in my note  I have personally reviewed last office note

## 2022-03-01 DIAGNOSIS — N25.81 SECONDARY HYPERPARATHYROIDISM OF RENAL ORIGIN (HCC): Primary | ICD-10-CM

## 2022-03-01 LAB — 25(OH)D3 SERPL-MCNC: 11.4 NG/ML (ref 30–100)

## 2022-03-01 RX ORDER — CALCITRIOL 0.25 UG/1
0.25 CAPSULE, LIQUID FILLED ORAL DAILY
Qty: 30 CAPSULE | Refills: 3 | Status: SHIPPED | OUTPATIENT
Start: 2022-03-01 | End: 2022-05-07 | Stop reason: SDUPTHER

## 2022-03-02 DIAGNOSIS — E55.9 VITAMIN D DEFICIENCY: Primary | ICD-10-CM

## 2022-03-02 RX ORDER — ERGOCALCIFEROL (VITAMIN D2) 1250 MCG
50000 CAPSULE ORAL WEEKLY
Qty: 12 CAPSULE | Refills: 0 | Status: SHIPPED | OUTPATIENT
Start: 2022-03-02 | End: 2022-06-06

## 2022-03-03 ENCOUNTER — TELEPHONE (OUTPATIENT)
Dept: NEPHROLOGY | Facility: CLINIC | Age: 27
End: 2022-03-03

## 2022-03-03 NOTE — TELEPHONE ENCOUNTER
Message left on patient's VM to start Vitamin D 50,000 weekly for low  Vitamin D level per nay       ----- Message from [facility], MARGUERITE sent at 3/2/2022  6:46 AM EST -----  Vitamin D level returned very low  Please have patient start ergocalciferol 50,000 units weekly x12 weeks  I placed this order to her pharmacy  Thank you

## 2022-03-03 NOTE — TELEPHONE ENCOUNTER
Message left on patient's VM to start Calcitriol 0 25 mcg QD for elevated PTH level per Marivel Dhillon       ----- Message from [place], [place] sent at 3/1/2022  3:47 PM EST -----  Please have patient start calcitriol 0 25mcg daily for an elevated PTH  I sent to pharmacy  Thank you

## 2022-03-04 ENCOUNTER — DOCUMENTATION (OUTPATIENT)
Dept: NEPHROLOGY | Facility: CLINIC | Age: 27
End: 2022-03-04

## 2022-03-15 ENCOUNTER — APPOINTMENT (OUTPATIENT)
Dept: LAB | Facility: CLINIC | Age: 27
End: 2022-03-15
Payer: MEDICARE

## 2022-03-15 ENCOUNTER — OFFICE VISIT (OUTPATIENT)
Dept: FAMILY MEDICINE CLINIC | Facility: CLINIC | Age: 27
End: 2022-03-15
Payer: MEDICARE

## 2022-03-15 VITALS
OXYGEN SATURATION: 100 % | TEMPERATURE: 98.2 F | SYSTOLIC BLOOD PRESSURE: 138 MMHG | HEART RATE: 83 BPM | WEIGHT: 254.25 LBS | BODY MASS INDEX: 43.64 KG/M2 | DIASTOLIC BLOOD PRESSURE: 86 MMHG

## 2022-03-15 DIAGNOSIS — N18.5 CHRONIC KIDNEY DISEASE (CKD), ACTIVE MEDICAL MANAGEMENT WITHOUT DIALYSIS, STAGE 5 (HCC): ICD-10-CM

## 2022-03-15 DIAGNOSIS — N18.5 CHRONIC KIDNEY DISEASE (CKD), ACTIVE MEDICAL MANAGEMENT WITHOUT DIALYSIS, STAGE 5 (HCC): Primary | ICD-10-CM

## 2022-03-15 DIAGNOSIS — M25.552 LEFT HIP PAIN: ICD-10-CM

## 2022-03-15 DIAGNOSIS — F07.81 POST-CONCUSSION SYNDROME: ICD-10-CM

## 2022-03-15 DIAGNOSIS — N18.9 CHRONIC KIDNEY DISEASE-MINERAL AND BONE DISORDER: ICD-10-CM

## 2022-03-15 DIAGNOSIS — Z23 ENCOUNTER FOR IMMUNIZATION: ICD-10-CM

## 2022-03-15 DIAGNOSIS — N18.5 CHRONIC KIDNEY DISEASE (CKD) STAGE G5/A2, GLOMERULAR FILTRATION RATE (GFR) LESS THAN OR EQUAL TO 15 ML/MIN/1.73 SQUARE METER AND ALBUMINURIA CREATININE RATIO BETWEEN 30-299 MG/G (HCC): ICD-10-CM

## 2022-03-15 DIAGNOSIS — N18.5 ANEMIA DUE TO STAGE 5 CHRONIC KIDNEY DISEASE, NOT ON CHRONIC DIALYSIS (HCC): ICD-10-CM

## 2022-03-15 DIAGNOSIS — M89.9 CHRONIC KIDNEY DISEASE-MINERAL AND BONE DISORDER: ICD-10-CM

## 2022-03-15 DIAGNOSIS — E83.9 CHRONIC KIDNEY DISEASE-MINERAL AND BONE DISORDER: ICD-10-CM

## 2022-03-15 DIAGNOSIS — D63.1 ANEMIA DUE TO STAGE 5 CHRONIC KIDNEY DISEASE, NOT ON CHRONIC DIALYSIS (HCC): ICD-10-CM

## 2022-03-15 DIAGNOSIS — J45.20 MILD INTERMITTENT ASTHMA WITHOUT COMPLICATION: ICD-10-CM

## 2022-03-15 PROBLEM — D64.9 ANEMIA: Status: ACTIVE | Noted: 2022-03-15

## 2022-03-15 LAB
ALBUMIN SERPL BCP-MCNC: 3.4 G/DL (ref 3.5–5)
ALP SERPL-CCNC: 119 U/L (ref 46–116)
ALT SERPL W P-5'-P-CCNC: 15 U/L (ref 12–78)
ANION GAP SERPL CALCULATED.3IONS-SCNC: 14 MMOL/L (ref 4–13)
AST SERPL W P-5'-P-CCNC: 10 U/L (ref 5–45)
BILIRUB SERPL-MCNC: 0.25 MG/DL (ref 0.2–1)
BUN SERPL-MCNC: 27 MG/DL (ref 5–25)
CALCIUM ALBUM COR SERPL-MCNC: 8 MG/DL (ref 8.3–10.1)
CALCIUM SERPL-MCNC: 7.5 MG/DL (ref 8.3–10.1)
CHLORIDE SERPL-SCNC: 107 MMOL/L (ref 100–108)
CO2 SERPL-SCNC: 22 MMOL/L (ref 21–32)
CREAT SERPL-MCNC: 7.91 MG/DL (ref 0.6–1.3)
CREAT UR-MCNC: 150 MG/DL
ERYTHROCYTE [DISTWIDTH] IN BLOOD BY AUTOMATED COUNT: 15.4 % (ref 11.6–15.1)
GFR SERPL CREATININE-BSD FRML MDRD: 6 ML/MIN/1.73SQ M
GLUCOSE SERPL-MCNC: 85 MG/DL (ref 65–140)
HCT VFR BLD AUTO: 28.5 % (ref 34.8–46.1)
HGB BLD-MCNC: 9 G/DL (ref 11.5–15.4)
MAGNESIUM SERPL-MCNC: 1.6 MG/DL (ref 1.6–2.6)
MCH RBC QN AUTO: 21.2 PG (ref 26.8–34.3)
MCHC RBC AUTO-ENTMCNC: 31.6 G/DL (ref 31.4–37.4)
MCV RBC AUTO: 67 FL (ref 82–98)
PHOSPHATE SERPL-MCNC: 3.9 MG/DL (ref 2.7–4.5)
PLATELET # BLD AUTO: 286 THOUSANDS/UL (ref 149–390)
PMV BLD AUTO: 11.5 FL (ref 8.9–12.7)
POTASSIUM SERPL-SCNC: 4.2 MMOL/L (ref 3.5–5.3)
PROT SERPL-MCNC: 7 G/DL (ref 6.4–8.2)
PROT UR-MCNC: 351 MG/DL
PROT/CREAT UR: 2.34 MG/G{CREAT} (ref 0–0.1)
PTH-INTACT SERPL-MCNC: 581 PG/ML (ref 18.4–80.1)
RBC # BLD AUTO: 4.24 MILLION/UL (ref 3.81–5.12)
SODIUM SERPL-SCNC: 143 MMOL/L (ref 136–145)
WBC # BLD AUTO: 6.48 THOUSAND/UL (ref 4.31–10.16)

## 2022-03-15 PROCEDURE — 84100 ASSAY OF PHOSPHORUS: CPT

## 2022-03-15 PROCEDURE — 83735 ASSAY OF MAGNESIUM: CPT

## 2022-03-15 PROCEDURE — 90686 IIV4 VACC NO PRSV 0.5 ML IM: CPT | Performed by: FAMILY MEDICINE

## 2022-03-15 PROCEDURE — 99213 OFFICE O/P EST LOW 20 MIN: CPT | Performed by: FAMILY MEDICINE

## 2022-03-15 PROCEDURE — 82570 ASSAY OF URINE CREATININE: CPT

## 2022-03-15 PROCEDURE — 90471 IMMUNIZATION ADMIN: CPT | Performed by: FAMILY MEDICINE

## 2022-03-15 PROCEDURE — 85027 COMPLETE CBC AUTOMATED: CPT

## 2022-03-15 PROCEDURE — 80053 COMPREHEN METABOLIC PANEL: CPT

## 2022-03-15 PROCEDURE — 36415 COLL VENOUS BLD VENIPUNCTURE: CPT

## 2022-03-15 PROCEDURE — 84156 ASSAY OF PROTEIN URINE: CPT

## 2022-03-15 PROCEDURE — 83970 ASSAY OF PARATHORMONE: CPT

## 2022-03-15 RX ORDER — ALBUTEROL SULFATE 2.5 MG/3ML
2.5 SOLUTION RESPIRATORY (INHALATION) EVERY 6 HOURS PRN
Qty: 30 ML | Refills: 0 | Status: SHIPPED | OUTPATIENT
Start: 2022-03-15

## 2022-03-15 NOTE — PATIENT INSTRUCTIONS
Hip Bursitis Exercises   AMBULATORY CARE:   Hip bursitis exercises  help strengthen the muscles in your hip and keep the joint flexible  Strong muscles can help reduce pain, prevent injury, and keep the joint stable  The exercises can also help increase the range of motion in your hip joint  What you need to know about exercise safety:   · Move slowly and smoothly  Avoid fast or jerky motions  This will help prevent an injury  · Breathe normally  Do not hold your breath  It is important to breathe in and out so you do not tense up during exercise  Tension could prevent you from moving your joint in a full range of motion  · Do the exercises and stretches on both legs  Do this so both hips remain strong and flexible  · Stop if you feel sharp pain or an increase in pain  Contact your healthcare provider or physical therapist  It is normal to feel some discomfort during exercise  Regular exercise will help decrease your discomfort over time  · Warm up before you stretch and exercise  Walk or ride a stationary bike for 5 to 10 minutes  How to do hip stretches: Your healthcare provider or physical therapist will tell you how many times to do each stretch  Do the stretch on both sides before you move to the next stretch  · Standing iliotibial band stretch:  Stand with the leg on your injured side behind your other leg  Bend sideways toward the side that is not injured  Stop when you feel a stretch in your outer hip  Hold for 5 to 10 seconds  Then return to the starting position  · Lying iliotibial band stretch:  Lie on your back  Bend the knee on your injured side toward your chest  Place your hands on the outside of your knee and thigh  Slowly pull the knee across your body  Stop when you feel a stretch in your hip and outer thigh  Hold for 5 to 10 seconds  Return your leg to the starting position  · Hip stretch:  Lie on your back with both legs straight and on the [facility] the knee on your injured side toward your chest until you can reach your lower leg  Place both hands on your shin and pull your knee toward your chest  Hold for 5 to 10 seconds  Return your leg to the starting position  · Knee to chest:  Lie on your back with both knees bent and feet flat on the floor  Bend the knee on your injured side toward your chest until you can reach your lower leg  Place both hands on your shin and pull your knee toward your chest  Hold for 5 to 10 seconds  Return your leg to the starting position  · Internal hip rotator stretch:  You will do this exercise on a table  Lie on your side with the injured hip on top  You may be told to keep a pillow between your thighs  Move the top leg so the foot hangs below the edge of the table  Rotate your hip to raise your foot in the opposite direction of the bottom shoulder  Raise your foot as high as you can so you feel a stretch in the back of your thigh  Hold for 5 seconds  Then slowly lower your foot to the starting position  · External hip rotator stretch:  You will do this exercise on a table  Lie on your side with the injured hip on the bottom  You do not need a pillow between your thighs for this exercise  Move the bottom leg so the foot is off the edge of the table  Rotate your hip to lift the foot in the opposite direction of the bottom shoulder  Raise your foot as high as you can so you feel a stretch in your buttock  Hold for 5 seconds  Then slowly lower your foot to the starting position  · Kneeling hip flexor stretch:  Kneel on your knee on the injured side  Place the foot of your other leg on the floor so the knee is bent  Put both hands on top of your thigh  Keep your back straight and abdominal muscles tight  Lean forward until you feel a stretch in your other thigh  Hold the stretch for 10 seconds  Return to the starting position  How to do hip strengthening exercises:   Your healthcare provider or physical therapist will tell you how many times to do each exercise  Do the exercise on both sides before you move to the next exercise  · Straight leg lift to the side: This may also be called hip abduction  Lie on your side with straight legs, with the injured hip on top  Slowly raise your top leg toward the ceiling as high as you can  Keep your foot pointed  Hold for 5 seconds  Then slowly lower your leg to the starting position  · Inner thigh lift: This may also be called hip adduction  Lie on your side with straight legs, with the injured hip on the bottom  Cross your top leg over your bottom leg  Put the foot of your top leg on the floor in front of you  Raise your bottom leg until it touches the top leg  Hold for 5 seconds  Then slowly lower the leg to the floor  · Clam exercise:  Lie on your side so your injured side is on top  Bend your knees  Keep your heels together during this exercise  Slowly raise your top knee toward the ceiling  Then lower your leg so your knees are together  Call your doctor if:   · You have sharp pain during exercise or at rest     · You have questions or concerns about the stretches or exercises  © Copyright [employer] 2022 Information is for End User's use only and may not be sold, redistributed or otherwise used for commercial purposes  All illustrations and images included in CareNotes® are the copyrighted property of [employer]  or Danitza Mireles  The above information is an  only  It is not intended as medical advice for individual conditions or treatments  Talk to your doctor, nurse or pharmacist before following any medical regimen to see if it is safe and effective for you

## 2022-03-15 NOTE — ASSESSMENT & PLAN NOTE
- Anemia of CKD - HBG stable at 9 today  - Denies any fatigue, sob, no evidence of bleeding  Plan:  - Continue Iron supplementation for now     - Consider IV iron
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
## 2022-03-15 NOTE — ASSESSMENT & PLAN NOTE
Blood Pressure: 138/86    - Stable  Pt denies taking her b/ps at home  Advised Mother and Pt to get a blood pressure monitor to help evaluate home pressures as well  Advised to keep a log for two weeks prior to follow up to review  - Denies any chest pain, no palpitations, no diaphoresis, no sob, no headaches, no visual changes or tinnitus  Plan:  - Continue Amlodipine 5 mg BID  - Continue Labetalol 100 mg BID  - Continue to avoid salt in your diet  - Discussed need to increase physical activity as tolerated  Recommend starting to walk and working up to at least 30 min of dedicated walking per day  - Follow up in office in 6 weeks or prior as needed

## 2022-03-15 NOTE — ASSESSMENT & PLAN NOTE
- Previous hx of concussions with last one in Jan of 2021  Pt was participating in OT for her short term memory and cognitive therapy  - Denies any recurrent headaches recently, no visual changes, no dizziness, no tinnitus  - Discussed cognitive exercises  - Referral placed to comprehensive concussion program for re-assessment and to re-establish as pt stopped attending previously

## 2022-03-15 NOTE — ASSESSMENT & PLAN NOTE
Lab Results   Component Value Date    EGFR 6 03/15/2022    EGFR 6 02/28/2022    EGFR 12 11/17/2021    CREATININE 7 91 (H) 03/15/2022    CREATININE 7 48 (H) 02/28/2022    CREATININE 4 85 (H) 11/17/2021     - Cr continues to be significantly elevated with labs done today showing elevation to 7 91 from 7 48 with BUN at 27  GFR is 6  Pt states she continues to make urine  Electrolytes stable, Mg and Phos WNL today  Further discussed and reiterated Nephrology recommendations to plan for Dialysis  Pt and Mom show some resistance to the idea  Concern for pt wanted to work  Discussed PD is likely the right choice if they are amenable as per Nephros conversation on 2/28  - Pt denies any acute complaints - no f/c/n/v/d, no chest pain, no sob, no lower ext edema, no abdominal distension, no new confusion or fatigue, no tremors, no abnormal taste in mouth  Plan:  - Continue to follow with Nephrology - next appt 3/31  - Recommend f/u Dr Dottie Anne for transplant evaluation    - Avoid NSAIDS     - Continue Sodium bicarb 650mg BID

## 2022-03-17 ENCOUNTER — TELEPHONE (OUTPATIENT)
Dept: NEPHROLOGY | Facility: CLINIC | Age: 27
End: 2022-03-17

## 2022-03-21 DIAGNOSIS — I10 HYPERTENSION, UNSPECIFIED TYPE: ICD-10-CM

## 2022-03-22 RX ORDER — AMLODIPINE BESYLATE 10 MG/1
TABLET ORAL
Qty: 30 TABLET | Refills: 0 | Status: SHIPPED | OUTPATIENT
Start: 2022-03-22 | End: 2022-03-31

## 2022-03-24 NOTE — TELEPHONE ENCOUNTER
Jose Argueta MD           Patient Information  Date:3/24/2022  Name : Mackenzie Bean 30 y.o.     YOB: 1985           History of present illness    Mackenzie Bean is a 30 y.o. female  here for follow-up of thyroid cancer  Papillary thyroid carcinoma T3N1 2.1 cm, status post total thyroidectomy at [facility] by Dr. Rustam Connor, in October 2011. She had metastatic carcinoma in 6 of 7 lymph nodes level VI nodes with vascular and lymphatic invasion. 2 parathyroid glands were removed right inferior and right superior. Thyrogen stimulated ablation February 2012 with 167 mCi with a TG of 3.6. Neck ultrasound in October was negative, initial TG was 0.8, increased to 1.1. Thyrogen stimulated TG was 40 with a negative whole-body scan. She had neck ultrasound at [facility] in December which was negative for recurrent disease. PET scan showed uptake in one axillary node in the mediastinum. She saw Dr. Margret Little at [facility],     November 2011: TSH 1.3, TG 6.6 (postop, PreI-131)  May 31, 2012, TSH less than 0.01, TG 0.8, TG antibody less than 20  October 16, 2012: TG 1.1, TG antibody less than 20, TSH 0.042  November 30, 2012: Thyrogen stimulated TG 40, TG antibody less than 20  January 18, 2013: TG 1.5, TG antibody less than 20, TSH 0.019 (on Synthroid 125 mcg)  April 2013: TG 1.5, TSH 0.45  February 2015: TG 2.2, TSH 0.3  September 2015: TSH 0.08, TG 4.6  2016: TG 6.8, TSH 0.495    Thyrogen whole-body scan: January 31, 2012  Anterior and lateral pinhole images of the neck reveal 3 small round foci of tracer uptake within the neck. One in the right neck, adjacent to the midline and 2 in the left neck located to the cephalad and lateral to the midline    Thyrogen whole-body scan iodine-131 treatment 167 mCi February 2012  Post iodine-131 treatment whole-body scan radioiodine avid thyroid tissue confined to the neck.   No imaging evidence of distant Pt mother advised that CR rising slowly, now at 7 91  Has f/u visit with Dr Obi De La Cruz on 3/31/22, per [facility] blood work to be discussed in detail at that time  ----- Message from [place], [place] sent at 3/16/2022  4:22 PM EDT -----  Creatinine continues to rise slowly  Can discuss blood work in detail at Dr Randee Potst appointment later this month  No immediate changes needed  metastatic cancer. TG was 3.6  PET/CT 12/14/2012  0.7 cm right axillary lymph node exhibits increased radiotracer activity. Small lymph nodes in the axillary are normal.  Soft tissue in the anterior mediastinum is more prominent than expected for a patient of this age. There is a focus of increased tracer activity in the anterior mediastinal soft tissue on the right with a max SUV of 4.8. Soft tissue in the left aspect of the anterior mediastinum exhibits a max SUV of 3.8. No mediastinal lymphadenopathy. Impression soft tissue in the anterior mediastinum with increased metabolic activity. Right axillary lymph node with increased metabolic activity. Metastatic disease cannot be excluded. No abnormal activity in the expected location of the thyroid gland    CT chest January 2013  Anterior mediastinal opacities as described on the left rather than right. Left-sided lesion is slightly smaller than the CT images of the PET scan December 2012. The etiology of the posterior in the right anterior mediastinum is indeterminate. It could be thymic tissue. There are 2 soft tissue densities extending inferiorly along the great vessel to form a band of opacity which corresponds to the thickened pericardial surface adjacent to the great vessels. Should be noted that neither the right-sided focus in the anterior mediastinum not apparent pericardial thickening was identified on the recent PET scan. Scattered tiny nodular opacities majority of which are peripheral distribution and probably reflect small lymph nodes. She is on 150 mcg the longest, celiac was negative in the past    She is postpartum, delivered July 2021  She is taking Synthroid consistently,  No change in the size of the neck or neck pain. No dysphagia,dysphonia or dyspnea.   Not able to lose weight, tried keto diet,    Wt Readings from Last 3 Encounters:   03/24/22 179 lb (81.2 kg)   10/18/21 175 lb (79.4 kg)   09/13/21 182 lb 9.6 oz (82.8 kg) Past Medical History:   Diagnosis Date    Abnormal Pap smear of cervix 10/05/2017; 10/30/18    Negative, HR HPV+, 16+, 18 negative; TONE/Neg HPV    Anxiety     Arthritis     Bursitis     and scapulothoracic dyskinesia, right shoulder    Fatigue     Headache     History of colposcopy 12/21/2018    Benign    History of endometrial biopsy 12/21/2018    Benign    Joint pain     Kidney stones     Muscle pain     Pap smear for cervical cancer screening 05/14/2014; 11/4/2019    negative; negative, HPV negative    Psychiatric problem     Anxiety    Shoulder bursitis     right    Thyroid cancer ([address] 1/10/2013    Complete Thyroidectomy 2012. Current Outpatient Medications   Medication Sig    Synthroid 150 mcg tablet Take 1 Tablet by mouth Daily (before breakfast). Half a tablet on Sunday    ibuprofen (MOTRIN) 600 mg tablet Take 1 Tablet by mouth every six (6) hours as needed for Pain. Take with food.  diphenhydrAMINE (BenadryL) 25 mg capsule Take 25 mg by mouth every six (6) hours as needed.  PNV Comb #2-Iron-FA-Omega 3 29-1-400 mg cmpk Take 1 Tablet by mouth daily.  cholecalciferol (VITAMIN D3) (5000 Units/125 mcg) tab tablet Take 10,000 Units by mouth every seven (7) days. No current facility-administered medications for this visit. Allergies   Allergen Reactions    Amoxicillin Hives         Review of Systems: Per HPI    Physical Examination:  Blood pressure 113/71, pulse 93, temperature 97.9 °F (36.6 °C), temperature source Temporal, resp. rate 16, height 4' 11\" (1.499 m), weight 179 lb (81.2 kg), SpO2 96 %, currently breastfeeding. General: pleasant, no distress, good eye contact  HEENT: no exophthalmos, no periorbital edema, EOMI  Neck: No  neck mass  RS: Normal respiratory effort  Musculoskeletal: no tremors  Neurological: alert and oriented  Psychiatric: normal mood and affect  Skin: Normal color  Data Reviewed:     [x] Reviewed labs      Assessment/Plan:     1. Postablative hypothyroidism    2. Thyroid cancer ([address]    3. Weight gain    4. Non morbid obesity        Metastatic papillary thyroid cancer status post total thyroidectomy as well as central neck lymph node dissection in 2011 at [facility]. GRANADOS  2012 with 167 mCi, with Tg of 3.6  She had TG positive, scan negative disease without any radiological evidence. TG November 2019 less than 5, stable  Ultrasound neck May 2020 - neg  2021: Thyroglobulin 10, ultrasound neck negative,  CT chest November 2021 - LUNGS: Multiple pulmonary nodules, measuring up to 3 mm in the right upper lobe up to 4 mm in the right middle lobe , up to 4 mm in the right lower lobe  Up to 1 to 2 mm in the lingula  Up to 3 mm in the left lower lobe. INCIDENTALLY IMAGED UPPER ABDOMEN: Nonobstructing 4 mm left renal calculus. Segments VI 6 x 3.6 cm enhancing mass (series 2, image 45). Segment VI 11 x 11  mm enhancing mass (image 51). More central right hepatic 19 mm and 10 mm  enhancing masses (series 2, image 54). BONES: No destructive bone lesion. PET/CT  2021 /11 -Diffuse FDG uptake in the breasts, possibly related to lactation. Clinical  correlation recommended. 2.  Pulmonary nodules, indeterminate. 2.  No FDG uptake associated with liver lesions. The last CT chest was in 2013 at [facility], there was some nodular opacities then, , she had a PET/CT in 2012 either at [facility] or [address] [place] [address].   Small nodular densities in the lung, indeterminate, too small to biopsy  Got the images on a CD from [facility], patient dropped off at [facility] to compare it with latest CT    Enhancing lesion on the liver on CT has no FDG uptake on PET, differential hemangioma, focal nodular hyperplasia, ultrasound liver ordered, not completed    Thyrogen stimulated radioactive iodine ablation discussed, low iodine therapy, pregnancy test, isolation, post therapy scan    Post ablation hypothyroidism  Synthroid 1 tablet Monday through Saturday, half a tablet Sunday    Vitamin D deficiency: 10,000 units week    Non morbid obesity: Hypothyroid, rule out Cushing's    Spent > 40 minutes on the day of the visit reviewing chart, examining, ordering/reviewing labs, counseling, discussing therapeutics and documentation in the medical record    Patient Instructions   Care Diabetes & Endocrinology  Radioactive Iodine Ablation for Thyroid Cancer Protocol     As we have discussed together, the best next step in the management of your thyroid condition is to treat any remaining thyroid cells present in your body. This treatment is called radioactive iodine. This process takes a full week to complete and requires meticulous coordination to complete properly. For this reason, please use this document as a guide to help you accomplish this successfully. You should be notified by the scheduling team regarding the following appointments. MONDAY: Report to the Outpatient [facility]) to receive first injection of Thyrogen April 11 th  Before you receive this injection, ask the nurse to check with the nuclear medicine team to make sure that the Wednesday iodine tablet is prepared for you. Do not receive this injection if the nuclear medicine team is not ready to provide the radioactive iodine tablet on Wednesday. TUESDAY: Report to the Outpatient [facility]) to receive second injection of Thyrogen on April 12 th     [facility]: Plan accordingly with the Nuclear Medicine Department to receive a Treatment dose of radioactive iodine (GRANADOS) which will aid in detecting and destroying any remaining thyroid cells. This should be received on this day unless otherwise advised by the Nuclear Medicine team of a different date/time. More than likely this will take place at the [facility] location. 1 Week later you will have a whole body scan to determine if there is any residual thyroid tissue in the body.  This should be exactly 1 week after the day you received the radioactive iodine. This will also be scheduled by the same nuclear medicine department and also will likely take place at the [facility] location. Be advised that although your endocrinologist will request and coordinate the week, it will be important for you to stay in touch with these departments (Outpatient infusion center & the [facility] nuclear medicine department) to make certain that your appointments are lined up accordingly for everything to go smoothly. Discuss with them the above instructions provided to you to assure that everyone is planning on the same. Pregnancy test 72 hours before iodine treatment   Thyroid labs on the day of the iodine treatment         General Information About the Low-Iodine Diet   1. LOW IODINE has NOTHING TO DO WITH SODIUM. Sodium in any form is OK, as long as it is not provided as IODIZED salt. NON-IODIZED salt is OK for the diet. 2. NO MILK or MILK PRODUCTS are permissable because milk is an INTRINSIC site for the biological concentration of iodine. In addition, commercial milking machines are often cleansed with iodine solutions, as are containers & cows' teats. 3. Most commercial vitamin preparations have IODINE ADDED as an essential nutrient. The only preparation which I have found to be fine for the diet is Vicon Forte=AE. Additional Guidelines   1. Avoid restaurant foods since there is no reasonable way to determine which restaurants use iodized salt. 2. Non-iodized salt may be used as desired. Important Note   Food prepared from any fresh meats, fresh poultry, fresh or frozen vegetables, and fresh fruits should be fine for this diet, provided that you do not add any of the ingredients listed above to avoid. Instructions for salivary cortisol test    1. Do not brush teeth or floss  before collecting specimen. 2. Do not eat or drink for 30 minutes prior to specimen collection.   3. 24 hours before collection - do not use any creams or lotion that contains steroids such as         hydrocortisone or use any steroid inhalers. These products may contaminate the absorbent. 4. Avoid activities that may cause gum bleeding before collection      Night 1- Put the absorbent pad under your tongue between 11p-midnight for four (4) minutes, label with name, date of birth, collection date and collection time. Place pad in the freezer.     Night 5- Put the absorbent pad under your tongue between 11p-midnight for four (4) minutes, label with name, date of birth, collection date and collection time. Place pad in the freezer.      Absorbent pads are to remain frozen until you can drop them off to [facility] with orders or our lab in the office. Follow-up and Dispositions    · Return in about 6 weeks (around 5/5/2022). Patient /caregiver verbalized understanding   Voice-recognition software was used to generate this report, which may result in some phonetic-based errors in the grammar and contents. Even though attempts were made to correct all the mistakes, some may have been missed and remained in the body of the report.

## 2022-03-28 DIAGNOSIS — D50.9 IRON DEFICIENCY ANEMIA, UNSPECIFIED IRON DEFICIENCY ANEMIA TYPE: ICD-10-CM

## 2022-03-29 RX ORDER — FERROUS SULFATE 325(65) MG
TABLET ORAL
Qty: 30 TABLET | Refills: 0 | Status: SHIPPED | OUTPATIENT
Start: 2022-03-29 | End: 2022-03-31 | Stop reason: SDUPTHER

## 2022-03-30 ENCOUNTER — TELEPHONE (OUTPATIENT)
Dept: NEPHROLOGY | Facility: CLINIC | Age: 27
End: 2022-03-30

## 2022-03-30 NOTE — TELEPHONE ENCOUNTER
Appointment Confirmation   Person confirmed appointment with  If not patient, name of the person LM 3/30    Date and time of appointment 3/31   10:30     Patient acknowledged and will be at appointment? no    Did you advise the patient that they will need a urine sample if they are a new patient?  N/A    Did you advise the patient to bring their current medications for verification? (including any OTC) Yes    Additional Information

## 2022-03-31 ENCOUNTER — OFFICE VISIT (OUTPATIENT)
Dept: NEPHROLOGY | Facility: CLINIC | Age: 27
End: 2022-03-31
Payer: MEDICARE

## 2022-03-31 VITALS — HEIGHT: 64 IN | WEIGHT: 251 LBS | BODY MASS INDEX: 42.85 KG/M2

## 2022-03-31 DIAGNOSIS — D50.9 IRON DEFICIENCY ANEMIA, UNSPECIFIED IRON DEFICIENCY ANEMIA TYPE: ICD-10-CM

## 2022-03-31 DIAGNOSIS — M89.9 CHRONIC KIDNEY DISEASE-MINERAL AND BONE DISORDER: ICD-10-CM

## 2022-03-31 DIAGNOSIS — N18.5 BENIGN HYPERTENSION WITH CKD (CHRONIC KIDNEY DISEASE) STAGE V (HCC): Primary | ICD-10-CM

## 2022-03-31 DIAGNOSIS — I10 HYPERTENSION, UNSPECIFIED TYPE: ICD-10-CM

## 2022-03-31 DIAGNOSIS — N18.5 CHRONIC KIDNEY DISEASE (CKD), ACTIVE MEDICAL MANAGEMENT WITHOUT DIALYSIS, STAGE 5 (HCC): ICD-10-CM

## 2022-03-31 DIAGNOSIS — N25.81 SECONDARY HYPERPARATHYROIDISM OF RENAL ORIGIN (HCC): ICD-10-CM

## 2022-03-31 DIAGNOSIS — N18.9 CHRONIC KIDNEY DISEASE-MINERAL AND BONE DISORDER: ICD-10-CM

## 2022-03-31 DIAGNOSIS — R80.1 PERSISTENT PROTEINURIA: ICD-10-CM

## 2022-03-31 DIAGNOSIS — E83.9 CHRONIC KIDNEY DISEASE-MINERAL AND BONE DISORDER: ICD-10-CM

## 2022-03-31 DIAGNOSIS — I12.0 BENIGN HYPERTENSION WITH CKD (CHRONIC KIDNEY DISEASE) STAGE V (HCC): Primary | ICD-10-CM

## 2022-03-31 PROBLEM — R41.82 ALTERED MENTAL STATUS: Status: RESOLVED | Noted: 2017-09-04 | Resolved: 2022-03-31

## 2022-03-31 PROBLEM — R79.89 ELEVATED SERUM CREATININE: Status: RESOLVED | Noted: 2017-09-07 | Resolved: 2022-03-31

## 2022-03-31 PROBLEM — N17.9 ACUTE KIDNEY INJURY SUPERIMPOSED ON CHRONIC KIDNEY DISEASE (HCC): Status: RESOLVED | Noted: 2017-09-04 | Resolved: 2022-03-31

## 2022-03-31 PROCEDURE — 99214 OFFICE O/P EST MOD 30 MIN: CPT | Performed by: INTERNAL MEDICINE

## 2022-03-31 RX ORDER — FERROUS SULFATE 325(65) MG
1 TABLET ORAL
Qty: 90 TABLET | Refills: 3 | Status: SHIPPED | OUTPATIENT
Start: 2022-03-31

## 2022-03-31 RX ORDER — AMLODIPINE BESYLATE 5 MG/1
5 TABLET ORAL 2 TIMES DAILY
Qty: 90 TABLET | Refills: 3 | Status: SHIPPED | OUTPATIENT
Start: 2022-03-31

## 2022-03-31 NOTE — PROGRESS NOTES
NEPHROLOGY OFFICE VISIT   Jasmin Simeon 27 y o  female MRN: [id number]  3/31/2022    Reason for Visit:  Advanced chronic kidney disease    History of Present Illness (HPI):    I had the pleasure of seeing Karen Dickinson today in the renal clinic for the continued management of advanced chronic kidney disease  Since our last visit, there has been no ER visits or hospitilizations  He currently has no complaints at this time and is feeling well  Patient denies any chest pain, shortness of breath and swelling  The last blood work was done on March, which we have reviewed together  She currently feels well and has no complaints  She reports she still makes good amounts of urine  She does not check her blood pressure at home  She reports no metallic taste no tremors of the hands, no swelling the legs no shortness of breath no chest pain no nausea no vomiting  She essentially has no overt uremic symptoms  They have yet to make a point with transplant  I continue to encourage them to follow-up with transplant team to start the evaluation initiated  I have sent multiple messages to transplant team in the past   We are just awaiting for them to make the final appointment  They already underwent Chronic Kidney Disease education class over the telephone  I did discuss renal replacement therapy peritoneal dialysis and hemodialysis  I explained to the patient that PD would probably be a better option for her  There is no urgent indication for renal placement therapy at this time but her renal function will need to be closely monitored  I explained this to her and I discussed some of the symptoms of uremia that she needs to look out for and when she should go to the emergency room  I also recommended she attend our Nephrology advanced care meeting with her mother to establish goals of care      I also encouraged to to set up a tor of down stairs PD unit, she wants to discuss this with her mother 1st     ASSESSMENT and PLAN:    1 ) Chronic Kidney Disease Stage V  --Imaging:  Right kidney 9 4 cm, left kidney 9 cm  It should be noted that there was a decrease in the right kidney size compared to 2015 ultrasound where it was 12 8 cm  Both kidneys are diffusely echogenic consistent with chronic kidney disease  --Urinalysis:  Negative blood  2+ protein  --Proteinuria:  Her last urine protein creatinine ratio was 1 08  --Baseline creatinine:  her renal function has progressively been worsening but her BUN remains stable at 27 with electrolytes  --Etiology:  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  --serologies: HIV nonreactive, anti GBM negative, LUCY negative, ANCA negative but TN-3 elevated to 5 4 hemolysis smear negative for schistocytes, C3/C4 normal, IgG/IgA/IgM normal, hepatitis negative, no peripheral eosinophilia  --Biopsy Proven:  Diffuse global glomerular sclerosis which is severe with severe tubular atrophy interstitial fibrosis and interstitial inflammation  Mild arterial sclerosis  Severe chronic pathologic changes, irreversible with poor prognostic sign  --Status:  Creatinine continues to worsen, BUN remains normal, electrolytes are stable including acid-base status and she has no symptoms of uremia  --Management:  recommended PD clinic tour, they completed Chronic Kidney Disease education class over the telephone, gave her referral for Nephrology advanced care meeting, recommended that they make an appointment with the transplant team, recommended peritoneal dialysis at the modality of choice for her, will continue need close follow-up  --Reducing Cardiovascular Risk Factors:  Simvastatin+ Ezetimibe reduced atherosclerotic events in CKD (SHARP trial);  Low dose aspirin safe (if no contraindications exist); smoking is an independent risk factor for Chronic Kidney Disease and progression, strongly recommend smoking cessation     2 ) Hypertension  -- Stage II (American College of Cardiology/American Heart Association)  -- with underlying chronic kidney disease and morbid obesity  -- Body mass index is 45 14 kg/m²  -- Volume status: Euvolemic  -- Etiology: Morbid obesity, underlying chronic kidney disease  -- Secondary Work-Up:  Renal artery Doppler was negative, a m   Cortisol 14, TSH 1 5, normal metanephrines and catecholamine levels, renin 0 935, Aldosterone 11 6  -- Target Goal: < 130/80 (ACC/AHA, CKD with proteinuria, CKD in diabetics) ; if tolerated can target SBP < 120 mmHg in high cardiovascular risk ( Age > 76, CKD, CVD, No Diabetics SPRINT)  -- Lifestyle Modifications: DASH Diet, Weight Loss for ideal body weight, 45 mins of cardiovascular exercise 3 times a week as tolerated, and if no contraindications exist, smoking cessation, limit alcohol use, avoid NSAIDS, monitor blood pressure at home  -- Status: Blood pressure at target  -- Current antihypertensive regiment:  Amlodipine 5 mg twice a day, labetalol 100 mg twice a day  -- Changes:  she takes amlodipine 10 mg half a tablet twice a day and tolerates that better than taking it 10 mg at on, she asked if I could adjust the dosage to make it 5 mg twice a day so that she does not have to cut it which I did     3 ) Morbid obesity  --BMI 43, she is losing weight  --educated on a diet exercise weight loss program    4 )  Mineral bone disorder-chronic kidney disease  --secondary hyperparathyroidism renal origin  --deficiency with most recent vitamin-D 25 hydroxy level of 11  --PTH is trending down  --phosphorus is normal  --continue calcitriol 0 25 mcg daily  --continue Ergocalciferol 50K weekly    5 )  Anemia of chronic kidney disease  --hemoglobin below goal but stable  --once her hemoglobin close below 8 5, would likely recommend Epogen dosing  --iron stores are adequate    6 )  Vitamin-D deficiency  --please see above    7 )  Low bicarb level--resolved  --continue oral sodium bicarbonate         PATIENT INSTRUCTIONS:    Patient Instructions   1 )  Low 2 g sodium diet    2 )  Monitor weights at home    3 )  Avoid NSAIDs (ibuprofen, Motrin, Advil, Aleve, naproxen)    4 )  Monitor blood pressure at home, call if blood pressure greater than 150/90 persistently    5 ) I will plan to discuss all results including blood work, and/or imaging at our next visit, unless there is an urgent indication, in which case I will call you earlier  If you have any questions or concerns about your results, please feel free to call our office  6 )  Continue renal transplant evaluation    7 )  Increase calcitriol to 0 5 mcg daily    8 )  Continue close follow-up with Nephrology to monitor your renal function, 68 weeks with repeat blood work before your next visit    9 )  If you have any symptoms of persistent nausea/vomiting, metallic taste on the tongue or tremors in the hand or worsening shortness of breath, chest pain or swelling the legs please call me or go to the emergency room    10 )  Recommend peritoneal dialysis, for when you need to start dialysis    11 )  Recommend a tour of the dialysis unit    12 )  Recommend our Kidney Care meeting, to have advanced talks in regards to overall goals of care    13 )  Continue vitamin-D, Ergocalciferol 50,000 units weekly        Orders Placed This Encounter   Procedures    Cystatin C With eGFR     Standing Status:   Future     Standing Expiration Date:   3/31/2023    CBC     Standing Status:   Future     Standing Expiration Date:   3/31/2023    PTH, intact     Standing Status:   Future     Standing Expiration Date:   3/31/2023    Calcium, ionized     Standing Status:   Future     Standing Expiration Date:   3/31/2023    Comprehensive metabolic panel     This is a patient instruction: Patient fasting for 8 hours or longer recommended       Standing Status:   Future     Standing Expiration Date:   3/31/2023    Ambulatory referral to advanced care planning Standing Status:   Future     Standing Expiration Date:   3/31/2023     Referral Priority:   Routine     Referral Type:   Program     Referral Reason:   Specialty Services Required     Number of Visits Requested:   1     Expiration Date:   3/31/2023       OBJECTIVE:  Current Weight: Weight - Scale: 114 kg (251 lb)  Vitals:    03/31/22 1040   Weight: 114 kg (251 lb)   Height: 5' 4" (1 626 m)    Body mass index is 43 08 kg/m²  REVIEW OF SYSTEMS:    Review of Systems   Constitutional: Negative for activity change and fever  Respiratory: Negative for cough, chest tightness, shortness of breath and wheezing  Cardiovascular: Negative for chest pain and leg swelling  Gastrointestinal: Negative for abdominal pain, diarrhea, nausea and vomiting  Endocrine: Negative for polyuria  Genitourinary: Negative for difficulty urinating, dysuria, flank pain, frequency and urgency  Skin: Negative for rash  Neurological: Negative for dizziness, syncope, light-headedness and headaches  PHYSICAL EXAM:      Physical Exam  Vitals and nursing note reviewed  Constitutional:       General: She is not in acute distress  Appearance: She is well-developed  She is obese  She is not diaphoretic  HENT:      Head: Normocephalic and atraumatic  Eyes:      General: No scleral icterus  Pupils: Pupils are equal, round, and reactive to light  Cardiovascular:      Rate and Rhythm: Normal rate and regular rhythm  Heart sounds: Normal heart sounds  No murmur heard  No friction rub  No gallop  Pulmonary:      Effort: Pulmonary effort is normal  No respiratory distress  Breath sounds: Normal breath sounds  No wheezing or rales  Chest:      Chest wall: No tenderness  Abdominal:      General: Bowel sounds are normal  There is no distension  Palpations: Abdomen is soft  Tenderness: There is no abdominal tenderness  There is no rebound  Musculoskeletal:         General: Normal range of motion  Cervical back: Normal range of motion and neck supple  Skin:     Findings: No rash  Neurological:      Mental Status: She is alert and oriented to person, place, and time           Medications:    Current Outpatient Medications:     acetaminophen (TYLENOL) 325 mg tablet, Take 2 tablets (650 mg total) by mouth every 6 (six) hours as needed for mild pain or headaches, Disp: 30 tablet, Rfl: 0    albuterol (2 5 mg/3 mL) 0 083 % nebulizer solution, Take 3 mL (2 5 mg total) by nebulization every 6 (six) hours as needed for wheezing or shortness of breath, Disp: 30 mL, Rfl: 0    albuterol (Ventolin HFA) 90 mcg/act inhaler, Inhale 2 puffs every 6 (six) hours as needed for wheezing or shortness of breath (cough), Disp: 18 g, Rfl: 0    calcitriol (ROCALTROL) 0 25 mcg capsule, Take 1 capsule (0 25 mcg total) by mouth daily, Disp: 30 capsule, Rfl: 3    ergocalciferol (ERGOCALCIFEROL) 1 25 MG (67806 UT) capsule, Take 1 capsule (50,000 Units total) by mouth once a week, Disp: 12 capsule, Rfl: 0    etonogestrel (NEXPLANON) subdermal implant, 68 mg by Subdermal route once, Disp: , Rfl:     ferrous sulfate 325 (65 Fe) mg tablet, Take 1 tablet (325 mg total) by mouth daily with breakfast, Disp: 90 tablet, Rfl: 3    fluticasone (Flovent HFA) 110 MCG/ACT inhaler, Inhale 1 puff 2 (two) times a day, Disp: 12 g, Rfl: 0    labetalol (NORMODYNE) 100 mg tablet, Take 1 tablet (100 mg total) by mouth 2 (two) times a day, Disp: 60 tablet, Rfl: 0    methocarbamol (ROBAXIN) 500 mg tablet, Take 1 tablet (500 mg total) by mouth 3 (three) times a day as needed for muscle spasms, Disp: 25 tablet, Rfl: 0    sodium bicarbonate 650 mg tablet, Take 1 tablet (650 mg total) by mouth 2 (two) times daily after meals, Disp: 60 tablet, Rfl: 0    amLODIPine (NORVASC) 5 mg tablet, Take 1 tablet (5 mg total) by mouth 2 (two) times a day, Disp: 90 tablet, Rfl: 3    Blood Pressure KIT, by Does not apply route daily (Patient not taking: Reported on 12/7/2021 ), Disp: 1 each, Rfl: 0    Laboratory Results:        Invalid input(s): ALBUMIN    Results for orders placed or performed in visit on 03/15/22   CBC   Result Value Ref Range    WBC 6 48 4 31 - 10 16 Thousand/uL    RBC 4 24 3 81 - 5 12 Million/uL    Hemoglobin 9 0 (L) 11 5 - 15 4 g/dL    Hematocrit 28 5 (L) 34 8 - 46 1 %    MCV 67 (L) 82 - 98 fL    MCH 21 2 (L) 26 8 - 34 3 pg    MCHC 31 6 31 4 - 37 4 g/dL    RDW 15 4 (H) 11 6 - 15 1 %    Platelets 924 641 - 670 Thousands/uL    MPV 11 5 8 9 - 12 7 fL   Phosphorus   Result Value Ref Range    Phosphorus 3 9 2 7 - 4 5 mg/dL   Magnesium   Result Value Ref Range    Magnesium 1 6 1 6 - 2 6 mg/dL   PTH, intact   Result Value Ref Range     0 (H) 18 4 - 80 1 pg/mL   Protein / creatinine ratio, urine   Result Value Ref Range    Creatinine, Ur 150 0 mg/dL    Protein Urine Random 351 mg/dL    Prot/Creat Ratio, Ur 2 34 (H) 0 00 - 0 10   Comprehensive metabolic panel   Result Value Ref Range    Sodium 143 136 - 145 mmol/L    Potassium 4 2 3 5 - 5 3 mmol/L    Chloride 107 100 - 108 mmol/L    CO2 22 21 - 32 mmol/L    ANION GAP 14 (H) 4 - 13 mmol/L    BUN 27 (H) 5 - 25 mg/dL    Creatinine 7 91 (H) 0 60 - 1 30 mg/dL    Glucose 85 65 - 140 mg/dL    Calcium 7 5 (L) 8 3 - 10 1 mg/dL    Corrected Calcium 8 0 (L) 8 3 - 10 1 mg/dL    AST 10 5 - 45 U/L    ALT 15 12 - 78 U/L    Alkaline Phosphatase 119 (H) 46 - 116 U/L    Total Protein 7 0 6 4 - 8 2 g/dL    Albumin 3 4 (L) 3 5 - 5 0 g/dL    Total Bilirubin 0 25 0 20 - 1 00 mg/dL    eGFR 6 ml/min/1 73sq m

## 2022-04-02 DIAGNOSIS — I12.9 BENIGN HYPERTENSION WITH CKD (CHRONIC KIDNEY DISEASE) STAGE III (HCC): ICD-10-CM

## 2022-04-02 DIAGNOSIS — N18.30 BENIGN HYPERTENSION WITH CKD (CHRONIC KIDNEY DISEASE) STAGE III (HCC): ICD-10-CM

## 2022-04-04 RX ORDER — LABETALOL 100 MG/1
TABLET, FILM COATED ORAL
Qty: 60 TABLET | Refills: 0 | Status: SHIPPED | OUTPATIENT
Start: 2022-04-04 | End: 2022-05-05

## 2022-04-07 ENCOUNTER — TELEPHONE (OUTPATIENT)
Dept: NEPHROLOGY | Facility: CLINIC | Age: 27
End: 2022-04-07

## 2022-04-15 ENCOUNTER — TELEPHONE (OUTPATIENT)
Dept: NEPHROLOGY | Facility: CLINIC | Age: 27
End: 2022-04-15

## 2022-04-15 DIAGNOSIS — N18.9 ACUTE KIDNEY INJURY SUPERIMPOSED ON CHRONIC KIDNEY DISEASE (HCC): ICD-10-CM

## 2022-04-15 DIAGNOSIS — N17.9 ACUTE KIDNEY INJURY SUPERIMPOSED ON CHRONIC KIDNEY DISEASE (HCC): ICD-10-CM

## 2022-04-18 ENCOUNTER — TELEPHONE (OUTPATIENT)
Dept: NEPHROLOGY | Facility: CLINIC | Age: 27
End: 2022-04-18

## 2022-04-18 RX ORDER — SODIUM BICARBONATE 650 MG/1
TABLET ORAL
Qty: 60 TABLET | Refills: 0 | Status: SHIPPED | OUTPATIENT
Start: 2022-04-18 | End: 2022-05-25 | Stop reason: SDUPTHER

## 2022-04-19 ENCOUNTER — TELEPHONE (OUTPATIENT)
Dept: OBGYN CLINIC | Facility: HOSPITAL | Age: 27
End: 2022-04-19

## 2022-04-19 NOTE — TELEPHONE ENCOUNTER
Patient is asking if tomorrows appt can be done Virtual  She has no ride  Please advise patient   Thanks

## 2022-05-05 DIAGNOSIS — I12.9 BENIGN HYPERTENSION WITH CKD (CHRONIC KIDNEY DISEASE) STAGE III (HCC): ICD-10-CM

## 2022-05-05 DIAGNOSIS — N18.30 BENIGN HYPERTENSION WITH CKD (CHRONIC KIDNEY DISEASE) STAGE III (HCC): ICD-10-CM

## 2022-05-05 RX ORDER — LABETALOL 100 MG/1
TABLET, FILM COATED ORAL
Qty: 60 TABLET | Refills: 0 | Status: SHIPPED | OUTPATIENT
Start: 2022-05-05 | End: 2022-06-20 | Stop reason: SDUPTHER

## 2022-05-07 ENCOUNTER — NURSE TRIAGE (OUTPATIENT)
Dept: OTHER | Facility: OTHER | Age: 27
End: 2022-05-07

## 2022-05-07 DIAGNOSIS — N25.81 SECONDARY HYPERPARATHYROIDISM OF RENAL ORIGIN (HCC): ICD-10-CM

## 2022-05-07 RX ORDER — CALCITRIOL 0.25 UG/1
0.25 CAPSULE, LIQUID FILLED ORAL DAILY
Qty: 7 CAPSULE | Refills: 0 | Status: SHIPPED | OUTPATIENT
Start: 2022-05-07 | End: 2022-05-25 | Stop reason: SDUPTHER

## 2022-05-07 NOTE — TELEPHONE ENCOUNTER
Regarding: calcitriol (ROCALTROL) 0 25 mcg capsule refill request   ----- Message from [facility] sent at 5/7/2022  2:09 PM EDT -----  "I am out of calcitriol (ROCALTROL) 0 25 mcg capsule and need a refill"

## 2022-05-07 NOTE — TELEPHONE ENCOUNTER
Reason for Disposition   [1] Caller requesting a prescription renewal (no refills left), no triage required, AND [2] triager able to renew prescription per department policy    Answer Assessment - Initial Assessment Questions  1  NAME of MEDICATION: "What medicine are you calling about?"      Calcitrol  2  QUESTION: "What is your question?" (e g , medication refill, side effect)      Medication refill? 3  PRESCRIBING HCP: "Who prescribed it?" Reason: if prescribed by specialist, call should be referred to that group  nephrology  4  SYMPTOMS: "Do you have any symptoms?"      None   5  SEVERITY: If symptoms are present, ask "Are they mild, moderate or severe?"      n/a  6   PREGNANCY:  "Is there any chance that you are pregnant?" "When was your last menstrual period?"      unknown    Protocols used: MEDICATION QUESTION CALL-ADULT-

## 2022-05-13 ENCOUNTER — TELEMEDICINE (OUTPATIENT)
Dept: NEPHROLOGY | Facility: CLINIC | Age: 27
End: 2022-05-13
Payer: MEDICARE

## 2022-05-13 VITALS — HEIGHT: 64 IN | BODY MASS INDEX: 43.08 KG/M2

## 2022-05-13 DIAGNOSIS — Z01.818 PRE-TRANSPLANT EVALUATION FOR CKD (CHRONIC KIDNEY DISEASE): Primary | ICD-10-CM

## 2022-05-13 PROCEDURE — 99215 OFFICE O/P EST HI 40 MIN: CPT | Performed by: INTERNAL MEDICINE

## 2022-05-13 NOTE — LETTER
May 13, 2022     Liseth Vanegas MD  [address] [facility] [address] [facility] [address]    Patient: Damion Lombardo   YOB: 1995   Date of Visit: 5/13/2022       Dear Dr Ceci Love:    Thank you for referring Jesus Alberto Cabral to me for evaluation  Below are my notes for this consultation  If you have questions, please do not hesitate to call me  I look forward to following your patient along with you  Sincerely,        Ray Alvarenga MD        CC: Junito Allison, DO  Ray Alvarenga MD  5/13/2022  8:38 AM  Sign when Signing Visit    Virtual Regular Visit    Verification of patient location:    Patient is located in the following state in which I hold an active license [place]      Assessment/Plan:    Problem List Items Addressed This Visit    None              Reason for visit is   Chief Complaint   Patient presents with    Follow-up    Chronic Kidney Disease    Virtual Regular Visit        Encounter provider Ray Alvarenga MD    Provider located at [address] [place] [address]  [address] [place] [id number]      Recent Visits  No visits were found meeting these conditions  Showing recent visits within past 7 days and meeting all other requirements  Today's Visits  Date Type Provider Dept   05/13/22 Telemedicine Ray Alvarenga MD [facility]   Showing today's visits and meeting all other requirements  Future Appointments  No visits were found meeting these conditions  Showing future appointments within next 150 days and meeting all other requirements       The patient was identified by name and date of birth  Damion Lombardo was informed that this is a telemedicine visit and that the visit is being conducted through [address] [place] [address] Now and patient was informed that this is a secure, HIPAA-compliant platform  She agrees to proceed     My office door was closed  No one else was in the room    She acknowledged consent and understanding of privacy and security of the video platform  The patient has agreed to participate and understands they can discontinue the visit at any time  Patient is aware this is a billable service  Laurence Oswald is a 27 y o  female presents for initial transplant evaluation  Presents virtually over video  Mother is present on video call  Bria CABEZAS     Past Medical History:   Diagnosis Date    Asthma     Chronic kidney disease     Eczema     Headache     Hypertension     Wears glasses        Past Surgical History:   Procedure Laterality Date    CHOLECYSTECTOMY      CT NEEDLE BIOPSY KIDNEY  1/29/2020    KELOID EXCISION Left 3/30/2021    Procedure: POSTERIOR EAR EXCISION KELOID, FLAP RECONSTRUCTION;  Surgeon: Yolande Fritz MD;  Location: [facility];  Service: Plastics       Current Outpatient Medications   Medication Sig Dispense Refill    acetaminophen (TYLENOL) 325 mg tablet Take 2 tablets (650 mg total) by mouth every 6 (six) hours as needed for mild pain or headaches 30 tablet 0    albuterol (2 5 mg/3 mL) 0 083 % nebulizer solution Take 3 mL (2 5 mg total) by nebulization every 6 (six) hours as needed for wheezing or shortness of breath 30 mL 0    albuterol (Ventolin HFA) 90 mcg/act inhaler Inhale 2 puffs every 6 (six) hours as needed for wheezing or shortness of breath (cough) 18 g 0    amLODIPine (NORVASC) 5 mg tablet Take 1 tablet (5 mg total) by mouth 2 (two) times a day 90 tablet 3    calcitriol (ROCALTROL) 0 25 mcg capsule Take 1 capsule (0 25 mcg total) by mouth daily for 7 days 7 capsule 0    ergocalciferol (ERGOCALCIFEROL) 1 25 MG (76220 UT) capsule Take 1 capsule (50,000 Units total) by mouth once a week 12 capsule 0    etonogestrel (NEXPLANON) subdermal implant 68 mg by Subdermal route once      ferrous sulfate 325 (65 Fe) mg tablet Take 1 tablet (325 mg total) by mouth daily with breakfast 90 tablet 3    fluticasone (Flovent HFA) 110 MCG/ACT inhaler Inhale 1 puff 2 (two) times a day 12 g 0    labetalol (NORMODYNE) 100 mg tablet TAKE 1 TABLET BY MOUTH TWICE A DAY 60 tablet 0    sodium bicarbonate 650 mg tablet TAKE 1 TABLET BY MOUTH 2 TIMES A DAY AFTER MEALS 60 tablet 0    Blood Pressure KIT by Does not apply route daily (Patient not taking: No sig reported) 1 each 0    methocarbamol (ROBAXIN) 500 mg tablet Take 1 tablet (500 mg total) by mouth 3 (three) times a day as needed for muscle spasms (Patient not taking: Reported on 5/13/2022) 25 tablet 0     No current facility-administered medications for this visit  Allergies   Allergen Reactions    Wheat Bran - Food Allergy Itching       Review of Systems    Video Exam    Vitals:    05/13/22 0751   Height: 5' 4" (1 626 m)     Assessment     Cat Devries is a 27 y o  female  referred by Dr Venu Chowdhury, CKD stave V with most recent eGFR 6, Creat 7 9 mg/dL, native disease chronic GN, HTN (first diagnosed 1/2020); asthma, non smoker, no ETOH, no drugs, seen in the [facility]rology [facility] for pre-transplant kidney evaluation  Renal ultrasound right kidney 9 4 cm, left kidney 9 cm, decreased size since 2015  Diffusely echogenic  ECHO 11/2021 - EF 60%; mild dilated atrium, mild mitral regurg, mild TR    Renal biopsy-diffuse glomerular sclerosis severe, severe tubular atrophy and interstitial fibrosis and interstitial inflammation, mild arterial sclerosis  Plan   1  Visit was a virtual visit; patient's case will be presented to transplant committee  Likely can be evaluated further but pt will need weight loss  has chronic GN biopsy proven of unclear etiology  Has potential living related donors and therefore both donors and recipient may benefit from genetic testing given pt young age at time of diagnosis of kidney disease  Pt will be attempting weight loss  2  The need to avoid blood transfusion to decrease allosensitization was explained to the patient    3  The advantages of a living donor over a  donor was explained to the patient and family  I strongly encouraged the patient and family to pursue a living donor  4  BMI - 43  5  Pap smear - needs to be updated for this year  6  History of concussion - has memory loss issues; mother helps pt remember to take medications  7  Potential living donor - 33 yo cousin; mother    I have discussed with Dany Fonseca and family at length about the risk and benefits of kidney transplantation  I have strongly encouraged her to pursue living donation, and explained to her the benefits of living donation over  donor transplantation  We have briefly discussed the surgical procedure  We have discussed about the need for life long immunosuppression and the importance of compliance  We have discussed the side effects of immunosuppression including but not limited to infection, malignancies and developing/worsening diabetes control  Dany Fonseca verbalized understanding and is interested in pursuing transplant  During this visit, I spent 60 minutes with the patient; more than 50% of the time I counseled the patient regarding the risks and benefits of kidney transplantation, the immediate and long-term complications of kidney transplantation, and the advantage of receiving a living donor kidney transplant  It was a pleasure evaluating your patient in the office today  Thank you for allowing our team to participate in the care of Ms Dany Fonseca  Please do not hesitate to contact our team if further issues/questions shall arise in the interim  HISTORY OF PRESENT ILLNESS    Dany Fonseca is a 27 y o  female seen in the Nephrology Clinic for evaluation for kidney transplant  CKD V due to diffuse global slcerosis, not on dialysis, S Cr 7 9 mg/dl, eGFR 6 ml/min  Renal disease is biopsy proven       Primary Nephrologist is Dr Brooke Vincent     HD unit - N/A      Native Urine Output: yes  Hx of voiding difficulty: no  Hx of hematuria: no  Hx of proteinuria: yes  Hx of nephrolithiasis: no  Hx of recurrent urinary tract infection: no    HTN: onset first diagnosed jan 2020,  hx of malignant HTN: no, admission for HTN emergencies: no    DM type n/a    PAD/PVD: no, Claudication: no    Cardiac history - CAD: no, MI: no; none in family    Primary Cardiologist: n/a    Exercise tolerance: minimal    Hx of CVA: no    Hx of DVT/PE: no    Viral Infection:    HIV: no  Hep B: no  Hep C: no    Cancer: History of cancer: no    Health maintenance and other pertinent history:    If AA, history of sickle cell: older sister with sickle cell trait    Female:    Colonoscopy: no  PAPs: due for this year  Mammogram: n/a    GYN History:    none    Blood transfusion: no    Last admission nov 2021    Review Of Systems:     Constitutional: nl appetite  no fevers, chills, involuntary weight gain or weight loss  Eyes: no eye disease, double vision  ENT: no ear disease, epistaxis or oral ulcers  Respiratory: no SOB, cough, hemoptysis, ARTEAGA     Cardiovascular: no CP, palpitations, claudication, edema  GI: no N/V/D/C, abdominal pain, melena, BRBPR  : see HPI  Endocrine: no thyroid disease  Heme: none bleeding or clotting disorders or swollen lymph nodes  MS: no joint effusions or deformities    Skin: no skin disease  Neuro: no HA, seizures, numbness, tingling, focal weakness  Psych: no depression, anxiety    Lives with mother and siblings (3); father; no pets    Employment history: not currently working; finished high school    HD Access: n/a  Abdominal Surgeries: cholecystectomy (at 17 yo due to "inflammation")    Smoke: none  ETOH: none  Drugs: none    Past Medical History:   Diagnosis Date    Asthma     Chronic kidney disease     Eczema     Headache     Hypertension     Wears glasses      Past Surgical History:   Procedure Laterality Date    CHOLECYSTECTOMY      CT NEEDLE BIOPSY KIDNEY  1/29/2020    KELOID EXCISION Left 3/30/2021    Procedure: POSTERIOR EAR EXCISION KELOID, FLAP RECONSTRUCTION;  Surgeon: Leta Frey MD;  [facility];  Service: Plastics       Family History   Problem Relation Age of Onset    Asthma Mother     No Known Problems Father      Social History     Socioeconomic History    Marital status: Single     Spouse name: None    Number of children: None    Years of education: None    Highest education level: None   Occupational History    Occupation: [employer]   Tobacco Use    Smoking status: Never Smoker    Smokeless tobacco: Never Used   Vaping Use    Vaping Use: Never used   Substance and Sexual Activity    Alcohol use: Yes     Comment: occassionally on social events    Drug use: No    Sexual activity: None   Other Topics Concern    None   Social History Narrative    None     Social Determinants of Health     Financial Resource Strain: Not on file   Food Insecurity: Not on file   Transportation Needs: Not on file   Physical Activity: Not on file   Stress: Not on file   Social Connections: Not on file   Intimate Partner Violence: Not on file   Housing Stability: Not on file         Living donors: cousin and mother are interested    Current Outpatient Medications   Medication Sig Dispense Refill    acetaminophen (TYLENOL) 325 mg tablet Take 2 tablets (650 mg total) by mouth every 6 (six) hours as needed for mild pain or headaches 30 tablet 0    albuterol (2 5 mg/3 mL) 0 083 % nebulizer solution Take 3 mL (2 5 mg total) by nebulization every 6 (six) hours as needed for wheezing or shortness of breath 30 mL 0    albuterol (Ventolin HFA) 90 mcg/act inhaler Inhale 2 puffs every 6 (six) hours as needed for wheezing or shortness of breath (cough) 18 g 0    amLODIPine (NORVASC) 5 mg tablet Take 1 tablet (5 mg total) by mouth 2 (two) times a day 90 tablet 3    calcitriol (ROCALTROL) 0 25 mcg capsule Take 1 capsule (0 25 mcg total) by mouth daily for 7 days 7 capsule 0    ergocalciferol (ERGOCALCIFEROL) 1 25 MG (23919 UT) capsule Take 1 capsule (50,000 Units total) by mouth once a week 12 capsule 0    etonogestrel (NEXPLANON) subdermal implant 68 mg by Subdermal route once      ferrous sulfate 325 (65 Fe) mg tablet Take 1 tablet (325 mg total) by mouth daily with breakfast 90 tablet 3    fluticasone (Flovent HFA) 110 MCG/ACT inhaler Inhale 1 puff 2 (two) times a day 12 g 0    labetalol (NORMODYNE) 100 mg tablet TAKE 1 TABLET BY MOUTH TWICE A DAY 60 tablet 0    sodium bicarbonate 650 mg tablet TAKE 1 TABLET BY MOUTH 2 TIMES A DAY AFTER MEALS 60 tablet 0    Blood Pressure KIT by Does not apply route daily (Patient not taking: No sig reported) 1 each 0    methocarbamol (ROBAXIN) 500 mg tablet Take 1 tablet (500 mg total) by mouth 3 (three) times a day as needed for muscle spasms (Patient not taking: Reported on 5/13/2022) 25 tablet 0     No current facility-administered medications for this visit  Wheat bran - food allergy    Physical Exam:    Ht 5' 4" (1 626 m)   BMI 43 08 kg/m²     General : NAD    HEENT:  Anicteric sclera  Neuro:none noted over video focal neuro deficits   Skin:  No rash on exposed skin  VIRTUAL VISIT [id number] [facility] verbally agrees to participate in [employer]  Pt is aware that [employer] could be limited without vital signs or the ability to perform a full hands-on physical Maggie Hartman understands she or the provider may request at any time to terminate the video visit and request the patient to seek care or treatment in person

## 2022-05-13 NOTE — PROGRESS NOTES
Virtual Regular Visit    Verification of patient location:    Patient is located in the following state in which I hold an active license [place]      Assessment/Plan:    Problem List Items Addressed This Visit    None              Reason for visit is   Chief Complaint   Patient presents with    Follow-up    Chronic Kidney Disease    Virtual Regular Visit        Encounter provider Mark Og MD    Provider located at [address] [place] [address]  [address] [place] [id number]      Recent Visits  No visits were found meeting these conditions  Showing recent visits within past 7 days and meeting all other requirements  Today's Visits  Date Type Provider Dept   05/13/22 Telemedicine Mark Og MD [facility]   Showing today's visits and meeting all other requirements  Future Appointments  No visits were found meeting these conditions  Showing future appointments within next 150 days and meeting all other requirements       The patient was identified by name and date of birth  Anabel Fernandez was informed that this is a telemedicine visit and that the visit is being conducted through [address] [place] [address] Now and patient was informed that this is a secure, HIPAA-compliant platform  She agrees to proceed     My office door was closed  No one else was in the room  She acknowledged consent and understanding of privacy and security of the video platform  The patient has agreed to participate and understands they can discontinue the visit at any time  Patient is aware this is a billable service  Yumiko Isbell is a 27 y o  female presents for initial transplant evaluation  Presents virtually over video  Mother is present on video call  Rosangela CABEZAS     Past Medical History:   Diagnosis Date    Asthma     Chronic kidney disease     Eczema     Headache     Hypertension     Wears glasses        Past Surgical History:   Procedure Laterality Date    CHOLECYSTECTOMY      CT NEEDLE BIOPSY KIDNEY  1/29/2020    KELOID EXCISION Left 3/30/2021    Procedure: POSTERIOR EAR EXCISION KELOID, FLAP RECONSTRUCTION;  Surgeon: Adrienne Ibarra MD;  [facility];  Service: Plastics       Current Outpatient Medications   Medication Sig Dispense Refill    acetaminophen (TYLENOL) 325 mg tablet Take 2 tablets (650 mg total) by mouth every 6 (six) hours as needed for mild pain or headaches 30 tablet 0    albuterol (2 5 mg/3 mL) 0 083 % nebulizer solution Take 3 mL (2 5 mg total) by nebulization every 6 (six) hours as needed for wheezing or shortness of breath 30 mL 0    albuterol (Ventolin HFA) 90 mcg/act inhaler Inhale 2 puffs every 6 (six) hours as needed for wheezing or shortness of breath (cough) 18 g 0    amLODIPine (NORVASC) 5 mg tablet Take 1 tablet (5 mg total) by mouth 2 (two) times a day 90 tablet 3    calcitriol (ROCALTROL) 0 25 mcg capsule Take 1 capsule (0 25 mcg total) by mouth daily for 7 days 7 capsule 0    ergocalciferol (ERGOCALCIFEROL) 1 25 MG (68896 UT) capsule Take 1 capsule (50,000 Units total) by mouth once a week 12 capsule 0    etonogestrel (NEXPLANON) subdermal implant 68 mg by Subdermal route once      ferrous sulfate 325 (65 Fe) mg tablet Take 1 tablet (325 mg total) by mouth daily with breakfast 90 tablet 3    fluticasone (Flovent HFA) 110 MCG/ACT inhaler Inhale 1 puff 2 (two) times a day 12 g 0    labetalol (NORMODYNE) 100 mg tablet TAKE 1 TABLET BY MOUTH TWICE A DAY 60 tablet 0    sodium bicarbonate 650 mg tablet TAKE 1 TABLET BY MOUTH 2 TIMES A DAY AFTER MEALS 60 tablet 0    Blood Pressure KIT by Does not apply route daily (Patient not taking: No sig reported) 1 each 0    methocarbamol (ROBAXIN) 500 mg tablet Take 1 tablet (500 mg total) by mouth 3 (three) times a day as needed for muscle spasms (Patient not taking: Reported on 5/13/2022) 25 tablet 0     No current facility-administered medications for this visit  Allergies   Allergen Reactions    Wheat Bran - Food Allergy Itching       Review of Systems    Video Exam    Vitals:    22 0751   Height: 5' 4" (1 626 m)     Assessment     Steven Rashid is a 27 y o  female  referred by Dr Sarah Araujo, CKD stave V with most recent eGFR 6, Creat 7 9 mg/dL, native disease chronic GN, HTN (first diagnosed 2020); asthma, non smoker, no ETOH, no drugs, seen in the Nephrology Clinic for pre-transplant kidney evaluation  Renal ultrasound right kidney 9 4 cm, left kidney 9 cm, decreased size since   Diffusely echogenic  ECHO 2021 - EF 60%; mild dilated atrium, mild mitral regurg, mild TR    Renal biopsy-diffuse glomerular sclerosis severe, severe tubular atrophy and interstitial fibrosis and interstitial inflammation, mild arterial sclerosis  Plan   1  Visit was a virtual visit; patient's case will be presented to transplant committee  Likely can be evaluated further but pt will need weight loss  has chronic GN biopsy proven of unclear etiology  Has potential living related donors and therefore both donors and recipient may benefit from genetic testing given pt young age at time of diagnosis of kidney disease  Pt will be attempting weight loss  2  The need to avoid blood transfusion to decrease allosensitization was explained to the patient  3  The advantages of a living donor over a  donor was explained to the patient and family  I strongly encouraged the patient and family to pursue a living donor  4  BMI - 43  5  Pap smear - needs to be updated for this year  6  History of concussion - has memory loss issues; mother helps pt remember to take medications  7  Potential living donor - 33 yo cousin; mother    I have discussed with Steven Rashid and family at length about the risk and benefits of kidney transplantation   I have strongly encouraged her to pursue living donation, and explained to her the benefits of living donation over  donor transplantation  We have briefly discussed the surgical procedure  We have discussed about the need for life long immunosuppression and the importance of compliance  We have discussed the side effects of immunosuppression including but not limited to infection, malignancies and developing/worsening diabetes control  dAen verbalized understanding and is interested in pursuing transplant  During this visit, I spent 60 minutes with the patient; more than 50% of the time I counseled the patient regarding the risks and benefits of kidney transplantation, the immediate and long-term complications of kidney transplantation, and the advantage of receiving a living donor kidney transplant  It was a pleasure evaluating your patient in the office today  Thank you for allowing our team to participate in the care of Ms Samaniego  Please do not hesitate to contact our team if further issues/questions shall arise in the interim  HISTORY OF PRESENT ILLNESS    Aden is a 27 y o  female seen in the Nephrology Clinic for evaluation for kidney transplant  CKD V due to diffuse global slcerosis, not on dialysis, S Cr 7 9 mg/dl, eGFR 6 ml/min  Renal disease is biopsy proven  Primary Nephrologist is Dr Luna Merritt     HD unit - N/A      Native Urine Output: yes  Hx of voiding difficulty: no  Hx of hematuria: no  Hx of proteinuria: yes  Hx of nephrolithiasis: no  Hx of recurrent urinary tract infection: no    HTN: onset first diagnosed 2020,  hx of malignant HTN: no, admission for HTN emergencies: no    DM type n/a    PAD/PVD: no, Claudication: no    Cardiac history - CAD: no, MI: no; none in family    Primary Cardiologist: n/a    Exercise tolerance: minimal    Hx of CVA: no    Hx of DVT/PE: no    Viral Infection:    HIV: no  Hep B: no  Hep C: no    Cancer: History of cancer: no    Health maintenance and other pertinent history:     If AA, history of sickle cell: older sister with sickle cell trait    Female:    Colonoscopy: no  PAPs: due for this year  Mammogram: n/a    GYN History:    none    Blood transfusion: no    Last admission nov 2021    Review Of Systems:     Constitutional: nl appetite  no fevers, chills, involuntary weight gain or weight loss  Eyes: no eye disease, double vision  ENT: no ear disease, epistaxis or oral ulcers  Respiratory: no SOB, cough, hemoptysis, ARTEAGA     Cardiovascular: no CP, palpitations, claudication, edema  GI: no N/V/D/C, abdominal pain, melena, BRBPR  : see HPI  Endocrine: no thyroid disease  Heme: none bleeding or clotting disorders or swollen lymph nodes  MS: no joint effusions or deformities    Skin: no skin disease  Neuro: no HA, seizures, numbness, tingling, focal weakness  Psych: no depression, anxiety    Lives with mother and siblings (3); father; no pets    Employment history: not currently working; finished high school    HD Access: n/a  Abdominal Surgeries: cholecystectomy (at 17 yo due to "inflammation")    Smoke: none  ETOH: none  Drugs: none    Past Medical History:   Diagnosis Date    Asthma     Chronic kidney disease     Eczema     Headache     Hypertension     Wears glasses      Past Surgical History:   Procedure Laterality Date    CHOLECYSTECTOMY      CT NEEDLE BIOPSY KIDNEY  1/29/2020    KELOID EXCISION Left 3/30/2021    Procedure: POSTERIOR EAR EXCISION KELOID, FLAP RECONSTRUCTION;  Surgeon: Adams Marquez MD;  [facility];  Service: Plastics       Family History   Problem Relation Age of Onset    Asthma Mother     No Known Problems Father      Social History     Socioeconomic History    Marital status: Single     Spouse name: None    Number of children: None    Years of education: None    Highest education level: None   Occupational History    Occupation: [employer]   Tobacco Use    Smoking status: Never Smoker    Smokeless tobacco: Never Used   Vaping Use  Vaping Use: Never used   Substance and Sexual Activity    Alcohol use: Yes     Comment: occassionally on social events    Drug use: No    Sexual activity: None   Other Topics Concern    None   Social History Narrative    None     Social Determinants of Health     Financial Resource Strain: Not on file   Food Insecurity: Not on file   Transportation Needs: Not on file   Physical Activity: Not on file   Stress: Not on file   Social Connections: Not on file   Intimate Partner Violence: Not on file   Housing Stability: Not on file         Living donors: cousin and mother are interested    Current Outpatient Medications   Medication Sig Dispense Refill    acetaminophen (TYLENOL) 325 mg tablet Take 2 tablets (650 mg total) by mouth every 6 (six) hours as needed for mild pain or headaches 30 tablet 0    albuterol (2 5 mg/3 mL) 0 083 % nebulizer solution Take 3 mL (2 5 mg total) by nebulization every 6 (six) hours as needed for wheezing or shortness of breath 30 mL 0    albuterol (Ventolin HFA) 90 mcg/act inhaler Inhale 2 puffs every 6 (six) hours as needed for wheezing or shortness of breath (cough) 18 g 0    amLODIPine (NORVASC) 5 mg tablet Take 1 tablet (5 mg total) by mouth 2 (two) times a day 90 tablet 3    calcitriol (ROCALTROL) 0 25 mcg capsule Take 1 capsule (0 25 mcg total) by mouth daily for 7 days 7 capsule 0    ergocalciferol (ERGOCALCIFEROL) 1 25 MG (04419 UT) capsule Take 1 capsule (50,000 Units total) by mouth once a week 12 capsule 0    etonogestrel (NEXPLANON) subdermal implant 68 mg by Subdermal route once      ferrous sulfate 325 (65 Fe) mg tablet Take 1 tablet (325 mg total) by mouth daily with breakfast 90 tablet 3    fluticasone (Flovent HFA) 110 MCG/ACT inhaler Inhale 1 puff 2 (two) times a day 12 g 0    labetalol (NORMODYNE) 100 mg tablet TAKE 1 TABLET BY MOUTH TWICE A DAY 60 tablet 0    sodium bicarbonate 650 mg tablet TAKE 1 TABLET BY MOUTH 2 TIMES A DAY AFTER MEALS 60 tablet 0  Blood Pressure KIT by Does not apply route daily (Patient not taking: No sig reported) 1 each 0    methocarbamol (ROBAXIN) 500 mg tablet Take 1 tablet (500 mg total) by mouth 3 (three) times a day as needed for muscle spasms (Patient not taking: Reported on 5/13/2022) 25 tablet 0     No current facility-administered medications for this visit  Wheat bran - food allergy    Physical Exam:    Ht 5' 4" (1 626 m)   BMI 43 08 kg/m²     General : NAD    HEENT:  Anicteric sclera  Neuro:none noted over video focal neuro deficits   Skin:  No rash on exposed skin  VIRTUAL VISIT [id number] [facility] verbally agrees to participate in [facility]  Pt is aware that [facility] could be limited without vital signs or the ability to perform a full hands-on physical Maggie Ponce understands she or the provider may request at any time to terminate the video visit and request the patient to seek care or treatment in person

## 2022-05-13 NOTE — PATIENT INSTRUCTIONS
1) We will review your case at the transplant committee meeting and will review our decision with you in approximately 4 weeks over the phone  2) If you do not receive a call from [facility] within 4 weeks, please call our local Nephrology office  3) From a renal transplant evaluation purpose, we will see you in our local office for medical follow-up in 6 months  4) Once we call you with our decision regarding further evaluation, you will receive further instruction over the phone and in the mail regarding the next steps to complete the transplant evaluation

## 2022-05-24 DIAGNOSIS — N25.81 SECONDARY HYPERPARATHYROIDISM OF RENAL ORIGIN (HCC): ICD-10-CM

## 2022-05-24 DIAGNOSIS — N17.9 ACUTE KIDNEY INJURY SUPERIMPOSED ON CHRONIC KIDNEY DISEASE (HCC): ICD-10-CM

## 2022-05-24 DIAGNOSIS — N18.9 ACUTE KIDNEY INJURY SUPERIMPOSED ON CHRONIC KIDNEY DISEASE (HCC): ICD-10-CM

## 2022-05-27 RX ORDER — CALCITRIOL 0.25 UG/1
0.25 CAPSULE, LIQUID FILLED ORAL DAILY
Qty: 7 CAPSULE | Refills: 0 | Status: SHIPPED | OUTPATIENT
Start: 2022-05-27 | End: 2022-06-20 | Stop reason: SDUPTHER

## 2022-05-27 RX ORDER — SODIUM BICARBONATE 650 MG/1
650 TABLET ORAL 2 TIMES DAILY
Qty: 60 TABLET | Refills: 0 | Status: SHIPPED | OUTPATIENT
Start: 2022-05-27 | End: 2022-06-28 | Stop reason: SDUPTHER

## 2022-06-06 ENCOUNTER — APPOINTMENT (OUTPATIENT)
Dept: LAB | Facility: CLINIC | Age: 27
End: 2022-06-06
Payer: MEDICARE

## 2022-06-06 DIAGNOSIS — N18.5 CHRONIC KIDNEY DISEASE (CKD), ACTIVE MEDICAL MANAGEMENT WITHOUT DIALYSIS, STAGE 5 (HCC): ICD-10-CM

## 2022-06-06 DIAGNOSIS — R80.9 PROTEINURIA, UNSPECIFIED TYPE: ICD-10-CM

## 2022-06-06 DIAGNOSIS — D50.9 IRON DEFICIENCY ANEMIA, UNSPECIFIED IRON DEFICIENCY ANEMIA TYPE: ICD-10-CM

## 2022-06-06 DIAGNOSIS — R80.1 PERSISTENT PROTEINURIA: ICD-10-CM

## 2022-06-06 DIAGNOSIS — E55.9 VITAMIN D DEFICIENCY: ICD-10-CM

## 2022-06-06 DIAGNOSIS — N18.9 CHRONIC KIDNEY DISEASE-MINERAL AND BONE DISORDER: ICD-10-CM

## 2022-06-06 DIAGNOSIS — N25.81 SECONDARY HYPERPARATHYROIDISM OF RENAL ORIGIN (HCC): ICD-10-CM

## 2022-06-06 DIAGNOSIS — N18.4 CHRONIC KIDNEY DISEASE (CKD) STAGE G4/A3, SEVERELY DECREASED GLOMERULAR FILTRATION RATE (GFR) BETWEEN 15-29 ML/MIN/1.73 SQUARE METER AND ALBUMINURIA CREATININE RATIO GREATER THAN 300 MG/G (HCC): ICD-10-CM

## 2022-06-06 DIAGNOSIS — M89.9 CHRONIC KIDNEY DISEASE-MINERAL AND BONE DISORDER: ICD-10-CM

## 2022-06-06 DIAGNOSIS — E83.9 CHRONIC KIDNEY DISEASE-MINERAL AND BONE DISORDER: ICD-10-CM

## 2022-06-06 DIAGNOSIS — N18.5 BENIGN HYPERTENSION WITH CKD (CHRONIC KIDNEY DISEASE) STAGE V (HCC): ICD-10-CM

## 2022-06-06 DIAGNOSIS — I12.0 BENIGN HYPERTENSION WITH CKD (CHRONIC KIDNEY DISEASE) STAGE V (HCC): ICD-10-CM

## 2022-06-06 DIAGNOSIS — I10 HYPERTENSION, UNSPECIFIED TYPE: ICD-10-CM

## 2022-06-06 LAB
ALBUMIN SERPL BCP-MCNC: 4.1 G/DL (ref 3.5–5)
ALP SERPL-CCNC: 69 U/L (ref 34–104)
ALT SERPL W P-5'-P-CCNC: 5 U/L (ref 7–52)
ANION GAP SERPL CALCULATED.3IONS-SCNC: 9 MMOL/L (ref 4–13)
AST SERPL W P-5'-P-CCNC: 7 U/L (ref 13–39)
BILIRUB SERPL-MCNC: 0.29 MG/DL (ref 0.2–1)
BUN SERPL-MCNC: 32 MG/DL (ref 5–25)
CA-I BLD-SCNC: 1.1 MMOL/L (ref 1.12–1.32)
CALCIUM SERPL-MCNC: 8.4 MG/DL (ref 8.4–10.2)
CHLORIDE SERPL-SCNC: 107 MMOL/L (ref 96–108)
CO2 SERPL-SCNC: 25 MMOL/L (ref 21–32)
CREAT SERPL-MCNC: 9 MG/DL (ref 0.6–1.3)
ERYTHROCYTE [DISTWIDTH] IN BLOOD BY AUTOMATED COUNT: 15.2 % (ref 11.6–15.1)
GFR SERPL CREATININE-BSD FRML MDRD: 5 ML/MIN/1.73SQ M
GLUCOSE SERPL-MCNC: 87 MG/DL (ref 65–140)
HCT VFR BLD AUTO: 28.8 % (ref 34.8–46.1)
HGB BLD-MCNC: 8.6 G/DL (ref 11.5–15.4)
MCH RBC QN AUTO: 21.2 PG (ref 26.8–34.3)
MCHC RBC AUTO-ENTMCNC: 29.9 G/DL (ref 31.4–37.4)
MCV RBC AUTO: 71 FL (ref 82–98)
PLATELET # BLD AUTO: 225 THOUSANDS/UL (ref 149–390)
PMV BLD AUTO: 9.6 FL (ref 8.9–12.7)
POTASSIUM SERPL-SCNC: 4.1 MMOL/L (ref 3.5–5.3)
PROT SERPL-MCNC: 7 G/DL (ref 6.4–8.4)
PTH-INTACT SERPL-MCNC: 618.9 PG/ML (ref 18.4–80.1)
RBC # BLD AUTO: 4.05 MILLION/UL (ref 3.81–5.12)
SODIUM SERPL-SCNC: 141 MMOL/L (ref 135–147)
WBC # BLD AUTO: 6.18 THOUSAND/UL (ref 4.31–10.16)

## 2022-06-06 PROCEDURE — 82610 CYSTATIN C: CPT

## 2022-06-06 PROCEDURE — 83970 ASSAY OF PARATHORMONE: CPT

## 2022-06-06 PROCEDURE — 82330 ASSAY OF CALCIUM: CPT

## 2022-06-06 PROCEDURE — 36415 COLL VENOUS BLD VENIPUNCTURE: CPT

## 2022-06-06 PROCEDURE — 80053 COMPREHEN METABOLIC PANEL: CPT

## 2022-06-06 PROCEDURE — 85027 COMPLETE CBC AUTOMATED: CPT

## 2022-06-06 RX ORDER — ERGOCALCIFEROL 1.25 MG/1
CAPSULE ORAL
Qty: 12 CAPSULE | Refills: 0 | Status: SHIPPED | OUTPATIENT
Start: 2022-06-06

## 2022-06-08 ENCOUNTER — TELEPHONE (OUTPATIENT)
Dept: NEPHROLOGY | Facility: CLINIC | Age: 27
End: 2022-06-08

## 2022-06-08 DIAGNOSIS — N18.5 CHRONIC KIDNEY DISEASE (CKD), ACTIVE MEDICAL MANAGEMENT WITHOUT DIALYSIS, STAGE 5 (HCC): Primary | ICD-10-CM

## 2022-06-08 NOTE — TELEPHONE ENCOUNTER
----- Message from Lauro sent at 6/7/2022  4:20 PM EDT -----  Labs reviewed  Significant increase in creatinine  Please inquire as to how she is feeling  If she is monitoring blood pressure at home please collect data  If she is feeling okay than we should repeat BMP in approximately 1 week  Upcoming appointment noted  If she is feeling ill she should be seen in the emergency room  Thank you

## 2022-06-08 NOTE — TELEPHONE ENCOUNTER
Spoke with patient's mother, Lulú Ann, about the following, she said patient is feeling okay so I told her to go for lab work in 1 week and if she starts to feel ill to go to the ER  She expressed understanding and thanked us for the call:    Labs reviewed  Significant increase in creatinine  Please inquire as to how she is feeling  If she is monitoring blood pressure at home please collect data  If she is feeling okay than we should repeat BMP in approximately 1 week  Upcoming appointment noted  If she is feeling ill she should be seen in the emergency room  Thank you

## 2022-06-10 LAB — MISCELLANEOUS LAB TEST RESULT: NORMAL

## 2022-06-18 ENCOUNTER — APPOINTMENT (INPATIENT)
Dept: CT IMAGING | Facility: HOSPITAL | Age: 27
DRG: 470 | End: 2022-06-18
Payer: MEDICARE

## 2022-06-18 ENCOUNTER — HOSPITAL ENCOUNTER (INPATIENT)
Facility: HOSPITAL | Age: 27
LOS: 1 days | Discharge: HOME | DRG: 470 | End: 2022-06-19
Attending: EMERGENCY MEDICINE | Admitting: INTERNAL MEDICINE
Payer: MEDICARE

## 2022-06-18 ENCOUNTER — APPOINTMENT (EMERGENCY)
Dept: RADIOLOGY | Facility: HOSPITAL | Age: 27
DRG: 470 | End: 2022-06-18
Payer: MEDICARE

## 2022-06-18 DIAGNOSIS — N18.9 CHRONIC KIDNEY DISEASE: ICD-10-CM

## 2022-06-18 DIAGNOSIS — N18.5 CKD (CHRONIC KIDNEY DISEASE) STAGE 5, GFR LESS THAN 15 ML/MIN (HCC): ICD-10-CM

## 2022-06-18 DIAGNOSIS — N19: Primary | ICD-10-CM

## 2022-06-18 PROBLEM — D63.8 ANEMIA, CHRONIC DISEASE: Status: ACTIVE | Noted: 2020-01-27

## 2022-06-18 LAB
ALBUMIN SERPL BCP-MCNC: 4.2 G/DL (ref 3.5–5)
ALP SERPL-CCNC: 82 U/L (ref 34–104)
ALT SERPL W P-5'-P-CCNC: 5 U/L (ref 7–52)
ANION GAP SERPL CALCULATED.3IONS-SCNC: 10 MMOL/L (ref 4–13)
AST SERPL W P-5'-P-CCNC: 9 U/L (ref 13–39)
ATRIAL RATE: 84 BPM
BACTERIA UR QL AUTO: NORMAL /HPF
BASOPHILS # BLD AUTO: 0.03 THOUSANDS/ΜL (ref 0–0.1)
BASOPHILS NFR BLD AUTO: 0 % (ref 0–1)
BILIRUB DIRECT SERPL-MCNC: 0.03 MG/DL (ref 0–0.2)
BILIRUB SERPL-MCNC: 0.23 MG/DL (ref 0.2–1)
BILIRUB UR QL STRIP: NEGATIVE
BUN SERPL-MCNC: 32 MG/DL (ref 5–25)
CALCIUM SERPL-MCNC: 7.9 MG/DL (ref 8.4–10.2)
CHLORIDE SERPL-SCNC: 105 MMOL/L (ref 96–108)
CLARITY UR: CLEAR
CO2 SERPL-SCNC: 23 MMOL/L (ref 21–32)
COLOR UR: YELLOW
CREAT SERPL-MCNC: 8.03 MG/DL (ref 0.6–1.3)
EOSINOPHIL # BLD AUTO: 0.18 THOUSAND/ΜL (ref 0–0.61)
EOSINOPHIL NFR BLD AUTO: 3 % (ref 0–6)
ERYTHROCYTE [DISTWIDTH] IN BLOOD BY AUTOMATED COUNT: 15 % (ref 11.6–15.1)
EXT PREG TEST URINE: NEGATIVE
EXT. CONTROL ED NAV: NORMAL
GFR SERPL CREATININE-BSD FRML MDRD: 6 ML/MIN/1.73SQ M
GLUCOSE SERPL-MCNC: 81 MG/DL (ref 65–140)
GLUCOSE UR STRIP-MCNC: NEGATIVE MG/DL
HCT VFR BLD AUTO: 26.8 % (ref 34.8–46.1)
HGB BLD-MCNC: 8 G/DL (ref 11.5–15.4)
HGB UR QL STRIP.AUTO: ABNORMAL
IMM GRANULOCYTES # BLD AUTO: 0.02 THOUSAND/UL (ref 0–0.2)
IMM GRANULOCYTES NFR BLD AUTO: 0 % (ref 0–2)
KETONES UR STRIP-MCNC: NEGATIVE MG/DL
LEUKOCYTE ESTERASE UR QL STRIP: NEGATIVE
LYMPHOCYTES # BLD AUTO: 1.69 THOUSANDS/ΜL (ref 0.6–4.47)
LYMPHOCYTES NFR BLD AUTO: 25 % (ref 14–44)
MAGNESIUM SERPL-MCNC: 1.8 MG/DL (ref 1.9–2.7)
MCH RBC QN AUTO: 21 PG (ref 26.8–34.3)
MCHC RBC AUTO-ENTMCNC: 29.9 G/DL (ref 31.4–37.4)
MCV RBC AUTO: 70 FL (ref 82–98)
MONOCYTES # BLD AUTO: 0.48 THOUSAND/ΜL (ref 0.17–1.22)
MONOCYTES NFR BLD AUTO: 7 % (ref 4–12)
NEUTROPHILS # BLD AUTO: 4.28 THOUSANDS/ΜL (ref 1.85–7.62)
NEUTS SEG NFR BLD AUTO: 65 % (ref 43–75)
NITRITE UR QL STRIP: NEGATIVE
NON-SQ EPI CELLS URNS QL MICRO: NORMAL /HPF
NRBC BLD AUTO-RTO: 0 /100 WBCS
P AXIS: 45 DEGREES
PH UR STRIP.AUTO: 7 [PH] (ref 4.5–8)
PHOSPHATE SERPL-MCNC: 4.4 MG/DL (ref 2.7–4.5)
PLATELET # BLD AUTO: 226 THOUSANDS/UL (ref 149–390)
PMV BLD AUTO: 9.6 FL (ref 8.9–12.7)
POTASSIUM SERPL-SCNC: 3.9 MMOL/L (ref 3.5–5.3)
PR INTERVAL: 156 MS
PROT SERPL-MCNC: 7.1 G/DL (ref 6.4–8.4)
PROT UR STRIP-MCNC: >=300 MG/DL
QRS AXIS: 5 DEGREES
QRSD INTERVAL: 96 MS
QT INTERVAL: 394 MS
QTC INTERVAL: 465 MS
RBC # BLD AUTO: 3.81 MILLION/UL (ref 3.81–5.12)
RBC #/AREA URNS AUTO: NORMAL /HPF
SODIUM SERPL-SCNC: 138 MMOL/L (ref 135–147)
SP GR UR STRIP.AUTO: 1.01 (ref 1–1.03)
T WAVE AXIS: 54 DEGREES
TSH SERPL DL<=0.05 MIU/L-ACNC: 3.5 UIU/ML (ref 0.45–4.5)
UROBILINOGEN UR QL STRIP.AUTO: 0.2 E.U./DL
VENTRICULAR RATE: 84 BPM
WBC # BLD AUTO: 6.68 THOUSAND/UL (ref 4.31–10.16)
WBC #/AREA URNS AUTO: NORMAL /HPF

## 2022-06-18 PROCEDURE — 93005 ELECTROCARDIOGRAM TRACING: CPT

## 2022-06-18 PROCEDURE — 81001 URINALYSIS AUTO W/SCOPE: CPT

## 2022-06-18 PROCEDURE — 81025 URINE PREGNANCY TEST: CPT | Performed by: EMERGENCY MEDICINE

## 2022-06-18 PROCEDURE — 99285 EMERGENCY DEPT VISIT HI MDM: CPT

## 2022-06-18 PROCEDURE — 99223 1ST HOSP IP/OBS HIGH 75: CPT | Performed by: INTERNAL MEDICINE

## 2022-06-18 PROCEDURE — 93010 ELECTROCARDIOGRAM REPORT: CPT | Performed by: INTERNAL MEDICINE

## 2022-06-18 PROCEDURE — 84443 ASSAY THYROID STIM HORMONE: CPT | Performed by: EMERGENCY MEDICINE

## 2022-06-18 PROCEDURE — G1004 CDSM NDSC: HCPCS

## 2022-06-18 PROCEDURE — 99285 EMERGENCY DEPT VISIT HI MDM: CPT | Performed by: EMERGENCY MEDICINE

## 2022-06-18 PROCEDURE — 80048 BASIC METABOLIC PNL TOTAL CA: CPT | Performed by: EMERGENCY MEDICINE

## 2022-06-18 PROCEDURE — 84100 ASSAY OF PHOSPHORUS: CPT | Performed by: EMERGENCY MEDICINE

## 2022-06-18 PROCEDURE — 70450 CT HEAD/BRAIN W/O DYE: CPT

## 2022-06-18 PROCEDURE — 83735 ASSAY OF MAGNESIUM: CPT | Performed by: EMERGENCY MEDICINE

## 2022-06-18 PROCEDURE — 36415 COLL VENOUS BLD VENIPUNCTURE: CPT | Performed by: EMERGENCY MEDICINE

## 2022-06-18 PROCEDURE — 71045 X-RAY EXAM CHEST 1 VIEW: CPT

## 2022-06-18 PROCEDURE — 99222 1ST HOSP IP/OBS MODERATE 55: CPT | Performed by: INTERNAL MEDICINE

## 2022-06-18 PROCEDURE — 85025 COMPLETE CBC W/AUTO DIFF WBC: CPT | Performed by: EMERGENCY MEDICINE

## 2022-06-18 PROCEDURE — 80076 HEPATIC FUNCTION PANEL: CPT | Performed by: EMERGENCY MEDICINE

## 2022-06-18 RX ORDER — FERROUS SULFATE 325(65) MG
325 TABLET ORAL
Status: DISCONTINUED | OUTPATIENT
Start: 2022-06-18 | End: 2022-06-19 | Stop reason: HOSPADM

## 2022-06-18 RX ORDER — ENOXAPARIN SODIUM 100 MG/ML
40 INJECTION SUBCUTANEOUS EVERY 12 HOURS SCHEDULED
Status: DISCONTINUED | OUTPATIENT
Start: 2022-06-18 | End: 2022-06-19 | Stop reason: HOSPADM

## 2022-06-18 RX ORDER — FLUTICASONE PROPIONATE 110 UG/1
1 AEROSOL, METERED RESPIRATORY (INHALATION) 2 TIMES DAILY
Status: DISCONTINUED | OUTPATIENT
Start: 2022-06-18 | End: 2022-06-19 | Stop reason: HOSPADM

## 2022-06-18 RX ORDER — ENOXAPARIN SODIUM 100 MG/ML
40 INJECTION SUBCUTANEOUS DAILY
Status: DISCONTINUED | OUTPATIENT
Start: 2022-06-18 | End: 2022-06-18

## 2022-06-18 RX ORDER — ALBUTEROL SULFATE 90 UG/1
2 AEROSOL, METERED RESPIRATORY (INHALATION) EVERY 6 HOURS PRN
Status: DISCONTINUED | OUTPATIENT
Start: 2022-06-18 | End: 2022-06-19 | Stop reason: HOSPADM

## 2022-06-18 RX ORDER — HYDRALAZINE HYDROCHLORIDE 20 MG/ML
5 INJECTION INTRAMUSCULAR; INTRAVENOUS EVERY 6 HOURS PRN
Status: DISCONTINUED | OUTPATIENT
Start: 2022-06-18 | End: 2022-06-19 | Stop reason: HOSPADM

## 2022-06-18 RX ORDER — ACETAMINOPHEN 325 MG/1
650 TABLET ORAL EVERY 6 HOURS PRN
Status: DISCONTINUED | OUTPATIENT
Start: 2022-06-18 | End: 2022-06-19 | Stop reason: HOSPADM

## 2022-06-18 RX ORDER — AMLODIPINE BESYLATE 5 MG/1
5 TABLET ORAL 2 TIMES DAILY
Status: DISCONTINUED | OUTPATIENT
Start: 2022-06-18 | End: 2022-06-19 | Stop reason: HOSPADM

## 2022-06-18 RX ORDER — ONDANSETRON 2 MG/ML
4 INJECTION INTRAMUSCULAR; INTRAVENOUS EVERY 6 HOURS PRN
Status: DISCONTINUED | OUTPATIENT
Start: 2022-06-18 | End: 2022-06-19 | Stop reason: HOSPADM

## 2022-06-18 RX ORDER — CALCITRIOL 0.25 UG/1
0.25 CAPSULE, LIQUID FILLED ORAL DAILY
Status: DISCONTINUED | OUTPATIENT
Start: 2022-06-18 | End: 2022-06-19 | Stop reason: HOSPADM

## 2022-06-18 RX ORDER — SODIUM BICARBONATE 650 MG/1
650 TABLET ORAL 2 TIMES DAILY
Status: DISCONTINUED | OUTPATIENT
Start: 2022-06-18 | End: 2022-06-19 | Stop reason: HOSPADM

## 2022-06-18 RX ORDER — LABETALOL 100 MG/1
100 TABLET, FILM COATED ORAL 2 TIMES DAILY
Status: DISCONTINUED | OUTPATIENT
Start: 2022-06-18 | End: 2022-06-19 | Stop reason: HOSPADM

## 2022-06-18 RX ORDER — ALBUTEROL SULFATE 2.5 MG/3ML
2.5 SOLUTION RESPIRATORY (INHALATION) EVERY 6 HOURS PRN
Status: DISCONTINUED | OUTPATIENT
Start: 2022-06-18 | End: 2022-06-19 | Stop reason: HOSPADM

## 2022-06-18 RX ADMIN — AMLODIPINE BESYLATE 5 MG: 5 TABLET ORAL at 20:16

## 2022-06-18 RX ADMIN — AMLODIPINE BESYLATE 5 MG: 5 TABLET ORAL at 08:02

## 2022-06-18 RX ADMIN — ENOXAPARIN SODIUM 40 MG: 40 INJECTION SUBCUTANEOUS at 20:16

## 2022-06-18 RX ADMIN — FERROUS SULFATE TAB 325 MG (65 MG ELEMENTAL FE) 325 MG: 325 (65 FE) TAB at 08:02

## 2022-06-18 RX ADMIN — ENOXAPARIN SODIUM 40 MG: 40 INJECTION SUBCUTANEOUS at 08:03

## 2022-06-18 RX ADMIN — SODIUM BICARBONATE 650 MG: 650 TABLET ORAL at 17:39

## 2022-06-18 RX ADMIN — LABETALOL HYDROCHLORIDE 100 MG: 100 TABLET, FILM COATED ORAL at 08:02

## 2022-06-18 RX ADMIN — SODIUM BICARBONATE 650 MG: 650 TABLET ORAL at 08:02

## 2022-06-18 RX ADMIN — FLUTICASONE PROPIONATE 1 PUFF: 110 AEROSOL, METERED RESPIRATORY (INHALATION) at 08:47

## 2022-06-18 RX ADMIN — LABETALOL HYDROCHLORIDE 100 MG: 100 TABLET, FILM COATED ORAL at 17:39

## 2022-06-18 RX ADMIN — CALCITRIOL 0.25 MCG: 0.25 CAPSULE, LIQUID FILLED ORAL at 08:47

## 2022-06-18 RX ADMIN — FLUTICASONE PROPIONATE 1 PUFF: 110 AEROSOL, METERED RESPIRATORY (INHALATION) at 17:42

## 2022-06-18 NOTE — PLAN OF CARE
Problem: Potential for Falls  Goal: Patient will remain free of falls  Description: INTERVENTIONS:  - Educate patient/family on patient safety including physical limitations  - Instruct patient to call for assistance with activity   - Consult OT/PT to assist with strengthening/mobility   - Keep Call bell within reach  - Keep bed low and locked with side rails adjusted as appropriate  - Keep care items and personal belongings within reach  - Initiate and maintain comfort rounds  - Make Fall Risk Sign visible to staff  - Offer Toileting every 2 Hours, in advance of need  - Apply yellow socks and bracelet for high fall risk patients  - Consider moving patient to room near nurses station  Outcome: Progressing     Problem: INFECTION - ADULT  Goal: Absence or prevention of progression during hospitalization  Description: INTERVENTIONS:  - Assess and monitor for signs and symptoms of infection  - Monitor lab/diagnostic results  - Monitor all insertion sites, i e  indwelling lines, tubes, and drains  - Monitor endotracheal if appropriate and nasal secretions for changes in amount and color  - [place] appropriate cooling/warming therapies per order  - Administer medications as ordered  - Instruct and encourage patient and family to use good hand hygiene technique  - Identify and instruct in appropriate isolation precautions for identified infection/condition  Outcome: Progressing     Problem: DISCHARGE PLANNING  Goal: Discharge to home or other facility with appropriate resources  Description: INTERVENTIONS:  - Identify barriers to discharge w/patient and caregiver  - Arrange for needed discharge resources and transportation as appropriate  - Identify discharge learning needs (meds, wound care, etc )  - Arrange for interpretive services to assist at discharge as needed  - Refer to Case Management Department for coordinating discharge planning if the patient needs post-hospital services based on physician/advanced practitioner order or complex needs related to functional status, cognitive ability, or social support system  Outcome: Progressing     Problem: Knowledge Deficit  Goal: Patient/family/caregiver demonstrates understanding of disease process, treatment plan, medications, and discharge instructions  Description: Complete learning assessment and assess knowledge base    Interventions:  - Provide teaching at level of understanding  - Provide teaching via preferred learning methods  Outcome: Progressing

## 2022-06-18 NOTE — ASSESSMENT & PLAN NOTE
Patient comes in with a blood pressure of 167/75  Unfortunately patient is unable to monitor blood pressure at home  Discussed with CM and no financial support available   Pt would need to buy at pharmacy  Home meds include:  Amlodipine 5 mg b i d , labetalol 100 mg b i d   Continue home medications

## 2022-06-18 NOTE — ED PROVIDER NOTES
History  Chief Complaint   Patient presents with    Medical Problem     Pt CKD patient, reports blurry vision, unbalanced, nausea, onset of symptoms Tuesday, not on dialysis     This is a 25 y/o female with stage V renal disease who presents with SOB, feeling of anxiety, nausea and intermittent episodes of vision changes since Tuesday  Patient had bloodwork on 22 that showed creatinine of 9  Was advised to have repeat bloodwork in one week  Has not yet had repeat labs  Some chest tightness  Symptoms moderate in severity  Worse with exertion  No recent medication changes  No change in PO intake  Urinating normally per patient and states urinated just prior to arrival  Has not been checking blood pressures at home as mother states she does not have BP cuff  Symptoms moderate in severity  DDx: renal failure, electrolyte abnormality, uremia  Prior to Admission Medications   Prescriptions Last Dose Informant Patient Reported? Taking?    Blood Pressure KIT  Self No No   Sig: by Does not apply route daily   Patient not taking: No sig reported   acetaminophen (TYLENOL) 325 mg tablet  Self No No   Sig: Take 2 tablets (650 mg total) by mouth every 6 (six) hours as needed for mild pain or headaches   albuterol (2 5 mg/3 mL) 0 083 % nebulizer solution  Self No No   Sig: Take 3 mL (2 5 mg total) by nebulization every 6 (six) hours as needed for wheezing or shortness of breath   albuterol (Ventolin HFA) 90 mcg/act inhaler  Self No No   Sig: Inhale 2 puffs every 6 (six) hours as needed for wheezing or shortness of breath (cough)   amLODIPine (NORVASC) 5 mg tablet   No No   Sig: Take 1 tablet (5 mg total) by mouth 2 (two) times a day   ergocalciferol (VITAMIN D2) 50,000 units   No No   Sig: TAKE 1 CAPSULE BY MOUTH ONE TIME PER WEEK   etonogestrel (NEXPLANON) subdermal implant  Self Yes No   Si mg by Subdermal route once   ferrous sulfate 325 (65 Fe) mg tablet   No No   Sig: Take 1 tablet (325 mg total) by mouth daily with breakfast   fluticasone (Flovent HFA) 110 MCG/ACT inhaler  Self No No   Sig: Inhale 1 puff 2 (two) times a day      Facility-Administered Medications: None       Past Medical History:   Diagnosis Date    Asthma     Chronic kidney disease     Eczema     Headache     Hypertension     Wears glasses        Past Surgical History:   Procedure Laterality Date    CHOLECYSTECTOMY      CT NEEDLE BIOPSY KIDNEY  1/29/2020    KELOID EXCISION Left 3/30/2021    Procedure: POSTERIOR EAR EXCISION KELOID, FLAP RECONSTRUCTION;  Surgeon: Fady Amaro MD;  Location: [facility];  Service: Plastics       Family History   Problem Relation Age of Onset    Asthma Mother     No Known Problems Father      I have reviewed and agree with the history as documented  E-Cigarette/Vaping    E-Cigarette Use Never User      E-Cigarette/Vaping Substances    Nicotine No     THC No     CBD No     Flavoring No     Other No     Unknown No      Social History     Tobacco Use    Smoking status: Never Smoker    Smokeless tobacco: Never Used   Vaping Use    Vaping Use: Never used   Substance Use Topics    Alcohol use: Yes     Comment: occassionally on social events    Drug use: No       Review of Systems   Constitutional: Negative for activity change, appetite change and fever  HENT: Negative for congestion, ear pain, rhinorrhea and sore throat  Eyes: Positive for visual disturbance  Negative for pain, discharge and redness  Respiratory: Positive for chest tightness and shortness of breath  Negative for cough and wheezing  Cardiovascular: Negative for chest pain and palpitations  Gastrointestinal: Negative for abdominal pain, diarrhea, nausea and vomiting  Endocrine: Negative for polyuria  Genitourinary: Negative for difficulty urinating, dysuria, frequency and urgency  Musculoskeletal: Negative for arthralgias and myalgias  Skin: Negative for color change, pallor, rash and wound  Allergic/Immunologic: Negative for immunocompromised state  Neurological: Positive for dizziness and light-headedness  Negative for syncope and numbness  Hematological: Does not bruise/bleed easily  Psychiatric/Behavioral: Negative for confusion  All other systems reviewed and are negative  Physical Exam  Physical Exam  Vitals and nursing note reviewed  Constitutional:       General: She is not in acute distress  Appearance: She is well-developed  HENT:      Head: Normocephalic and atraumatic  Nose: Nose normal    Eyes:      General: No scleral icterus  Conjunctiva/sclera: Conjunctivae normal    Cardiovascular:      Rate and Rhythm: Normal rate and regular rhythm  Heart sounds: Normal heart sounds  Pulmonary:      Effort: Pulmonary effort is normal  No respiratory distress  Breath sounds: Normal breath sounds  No stridor  No wheezing  Abdominal:      General: There is no distension  Palpations: Abdomen is soft  Tenderness: There is no abdominal tenderness  There is no guarding or rebound  Musculoskeletal:         General: No deformity  Cervical back: Normal range of motion and neck supple  Skin:     General: Skin is warm and dry  Findings: No rash  Neurological:      Mental Status: She is alert and oriented to person, place, and time  Psychiatric:         Thought Content:  Thought content normal          Vital Signs  ED Triage Vitals   Temperature Pulse Respirations Blood Pressure SpO2   06/18/22 0021 06/18/22 0021 06/18/22 0021 06/18/22 0021 06/18/22 0021   98 9 °F (37 2 °C) 93 20 167/75 100 %      Temp Source Heart Rate Source Patient Position - Orthostatic VS BP Location FiO2 (%)   06/18/22 0021 06/18/22 0021 06/18/22 0021 06/18/22 0021 --   Oral Monitor Sitting Right arm       Pain Score       06/18/22 0409       No Pain           Vitals:    06/18/22 0724 06/18/22 1525 06/18/22 2152 06/19/22 0806   BP: 140/80 133/65 164/82 140/81   Pulse: 89 77 82 81   Patient Position - Orthostatic VS: Lying Lying Lying Lying         Visual Acuity      ED Medications  Medications - No data to display    Diagnostic Studies  Results Reviewed     Procedure Component Value Units Date/Time    Urine Microscopic [[id number]]  (Normal) Collected: 06/18/22 0242    Lab Status: Final result Specimen: Urine, Clean Catch Updated: 06/18/22 0321     RBC, UA 0-1 /hpf      WBC, UA 1-2 /hpf      Epithelial Cells Occasional /hpf      Bacteria, UA None Seen /hpf     POCT urinalysis dipstick [[id number]]  (Normal) Resulted: 06/18/22 0250    Lab Status: Final result Specimen: Urine Updated: 06/18/22 0250    POCT pregnancy, urine [[id number]]  (Normal) Resulted: 06/18/22 0250    Lab Status: Final result Updated: 06/18/22 0250     EXT PREG TEST UR (Ref: Negative) negative     Control valid    Urine Macroscopic, POC [[id number]]  (Abnormal) Collected: 06/18/22 0242    Lab Status: Final result Specimen: Urine Updated: 06/18/22 0243     Color, UA Yellow     Clarity, UA Clear     pH, UA 7 0     Leukocytes, UA Negative     Nitrite, UA Negative     Protein, UA >=300 mg/dl      Glucose, UA Negative mg/dl      Ketones, UA Negative mg/dl      Urobilinogen, UA 0 2 E U /dl      Bilirubin, UA Negative     Occult Blood, UA Small     Specific [place], UA 1 015    Narrative:      CLINITEK RESULT    TSH [[id number]]  (Normal) Collected: 06/18/22 0057    Lab Status: Final result Specimen: Blood from Arm, Right Updated: 06/18/22 0159     TSH 3RD GENERATON 3 495 uIU/mL     Narrative:      Patients undergoing fluorescein dye angiography may retain small amounts of fluorescein in the body for 48-72 hours post procedure  Samples containing fluorescein can produce falsely depressed TSH values  If the patient had this procedure,a specimen should be resubmitted post fluorescein clearance        Basic metabolic panel [[id number]]  (Abnormal) Collected: 06/18/22 0057    Lab Status: Final result Specimen: Blood from Arm, Right Updated: 06/18/22 0129     Sodium 138 mmol/L      Potassium 3 9 mmol/L      Chloride 105 mmol/L      CO2 23 mmol/L      ANION GAP 10 mmol/L      BUN 32 mg/dL      Creatinine 8 03 mg/dL      Glucose 81 mg/dL      Calcium 7 9 mg/dL      eGFR 6 ml/min/1 73sq m     Narrative:      National Kidney Disease Foundation guidelines for Chronic Kidney Disease (CKD):     Stage 1 with normal or high GFR (GFR > 90 mL/min/1 73 square meters)    Stage 2 Mild CKD (GFR = 60-89 mL/min/1 73 square meters)    Stage 3A Moderate CKD (GFR = 45-59 mL/min/1 73 square meters)    Stage 3B Moderate CKD (GFR = 30-44 mL/min/1 73 square meters)    Stage 4 Severe CKD (GFR = 15-29 mL/min/1 73 square meters)    Stage 5 End Stage CKD (GFR <15 mL/min/1 73 square meters)  Note: GFR calculation is accurate only with a steady state creatinine    Hepatic function panel [[id number]]  (Abnormal) Collected: 06/18/22 0057    Lab Status: Final result Specimen: Blood from Arm, Right Updated: 06/18/22 0129     Total Bilirubin 0 23 mg/dL      Bilirubin, Direct 0 03 mg/dL      Alkaline Phosphatase 82 U/L      AST 9 U/L      ALT 5 U/L      Total Protein 7 1 g/dL      Albumin 4 2 g/dL     Magnesium [[id number]]  (Abnormal) Collected: 06/18/22 0057    Lab Status: Final result Specimen: Blood from Arm, Right Updated: 06/18/22 0129     Magnesium 1 8 mg/dL     Phosphorus [[id number]]  (Normal) Collected: 06/18/22 0057    Lab Status: Final result Specimen: Blood from Arm, Right Updated: 06/18/22 0129     Phosphorus 4 4 mg/dL     CBC and differential [[id number]]  (Abnormal) Collected: 06/18/22 0057    Lab Status: Final result Specimen: Blood from Arm, Right Updated: 06/18/22 0110     WBC 6 68 Thousand/uL      RBC 3 81 Million/uL      Hemoglobin 8 0 g/dL      Hematocrit 26 8 %      MCV 70 fL      MCH 21 0 pg      MCHC 29 9 g/dL      RDW 15 0 %      MPV 9 6 fL      Platelets 059 Thousands/uL      nRBC 0 /100 WBCs      Neutrophils Relative 65 %      Immat GRANS % 0 % Lymphocytes Relative 25 %      Monocytes Relative 7 %      Eosinophils Relative 3 %      Basophils Relative 0 %      Neutrophils Absolute 4 28 Thousands/µL      Immature Grans Absolute 0 02 Thousand/uL      Lymphocytes Absolute 1 69 Thousands/µL      Monocytes Absolute 0 48 Thousand/µL      Eosinophils Absolute 0 18 Thousand/µL      Basophils Absolute 0 03 Thousands/µL                  CT head wo contrast   Final Result by Adonis Arce MD (06/18 1948)      No acute intracranial or orbital abnormality  Workstation performed: [id number]         XR chest 1 view portable   Final Result by Nabeel Smith MD (06/18 1839)      No acute cardiopulmonary disease  Workstation performed: [id number]                    Procedures  ECG 12 Lead Documentation Only    Date/Time: 6/18/2022 1:02 AM  Performed by: Juventino Dinero MD  Authorized by: Juventino Dinero MD     Indications / Diagnosis:  Sob, chest tightness  ECG reviewed by me, the ED Provider: yes    Patient location:  ED  Rate:     ECG rate assessment: normal    Rhythm:     Rhythm: sinus rhythm    Ectopy:     Ectopy: none    QRS:     QRS axis:  Normal    QRS intervals:  Normal  Conduction:     Conduction: normal    ST segments:     ST segments:  Normal  T waves:     T waves: normal               ED Course  ED Course as of 07/05/22 1135   Sat Jun 18, 2022   0056 Bedside US - no pericardial effusion  Images in media tab                               SBIRT 20yo+    Flowsheet Row Most Recent Value   SBIRT (23 yo +)    In order to provide better care to our patients, we are screening all of our patients for alcohol and drug use  Would it be okay to ask you these screening questions?  Unable to answer at this time Filed at: 06/18/2022 0610                    MDM    Disposition  Final diagnoses:   Uremic syndrome   Chronic kidney disease     Time reflects when diagnosis was documented in both MDM as applicable and the Disposition within this note     Time User Action Codes Description Comment    6/18/2022  2:56 AM Piedad Kelso Add [N19] Renal failure     6/18/2022  2:56 AM Piedad Kelso Add [N19] Uremic syndrome     6/18/2022  2:56 AM Piedad Kelso Modify [N19] Uremic syndrome     6/18/2022  2:56 AM Piedad Kelso Remove [N19] Renal failure     6/18/2022  2:57 AM Piedad Kelso Add [N18 9] Chronic kidney disease     6/18/2022  3:55 AM Erin Hark Add [N18 5] CKD (chronic kidney disease) stage 5, GFR less than 15 ml/min [facility])       ED Disposition     ED Disposition   Admit    Condition   Stable    Date/Time   Sat Jun 18, 2022  2:45 AM    Comment   Case was discussed with Medical resident and the patient's admission status was agreed to be Admission Status: inpatient status to the service of Dr Wendy Sauceda up With Specialties Details Why Contact Info Additional Information    [facility] Follow up on 7/5/2022  [id number]  [phone] [facility] [address] [place] [address], [address] [place] [address] [facility]    Higinio Velasquez, [address] [place] [address] Schedule an appointment as soon as possible for a visit in 1 week(s)  [facility]  [phone]             Discharge Medication List as of 6/19/2022 12:18 PM      CONTINUE these medications which have NOT CHANGED    Details   acetaminophen (TYLENOL) 325 mg tablet Take 2 tablets (650 mg total) by mouth every 6 (six) hours as needed for mild pain or headaches, Starting Sun 1/26/2020, No Print      albuterol (2 5 mg/3 mL) 0 083 % nebulizer solution Take 3 mL (2 5 mg total) by nebulization every 6 (six) hours as needed for wheezing or shortness of breath, Starting Tue 3/15/2022, Normal      albuterol (Ventolin HFA) 90 mcg/act inhaler Inhale 2 puffs every 6 (six) hours as needed for wheezing or shortness of breath (cough), Starting Tue 7/27/2021, Normal      amLODIPine (NORVASC) 5 mg tablet Take 1 tablet (5 mg total) by mouth 2 (two) times a day, Starting Thu 3/31/2022, Normal      Blood Pressure KIT by Does not apply route daily, Starting Wed 6/24/2020, Normal      ergocalciferol (VITAMIN D2) 50,000 units TAKE 1 CAPSULE BY MOUTH ONE TIME PER WEEK, Normal      etonogestrel (NEXPLANON) subdermal implant 68 mg by Subdermal route once, Historical Med      ferrous sulfate 325 (65 Fe) mg tablet Take 1 tablet (325 mg total) by mouth daily with breakfast, Starting Thu 3/31/2022, Normal      fluticasone (Flovent HFA) 110 MCG/ACT inhaler Inhale 1 puff 2 (two) times a day, Starting Mon 1/3/2022, Normal      calcitriol (ROCALTROL) 0 25 mcg capsule Take 1 capsule (0 25 mcg total) by mouth daily for 7 days, Starting Fri 5/27/2022, Until Fri 6/3/2022, Normal      labetalol (NORMODYNE) 100 mg tablet TAKE 1 TABLET BY MOUTH TWICE A DAY, Normal      sodium bicarbonate 650 mg tablet Take 1 tablet (650 mg total) by mouth 2 (two) times a day, Starting Fri 5/27/2022, Normal             Outpatient Discharge Orders   Basic metabolic panel   Standing Status: Future Standing Exp   Date: 06/19/23     Call provider for:  persistent nausea or vomiting     Call provider for:  difficulty breathing, headache or visual disturbances     Call provider for:  persistent dizziness or light-headedness     Call provider for:  extreme fatigue       PDMP Review       Value Time User    PDMP Reviewed  Yes 4/21/2021 10:24 PM Ramon Maher MD          ED Provider  Electronically Signed by           Danika Catherine MD  07/05/22 3177

## 2022-06-18 NOTE — ASSESSMENT & PLAN NOTE
Lab Results   Component Value Date    HGB 8 0 (L) 06/18/2022    HGB 8 6 (L) 06/06/2022    HGB 9 0 (L) 03/15/2022     POA:  HGB 8 0  Patient seems to have down trending hemoglobin since November last year where patient had a hemoglobin of 10  Has been worked up as outpatient, iron saturation 24, TIBC 1831, iron 44, ferritin 173  Per pre transplantation nodes, patient needs avoid blood transfusion to decrease allosensitization  Continue taking ferrous sulfate  Will likely need Epogen since hemoglobin less than 8 5    In the setting of CKD stage 5

## 2022-06-18 NOTE — H&P
[address] [facility] ([place], [place]),[id number], 24 y o  female MRN: [id number]  Unit/Bed#: S -01 Encounter: [id number]  Primary Care Provider: Vee Aguilar DO   Date and time admitted to hospital: 6/18/2022 12:16 AM    * Chronic kidney disease (CKD), active medical management without dialysis, stage 5 [facility])  Assessment & Plan  Lab Results   Component Value Date    EGFR 6 06/18/2022    EGFR 5 06/06/2022    EGFR 6 03/15/2022    CREATININE 8 03 (H) 06/18/2022    CREATININE 9 00 (H) 06/06/2022    CREATININE 7 91 (H) 03/15/2022   Has history of recent progression of CKD currently following Dr Connie Bishop as outpatient  Recently had pre transplant evaluation  Patient has 4 days history of intermittent blurry vision, and feels unstable when standing with associated lightheadedness, anxiety, nausea  Episodes would only last a few minutes been patient would be back to baseline  Noted to have good appetite  During my assessment patient was back to normal  Patient and mother has  negative outlook regarding dialysis  Would prefer to wait for transplantation  Ultrasound in ED showed no pericardial effusion  Plan:  Nephrology consult  Encourage oral intake  Low-sodium diet,  I&O, daily weights  Continue calcitriol 0 5 mg daily  Telemetry    Hypertension  Assessment & Plan  Patient comes in with a blood pressure of 167/75  Unfortunately patient is unable to monitor blood pressure at home  Home meds include:  Amlodipine 5 mg b i d , labetalol 100 mg b i d  Per nephrology patient notes, goal blood pressure is less than 130/80  Continue home medications  P r n   Hydralazine for BP greater than 160, hold for SBP less than 130    Anemia, chronic disease  Assessment & Plan  Lab Results   Component Value Date    HGB 8 0 (L) 06/18/2022    HGB 8 6 (L) 06/06/2022    HGB 9 0 (L) 03/15/2022     POA:  HGB 8 0  Patient seems to have down trending hemoglobin since November last year where patient had a hemoglobin of 10  Has been worked up as outpatient, iron saturation 24, TIBC 1831, iron 44, ferritin 173  Per pre transplantation nodes, patient needs avoid blood transfusion to decrease allosensitization  Continue taking ferrous sulfate  Will likely need Epogen since hemoglobin less than 8 5  In the setting of CKD stage 5    Morbid obesity with BMI of 40 0-44 9, adult [facility])  Assessment & Plan  Lifestyle modification    VTE Pharmacologic Prophylaxis: VTE Score: 3 Moderate Risk (Score 3-4) - Pharmacological DVT Prophylaxis Ordered: enoxaparin (Lovenox)  Code Status: Level 1 - Full Code   Discussion with family: Updated  (mother) at bedside  Anticipated Length of Stay: Patient will be admitted on an inpatient basis with an anticipated length of stay of greater than 2 midnights secondary to CKD  Chief Complaint:  Blurriness of vision, unstable walking    History of Present Illness:  Tonya Kikrpatrick is a 24 y o  female with a PMH of CKD stage 5, hypertension, asthma, anemia who presents with blurriness of vision, weakness  Patient has been seen nephrologist as outpatient  She has had recent progression of her CKD  She recently got a pre transplant evaluation  Both her and the mother are not open to doing dialysis  Four days prior started noticing intermittent blurring of vision, with unstable walking  Associated with nausea and fatigue  No itching, no confusion, no loss of appetite noted  Patient had another episode today which prompted consult  In the emergency department patient presented hypertensive with blood pressure of 167/78  Creatinine looks slightly less at 8 compared to 9 0 on 06/06  BUN was 32  ED reached out to Nephrology who deemed not necessary to have urgent dialysis  Patient will be admitted for further workup and treatment    Review of Systems:  Review of Systems   Constitutional: Negative for chills and fever  HENT: Negative for ear pain and sore throat  Eyes: Negative for pain and visual disturbance  Respiratory: Negative for cough and shortness of breath  Cardiovascular: Negative for chest pain and palpitations  Gastrointestinal: Negative for abdominal pain and vomiting  Genitourinary: Negative for dysuria and hematuria  Musculoskeletal: Negative for arthralgias and back pain  Skin: Negative for color change and rash  Neurological: Negative for seizures and syncope  All other systems reviewed and are negative  Past Medical and Surgical History:   Past Medical History:   Diagnosis Date    Asthma     Chronic kidney disease     Eczema     Headache     Hypertension     Wears glasses        Past Surgical History:   Procedure Laterality Date    CHOLECYSTECTOMY      CT NEEDLE BIOPSY KIDNEY  1/29/2020    KELOID EXCISION Left 3/30/2021    Procedure: POSTERIOR EAR EXCISION KELOID, FLAP RECONSTRUCTION;  Surgeon: Heath Castro MD;  [facility]: [facility];  Service: Plastics       Meds/Allergies:  Prior to Admission medications    Medication Sig Start Date End Date Taking?  Authorizing Provider   acetaminophen (TYLENOL) 325 mg tablet Take 2 tablets (650 mg total) by mouth every 6 (six) hours as needed for mild pain or headaches 1/26/20   Guillermina Velasquez PA-C   albuterol (2 5 mg/3 mL) 0 083 % nebulizer solution Take 3 mL (2 5 mg total) by nebulization every 6 (six) hours as needed for wheezing or shortness of breath 3/15/22   Driss Morales DO   albuterol (Ventolin HFA) 90 mcg/act inhaler Inhale 2 puffs every 6 (six) hours as needed for wheezing or shortness of breath (cough) 7/27/21   Driss Morales DO   amLODIPine (NORVASC) 5 mg tablet Take 1 tablet (5 mg total) by mouth 2 (two) times a day 3/31/22   Shankar Ferraro MD   Blood Pressure KIT by Does not apply route daily  Patient not taking: No sig reported 6/24/20   SUZAN Finnegan   calcitriol (ROCALTROL) 0 25 mcg capsule Take 1 capsule (0 25 mcg total) by mouth daily for 7 days 5/27/22 6/3/22  Cherise Coil, DO   ergocalciferol (VITAMIN D2) 50,000 units TAKE 1 CAPSULE BY MOUTH ONE TIME PER WEEK 6/6/22   [facility], MARGUERITE   etonogestrel (NEXPLANON) subdermal implant 68 mg by Subdermal route once    Historical Provider, MD   ferrous sulfate 325 (65 Fe) mg tablet Take 1 tablet (325 mg total) by mouth daily with breakfast 3/31/22   Era Paniagua MD   fluticasone (Flovent HFA) 110 MCG/ACT inhaler Inhale 1 puff 2 (two) times a day 1/3/22   Cherise Coil, DO   labetalol (NORMODYNE) 100 mg tablet TAKE 1 TABLET BY MOUTH TWICE A DAY 5/5/22   Cherise Coil, DO   sodium bicarbonate 650 mg tablet Take 1 tablet (650 mg total) by mouth 2 (two) times a day 5/27/22   Cherise Coil, DO   methocarbamol (ROBAXIN) 500 mg tablet Take 1 tablet (500 mg total) by mouth 3 (three) times a day as needed for muscle spasms  Patient not taking: Reported on 5/13/2022 1/18/22 6/18/22  SUZAN Levin     I have reviewed home medications with patient personally  Allergies:    Allergies   Allergen Reactions    Wheat Bran - Food Allergy Itching       Social History:  Marital Status: Single   Occupation:  None  Patient Pre-hospital Living Situation: Home  Patient Pre-hospital Level of Mobility: walks  Patient Pre-hospital Diet Restrictions:  None  Substance Use History:   Social History     Substance and Sexual Activity   Alcohol Use Yes    Comment: occassionally on social events     Social History     Tobacco Use   Smoking Status Never Smoker   Smokeless Tobacco Never Used     Social History     Substance and Sexual Activity   Drug Use No       Family History:  Family History   Problem Relation Age of Onset    Asthma Mother     No Known Problems Father        Physical Exam:     Vitals:   Blood Pressure: 160/88 (06/18/22 0246)  Pulse: 74 (06/18/22 0246)  Temperature: 98 9 °F (37 2 °C) (06/18/22 0021)  Temp Source: Oral (06/18/22 0021)  Respirations: 20 (06/18/22 0246)  SpO2: 100 % (06/18/22 0246)    Physical Exam  Vitals and nursing note reviewed  Constitutional:       General: She is not in acute distress  Appearance: She is well-developed  She is obese  Comments: Patient appears euvolemic on physical exam, appears comfortable , no respiratory distress   HENT:      Head: Normocephalic and atraumatic  Nose: Nose normal       Mouth/Throat:      Mouth: Mucous membranes are moist    Eyes:      Conjunctiva/sclera: Conjunctivae normal    Cardiovascular:      Rate and Rhythm: Normal rate and regular rhythm  Heart sounds: No murmur heard  Pulmonary:      Effort: Pulmonary effort is normal  No respiratory distress  Breath sounds: Normal breath sounds  Abdominal:      Palpations: Abdomen is soft  Tenderness: There is no abdominal tenderness  Musculoskeletal:      Cervical back: Neck supple  Right lower leg: No edema  Left lower leg: No edema  Skin:     General: Skin is warm and dry  Capillary Refill: Capillary refill takes less than 2 seconds  Neurological:      General: No focal deficit present  Mental Status: She is alert and oriented to person, place, and time  Cranial Nerves: No cranial nerve deficit  Sensory: No sensory deficit  Motor: No weakness        Coordination: Coordination normal       Gait: Gait normal       Deep Tendon Reflexes: Reflexes normal       Comments: Patient oriented x3   Psychiatric:         Mood and Affect: Mood normal           Additional Data:     Lab Results:  Results from last 7 days   Lab Units 06/18/22  0057   WBC Thousand/uL 6 68   HEMOGLOBIN g/dL 8 0*   HEMATOCRIT % 26 8*   PLATELETS Thousands/uL 226   NEUTROS PCT % 65   LYMPHS PCT % 25   MONOS PCT % 7   EOS PCT % 3     Results from last 7 days   Lab Units 06/18/22  0057   SODIUM mmol/L 138   POTASSIUM mmol/L 3 9   CHLORIDE mmol/L 105   CO2 mmol/L 23   BUN mg/dL 32*   CREATININE mg/dL 8 03*   ANION GAP mmol/L 10   CALCIUM mg/dL 7 9*   ALBUMIN g/dL 4 2   TOTAL BILIRUBIN mg/dL 0 23   ALK PHOS U/L 82   ALT U/L 5*   AST U/L 9*   GLUCOSE RANDOM mg/dL 81                       Imaging: No pertinent imaging reviewed  XR chest 1 view portable    (Results Pending)       EKG and Other Studies Reviewed on Admission:   · EKG: NSR  HR Eighty  ** Please Note: This note has been constructed using a voice recognition system   **

## 2022-06-18 NOTE — ASSESSMENT & PLAN NOTE
Patient comes in with a blood pressure of 167/75  Unfortunately patient is unable to monitor blood pressure at home  Home meds include:  Amlodipine 5 mg b i d , labetalol 100 mg b i d  Per nephrology patient notes, goal blood pressure is less than 130/80  Continue home medications  P r n   Hydralazine for BP greater than 160, hold for SBP less than 130

## 2022-06-18 NOTE — ASSESSMENT & PLAN NOTE
Lab Results   Component Value Date    EGFR 6 06/18/2022    EGFR 5 06/06/2022    EGFR 6 03/15/2022    CREATININE 8 03 (H) 06/18/2022    CREATININE 9 00 (H) 06/06/2022    CREATININE 7 91 (H) 03/15/2022   Has history of recent progression of CKD currently following Dr Jus Savage as outpatient  Recently had pre transplant evaluation  Patient has 4 days history of intermittent blurry vision, and feels unstable when standing with associated lightheadedness, anxiety, nausea  Episodes would only last a few minutes been patient would be back to baseline  Noted to have good appetite  During my assessment patient was back to normal  Patient and mother has  negative outlook regarding dialysis    Would prefer to wait for transplantation  Plan:  Nephrology consult  Encourage oral intake  Low-sodium diet,  I&O, daily weights  Continue calcitriol 0 5 mg daily

## 2022-06-18 NOTE — ASSESSMENT & PLAN NOTE
Lab Results   Component Value Date    EGFR 6 06/18/2022    EGFR 5 06/06/2022    EGFR 6 03/15/2022    CREATININE 8 03 (H) 06/18/2022    CREATININE 9 00 (H) 06/06/2022    CREATININE 7 91 (H) 03/15/2022   Has history of recent progression of CKD currently following Dr Renee Squires as outpatient  Recently had pre transplant evaluation  Patient has 4 days history of intermittent blurry vision, and feels unstable when standing with associated lightheadedness, anxiety, nausea  Episodes would only last a few minutes been patient would be back to baseline  Noted to have good appetite  During my assessment patient was back to normal  Patient and mother has  negative outlook regarding dialysis    Would prefer to wait for transplantation  Ultrasound in ED showed no pericardial effusion      Plan:  Follow up with Nephrology outpatient  Repeat BMP in 1 week  Continue home meds  Close return precautions

## 2022-06-18 NOTE — CONSULTS
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  -previously had serological workup per Dr Erich Saint   It appears that Anca panel was negative but PR3 was elevated to 5 4  Status post kidney biopsy which showed diffuse global glomerulosclerosis which is severe with severe tubular atrophy and interstitial fibrosis  -patient has attended kidney education class but has not met with PD nurse yet  She is not decided about dialysis modality yet  -GFR has been around 5-6 since March 2022  Creatinine has been around 8 mg/dL  -her symptoms of nausea could be due to uremia and I discussed about starting on dialysis but she is not interested in starting dialysis yet and wants to wait  She also wants to discuss with her mother  She mentions though that her symptoms are better today and would like to wait before started on dialysis  Will monitor tonight  If symptoms improved, can hold off on starting HD versus PD for now  -overall volume status is euvolemic and electrolytes are stable  No urgent indication to start dialysis today  She does complain of blurring vision, cause is not clear, discussed with primary team and noted plan for CT head and possible outpatient ophthalmology consult  -continue to monitor for uremic symptoms    Primary hypertension with chronic kidney disease stage 5  -blood pressure currently acceptable, continue home medication-amlodipine and labetalol    Secondary hyperparathyroidism of renal origin currently on calcitriol 0 25 mcg daily  Monitor PTH as outpatient    Chronic metabolic acidosis:  on oral sodium bicarbonate tablet 650 mg b i d , bicarb level currently stable and at goal    Anemia of chronic kidney disease  -hemoglobin was 8 0  Trending down, continue to monitor  -iron saturation 24% in February  Recheck iron stores  -patient is currently undergoing workup for kidney transplantation  If need for PRBC recommend CMV negative, irradiated, leukocyte reduced PRBCs  Continue oral ferrous sulfate    Discussed with primary team      HISTORY OF PRESENT ILLNESS:  Requesting Physician: Izzy Cota MD  Reason for Consult:  Chronic kidney disease stage 5    Humaira Gore is a 24 y o  female chronic kidney disease stage 5, hypertension, anemia, metabolic acidosis who was admitted to [facility] after presenting with nausea generalized weakness and blurry vision which is intermittent for last 4 days  She mentions her appetite is okay  She is untable while walking  Denies any intake of NSAIDs  Nephrology consulted for elevated creatinine, creatinine admission was 9 0 compared to recent creatinine March of 7 9    Today improving to 8 0 with oral hydration    PAST MEDICAL HISTORY:  Past Medical History:   Diagnosis Date    Asthma     Chronic kidney disease     Eczema     Headache     Hypertension     Wears glasses        PAST SURGICAL HISTORY:  Past Surgical History:   Procedure Laterality Date    CHOLECYSTECTOMY      CT NEEDLE BIOPSY KIDNEY  1/29/2020    KELOID EXCISION Left 3/30/2021    Procedure: POSTERIOR EAR EXCISION KELOID, FLAP RECONSTRUCTION;  Surgeon: Fady Amaro MD;  [facility]: [facility];  Service: Plastics       SOCIAL HISTORY:  Social History     Substance and Sexual Activity   Alcohol Use Yes    Comment: occassionally on social events     Social History     Substance and Sexual Activity   Drug Use No     Social History     Tobacco Use   Smoking Status Never Smoker   Smokeless Tobacco Never Used       FAMILY HISTORY:  Family History   Problem Relation Age of Onset    Asthma Mother     No Known Problems Father        ALLERGIES:  Allergies   Allergen Reactions    Wheat Bran - Food Allergy Itching MEDICATIONS:    Current Facility-Administered Medications:     acetaminophen (TYLENOL) tablet 650 mg, 650 mg, Oral, Q6H PRN, Laverne Chaudhary MD    albuterol (PROVENTIL HFA,VENTOLIN HFA) inhaler 2 puff, 2 puff, Inhalation, Q6H PRN, Laverne Chaudhary MD    albuterol inhalation solution 2 5 mg, 2 5 mg, Nebulization, Q6H PRN, Laverne Chaudhary MD    amLODIPine [facility]) tablet 5 mg, 5 mg, Oral, BID, Laverne Chaudhary MD, 5 mg at 06/18/22 0802    calcitriol (ROCALTROL) capsule 0 25 mcg, 0 25 mcg, Oral, Daily, Laverne Chaudhary MD, 0 25 mcg at 06/18/22 0847    enoxaparin (LOVENOX) subcutaneous injection 40 mg, 40 mg, Subcutaneous, Q12H [facility], Laverne Chaudhary MD, 40 mg at 06/18/22 0803    ferrous sulfate tablet 325 mg, 325 mg, Oral, Daily With Breakfast, Laverne Chaudhary MD, 325 mg at 06/18/22 0802    fluticasone (FLOVENT HFA) 110 MCG/ACT inhaler 1 puff, 1 puff, Inhalation, BID, Laverne Chaudhary MD, 1 puff at 06/18/22 0847    hydrALAZINE (APRESOLINE) injection 5 mg, 5 mg, Intravenous, Q6H PRN, Laverne Chaudhary MD    labetalol (NORMODYNE) tablet 100 mg, 100 mg, Oral, BID, Laverne Chaudhary MD, 100 mg at 06/18/22 0802    ondansetron [facility]) injection 4 mg, 4 mg, Intravenous, Q6H PRN, Laverne Chaudhary MD    sodium bicarbonate tablet 650 mg, 650 mg, Oral, BID, Laverne Chaudhary MD, 650 mg at 06/18/22 0802    REVIEW OF SYSTEMS:   Review of Systems   Constitutional: Negative for activity change, appetite change, chills, diaphoresis, fatigue and fever  HENT: Negative for congestion, facial swelling and nosebleeds  Eyes: Positive for visual disturbance (Blurry vision)  Negative for pain  Respiratory: Negative for cough, chest tightness and shortness of breath  Cardiovascular: Negative for chest pain and palpitations  Gastrointestinal: Positive for nausea   Negative for abdominal distention, abdominal pain, diarrhea and vomiting  Genitourinary: Negative for difficulty urinating, dysuria, flank pain, frequency and hematuria  Musculoskeletal: Negative for arthralgias, back pain and joint swelling  Skin: Negative for rash  Neurological: Negative for dizziness, seizures, syncope, weakness and headaches  Psychiatric/Behavioral: Negative for agitation and confusion  The patient is not nervous/anxious  All the systems were reviewed and were negative except as documented on the HPI  PHYSICAL EXAM:  Current Weight:    First Weight:    Vitals:    06/18/22 0021 06/18/22 0246 06/18/22 0724   BP: 167/75 160/88 140/80   BP Location: Right arm  Left arm   Pulse: 93 74 89   Resp: 20 20 17   Temp: 98 9 °F (37 2 °C)  98 4 °F (36 9 °C)   TempSrc: Oral  Oral   SpO2: 100% 100% 100%       Intake/Output Summary (Last 24 hours) at 6/18/2022 1300  Last data filed at 6/18/2022 0900  Gross per 24 hour   Intake 360 ml   Output 800 ml   Net -440 ml     Physical Exam  Constitutional:       General: She is not in acute distress  Appearance: Normal appearance  She is well-developed  HENT:      Head: Normocephalic and atraumatic  Nose: Nose normal       Mouth/Throat:      Mouth: Mucous membranes are moist    Eyes:      General: No scleral icterus  Conjunctiva/sclera: Conjunctivae normal       Pupils: Pupils are equal, round, and reactive to light  Neck:      Thyroid: No thyromegaly  Vascular: No JVD  Cardiovascular:      Rate and Rhythm: Normal rate and regular rhythm  Heart sounds: Normal heart sounds  No murmur heard  No friction rub  Pulmonary:      Effort: Pulmonary effort is normal  No respiratory distress  Breath sounds: Normal breath sounds  No wheezing or rales  Abdominal:      General: Bowel sounds are normal  There is no distension  Palpations: Abdomen is soft  Tenderness: There is no abdominal tenderness  Musculoskeletal:         General: No deformity        Cervical back: Neck supple  Right lower leg: No edema  Left lower leg: No edema  Skin:     General: Skin is warm and dry  Findings: No rash  Neurological:      Mental Status: She is alert and oriented to person, place, and time  Psychiatric:         Mood and Affect: Mood normal          Behavior: Behavior normal          Thought Content: Thought content normal            Invasive Devices:        Lab Results:   Results from last 7 days   Lab Units 06/18/22  0057   WBC Thousand/uL 6 68   HEMOGLOBIN g/dL 8 0*   HEMATOCRIT % 26 8*   PLATELETS Thousands/uL 226   POTASSIUM mmol/L 3 9   CHLORIDE mmol/L 105   CO2 mmol/L 23   BUN mg/dL 32*   CREATININE mg/dL 8 03*   CALCIUM mg/dL 7 9*   MAGNESIUM mg/dL 1 8*   PHOSPHORUS mg/dL 4 4   ALK PHOS U/L 82   ALT U/L 5*   AST U/L 9*       Other Studies: CHEST      INDICATION:   chest tightness      COMPARISON:  11/13/2021     EXAM PERFORMED/VIEWS:  XR CHEST PORTABLE  1 image     FINDINGS:     Cardiomediastinal silhouette appears unremarkable      The lungs are clear  No pneumothorax or pleural effusion      Osseous structures appear within normal limits for patient age      IMPRESSION:     No acute cardiopulmonary disease          Portions of the record may have been created with voice recognition software  Occasional wrong word or "sound a like" substitutions may have occurred due to the inherent limitations of voice recognition software  Read the chart carefully and recognize, using context, where substitutions have occurred  If you have any questions, please contact the dictating provider

## 2022-06-18 NOTE — ASSESSMENT & PLAN NOTE
Lab Results   Component Value Date    HGB 8 0 (L) 06/18/2022    HGB 8 6 (L) 06/06/2022    HGB 9 0 (L) 03/15/2022     POA:  HGB 8 0  Patient seems to have down trending hemoglobin since November last year where patient had a hemoglobin of 10  Has been worked up as outpatient, iron saturation 24, TIBC 1831, iron 44, ferritin 173  Per pre transplantation nodes, patient needs avoid blood transfusion to decrease allosensitization  Continue taking ferrous sulfate  Will likely need Epogen since hemoglobin less than 8 5    In the setting of CKD stage 5  Follow up with nephrology outpatient

## 2022-06-19 VITALS
TEMPERATURE: 98.6 F | OXYGEN SATURATION: 96 % | WEIGHT: 256.39 LBS | DIASTOLIC BLOOD PRESSURE: 81 MMHG | HEART RATE: 81 BPM | BODY MASS INDEX: 44.01 KG/M2 | RESPIRATION RATE: 18 BRPM | SYSTOLIC BLOOD PRESSURE: 140 MMHG

## 2022-06-19 LAB
ANION GAP SERPL CALCULATED.3IONS-SCNC: 11 MMOL/L (ref 4–13)
BUN SERPL-MCNC: 36 MG/DL (ref 5–25)
CALCIUM SERPL-MCNC: 8.4 MG/DL (ref 8.4–10.2)
CHLORIDE SERPL-SCNC: 109 MMOL/L (ref 96–108)
CO2 SERPL-SCNC: 21 MMOL/L (ref 21–32)
CREAT SERPL-MCNC: 8.58 MG/DL (ref 0.6–1.3)
ERYTHROCYTE [DISTWIDTH] IN BLOOD BY AUTOMATED COUNT: 15.1 % (ref 11.6–15.1)
FERRITIN SERPL-MCNC: 154 NG/ML (ref 8–388)
GFR SERPL CREATININE-BSD FRML MDRD: 5 ML/MIN/1.73SQ M
GLUCOSE SERPL-MCNC: 82 MG/DL (ref 65–140)
HCT VFR BLD AUTO: 26.7 % (ref 34.8–46.1)
HGB BLD-MCNC: 8.1 G/DL (ref 11.5–15.4)
IRON SATN MFR SERPL: 16 % (ref 15–50)
IRON SERPL-MCNC: 34 UG/DL (ref 50–170)
MCH RBC QN AUTO: 21.5 PG (ref 26.8–34.3)
MCHC RBC AUTO-ENTMCNC: 30.3 G/DL (ref 31.4–37.4)
MCV RBC AUTO: 71 FL (ref 82–98)
PLATELET # BLD AUTO: 233 THOUSANDS/UL (ref 149–390)
PMV BLD AUTO: 10.7 FL (ref 8.9–12.7)
POTASSIUM SERPL-SCNC: 4.2 MMOL/L (ref 3.5–5.3)
RBC # BLD AUTO: 3.76 MILLION/UL (ref 3.81–5.12)
SODIUM SERPL-SCNC: 141 MMOL/L (ref 135–147)
TIBC SERPL-MCNC: 211 UG/DL (ref 250–450)
WBC # BLD AUTO: 6.44 THOUSAND/UL (ref 4.31–10.16)

## 2022-06-19 PROCEDURE — 99239 HOSP IP/OBS DSCHRG MGMT >30: CPT | Performed by: INTERNAL MEDICINE

## 2022-06-19 PROCEDURE — 80048 BASIC METABOLIC PNL TOTAL CA: CPT

## 2022-06-19 PROCEDURE — 99232 SBSQ HOSP IP/OBS MODERATE 35: CPT | Performed by: INTERNAL MEDICINE

## 2022-06-19 PROCEDURE — 83540 ASSAY OF IRON: CPT | Performed by: INTERNAL MEDICINE

## 2022-06-19 PROCEDURE — 85027 COMPLETE CBC AUTOMATED: CPT

## 2022-06-19 PROCEDURE — 83550 IRON BINDING TEST: CPT | Performed by: INTERNAL MEDICINE

## 2022-06-19 PROCEDURE — 82728 ASSAY OF FERRITIN: CPT | Performed by: INTERNAL MEDICINE

## 2022-06-19 RX ADMIN — AMLODIPINE BESYLATE 5 MG: 5 TABLET ORAL at 08:48

## 2022-06-19 RX ADMIN — LABETALOL HYDROCHLORIDE 100 MG: 100 TABLET, FILM COATED ORAL at 08:48

## 2022-06-19 RX ADMIN — CALCITRIOL 0.25 MCG: 0.25 CAPSULE, LIQUID FILLED ORAL at 08:49

## 2022-06-19 RX ADMIN — FLUTICASONE PROPIONATE 1 PUFF: 110 AEROSOL, METERED RESPIRATORY (INHALATION) at 08:50

## 2022-06-19 RX ADMIN — FERROUS SULFATE TAB 325 MG (65 MG ELEMENTAL FE) 325 MG: 325 (65 FE) TAB at 08:48

## 2022-06-19 RX ADMIN — SODIUM BICARBONATE 650 MG: 650 TABLET ORAL at 08:48

## 2022-06-19 RX ADMIN — ENOXAPARIN SODIUM 40 MG: 40 INJECTION SUBCUTANEOUS at 08:50

## 2022-06-19 NOTE — DISCHARGE INSTR - AVS FIRST PAGE
Dear Cat Devries,     It was our [facility] to care for you here at [facility] [address] [facility]  It is our hope that we were always able to exceed the expected standards for your care during your stay  You were hospitalized due to worsening kidney function  You were cared for on the s3 floor by Mane Malik DO under the service of Amy Martin MD with the [address] [place] [address] [facility] who covers for your primary care physician (PCP), Yang Polk DO, while you were hospitalized  If you have any questions or concerns related to this hospitalization, you may contact us at [phone]  For follow up as well as any medication refills, we recommend that you follow up with your primary care physician  A registered nurse will reach out to you by phone within a few days after your discharge to answer any additional questions that you may have after going home  However, at this time we provide for you here, the most important instructions / recommendations at discharge:     Notable Medication Adjustments -   none  Testing Required after Discharge -   BMP in 1 week  Important follow up information -   Please follow up with your nephrologist and pcp  Other Instructions -   none  Please review this entire after visit summary as additional general instructions including medication list, appointments, activity, diet, any pertinent wound care, and other additional recommendations from your care team that may be provided for you        Sincerely,     Mane Malik DO

## 2022-06-19 NOTE — PLAN OF CARE
Problem: Potential for Falls  Goal: Patient will remain free of falls  Description: INTERVENTIONS:  - Educate patient/family on patient safety including physical limitations  - Instruct patient to call for assistance with activity   - Consult OT/PT to assist with strengthening/mobility   - Keep Call bell within reach  - Keep bed low and locked with side rails adjusted as appropriate  - Keep care items and personal belongings within reach  - Initiate and maintain comfort rounds  - Make Fall Risk Sign visible to staff  - Offer Toileting every 2 Hours, in advance of need  - Apply yellow socks and bracelet for high fall risk patients  - Consider moving patient to room near nurses station  Outcome: Progressing     Problem: INFECTION - ADULT  Goal: Absence or prevention of progression during hospitalization  Description: INTERVENTIONS:  - Assess and monitor for signs and symptoms of infection  - Monitor lab/diagnostic results  - Monitor all insertion sites, i e  indwelling lines, tubes, and drains  - Monitor endotracheal if appropriate and nasal secretions for changes in amount and color  - [place] appropriate cooling/warming therapies per order  - Administer medications as ordered  - Instruct and encourage patient and family to use good hand hygiene technique  - Identify and instruct in appropriate isolation precautions for identified infection/condition  Outcome: Progressing     Problem: DISCHARGE PLANNING  Goal: Discharge to home or other facility with appropriate resources  Description: INTERVENTIONS:  - Identify barriers to discharge w/patient and caregiver  - Arrange for needed discharge resources and transportation as appropriate  - Identify discharge learning needs (meds, wound care, etc )  - Arrange for interpretive services to assist at discharge as needed  - Refer to Case Management Department for coordinating discharge planning if the patient needs post-hospital services based on physician/advanced practitioner order or complex needs related to functional status, cognitive ability, or social support system  Outcome: Progressing     Problem: Knowledge Deficit  Goal: Patient/family/caregiver demonstrates understanding of disease process, treatment plan, medications, and discharge instructions  Description: Complete learning assessment and assess knowledge base    Interventions:  - Provide teaching at level of understanding  - Provide teaching via preferred learning methods  Outcome: Progressing

## 2022-06-19 NOTE — PROGRESS NOTES
NEPHROLOGY PROGRESS NOTE   Yadira Simeon 24 y o  female MRN: [id number]  Unit/Bed#: S -01 Encounter: [id number]    ASSESSMENT & PLAN:  24 y o  female chronic kidney disease stage 5, hypertension, anemia, metabolic acidosis who was admitted to [facility] after presenting with nausea generalized weakness and blurry vision which is intermittent for last 4 days    Chronic kidney disease stage 5  -follows with Dr Stephanie Romero  -suspected to be due to hypertensive nephrosclerosis and chronic tubular interstitial nephropathy  -previously had serological workup per Dr Stephanie Romero   It appears that Anca panel was negative but PR3 was elevated to 5 4  Status post kidney biopsy which showed diffuse global glomerulosclerosis which is severe with severe tubular atrophy and interstitial fibrosis  -patient has attended kidney education class but has not met with PD nurse yet  She is not decided about dialysis modality yet  -GFR has been around 5-6 since March 2022  Creatinine has been around 8 mg/dL  -her symptoms of nausea could be due to uremia and I discussed about starting on dialysis but she is not interested in starting dialysis yet and wants to wait  renal function has slightly worsened to creatinine 8 58 mg/dL today, EGFR 5 with stable potassium  She mentions that nausea resolved and not interested in starting dialysis yet wants to go home  Discussed about uremic symptoms and stressed on coming the ED if she develops any of the uremic symptoms and may have to start on dialysis  She verbalized understanding  -currently no urgent indications start on dialysis but I am concerned about mild uremic symptoms like nausea  Okay for discharge from Nephrology side as patient does not want to start dialysis yet    -will need close outpatient monitoring, office informed for follow-up and repeat BMP in 1 week     Primary hypertension with chronic kidney disease stage 5  -blood pressure currently stable and at goal  continue home medication-amlodipine and labetalol     Secondary hyperparathyroidism of renal origin currently on calcitriol 0 25 mcg daily  Monitor PTH as outpatient     Chronic metabolic acidosis:  on oral sodium bicarbonate tablet 650 mg b i d , bicarb level currently stable and at goal     Anemia of chronic kidney disease  -hemoglobin was 8 1  Trending down, continue to monitor  -iron saturation 24% in February  Recheck iron stores-currently under process   -patient is currently undergoing workup for kidney transplantation  If need for PRBC recommend CMV negative, irradiated, leukocyte reduced PRBCs  Continue oral ferrous sulfate    Discussed with primary team resident  Office informed     SUBJECTIVE:  No nausea or blurry vision anymore  Denies any shortness of breath, appetite normal    OBJECTIVE:  Current Weight: Weight - Scale: 116 kg (256 lb 6 3 oz)  Vitals:    06/19/22 0806   BP: 140/81   Pulse: 81   Resp: 18   Temp: 98 6 °F (37 °C)   SpO2: 96%       Intake/Output Summary (Last 24 hours) at 6/19/2022 1138  Last data filed at 6/19/2022 0901  Gross per 24 hour   Intake 120 ml   Output 1400 ml   Net -1280 ml       Physical Exam  General:  Ill looking, awake  Eyes: Conjunctivae pink,  Sclera anicteric  ENT: lips and mucous membranes moist  Neck: supple   Chest: Clear to Auscultation both lungs,  no crackles, ronchus or wheezing  CVS: S1 & S2 present, normal rate, regular rhythm, no murmur    Abdomen: soft, non-tender, non-distended, Bowel sounds normoactive  Extremities: no edema of  legs  Skin: no rash  Neuro: awake, alert, oriented x 3   Psych: Mood and affect appropriate     Medications:    Current Facility-Administered Medications:     acetaminophen (TYLENOL) tablet 650 mg, 650 mg, Oral, Q6H PRN, Radha Rizo MD    albuterol (PROVENTIL HFA,VENTOLIN HFA) inhaler 2 puff, 2 puff, Inhalation, Q6H PRN, Radha Rizo MD    albuterol inhalation solution 2 5 mg, 2 5 mg, Nebulization, Q6H PRN, Hugh Spears MD    amLODIPine [facility]) tablet 5 mg, 5 mg, Oral, BID, Hugh Spears MD, 5 mg at 06/19/22 0848    calcitriol (ROCALTROL) capsule 0 25 mcg, 0 25 mcg, Oral, Daily, Hugh Spears MD, 0 25 mcg at 06/19/22 0849    enoxaparin (LOVENOX) subcutaneous injection 40 mg, 40 mg, Subcutaneous, Q12H [address], Hugh Spears MD, 40 mg at 06/19/22 0850    ferrous sulfate tablet 325 mg, 325 mg, Oral, Daily With Breakfast, Hugh Spears MD, 325 mg at 06/19/22 0848    fluticasone (FLOVENT HFA) 110 MCG/ACT inhaler 1 puff, 1 puff, Inhalation, BID, Hugh Spears MD, 1 puff at 06/19/22 0850    hydrALAZINE (APRESOLINE) injection 5 mg, 5 mg, Intravenous, Q6H PRN, Hugh Spears MD    labetalol (NORMODYNE) tablet 100 mg, 100 mg, Oral, BID, Hugh Spears MD, 100 mg at 06/19/22 0848    ondansetron (ZOFRAN) injection 4 mg, 4 mg, Intravenous, Q6H PRN, Hugh Spears MD    sodium bicarbonate tablet 650 mg, 650 mg, Oral, BID, Hugh Spears MD, 650 mg at 06/19/22 0848    Invasive Devices:        Lab Results:   Results from last 7 days   Lab Units 06/19/22  0506 06/18/22  0057   WBC Thousand/uL 6 44 6 68   HEMOGLOBIN g/dL 8 1* 8 0*   HEMATOCRIT % 26 7* 26 8*   PLATELETS Thousands/uL 233 226   POTASSIUM mmol/L 4 2 3 9   CHLORIDE mmol/L 109* 105   CO2 mmol/L 21 23   BUN mg/dL 36* 32*   CREATININE mg/dL 8 58* 8 03*   CALCIUM mg/dL 8 4 7 9*   MAGNESIUM mg/dL  --  1 8*   PHOSPHORUS mg/dL  --  4 4   ALK PHOS U/L  --  82   ALT U/L  --  5*   AST U/L  --  9*       Previous work up:         Portions of the record may have been created with voice recognition software  Occasional wrong word or "sound a like" substitutions may have occurred due to the inherent limitations of voice recognition software  Read the chart carefully and recognize, using context, where substitutions have occurred  If you have any questions, please contact the dictating provider

## 2022-06-19 NOTE — DISCHARGE SUMMARY
[facility]  Discharge- Melford Solo 1995, 24 y o  female MRN: [id number]  Unit/Bed#: S -01 Encounter: [id number]  Primary Care Provider: Radha Hurtado DO   Date and time admitted to hospital: 6/18/2022 12:16 AM    * Chronic kidney disease (CKD), active medical management without dialysis, stage 5 [facility])  Assessment & Plan  Lab Results   Component Value Date    EGFR 6 06/18/2022    EGFR 5 06/06/2022    EGFR 6 03/15/2022    CREATININE 8 03 (H) 06/18/2022    CREATININE 9 00 (H) 06/06/2022    CREATININE 7 91 (H) 03/15/2022   Has history of recent progression of CKD currently following Dr Michael Dailey as outpatient  Recently had pre transplant evaluation  Patient has 4 days history of intermittent blurry vision, and feels unstable when standing with associated lightheadedness, anxiety, nausea  Episodes would only last a few minutes been patient would be back to baseline  Noted to have good appetite  During my assessment patient was back to normal  Patient and mother has  negative outlook regarding dialysis  Would prefer to wait for transplantation  Ultrasound in ED showed no pericardial effusion      Plan:  Follow up with Nephrology outpatient  Repeat BMP in 1 week  Continue home meds  Close return precautions    Anemia, chronic disease  Assessment & Plan  Lab Results   Component Value Date    HGB 8 0 (L) 06/18/2022    HGB 8 6 (L) 06/06/2022    HGB 9 0 (L) 03/15/2022     POA:  HGB 8 0  Patient seems to have down trending hemoglobin since November last year where patient had a hemoglobin of 10  Has been worked up as outpatient, iron saturation 24, TIBC 1831, iron 44, ferritin 173  Per pre transplantation nodes, patient needs avoid blood transfusion to decrease allosensitization  Continue taking ferrous sulfate  Will likely need Epogen since hemoglobin less than 8 5    In the setting of CKD stage 5  Follow up with nephrology outpatient    Morbid obesity with BMI of 40 0-44 9, adult [facility])  Assessment & Plan  Lifestyle modification    Hypertension  Assessment & Plan  Patient comes in with a blood pressure of 167/75  Unfortunately patient is unable to monitor blood pressure at home  Discussed with CM and no financial support available  Pt would need to buy at pharmacy  Home meds include:  Amlodipine 5 mg b i d , labetalol 100 mg b i d   Continue home medications        Medical Problems             Resolved Problems  Date Reviewed: 6/19/2022   None               Discharging Resident: Leonardo Castro DO  Discharging Attending: No att  providers found  PCP: Shelton Rose DO  Admission Date:   Admission Orders (From admission, onward)     Ordered        06/18/22 0247  INPATIENT ADMISSION  Once                      Discharge Date: 06/19/22    Consultations During Hospital Stay:  · Nephrology    Procedures Performed:   · Non    Significant Findings / Test Results:   · CKD 5    Incidental Findings:   · none     Test Results Pending at Discharge (will require follow up):  · none     Outpatient Tests Requested:  · BMP 1 week    Complications:  none    Reason for Admission: CKD    Hospital Course:   Madan Cook is a 24 y o  female patient who originally presented to the hospital on 6/18/2022 due to blurriness of vision and weakness  CT head showed No acute intracranial or orbital abnormality  Pt reports reports that she usually wears glasses but not has not been using them regularly recently  During this admission, her vision improved with generalized weakness resolved  Nephrology was consulted who did not think pt required urgent dialysis  Pt and her mother were not agreeable to getting permacath and dialysis during this admission  They were agreeable to following up with nephrology outpatient with close return precaution  Please see above list of diagnoses and related plan for additional information       Condition at Discharge: stable    Discharge Day Visit / Exam:   Subjective: Patient doing well at bedside with no new complaints  She states her vision has improved  Denies any headache, N/V/D, dizziness, lightheadedness  She states that she still not ready for dialysis and would like to go home  Vitals: Blood Pressure: 140/81 (06/19/22 0806)  Pulse: 81 (06/19/22 0806)  Temperature: 98 6 °F (37 °C) (06/19/22 0806)  Temp Source: Oral (06/19/22 0806)  Respirations: 18 (06/19/22 0806)  Weight - Scale: 116 kg (256 lb 6 3 oz) (06/19/22 0600)  SpO2: 96 % (06/19/22 0806)  Exam:   Physical Exam  Vitals and nursing note reviewed  Constitutional:       Appearance: She is obese  She is not diaphoretic  Cardiovascular:      Rate and Rhythm: Normal rate and regular rhythm  Pulses: Normal pulses  Heart sounds: Normal heart sounds  No murmur heard  No friction rub  Pulmonary:      Effort: Pulmonary effort is normal  No respiratory distress  Breath sounds: Normal breath sounds  No wheezing or rales  Abdominal:      Palpations: Abdomen is soft  Tenderness: There is no abdominal tenderness  There is no guarding  Musculoskeletal:         General: No deformity  Right lower leg: No edema  Left lower leg: No edema  Skin:     General: Skin is warm  Coloration: Skin is not jaundiced or pale  Neurological:      Mental Status: She is alert  Discussion with Family: Attempted to update  (mother) via phone  Unable to contact  Discharge instructions/Information to patient and family:   See after visit summary for information provided to patient and family  Provisions for Follow-Up Care:  See after visit summary for information related to follow-up care and any pertinent home health orders  Disposition:   Home    Planned Readmission:  None    Discharge Medications:  See after visit summary for reconciled discharge medications provided to patient and/or family        **Please Note: This note may have been constructed using a voice recognition system**

## 2022-06-19 NOTE — UTILIZATION REVIEW
Initial Clinical Review    Admission: Date/Time/Statement:   Admission Orders (From admission, onward)     Ordered        06/18/22 0247  INPATIENT ADMISSION  Once                      Orders Placed This Encounter   Procedures    INPATIENT ADMISSION     Standing Status:   Standing     Number of Occurrences:   1     Order Specific Question:   Level of Care     Answer:   Med Surg [16]     Order Specific Question:   Estimated length of stay     Answer:   More than 2 Midnights     Order Specific Question:   Certification     Answer:   I certify that inpatient services are medically necessary for this patient for a duration of greater than two midnights  See H&P and MD Progress Notes for additional information about the patient's course of treatment  ED Arrival Information     Expected   -    Arrival   6/18/2022 00:09    Acuity   Urgent            Means of arrival   Walk-In    Escorted by   Family Member    Service   Hospitalist    Admission type   Urgent            Arrival complaint   Chills, nausea after taking meds            Chief Complaint   Patient presents with    Medical Problem     Pt CKD patient, reports blurry vision, unbalanced, nausea, onset of symptoms Tuesday, not on dialysis       Initial Presentation: 24 y o  female presents to ED from home due to intermittent blurring of vision , unstable walking , nausea, fatigue episodes starting 2 days ago  PMH of CKD stage 5, hypertension, asthma, anemia  CKD has recently progresses  She had a pre-transplant evaluation Both patient and her mother are not open to doing dialysis  In ED pt is hypertensive /78  Creatinine is 8, decreased from 9 on 6/6   BUN 32  H/H 8/26 8  Exam notes pt appears euvolemic, no respiratory distress, abdomen soft, alert, oriented , no edema  US in ED no pericardial effusion Admitted as Inpatient to med surg for CKD stage 5,anemia Hypertension Plan Nephrology consult, encourage PO intake, Low sodium diet, I/O daily weight, telemetry , calctriol 0 5 mg daily, Goal BP is 130/80, continue home meds, add prn hydralazine for SBP >160, Continue ferrous sulfate, needs to avoid blood transfusion to decrease allosensitization, will likely need epogen, enc lifestyle modification for MO      Nephrology 6/18  CKD stage 5, suspected to be hypertensive nephrosclerosis and chronic tubular interstitial nephropathy  her symptoms of nausea could be due to uremia and I discussed about starting on dialysis but she is not interested in starting dialysis yet and wants to wait  She also wants to discuss with her mother  She mentions though that her symptoms are better today and would like to wait before started on dialysis  Will monitor tonight  If symptoms improved, can hold off on starting HD versus PD for now  -overall volume status is euvolemic and electrolytes are stable  No urgent indication to start dialysis today  She does complain of blurring vision, cause is not clear, discussed with primary team and noted plan for CT head and possible outpatient ophthalmology consult    Date: 6/19   Day 2   No nausea or blurry vision anymore    Denies any shortness of breath, appetite normal   Exam ill looking, awake, no edema, alert , oriented x 3, lungs CTA, S1, S2, normal rate , rhythm, no murmur    data filed at 6/19/2022 0901      Gross per 24 hour   Intake 120 ml   Output 1400 ml   Net -1280 ml          ED Triage Vitals   Temperature Pulse Respirations Blood Pressure SpO2   06/18/22 0021 06/18/22 0021 06/18/22 0021 06/18/22 0021 06/18/22 0021   98 9 °F (37 2 °C) 93 20 167/75 100 %      Temp Source Heart Rate Source Patient Position - Orthostatic VS BP Location FiO2 (%)   06/18/22 0021 06/18/22 0021 06/18/22 0021 06/18/22 0021 --   Oral Monitor Sitting Right arm       Pain Score       06/18/22 0409       No Pain          Wt Readings from Last 1 Encounters:   06/19/22 116 kg (256 lb 6 3 oz)     Additional Vital Signs:     Date/Time Temp Pulse Resp BP MAP (mmHg) SpO2   06/19/22 0806 98 6 °F (37 °C) 81 18 140/81 104 96 %   06/18/22 2152 -- 82 16 164/82 114 95 %   06/18/22 1525 98 5 °F (36 9 °C) 77 16 133/65 94 99 %     06/19/22 0600 116 kg (256 lb 6 3 oz) Bed scale   06/19/22 0510 113 kg (248 lb 0 3 oz) Standing scale       Pertinent Labs/Diagnostic Test Results:     6/18 EKG    Normal sinus rhythm  Normal ECG  CT head wo contrast   Final Result by Ivan June MD (06/18 1948)      No acute intracranial or orbital abnormality  Workstation performed: [id number]         XR chest 1 view portable   Final Result by Henrietta Peoples MD (06/18 1664)      No acute cardiopulmonary disease                    Workstation performed: [id number]               Results from last 7 days   Lab Units 06/19/22  0506 06/18/22  0057   WBC Thousand/uL 6 44 6 68   HEMOGLOBIN g/dL 8 1* 8 0*   HEMATOCRIT % 26 7* 26 8*   PLATELETS Thousands/uL 233 226   NEUTROS ABS Thousands/µL  --  4 28         Results from last 7 days   Lab Units 06/19/22  0506 06/18/22  0057   SODIUM mmol/L 141 138   POTASSIUM mmol/L 4 2 3 9   CHLORIDE mmol/L 109* 105   CO2 mmol/L 21 23   ANION GAP mmol/L 11 10   BUN mg/dL 36* 32*   CREATININE mg/dL 8 58* 8 03*   EGFR ml/min/1 73sq m 5 6   CALCIUM mg/dL 8 4 7 9*   MAGNESIUM mg/dL  --  1 8*   PHOSPHORUS mg/dL  --  4 4     Results from last 7 days   Lab Units 06/18/22  0057   AST U/L 9*   ALT U/L 5*   ALK PHOS U/L 82   TOTAL PROTEIN g/dL 7 1   ALBUMIN g/dL 4 2   TOTAL BILIRUBIN mg/dL 0 23   BILIRUBIN DIRECT mg/dL 0 03         Results from last 7 days   Lab Units 06/19/22  0506 06/18/22  0057   GLUCOSE RANDOM mg/dL 82 81             No results found for: BETA-HYDROXYBUTYRATE                               Results from last 7 days   Lab Units 06/18/22  0057   TSH 3RD GENERATON uIU/mL 3 495                         Results from last 7 days   Lab Units 06/19/22  0506   FERRITIN ng/mL 154                         Results from last 7 days   Lab Units 06/18/22  0242   CLARITY UA  Clear   COLOR UA  Yellow   SPEC GRAV UA  1 015   PH UA  7 0   GLUCOSE UA mg/dl Negative   KETONES UA mg/dl Negative   BLOOD UA  Small*   PROTEIN UA mg/dl >=300*   NITRITE UA  Negative   BILIRUBIN UA  Negative   UROBILINOGEN UA E U /dl 0 2   LEUKOCYTES UA  Negative   WBC UA /hpf 1-2   RBC UA /hpf 0-1   BACTERIA UA /hpf None Seen   EPITHELIAL CELLS WET PREP /hpf Occasional                                               ED Treatment:   Medication Administration from 06/18/2022 0009 to 06/18/2022 0344     None        Past Medical History:   Diagnosis Date    Asthma     Chronic kidney disease     Eczema     Headache     Hypertension     Wears glasses      Present on Admission:   Chronic kidney disease (CKD), active medical management without dialysis, stage 5 ([facility] [address] )   Hypertension   Anemia, chronic disease      Admitting Diagnosis: Uremic syndrome [N19]  Chronic kidney disease [N18 9]  Known medical problems [Z78 9]  Age/Sex: 24 y o  female  Admission Orders:  Scheduled Medications:  amLODIPine, 5 mg, Oral, BID  calcitriol, 0 25 mcg, Oral, Daily  enoxaparin, 40 mg, Subcutaneous, Q12H [address]  ferrous sulfate, 325 mg, Oral, Daily With Breakfast  fluticasone, 1 puff, Inhalation, BID  labetalol, 100 mg, Oral, BID  sodium bicarbonate, 650 mg, Oral, BID      Continuous IV Infusions:     PRN Meds:  acetaminophen, 650 mg, Oral, Q6H PRN  albuterol, 2 puff, Inhalation, Q6H PRN  albuterol, 2 5 mg, Nebulization, Q6H PRN  hydrALAZINE, 5 mg, Intravenous, Q6H PRN  ondansetron, 4 mg, Intravenous, Q6H PRN      OOB as tank    Daily weight   I/O   IP CONSULT TO NEPHROLOGY    Network Utilization Review Department  ATTENTION: Please call with any questions or concerns to [phone] and carefully listen to the prompts so that you are directed to the right person   All voicemails are confidential   Cade Wooten all requests for admission clinical reviews, approved or denied determinations and any other requests to dedicated fax number below belonging to the campus where the patient is receiving treatment   List of dedicated fax numbers for the Facilities:  [address] [place] [address] DENIALS (Administrative/Medical Necessity) [phone]   [address]  (Maternity/NICU/Pediatrics) [phone]   [address]  [address] [place] [address] [address] [place] [address] [address] 921 [facility] 951 [phone]

## 2022-06-20 ENCOUNTER — TRANSITIONAL CARE MANAGEMENT (OUTPATIENT)
Dept: FAMILY MEDICINE CLINIC | Facility: CLINIC | Age: 27
End: 2022-06-20

## 2022-06-20 ENCOUNTER — TELEPHONE (OUTPATIENT)
Dept: NEPHROLOGY | Facility: CLINIC | Age: 27
End: 2022-06-20

## 2022-06-20 ENCOUNTER — NURSE TRIAGE (OUTPATIENT)
Dept: OTHER | Facility: OTHER | Age: 27
End: 2022-06-20

## 2022-06-20 DIAGNOSIS — N18.30 BENIGN HYPERTENSION WITH CKD (CHRONIC KIDNEY DISEASE) STAGE III (HCC): ICD-10-CM

## 2022-06-20 DIAGNOSIS — I12.9 BENIGN HYPERTENSION WITH CKD (CHRONIC KIDNEY DISEASE) STAGE III (HCC): ICD-10-CM

## 2022-06-20 DIAGNOSIS — N25.81 SECONDARY HYPERPARATHYROIDISM OF RENAL ORIGIN (HCC): ICD-10-CM

## 2022-06-20 RX ORDER — CALCITRIOL 0.25 UG/1
0.25 CAPSULE, LIQUID FILLED ORAL DAILY
Qty: 7 CAPSULE | Refills: 0 | Status: SHIPPED | OUTPATIENT
Start: 2022-06-20 | End: 2022-06-20 | Stop reason: SDUPTHER

## 2022-06-20 RX ORDER — LABETALOL 100 MG/1
100 TABLET, FILM COATED ORAL 2 TIMES DAILY
Qty: 14 TABLET | Refills: 0 | Status: SHIPPED | OUTPATIENT
Start: 2022-06-20 | End: 2022-06-20 | Stop reason: SDUPTHER

## 2022-06-20 NOTE — TELEPHONE ENCOUNTER
Regarding: urgent refill request  - calcotriol and labectalol   ----- Message from Bell Goins MA sent at 6/20/2022  6:20 PM EDT -----  "I need refills on my medication  calcitriol (ROCALTROL) 0 25 mcg capsule and labetalol (NORMODYNE) 100 mg tablet  I am completely out"  I had a very hard time commuicatin with her

## 2022-06-20 NOTE — UTILIZATION REVIEW
Notification of Discharge   This is a Notification of Discharge from our facility [address]  Please be advised that this patient has been discharge from our facility  Below you will find the admission and discharge date and time including the patients disposition  UTILIZATION REVIEW CONTACT:  Trisha Lane  Utilization   Network Utilization Review Department  Phone: [phone] x carefully listen to the prompts  All voicemails are confidential   Email: [email or web address]     PHYSICIAN ADVISORY SERVICES:  FOR UUJM-ZH-HZMO REVIEW - MEDICAL NECESSITY DENIAL  Phone: [phone]  Fax: [phone]  Email: [email or web address]     PRESENTATION DATE: 6/18/2022 12:16 AM  OBERVATION ADMISSION DATE:   INPATIENT ADMISSION DATE: 6/18/22  2:47 AM   DISCHARGE DATE: 6/19/2022  3:33 PM  DISPOSITION: Home/Self Care Home/Self Care      IMPORTANT INFORMATION:  Send all requests for admission clinical reviews, approved or denied determinations and any other requests to dedicated fax number below belonging to the campus where the patient is receiving treatment   List of dedicated fax numbers:  [address] DENIALS (Administrative/Medical Necessity) [phone]   [address] (Maternity/NICU/Pediatrics) [phone]   Laura Zavala [phone]   Betty Borrego [phone]   Tigre Blackburn [phone]   [address] [place] [address],[address] [phone]   [facility]  [phone]   2201 [facility]  2401 [facility] 1000 [address] [facility] [phone]

## 2022-06-20 NOTE — TELEPHONE ENCOUNTER
----- Message from Sarah White MD sent at 6/19/2022 11:42 AM EDT -----  Patient being discharged from [facility] today, please order for repeat BMP to be done in 1 week for CKD stage 5  She already has follow-up in July with advanced practitioner, okay to keep that appointment

## 2022-06-20 NOTE — TELEPHONE ENCOUNTER
Reason for Disposition   [1] Caller requesting a prescription renewal (no refills left), no triage required, AND [2] triager able to renew prescription per department policy    Answer Assessment - Initial Assessment Questions  1  NAME of MEDICATION: "What medicine are you calling about?"      Rocaltrol and labetolol    2  QUESTION: "What is your question?" (e g , medication refill, side effect)      "I am out and I need some sent in tonight"    3  PRESCRIBING HCP: "Who prescribed it?" Reason: if prescribed by specialist, call should be referred to that group  Dr Chloe Noonan (PCP)    Informed pt that I can send a 7 day supply only   Rx sent to pharmacy requested  Verified confirmation receipt of rx by pharmacy  Med refills also queued up and sent to office for tomorrow      Protocols used: MEDICATION QUESTION CALL-ADULT-

## 2022-06-20 NOTE — UTILIZATION REVIEW
Inpatient Admission Authorization Request   NOTIFICATION OF INPATIENT ADMISSION/INPATIENT AUTHORIZATION REQUEST   SERVICING FACILITY:   [address]  Tax ID: [id number]  NPI: [id number]  Place of Service: Inpatient [facility]  [address]  Place of Service Code: 24     ATTENDING PROVIDER:  Attending Name and NPI#: Donnaharvey Rendonk, [place] [[id number]]  Address: [address]  Phone: [phone]     UTILIZATION REVIEW CONTACT:  Pastora Mayorga, Utilization   Network Utilization Review Department  Phone: [phone]  Fax: [phone]  Email: Janet [email or web address]  [email or web address]     PHYSICIAN ADVISORY SERVICES:  FOR BEJG-CG-SUAB REVIEW - MEDICAL NECESSITY DENIAL  Phone: [phone]  Fax: [phone]  Email: [email or web address]     TYPE OF REQUEST:  Inpatient Status     ADMISSION INFORMATION:  ADMISSION DATE/TIME: 6/18/22  2:47 AM  PATIENT DIAGNOSIS CODE/DESCRIPTION:  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

## 2022-06-20 NOTE — TELEPHONE ENCOUNTER
Medication Refill Request     Name Rocaltrol  Dose/Frequency 0 25mcg daily  Quantity 30  Verified pharmacy   [x]  Verified ordering Provider   [x]  Verified enough for 3 days  [x]      Medication Refill Request     Name Labetolol  Dose/Frequency 100mg bid  Quantity 60  Verified pharmacy   [x]  Verified ordering Provider   [x]  Verified enough for 3 days  [x]    Pt is completely out of these meds  7 day emergency supply was sent in for both

## 2022-06-21 RX ORDER — LABETALOL 100 MG/1
100 TABLET, FILM COATED ORAL 2 TIMES DAILY
Qty: 60 TABLET | Refills: 0 | Status: SHIPPED | OUTPATIENT
Start: 2022-06-21 | End: 2022-07-21

## 2022-06-21 RX ORDER — CALCITRIOL 0.25 UG/1
0.25 CAPSULE, LIQUID FILLED ORAL DAILY
Qty: 30 CAPSULE | Refills: 0 | Status: SHIPPED | OUTPATIENT
Start: 2022-06-21 | End: 2022-07-21

## 2022-06-28 ENCOUNTER — EVALUATION (OUTPATIENT)
Dept: PHYSICAL THERAPY | Facility: CLINIC | Age: 27
End: 2022-06-28
Payer: MEDICARE

## 2022-06-28 DIAGNOSIS — N17.9 ACUTE KIDNEY INJURY SUPERIMPOSED ON CHRONIC KIDNEY DISEASE (HCC): ICD-10-CM

## 2022-06-28 DIAGNOSIS — M54.16 LUMBAR RADICULOPATHY: Primary | ICD-10-CM

## 2022-06-28 DIAGNOSIS — M25.552 LEFT HIP PAIN: ICD-10-CM

## 2022-06-28 DIAGNOSIS — N18.9 ACUTE KIDNEY INJURY SUPERIMPOSED ON CHRONIC KIDNEY DISEASE (HCC): ICD-10-CM

## 2022-06-28 PROCEDURE — 97162 PT EVAL MOD COMPLEX 30 MIN: CPT

## 2022-06-28 PROCEDURE — 97110 THERAPEUTIC EXERCISES: CPT

## 2022-06-28 RX ORDER — SODIUM BICARBONATE 650 MG/1
650 TABLET ORAL 2 TIMES DAILY
Qty: 60 TABLET | Refills: 0 | Status: SHIPPED | OUTPATIENT
Start: 2022-06-28

## 2022-06-28 NOTE — PROGRESS NOTES
PT Evaluation     Today's date: 2022  Patient name: Rich Moreno  : 1995  MRN: [id number]  Referring provider: Gerard John DO  Dx:   Encounter Diagnosis     ICD-10-CM    1  Lumbar radiculopathy  M54 16    2  Left hip pain  M25 552 Ambulatory Referral to Physical Therapy       Start Time: 0615  Stop Time: 0700  Total time in clinic (min): 45 minutes    Assessment  Assessment details: Signs and symptoms are consistent with lumbar radiculopathy due to decreased hip and lumbar ROM, impaired LLE strength and pain with lumbar motion  The patient has difficulty with standing, sitting, walking and stanidng and lying down and this is limiting her ability to complete ADLs  The patient has limited lumbar ROM so this will be the first thing we focus on as we begin PT  As this improves we will then focus on restoring core and LLE strength to help support her lumbar spine and her hip  The patient would benefit from PT to help restore ROM, strength and joint mobility  Impairments: abnormal or restricted ROM, abnormal movement, activity intolerance, impaired physical strength, pain with function and weight-bearing intolerance    Goals  STG : (2 weeks) - Patient demonstrates independence with HEP to be able to transition to HEP in the long term  (2 weeks) - Decrease patient's pain by 50%  (4 weeks) - Improve lumbar and hip ROM to [facility]    LTG: (6 weeks) - The patient is able to walk half a mile with out needing a break  (8 weeks) - Improve patient's core and BLE strength to at least 4/5 to allow the patient to sit or stand for extended periods of time  with minimal pain         Plan  Patient would benefit from: skilled physical therapy  Planned modality interventions: cryotherapy and thermotherapy: hydrocollator packs  Planned therapy interventions: therapeutic exercise, neuromuscular re-education, manual therapy and therapeutic activities  Frequency: 2x week  Duration in weeks: 8        Subjective Evaluation    History of Present Illness  Date of onset: 2022  Mechanism of injury: The patient reports that she had a slip and fall and landed on her left hip and left side  The patient had an X-ray but it came back negative  The patient is still dealing with left hip pain when she sits and when she lays down  She also has increased swelling when she does a lot of standing and walking  The patient also notices that she can get numbness running from her left hip down into her foot  Pain  Current pain ratin  At worst pain ratin  Location: left hip and low back  Quality: knife-like and throbbing  Relieving factors: rest, ice and medications  Aggravating factors: sitting, standing and walking    Patient Goals  Patient goal: The patient wants to be able to sit longer in chairs without pain          Objective     Active Range of Motion     Lumbar   Flexion:  WFL and with pain  Extension:  with pain Restriction level: minimal  Left lateral flexion:  with pain Restriction level: minimal  Right lateral flexion:  with pain Restriction level: minimal  Left rotation:  with pain Restriction level: minimal  Right rotation:  with pain Restriction level: minimal    Right Hip   External rotation (90/90): 21 degrees with pain  Internal rotation (90/90): 19 degrees with pain    Strength/Myotome Testing     Left Hip   Planes of Motion   Flexion: 3+  Abduction: 4-  External rotation: 3+    Right Hip   Planes of Motion   Flexion: 4  Abduction: 4-  External rotation: 4-    Left Knee   Flexion: 3+  Extension: 3+    Right Knee   Flexion: 4-  Extension: 4    Additional Strength Details  Pain with L hip flexion, hip ER, knee flexion and extension    Tests     Lumbar     Left   Positive slump test               Precautions:     Diagnosis:    Precautions:    Primary Goals:    *asterisks by exercise = given for HEP   Manuals                                                There Ex        Seated hip ADD 5"x10 Neuro Re-Ed        Seated Ab brace 5"x10                                                                                                Re-evaluation              Ther Act                                         Modalities

## 2022-06-29 ENCOUNTER — TELEPHONE (OUTPATIENT)
Dept: NEPHROLOGY | Facility: CLINIC | Age: 27
End: 2022-06-29

## 2022-06-29 NOTE — TELEPHONE ENCOUNTER
Spoke with patient's mother, Salma Chapa, reminding them to go for lab work before her appt on 7/5  She expressed understanding and thanked us for the reminder call

## 2022-07-05 ENCOUNTER — OFFICE VISIT (OUTPATIENT)
Dept: PHYSICAL THERAPY | Facility: CLINIC | Age: 27
End: 2022-07-05
Payer: MEDICARE

## 2022-07-05 DIAGNOSIS — M25.552 LEFT HIP PAIN: Primary | ICD-10-CM

## 2022-07-05 DIAGNOSIS — M54.16 LUMBAR RADICULOPATHY: ICD-10-CM

## 2022-07-05 PROCEDURE — 97112 NEUROMUSCULAR REEDUCATION: CPT

## 2022-07-05 PROCEDURE — 97110 THERAPEUTIC EXERCISES: CPT

## 2022-07-05 NOTE — PROGRESS NOTES
Daily Note     Today's date: 2022  Patient name: Leigh Quiñonez  : 1995  MRN: [id number]  Referring provider: Leo Brown DO  Dx:   Encounter Diagnosis     ICD-10-CM    1  Left hip pain  M25 552    2  Lumbar radiculopathy  M54 16                   Subjective: The patient reports that she is feeling pretty good today but she is still having trouble sleeping on her stomach  Objective: See treatment diary below      Assessment: Tolerated treatment well  Patient demonstrated fatigue post treatment and would benefit from continued PT     The patient did very well with her table exercises and she seems to be tolerating more than the IE  I worked a lot with her on her form for her abdominal exercises to make sure she was getting proper muscle activation  The patient did have some pain on the squat machine so we skipped this today  Plan: Continue per plan of care        Precautions:     Diagnosis:    Precautions:    Primary Goals:    *asterisks by exercise = given for HEP   Manuals                                                There Ex        Seated hip ADD 5"x10 5"x20      Supine clam  YTB x20      Standing hip extension  YTB x20 ea                                                      Neuro Re-Ed        Seated Ab brace 5"x10       Bridge  2x10      PPT  5"x15      Anti rotation  8# 5"x10      AB brace with march  Standing YTB x15ea                                                               Re-evaluation              Ther Act              Squat machine                           Modalities

## 2022-07-12 ENCOUNTER — OFFICE VISIT (OUTPATIENT)
Dept: PHYSICAL THERAPY | Facility: CLINIC | Age: 27
End: 2022-07-12
Payer: MEDICARE

## 2022-07-12 DIAGNOSIS — M54.16 LUMBAR RADICULOPATHY: ICD-10-CM

## 2022-07-12 DIAGNOSIS — M25.552 LEFT HIP PAIN: Primary | ICD-10-CM

## 2022-07-12 PROCEDURE — 97530 THERAPEUTIC ACTIVITIES: CPT | Performed by: PHYSICAL THERAPIST

## 2022-07-12 PROCEDURE — 97112 NEUROMUSCULAR REEDUCATION: CPT | Performed by: PHYSICAL THERAPIST

## 2022-07-12 PROCEDURE — 97110 THERAPEUTIC EXERCISES: CPT | Performed by: PHYSICAL THERAPIST

## 2022-07-12 NOTE — PROGRESS NOTES
Daily Note     Today's date: 2022  Patient name: Eduin Taylor  : 1995  MRN: [id number]  Referring provider: Eder Perez DO  Dx:   Encounter Diagnosis     ICD-10-CM    1  Left hip pain  M25 552    2  Lumbar radiculopathy  M54 16        Start Time: 1630  Stop Time: 1715  Total time in clinic (min): 45 minutes    Subjective: The patient reports that she felt better following last session, but on  she felt like her leg wanted to give out  Says that today "I have the same old messed up hip"  Objective: See treatment diary below      Assessment: Tolerated treatment well  Patient demonstrated fatigue post treatment and would benefit from continued PT   Patient was able to tolerate exercise progressions today with good response to treatment  She had some mild fatigue post session, but no significant increases in symptoms  She does require frequent cues for proper performance of exercises and mechanics  Patient will benefit from continuation of skilled PT for functional strengthening  Plan: Continue per plan of care        Precautions:     Diagnosis:    Precautions:    Primary Goals:    *asterisks by exercise = given for HEP   Manuals                                                There Ex        Bike   5 mins     Seated hip ADD 5"x10 5"x20 5" 20x     Supine clam  YTB x20 RTB 5" 20x     Standing hip extension  YTB x20 ea YTB x20 ea     Standing hip abduction   YTB x20 ea                                     Neuro Re-Ed        Seated Ab brace 5"x10  10" 10x seated      Bridge  2x10 2x10     PPT  5"x15 5" 20x in HL     Anti rotation  8# 5"x10      AB brace with march  Standing YTB x15ea Seated 10x ea     Anti rotation walk-outs   8# 4 laps ea     Wobble board   1 5'/1 5'                                              Re-evaluation              Ther Act              Squat machine              sidestepping      RTB 5 laps at [facility]       Modalities

## 2022-07-14 ENCOUNTER — RA CDI HCC (OUTPATIENT)
Dept: OTHER | Facility: HOSPITAL | Age: 27
End: 2022-07-14

## 2022-07-14 ENCOUNTER — OFFICE VISIT (OUTPATIENT)
Dept: PHYSICAL THERAPY | Facility: CLINIC | Age: 27
End: 2022-07-14
Payer: MEDICARE

## 2022-07-14 DIAGNOSIS — M54.16 LUMBAR RADICULOPATHY: ICD-10-CM

## 2022-07-14 DIAGNOSIS — M25.552 LEFT HIP PAIN: Primary | ICD-10-CM

## 2022-07-14 PROCEDURE — 97112 NEUROMUSCULAR REEDUCATION: CPT

## 2022-07-14 PROCEDURE — 97110 THERAPEUTIC EXERCISES: CPT

## 2022-07-14 NOTE — PROGRESS NOTES
Daily Note     Today's date: 2022  Patient name: Fannie Wheeler  : 1995  MRN: [id number]  Referring provider: Antonina Aragon DO  Dx:   Encounter Diagnosis     ICD-10-CM    1  Left hip pain  M25 552    2  Lumbar radiculopathy  M54 16                   Subjective: The patient reports that her back is not bothering her its just her left hip  Objective: See treatment diary below      Assessment: Tolerated treatment fair  Patient demonstrated fatigue post treatment and would benefit from continued PT     The patient has trouble lying on her left hip because it is causing pain so we avoided this today  She is getting better at activating her core but she still requires cues  The patient was able to complete 10 reps on the leg press before she started to get pain  She also had more discomfort during her hip ABD and extension exercises  I instructed her to keep working on this hip strengthening with her HEP  Plan: Continue per plan of care        Precautions:     Diagnosis:    Precautions:    Primary Goals:    *asterisks by exercise = given for HEP   Manuals                                             There Ex        Bike   5 mins     Seated hip ADD 5"x10 5"x20 5" 20x     Supine clam  YTB x20 RTB 5" 20x RTB 5" x20    Standing hip extension  YTB x20 ea YTB x20 ea YTB x10    Standing hip abduction   YTB x20 ea YTB x10    LTR    x10ea                            Neuro Re-Ed        Seated Ab brace 5"x10  10" 10x seated      Bridge  2x10 2x10 2x10    PPT  5"x15 5" 20x in HL 5"x20    Anti rotation  8# 5"x10      AB brace with march  Standing YTB x15ea Seated 10x ea     Anti rotation walk-outs   8# 4 laps ea 8# 4 laps ea    Wobble board   1 5'/1 5'                                              Re-evaluation              Ther Act              Squat machine        40# x10      sidestepping      RTB 5 laps at [facility]       Modalities

## 2022-07-14 NOTE — PROGRESS NOTES
[facility]  coding opportunities       Chart reviewed, no opportunity found: CHART REVIEWED, NO OPPORTUNITY FOUND        Patients Insurance           CQ[employer] -CKD     Z99 2 NOT BILLED ON 3/31/22- PT NOT INTERESTED IN DIALYSIS RIGHT NOW

## 2022-07-19 ENCOUNTER — OFFICE VISIT (OUTPATIENT)
Dept: PHYSICAL THERAPY | Facility: CLINIC | Age: 27
End: 2022-07-19
Payer: MEDICARE

## 2022-07-19 DIAGNOSIS — M25.552 LEFT HIP PAIN: Primary | ICD-10-CM

## 2022-07-19 DIAGNOSIS — M54.16 LUMBAR RADICULOPATHY: ICD-10-CM

## 2022-07-19 PROCEDURE — 97140 MANUAL THERAPY 1/> REGIONS: CPT

## 2022-07-19 PROCEDURE — 97112 NEUROMUSCULAR REEDUCATION: CPT

## 2022-07-19 PROCEDURE — 97110 THERAPEUTIC EXERCISES: CPT

## 2022-07-19 NOTE — PROGRESS NOTES
Daily Note     Today's date: 2022  Patient name: Leonel Cano  : 1995  MRN: [id number]  Referring provider: Robbi Chris DO  Dx:   Encounter Diagnosis     ICD-10-CM    1  Left hip pain  M25 552    2  Lumbar radiculopathy  M54 16                   Subjective: The patient reports that her left hip got tight in the car and when she went to move it she her a crack and it was a little sore after this  Objective: See treatment diary below      Assessment: Tolerated treatment well  Patient demonstrated fatigue post treatment and would benefit from continued PT    The patient was having a lot of hip stiffness today  I did two kinds of mobilizations and this seemed to both help her ROM and decrease pain  She also seemed to have less discomfort with her hip strengthening exercises  I was able to progress a few of her exercises and she maintained good form  Plan: Continue per plan of care        Precautions:     Diagnosis:    Precautions:    Primary Goals:    *asterisks by exercise = given for HEP   Manuals    Hip mobs - Inf, lat     AB                                   There Ex        Bike   5 mins     Seated hip ADD 5"x10 5"x20 5" 20x     Supine clam  YTB x20 RTB 5" 20x RTB 5" x20 RTB 5"x20   Standing hip extension  YTB x20 ea YTB x20 ea YTB x10 RTB x10ea   Standing hip abduction   YTB x20 ea YTB x10 RTB x 10ea   LTR    x10ea x10ea                           Neuro Re-Ed        Seated Ab brace 5"x10  10" 10x seated      Bridge  2x10 2x10 2x10 2x10   PPT  5"x15 5" 20x in HL 5"x20 5"x20   Anti rotation  8# 5"x10      AB brace with march  Standing YTB x15ea Seated 10x ea     Anti rotation walk-outs   8# 4 laps ea 8# 4 laps ea 8# 4 laps ea   Wobble board   1 5'/1 5'                                              Re-evaluation              Ther Act              Squat machine        40# x10  40# x 20    sidestepping      RTB 5 laps at int       Modalities

## 2022-07-21 ENCOUNTER — OFFICE VISIT (OUTPATIENT)
Dept: PHYSICAL THERAPY | Facility: CLINIC | Age: 27
End: 2022-07-21
Payer: MEDICARE

## 2022-07-21 DIAGNOSIS — N25.81 SECONDARY HYPERPARATHYROIDISM OF RENAL ORIGIN (HCC): ICD-10-CM

## 2022-07-21 DIAGNOSIS — M54.16 LUMBAR RADICULOPATHY: ICD-10-CM

## 2022-07-21 DIAGNOSIS — I12.9 BENIGN HYPERTENSION WITH CKD (CHRONIC KIDNEY DISEASE) STAGE III (HCC): ICD-10-CM

## 2022-07-21 DIAGNOSIS — M25.552 LEFT HIP PAIN: Primary | ICD-10-CM

## 2022-07-21 DIAGNOSIS — N18.30 BENIGN HYPERTENSION WITH CKD (CHRONIC KIDNEY DISEASE) STAGE III (HCC): ICD-10-CM

## 2022-07-21 PROCEDURE — 97112 NEUROMUSCULAR REEDUCATION: CPT

## 2022-07-21 PROCEDURE — 97140 MANUAL THERAPY 1/> REGIONS: CPT

## 2022-07-21 RX ORDER — LABETALOL 100 MG/1
TABLET, FILM COATED ORAL
Qty: 60 TABLET | Refills: 0 | Status: SHIPPED | OUTPATIENT
Start: 2022-07-21 | End: 2022-08-18

## 2022-07-21 RX ORDER — CALCITRIOL 0.25 UG/1
CAPSULE, LIQUID FILLED ORAL
Qty: 30 CAPSULE | Refills: 0 | Status: SHIPPED | OUTPATIENT
Start: 2022-07-21 | End: 2022-09-20 | Stop reason: SDUPTHER

## 2022-07-21 NOTE — PROGRESS NOTES
Daily Note     Today's date: 2022  Patient name: Angella Wade  : 1995  MRN: [id number]  Referring provider: Camilo Gonzalez DO  Dx:   Encounter Diagnosis     ICD-10-CM    1  Left hip pain  M25 552    2  Lumbar radiculopathy  M54 16                   Subjective: The patient reports that her left hip is feeling stiff today  Objective: See treatment diary below      Assessment: Tolerated treatment well  Patient demonstrated fatigue post treatment and would benefit from continued PT    After doing hip mobilizations she seemed to have much less hip stiffness during ambulation  The patient is doing better with her core activation when I tested her during her PPT exercise  I was able to progress her anti rotation exercise and she did fairly well with this  Plan: Continue per plan of care        Precautions:     Diagnosis:    Precautions:    Primary Goals:    *asterisks by exercise = given for HEP   Manuals    Hip mobs - Inf, lat AB    AB                                   There Ex        Bike   5 mins     Seated hip ADD  5"x20 5" 20x     Supine clam RTB 5"x20 YTB x20 RTB 5" 20x RTB 5" x20 RTB 5"x20   Standing hip extension RTB x20ea YTB x20 ea YTB x20 ea YTB x10 RTB x10ea   Standing hip abduction RTB x20ea  YTB x20 ea YTB x10 RTB x 10ea   LTR x10ea   x10ea x10ea                           Neuro Re-Ed        Seated Ab brace   10" 10x seated      Bridge 2x10 2x10 2x10 2x10 2x10   PPT W/ ball 5"x20 5"x15 5" 20x in HL 5"x20 5"x20   Anti rotation  8# 5"x10      AB brace with march  Standing YTB x15ea Seated 10x ea     Anti rotation walk-outs 10# 4 laps  8# 4 laps ea 8# 4 laps ea 8# 4 laps ea   Wobble board   1 5'/1 5'                                              Re-evaluation             Ther Act             Squat machine 40#x20      40# x10  40# x 20    sidestepping     RTB 5 laps at [facility]       Modalities

## 2022-07-22 ENCOUNTER — RA CDI HCC (OUTPATIENT)
Dept: OTHER | Facility: HOSPITAL | Age: 27
End: 2022-07-22

## 2022-07-22 NOTE — PROGRESS NOTES
Chronic kidney disease (CKD), active medical management without dialysis, stage 5 ([facility] )    DOC    N18 5 - not on dialysis     [facility] 75  coding opportunities       Chart reviewed, no opportunity found: CHART REVIEWED, NO OPPORTUNITY FOUND        Patients Insurance

## 2022-08-04 ENCOUNTER — APPOINTMENT (OUTPATIENT)
Dept: PHYSICAL THERAPY | Facility: CLINIC | Age: 27
End: 2022-08-04
Payer: MEDICARE

## 2022-08-11 ENCOUNTER — OFFICE VISIT (OUTPATIENT)
Dept: PHYSICAL THERAPY | Facility: CLINIC | Age: 27
End: 2022-08-11
Payer: MEDICARE

## 2022-08-11 DIAGNOSIS — M54.16 LUMBAR RADICULOPATHY: ICD-10-CM

## 2022-08-11 DIAGNOSIS — M25.552 LEFT HIP PAIN: Primary | ICD-10-CM

## 2022-08-11 PROCEDURE — 97112 NEUROMUSCULAR REEDUCATION: CPT

## 2022-08-11 PROCEDURE — 97110 THERAPEUTIC EXERCISES: CPT

## 2022-08-11 PROCEDURE — 97140 MANUAL THERAPY 1/> REGIONS: CPT

## 2022-08-11 NOTE — PROGRESS NOTES
PT Evaluation     Today's date: 2022  Patient name: Janice Euceda  : 1995  MRN: [id number]  Referring provider: Janice Richmond DO  Dx:   Encounter Diagnosis     ICD-10-CM    1  Left hip pain  M25 552    2  Lumbar radiculopathy  M54 16                   Assessment  Assessment details: The patient has not made much progress since the start of therapy  She had less pain with lumbar ROM testing but she still has a lot of tightness  Her strength did not make much improvements either and this is why her pain levels are still high  The reason she has not made that much improvements is inconsistent attendance to PT and inconsistent HEP completion  I discussed with the patient the importance of attending PT and completing her HEP  I redesigned her HEP so she can continue to make improvements in between PT sessions  As we move forward we will continue to work on improving the patient's lumbar and hip ROM, core and BLE strength  Impairments: abnormal or restricted ROM, abnormal movement, activity intolerance, impaired physical strength, pain with function and weight-bearing intolerance    Goals  STG : (2 weeks) - Patient demonstrates independence with HEP to be able to transition to HEP in the long term  - 50%  (2 weeks) - Decrease patient's pain by 50%  - 0%  (4 weeks) - Improve lumbar and hip ROM to [facility] - 20%    LTG: (6 weeks) - The patient is able to walk half a mile with out needing a break  - 10%  (8 weeks) - Improve patient's core and BLE strength to at least 4/5 to allow the patient to sit or stand for extended periods of time  with minimal pain   - 20%      Plan  Patient would benefit from: skilled physical therapy  Planned modality interventions: cryotherapy and thermotherapy: hydrocollator packs  Planned therapy interventions: therapeutic exercise, neuromuscular re-education, manual therapy and therapeutic activities  Frequency: 2x week  Duration in weeks: 8        Subjective Evaluation    History of Present Illness  Date of onset: 2022  Mechanism of injury: The patient reports that she had a slip and fall and landed on her left hip and left side  The patient had an X-ray but it came back negative  The patient is still dealing with left hip pain when she sits and when she lays down  She also has increased swelling when she does a lot of standing and walking  The patient also notices that she can get numbness running from her left hip down into her foot  Re-Evaluation - The patient reports that her hip is still very painful  It really bothers her when she goes on longer walks  Pain  Current pain ratin  At worst pain rating: 10  Location: left hip and low back  Quality: knife-like and throbbing  Relieving factors: rest, ice and medications  Aggravating factors: sitting, standing and walking    Patient Goals  Patient goal: The patient wants to be able to sit longer in chairs without pain          Objective     Active Range of Motion     Lumbar   Flexion:  Restriction level: minimal  Extension:  with pain Restriction level: minimal  Left lateral flexion:  with pain Restriction level: moderate  Right lateral flexion:  with pain Restriction level: moderate  Left rotation:  with pain Restriction level: minimal  Right rotation:  Restriction level: minimal    Right Hip   External rotation (90/90): 21 degrees with pain  Internal rotation (90/90): 19 degrees with pain    Strength/Myotome Testing     Left Hip   Planes of Motion   Flexion: 3+  Extension: 3+  Abduction: 3+  External rotation: 3+    Right Hip   Planes of Motion   Flexion: 4  Extension: 3+  Abduction: 3+  External rotation: 4-    Left Knee   Flexion: 3+  Extension: 3+    Right Knee   Flexion: 4-  Extension: 4    Additional Strength Details  Pain with L hip flexion, hip ER, ABD, knee flexion and extension    Tests     Lumbar     Left   Positive slump test               Precautions:     Diagnosis:    Precautions: Primary Goals:    *asterisks by exercise = given for HEP   Manuals 7/21 8/11 7/12 7/14 7/19   Hip mobs - Inf, lat AB    AB   STM - lumbar  AB                              There Ex        Bike   5 mins     Seated hip ADD   5" 20x     Supine clam RTB 5"x20  RTB 5" 20x RTB 5" x20 RTB 5"x20   Standing hip extension RTB x20ea RTB x20ea YTB x20 ea YTB x10 RTB x10ea   Standing hip abduction RTB x20ea RTB x20ea YTB x20 ea YTB x10 RTB x 10ea   LTR x10ea x20ea  x10ea x10ea                           Neuro Re-Ed        Seated Ab brace   10" 10x seated      Bridge 2x10 2x10 2x10 2x10 2x10   PPT W/ ball 5"x20  5" 20x in HL 5"x20 5"x20   Anti rotation        AB brace with march   Seated 10x ea     Anti rotation walk-outs 10# 4 laps  8# 4 laps ea 8# 4 laps ea 8# 4 laps ea   Wobble board   1 5'/1 5'                                              Re-evaluation            Ther Act            Squat machine 40#x20     40# x10  40# x 20    sidestepping    RTB 5 laps at [facility]       Modalities

## 2022-08-16 ENCOUNTER — OFFICE VISIT (OUTPATIENT)
Dept: PHYSICAL THERAPY | Facility: CLINIC | Age: 27
End: 2022-08-16
Payer: MEDICARE

## 2022-08-16 DIAGNOSIS — M25.552 LEFT HIP PAIN: Primary | ICD-10-CM

## 2022-08-16 DIAGNOSIS — M54.16 LUMBAR RADICULOPATHY: ICD-10-CM

## 2022-08-16 PROCEDURE — 97140 MANUAL THERAPY 1/> REGIONS: CPT

## 2022-08-16 PROCEDURE — 97110 THERAPEUTIC EXERCISES: CPT

## 2022-08-16 PROCEDURE — 97112 NEUROMUSCULAR REEDUCATION: CPT

## 2022-08-16 NOTE — PROGRESS NOTES
Daily Note     Today's date: 2022  Patient name: Radha Child  : 1995  MRN: [id number]  Referring provider: Alvaro Sharma DO  Dx:   Encounter Diagnosis     ICD-10-CM    1  Left hip pain  M25 552    2  Lumbar radiculopathy  M54 16                   Subjective: The patient reports that she is sore after walking around her sisters school  Objective: See treatment diary below      Assessment: Tolerated treatment well  Patient demonstrated fatigue post treatment and would benefit from continued PT    I added in prone swimmers and this challenged the patient's posterior musculature  The patient appears to be having endurance issues as she looses muscle strength as she does more repetitions  I discussed with the patient about taking breaks so she could push her muscles harder  Swiss ball pull downs and X-walks were added in today  These were tough for the patient but she was able to complete them  Plan: Progress treatment as tolerated         Precautions:     Diagnosis:    Precautions:    Primary Goals:    *asterisks by exercise = given for HEP   Manuals    Hip mobs - Inf, lat AB    AB   STM - lumbar  AB AB                             There Ex        Bike        Seated hip ADD        Supine clam RTB 5"x20  RTB x20 Progress RTB 5"x20   Standing hip extension RTB x20ea RTB x20ea RTB x20ea YTB x10 RTB x10ea   Standing hip abduction RTB x20ea RTB x20ea RTB x20ea YTB x10 RTB x 10ea   LTR x10ea x20ea x20ea x10ea x10ea                           Neuro Re-Ed        Seated Ab brace        Bridge 2x10 2x10 2x10 2x10 2x10   PPT W/ ball 5"x20  W/ ball 5"x20 5"x20 5"x20   Anti rotation        AB brace with march        Anti rotation walk-outs 10# 4 laps  10# 4 laps 8# 4 laps ea 8# 4 laps ea   Prone Swimmers   x10ea     W/ ball pull down   12 5# x20                                      Re-evaluation           Ther Act           Squat machine 40#x20  50# x20  40# x10  40# x 20    X walks          Modalities

## 2022-08-18 ENCOUNTER — OFFICE VISIT (OUTPATIENT)
Dept: PHYSICAL THERAPY | Facility: CLINIC | Age: 27
End: 2022-08-18
Payer: MEDICARE

## 2022-08-18 DIAGNOSIS — I12.9 BENIGN HYPERTENSION WITH CKD (CHRONIC KIDNEY DISEASE) STAGE III (HCC): ICD-10-CM

## 2022-08-18 DIAGNOSIS — M54.16 LUMBAR RADICULOPATHY: ICD-10-CM

## 2022-08-18 DIAGNOSIS — N18.30 BENIGN HYPERTENSION WITH CKD (CHRONIC KIDNEY DISEASE) STAGE III (HCC): ICD-10-CM

## 2022-08-18 DIAGNOSIS — M25.552 LEFT HIP PAIN: Primary | ICD-10-CM

## 2022-08-18 PROCEDURE — 97112 NEUROMUSCULAR REEDUCATION: CPT

## 2022-08-18 PROCEDURE — 97110 THERAPEUTIC EXERCISES: CPT

## 2022-08-18 PROCEDURE — 97140 MANUAL THERAPY 1/> REGIONS: CPT

## 2022-08-18 RX ORDER — LABETALOL 100 MG/1
TABLET, FILM COATED ORAL
Qty: 60 TABLET | Refills: 0 | Status: SHIPPED | OUTPATIENT
Start: 2022-08-18 | End: 2022-10-13 | Stop reason: SDUPTHER

## 2022-08-18 NOTE — PROGRESS NOTES
sDaily Note     Today's date: 2022  Patient name: Hugo Ganser  : 1995  MRN: [id number]  Referring provider: Marissa Rider DO  Dx:   Encounter Diagnosis     ICD-10-CM    1  Left hip pain  M25 552    2  Lumbar radiculopathy  M54 16                   Subjective: The patient reports that her left hip is a little sore today  Objective: See treatment diary below      Assessment: Tolerated treatment well  Patient demonstrated fatigue post treatment and would benefit from continued PT    The patient was progressed today with her clams and bridges showing that her hip strength is improving  These progressions did challenge her hip strength and I could tell she was fatiguing towards the end of her repetitions  She is still having trouble with X walks as it puts a lot of stress on the left hip  Plan: Progress treatment as tolerated         Precautions:     Diagnosis:    Precautions:    Primary Goals:    *asterisks by exercise = given for HEP   Manuals    Hip mobs - Inf, lat AB    AB   STM - lumbar  AB AB AB                            There Ex        Bike        Seated hip ADD        Supine clam RTB 5"x20  RTB x20 GTB x20 RTB 5"x20   Standing hip extension RTB x20ea RTB x20ea RTB x20ea  RTB x10ea   Standing hip abduction RTB x20ea RTB x20ea RTB x20ea  RTB x 10ea   LTR x10ea x20ea x20ea x20ea x10ea                           Neuro Re-Ed        Seated Ab brace        Bridge 2x10 2x10 2x10 W/march x15 2x10   PPT W/ ball 5"x20  W/ ball 5"x20 W/ball 5"x20 5"x20   Anti rotation        AB brace with march        Anti rotation walk-outs 10# 4 laps  10# 4 laps 10# 4 laps 8# 4 laps ea   Prone Swimmers   x10ea x10ea    W/ ball pull down   12 5# x20 12 5# x20                                     Re-evaluation          Ther Act          Squat machine 40#x20  50# x20 50# x20  40# x 20    X walks   40' x1 40' 1 lap     Modalities

## 2022-08-24 DIAGNOSIS — E55.9 VITAMIN D DEFICIENCY: ICD-10-CM

## 2022-08-24 RX ORDER — ERGOCALCIFEROL 1.25 MG/1
CAPSULE ORAL
Qty: 12 CAPSULE | Refills: 0 | Status: SHIPPED | OUTPATIENT
Start: 2022-08-24

## 2022-08-25 ENCOUNTER — APPOINTMENT (OUTPATIENT)
Dept: PHYSICAL THERAPY | Facility: CLINIC | Age: 27
End: 2022-08-25
Payer: MEDICARE

## 2022-08-26 ENCOUNTER — OFFICE VISIT (OUTPATIENT)
Dept: PHYSICAL THERAPY | Facility: CLINIC | Age: 27
End: 2022-08-26
Payer: MEDICARE

## 2022-08-26 DIAGNOSIS — M25.552 LEFT HIP PAIN: Primary | ICD-10-CM

## 2022-08-26 DIAGNOSIS — M54.16 LUMBAR RADICULOPATHY: ICD-10-CM

## 2022-08-26 PROCEDURE — 97110 THERAPEUTIC EXERCISES: CPT

## 2022-08-26 PROCEDURE — 97112 NEUROMUSCULAR REEDUCATION: CPT

## 2022-08-26 PROCEDURE — 97140 MANUAL THERAPY 1/> REGIONS: CPT

## 2022-08-26 NOTE — PROGRESS NOTES
Daily Note     Today's date: 2022  Patient name: Marisel Cho  : 1995  MRN: [id number]  Referring provider: Elvi Faye DO  Dx:   Encounter Diagnosis     ICD-10-CM    1  Left hip pain  M25 552    2  Lumbar radiculopathy  M54 16                   Subjective: The patient reports that her back is not bothering her as much today  Objective: See treatment diary below      Assessment: Tolerated treatment well  Patient demonstrated fatigue post treatment and would benefit from continued PT    The patient is still having decreased muscle strength in the left hip and this is obvious during her prone swimmers  We worked on this today by adding in single leg on the squat machine  She did fairly well with this and was getting better muscle activation  Plan: Continue per plan of care        Precautions:     Diagnosis:    Precautions:    Primary Goals:    *asterisks by exercise = given for HEP   Manuals    Hip mobs - Inf, lat AB       STM - lumbar  AB AB AB AB                           There Ex        Bike        Seated hip ADD        Supine clam RTB 5"x20  RTB x20 GTB x20 GTB x20   Standing hip extension RTB x20ea RTB x20ea RTB x20ea  GTB x20ea   Standing hip abduction RTB x20ea RTB x20ea RTB x20ea  GTB x20ea   LTR x10ea x20ea x20ea x20ea x20ea                           Neuro Re-Ed        Seated Ab brace        Bridge 2x10 2x10 2x10 W/march x15    PPT W/ ball 5"x20  W/ ball 5"x20 W/ball 5"x20 W/ball 5"x20   Anti rotation        AB brace with march        Anti rotation walk-outs 10# 4 laps  10# 4 laps 10# 4 laps    Prone Swimmers   x10ea x10ea x10ea   W/ ball pull down   12 5# x20 12 5# x20 15# x20                                    Re-evaluation         Ther Act         Squat machine 40#x20  50# x20 50# x20 50# SL x20    X walks   40' x1 40' 1 lap    Modalities

## 2022-08-30 ENCOUNTER — OFFICE VISIT (OUTPATIENT)
Dept: URGENT CARE | Facility: CLINIC | Age: 27
End: 2022-08-30
Payer: MEDICARE

## 2022-08-30 ENCOUNTER — OFFICE VISIT (OUTPATIENT)
Dept: PHYSICAL THERAPY | Facility: CLINIC | Age: 27
End: 2022-08-30
Payer: MEDICARE

## 2022-08-30 VITALS
HEIGHT: 64 IN | OXYGEN SATURATION: 100 % | TEMPERATURE: 98.4 F | WEIGHT: 252 LBS | RESPIRATION RATE: 16 BRPM | HEART RATE: 88 BPM | DIASTOLIC BLOOD PRESSURE: 98 MMHG | BODY MASS INDEX: 43.02 KG/M2 | SYSTOLIC BLOOD PRESSURE: 168 MMHG

## 2022-08-30 DIAGNOSIS — M54.16 LUMBAR RADICULOPATHY: ICD-10-CM

## 2022-08-30 DIAGNOSIS — L08.9 LOCAL INFECTION OF THE SKIN AND SUBCUTANEOUS TISSUE, UNSPECIFIED: Primary | ICD-10-CM

## 2022-08-30 DIAGNOSIS — M25.552 LEFT HIP PAIN: Primary | ICD-10-CM

## 2022-08-30 PROCEDURE — 97110 THERAPEUTIC EXERCISES: CPT

## 2022-08-30 PROCEDURE — 97112 NEUROMUSCULAR REEDUCATION: CPT

## 2022-08-30 PROCEDURE — 97140 MANUAL THERAPY 1/> REGIONS: CPT

## 2022-08-30 PROCEDURE — 99213 OFFICE O/P EST LOW 20 MIN: CPT | Performed by: PHYSICIAN ASSISTANT

## 2022-08-30 RX ORDER — AMOXICILLIN AND CLAVULANATE POTASSIUM 875; 125 MG/1; MG/1
1 TABLET, FILM COATED ORAL EVERY 12 HOURS SCHEDULED
Qty: 20 TABLET | Refills: 0 | Status: SHIPPED | OUTPATIENT
Start: 2022-08-30 | End: 2022-09-09

## 2022-08-30 NOTE — PROGRESS NOTES
Daily Note     Today's date: 2022  Patient name: Marisel Cho  : 1995  MRN: [id number]  Referring provider: Elvi Faye DO  Dx:   Encounter Diagnosis     ICD-10-CM    1  Left hip pain  M25 552    2  Lumbar radiculopathy  M54 16                   Subjective: The patient reports that her left hip was a little sore after last session  Objective: See treatment diary below      Assessment: Tolerated treatment well  Patient demonstrated fatigue post treatment and would benefit from continued PT    We have been focusing in on her LLE and making sure she maintains good form to get proper muscle activation  This is requiring her to take more rest breaks but she is working her muscles properly  She still has the biggest trouble with her hip strengthening exercises on the left  Plan: Continue per plan of care        Precautions:     Diagnosis:    Precautions:    Primary Goals:    *asterisks by exercise = given for HEP   Manuals    Hip mobs - Inf, lat        STM - lumbar AB AB AB AB AB                           There Ex        Bike        Seated hip ADD        Supine clam GTB x20 Progress RTB x20 GTB x20 GTB x20   Standing hip extension GTB x20 RTB x20ea RTB x20ea  GTB x20ea   Standing hip abduction GTB x20 RTB x20ea RTB x20ea  GTB x20ea   LTR x20ea x20ea x20ea x20ea x20ea                           Neuro Re-Ed        Seated Ab brace        Bridge W/ march x15 2x10 2x10 W/march x15    PPT W/ Ball 5"x20  W/ ball 5"x20 W/ball 5"x20 W/ball 5"x20   Anti rotation        AB brace with march        Anti rotation walk-outs   10# 4 laps 10# 4 laps    Prone Swimmers x10ea  x10ea x10ea x10ea   W/ ball pull down 15# x20  12 5# x20 12 5# x20 15# x20   Supine Pull down GTB 2x10                                Re-evaluation         Ther Act         Squat machine 50# SL x20ea  50# x20 50# x20 50# SL x20    X walks GTB 1 lap  40' x1 40' 1 lap    Modalities

## 2022-08-30 NOTE — PROGRESS NOTES
[facility]        NAME: Main Gaspar is a 24 y o  female  : 1995    MRN: [id number]  DATE: 2022  TIME: 7:08 PM    Assessment and Plan   Local infection of the skin and subcutaneous tissue, unspecified [L08 9]  1  Local infection of the skin and subcutaneous tissue, unspecified  amoxicillin-clavulanate (AUGMENTIN) 875-125 mg per tablet         Patient Instructions       Follow up with PCP in 3-5 days  Proceed to  ER if symptoms worsen  Chief Complaint     Chief Complaint   Patient presents with    Allergic Reaction     Pt  States that she ate a new kind of yogurt last and woke up this am with the left side of her face swollen  Pt  States that she took a multi sx allergy pill this am, but it didn't help  History of Present Illness       Patient here for evaluation of swelling of her face and is concerned about possible allergic reaction to yogurt she ate last night  Review of Systems   Review of Systems   Constitutional: Negative  HENT: Positive for dental problem and facial swelling  Negative for congestion, ear discharge, ear pain, mouth sores, postnasal drip, rhinorrhea, sinus pressure, sinus pain, sore throat, trouble swallowing and voice change  Eyes: Negative  Respiratory: Negative  Cardiovascular: Negative  Gastrointestinal: Negative  Neurological: Negative            Current Medications       Current Outpatient Medications:     amLODIPine (NORVASC) 5 mg tablet, Take 1 tablet (5 mg total) by mouth 2 (two) times a day, Disp: 90 tablet, Rfl: 3    amoxicillin-clavulanate (AUGMENTIN) 875-125 mg per tablet, Take 1 tablet by mouth every 12 (twelve) hours for 10 days, Disp: 20 tablet, Rfl: 0    calcitriol (ROCALTROL) 0 25 mcg capsule, TAKE 1 CAPSULE BY MOUTH EVERY DAY, Disp: 30 capsule, Rfl: 0    ergocalciferol (VITAMIN D2) 50,000 units, TAKE 1 CAPSULE BY MOUTH ONE TIME PER WEEK, Disp: 12 capsule, Rfl: 0    etonogestrel (Melanie Locks) subdermal implant, 68 mg by Subdermal route once, Disp: , Rfl:     ferrous sulfate 325 (65 Fe) mg tablet, Take 1 tablet (325 mg total) by mouth daily with breakfast, Disp: 90 tablet, Rfl: 3    fluticasone (Flovent HFA) 110 MCG/ACT inhaler, Inhale 1 puff 2 (two) times a day, Disp: 12 g, Rfl: 0    labetalol (NORMODYNE) 100 mg tablet, TAKE 1 TABLET BY MOUTH TWICE A DAY, Disp: 60 tablet, Rfl: 0    sodium bicarbonate 650 mg tablet, Take 1 tablet (650 mg total) by mouth 2 (two) times a day, Disp: 60 tablet, Rfl: 0    acetaminophen (TYLENOL) 325 mg tablet, Take 2 tablets (650 mg total) by mouth every 6 (six) hours as needed for mild pain or headaches, Disp: 30 tablet, Rfl: 0    albuterol (2 5 mg/3 mL) 0 083 % nebulizer solution, Take 3 mL (2 5 mg total) by nebulization every 6 (six) hours as needed for wheezing or shortness of breath, Disp: 30 mL, Rfl: 0    albuterol (Ventolin HFA) 90 mcg/act inhaler, Inhale 2 puffs every 6 (six) hours as needed for wheezing or shortness of breath (cough), Disp: 18 g, Rfl: 0    Blood Pressure KIT, by Does not apply route daily (Patient not taking: No sig reported), Disp: 1 each, Rfl: 0    Current Allergies     Allergies as of 08/30/2022 - Reviewed 08/30/2022   Allergen Reaction Noted    Wheat bran - food allergy Itching 12/19/2019            The following portions of the patient's history were reviewed and updated as appropriate: allergies, current medications, past family history, past medical history, past social history, past surgical history and problem list      Past Medical History:   Diagnosis Date    Asthma     Chronic kidney disease     Eczema     Headache     Hypertension     Wears glasses        Past Surgical History:   Procedure Laterality Date    CHOLECYSTECTOMY      CT NEEDLE BIOPSY KIDNEY  1/29/2020    KELOID EXCISION Left 3/30/2021    Procedure: POSTERIOR EAR EXCISION KELOID, FLAP RECONSTRUCTION;  Surgeon: Mikey Baker MD;  Location: [facility]; Service: Plastics       Family History   Problem Relation Age of Onset    Asthma Mother     No Known Problems Father          Medications have been verified  Objective   /98   Pulse 88   Temp 98 4 °F (36 9 °C)   Resp 16   Ht 5' 4" (1 626 m)   Wt 114 kg (252 lb)   SpO2 100%   BMI 43 26 kg/m²   No LMP recorded  Physical Exam     Physical Exam  Vitals and nursing note reviewed  Constitutional:       General: She is not in acute distress  Appearance: Normal appearance  She is well-developed  She is not ill-appearing, toxic-appearing or diaphoretic  HENT:      Head: Atraumatic  Comments: Left facial swelling from the lower eyelid to the upper lip with central mild erythema and warmth and slight induration  No pustule  No rash  Nose: Nose normal  No congestion or rhinorrhea  Mouth/Throat:      Mouth: Mucous membranes are moist       Pharynx: Oropharynx is clear  No oropharyngeal exudate or posterior oropharyngeal erythema  Eyes:      General:         Right eye: No discharge  Left eye: No discharge  Extraocular Movements: Extraocular movements intact  Conjunctiva/sclera: Conjunctivae normal       Pupils: Pupils are equal, round, and reactive to light  Cardiovascular:      Rate and Rhythm: Normal rate and regular rhythm  Pulmonary:      Effort: Pulmonary effort is normal       Breath sounds: Normal breath sounds  Lymphadenopathy:      Cervical: No cervical adenopathy  Skin:     General: Skin is warm and dry  Findings: No rash  Neurological:      General: No focal deficit present  Mental Status: She is alert and oriented to person, place, and time  Psychiatric:         Mood and Affect: Mood normal          Behavior: Behavior normal          Thought Content:  Thought content normal          Judgment: Judgment normal

## 2022-08-30 NOTE — PATIENT INSTRUCTIONS
1   Apply warm compresses for    2  Take probiotics [i e  Yogurt, Acidophilus, Florastor (liquid)] daily  3   Over-the-counter medications as needed for symptomatic care  4    Advance activities as tolerated  5    Follow-up with your primary care physician in 3-4 days  6   Go to emergency room if symptoms are worsening

## 2022-09-01 ENCOUNTER — TELEPHONE (OUTPATIENT)
Dept: FAMILY MEDICINE CLINIC | Facility: CLINIC | Age: 27
End: 2022-09-01

## 2022-09-01 ENCOUNTER — OFFICE VISIT (OUTPATIENT)
Dept: PHYSICAL THERAPY | Facility: CLINIC | Age: 27
End: 2022-09-01
Payer: MEDICARE

## 2022-09-01 DIAGNOSIS — M54.16 LUMBAR RADICULOPATHY: ICD-10-CM

## 2022-09-01 DIAGNOSIS — M25.552 LEFT HIP PAIN: Primary | ICD-10-CM

## 2022-09-01 PROCEDURE — 97110 THERAPEUTIC EXERCISES: CPT

## 2022-09-01 PROCEDURE — 97112 NEUROMUSCULAR REEDUCATION: CPT

## 2022-09-01 PROCEDURE — 97140 MANUAL THERAPY 1/> REGIONS: CPT

## 2022-09-01 NOTE — TELEPHONE ENCOUNTER
It appears she is taking it every 12 hours  This should be fine to complete this course of antibiotics  Thank you

## 2022-09-01 NOTE — TELEPHONE ENCOUNTER
Patient is taking amoxicillin and called to ask if this is okay for her to take with her kidney disease

## 2022-09-02 NOTE — PROGRESS NOTES
Daily Note     Today's date: 2022  Patient name: Angella Wade  : 1995  MRN: [id number]  Referring provider: Camilo Gonzalez DO  Dx:   Encounter Diagnosis     ICD-10-CM    1  Left hip pain  M25 552    2  Lumbar radiculopathy  M54 16        Start Time:   Stop Time:   Total time in clinic (min): 45 minutes    Subjective: The patient reports that her left hip pain is a 3-4/10 today  Objective: See treatment diary below      Assessment: Tolerated treatment well  Patient demonstrated fatigue post treatment and would benefit from continued PT    The patient is slowly making progress with her strengthening exercises  Her glut activation is getting better and it seems to be getting less painful  I added in a lift at the cable column and the patient demonstrated good core activation with this  Plan: Continue per plan of care        Precautions:     Diagnosis:    Precautions:    Primary Goals:    *asterisks by exercise = given for HEP   Manuals    Hip mobs - Inf, lat        STM - lumbar AB AB AB AB AB                           There Ex        Bike        Seated hip ADD        Supine clam GTB x20 BTB x20 RTB x20 GTB x20 GTB x20   Standing hip extension GTB x20  RTB x20ea  GTB x20ea   Standing hip abduction GTB x20  RTB x20ea  GTB x20ea   LTR x20ea x20ea x20ea x20ea x20ea                           Neuro Re-Ed        Seated Ab brace        Bridge W/ march x15 W/ march x15 2x10 W/march x15    PPT W/ Ball 5"x20 W/ Ball 5"x20 W/ ball 5"x20 W/ball 5"x20 W/ball 5"x20   Anti rotation        AB brace with march        Anti rotation walk-outs  10# 4 laps 10# 4 laps 10# 4 laps    Prone Swimmers x10ea x10ea x10ea x10ea x10ea   W/ ball pull down 15# x20  12 5# x20 12 5# x20 15# x20   Supine Pull down GTB 2x10                                Re-evaluation         Ther Act         Squat machine 50# SL x20ea 50# SL x20ea 50# x20 50# x20 50# SL x20   Lift  7 5# x10ea       X walks GTB 1 lap  40' x1 40' 1 lap    Modalities

## 2022-09-07 ENCOUNTER — APPOINTMENT (OUTPATIENT)
Dept: PHYSICAL THERAPY | Facility: CLINIC | Age: 27
End: 2022-09-07
Payer: MEDICARE

## 2022-09-15 ENCOUNTER — OFFICE VISIT (OUTPATIENT)
Dept: URGENT CARE | Age: 27
End: 2022-09-15
Payer: MEDICARE

## 2022-09-15 ENCOUNTER — APPOINTMENT (OUTPATIENT)
Dept: RADIOLOGY | Age: 27
End: 2022-09-15
Payer: MEDICARE

## 2022-09-15 VITALS
DIASTOLIC BLOOD PRESSURE: 82 MMHG | TEMPERATURE: 97.5 F | OXYGEN SATURATION: 100 % | SYSTOLIC BLOOD PRESSURE: 181 MMHG | RESPIRATION RATE: 20 BRPM | HEART RATE: 71 BPM

## 2022-09-15 DIAGNOSIS — S92.515A CLOSED NONDISPLACED FRACTURE OF PROXIMAL PHALANX OF LESSER TOE OF LEFT FOOT, INITIAL ENCOUNTER: ICD-10-CM

## 2022-09-15 DIAGNOSIS — T14.90XA INJURY: Primary | ICD-10-CM

## 2022-09-15 DIAGNOSIS — N17.9 ACUTE KIDNEY INJURY SUPERIMPOSED ON CHRONIC KIDNEY DISEASE (HCC): ICD-10-CM

## 2022-09-15 DIAGNOSIS — T14.90XA INJURY: ICD-10-CM

## 2022-09-15 DIAGNOSIS — N18.9 ACUTE KIDNEY INJURY SUPERIMPOSED ON CHRONIC KIDNEY DISEASE (HCC): ICD-10-CM

## 2022-09-15 PROCEDURE — 99213 OFFICE O/P EST LOW 20 MIN: CPT

## 2022-09-15 PROCEDURE — 73630 X-RAY EXAM OF FOOT: CPT

## 2022-09-15 RX ORDER — SODIUM BICARBONATE 650 MG/1
650 TABLET ORAL 2 TIMES DAILY
Qty: 60 TABLET | Refills: 0 | Status: SHIPPED | OUTPATIENT
Start: 2022-09-15

## 2022-09-16 NOTE — PROGRESS NOTES
[facility]        NAME: Marisel Cho is a 24 y o  female  : 1995    MRN: [id number]  DATE: September 15, 2022  TIME: 9:15 PM    Assessment and Plan   Injury [T14 90XA]  1  Injury  XR foot 3+ vw left   2  Closed nondisplaced fracture of proximal phalanx of lesser toe of left foot, initial encounter  Ambulatory Referral to Orthopedic Surgery    28-year-old female presents for evaluation of left foot injury  X-rays initially reviewed with suspected fracture of the left little toe  Official read reveals no acute abnormality  Patient Instructions   Toe Fracture   WHAT YOU NEED TO KNOW:   A toe fracture is a break in a bone in your toe          DISCHARGE INSTRUCTIONS:   Return to the emergency department if:   · Blood soaks through your bandage      · You have severe pain in your toe      · Your toe is cold or numb      Call your doctor if:   · You have a fever      · Your pain does not go away, even after treatment      · Your toe continues to hurt even after it has healed      · You have questions or concerns about your condition or care      Medicines: You may need any of the following:  · NSAIDs , such as ibuprofen, help decrease swelling, pain, and fever  This medicine is available with or without a doctor's order  NSAIDs can cause stomach bleeding or kidney problems in certain people  If you take blood thinner medicine, always ask your healthcare provider if NSAIDs are safe for you  Always read the medicine label and follow directions      · Prescription pain medicine  may be given  Ask your healthcare provider how to take this medicine safely  Some prescription pain medicines contain acetaminophen  Do not take other medicines that contain acetaminophen without talking to your healthcare provider  Too much acetaminophen may cause liver damage  Prescription pain medicine may cause constipation   Ask your healthcare provider how to prevent or treat constipation       · Antibiotics treat a bacterial infection  You may need antibiotics if you have an open wound      · Take your medicine as directed  Contact your healthcare provider if you think your medicine is not helping or if you have side effects  Tell him of her if you are allergic to any medicine  Keep a list of the medicines, vitamins, and herbs you take  Include the amounts, and when and why you take them  Bring the list or the pill bottles to follow-up visits  Carry your medicine list with you in case of an emergency      Self-care:   · Rest  your toe so that it can heal  Return to normal activities as directed      · Apply ice  on your toe for 15 to 20 minutes every hour or as directed  Use an ice pack, or put crushed ice in a plastic bag  Cover it with a towel before you put it on your toe  Ice helps prevent tissue damage and decreases swelling and pain      · Elevate  your toe above the level of your heart as often as you can  This will help decrease swelling and pain  Prop your toe on pillows or blankets to keep it elevated comfortably           · Use miriam tape, an elastic bandage, or a splint  as directed  These help keep your toe in its correct position as it heals  Miriam tape means your fractured toe and the toe next to it are taped together      · Use a support device  such as a cane, crutches, walking boot, or hard soled shoe as directed  These help protect your toe and limit movement so it can heal         Follow up with your doctor as directed: You may need to return in 2 to 4 weeks  Write down your questions so you remember to ask them during your visits  © Copyright [employer] 2022 Information is for End User's use only and may not be sold, redistributed or otherwise used for commercial purposes  All illustrations and images included in CareNotes® are the copyrighted property of [employer]  or Danitza Velazquez   The above information is an  only   It is not intended as medical advice for individual conditions or treatments  Talk to your doctor, nurse or pharmacist before following any medical regimen to see if it is safe and effective for you            Follow up with PCP in 3-5 days  Proceed to  ER if symptoms worsen  Chief Complaint     Chief Complaint   Patient presents with    Foot Injury     [address] DOWN HURTING LEFT FOOT IT HAPPEN Saturday  History of Present Illness       Patient is a 45-year-old female with no significant past medical history who presents for evaluation of left foot injury  She reports that on Saturday she dropped a 24 pack of water bottles on her left foot  She has been experiencing pain to the dorsal aspect of her left foot and mild swelling since  She has not taken any medication for the pain  She denies tingling, ankle pain or swelling, color change  She does report some numbness of her left little toe  Review of Systems   Review of Systems   Constitutional: Negative for chills and fever  HENT: Negative for congestion, ear pain, postnasal drip, rhinorrhea, sinus pressure, sinus pain, sneezing and sore throat  Eyes: Negative for pain and visual disturbance  Respiratory: Negative  Negative for apnea, cough, choking, chest tightness, shortness of breath, wheezing and stridor  Cardiovascular: Negative for chest pain and palpitations  Gastrointestinal: Negative for abdominal pain, diarrhea, nausea and vomiting  Endocrine: Negative  Negative for cold intolerance  Genitourinary: Negative for dysuria and hematuria  Musculoskeletal: Positive for arthralgias and joint swelling  Negative for back pain, myalgias, neck pain and neck stiffness  Skin: Negative for color change and rash  Allergic/Immunologic: Negative  Negative for environmental allergies  Neurological: Positive for numbness  Negative for dizziness, seizures, syncope, facial asymmetry, light-headedness and headaches  Hematological: Negative  Negative for adenopathy  Psychiatric/Behavioral: Negative  All other systems reviewed and are negative          Current Medications       Current Outpatient Medications:     acetaminophen (TYLENOL) 325 mg tablet, Take 2 tablets (650 mg total) by mouth every 6 (six) hours as needed for mild pain or headaches, Disp: 30 tablet, Rfl: 0    albuterol (2 5 mg/3 mL) 0 083 % nebulizer solution, Take 3 mL (2 5 mg total) by nebulization every 6 (six) hours as needed for wheezing or shortness of breath, Disp: 30 mL, Rfl: 0    albuterol (Ventolin HFA) 90 mcg/act inhaler, Inhale 2 puffs every 6 (six) hours as needed for wheezing or shortness of breath (cough), Disp: 18 g, Rfl: 0    amLODIPine (NORVASC) 5 mg tablet, Take 1 tablet (5 mg total) by mouth 2 (two) times a day, Disp: 90 tablet, Rfl: 3    Blood Pressure KIT, by Does not apply route daily (Patient not taking: No sig reported), Disp: 1 each, Rfl: 0    calcitriol (ROCALTROL) 0 25 mcg capsule, TAKE 1 CAPSULE BY MOUTH EVERY DAY, Disp: 30 capsule, Rfl: 0    ergocalciferol (VITAMIN D2) 50,000 units, TAKE 1 CAPSULE BY MOUTH ONE TIME PER WEEK, Disp: 12 capsule, Rfl: 0    etonogestrel (NEXPLANON) subdermal implant, 68 mg by Subdermal route once, Disp: , Rfl:     ferrous sulfate 325 (65 Fe) mg tablet, Take 1 tablet (325 mg total) by mouth daily with breakfast, Disp: 90 tablet, Rfl: 3    fluticasone (Flovent HFA) 110 MCG/ACT inhaler, Inhale 1 puff 2 (two) times a day, Disp: 12 g, Rfl: 0    labetalol (NORMODYNE) 100 mg tablet, TAKE 1 TABLET BY MOUTH TWICE A DAY, Disp: 60 tablet, Rfl: 0    sodium bicarbonate 650 mg tablet, Take 1 tablet (650 mg total) by mouth 2 (two) times a day, Disp: 60 tablet, Rfl: 0    Current Allergies     Allergies as of 09/15/2022 - Reviewed 09/15/2022   Allergen Reaction Noted    Wheat bran - food allergy Itching 12/19/2019            The following portions of the patient's history were reviewed and updated as appropriate: allergies, current medications, past family history, past medical history, past social history, past surgical history and problem list      Past Medical History:   Diagnosis Date    Asthma     Chronic kidney disease     Eczema     Headache     Hypertension     Wears glasses        Past Surgical History:   Procedure Laterality Date    CHOLECYSTECTOMY      CT NEEDLE BIOPSY KIDNEY  1/29/2020    KELOID EXCISION Left 3/30/2021    Procedure: POSTERIOR EAR EXCISION KELOID, FLAP RECONSTRUCTION;  Surgeon: Sarah Vargas MD;  Location: [facility];  Service: Plastics       Family History   Problem Relation Age of Onset    Asthma Mother     No Known Problems Father          Medications have been verified  Objective   BP (!) 181/82   Pulse 71   Temp 97 5 °F (36 4 °C) (Temporal)   Resp 20   LMP 08/15/2022 (Approximate)   SpO2 100%        Physical Exam     Physical Exam  Vitals and nursing note reviewed  Constitutional:       General: She is not in acute distress  Appearance: Normal appearance  She is not ill-appearing, toxic-appearing or diaphoretic  HENT:      Head: Normocephalic and atraumatic  Right Ear: External ear normal       Left Ear: External ear normal       Nose: Nose normal  No congestion or rhinorrhea  Mouth/Throat:      Mouth: Mucous membranes are moist    Eyes:      Extraocular Movements: Extraocular movements intact  Conjunctiva/sclera: Conjunctivae normal       Pupils: Pupils are equal, round, and reactive to light  Cardiovascular:      Rate and Rhythm: Normal rate and regular rhythm  Pulses: Normal pulses  Dorsalis pedis pulses are 2+ on the left side  Heart sounds: Normal heart sounds  No murmur heard  No friction rub  No gallop  Pulmonary:      Effort: Pulmonary effort is normal  No respiratory distress  Breath sounds: Normal breath sounds  No stridor  No wheezing, rhonchi or rales     Abdominal:      General: Bowel sounds are normal  Palpations: Abdomen is soft  Tenderness: There is no abdominal tenderness  There is no guarding or rebound  Musculoskeletal:      Cervical back: Normal range of motion and neck supple  No tenderness  Left foot: Decreased range of motion  No deformity, bunion, Charcot foot, foot drop or prominent metatarsal heads  Feet:      Left foot:      Skin integrity: Skin integrity normal       Toenail Condition: Left toenails are normal       Comments: Left pedal pulse +2, capillary refill less than 2 seconds  Limited flexion and extension due to discomfort  Some tenderness at the base of left little toe  Skin:     General: Skin is warm and dry  Capillary Refill: Capillary refill takes less than 2 seconds  Neurological:      General: No focal deficit present  Mental Status: She is alert and oriented to person, place, and time  Cranial Nerves: No cranial nerve deficit     Psychiatric:         Mood and Affect: Mood normal          Behavior: Behavior normal

## 2022-09-20 ENCOUNTER — OFFICE VISIT (OUTPATIENT)
Dept: NEPHROLOGY | Facility: CLINIC | Age: 27
End: 2022-09-20
Payer: MEDICARE

## 2022-09-20 VITALS
SYSTOLIC BLOOD PRESSURE: 150 MMHG | BODY MASS INDEX: 41.83 KG/M2 | WEIGHT: 245 LBS | DIASTOLIC BLOOD PRESSURE: 80 MMHG | HEIGHT: 64 IN

## 2022-09-20 DIAGNOSIS — N25.81 SECONDARY HYPERPARATHYROIDISM OF RENAL ORIGIN (HCC): ICD-10-CM

## 2022-09-20 DIAGNOSIS — D50.9 IRON DEFICIENCY ANEMIA, UNSPECIFIED IRON DEFICIENCY ANEMIA TYPE: ICD-10-CM

## 2022-09-20 DIAGNOSIS — N18.5 CKD (CHRONIC KIDNEY DISEASE) STAGE 5, GFR LESS THAN 15 ML/MIN (HCC): Primary | ICD-10-CM

## 2022-09-20 DIAGNOSIS — N18.5 CHRONIC KIDNEY DISEASE (CKD), ACTIVE MEDICAL MANAGEMENT WITHOUT DIALYSIS, STAGE 5 (HCC): ICD-10-CM

## 2022-09-20 DIAGNOSIS — N18.5 BENIGN HYPERTENSION WITH CKD (CHRONIC KIDNEY DISEASE) STAGE V (HCC): ICD-10-CM

## 2022-09-20 DIAGNOSIS — M89.9 CHRONIC KIDNEY DISEASE-MINERAL AND BONE DISORDER: ICD-10-CM

## 2022-09-20 DIAGNOSIS — E83.9 CHRONIC KIDNEY DISEASE-MINERAL AND BONE DISORDER: ICD-10-CM

## 2022-09-20 DIAGNOSIS — I10 HYPERTENSION, UNSPECIFIED TYPE: ICD-10-CM

## 2022-09-20 DIAGNOSIS — Z71.89 ADVANCE CARE PLANNING: ICD-10-CM

## 2022-09-20 DIAGNOSIS — I12.0 BENIGN HYPERTENSION WITH CKD (CHRONIC KIDNEY DISEASE) STAGE V (HCC): ICD-10-CM

## 2022-09-20 DIAGNOSIS — N18.9 CHRONIC KIDNEY DISEASE-MINERAL AND BONE DISORDER: ICD-10-CM

## 2022-09-20 DIAGNOSIS — R80.1 PERSISTENT PROTEINURIA: ICD-10-CM

## 2022-09-20 PROCEDURE — 99498 ADVNCD CARE PLAN ADDL 30 MIN: CPT | Performed by: INTERNAL MEDICINE

## 2022-09-20 PROCEDURE — 99214 OFFICE O/P EST MOD 30 MIN: CPT | Performed by: INTERNAL MEDICINE

## 2022-09-20 RX ORDER — AMLODIPINE BESYLATE 5 MG/1
5 TABLET ORAL 2 TIMES DAILY
Qty: 60 TABLET | Refills: 3 | Status: SHIPPED | OUTPATIENT
Start: 2022-09-20 | End: 2022-10-13 | Stop reason: SDUPTHER

## 2022-09-20 RX ORDER — CALCITRIOL 0.25 UG/1
0.25 CAPSULE, LIQUID FILLED ORAL DAILY
Qty: 90 CAPSULE | Refills: 3 | Status: SHIPPED | OUTPATIENT
Start: 2022-09-20

## 2022-09-20 NOTE — PATIENT INSTRUCTIONS
1  )  Low 2 g sodium diet    2 )  Monitor weights at home    3 )  Avoid NSAIDs (ibuprofen, Motrin, Advil, Aleve, naproxen)    4 )  Monitor blood pressure at home, call if blood pressure greater than 150/90 persistently    5 ) I will plan to discuss all results including blood work, and/or imaging at our next visit, unless there is an urgent indication, in which case I will call you earlier  If you have any questions or concerns about your results, please feel free to call our office      6 ) Refer to Vascular Surgery and vein mapping for future "cleaning blood" access    7 ) If you feel very sick, go to ER    8 ) Please see 5 wishes    9 ) Weight loss

## 2022-09-20 NOTE — PROGRESS NOTES
NEPHROLOGY OFFICE VISIT   Macario Simeon 24 y o  female MRN: [id number]  9/20/2022    Reason for Visit:  Advanced chronic kidney disease    History of Present Illness (HPI):    I had the pleasure of seeing Yfn Andersen today in the renal clinic for the continued management of advanced chronic kidney disease  Since our last visit, she was in the hospital for brief stay in June for blurry vision  She was seen by Nephrology during that encounter  She did not get repeat blood work for this visit  Please see advanced care planning note as well  This was completed today  Patient does have a lot of concerns and fears about starting dialysis specifically discoloration of her skin and generalized fatigue  These are some of the symptoms some of her family members have had on dialysis  She is also fearful of the word dialysis as it includes "di" in it, her 1st cleaning of the blood  Attended Kidney Smart class education as well as transplant evaluation by Dr Mcdonnell Later  She reports she has a live donor  She has lost 6 lb since our last office visit  She ran out of amlodipine 3 weeks ago but did not call us  She also ran out of calcitriol yesterday  She is seeking refills for both these medications  She has no uremic symptoms at this time  ASSESSMENT and PLAN:    1 ) Chronic Kidney Disease Stage V  --Imaging:  Right kidney 9 4 cm, left kidney 9 cm   It should be noted that there was a decrease in the right kidney size compared to 2015 ultrasound where it was 12 8 cm   Both kidneys are diffusely echogenic consistent with chronic kidney disease  --Urinalysis:  Negative blood   2+ protein    --Proteinuria:  Her last urine protein creatinine ratio was 1 08  --Baseline creatinine:  her renal function has progressively been worsening but her BUN remains stable at 27 with electrolytes  --Etiology:  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  --serologies: HIV nonreactive, anti GBM negative, LUCY negative, ANCA negative but KY-3 elevated to 5 4 hemolysis smear negative for schistocytes, C3/C4 normal, IgG/IgA/IgM normal, hepatitis negative, no peripheral eosinophilia  --Biopsy Proven:  Diffuse global glomerular sclerosis which is severe with severe tubular atrophy interstitial fibrosis and interstitial inflammation   Mild arterial sclerosis   Severe chronic pathologic changes, irreversible with poor prognostic sign  --Status:  no uremic symptoms  Check repeat blood work now  --Management:  Given her poor compliance and forgetfulness I do not think she would be on optimal candidate for peritoneal dialysis at this time  I would recommend starting with hemodialysis and then can transition to PD if she is successful on hemodialysis  --Reducing Cardiovascular Risk Factors:  Simvastatin+ Ezetimibe reduced atherosclerotic events in CKD (SHARP trial); Low dose aspirin safe (if no contraindications exist); smoking is an independent risk factor for Chronic Kidney Disease and progression, strongly recommend smoking cessation     2  ) Hypertension  -- Stage II (American College of Cardiology/American Heart Association)  -- with underlying chronic kidney disease and morbid obesity  -- Body mass index is 42 kg/m²  -- Volume status: Euvolemic  -- Etiology:  Morbid obesity, underlying chronic kidney disease  -- Secondary Work-Up:  Renal artery Doppler was negative, a m   Cortisol 14, TSH 1 5, normal metanephrines and catecholamine levels, renin 0 935, Aldosterone 11 6  -- Target Goal: < 130/80 (ACC/AHA, CKD with proteinuria, CKD in diabetics) ; if tolerated can target SBP < 120 mmHg in high cardiovascular risk ( Age > 75, CKD, CVD, No Diabetics SPRINT)  -- Lifestyle Modifications: DASH Diet, Weight Loss for ideal body weight, 45 mins of cardiovascular exercise 3 times a week as tolerated, and if no contraindications exist, smoking cessation, limit alcohol use, avoid NSAIDS, monitor blood pressure at home  -- Status: Blood pressure above target  -- Current antihypertensive regiment:  Amlodipine 5 mg twice a day, labetalol 100 mg twice a day  -- Changes:  I refilled her amlodipine 5 mg twice a day     3 ) Morbid obesity  --BMI 42, she is losing weight  --educated on a diet exercise weight loss program     4 )  Mineral bone disorder-chronic kidney disease  --secondary hyperparathyroidism renal origin  --check repeat labs     5 )  Anemia of chronic kidney disease  --check repeat hemoglobin  --Epogen if hemoglobin less than 9     6 )  Vitamin-D deficiency  --please see above     7 )  Low bicarb level--resolved  --continue oral sodium bicarbonate      PATIENT INSTRUCTIONS:    Patient Instructions   1 )  Low 2 g sodium diet    2 )  Monitor weights at home    3 )  Avoid NSAIDs (ibuprofen, Motrin, Advil, Aleve, naproxen)    4 )  Monitor blood pressure at home, call if blood pressure greater than 150/90 persistently    5 ) I will plan to discuss all results including blood work, and/or imaging at our next visit, unless there is an urgent indication, in which case I will call you earlier  If you have any questions or concerns about your results, please feel free to call our office  6 ) Refer to Vascular Surgery and vein mapping for future "cleaning blood" access    7 ) If you feel very sick, go to ER    8 ) Please see 5 wishes    9 ) Weight loss          Orders Placed This Encounter   Procedures    Cystatin C With eGFR     Standing Status:   Future     Standing Expiration Date:   9/20/2023    Comprehensive metabolic panel     This is a patient instruction: Patient fasting for 8 hours or longer recommended       Standing Status:   Future     Standing Expiration Date:   9/20/2023    CBC     Standing Status:   Future     Standing Expiration Date:   9/20/2023    Urinalysis with microscopic       OBJECTIVE:  Current Weight: Weight - Scale: 111 kg (245 lb)  Vitals:    09/20/22 1501 09/20/22 1601   BP:  150/80   Weight: 111 kg (245 lb)    Height: 5' 4" (1 626 m)     Body mass index is 42 05 kg/m²  REVIEW OF SYSTEMS:    Review of Systems   Constitutional: Negative for activity change and fever  Respiratory: Negative for cough, chest tightness, shortness of breath and wheezing  Cardiovascular: Negative for chest pain and leg swelling  Gastrointestinal: Negative for abdominal pain, diarrhea, nausea and vomiting  Endocrine: Negative for polyuria  Genitourinary: Negative for difficulty urinating, dysuria, flank pain, frequency and urgency  Skin: Negative for rash  Neurological: Negative for dizziness, syncope, light-headedness and headaches  All other systems reviewed and are negative  PHYSICAL EXAM:      Physical Exam  Vitals and nursing note reviewed  Exam conducted with a chaperone present  Constitutional:       General: She is not in acute distress  Appearance: She is well-developed  She is obese  She is not diaphoretic  HENT:      Head: Normocephalic and atraumatic  Eyes:      General: No scleral icterus  Pupils: Pupils are equal, round, and reactive to light  Cardiovascular:      Rate and Rhythm: Normal rate and regular rhythm  Heart sounds: Normal heart sounds  No murmur heard  No friction rub  No gallop  Pulmonary:      Effort: Pulmonary effort is normal  No respiratory distress  Breath sounds: Normal breath sounds  No wheezing or rales  Chest:      Chest wall: No tenderness  Abdominal:      General: Bowel sounds are normal  There is no distension  Palpations: Abdomen is soft  Tenderness: There is no abdominal tenderness  There is no rebound  Musculoskeletal:         General: Normal range of motion  Cervical back: Normal range of motion and neck supple  Skin:     Findings: No rash  Neurological:      Mental Status: She is alert and oriented to person, place, and time           Medications:    Current Outpatient Medications:     amLODIPine (NORVASC) 5 mg tablet, Take 1 tablet (5 mg total) by mouth 2 (two) times a day, Disp: 60 tablet, Rfl: 3    calcitriol (ROCALTROL) 0 25 mcg capsule, Take 1 capsule (0 25 mcg total) by mouth daily, Disp: 90 capsule, Rfl: 3    ergocalciferol (VITAMIN D2) 50,000 units, TAKE 1 CAPSULE BY MOUTH ONE TIME PER WEEK, Disp: 12 capsule, Rfl: 0    ferrous sulfate 325 (65 Fe) mg tablet, Take 1 tablet (325 mg total) by mouth daily with breakfast, Disp: 90 tablet, Rfl: 3    labetalol (NORMODYNE) 100 mg tablet, TAKE 1 TABLET BY MOUTH TWICE A DAY, Disp: 60 tablet, Rfl: 0    sodium bicarbonate 650 mg tablet, Take 1 tablet (650 mg total) by mouth 2 (two) times a day, Disp: 60 tablet, Rfl: 0    acetaminophen (TYLENOL) 325 mg tablet, Take 2 tablets (650 mg total) by mouth every 6 (six) hours as needed for mild pain or headaches, Disp: 30 tablet, Rfl: 0    albuterol (2 5 mg/3 mL) 0 083 % nebulizer solution, Take 3 mL (2 5 mg total) by nebulization every 6 (six) hours as needed for wheezing or shortness of breath, Disp: 30 mL, Rfl: 0    albuterol (Ventolin HFA) 90 mcg/act inhaler, Inhale 2 puffs every 6 (six) hours as needed for wheezing or shortness of breath (cough), Disp: 18 g, Rfl: 0    Blood Pressure KIT, by Does not apply route daily (Patient not taking: No sig reported), Disp: 1 each, Rfl: 0    etonogestrel (NEXPLANON) subdermal implant, 68 mg by Subdermal route once, Disp: , Rfl:     fluticasone (Flovent HFA) 110 MCG/ACT inhaler, Inhale 1 puff 2 (two) times a day, Disp: 12 g, Rfl: 0    Laboratory Results:        Invalid input(s): ALBUMIN    Results for orders placed or performed during the hospital encounter of 06/18/22   CBC and differential   Result Value Ref Range    WBC 6 68 4 31 - 10 16 Thousand/uL    RBC 3 81 3 81 - 5 12 Million/uL    Hemoglobin 8 0 (L) 11 5 - 15 4 g/dL    Hematocrit 26 8 (L) 34 8 - 46 1 %    MCV 70 (L) 82 - 98 fL    MCH 21 0 (L) 26 8 - 34 3 pg    MCHC 29 9 (L) 31 4 - 37 4 g/dL    RDW 15 0 11 6 - 15 1 %    MPV 9 6 8 9 - 12 7 fL    Platelets 863 472 - 117 Thousands/uL    nRBC 0 /100 WBCs    Neutrophils Relative 65 43 - 75 %    Immat GRANS % 0 0 - 2 %    Lymphocytes Relative 25 14 - 44 %    Monocytes Relative 7 4 - 12 %    Eosinophils Relative 3 0 - 6 %    Basophils Relative 0 0 - 1 %    Neutrophils Absolute 4 28 1 85 - 7 62 Thousands/µL    Immature Grans Absolute 0 02 0 00 - 0 20 Thousand/uL    Lymphocytes Absolute 1 69 0 60 - 4 47 Thousands/µL    Monocytes Absolute 0 48 0 17 - 1 22 Thousand/µL    Eosinophils Absolute 0 18 0 00 - 0 61 Thousand/µL    Basophils Absolute 0 03 0 00 - 0 10 Thousands/µL   Basic metabolic panel   Result Value Ref Range    Sodium 138 135 - 147 mmol/L    Potassium 3 9 3 5 - 5 3 mmol/L    Chloride 105 96 - 108 mmol/L    CO2 23 21 - 32 mmol/L    ANION GAP 10 4 - 13 mmol/L    BUN 32 (H) 5 - 25 mg/dL    Creatinine 8 03 (H) 0 60 - 1 30 mg/dL    Glucose 81 65 - 140 mg/dL    Calcium 7 9 (L) 8 4 - 10 2 mg/dL    eGFR 6 ml/min/1 73sq m   Hepatic function panel   Result Value Ref Range    Total Bilirubin 0 23 0 20 - 1 00 mg/dL    Bilirubin, Direct 0 03 0 00 - 0 20 mg/dL    Alkaline Phosphatase 82 34 - 104 U/L    AST 9 (L) 13 - 39 U/L    ALT 5 (L) 7 - 52 U/L    Total Protein 7 1 6 4 - 8 4 g/dL    Albumin 4 2 3 5 - 5 0 g/dL   TSH   Result Value Ref Range    TSH 3RD GENERATON 3 495 0 450 - 4 500 uIU/mL   Magnesium   Result Value Ref Range    Magnesium 1 8 (L) 1 9 - 2 7 mg/dL   Phosphorus   Result Value Ref Range    Phosphorus 4 4 2 7 - 4 5 mg/dL   Urine Microscopic   Result Value Ref Range    RBC, UA 0-1 None Seen, 0-1, 1-2, 2-4, 0-5 /hpf    WBC, UA 1-2 None Seen, 0-1, 1-2, 0-5, 2-4 /hpf    Epithelial Cells Occasional None Seen, Occasional /hpf    Bacteria, UA None Seen None Seen, Occasional /hpf   CBC and Platelet   Result Value Ref Range    WBC 6 44 4 31 - 10 16 Thousand/uL    RBC 3 76 (L) 3 81 - 5 12 Million/uL    Hemoglobin 8 1 (L) 11 5 - 15 4 g/dL Hematocrit 26 7 (L) 34 8 - 46 1 %    MCV 71 (L) 82 - 98 fL    MCH 21 5 (L) 26 8 - 34 3 pg    MCHC 30 3 (L) 31 4 - 37 4 g/dL    RDW 15 1 11 6 - 15 1 %    Platelets 394 651 - 127 Thousands/uL    MPV 10 7 8 9 - 12 7 fL   Basic metabolic panel   Result Value Ref Range    Sodium 141 135 - 147 mmol/L    Potassium 4 2 3 5 - 5 3 mmol/L    Chloride 109 (H) 96 - 108 mmol/L    CO2 21 21 - 32 mmol/L    ANION GAP 11 4 - 13 mmol/L    BUN 36 (H) 5 - 25 mg/dL    Creatinine 8 58 (H) 0 60 - 1 30 mg/dL    Glucose 82 65 - 140 mg/dL    Calcium 8 4 8 4 - 10 2 mg/dL    eGFR 5 ml/min/1 73sq m   Iron Saturation %   Result Value Ref Range    Iron Saturation 16 15 - 50 %    TIBC 211 (L) 250 - 450 ug/dL    Iron 34 (L) 50 - 170 ug/dL   Ferritin   Result Value Ref Range    Ferritin 154 8 - 388 ng/mL   POCT pregnancy, urine   Result Value Ref Range    EXT PREG TEST UR (Ref: Negative) negative     Control valid    ECG 12 lead   Result Value Ref Range    Ventricular Rate 84 BPM    Atrial Rate 84 BPM    MN Interval 156 ms    QRSD Interval 96 ms    QT Interval 394 ms    QTC Interval 465 ms    P Axis 45 degrees    QRS Axis 5 degrees    T Wave Axis 54 degrees   Urine Macroscopic, POC   Result Value Ref Range    Color, UA Yellow     Clarity, UA Clear     pH, UA 7 0 4 5 - 8 0    Leukocytes, UA Negative Negative    Nitrite, UA Negative Negative    Protein, UA >=300 (A) Negative mg/dl    Glucose, UA Negative Negative mg/dl    Ketones, UA Negative Negative mg/dl    Urobilinogen, UA 0 2 0 2, 1 0 E U /dl E U /dl    Bilirubin, UA Negative Negative    Occult Blood, UA Small (A) Negative    Specific Gravity, UA 1 015 1 003 - 1 030

## 2022-09-20 NOTE — ACP (ADVANCE CARE PLANNING)
[address]  Janice Euceda 24 y o  female MRN: [id number]  2022        ASSESSMENT and PLAN:    I had the pleasure of seeing Janice Euceda today in the renal clinic for discussion of the patient's goals of care  Our discussion today will focus on helping Raven achieve their desired goals, long term goals of care and how best to achieve those goals  The participants during this visit include her brother (who did not really participate much in meeting or ask any questions) her mother could not be present during this encounter   Kidney Smart Class: Completed Kidney Smart Class  Would Pursue Dialysis if Needed: Yes  Dialysis Access: No Access   Advance Directive: She has a 5 wishes packet, she was very scared about the title of death wishes, I explained to her the importance that every patient should address these goals regardless of their health status  I asked her to review this again with her mother  [[id number]] Wishes Packet: Not Completed  Code Status: Level 1: Full Code  POA:  Her mother  Do they need to be referred to Palliative Care: No  Do they need to be referred to Hospice: No  Length/Time of Meetin Minutes  Outpatient Nephrologist: Al Jones MD    Discussion and Plan:    Advanced chronic kidney disease stage V  --poor compliance with follow ups, medication  --very fearful of death, cosmetic changes to her skin, generalized fatigue and being on dialysis  --the were dialysis cares her because of "di" in the word, prefers cleaning of blood  --she has some family members on dialysis who had issues with skin discoloration and fatigue and she scared that will happen to her  --she did not get blood work for her recent visit  --she has a 5 wishes packets I encouraged her to fill that out  --in the end she would pursue dialysis if needed    --given her compliance issues and her feeling forgetful at times I do not think peritoneal dialysis is a great option to start with  I would recommend starting with hemodialysis and then in the future could transition to PD if she was successful on hemodialysis  --he has already been evaluated by the transplant team, she reports she has a living donor  Her BMI is 42 she will likely need to lose some weight prior to the transplant    Access  --agreeable to seeing the vascular surgeon in getting vein mapping  --she is right handed        REVIEW OF SYSTEMS:    Review of Systems   Constitutional: Negative for activity change and fever  Respiratory: Negative for cough, chest tightness, shortness of breath and wheezing  Cardiovascular: Negative for chest pain and leg swelling  Gastrointestinal: Negative for abdominal pain, diarrhea, nausea and vomiting  Endocrine: Negative for polyuria  Genitourinary: Negative for difficulty urinating, dysuria, flank pain, frequency and urgency  Skin: Negative for rash  Neurological: Negative for dizziness, syncope, light-headedness and headaches           Medications:    Current Outpatient Medications:     acetaminophen (TYLENOL) 325 mg tablet, Take 2 tablets (650 mg total) by mouth every 6 (six) hours as needed for mild pain or headaches, Disp: 30 tablet, Rfl: 0    albuterol (2 5 mg/3 mL) 0 083 % nebulizer solution, Take 3 mL (2 5 mg total) by nebulization every 6 (six) hours as needed for wheezing or shortness of breath, Disp: 30 mL, Rfl: 0    albuterol (Ventolin HFA) 90 mcg/act inhaler, Inhale 2 puffs every 6 (six) hours as needed for wheezing or shortness of breath (cough), Disp: 18 g, Rfl: 0    amLODIPine (NORVASC) 5 mg tablet, Take 1 tablet (5 mg total) by mouth 2 (two) times a day, Disp: 60 tablet, Rfl: 3    Blood Pressure KIT, by Does not apply route daily (Patient not taking: No sig reported), Disp: 1 each, Rfl: 0    calcitriol (ROCALTROL) 0 25 mcg capsule, Take 1 capsule (0 25 mcg total) by mouth daily, Disp: 90 capsule, Rfl: 3    ergocalciferol (VITAMIN D2) 50,000 units, TAKE 1 CAPSULE BY MOUTH ONE TIME PER WEEK, Disp: 12 capsule, Rfl: 0    etonogestrel (NEXPLANON) subdermal implant, 68 mg by Subdermal route once, Disp: , Rfl:     ferrous sulfate 325 (65 Fe) mg tablet, Take 1 tablet (325 mg total) by mouth daily with breakfast, Disp: 90 tablet, Rfl: 3    fluticasone (Flovent HFA) 110 MCG/ACT inhaler, Inhale 1 puff 2 (two) times a day, Disp: 12 g, Rfl: 0    labetalol (NORMODYNE) 100 mg tablet, TAKE 1 TABLET BY MOUTH TWICE A DAY, Disp: 60 tablet, Rfl: 0    sodium bicarbonate 650 mg tablet, Take 1 tablet (650 mg total) by mouth 2 (two) times a day, Disp: 60 tablet, Rfl: 0    Serious Illness Conversation    1  What is your understanding now of where you are with your illness? Prognostic Understanding: appropriate understanding of prognosis  I discussed the overall picture  Explain to her that without renal replacement therapy should could end up with death, volume overload, cardiac complication, electrolyte disorders  She is fearful of the were dialysis, and wishes that they were call it cleaning blood more     2  How much information about what is likely to be ahead with your illness would you like to have? Information: patient wants to be informed of big picture, but not details  She wants the big picture, and she wants it explained in the simple way that she can understand that is not very threatening     3  What did you (clinician) communicate to the patient? Prognostic Communication: Function - I hope that this is not the case, but Im worried that this may be as strong as you will feel, and things are likely to get more difficult  She understands that without dialysis she could  fast   She is very fearful of death  She has concerns about dialysis as well primarily cosmetic reasons, along with feeling wiped out     4  If your health situation worsens, what are your most important goals?   Goals: have my medical decisions respected, be physically comfortable, be independent  Concerned about darkening skin, energy level     5  What are the biggest fears and worries about the future and your health? Fears/Worries: other symptoms  Darkening of skin, being sluggish     6  What abilities are so critical to your life that you cannot imagine living without them? Unacceptable Function: being in pain or very uncomfortable  Skin discoloration     7  What gives you strength as you think about the future with your illness? 8  If you become sicker, how much are you willing to go through for the possibility of gaining more time? Be in the hospital: Yes Have a feeding tube: Yes   Be in the ICU: Yes Live in a nursing home: Yes   Be on a ventilator: Yes    Be on dialysis: Yes    9  How much does your proxy and family know about your priorities and wishes? Discussion Discussion: does not want family informed  Mother-Rosy Presley heard you say that death is something or scared about and you want to still maintain being herself is really important to you  Keeping that in mind, and what we know about your illness, I recommend that we refer you for an access placement for future dialysis and continued pursuit of a renal transplant  This will help us make sure that your treatment plans reflect whats important to you  How does this plan sound to you? I will do everything I can to help you through this       I have spent 50 minutes speaking with my patient on advanced care planning today or during this visit     Advanced directives  Five Wishes: Patient has Five Wishes, not in chart

## 2022-10-13 DIAGNOSIS — D50.9 IRON DEFICIENCY ANEMIA, UNSPECIFIED IRON DEFICIENCY ANEMIA TYPE: ICD-10-CM

## 2022-10-13 DIAGNOSIS — I10 HYPERTENSION, UNSPECIFIED TYPE: ICD-10-CM

## 2022-10-13 DIAGNOSIS — N18.9 CHRONIC KIDNEY DISEASE-MINERAL AND BONE DISORDER: ICD-10-CM

## 2022-10-13 DIAGNOSIS — N18.30 BENIGN HYPERTENSION WITH CKD (CHRONIC KIDNEY DISEASE) STAGE III (HCC): ICD-10-CM

## 2022-10-13 DIAGNOSIS — I12.0 BENIGN HYPERTENSION WITH CKD (CHRONIC KIDNEY DISEASE) STAGE V (HCC): ICD-10-CM

## 2022-10-13 DIAGNOSIS — M89.9 CHRONIC KIDNEY DISEASE-MINERAL AND BONE DISORDER: ICD-10-CM

## 2022-10-13 DIAGNOSIS — N18.5 CHRONIC KIDNEY DISEASE (CKD), ACTIVE MEDICAL MANAGEMENT WITHOUT DIALYSIS, STAGE 5 (HCC): ICD-10-CM

## 2022-10-13 DIAGNOSIS — R80.1 PERSISTENT PROTEINURIA: ICD-10-CM

## 2022-10-13 DIAGNOSIS — I12.9 BENIGN HYPERTENSION WITH CKD (CHRONIC KIDNEY DISEASE) STAGE III (HCC): ICD-10-CM

## 2022-10-13 DIAGNOSIS — N25.81 SECONDARY HYPERPARATHYROIDISM OF RENAL ORIGIN (HCC): ICD-10-CM

## 2022-10-13 DIAGNOSIS — E83.9 CHRONIC KIDNEY DISEASE-MINERAL AND BONE DISORDER: ICD-10-CM

## 2022-10-13 DIAGNOSIS — N18.5 BENIGN HYPERTENSION WITH CKD (CHRONIC KIDNEY DISEASE) STAGE V (HCC): ICD-10-CM

## 2022-10-13 RX ORDER — LABETALOL 100 MG/1
100 TABLET, FILM COATED ORAL 2 TIMES DAILY
Qty: 60 TABLET | Refills: 3 | Status: SHIPPED | OUTPATIENT
Start: 2022-10-13

## 2022-10-13 RX ORDER — AMLODIPINE BESYLATE 5 MG/1
5 TABLET ORAL 2 TIMES DAILY
Qty: 60 TABLET | Refills: 3 | Status: SHIPPED | OUTPATIENT
Start: 2022-10-13

## 2022-10-13 RX ORDER — FERROUS SULFATE 325(65) MG
1 TABLET ORAL
Qty: 30 TABLET | Refills: 3 | Status: SHIPPED | OUTPATIENT
Start: 2022-10-13

## 2022-10-17 ENCOUNTER — RA CDI HCC (OUTPATIENT)
Dept: OTHER | Facility: HOSPITAL | Age: 27
End: 2022-10-17

## 2022-10-17 NOTE — PROGRESS NOTES
[facility] [id number] [facility] 5/13/22  [id number] billed and noted on problem list; Dialysis not in place at [facility]

## 2022-10-31 DIAGNOSIS — N18.9 ACUTE KIDNEY INJURY SUPERIMPOSED ON CHRONIC KIDNEY DISEASE (HCC): ICD-10-CM

## 2022-10-31 DIAGNOSIS — N17.9 ACUTE KIDNEY INJURY SUPERIMPOSED ON CHRONIC KIDNEY DISEASE (HCC): ICD-10-CM

## 2022-10-31 DIAGNOSIS — N18.5 CHRONIC KIDNEY DISEASE (CKD), ACTIVE MEDICAL MANAGEMENT WITHOUT DIALYSIS, STAGE 5 (HCC): Primary | ICD-10-CM

## 2022-10-31 RX ORDER — SODIUM BICARBONATE 650 MG/1
650 TABLET ORAL 2 TIMES DAILY
Qty: 180 TABLET | Refills: 3 | Status: SHIPPED | OUTPATIENT
Start: 2022-10-31

## 2022-11-01 ENCOUNTER — OFFICE VISIT (OUTPATIENT)
Dept: URGENT CARE | Facility: CLINIC | Age: 27
End: 2022-11-01

## 2022-11-01 VITALS
HEIGHT: 64 IN | RESPIRATION RATE: 16 BRPM | TEMPERATURE: 97.8 F | DIASTOLIC BLOOD PRESSURE: 79 MMHG | WEIGHT: 244 LBS | SYSTOLIC BLOOD PRESSURE: 142 MMHG | HEART RATE: 68 BPM | OXYGEN SATURATION: 100 % | BODY MASS INDEX: 41.66 KG/M2

## 2022-11-01 DIAGNOSIS — R22.31 MASS OF RIGHT AXILLA: Primary | ICD-10-CM

## 2022-11-01 NOTE — PROGRESS NOTES
[facility] [facility]        NAME: Osmin Guillen is a 24 y o  female  : 1995    MRN: [id number]  DATE: 2022  TIME: 8:00 PM    Assessment and Plan   Mass of right axilla [R22 31]  1  Mass of right axilla      28-year-old female presents for evaluation of mass of right armpit  Suspect that this represents a lymph node due, does not feel matted, fixed or irregular in shape  Patient advised to apply warm compresses and to follow-up with primary care provider within 1-2 weeks  Patient Instructions   Lymphadenopathy   WHAT YOU NEED TO KNOW:   Lymphadenopathy is swelling of your lymph nodes  Lymph nodes are small organs that are part of your immune system  The lymph nodes are found throughout your body  They are most easily felt in your neck, under your arms, and near your groin  Lymphadenopathy can occur in one or more areas of your body  It is usually caused by an infection  DISCHARGE INSTRUCTIONS:   Return to the emergency department if:   · The swollen lymph nodes bleed      · You have swollen lymph nodes in your neck that affect your breathing or swallowing      Contact your healthcare provider if:   · You have a fever      · You have a new swollen and painful lymph node      · You have a skin rash      · Your lymph node remains swollen or painful, or it gets bigger      · Your lymph node has red streaks around it, or the skin around the lymph node is red      · You have questions or concerns about your condition or care      Follow up with your doctor as directed:  Write down your questions so you remember to ask them during your visits  Self-care:   · Do not poke or squeeze  the swollen lymph nodes      · Apply heat to the swollen glands  You may use warm compresses, or an electric heating pad set on low       · Rest as needed    If you have a fever, rest until your temperature returns to normal  Return to your normal daily activities slowly after your fever is gone      © Copyright [employer] 2022 Information is for [employer] use only and may not be sold, redistributed or otherwise used for commercial purposes  All illustrations and images included in CareNotes® are the copyrighted property of [employer]  or Danitza Mireles  The above information is an  only  It is not intended as medical advice for individual conditions or treatments  Talk to your doctor, nurse or pharmacist before following any medical regimen to see if it is safe and effective for you        Follow up with PCP in 3-5 days  Proceed to  ER if symptoms worsen  Chief Complaint     Chief Complaint   Patient presents with   • Mass     Pt  C/o lump in right armpit area x 2 5 days, Pt  States that it does hurt at times when laying down  History of Present Illness       Patient is a 46-year-old female who presents for evaluation of lump in right axilla over the past 2-3 days  She reports some mild discomfort associated with palpation of the lump  She denies fevers, body aches, chills, open area or drainage, breast changes  Review of Systems   Review of Systems   Constitutional: Negative for chills, fatigue and fever  HENT: Negative for congestion, ear discharge, ear pain, postnasal drip, rhinorrhea, sinus pressure, sinus pain, sneezing and sore throat  Eyes: Negative  Negative for pain, discharge, redness and itching  Respiratory: Negative  Negative for apnea, cough, choking, chest tightness, shortness of breath and stridor  Cardiovascular: Negative  Negative for chest pain and palpitations  Gastrointestinal: Negative  Negative for diarrhea, nausea and vomiting  Endocrine: Negative  Negative for polydipsia, polyphagia and polyuria  Genitourinary: Negative  Negative for decreased urine volume and flank pain  Musculoskeletal: Negative  Negative for arthralgias, back pain, myalgias, neck pain and neck stiffness  Skin: Negative  Negative for color change and rash  Allergic/Immunologic: Negative  Negative for environmental allergies  Neurological: Negative  Negative for dizziness, facial asymmetry, light-headedness, numbness and headaches  Hematological: Positive for adenopathy  Psychiatric/Behavioral: Negative            Current Medications       Current Outpatient Medications:   •  amLODIPine (NORVASC) 5 mg tablet, Take 1 tablet (5 mg total) by mouth 2 (two) times a day, Disp: 60 tablet, Rfl: 3  •  calcitriol (ROCALTROL) 0 25 mcg capsule, Take 1 capsule (0 25 mcg total) by mouth daily, Disp: 90 capsule, Rfl: 3  •  ergocalciferol (VITAMIN D2) 50,000 units, TAKE 1 CAPSULE BY MOUTH ONE TIME PER WEEK, Disp: 12 capsule, Rfl: 0  •  etonogestrel (NEXPLANON) subdermal implant, 68 mg by Subdermal route once, Disp: , Rfl:   •  ferrous sulfate 325 (65 Fe) mg tablet, Take 1 tablet (325 mg total) by mouth daily with breakfast, Disp: 30 tablet, Rfl: 3  •  fluticasone (Flovent HFA) 110 MCG/ACT inhaler, Inhale 1 puff 2 (two) times a day, Disp: 12 g, Rfl: 0  •  labetalol (NORMODYNE) 100 mg tablet, Take 1 tablet (100 mg total) by mouth 2 (two) times a day, Disp: 60 tablet, Rfl: 3  •  sodium bicarbonate 650 mg tablet, Take 1 tablet (650 mg total) by mouth 2 (two) times a day, Disp: 180 tablet, Rfl: 3  •  acetaminophen (TYLENOL) 325 mg tablet, Take 2 tablets (650 mg total) by mouth every 6 (six) hours as needed for mild pain or headaches, Disp: 30 tablet, Rfl: 0  •  albuterol (2 5 mg/3 mL) 0 083 % nebulizer solution, Take 3 mL (2 5 mg total) by nebulization every 6 (six) hours as needed for wheezing or shortness of breath, Disp: 30 mL, Rfl: 0  •  albuterol (Ventolin HFA) 90 mcg/act inhaler, Inhale 2 puffs every 6 (six) hours as needed for wheezing or shortness of breath (cough), Disp: 18 g, Rfl: 0  •  Blood Pressure KIT, by Does not apply route daily (Patient not taking: No sig reported), Disp: 1 each, Rfl: 0    Current Allergies     Allergies as of 11/01/2022 - Reviewed 11/01/2022 Allergen Reaction Noted   • Wheat bran - food allergy Itching 12/19/2019            The following portions of the patient's history were reviewed and updated as appropriate: allergies, current medications, past family history, past medical history, past social history, past surgical history and problem list      Past Medical History:   Diagnosis Date   • Asthma    • Chronic kidney disease    • Eczema    • Headache    • Hypertension    • Wears glasses        Past Surgical History:   Procedure Laterality Date   • CHOLECYSTECTOMY     • CT NEEDLE BIOPSY KIDNEY  1/29/2020   • KELOID EXCISION Left 3/30/2021    Procedure: POSTERIOR EAR EXCISION KELOID, FLAP RECONSTRUCTION;  Surgeon: Brock Love MD;  Location: [facility];  Service: Plastics       Family History   Problem Relation Age of Onset   • Asthma Mother    • No Known Problems Father          Medications have been verified  Objective   /79   Pulse 68   Temp 97 8 °F (36 6 °C)   Resp 16   Ht 5' 4" (1 626 m)   Wt 111 kg (244 lb)   SpO2 100%   BMI 41 88 kg/m²        Physical Exam     Physical Exam  Vitals and nursing note reviewed  Constitutional:       General: She is not in acute distress  Appearance: Normal appearance  She is not ill-appearing, toxic-appearing or diaphoretic  Interventions: She is not intubated  HENT:      Head: Normocephalic and atraumatic  Right Ear: External ear normal       Left Ear: External ear normal       Nose: Nose normal  No congestion or rhinorrhea  Mouth/Throat:      Mouth: Mucous membranes are moist    Eyes:      Extraocular Movements: Extraocular movements intact  Conjunctiva/sclera: Conjunctivae normal       Pupils: Pupils are equal, round, and reactive to light  Cardiovascular:      Rate and Rhythm: Normal rate and regular rhythm  Pulses: Normal pulses  Heart sounds: Normal heart sounds, S1 normal and S2 normal  Heart sounds not distant  No murmur heard      No friction rub  No gallop  Pulmonary:      Effort: Pulmonary effort is normal  No tachypnea, bradypnea, accessory muscle usage, prolonged expiration, respiratory distress or retractions  She is not intubated  Breath sounds: Normal breath sounds  No stridor, decreased air movement or transmitted upper airway sounds  No decreased breath sounds, wheezing, rhonchi or rales  Chest:      Chest wall: No mass, lacerations, deformity, swelling, tenderness, crepitus or edema  There is no dullness to percussion  Breasts:      Right: Axillary adenopathy present  No supraclavicular adenopathy  Left: No axillary adenopathy or supraclavicular adenopathy  Comments: Oval shaped, approximately 1 5 cm x 0 5 cm smooth, mobile mass palpated in the right axilla  Abdominal:      General: Bowel sounds are normal       Palpations: Abdomen is soft  Tenderness: There is no abdominal tenderness  There is no guarding or rebound  Musculoskeletal:         General: Normal range of motion  Cervical back: Normal range of motion and neck supple  No rigidity or tenderness  Lymphadenopathy:      Cervical: No cervical adenopathy  Upper Body:      Right upper body: Axillary adenopathy present  No supraclavicular or pectoral adenopathy  Left upper body: No supraclavicular, axillary or pectoral adenopathy  Comments: 2 cm x 1 cm palpable   Skin:     General: Skin is warm and dry  Capillary Refill: Capillary refill takes less than 2 seconds  Neurological:      General: No focal deficit present  Mental Status: She is alert and oriented to person, place, and time  Cranial Nerves: No cranial nerve deficit     Psychiatric:         Mood and Affect: Mood normal          Behavior: Behavior normal

## 2022-11-02 NOTE — PATIENT INSTRUCTIONS
Follow-up with your primary care provider within 1-2 weeks  Apply warm compresses and alternate Tylenol/ibuprofen as needed for comfort

## 2022-11-04 ENCOUNTER — RA CDI HCC (OUTPATIENT)
Dept: OTHER | Facility: HOSPITAL | Age: 27
End: 2022-11-04

## 2022-11-07 ENCOUNTER — TELEPHONE (OUTPATIENT)
Dept: NEPHROLOGY | Facility: CLINIC | Age: 27
End: 2022-11-07

## 2022-11-07 ENCOUNTER — RA CDI HCC (OUTPATIENT)
Dept: OTHER | Facility: HOSPITAL | Age: 27
End: 2022-11-07

## 2022-11-07 NOTE — TELEPHONE ENCOUNTER
Attempted to reach pt to reschedule today's appt with Dr Linda Castellon - received busy signal  Per Dr Linda Castellon pt should be seen in person

## 2022-11-07 NOTE — TELEPHONE ENCOUNTER
Attempted to call the patient over the phone and also attempted to log on to [employer] but no answer to either option  Pt will need to be rescheduled

## 2022-11-07 NOTE — PROGRESS NOTES
[address] RICHY SANDOVAL [facility] DOS 2/28/22  N18 5 billed and noted on DOS and  problem list; Dialysis not in place at [facility]

## 2022-11-11 ENCOUNTER — TELEPHONE (OUTPATIENT)
Dept: NEPHROLOGY | Facility: CLINIC | Age: 27
End: 2022-11-11

## 2022-11-11 ENCOUNTER — TELEPHONE (OUTPATIENT)
Dept: FAMILY MEDICINE CLINIC | Facility: CLINIC | Age: 27
End: 2022-11-11

## 2022-11-11 DIAGNOSIS — E83.9 CHRONIC KIDNEY DISEASE-MINERAL AND BONE DISORDER: ICD-10-CM

## 2022-11-11 DIAGNOSIS — N18.5 CHRONIC KIDNEY DISEASE (CKD), ACTIVE MEDICAL MANAGEMENT WITHOUT DIALYSIS, STAGE 5 (HCC): Primary | ICD-10-CM

## 2022-11-11 DIAGNOSIS — M89.9 CHRONIC KIDNEY DISEASE-MINERAL AND BONE DISORDER: ICD-10-CM

## 2022-11-11 DIAGNOSIS — N18.9 CHRONIC KIDNEY DISEASE-MINERAL AND BONE DISORDER: ICD-10-CM

## 2022-11-28 DIAGNOSIS — D50.9 IRON DEFICIENCY ANEMIA, UNSPECIFIED IRON DEFICIENCY ANEMIA TYPE: ICD-10-CM

## 2022-11-28 RX ORDER — FERROUS SULFATE 325(65) MG
1 TABLET ORAL
Qty: 30 TABLET | Refills: 3 | Status: SHIPPED | OUTPATIENT
Start: 2022-11-28

## 2022-12-19 ENCOUNTER — OFFICE VISIT (OUTPATIENT)
Dept: NEPHROLOGY | Facility: CLINIC | Age: 27
End: 2022-12-19

## 2022-12-19 VITALS
HEIGHT: 64 IN | SYSTOLIC BLOOD PRESSURE: 150 MMHG | DIASTOLIC BLOOD PRESSURE: 88 MMHG | BODY MASS INDEX: 40.8 KG/M2 | WEIGHT: 239 LBS

## 2022-12-19 DIAGNOSIS — Z01.818 PRE-TRANSPLANT EVALUATION FOR CHRONIC KIDNEY DISEASE: Primary | ICD-10-CM

## 2022-12-19 NOTE — PATIENT INSTRUCTIONS
1) We will review your case at the transplant committee meeting and will review our decision with you in approximately 4 weeks over the phone  2) If you do not receive a call from [facility] within 4 weeks, please call our local Nephrology office  3) From a renal transplant evaluation purpose, we will see you once a year in our local office for medical follow-up  4) Once we call you with our decision regarding further evaluation, you will receive further instruction over the phone and in the mail regarding the next steps to complete the transplant evaluation  5) PLEASE HAVE LABWORK ASAP FOR DR SHEPARD  6) please continue your weight loss as you are - goal weight is 220 lbs  7) please DO NOT HAVE ANY CONTRAST FOR CT SCAN THRU YOUR IV   PLEASE ASK THEM TO CALL US

## 2022-12-19 NOTE — LETTER
December 19, 2022     Kota Weir MD  [address] [facility]    [address] [address]    Patient: Lynda Meeks   YOB: 1995   Date of Visit: 12/19/2022       Dear Dr Alverto Gunderson:    Thank you for referring Mike Carson to me for evaluation  Below are my notes for this consultation  If you have questions, please do not hesitate to call me  I look forward to following your patient along with you  Sincerely,        Rani Goyal MD        CC: DO Rani Engle MD  12/19/2022  4:55 PM  Sign when Signing Visit  NEPHROLOGY OFFICE VISIT   Lynda Meeks 24 y o  female MRN: [id number]  12/19/2022    Reason for Visit: Prerenal transplant evaluation follow-up    ASSESSMENT and PLAN:    I had the pleasure of seeing Ms Rayo Royal today in the renal clinic for the continued management of pre renal transplant evaluation f/u  Lynda Meeks is a 27 y o  female  referred by Dr Alverto Gunderson, CKD stave V with most recent eGFR 6, Creat 7 9 mg/dL, native disease chronic GN, HTN (first diagnosed 1/2020); asthma, non smoker, no ETOH, no drugs, seen in the Nephrology Clinic for pre-transplant kidney evaluation         Renal ultrasound right kidney 9 4 cm, left kidney 9 cm, decreased size since 2015  Diffusely echogenic        ECHO 11/2021 - EF 60%; mild dilated atrium, mild mitral regurg, mild TR     Renal biopsy-diffuse glomerular sclerosis severe, severe tubular atrophy and interstitial fibrosis and interstitial inflammation, mild arterial sclerosis  June 2022- patient was admitted to the hospital blurriness of vision and weakness  There was concern for uremia but patient was not amenable to initiating dialysis  Patient had follow-up visit in September 2022 with primary nephrologist   Did not have lab work completed as advised for this visit    There was concern regarding compliance and forgetfulness and therefore patient was deemed not a candidate for peritoneal dialysis      Plan   1  Patient was last seen in the renal transplant office May 2022 as a virtual visit  therefore patient was advised for follow-up visit and presented today in person  Has been able to lose weight and is actively exercising and eating better  Concerning is that the patient has not obtained follow-up lab work as of yet and states that she will go tomorrow  Patient does not have overt uremic signs or symptoms as of yet but I advised the patient that she needs to follow with her primary nephrologist and follow lab work as advised  2   Patient is starting the testing process for transplant evaluation-there was many moments that we could not reach the patient but communication appears to be improving slowly  From the patient standpoint  3  Advised the patient to avoid any IV contrast and I have adjusted the orders for the CT scan to avoid contrast  4  Will need close social work evaluation as the patient will have transportation issues as patient does not drive  Patient's mother is not always available per the patient's report and at times patient has to utilize right sharing  5 BMI is improving to 41   6  Pap smear - needs to be updated for this year  7  History of concussion - has memory loss issues; mother helps pt remember to take medications  8  Potential living donor - 33 yo cousin  5  Noted to have right axilla swelling at urgent care recently      No problem-specific Assessment & Plan notes found for this encounter  HPI:    Patient denies complaints  Does have dizziness when exerting but otherwise no complaints currently  No nausea, no vomiting  PATIENT INSTRUCTIONS:    Patient Instructions   1) We will review your case at the transplant committee meeting and will review our decision with you in approximately 4 weeks over the phone  2) If you do not receive a call from [facility] within 4 weeks, please call our local Nephrology office    3) From a renal transplant evaluation purpose, we will see you once a year in our local office for medical follow-up  4) Once we call you with our decision regarding further evaluation, you will receive further instruction over the phone and in the mail regarding the next steps to complete the transplant evaluation  5) PLEASE HAVE LABWORK ASAP FOR DR SHEPARD  6) please continue your weight loss as you are - goal weight is 220 lbs  7) please DO NOT HAVE ANY CONTRAST FOR CT SCAN THRU YOUR IV  PLEASE ASK THEM TO CALL US        OBJECTIVE:  Current Weight: Weight - Scale: 108 kg (239 lb)  Vitals:    12/19/22 1632   BP: 150/88   BP Location: Left arm   Patient Position: Sitting   Cuff Size: Large   Weight: 108 kg (239 lb)   Height: 5' 4" (1 626 m)    Body mass index is 41 02 kg/m²  REVIEW OF SYSTEMS:    Review of Systems   Constitutional: Negative  Negative for fatigue  Has chills chronically  HENT: Negative  Eyes: Negative  Respiratory: Negative  Negative for shortness of breath  Cardiovascular: Negative  Negative for leg swelling  Gastrointestinal: Negative  Endocrine: Negative  Genitourinary: Negative  Negative for difficulty urinating  Musculoskeletal: Negative  Skin: Negative  Allergic/Immunologic: Negative  Neurological: Negative  Hematological: Negative  Psychiatric/Behavioral: Negative  All other systems reviewed and are negative  PHYSICAL EXAM:      Physical Exam  Vitals and nursing note reviewed  Constitutional:       General: She is not in acute distress  Appearance: She is well-developed  She is not diaphoretic  HENT:      Head: Normocephalic and atraumatic  Eyes:      General: No scleral icterus  Right eye: No discharge  Left eye: No discharge  Conjunctiva/sclera: Conjunctivae normal    Neck:      Vascular: No JVD  Cardiovascular:      Rate and Rhythm: Normal rate and regular rhythm  Heart sounds: No murmur heard      No friction rub  No gallop  Pulmonary:      Effort: Pulmonary effort is normal  No respiratory distress  Breath sounds: Normal breath sounds  No wheezing or rales  Abdominal:      General: Bowel sounds are normal  There is no distension  Palpations: Abdomen is soft  Tenderness: There is no abdominal tenderness  There is no rebound  Musculoskeletal:         General: No tenderness or deformity  Normal range of motion  Cervical back: Normal range of motion and neck supple  Skin:     General: Skin is warm and dry  Coloration: Skin is not pale  Findings: No erythema or rash  Neurological:      Mental Status: She is alert and oriented to person, place, and time  Coordination: Coordination normal    Psychiatric:         Behavior: Behavior normal          Thought Content:  Thought content normal          Judgment: Judgment normal          Medications:    Current Outpatient Medications:   •  acetaminophen (TYLENOL) 325 mg tablet, Take 2 tablets (650 mg total) by mouth every 6 (six) hours as needed for mild pain or headaches, Disp: 30 tablet, Rfl: 0  •  albuterol (2 5 mg/3 mL) 0 083 % nebulizer solution, Take 3 mL (2 5 mg total) by nebulization every 6 (six) hours as needed for wheezing or shortness of breath, Disp: 30 mL, Rfl: 0  •  albuterol (Ventolin HFA) 90 mcg/act inhaler, Inhale 2 puffs every 6 (six) hours as needed for wheezing or shortness of breath (cough), Disp: 18 g, Rfl: 0  •  amLODIPine (NORVASC) 5 mg tablet, Take 1 tablet (5 mg total) by mouth 2 (two) times a day, Disp: 60 tablet, Rfl: 3  •  calcitriol (ROCALTROL) 0 25 mcg capsule, Take 1 capsule (0 25 mcg total) by mouth daily, Disp: 90 capsule, Rfl: 3  •  ergocalciferol (VITAMIN D2) 50,000 units, TAKE 1 CAPSULE BY MOUTH ONE TIME PER WEEK, Disp: 12 capsule, Rfl: 0  •  etonogestrel (NEXPLANON) subdermal implant, 68 mg by Subdermal route once, Disp: , Rfl:   •  ferrous sulfate 325 (65 Fe) mg tablet, Take 1 tablet (325 mg total) by mouth daily with breakfast, Disp: 30 tablet, Rfl: 3  •  fluticasone (Flovent HFA) 110 MCG/ACT inhaler, Inhale 1 puff 2 (two) times a day, Disp: 12 g, Rfl: 0  •  labetalol (NORMODYNE) 100 mg tablet, Take 1 tablet (100 mg total) by mouth 2 (two) times a day, Disp: 60 tablet, Rfl: 3  •  sodium bicarbonate 650 mg tablet, Take 1 tablet (650 mg total) by mouth 2 (two) times a day, Disp: 180 tablet, Rfl: 3  •  Blood Pressure KIT, by Does not apply route daily (Patient not taking: Reported on 12/7/2021), Disp: 1 each, Rfl: 0    Laboratory Results:        Invalid input(s): ALBUMIN    Results for orders placed or performed during the hospital encounter of 06/18/22   CBC and differential   Result Value Ref Range    WBC 6 68 4 31 - 10 16 Thousand/uL    RBC 3 81 3 81 - 5 12 Million/uL    Hemoglobin 8 0 (L) 11 5 - 15 4 g/dL    Hematocrit 26 8 (L) 34 8 - 46 1 %    MCV 70 (L) 82 - 98 fL    MCH 21 0 (L) 26 8 - 34 3 pg    MCHC 29 9 (L) 31 4 - 37 4 g/dL    RDW 15 0 11 6 - 15 1 %    MPV 9 6 8 9 - 12 7 fL    Platelets 449 699 - 871 Thousands/uL    nRBC 0 /100 WBCs    Neutrophils Relative 65 43 - 75 %    Immat GRANS % 0 0 - 2 %    Lymphocytes Relative 25 14 - 44 %    Monocytes Relative 7 4 - 12 %    Eosinophils Relative 3 0 - 6 %    Basophils Relative 0 0 - 1 %    Neutrophils Absolute 4 28 1 85 - 7 62 Thousands/µL    Immature Grans Absolute 0 02 0 00 - 0 20 Thousand/uL    Lymphocytes Absolute 1 69 0 60 - 4 47 Thousands/µL    Monocytes Absolute 0 48 0 17 - 1 22 Thousand/µL    Eosinophils Absolute 0 18 0 00 - 0 61 Thousand/µL    Basophils Absolute 0 03 0 00 - 0 10 Thousands/µL   Basic metabolic panel   Result Value Ref Range    Sodium 138 135 - 147 mmol/L    Potassium 3 9 3 5 - 5 3 mmol/L    Chloride 105 96 - 108 mmol/L    CO2 23 21 - 32 mmol/L    ANION GAP 10 4 - 13 mmol/L    BUN 32 (H) 5 - 25 mg/dL    Creatinine 8 03 (H) 0 60 - 1 30 mg/dL    Glucose 81 65 - 140 mg/dL    Calcium 7 9 (L) 8 4 - 10 2 mg/dL    eGFR 6 ml/min/1 73sq m   Hepatic function panel   Result Value Ref Range    Total Bilirubin 0 23 0 20 - 1 00 mg/dL    Bilirubin, Direct 0 03 0 00 - 0 20 mg/dL    Alkaline Phosphatase 82 34 - 104 U/L    AST 9 (L) 13 - 39 U/L    ALT 5 (L) 7 - 52 U/L    Total Protein 7 1 6 4 - 8 4 g/dL    Albumin 4 2 3 5 - 5 0 g/dL   TSH   Result Value Ref Range    TSH 3RD GENERATON 3 495 0 450 - 4 500 uIU/mL   Magnesium   Result Value Ref Range    Magnesium 1 8 (L) 1 9 - 2 7 mg/dL   Phosphorus   Result Value Ref Range    Phosphorus 4 4 2 7 - 4 5 mg/dL   Urine Microscopic   Result Value Ref Range    RBC, UA 0-1 None Seen, 0-1, 1-2, 2-4, 0-5 /hpf    WBC, UA 1-2 None Seen, 0-1, 1-2, 0-5, 2-4 /hpf    Epithelial Cells Occasional None Seen, Occasional /hpf    Bacteria, UA None Seen None Seen, Occasional /hpf   CBC and Platelet   Result Value Ref Range    WBC 6 44 4 31 - 10 16 Thousand/uL    RBC 3 76 (L) 3 81 - 5 12 Million/uL    Hemoglobin 8 1 (L) 11 5 - 15 4 g/dL    Hematocrit 26 7 (L) 34 8 - 46 1 %    MCV 71 (L) 82 - 98 fL    MCH 21 5 (L) 26 8 - 34 3 pg    MCHC 30 3 (L) 31 4 - 37 4 g/dL    RDW 15 1 11 6 - 15 1 %    Platelets 889 956 - 739 Thousands/uL    MPV 10 7 8 9 - 12 7 fL   Basic metabolic panel   Result Value Ref Range    Sodium 141 135 - 147 mmol/L    Potassium 4 2 3 5 - 5 3 mmol/L    Chloride 109 (H) 96 - 108 mmol/L    CO2 21 21 - 32 mmol/L    ANION GAP 11 4 - 13 mmol/L    BUN 36 (H) 5 - 25 mg/dL    Creatinine 8 58 (H) 0 60 - 1 30 mg/dL    Glucose 82 65 - 140 mg/dL    Calcium 8 4 8 4 - 10 2 mg/dL    eGFR 5 ml/min/1 73sq m   Iron Saturation %   Result Value Ref Range    Iron Saturation 16 15 - 50 %    TIBC 211 (L) 250 - 450 ug/dL    Iron 34 (L) 50 - 170 ug/dL   Ferritin   Result Value Ref Range    Ferritin 154 8 - 388 ng/mL   POCT pregnancy, urine   Result Value Ref Range    EXT PREG TEST UR (Ref: Negative) negative     Control valid    ECG 12 lead   Result Value Ref Range    Ventricular Rate 84 BPM    Atrial Rate 84 BPM    MD Interval 156 ms    QRSD Interval 96 ms    QT Interval 394 ms    QTC Interval 465 ms    P Axis 45 degrees    QRS Axis 5 degrees    T Wave Axis 54 degrees   Urine Macroscopic, POC   Result Value Ref Range    Color, UA Yellow     Clarity, UA Clear     pH, UA 7 0 4 5 - 8 0    Leukocytes, UA Negative Negative    Nitrite, UA Negative Negative    Protein, UA >=300 (A) Negative mg/dl    Glucose, UA Negative Negative mg/dl    Ketones, UA Negative Negative mg/dl    Urobilinogen, UA 0 2 0 2, 1 0 E U /dl E U /dl    Bilirubin, UA Negative Negative    Occult Blood, UA Small (A) Negative    Specific Gravity, UA 1 015 1 003 - 1 030

## 2022-12-19 NOTE — PROGRESS NOTES
NEPHROLOGY OFFICE VISIT   Connie Simeon 24 y o  female MRN: [id number]  12/19/2022    Reason for Visit: Prerenal transplant evaluation follow-up    ASSESSMENT and PLAN:    I had the pleasure of seeing Ms Liliam Leblanc today in the renal clinic for the continued management of pre renal transplant evaluation f/u  Annemarie Bloch is a 27 y o  female  referred by Dr Flavia Cano, CKD stave V with most recent eGFR 6, Creat 7 9 mg/dL, native disease chronic GN, HTN (first diagnosed 1/2020); asthma, non smoker, no ETOH, no drugs, seen in the Nephrology Clinic for pre-transplant kidney evaluation         Renal ultrasound right kidney 9 4 cm, left kidney 9 cm, decreased size since 2015  Diffusely echogenic        ECHO 11/2021 - EF 60%; mild dilated atrium, mild mitral regurg, mild TR     Renal biopsy-diffuse glomerular sclerosis severe, severe tubular atrophy and interstitial fibrosis and interstitial inflammation, mild arterial sclerosis  June 2022- patient was admitted to the hospital blurriness of vision and weakness  There was concern for uremia but patient was not amenable to initiating dialysis  Patient had follow-up visit in September 2022 with primary nephrologist   Did not have lab work completed as advised for this visit  There was concern regarding compliance and forgetfulness and therefore patient was deemed not a candidate for peritoneal dialysis      Plan   1  Patient was last seen in the renal transplant office May 2022 as a virtual visit  therefore patient was advised for follow-up visit and presented today in person  Has been able to lose weight and is actively exercising and eating better  Concerning is that the patient has not obtained follow-up lab work as of yet and states that she will go tomorrow    Patient does not have overt uremic signs or symptoms as of yet but I advised the patient that she needs to follow with her primary nephrologist and follow lab work as advised  2   Patient is starting the testing process for transplant evaluation-there was many moments that we could not reach the patient but communication appears to be improving slowly  From the patient standpoint  3  Advised the patient to avoid any IV contrast and I have adjusted the orders for the CT scan to avoid contrast  4  Will need close social work evaluation as the patient will have transportation issues as patient does not drive  Patient's mother is not always available per the patient's report and at times patient has to utilize right sharing  5 BMI is improving to 41   6  Pap smear - needs to be updated for this year  7  History of concussion - has memory loss issues; mother helps pt remember to take medications  8  Potential living donor - 31 yo cousin  5  Noted to have right axilla swelling at urgent care recently      No problem-specific Assessment & Plan notes found for this encounter  HPI:    Patient denies complaints  Does have dizziness when exerting but otherwise no complaints currently  No nausea, no vomiting  PATIENT INSTRUCTIONS:    Patient Instructions   1) We will review your case at the transplant committee meeting and will review our decision with you in approximately 4 weeks over the phone  2) If you do not receive a call from [facility] within 4 weeks, please call our local Nephrology office  3) From a renal transplant evaluation purpose, we will see you once a year in our local office for medical follow-up  4) Once we call you with our decision regarding further evaluation, you will receive further instruction over the phone and in the mail regarding the next steps to complete the transplant evaluation  5) PLEASE HAVE LABWORK ASAP FOR DR SHEPARD  6) please continue your weight loss as you are - goal weight is 220 lbs  7) please DO NOT HAVE ANY CONTRAST FOR CT SCAN THRU YOUR IV   PLEASE ASK THEM TO CALL US        OBJECTIVE:  Current Weight: Weight - Scale: 108 kg (239 lb)  Vitals:    12/19/22 1632   BP: 150/88   BP Location: Left arm   Patient Position: Sitting   Cuff Size: Large   Weight: 108 kg (239 lb)   Height: 5' 4" (1 626 m)    Body mass index is 41 02 kg/m²  REVIEW OF SYSTEMS:    Review of Systems   Constitutional: Negative  Negative for fatigue  Has chills chronically  HENT: Negative  Eyes: Negative  Respiratory: Negative  Negative for shortness of breath  Cardiovascular: Negative  Negative for leg swelling  Gastrointestinal: Negative  Endocrine: Negative  Genitourinary: Negative  Negative for difficulty urinating  Musculoskeletal: Negative  Skin: Negative  Allergic/Immunologic: Negative  Neurological: Negative  Hematological: Negative  Psychiatric/Behavioral: Negative  All other systems reviewed and are negative  PHYSICAL EXAM:      Physical Exam  Vitals and nursing note reviewed  Constitutional:       General: She is not in acute distress  Appearance: She is well-developed  She is not diaphoretic  HENT:      Head: Normocephalic and atraumatic  Eyes:      General: No scleral icterus  Right eye: No discharge  Left eye: No discharge  Conjunctiva/sclera: Conjunctivae normal    Neck:      Vascular: No JVD  Cardiovascular:      Rate and Rhythm: Normal rate and regular rhythm  Heart sounds: No murmur heard  No friction rub  No gallop  Pulmonary:      Effort: Pulmonary effort is normal  No respiratory distress  Breath sounds: Normal breath sounds  No wheezing or rales  Abdominal:      General: Bowel sounds are normal  There is no distension  Palpations: Abdomen is soft  Tenderness: There is no abdominal tenderness  There is no rebound  Musculoskeletal:         General: No tenderness or deformity  Normal range of motion  Cervical back: Normal range of motion and neck supple  Skin:     General: Skin is warm and dry        Coloration: Skin is not pale       Findings: No erythema or rash  Neurological:      Mental Status: She is alert and oriented to person, place, and time  Coordination: Coordination normal    Psychiatric:         Behavior: Behavior normal          Thought Content:  Thought content normal          Judgment: Judgment normal          Medications:    Current Outpatient Medications:   •  acetaminophen (TYLENOL) 325 mg tablet, Take 2 tablets (650 mg total) by mouth every 6 (six) hours as needed for mild pain or headaches, Disp: 30 tablet, Rfl: 0  •  albuterol (2 5 mg/3 mL) 0 083 % nebulizer solution, Take 3 mL (2 5 mg total) by nebulization every 6 (six) hours as needed for wheezing or shortness of breath, Disp: 30 mL, Rfl: 0  •  albuterol (Ventolin HFA) 90 mcg/act inhaler, Inhale 2 puffs every 6 (six) hours as needed for wheezing or shortness of breath (cough), Disp: 18 g, Rfl: 0  •  amLODIPine (NORVASC) 5 mg tablet, Take 1 tablet (5 mg total) by mouth 2 (two) times a day, Disp: 60 tablet, Rfl: 3  •  calcitriol (ROCALTROL) 0 25 mcg capsule, Take 1 capsule (0 25 mcg total) by mouth daily, Disp: 90 capsule, Rfl: 3  •  ergocalciferol (VITAMIN D2) 50,000 units, TAKE 1 CAPSULE BY MOUTH ONE TIME PER WEEK, Disp: 12 capsule, Rfl: 0  •  etonogestrel (NEXPLANON) subdermal implant, 68 mg by Subdermal route once, Disp: , Rfl:   •  ferrous sulfate 325 (65 Fe) mg tablet, Take 1 tablet (325 mg total) by mouth daily with breakfast, Disp: 30 tablet, Rfl: 3  •  fluticasone (Flovent HFA) 110 MCG/ACT inhaler, Inhale 1 puff 2 (two) times a day, Disp: 12 g, Rfl: 0  •  labetalol (NORMODYNE) 100 mg tablet, Take 1 tablet (100 mg total) by mouth 2 (two) times a day, Disp: 60 tablet, Rfl: 3  •  sodium bicarbonate 650 mg tablet, Take 1 tablet (650 mg total) by mouth 2 (two) times a day, Disp: 180 tablet, Rfl: 3  •  Blood Pressure KIT, by Does not apply route daily (Patient not taking: Reported on 12/7/2021), Disp: 1 each, Rfl: 0    Laboratory Results:        Invalid input(s): ALBUMIN    Results for orders placed or performed during the hospital encounter of 06/18/22   CBC and differential   Result Value Ref Range    WBC 6 68 4 31 - 10 16 Thousand/uL    RBC 3 81 3 81 - 5 12 Million/uL    Hemoglobin 8 0 (L) 11 5 - 15 4 g/dL    Hematocrit 26 8 (L) 34 8 - 46 1 %    MCV 70 (L) 82 - 98 fL    MCH 21 0 (L) 26 8 - 34 3 pg    MCHC 29 9 (L) 31 4 - 37 4 g/dL    RDW 15 0 11 6 - 15 1 %    MPV 9 6 8 9 - 12 7 fL    Platelets 911 416 - 409 Thousands/uL    nRBC 0 /100 WBCs    Neutrophils Relative 65 43 - 75 %    Immat GRANS % 0 0 - 2 %    Lymphocytes Relative 25 14 - 44 %    Monocytes Relative 7 4 - 12 %    Eosinophils Relative 3 0 - 6 %    Basophils Relative 0 0 - 1 %    Neutrophils Absolute 4 28 1 85 - 7 62 Thousands/µL    Immature Grans Absolute 0 02 0 00 - 0 20 Thousand/uL    Lymphocytes Absolute 1 69 0 60 - 4 47 Thousands/µL    Monocytes Absolute 0 48 0 17 - 1 22 Thousand/µL    Eosinophils Absolute 0 18 0 00 - 0 61 Thousand/µL    Basophils Absolute 0 03 0 00 - 0 10 Thousands/µL   Basic metabolic panel   Result Value Ref Range    Sodium 138 135 - 147 mmol/L    Potassium 3 9 3 5 - 5 3 mmol/L    Chloride 105 96 - 108 mmol/L    CO2 23 21 - 32 mmol/L    ANION GAP 10 4 - 13 mmol/L    BUN 32 (H) 5 - 25 mg/dL    Creatinine 8 03 (H) 0 60 - 1 30 mg/dL    Glucose 81 65 - 140 mg/dL    Calcium 7 9 (L) 8 4 - 10 2 mg/dL    eGFR 6 ml/min/1 73sq m   Hepatic function panel   Result Value Ref Range    Total Bilirubin 0 23 0 20 - 1 00 mg/dL    Bilirubin, Direct 0 03 0 00 - 0 20 mg/dL    Alkaline Phosphatase 82 34 - 104 U/L    AST 9 (L) 13 - 39 U/L    ALT 5 (L) 7 - 52 U/L    Total Protein 7 1 6 4 - 8 4 g/dL    Albumin 4 2 3 5 - 5 0 g/dL   TSH   Result Value Ref Range    TSH 3RD GENERATON 3 495 0 450 - 4 500 uIU/mL   Magnesium   Result Value Ref Range    Magnesium 1 8 (L) 1 9 - 2 7 mg/dL   Phosphorus   Result Value Ref Range    Phosphorus 4 4 2 7 - 4 5 mg/dL   Urine Microscopic   Result Value Ref Range    RBC, UA 0-1 None Seen, 0-1, 1-2, 2-4, 0-5 /hpf    WBC, UA 1-2 None Seen, 0-1, 1-2, 0-5, 2-4 /hpf    Epithelial Cells Occasional None Seen, Occasional /hpf    Bacteria, UA None Seen None Seen, Occasional /hpf   CBC and Platelet   Result Value Ref Range    WBC 6 44 4 31 - 10 16 Thousand/uL    RBC 3 76 (L) 3 81 - 5 12 Million/uL    Hemoglobin 8 1 (L) 11 5 - 15 4 g/dL    Hematocrit 26 7 (L) 34 8 - 46 1 %    MCV 71 (L) 82 - 98 fL    MCH 21 5 (L) 26 8 - 34 3 pg    MCHC 30 3 (L) 31 4 - 37 4 g/dL    RDW 15 1 11 6 - 15 1 %    Platelets 031 924 - 082 Thousands/uL    MPV 10 7 8 9 - 12 7 fL   Basic metabolic panel   Result Value Ref Range    Sodium 141 135 - 147 mmol/L    Potassium 4 2 3 5 - 5 3 mmol/L    Chloride 109 (H) 96 - 108 mmol/L    CO2 21 21 - 32 mmol/L    ANION GAP 11 4 - 13 mmol/L    BUN 36 (H) 5 - 25 mg/dL    Creatinine 8 58 (H) 0 60 - 1 30 mg/dL    Glucose 82 65 - 140 mg/dL    Calcium 8 4 8 4 - 10 2 mg/dL    eGFR 5 ml/min/1 73sq m   Iron Saturation %   Result Value Ref Range    Iron Saturation 16 15 - 50 %    TIBC 211 (L) 250 - 450 ug/dL    Iron 34 (L) 50 - 170 ug/dL   Ferritin   Result Value Ref Range    Ferritin 154 8 - 388 ng/mL   POCT pregnancy, urine   Result Value Ref Range    EXT PREG TEST UR (Ref: Negative) negative     Control valid    ECG 12 lead   Result Value Ref Range    Ventricular Rate 84 BPM    Atrial Rate 84 BPM    TN Interval 156 ms    QRSD Interval 96 ms    QT Interval 394 ms    QTC Interval 465 ms    P Axis 45 degrees    QRS Axis 5 degrees    T Wave Axis 54 degrees   Urine Macroscopic, POC   Result Value Ref Range    Color, UA Yellow     Clarity, UA Clear     pH, UA 7 0 4 5 - 8 0    Leukocytes, UA Negative Negative    Nitrite, UA Negative Negative    Protein, UA >=300 (A) Negative mg/dl    Glucose, UA Negative Negative mg/dl    Ketones, UA Negative Negative mg/dl    Urobilinogen, UA 0 2 0 2, 1 0 E U /dl E U /dl    Bilirubin, UA Negative Negative    Occult Blood, UA Small (A) Negative    Specific [place], UA 1 015 1 003 - 1 030

## 2022-12-21 ENCOUNTER — APPOINTMENT (OUTPATIENT)
Dept: LAB | Facility: CLINIC | Age: 27
End: 2022-12-21

## 2022-12-21 DIAGNOSIS — N18.5 CHRONIC KIDNEY DISEASE (CKD), ACTIVE MEDICAL MANAGEMENT WITHOUT DIALYSIS, STAGE 5 (HCC): ICD-10-CM

## 2022-12-21 DIAGNOSIS — N18.5 BENIGN HYPERTENSION WITH CKD (CHRONIC KIDNEY DISEASE) STAGE V (HCC): ICD-10-CM

## 2022-12-21 DIAGNOSIS — I12.0 BENIGN HYPERTENSION WITH CKD (CHRONIC KIDNEY DISEASE) STAGE V (HCC): ICD-10-CM

## 2022-12-21 DIAGNOSIS — N25.81 SECONDARY HYPERPARATHYROIDISM OF RENAL ORIGIN (HCC): ICD-10-CM

## 2022-12-21 DIAGNOSIS — N18.9 CHRONIC KIDNEY DISEASE: ICD-10-CM

## 2022-12-21 DIAGNOSIS — R80.1 PERSISTENT PROTEINURIA: ICD-10-CM

## 2022-12-21 DIAGNOSIS — M89.9 CHRONIC KIDNEY DISEASE-MINERAL AND BONE DISORDER: ICD-10-CM

## 2022-12-21 DIAGNOSIS — N19: ICD-10-CM

## 2022-12-21 DIAGNOSIS — I10 HYPERTENSION, UNSPECIFIED TYPE: ICD-10-CM

## 2022-12-21 DIAGNOSIS — N18.9 CHRONIC KIDNEY DISEASE-MINERAL AND BONE DISORDER: ICD-10-CM

## 2022-12-21 DIAGNOSIS — E83.9 CHRONIC KIDNEY DISEASE-MINERAL AND BONE DISORDER: ICD-10-CM

## 2022-12-21 NOTE — PROGRESS NOTES
Assessment and Plan:    Ron Singh was seen today for follow-up  Diagnoses and all orders for this visit:    Benign hypertension with CKD (chronic kidney disease) stage V ([facility])    Chronic kidney disease (CKD), active medical management without dialysis, stage 5 ([facility] )  -     Ambulatory Referral to Nephrology  -     Basic metabolic panel; Future  -     Phosphorus; Future  -     Basic metabolic panel; Future  -     Phosphorus; Future  -     CBC; Future  -     Magnesium; Future  -     Protein / creatinine ratio, urine; Future  -     PTH, intact; Future  -     Vitamin D 25 hydroxy; Future    Chronic kidney disease-mineral and bone disorder  -     Ambulatory Referral to Nephrology    Metabolic acidosis    Anemia due to stage 5 chronic kidney disease, not on chronic dialysis ([place] [address] )  -     CBC; Future  -     Iron Panel (Includes Ferritin, Iron Sat%, Iron, and TIBC); Future    Persistent proteinuria    Secondary hyperparathyroidism of renal origin ([facility])  -     calcitriol (ROCALTROL) 0 25 mcg capsule; Take 1 capsule (0 25 mcg total) by mouth daily      Chronic Kidney Disease stage 5- Creatinine continues to progress and worsen  Currently, creatinine is 7 7 with a BUN of 32 and a GFR of 6%    Renal biopsy 1/29/2020: severe diffuse global glomerulosclerosis, severe tubular atrophy with interstitial fibrosis and inflammation, mild arteriosclerosis leading to the likely cause of renal dysfunction being 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 due to NSAID use)  Workup: HIV nonreactive, anti GBM negative, LUCY negative, ANCA negative but NV-3 elevated to 5 4 hemolysis smear negative for schistocytes, C3/C4 normal, IgG/IgA/IgM normal, hepatitis negative, no peripheral eosinophilia  Kidney Smart Class: completed over the phone  [facility] to [facility]  Access: declined at this time  - discussed in great detail the need for dialysis access however mother and patient continue to refuse  Transplant: evaluated by Dr Brenda Miranda on 12/19/22  Barriers: Patient has poor insight into her disease   Mother helps to manage however her mother is reluctant to plan for dialysis       Hypertension- Antihypertensive regimen includes amlodipine 5mg twice a day and labetalol 100mg twice a day   Please avoid salt in your diet   Avoid NSAIDs   Lose weight   Check your blood pressures at home  We recommend OMRON blood pressure cuff and definitely a large arm cuff)   Goal blood pressure is 130/80  Workup: renal artery doppler negative, am cortisol 14, tsh 1 5, metanephrines normal, catecholamines negative, renin 0 935, [id number] 08 1     Metabolic Acidosis- She takes sodium bicarbonate tablets 650mg twice a day        Anemia- Continue to take an oral iron supplement daily  Will check iron studies  Will likely need IV iron      Bone Mineral Disorder- Check studies  Low phosphorus diet  She takes ergocalciferol weekly      Proteinuria- Check UPC ratio  Unable to use RAAS inhibition with ACEI/ARB due to progressive and advanced kidney disease  Follow up with Dr Katz Head or an AP in 2 months  Please call the office with any questions or concerns  Reason for Visit: Follow-up (CKD 5/)    HPI: Amanda Moore is a 24 y o  female who is here for follow up of advanced chronic kidney disease  The patient has no uremic symptoms  She denies SOB, LE edema, GI distress  She is trying to lose weight  Her goal weight for transplant is <220 lbs  She does have living donors  She is working on her transplant check list   I explained to them that just because they had the appointment with Dr Brenda Miranda does not mean she is automatically on the transplant list   Her mother states that she does not want her daughter to start dialysis prior to transplantation and is not willing to allow me to place a referral to vascular surgery for fistula creation    I was very blunt in my discussion with the patient and her mother about the risk of death and the fact that she likely will not receive a transplant before needing dialysis  I discussed the obvious that a transplant will not be of benefit if patient dies from refusing dialysis prior  We discussed that if she starts dialysis prior to receiving a transplant, she would be able to stop dialysis once the transplant is working  All questions answered to the best of my ability  ROS: A complete review of systems was performed and was negative unless otherwise noted in the history of present illness      Allergies:   Wheat bran - food allergy    Medications:     Current Outpatient Medications:   •  acetaminophen (TYLENOL) 325 mg tablet, Take 2 tablets (650 mg total) by mouth every 6 (six) hours as needed for mild pain or headaches, Disp: 30 tablet, Rfl: 0  •  albuterol (2 5 mg/3 mL) 0 083 % nebulizer solution, Take 3 mL (2 5 mg total) by nebulization every 6 (six) hours as needed for wheezing or shortness of breath, Disp: 30 mL, Rfl: 0  •  albuterol (Ventolin HFA) 90 mcg/act inhaler, Inhale 2 puffs every 6 (six) hours as needed for wheezing or shortness of breath (cough), Disp: 18 g, Rfl: 0  •  amLODIPine (NORVASC) 5 mg tablet, Take 1 tablet (5 mg total) by mouth 2 (two) times a day, Disp: 60 tablet, Rfl: 3  •  calcitriol (ROCALTROL) 0 25 mcg capsule, Take 1 capsule (0 25 mcg total) by mouth daily, Disp: 90 capsule, Rfl: 3  •  ergocalciferol (VITAMIN D2) 50,000 units, TAKE 1 CAPSULE BY MOUTH ONE TIME PER WEEK, Disp: 12 capsule, Rfl: 0  •  ferrous sulfate 325 (65 Fe) mg tablet, Take 1 tablet (325 mg total) by mouth daily with breakfast, Disp: 30 tablet, Rfl: 3  •  fluticasone (Flovent HFA) 110 MCG/ACT inhaler, Inhale 1 puff 2 (two) times a day, Disp: 12 g, Rfl: 0  •  labetalol (NORMODYNE) 100 mg tablet, Take 1 tablet (100 mg total) by mouth 2 (two) times a day, Disp: 60 tablet, Rfl: 3  •  sodium bicarbonate 650 mg tablet, Take 1 tablet (650 mg total) by mouth 2 (two) times a day, Disp: 180 tablet, Rfl: 3  •  Blood Pressure KIT, by Does not apply route daily (Patient not taking: Reported on 12/22/2022), Disp: 1 each, Rfl: 0  •  etonogestrel (NEXPLANON) subdermal implant, 68 mg by Subdermal route once, Disp: , Rfl:     Past Medical History:   Diagnosis Date   • Asthma    • Chronic kidney disease    • Eczema    • Headache    • Hypertension    • Wears glasses      Past Surgical History:   Procedure Laterality Date   • CHOLECYSTECTOMY     • CT NEEDLE BIOPSY KIDNEY  1/29/2020   • KELOID EXCISION Left 3/30/2021    Procedure: POSTERIOR EAR EXCISION KELOID, FLAP RECONSTRUCTION;  Surgeon: Ashley Dumont MD;  Location: [facility];  Service: Plastics     Family History   Problem Relation Age of Onset   • Asthma Mother    • No Known Problems Father       reports that she has never smoked  She has never used smokeless tobacco  She reports current alcohol use  She reports that she does not use drugs  Physical Exam:   Vitals:    12/22/22 1518 12/22/22 1531   BP:  136/70   BP Location:  Right arm   Patient Position:  Sitting   Cuff Size:  Standard   Pulse:  68   Weight: 107 kg (236 lb 9 6 oz)    Height: 5' 4" (1 626 m)      Body mass index is 40 61 kg/m²  General: NAD  Neuro: AAO  Skin: no rash  Eyes: anicteric  ENMT: mm moist  Neck: no masses  Respiratory: CTAB  Cardiovascular: RRR  Extremities: no edema  Gastrointestinal: soft nt nd    Procedure:  No results found for this or any previous visit  Lab Results   Component Value Date    GLUCOSE 86 06/12/2015    CALCIUM 8 6 12/21/2022     06/12/2015    K 4 3 12/21/2022    CO2 26 12/21/2022     12/21/2022    BUN 32 (H) 12/21/2022    CREATININE 7 76 (H) 12/21/2022     I have personally reviewed the blood work as stated above and in my note  I have personally reviewed Dr Roberta montenegro

## 2022-12-22 ENCOUNTER — OFFICE VISIT (OUTPATIENT)
Dept: NEPHROLOGY | Facility: CLINIC | Age: 27
End: 2022-12-22

## 2022-12-22 VITALS
BODY MASS INDEX: 40.39 KG/M2 | HEIGHT: 64 IN | SYSTOLIC BLOOD PRESSURE: 136 MMHG | HEART RATE: 68 BPM | DIASTOLIC BLOOD PRESSURE: 70 MMHG | WEIGHT: 236.6 LBS

## 2022-12-22 DIAGNOSIS — E87.20 METABOLIC ACIDOSIS: ICD-10-CM

## 2022-12-22 DIAGNOSIS — D63.1 ANEMIA DUE TO STAGE 5 CHRONIC KIDNEY DISEASE, NOT ON CHRONIC DIALYSIS (HCC): ICD-10-CM

## 2022-12-22 DIAGNOSIS — N18.5 CHRONIC KIDNEY DISEASE (CKD), ACTIVE MEDICAL MANAGEMENT WITHOUT DIALYSIS, STAGE 5 (HCC): ICD-10-CM

## 2022-12-22 DIAGNOSIS — N18.9 CHRONIC KIDNEY DISEASE-MINERAL AND BONE DISORDER: ICD-10-CM

## 2022-12-22 DIAGNOSIS — N18.5 ANEMIA DUE TO STAGE 5 CHRONIC KIDNEY DISEASE, NOT ON CHRONIC DIALYSIS (HCC): ICD-10-CM

## 2022-12-22 DIAGNOSIS — R80.1 PERSISTENT PROTEINURIA: ICD-10-CM

## 2022-12-22 DIAGNOSIS — N18.5 BENIGN HYPERTENSION WITH CKD (CHRONIC KIDNEY DISEASE) STAGE V (HCC): Primary | ICD-10-CM

## 2022-12-22 DIAGNOSIS — N25.81 SECONDARY HYPERPARATHYROIDISM OF RENAL ORIGIN (HCC): ICD-10-CM

## 2022-12-22 DIAGNOSIS — I12.0 BENIGN HYPERTENSION WITH CKD (CHRONIC KIDNEY DISEASE) STAGE V (HCC): Primary | ICD-10-CM

## 2022-12-22 DIAGNOSIS — E83.9 CHRONIC KIDNEY DISEASE-MINERAL AND BONE DISORDER: ICD-10-CM

## 2022-12-22 DIAGNOSIS — M89.9 CHRONIC KIDNEY DISEASE-MINERAL AND BONE DISORDER: ICD-10-CM

## 2022-12-22 LAB
ALBUMIN SERPL BCP-MCNC: 3.8 G/DL (ref 3.5–5)
ALP SERPL-CCNC: 80 U/L (ref 46–116)
ALT SERPL W P-5'-P-CCNC: 10 U/L (ref 12–78)
ANION GAP SERPL CALCULATED.3IONS-SCNC: 8 MMOL/L (ref 4–13)
AST SERPL W P-5'-P-CCNC: 5 U/L (ref 5–45)
BACTERIA UR QL AUTO: ABNORMAL /HPF
BILIRUB SERPL-MCNC: 0.35 MG/DL (ref 0.2–1)
BILIRUB UR QL STRIP: NEGATIVE
BUN SERPL-MCNC: 32 MG/DL (ref 5–25)
CALCIUM SERPL-MCNC: 8.6 MG/DL (ref 8.3–10.1)
CHLORIDE SERPL-SCNC: 107 MMOL/L (ref 96–108)
CLARITY UR: CLEAR
CO2 SERPL-SCNC: 26 MMOL/L (ref 21–32)
COLOR UR: ABNORMAL
CREAT SERPL-MCNC: 7.76 MG/DL (ref 0.6–1.3)
ERYTHROCYTE [DISTWIDTH] IN BLOOD BY AUTOMATED COUNT: 14.9 % (ref 11.6–15.1)
GFR SERPL CREATININE-BSD FRML MDRD: 6 ML/MIN/1.73SQ M
GLUCOSE P FAST SERPL-MCNC: 85 MG/DL (ref 65–99)
GLUCOSE UR STRIP-MCNC: NEGATIVE MG/DL
HCT VFR BLD AUTO: 30.1 % (ref 34.8–46.1)
HGB BLD-MCNC: 8.8 G/DL (ref 11.5–15.4)
HGB UR QL STRIP.AUTO: ABNORMAL
KETONES UR STRIP-MCNC: NEGATIVE MG/DL
LEUKOCYTE ESTERASE UR QL STRIP: ABNORMAL
MCH RBC QN AUTO: 21.3 PG (ref 26.8–34.3)
MCHC RBC AUTO-ENTMCNC: 29.2 G/DL (ref 31.4–37.4)
MCV RBC AUTO: 73 FL (ref 82–98)
NITRITE UR QL STRIP: NEGATIVE
NON-SQ EPI CELLS URNS QL MICRO: ABNORMAL /HPF
PH UR STRIP.AUTO: 6.5 [PH]
PLATELET # BLD AUTO: 276 THOUSANDS/UL (ref 149–390)
PMV BLD AUTO: 12.2 FL (ref 8.9–12.7)
POTASSIUM SERPL-SCNC: 4.3 MMOL/L (ref 3.5–5.3)
PROT SERPL-MCNC: 7.1 G/DL (ref 6.4–8.4)
PROT UR STRIP-MCNC: ABNORMAL MG/DL
RBC # BLD AUTO: 4.14 MILLION/UL (ref 3.81–5.12)
RBC #/AREA URNS AUTO: ABNORMAL /HPF
SODIUM SERPL-SCNC: 141 MMOL/L (ref 135–147)
SP GR UR STRIP.AUTO: 1.01 (ref 1–1.03)
UROBILINOGEN UR STRIP-ACNC: <2 MG/DL
WBC # BLD AUTO: 6.7 THOUSAND/UL (ref 4.31–10.16)
WBC #/AREA URNS AUTO: ABNORMAL /HPF

## 2022-12-22 RX ORDER — CALCITRIOL 0.25 UG/1
0.25 CAPSULE, LIQUID FILLED ORAL DAILY
Qty: 90 CAPSULE | Refills: 3 | Status: SHIPPED | OUTPATIENT
Start: 2022-12-22

## 2022-12-22 NOTE — PATIENT INSTRUCTIONS
Chronic Kidney Disease stage 5- Creatinine continues to progress and worsen  Currently, creatinine is 7 7 with a BUN of 32 and a GFR of 6%  Kidney Smart Class: completed  Modality: [place] hemodialysis  Access: declined at this time  Transplant: evaluated by Dr Avery Ponce on 12/19/22     Hypertension- Antihypertensive regimen includes amlodipine 5mg twice a day and labetalol 100mg twice a day  Please avoid salt in your diet  Avoid NSAIDs  Lose weight  Check your blood pressures at home  We recommend OMRON blood pressure cuff and definitely a large arm cuff)  Goal blood pressure is 130/80  Metabolic Acidosis- She takes sodium bicarbonate tablets 650mg twice a day  Anemia- Continue to take an oral iron supplement daily  Will check iron studies  Will likely need IV iron  Bone Mineral Disorder- Check studies  Low phosphorus diet  She takes ergocalciferol weekly  Proteinuria- Check UPC ratio  Unable to use RAAS inhibition with ACEI/ARB due to progressive and advanced kidney disease  Follow up with Dr Donald Greenberg or an AP in 2 months  Please call the office with any questions or concerns

## 2022-12-26 ENCOUNTER — HOSPITAL ENCOUNTER (OUTPATIENT)
Dept: CT IMAGING | Facility: HOSPITAL | Age: 27
Discharge: HOME | End: 2022-12-26
Attending: INTERNAL MEDICINE

## 2022-12-26 ENCOUNTER — APPOINTMENT (OUTPATIENT)
Dept: CT IMAGING | Facility: HOSPITAL | Age: 27
End: 2022-12-26
Attending: INTERNAL MEDICINE

## 2022-12-26 DIAGNOSIS — Z01.818 PRE-TRANSPLANT EVALUATION FOR CHRONIC KIDNEY DISEASE: ICD-10-CM

## 2023-01-03 DIAGNOSIS — E55.9 VITAMIN D DEFICIENCY: ICD-10-CM

## 2023-01-04 ENCOUNTER — OFFICE VISIT (OUTPATIENT)
Dept: URGENT CARE | Facility: CLINIC | Age: 28
End: 2023-01-04

## 2023-01-04 ENCOUNTER — TELEPHONE (OUTPATIENT)
Dept: OTHER | Facility: HOSPITAL | Age: 28
End: 2023-01-04

## 2023-01-04 ENCOUNTER — TELEPHONE (OUTPATIENT)
Dept: NEPHROLOGY | Facility: CLINIC | Age: 28
End: 2023-01-04

## 2023-01-04 ENCOUNTER — APPOINTMENT (OUTPATIENT)
Dept: RADIOLOGY | Facility: CLINIC | Age: 28
End: 2023-01-04

## 2023-01-04 VITALS
HEART RATE: 89 BPM | BODY MASS INDEX: 40.29 KG/M2 | HEIGHT: 64 IN | TEMPERATURE: 97.8 F | WEIGHT: 236 LBS | OXYGEN SATURATION: 99 % | RESPIRATION RATE: 15 BRPM

## 2023-01-04 DIAGNOSIS — S59.902A ELBOW INJURY, LEFT, INITIAL ENCOUNTER: ICD-10-CM

## 2023-01-04 DIAGNOSIS — S59.902A ELBOW INJURY, LEFT, INITIAL ENCOUNTER: Primary | ICD-10-CM

## 2023-01-04 RX ORDER — ERGOCALCIFEROL 1.25 MG/1
50000 CAPSULE ORAL WEEKLY
Qty: 12 CAPSULE | Refills: 1 | Status: SHIPPED | OUTPATIENT
Start: 2023-01-04

## 2023-01-04 NOTE — TELEPHONE ENCOUNTER
chacorta Nair 1997 - CT scan no acute abn  but has adnexal cyst 3 1  cm  can you please make sure she is seeing her Gyn regularly and if not, needs to be seen and this should be followed by them    Above message sent to [facility] [place] team to review with the patient

## 2023-01-04 NOTE — TELEPHONE ENCOUNTER
Received a call from [address] [place] [address] Ave with [facility] stating that the CT Head wo Contrast was denied but offering peer to peer   Call back number is [phone] and the tracking number is [id number]

## 2023-01-05 ENCOUNTER — HOSPITAL ENCOUNTER (OUTPATIENT)
Dept: NON INVASIVE DIAGNOSTICS | Facility: CLINIC | Age: 28
Discharge: HOME | End: 2023-01-05

## 2023-01-05 VITALS
WEIGHT: 235.89 LBS | HEIGHT: 64 IN | HEART RATE: 80 BPM | DIASTOLIC BLOOD PRESSURE: 70 MMHG | SYSTOLIC BLOOD PRESSURE: 136 MMHG | BODY MASS INDEX: 40.27 KG/M2

## 2023-01-05 DIAGNOSIS — S09.90XA UNSPECIFIED INJURY OF HEAD, INITIAL ENCOUNTER: ICD-10-CM

## 2023-01-05 DIAGNOSIS — E79.0 HYPERURICEMIA WITHOUT SIGNS OF INFLAMMATORY ARTHRITIS AND TOPHACEOUS DISEASE: ICD-10-CM

## 2023-01-05 DIAGNOSIS — M25.552 PAIN IN LEFT HIP: ICD-10-CM
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
## 2023-01-05 DIAGNOSIS — R41.0 DISORIENTATION, UNSPECIFIED: ICD-10-CM

## 2023-01-05 DIAGNOSIS — N18.5 CHRONIC KIDNEY DISEASE, STAGE 5 (HCC): ICD-10-CM

## 2023-01-05 DIAGNOSIS — G44.86 CERVICOGENIC HEADACHE: ICD-10-CM

## 2023-01-05 DIAGNOSIS — Z01.818 ENCOUNTER FOR OTHER PREPROCEDURAL EXAMINATION: ICD-10-CM

## 2023-01-05 DIAGNOSIS — Z62.819 PERSONAL HISTORY OF UNSPECIFIED ABUSE IN CHILDHOOD: ICD-10-CM

## 2023-01-05 LAB
AORTIC ROOT: 2.9 CM
APICAL FOUR CHAMBER EJECTION FRACTION: 59 %
ASCENDING AORTA: 2.4 CM
E WAVE DECELERATION TIME: 174 MS
FRACTIONAL SHORTENING: 42 (ref 28–44)
INTERVENTRICULAR SEPTUM IN DIASTOLE (PARASTERNAL SHORT AXIS VIEW): 1.1 CM
INTERVENTRICULAR SEPTUM: 1.1 CM (ref 0.6–1.1)
LAAS-AP2: 19.7 CM2
LAAS-AP4: 20.6 CM2
LEFT ATRIUM AREA SYSTOLE SINGLE PLANE A4C: 18.3 CM2
LEFT ATRIUM SIZE: 3.5 CM
LEFT INTERNAL DIMENSION IN SYSTOLE: 2.8 CM (ref 2.1–4)
LEFT VENTRICULAR INTERNAL DIMENSION IN DIASTOLE: 4.8 CM (ref 3.5–6)
LEFT VENTRICULAR POSTERIOR WALL IN END DIASTOLE: 1.1 CM
LEFT VENTRICULAR STROKE VOLUME: 79 ML
LVSV (TEICH): 79 ML
MV E'TISSUE VEL-SEP: 10 CM/S
MV PEAK A VEL: 0.84 M/S
MV PEAK E VEL: 113 CM/S
MV STENOSIS PRESSURE HALF TIME: 50 MS
MV VALVE AREA P 1/2 METHOD: 4.4
RIGHT ATRIUM AREA SYSTOLE A4C: 16.3 CM2
RIGHT VENTRICLE ID DIMENSION: 3.8 CM
SL CV LEFT ATRIUM LENGTH A2C: 5.5 CM
SL CV LV EF: 60
SL CV PED ECHO LEFT VENTRICLE DIASTOLIC VOLUME (MOD BIPLANE) 2D: 110 ML
SL CV PED ECHO LEFT VENTRICLE SYSTOLIC VOLUME (MOD BIPLANE) 2D: 31 ML

## 2023-01-05 NOTE — PATIENT INSTRUCTIONS
--Initial read of x-ray negative for fracture  Will call with final results when obtained (anticipate 1-12 hours)  --Rest, avoid direct pressure on area, avoid exacerbating movements  --OTC Tylenol as needed  --Alernative is OTC Voltaren gel  --ACE wrap  --Follow-up with [facility] if no improvement/resolution over the next week
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
## 2023-01-10 ENCOUNTER — TELEPHONE (OUTPATIENT)
Dept: OBGYN CLINIC | Facility: CLINIC | Age: 28
End: 2023-01-10

## 2023-01-10 ENCOUNTER — OFFICE VISIT (OUTPATIENT)
Dept: OBGYN CLINIC | Facility: CLINIC | Age: 28
End: 2023-01-10

## 2023-01-10 VITALS — BODY MASS INDEX: 40.12 KG/M2 | HEIGHT: 64 IN | WEIGHT: 235 LBS

## 2023-01-10 DIAGNOSIS — S59.902A ELBOW INJURY, LEFT, INITIAL ENCOUNTER: ICD-10-CM

## 2023-01-10 NOTE — TELEPHONE ENCOUNTER
FYI - Pt had called and left a voicemail stating she wanted information in regards to our ride share program so she can keep her appt for tomorrow  Pt states she needs this appt in order to get a kidney transplant  Per practice admin, ride share program is only offered to established patients who are pregnant  This pt will be a new patient and is not pregnant, so program can not be offered to her  Called patient back to explain this  Offered other suggestions for patient, however she declined all of them  Advised patient to reach out to her insurance company to see if they offer anything and she stated they will just tell her that if she makes it to her appt then she makes it and if not then its not a big deal, suggested to look into UBER or LYFT and patient stated she had no money, suggested to try and reach out to friends and patient stated all her friends live in [place] and if she asked them they would be mad it was last minute  I also suggested perhaps [facility] in [place] as they might have some options for patients with state insurance, but the patient states she saw dr Es Green before (last seen in 2019 @ [place]) and all other doctors will just brush her off  I apologized to the patient as I understand her struggle and the inconvenience, but I still suggested patient tries one of those options as we unfortunately don't have a service to offer her  Patient was silent on the phone so I told her we will keep her appt as scheduled and she can always call us back if she needs to cancel tomorrow  Pt stated ok and it was the end of the conversation

## 2023-01-10 NOTE — PROGRESS NOTES
Orthopaedics Office Visit - New Patient Visit    ASSESSMENT/PLAN:    Assessment:   #1 bone contusion left ulna    Plan:   #1 patient can continue to be weightbearing as tolerated range of motion as tolerated, activity as tolerated to left upper extremity  2  Patient instructed that she can wear the soft wrap over the elbow however we do not want to restrict her range of motion  3   Patient has kidney issues and therefore cannot take oral anti-inflammatory medications  We will send the patient in Voltaren gel for topical use to be applied to the elbow 4-5 times a day for pain relief  4  Continue to take Tylenol as needed for pain relief  5  Patient can follow-up in 6 weeks time to ensure symptom resolution  To Do Next Visit:  Reevaluation no new x-rays    _____________________________________________________  CHIEF COMPLAINT:  Chief Complaint   Patient presents with   • Left Elbow - Pain         SUBJECTIVE:  Eduin Taylor is a 24 y o  female who presents for initial evaluation of left elbow  Patient states she fell landing directly on her elbow on 1/1/2023, she was seen at urgent care 1/4/2023 where x-rays were taken and demonstrate no acute osseous abnormalities  She was given an ace wrap and instructed to take over-the-counter Tylenol as needed  Today the patient states her pain has worsened since the time of injury  She indicates the proximal forearm and elbow as the location of her pain  Denies any pain in the elbow prior to the fall she denies any numbness or paresthesias       PAST MEDICAL HISTORY:  Past Medical History:   Diagnosis Date   • Asthma    • Chronic kidney disease    • Eczema    • Headache    • Hypertension    • Wears glasses        PAST SURGICAL HISTORY:  Past Surgical History:   Procedure Laterality Date   • CHOLECYSTECTOMY     • CT NEEDLE BIOPSY KIDNEY  1/29/2020   • KELOID EXCISION Left 3/30/2021    Procedure: POSTERIOR EAR EXCISION KELOID, FLAP RECONSTRUCTION;  Surgeon: Crescencio Martinez MD;  [facility];  Service: Plastics       FAMILY HISTORY:  Family History   Problem Relation Age of Onset   • Asthma Mother    • No Known Problems Father        SOCIAL HISTORY:  Social History     Tobacco Use   • Smoking status: Never   • Smokeless tobacco: Never   Vaping Use   • Vaping Use: Never used   Substance Use Topics   • Alcohol use: Yes     Comment: occassionally on social events   • Drug use: No       MEDICATIONS:    Current Outpatient Medications:   •  acetaminophen (TYLENOL) 325 mg tablet, Take 2 tablets (650 mg total) by mouth every 6 (six) hours as needed for mild pain or headaches, Disp: 30 tablet, Rfl: 0  •  albuterol (2 5 mg/3 mL) 0 083 % nebulizer solution, Take 3 mL (2 5 mg total) by nebulization every 6 (six) hours as needed for wheezing or shortness of breath, Disp: 30 mL, Rfl: 0  •  albuterol (Ventolin HFA) 90 mcg/act inhaler, Inhale 2 puffs every 6 (six) hours as needed for wheezing or shortness of breath (cough), Disp: 18 g, Rfl: 0  •  amLODIPine (NORVASC) 5 mg tablet, Take 1 tablet (5 mg total) by mouth 2 (two) times a day, Disp: 60 tablet, Rfl: 3  •  Blood Pressure KIT, by Does not apply route daily, Disp: 1 each, Rfl: 0  •  calcitriol (ROCALTROL) 0 25 mcg capsule, Take 1 capsule (0 25 mcg total) by mouth daily, Disp: 90 capsule, Rfl: 3  •  ergocalciferol (VITAMIN D2) 50,000 units, Take 1 capsule (50,000 Units total) by mouth once a week, Disp: 12 capsule, Rfl: 1  •  etonogestrel (NEXPLANON) subdermal implant, 68 mg by Subdermal route once, Disp: , Rfl:   •  ferrous sulfate 325 (65 Fe) mg tablet, Take 1 tablet (325 mg total) by mouth daily with breakfast, Disp: 30 tablet, Rfl: 3  •  fluticasone (Flovent HFA) 110 MCG/ACT inhaler, Inhale 1 puff 2 (two) times a day, Disp: 12 g, Rfl: 0  •  labetalol (NORMODYNE) 100 mg tablet, Take 1 tablet (100 mg total) by mouth 2 (two) times a day, Disp: 60 tablet, Rfl: 3  •  sodium bicarbonate 650 mg tablet, Take 1 tablet (650 mg total) by mouth 2 (two) times a day, Disp: 180 tablet, Rfl: 3    ALLERGIES:  Allergies   Allergen Reactions   • Wheat Bran - Food Allergy Itching       REVIEW OF SYSTEMS:  MSK: left elbow pain  Neuro: WNL  Pertinent items are otherwise noted in HPI  A comprehensive review of systems was otherwise negative  LABS:  HgA1c:   Lab Results   Component Value Date    HGBA1C 5 3 11/13/2021     BMP:   Lab Results   Component Value Date    GLUCOSE 86 06/12/2015    CALCIUM 8 6 12/21/2022     06/12/2015    K 4 3 12/21/2022    CO2 26 12/21/2022     12/21/2022    BUN 32 (H) 12/21/2022    CREATININE 7 76 (H) 12/21/2022     CBC: No components found for: CBC    _____________________________________________________  PHYSICAL EXAMINATION:  Vital signs: Ht 5' 4" (1 626 m)   Wt 107 kg (235 lb)   BMI 40 34 kg/m²   General: No acute distress, awake and alert  Psychiatric: Mood and affect appear appropriate  HEENT: Trachea Midline, No torticollis, no apparent facial trauma  Cardiovascular: No audible murmurs; Extremities appear perfused  Pulmonary: No audible wheezing or stridor  Skin: No open lesions; see further details (if any) below    MUSCULOSKELETAL EXAMINATION:  Extremities: The left elbow was exposed inspected  Patient's skin is intact overlying the left elbow  Patient is morbidly obese so swelling is difficult to compared to the contralateral side but there is a little increased swelling around the elbow  Patient is diffusely tender to palpation around the elbow most prominently over the olecranon  No open wounds or lacerations  Patient has no pain at the wrist or the shoulder  Patient has full range of motion of the elbow from 0 to 120 degrees  No varus valgus instability of the elbow  No pain with valgus stress  Sensation is intact light touch in the median, radial, ulnar nerves  AIN, PIN, ulnar nerves intact    +2 radial pulses distally      _____________________________________________________  STUDIES REVIEWED:  I personally reviewed the images and interpretation is as follows:  X-rays of left elbow from 1/4/2023 reviewed demonstrate no acute fracture or dislocation, no joint effusion      PROCEDURES PERFORMED:  Procedures    Scribe Attestation    I,:   am acting as a scribe while in the presence of the attending physician :       I,:   personally performed the services described in this documentation    as scribed in my presence :

## 2023-01-11 ENCOUNTER — OFFICE VISIT (OUTPATIENT)
Dept: OBGYN CLINIC | Facility: CLINIC | Age: 28
End: 2023-01-11

## 2023-01-11 VITALS
HEART RATE: 67 BPM | BODY MASS INDEX: 40.63 KG/M2 | WEIGHT: 238 LBS | HEIGHT: 64 IN | SYSTOLIC BLOOD PRESSURE: 134 MMHG | DIASTOLIC BLOOD PRESSURE: 84 MMHG

## 2023-01-11 DIAGNOSIS — N94.9 ADNEXAL CYST: ICD-10-CM

## 2023-01-11 DIAGNOSIS — Z01.419 ENCNTR FOR GYN EXAM (GENERAL) (ROUTINE) W/O ABN FINDINGS: Primary | ICD-10-CM

## 2023-01-11 DIAGNOSIS — Z12.4 CERVICAL CANCER SCREENING: ICD-10-CM

## 2023-01-11 DIAGNOSIS — Z11.3 SCREEN FOR STD (SEXUALLY TRANSMITTED DISEASE): ICD-10-CM

## 2023-01-11 NOTE — PATIENT INSTRUCTIONS
Wellness Visit for Adults   AMBULATORY CARE:   A wellness visit  is when you see your healthcare provider to get screened for health problems  Your healthcare provider will also give you advice on how to stay healthy  Write down your questions so you remember to ask them  Ask your healthcare provider how often you should have a wellness visit  What happens at a wellness visit:  Your healthcare provider will ask about your health, and your family history of health problems  This includes high blood pressure, heart disease, and cancer  He or she will ask if you have symptoms that concern you, if you smoke, and about your mood  You may also be asked about your intake of medicines, supplements, food, and alcohol  Any of the following may be done: Your weight  will be checked  Your height may also be checked so your body mass index (BMI) can be calculated  Your BMI shows if you are at a healthy weight  Your blood pressure  and heart rate will be checked  Your temperature may also be checked  Blood and urine tests  may be done  Blood tests may be done to check your cholesterol levels  Abnormal cholesterol levels increase your risk for heart disease and stroke  You may also need a blood or urine test to check for diabetes if you are at increased risk  Urine tests may be done to look for signs of an infection or kidney disease  A physical exam  includes checking your heartbeat and lungs with a stethoscope  Your healthcare provider may also check your skin to look for sun damage  Screening tests  may be recommended  A screening test is done to check for diseases that may not cause symptoms  The screening tests you may need depend on your age, gender, family history, and lifestyle habits  For example, colorectal screening may be recommended if you are 48years old or older  Screening tests you need if you are a woman:   A Pap smear  is used to screen for cervical cancer   Pap smears are usually done every 3 to 5 years depending on your age  You may need them more often if you have had abnormal Pap smear test results in the past  Ask your healthcare provider how often you should have a Pap smear  A mammogram  is an x-ray of your breasts to screen for breast cancer  Experts recommend mammograms every 2 years starting at age 48 years  You may need a mammogram at age 52 years or younger if you have an increased risk for breast cancer  Talk to your healthcare provider about when you should start having mammograms and how often you need them  Vaccines you may need:   Get an influenza vaccine  every year  The influenza vaccine protects you from the flu  Several types of viruses cause the flu  The viruses change over time, so new vaccines are made each year  Get a tetanus-diphtheria (Td) booster vaccine  every 10 years  This vaccine protects you against tetanus and diphtheria  Tetanus is a severe infection that may cause painful muscle spasms and lockjaw  Diphtheria is a severe bacterial infection that causes a thick covering in the back of your mouth and throat  Get a human papillomavirus (HPV) vaccine  if you are female and aged 23 to 32 or male 23 to 24 and never received it  This vaccine protects you from HPV infection  HPV is the most common infection spread by sexual contact  HPV may also cause vaginal, penile, and anal cancers  Get a pneumococcal vaccine  if you are aged 72 years or older  The pneumococcal vaccine is an injection given to protect you from pneumococcal disease  Pneumococcal disease is an infection caused by pneumococcal bacteria  The infection may cause pneumonia, meningitis, or an ear infection  Get a shingles vaccine  if you are 60 or older, even if you have had shingles before  The shingles vaccine is an injection to protect you from the varicella-zoster virus  This is the same virus that causes chickenpox   Shingles is a painful rash that develops in people who had chickenpox or have been exposed to the virus  How to eat healthy:  My Plate is a model for planning healthy meals  It shows the types and amounts of foods that should go on your plate  Fruits and vegetables make up about half of your plate, and grains and protein make up the other half  A serving of dairy is included on the side of your plate  The amount of calories and serving sizes you need depends on your age, gender, weight, and height  Examples of healthy foods are listed below:  Eat a variety of vegetables  such as dark green, red, and orange vegetables  You can also include canned vegetables low in sodium (salt) and frozen vegetables without added butter or sauces  Eat a variety of fresh fruits , canned fruit in 100% juice, frozen fruit, and dried fruit  Include whole grains  At least half of the grains you eat should be whole grains  Examples include whole-wheat bread, wheat pasta, brown rice, and whole-grain cereals such as oatmeal     Eat a variety of protein foods such as seafood (fish and shellfish), lean meat, and poultry without skin (turkey and chicken)  Examples of lean meats include pork leg, shoulder, or tenderloin, and beef round, sirloin, tenderloin, and extra lean ground beef  Other protein foods include eggs and egg substitutes, beans, peas, soy products, nuts, and seeds  Choose low-fat dairy products such as skim or 1% milk or low-fat yogurt, cheese, and cottage cheese  Limit unhealthy fats  such as butter, hard margarine, and shortening  Exercise:  Exercise at least 30 minutes per day on most days of the week  Some examples of exercise include walking, biking, dancing, and swimming  You can also fit in more physical activity by taking the stairs instead of the elevator or parking farther away from stores  Include muscle strengthening activities 2 days each week  Regular exercise provides many health benefits   It helps you manage your weight, and decreases your risk for type 2 diabetes, heart disease, stroke, and high blood pressure  Exercise can also help improve your mood  Ask your healthcare provider about the best exercise plan for you  General health and safety guidelines:   Do not smoke  Nicotine and other chemicals in cigarettes and cigars can cause lung damage  Ask your healthcare provider for information if you currently smoke and need help to quit  E-cigarettes or smokeless tobacco still contain nicotine  Talk to your healthcare provider before you use these products  Limit alcohol  A drink of alcohol is 12 ounces of beer, 5 ounces of wine, or 1½ ounces of liquor  Lose weight, if needed  Being overweight increases your risk of certain health conditions  These include heart disease, high blood pressure, type 2 diabetes, and certain types of cancer  Protect your skin  Do not sunbathe or use tanning beds  Use sunscreen with a SPF 15 or higher  Apply sunscreen at least 15 minutes before you go outside  Reapply sunscreen every 2 hours  Wear protective clothing, hats, and sunglasses when you are outside  Drive safely  Always wear your seatbelt  Make sure everyone in your car wears a seatbelt  A seatbelt can save your life if you are in an accident  Do not use your cell phone when you are driving  This could distract you and cause an accident  Pull over if you need to make a call or send a text message  Practice safe sex  Use latex condoms if are sexually active and have more than one partner  Your healthcare provider may recommend screening tests for sexually transmitted infections (STIs)  Wear helmets, lifejackets, and protective gear  Always wear a helmet when you ride a bike or motorcycle, go skiing, or play sports that could cause a head injury  Wear protective equipment when you play sports  Wear a lifejacket when you are on a boat or doing water sports      © Copyright [address] 2022 Information is for End User's use only and may not be sold, redistributed or otherwise used for commercial purposes  All illustrations and images included in CareNotes® are the copyrighted property of A D A M [employer]  or Danitza Mireles  The above information is an  only  It is not intended as medical advice for individual conditions or treatments  Talk to your doctor, nurse or pharmacist before following any medical regimen to see if it is safe and effective for you

## 2023-01-13 DIAGNOSIS — J45.21 MILD INTERMITTENT ASTHMA WITH ACUTE EXACERBATION: ICD-10-CM

## 2023-01-13 DIAGNOSIS — J45.20 MILD INTERMITTENT ASTHMA WITHOUT COMPLICATION: ICD-10-CM

## 2023-01-13 LAB
C TRACH DNA SPEC QL NAA+PROBE: NEGATIVE
N GONORRHOEA DNA SPEC QL NAA+PROBE: NEGATIVE

## 2023-01-13 RX ORDER — FLUTICASONE PROPIONATE 110 UG/1
1 AEROSOL, METERED RESPIRATORY (INHALATION) 2 TIMES DAILY
Qty: 12 G | Refills: 0 | Status: SHIPPED | OUTPATIENT
Start: 2023-01-13

## 2023-01-13 RX ORDER — ALBUTEROL SULFATE 90 UG/1
2 AEROSOL, METERED RESPIRATORY (INHALATION) EVERY 6 HOURS PRN
Qty: 18 G | Refills: 0 | Status: SHIPPED | OUTPATIENT
Start: 2023-01-13 | End: 2023-01-17 | Stop reason: SDUPTHER

## 2023-01-15 DIAGNOSIS — N25.81 SECONDARY HYPERPARATHYROIDISM OF RENAL ORIGIN (HCC): ICD-10-CM

## 2023-01-16 RX ORDER — CALCITRIOL 0.25 UG/1
0.25 CAPSULE, LIQUID FILLED ORAL DAILY
Qty: 90 CAPSULE | Refills: 0 | Status: SHIPPED | OUTPATIENT
Start: 2023-01-16

## 2023-01-17 DIAGNOSIS — J45.20 MILD INTERMITTENT ASTHMA WITHOUT COMPLICATION: ICD-10-CM

## 2023-01-17 RX ORDER — ALBUTEROL SULFATE 90 UG/1
2 AEROSOL, METERED RESPIRATORY (INHALATION) EVERY 6 HOURS PRN
Qty: 18 G | Refills: 0 | Status: SHIPPED | OUTPATIENT
Start: 2023-01-17

## 2023-01-18 ENCOUNTER — HOSPITAL ENCOUNTER (OUTPATIENT)
Dept: NON INVASIVE DIAGNOSTICS | Facility: HOSPITAL | Age: 28
Discharge: HOME | End: 2023-01-18
Attending: INTERNAL MEDICINE

## 2023-01-18 ENCOUNTER — HOSPITAL ENCOUNTER (OUTPATIENT)
Dept: NUCLEAR MEDICINE | Facility: HOSPITAL | Age: 28
Discharge: HOME | End: 2023-01-18
Attending: INTERNAL MEDICINE

## 2023-01-18 ENCOUNTER — HOSPITAL ENCOUNTER (OUTPATIENT)
Dept: CT IMAGING | Facility: HOSPITAL | Age: 28
End: 2023-01-18
Attending: INTERNAL MEDICINE

## 2023-01-18 VITALS
HEIGHT: 64 IN | HEART RATE: 64 BPM | RESPIRATION RATE: 16 BRPM | SYSTOLIC BLOOD PRESSURE: 132 MMHG | OXYGEN SATURATION: 100 % | DIASTOLIC BLOOD PRESSURE: 86 MMHG | BODY MASS INDEX: 40.63 KG/M2 | WEIGHT: 238 LBS

## 2023-01-18 DIAGNOSIS — S09.90XA UNSPECIFIED INJURY OF HEAD, INITIAL ENCOUNTER: ICD-10-CM

## 2023-01-18 DIAGNOSIS — N18.5 CHRONIC KIDNEY DISEASE, STAGE 5 (HCC): ICD-10-CM

## 2023-01-18 DIAGNOSIS — G44.86 CERVICOGENIC HEADACHE: ICD-10-CM

## 2023-01-18 DIAGNOSIS — Z62.819 PERSONAL HISTORY OF UNSPECIFIED ABUSE IN CHILDHOOD: ICD-10-CM

## 2023-01-18 DIAGNOSIS — Z01.818 ENCOUNTER FOR OTHER PREPROCEDURAL EXAMINATION: ICD-10-CM

## 2023-01-18 DIAGNOSIS — E79.0 HYPERURICEMIA WITHOUT SIGNS OF INFLAMMATORY ARTHRITIS AND TOPHACEOUS DISEASE: ICD-10-CM

## 2023-01-18 DIAGNOSIS — M25.552 PAIN IN LEFT HIP: ICD-10-CM
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
## 2023-01-18 DIAGNOSIS — R41.0 DISORIENTATION, UNSPECIFIED: ICD-10-CM

## 2023-01-18 LAB
LAB AP GYN PRIMARY INTERPRETATION: NORMAL
Lab: NORMAL
MAX HR PERCENT: 65 %
MAX HR: 127 BPM
NUC STRESS EJECTION FRACTION: 56 %
RATE PRESSURE PRODUCT: NORMAL
SL CV REST NUCLEAR ISOTOPE DOSE: 10.9 MCI
SL CV STRESS NUCLEAR ISOTOPE DOSE: 33 MCI
SL CV STRESS RECOVERY BP: NORMAL MMHG
SL CV STRESS RECOVERY HR: 81 BPM
SL CV STRESS RECOVERY O2 SAT: 99 %
STRESS ANGINA INDEX: 0
STRESS BASELINE BP: NORMAL MMHG
STRESS BASELINE HR: 64 BPM
STRESS DUKE TREADMILL SCORE: 3
STRESS O2 SAT REST: 100 %
STRESS PEAK HR: 127 BPM
STRESS POST ESTIMATED WORKLOAD: 1.5 METS
STRESS POST EXERCISE DUR MIN: 3 MIN
STRESS POST EXERCISE DUR SEC: 0 SEC
STRESS POST O2 SAT PEAK: 99 %
STRESS POST PEAK BP: 126 MMHG
STRESS ST DEPRESSION: 0 MM
STRESS/REST PERFUSION RATIO: 1.12

## 2023-01-18 RX ADMIN — REGADENOSON 0.4 MG: 0.08 INJECTION, SOLUTION INTRAVENOUS at 13:24

## 2023-01-19 LAB
CHEST PAIN STATEMENT: NORMAL
MAX DIASTOLIC BP: 76 MMHG
MAX HEART RATE: 127 BPM
MAX PREDICTED HEART RATE: 195 BPM
MAX. SYSTOLIC BP: 134 MMHG
PROTOCOL NAME: NORMAL
REASON FOR TERMINATION: NORMAL
TARGET HR FORMULA: NORMAL
TEST INDICATION: NORMAL
TIME IN EXERCISE PHASE: NORMAL

## 2023-02-05 DIAGNOSIS — I12.9 BENIGN HYPERTENSION WITH CKD (CHRONIC KIDNEY DISEASE) STAGE III (HCC): ICD-10-CM

## 2023-02-05 DIAGNOSIS — N18.30 BENIGN HYPERTENSION WITH CKD (CHRONIC KIDNEY DISEASE) STAGE III (HCC): ICD-10-CM

## 2023-02-05 RX ORDER — LABETALOL 100 MG/1
TABLET, FILM COATED ORAL
Qty: 60 TABLET | Refills: 3 | Status: SHIPPED | OUTPATIENT
Start: 2023-02-05

## 2023-02-06 DIAGNOSIS — J45.20 MILD INTERMITTENT ASTHMA WITHOUT COMPLICATION: ICD-10-CM

## 2023-02-06 DIAGNOSIS — J45.21 MILD INTERMITTENT ASTHMA WITH ACUTE EXACERBATION: ICD-10-CM

## 2023-02-06 RX ORDER — FLUTICASONE PROPIONATE 110 UG/1
1 AEROSOL, METERED RESPIRATORY (INHALATION) 2 TIMES DAILY
Qty: 12 G | Refills: 0 | Status: SHIPPED | OUTPATIENT
Start: 2023-02-06

## 2023-02-06 RX ORDER — ALBUTEROL SULFATE 2.5 MG/3ML
2.5 SOLUTION RESPIRATORY (INHALATION) EVERY 6 HOURS PRN
Qty: 30 ML | Refills: 0 | Status: SHIPPED | OUTPATIENT
Start: 2023-02-06

## 2023-02-06 RX ORDER — ALBUTEROL SULFATE 90 UG/1
2 AEROSOL, METERED RESPIRATORY (INHALATION) EVERY 6 HOURS PRN
Qty: 18 G | Refills: 0 | Status: SHIPPED | OUTPATIENT
Start: 2023-02-06

## 2023-02-10 ENCOUNTER — HOSPITAL ENCOUNTER (OUTPATIENT)
Dept: ULTRASOUND IMAGING | Facility: HOSPITAL | Age: 28
Discharge: HOME | End: 2023-02-10
Attending: OBSTETRICS & GYNECOLOGY

## 2023-02-10 DIAGNOSIS — N94.9 ADNEXAL CYST: ICD-10-CM

## 2023-02-14 ENCOUNTER — APPOINTMENT (OUTPATIENT)
Dept: LAB | Facility: CLINIC | Age: 28
End: 2023-02-14

## 2023-02-14 DIAGNOSIS — D63.1 ANEMIA DUE TO STAGE 5 CHRONIC KIDNEY DISEASE, NOT ON CHRONIC DIALYSIS (HCC): ICD-10-CM

## 2023-02-14 DIAGNOSIS — N18.5 CHRONIC KIDNEY DISEASE (CKD), ACTIVE MEDICAL MANAGEMENT WITHOUT DIALYSIS, STAGE 5 (HCC): ICD-10-CM

## 2023-02-14 DIAGNOSIS — N18.5 ANEMIA DUE TO STAGE 5 CHRONIC KIDNEY DISEASE, NOT ON CHRONIC DIALYSIS (HCC): ICD-10-CM

## 2023-02-14 LAB
25(OH)D3 SERPL-MCNC: 70.4 NG/ML (ref 30–100)
ANION GAP SERPL CALCULATED.3IONS-SCNC: 8 MMOL/L (ref 4–13)
BUN SERPL-MCNC: 44 MG/DL (ref 5–25)
CALCIUM SERPL-MCNC: 8.8 MG/DL (ref 8.3–10.1)
CHLORIDE SERPL-SCNC: 109 MMOL/L (ref 96–108)
CO2 SERPL-SCNC: 21 MMOL/L (ref 21–32)
CREAT SERPL-MCNC: 7.87 MG/DL (ref 0.6–1.3)
CREAT UR-MCNC: 73.8 MG/DL
ERYTHROCYTE [DISTWIDTH] IN BLOOD BY AUTOMATED COUNT: 15.8 % (ref 11.6–15.1)
FERRITIN SERPL-MCNC: 150 NG/ML (ref 8–388)
GFR SERPL CREATININE-BSD FRML MDRD: 6 ML/MIN/1.73SQ M
GLUCOSE SERPL-MCNC: 76 MG/DL (ref 65–140)
HCT VFR BLD AUTO: 27.8 % (ref 34.8–46.1)
HGB BLD-MCNC: 8.3 G/DL (ref 11.5–15.4)
IRON SATN MFR SERPL: 32 % (ref 15–50)
IRON SERPL-MCNC: 66 UG/DL (ref 50–170)
MAGNESIUM SERPL-MCNC: 2.1 MG/DL (ref 1.6–2.6)
MCH RBC QN AUTO: 21.4 PG (ref 26.8–34.3)
MCHC RBC AUTO-ENTMCNC: 29.9 G/DL (ref 31.4–37.4)
MCV RBC AUTO: 72 FL (ref 82–98)
PHOSPHATE SERPL-MCNC: 3.7 MG/DL (ref 2.7–4.5)
PLATELET # BLD AUTO: 254 THOUSANDS/UL (ref 149–390)
PMV BLD AUTO: 11.9 FL (ref 8.9–12.7)
POTASSIUM SERPL-SCNC: 4.1 MMOL/L (ref 3.5–5.3)
PROT UR-MCNC: 71 MG/DL
PROT/CREAT UR: 0.96 MG/G{CREAT} (ref 0–0.1)
PTH-INTACT SERPL-MCNC: 614.2 PG/ML (ref 18.4–80.1)
RBC # BLD AUTO: 3.87 MILLION/UL (ref 3.81–5.12)
SODIUM SERPL-SCNC: 138 MMOL/L (ref 135–147)
TIBC SERPL-MCNC: 207 UG/DL (ref 250–450)
WBC # BLD AUTO: 6.44 THOUSAND/UL (ref 4.31–10.16)

## 2023-02-24 ENCOUNTER — OFFICE VISIT (OUTPATIENT)
Dept: FAMILY MEDICINE CLINIC | Facility: CLINIC | Age: 28
End: 2023-02-24

## 2023-02-24 VITALS
WEIGHT: 236.6 LBS | DIASTOLIC BLOOD PRESSURE: 84 MMHG | BODY MASS INDEX: 40.39 KG/M2 | HEIGHT: 64 IN | OXYGEN SATURATION: 100 % | TEMPERATURE: 97.8 F | HEART RATE: 75 BPM | SYSTOLIC BLOOD PRESSURE: 142 MMHG

## 2023-02-24 DIAGNOSIS — R29.898 LEFT LEG WEAKNESS: Primary | ICD-10-CM

## 2023-02-24 DIAGNOSIS — M25.552 LEFT HIP PAIN: ICD-10-CM

## 2023-02-24 DIAGNOSIS — J45.20 MILD INTERMITTENT ASTHMA WITHOUT COMPLICATION: ICD-10-CM

## 2023-02-24 DIAGNOSIS — N18.5 CHRONIC KIDNEY DISEASE (CKD), ACTIVE MEDICAL MANAGEMENT WITHOUT DIALYSIS, STAGE 5 (HCC): ICD-10-CM

## 2023-02-24 DIAGNOSIS — J45.40 MODERATE PERSISTENT ASTHMA WITHOUT COMPLICATION: ICD-10-CM

## 2023-02-24 RX ORDER — FLUTICASONE PROPIONATE AND SALMETEROL XINAFOATE 115; 21 UG/1; UG/1
2 AEROSOL, METERED RESPIRATORY (INHALATION) 2 TIMES DAILY
Qty: 12 G | Refills: 2 | Status: SHIPPED | OUTPATIENT
Start: 2023-02-24

## 2023-02-24 RX ORDER — ALBUTEROL SULFATE 90 UG/1
2 AEROSOL, METERED RESPIRATORY (INHALATION) EVERY 6 HOURS PRN
Qty: 18 G | Refills: 0 | Status: SHIPPED | OUTPATIENT
Start: 2023-02-24
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
## 2023-02-24 NOTE — ASSESSMENT & PLAN NOTE
Patient with history of asthma since childhood with wheezing and asthma symptoms daily in the setting of smokers within their living situation who are smoking inside  Currently with daily albuterol use and has been using Flovent daily as well too      Plan:  · Plan to repeat PFTs as they have not from performed in several years  · Stop Flovent at this time and plan to start Advair for increased asthma management  · Hopefully will be able to utilize albuterol less frequency with this  · Plan to follow-up in 4 to 6 weeks

## 2023-02-24 NOTE — LETTER
February 24, 2023     Patient: Janice Euceda  YOB: 1995  Date of Visit: 2/24/2023      To Whom it May Concern:    Mikki Ott is under my professional care  Peña Rojas was seen in my office on 2/24/2023  Patient does have a diagnosis of Stage 5 CKD and is in the evaluation process for renal transplant  If you have any questions or concerns, please don't hesitate to call           Sincerely,          Neil Griffin DO        CC: No Recipients
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
## 2023-02-24 NOTE — ASSESSMENT & PLAN NOTE
Lab Results   Component Value Date    EGFR 6 02/14/2023    EGFR 6 12/21/2022    EGFR 5 06/19/2022    CREATININE 7 87 (H) 02/14/2023    CREATININE 7 76 (H) 12/21/2022    CREATININE 8 58 (H) 06/19/2022     Lab Results   Component Value Date    EGFR 6 02/14/2023    EGFR 6 12/21/2022    EGFR 5 06/19/2022    CREATININE 7 87 (H) 02/14/2023    CREATININE 7 76 (H) 12/21/2022    CREATININE 8 58 (H) 06/19/2022     Patient continues to make urine continues with stage V CKD with nephrology's continued recommendations to consider dialysis  These recommendations were again discussed with patient today  Patient and mother continue to decline dialysis  Plan currently for renal transplant with Dr Mayda Sherman encourage continue follow-up and negotiation between insurance and ordering physician so that she may complete head CT and continue with renal transplant  She reports that many family members are willing to transplant  Discussed with family that if there were to be any worsening of her condition acutely there might be a need for emergent dialysis in the future

## 2023-02-27 ENCOUNTER — OFFICE VISIT (OUTPATIENT)
Dept: OBGYN CLINIC | Facility: CLINIC | Age: 28
End: 2023-02-27

## 2023-02-27 ENCOUNTER — APPOINTMENT (OUTPATIENT)
Dept: RADIOLOGY | Facility: AMBULARY SURGERY CENTER | Age: 28
End: 2023-02-27
Attending: STUDENT IN AN ORGANIZED HEALTH CARE EDUCATION/TRAINING PROGRAM

## 2023-02-27 VITALS
BODY MASS INDEX: 40.45 KG/M2 | HEIGHT: 64 IN | SYSTOLIC BLOOD PRESSURE: 177 MMHG | DIASTOLIC BLOOD PRESSURE: 97 MMHG | WEIGHT: 236.9 LBS | HEART RATE: 74 BPM

## 2023-02-27 DIAGNOSIS — M25.532 LEFT WRIST PAIN: ICD-10-CM

## 2023-02-27 DIAGNOSIS — R29.898 LEFT LEG WEAKNESS: ICD-10-CM

## 2023-02-27 DIAGNOSIS — G56.22 CUBITAL TUNNEL SYNDROME ON LEFT: Primary | ICD-10-CM

## 2023-02-27 NOTE — PROGRESS NOTES
Orthopaedics Office Visit - New Patient Visit    ASSESSMENT/PLAN:    Assessment:   #1 bone contusion left ulna, resolved  #2 left cubital tunnel syndrome    Plan:   #1 patient can continue to be weightbearing as tolerated range of motion as tolerated, activity as tolerated to left upper extremity  2  Patient instructed to begin trying to sleep with the left arm in a straight position and avoid hyperflexion of the elbow to try to prevent the ulnar nerve paresthesias and pain that she has begun having since her last visit  3   Patient has kidney issues and therefore cannot take oral anti-inflammatory medications  We will send the patient in Voltaren gel for topical use to be applied to the elbow 4-5 times a day for pain relief  4  Continue to take Tylenol as needed for pain relief  5  Patient can follow-up on as-needed basis       To Do Next Visit:    _____________________________________________________  CHIEF COMPLAINT:  Chief Complaint   Patient presents with   • Left Elbow - Follow-up         SUBJECTIVE:  Lynda Meeks is a 24 y o  female who presents for initial evaluation of left elbow  Patient states she fell landing directly on her elbow on 1/1/2023, she was seen at urgent care 1/4/2023 where x-rays were taken and demonstrate no acute osseous abnormalities  She was given an ace wrap and instructed to take over-the-counter Tylenol as needed  Today the patient states her pain has worsened since the time of injury  She indicates the proximal forearm and elbow as the location of her pain  Denies any pain in the elbow prior to the fall she denies any numbness or paresthesias  Interval history 2/27/2023  Patient is a 22-year-old female that was previously seen for a fall which she sustained onto her left elbow  This occurred on 1/1/2023  X-rays were taken at that time and negative for any fractures  Patient also had no numbness or paresthesias at that time    Patient states today that the pain that she was having on the dorsal aspect of the ulna has resolved however she has begun to have a radiating type pain that goes down from the medial aspect of her elbow down into the ulnar 2 digits  She states that this is associated with numbness and tingling in the fourth and fifth digits  She has been using the Voltaren gel around the elbow which has helped her elbow pain and states that putting the Voltaren gel over the wrist does not improve her pain  Patient denies any neck pain or myelopathic syndrome symptoms  She denies any radiating pain from the shoulder down into the fingers  Does complain of numbness at night      PAST MEDICAL HISTORY:  Past Medical History:   Diagnosis Date   • Asthma    • Chronic kidney disease    • Eczema    • Headache    • Hypertension    • Wears glasses        PAST SURGICAL HISTORY:  Past Surgical History:   Procedure Laterality Date   • CHOLECYSTECTOMY     • CT NEEDLE BIOPSY KIDNEY  1/29/2020   • KELOID EXCISION Left 3/30/2021    Procedure: POSTERIOR EAR EXCISION KELOID, FLAP RECONSTRUCTION;  Surgeon: Petty Moss MD;  Location: [facility];  Service: Plastics       FAMILY HISTORY:  Family History   Problem Relation Age of Onset   • Asthma Mother    • No Known Problems Father        SOCIAL HISTORY:  Social History     Tobacco Use   • Smoking status: Never   • Smokeless tobacco: Never   Vaping Use   • Vaping Use: Never used   Substance Use Topics   • Alcohol use: Yes     Comment: occassionally on social events   • Drug use: No       MEDICATIONS:    Current Outpatient Medications:   •  acetaminophen (TYLENOL) 325 mg tablet, Take 2 tablets (650 mg total) by mouth every 6 (six) hours as needed for mild pain or headaches, Disp: 30 tablet, Rfl: 0  •  albuterol (2 5 mg/3 mL) 0 083 % nebulizer solution, Take 3 mL (2 5 mg total) by nebulization every 6 (six) hours as needed for wheezing or shortness of breath, Disp: 30 mL, Rfl: 0  •  albuterol (Ventolin HFA) 90 mcg/act inhaler, Inhale 2 puffs every 6 (six) hours as needed for wheezing or shortness of breath (cough), Disp: 18 g, Rfl: 0  •  amLODIPine (NORVASC) 5 mg tablet, Take 1 tablet (5 mg total) by mouth 2 (two) times a day, Disp: 60 tablet, Rfl: 3  •  calcitriol (ROCALTROL) 0 25 mcg capsule, Take 1 capsule (0 25 mcg total) by mouth daily, Disp: 90 capsule, Rfl: 0  •  Diclofenac Sodium (VOLTAREN) 1 %, Apply 2 g topically 4 (four) times a day, Disp: 150 g, Rfl: 2  •  ergocalciferol (VITAMIN D2) 50,000 units, Take 1 capsule (50,000 Units total) by mouth once a week, Disp: 12 capsule, Rfl: 1  •  etonogestrel (NEXPLANON) subdermal implant, 68 mg by Subdermal route once, Disp: , Rfl:   •  ferrous sulfate 325 (65 Fe) mg tablet, Take 1 tablet (325 mg total) by mouth daily with breakfast, Disp: 30 tablet, Rfl: 3  •  fluticasone-salmeterol (Advair HFA) 115-21 MCG/ACT inhaler, Inhale 2 puffs 2 (two) times a day Rinse mouth after use , Disp: 12 g, Rfl: 2  •  labetalol (NORMODYNE) 100 mg tablet, TAKE 1 TABLET BY MOUTH TWICE A DAY, Disp: 60 tablet, Rfl: 3  •  sodium bicarbonate 650 mg tablet, Take 1 tablet (650 mg total) by mouth 2 (two) times a day, Disp: 180 tablet, Rfl: 3  •  Blood Pressure KIT, by Does not apply route daily (Patient not taking: Reported on 1/11/2023), Disp: 1 each, Rfl: 0    ALLERGIES:  Allergies   Allergen Reactions   • Wheat Bran - Food Allergy Itching       REVIEW OF SYSTEMS:  MSK: left elbow pain  Neuro: WNL  Pertinent items are otherwise noted in HPI  A comprehensive review of systems was otherwise negative      LABS:  HgA1c:   Lab Results   Component Value Date    HGBA1C 5 3 11/13/2021     BMP:   Lab Results   Component Value Date    GLUCOSE 86 06/12/2015    CALCIUM 8 8 02/14/2023     06/12/2015    K 4 1 02/14/2023    CO2 21 02/14/2023     (H) 02/14/2023    BUN 44 (H) 02/14/2023    CREATININE 7 87 (H) 02/14/2023     CBC: No components found for: CBC    _____________________________________________________  PHYSICAL EXAMINATION:  Vital signs: BP (!) 177/97 (BP Location: Right arm, Patient Position: Sitting, Cuff Size: Standard)   Pulse 74   Ht 5' 4" (1 626 m)   Wt 107 kg (236 lb 14 4 oz)   BMI 40 66 kg/m²   General: No acute distress, awake and alert  Psychiatric: Mood and affect appear appropriate  HEENT: Trachea Midline, No torticollis, no apparent facial trauma  Cardiovascular: No audible murmurs; Extremities appear perfused  Pulmonary: No audible wheezing or stridor  Skin: No open lesions; see further details (if any) below    MUSCULOSKELETAL EXAMINATION:  Extremities: The left elbow was exposed inspected  Patient's skin is intact overlying the left elbow  Patient is no longer tender over the border of the ulna  She has mild discomfort with palpation over the ulnar nerve in the cubital tunnel  She has palpable subluxation of the ulnar nerve with flexion and extension of the elbow  Negative Tinel's sign  Her symptoms are able to be reproduced with pressure over the ulnar nerve and flexion of the elbow  The patient has a palpable crepitus with flexion and extension of the wrist   She has no tenderness over the carpal bones at the distal end of the radius and ulna  No pain in the anatomic snuffbox  No open wounds or lacerations  Patient has no pain at the wrist or the shoulder  Patient has full range of motion of the elbow from 0 to 120 degrees  No varus valgus instability of the elbow  No pain with valgus stress  Sensation is intact light touch in the median, radial, ulnar nerves  AIN, PIN, ulnar nerves intact  +2 radial pulses distally      _____________________________________________________  STUDIES REVIEWED:  I personally reviewed the images and interpretation is as follows:  X-rays of the left wrist demonstrate no acute fractures or dislocations  No osseous lesions      PROCEDURES PERFORMED:  Procedures    Scribe Attestation I,:   am acting as a scribe while in the presence of the attending physician :       I,:   personally performed the services described in this documentation    as scribed in my presence :

## 2023-03-06 DIAGNOSIS — D50.9 IRON DEFICIENCY ANEMIA, UNSPECIFIED IRON DEFICIENCY ANEMIA TYPE: ICD-10-CM

## 2023-03-06 DIAGNOSIS — E83.9 CHRONIC KIDNEY DISEASE-MINERAL AND BONE DISORDER: ICD-10-CM

## 2023-03-06 DIAGNOSIS — I10 HYPERTENSION, UNSPECIFIED TYPE: ICD-10-CM

## 2023-03-06 DIAGNOSIS — N18.5 CHRONIC KIDNEY DISEASE (CKD), ACTIVE MEDICAL MANAGEMENT WITHOUT DIALYSIS, STAGE 5 (HCC): ICD-10-CM

## 2023-03-06 DIAGNOSIS — R80.1 PERSISTENT PROTEINURIA: ICD-10-CM

## 2023-03-06 DIAGNOSIS — M89.9 CHRONIC KIDNEY DISEASE-MINERAL AND BONE DISORDER: ICD-10-CM

## 2023-03-06 DIAGNOSIS — I12.0 BENIGN HYPERTENSION WITH CKD (CHRONIC KIDNEY DISEASE) STAGE V (HCC): ICD-10-CM

## 2023-03-06 DIAGNOSIS — N18.5 BENIGN HYPERTENSION WITH CKD (CHRONIC KIDNEY DISEASE) STAGE V (HCC): ICD-10-CM

## 2023-03-06 DIAGNOSIS — N18.9 CHRONIC KIDNEY DISEASE-MINERAL AND BONE DISORDER: ICD-10-CM

## 2023-03-06 DIAGNOSIS — N25.81 SECONDARY HYPERPARATHYROIDISM OF RENAL ORIGIN (HCC): ICD-10-CM

## 2023-03-06 RX ORDER — AMLODIPINE BESYLATE 5 MG/1
TABLET ORAL
Qty: 60 TABLET | Refills: 3 | Status: SHIPPED | OUTPATIENT
Start: 2023-03-06

## 2023-03-11 ENCOUNTER — OFFICE VISIT (OUTPATIENT)
Dept: URGENT CARE | Age: 28
End: 2023-03-11

## 2023-03-11 VITALS
TEMPERATURE: 97.5 F | DIASTOLIC BLOOD PRESSURE: 81 MMHG | HEART RATE: 63 BPM | OXYGEN SATURATION: 98 % | RESPIRATION RATE: 12 BRPM | SYSTOLIC BLOOD PRESSURE: 161 MMHG

## 2023-03-11 DIAGNOSIS — J01.90 ACUTE NON-RECURRENT SINUSITIS, UNSPECIFIED LOCATION: Primary | ICD-10-CM

## 2023-03-11 RX ORDER — FLUTICASONE PROPIONATE 50 MCG
1 SPRAY, SUSPENSION (ML) NASAL DAILY
Qty: 11.1 ML | Refills: 0 | Status: SHIPPED | OUTPATIENT
Start: 2023-03-11

## 2023-03-11 NOTE — PROGRESS NOTES
[facility]        NAME: Lelo Hurley is a 24 y o  female  : 1995    MRN: [id number]  DATE: 2023  TIME: 4:59 PM    Assessment and Plan   Acute non-recurrent sinusitis, unspecified location [J01 90]  1  Acute non-recurrent sinusitis, unspecified location  fluticasone (FLONASE) 50 mcg/act nasal spray      Please begin Flonase nasal spray once daily for nasal congestion  It is recommended to try a humidifier/hot shower steam, may continue Vicks products as desired  Over-the-counter products such as Mucinex may also be helpful  Symptoms likely viral in etiology and will resolve within 7 to 10 days  Patient Instructions   Sinusitis   WHAT YOU NEED TO KNOW:   Sinusitis is inflammation or infection of your sinuses  Sinusitis is most often caused by a virus  Acute sinusitis may last up to 12 weeks  Chronic sinusitis lasts longer than 12 weeks  Recurrent sinusitis means you have 4 or more infections in 1 year          DISCHARGE INSTRUCTIONS:   Return to the emergency department if:   • You have trouble breathing or wheezing that is getting worse      • You have a stiff neck, a fever, or a bad headache       • You cannot open your eye       • Your eyeball bulges out or you cannot move your eye       • You are more sleepy than normal, or you notice changes in your ability to think, move, or talk      • You have swelling of your forehead or scalp      Call your doctor if:   • You have vision changes, such as double vision      • Your eye and eyelid are red, swollen, and painful       • Your symptoms do not improve or go away after 10 days      • You have nausea and are vomiting      • Your nose is bleeding      • You have questions or concerns about your condition or care      Medicines: Your symptoms may go away on their own  Your healthcare provider may recommend watchful waiting for up to 10 days before starting antibiotics   You may need any of the following:  • Acetaminophen decreases pain and fever  It is available without a doctor's order  Ask how much to take and how often to take it  Follow directions  Read the labels of all other medicines you are using to see if they also contain acetaminophen, or ask your doctor or pharmacist  Acetaminophen can cause liver damage if not taken correctly      • NSAIDs , such as ibuprofen, help decrease swelling, pain, and fever  This medicine is available with or without a doctor's order  NSAIDs can cause stomach bleeding or kidney problems in certain people  If you take blood thinner medicine, always ask your healthcare provider if NSAIDs are safe for you  Always read the medicine label and follow directions      • Nasal steroid sprays  may help decrease inflammation in your nose and sinuses      • Decongestants  help reduce swelling and drain mucus in the nose and sinuses  They may help you breathe easier       • Antihistamines  help dry mucus in the nose and relieve sneezing       • Antibiotics  help treat or prevent a bacterial infection      • Take your medicine as directed  Contact your healthcare provider if you think your medicine is not helping or if you have side effects  Tell your provider if you are allergic to any medicine  Keep a list of the medicines, vitamins, and herbs you take  Include the amounts, and when and why you take them  Bring the list or the pill bottles to follow-up visits  Carry your medicine list with you in case of an emergency      Self-care:   • Rinse your sinuses as directed  Use a sinus rinse device to rinse your nasal passages with a saline (salt water) solution or distilled water  Do not use tap water  This will help thin the mucus in your nose and rinse away pollen and dirt  It will also help reduce swelling so you can breathe normally      • Use a humidifier  to increase air moisture in your home  This may make it easier for you to breathe and help decrease your cough       • Sleep with your head elevated  Place an extra pillow under your head before you go to sleep to help your sinuses drain       • Drink liquids as directed  Ask your healthcare provider how much liquid to drink each day and which liquids are best for you  Liquids will thin the mucus in your nose and help it drain  Avoid drinks that contain alcohol or caffeine       • Do not smoke, and avoid secondhand smoke  Nicotine and other chemicals in cigarettes and cigars can make your symptoms worse  Ask your healthcare provider for information if you currently smoke and need help to quit  E-cigarettes or smokeless tobacco still contain nicotine  Talk to your healthcare provider before you use these products      Prevent the spread of germs:   • Wash your hands often with soap and water  Wash your hands after you use the bathroom, change a child's diaper, or sneeze  Wash your hands before you prepare or eat food           • Stay away from people who are sick  Some germs spread easily and quickly through contact      Follow up with your doctor as directed: You may be referred to an ear, nose, and throat specialist  Write down your questions so you remember to ask them during your visits  © Copyright Kristan [employer]er 2022 Information is for End User's use only and may not be sold, redistributed or otherwise used for commercial purposes  The above information is an  only  It is not intended as medical advice for individual conditions or treatments  Talk to your doctor, nurse or pharmacist before following any medical regimen to see if it is safe and effective for you  Follow up with PCP in 3-5 days  Proceed to  ER if symptoms worsen      Chief Complaint     Chief Complaint   Patient presents with   • Sinusitis     Pressure started yesterday   • Ear Fullness     Right side         History of Present Illness       Patient is a 51-year-old female with past medical history significant for hypertension, stage V CKD, asthma, who presents for evaluation of nasal congestion, frontal sinus pressure and right ear fullness since yesterday  She has not tried any medication for her symptoms  She took an at home COVID test prior to arriving at the clinic which was negative  Denies fever, cough, body aches, chills, chest pain or palpitations  Sinusitis  Associated symptoms include congestion and ear pain  Pertinent negatives include no coughing, headaches, neck pain, shortness of breath, sinus pressure, sneezing or sore throat  Ear Fullness   Pertinent negatives include no coughing, diarrhea, ear discharge, headaches, neck pain, rash, rhinorrhea, sore throat or vomiting  Review of Systems   Review of Systems   Constitutional: Negative for fatigue and fever  HENT: Positive for congestion and ear pain  Negative for ear discharge, postnasal drip, rhinorrhea, sinus pressure, sinus pain, sneezing and sore throat  Eyes: Negative  Negative for pain, discharge, redness and itching  Respiratory: Negative  Negative for apnea, cough, choking, chest tightness, shortness of breath, wheezing and stridor  Cardiovascular: Negative  Negative for chest pain and palpitations  Gastrointestinal: Negative  Negative for diarrhea, nausea and vomiting  Endocrine: Negative  Negative for polydipsia, polyphagia and polyuria  Genitourinary: Negative  Negative for decreased urine volume and flank pain  Musculoskeletal: Negative  Negative for arthralgias, back pain, myalgias, neck pain and neck stiffness  Skin: Negative  Negative for color change and rash  Allergic/Immunologic: Negative  Negative for environmental allergies  Neurological: Negative  Negative for dizziness, facial asymmetry, light-headedness, numbness and headaches  Hematological: Negative  Negative for adenopathy  Psychiatric/Behavioral: Negative            Current Medications       Current Outpatient Medications:   •  fluticasone (FLONASE) 50 mcg/act nasal spray, 1 spray into each nostril daily, Disp: 11 1 mL, Rfl: 0  •  acetaminophen (TYLENOL) 325 mg tablet, Take 2 tablets (650 mg total) by mouth every 6 (six) hours as needed for mild pain or headaches, Disp: 30 tablet, Rfl: 0  •  albuterol (2 5 mg/3 mL) 0 083 % nebulizer solution, Take 3 mL (2 5 mg total) by nebulization every 6 (six) hours as needed for wheezing or shortness of breath, Disp: 30 mL, Rfl: 0  •  albuterol (Ventolin HFA) 90 mcg/act inhaler, Inhale 2 puffs every 6 (six) hours as needed for wheezing or shortness of breath (cough), Disp: 18 g, Rfl: 0  •  amLODIPine (NORVASC) 5 mg tablet, TAKE 1 TABLET BY MOUTH TWICE A DAY, Disp: 60 tablet, Rfl: 3  •  Blood Pressure KIT, by Does not apply route daily (Patient not taking: Reported on 1/11/2023), Disp: 1 each, Rfl: 0  •  calcitriol (ROCALTROL) 0 25 mcg capsule, Take 1 capsule (0 25 mcg total) by mouth daily, Disp: 90 capsule, Rfl: 0  •  Diclofenac Sodium (VOLTAREN) 1 %, Apply 2 g topically 4 (four) times a day, Disp: 150 g, Rfl: 2  •  ergocalciferol (VITAMIN D2) 50,000 units, Take 1 capsule (50,000 Units total) by mouth once a week, Disp: 12 capsule, Rfl: 1  •  etonogestrel (NEXPLANON) subdermal implant, 68 mg by Subdermal route once, Disp: , Rfl:   •  ferrous sulfate 325 (65 Fe) mg tablet, Take 1 tablet (325 mg total) by mouth daily with breakfast, Disp: 30 tablet, Rfl: 3  •  fluticasone-salmeterol (Advair HFA) 115-21 MCG/ACT inhaler, Inhale 2 puffs 2 (two) times a day Rinse mouth after use , Disp: 12 g, Rfl: 2  •  labetalol (NORMODYNE) 100 mg tablet, TAKE 1 TABLET BY MOUTH TWICE A DAY, Disp: 60 tablet, Rfl: 3  •  sodium bicarbonate 650 mg tablet, Take 1 tablet (650 mg total) by mouth 2 (two) times a day, Disp: 180 tablet, Rfl: 3    Current Allergies     Allergies as of 03/11/2023 - Reviewed 03/11/2023   Allergen Reaction Noted   • Wheat bran - food allergy Itching 12/19/2019            The following portions of the patient's history were reviewed and updated as appropriate: allergies, current medications, past family history, past medical history, past social history, past surgical history and problem list      Past Medical History:   Diagnosis Date   • Asthma    • Chronic kidney disease    • Eczema    • Headache    • Hypertension    • Wears glasses        Past Surgical History:   Procedure Laterality Date   • CHOLECYSTECTOMY     • CT NEEDLE BIOPSY KIDNEY  1/29/2020   • KELOID EXCISION Left 3/30/2021    Procedure: POSTERIOR EAR EXCISION KELOID, FLAP RECONSTRUCTION;  Surgeon: Kiley Resendiz MD;  Location: [facility];  Service: Plastics       Family History   Problem Relation Age of Onset   • Asthma Mother    • No Known Problems Father          Medications have been verified  Objective   /81 (BP Location: Left arm, Patient Position: Sitting, Cuff Size: Standard)   Pulse 63   Temp 97 5 °F (36 4 °C) (Temporal)   Resp 12   SpO2 98%        Physical Exam     Physical Exam  Vitals and nursing note reviewed  Constitutional:       General: She is not in acute distress  Appearance: Normal appearance  She is not ill-appearing, toxic-appearing or diaphoretic  Interventions: She is not intubated  HENT:      Head: Normocephalic and atraumatic  Right Ear: Tympanic membrane, ear canal and external ear normal  There is no impacted cerumen  Left Ear: Tympanic membrane, ear canal and external ear normal  There is no impacted cerumen  Nose: Nose normal  No congestion or rhinorrhea  Mouth/Throat:      Mouth: Mucous membranes are moist       Pharynx: No oropharyngeal exudate or posterior oropharyngeal erythema  Eyes:      Extraocular Movements: Extraocular movements intact  Conjunctiva/sclera: Conjunctivae normal       Pupils: Pupils are equal, round, and reactive to light  Cardiovascular:      Rate and Rhythm: Normal rate and regular rhythm  Pulses: Normal pulses        Heart sounds: Normal heart sounds, S1 normal and S2 normal  Heart sounds not distant  No murmur heard  No friction rub  No gallop  Pulmonary:      Effort: Pulmonary effort is normal  No tachypnea, bradypnea, accessory muscle usage, prolonged expiration, respiratory distress or retractions  She is not intubated  Breath sounds: Normal breath sounds  No stridor, decreased air movement or transmitted upper airway sounds  No decreased breath sounds, wheezing, rhonchi or rales  Abdominal:      General: Bowel sounds are normal       Palpations: Abdomen is soft  Tenderness: There is no abdominal tenderness  There is no guarding or rebound  Musculoskeletal:         General: Normal range of motion  Cervical back: Normal range of motion and neck supple  No rigidity or tenderness  Lymphadenopathy:      Cervical: No cervical adenopathy  Skin:     General: Skin is warm and dry  Capillary Refill: Capillary refill takes less than 2 seconds  Neurological:      General: No focal deficit present  Mental Status: She is alert and oriented to person, place, and time  Cranial Nerves: No cranial nerve deficit     Psychiatric:         Mood and Affect: Mood normal          Behavior: Behavior normal

## 2023-03-11 NOTE — PATIENT INSTRUCTIONS
Please begin Flonase nasal spray once daily for nasal congestion  It is recommended to try a humidifier/hot shower steam, may continue Vicks products as desired  Over-the-counter products such as Mucinex may also be helpful  Symptoms likely viral in etiology and will resolve within 7 to 10 days

## 2023-03-31 ENCOUNTER — OFFICE VISIT (OUTPATIENT)
Dept: NEPHROLOGY | Facility: CLINIC | Age: 28
End: 2023-03-31

## 2023-03-31 VITALS — BODY MASS INDEX: 42.99 KG/M2 | HEIGHT: 64 IN | WEIGHT: 251.8 LBS

## 2023-03-31 DIAGNOSIS — N18.9 CHRONIC KIDNEY DISEASE-MINERAL AND BONE DISORDER: ICD-10-CM

## 2023-03-31 DIAGNOSIS — E66.01 MORBID OBESITY WITH BMI OF 40.0-44.9, ADULT (HCC): ICD-10-CM

## 2023-03-31 DIAGNOSIS — N18.5 BENIGN HYPERTENSION WITH CKD (CHRONIC KIDNEY DISEASE) STAGE V (HCC): ICD-10-CM

## 2023-03-31 DIAGNOSIS — E83.9 CHRONIC KIDNEY DISEASE-MINERAL AND BONE DISORDER: ICD-10-CM

## 2023-03-31 DIAGNOSIS — N25.81 SECONDARY HYPERPARATHYROIDISM OF RENAL ORIGIN (HCC): Primary | ICD-10-CM

## 2023-03-31 DIAGNOSIS — I10 PRIMARY HYPERTENSION: ICD-10-CM

## 2023-03-31 DIAGNOSIS — N18.5 CHRONIC KIDNEY DISEASE (CKD), ACTIVE MEDICAL MANAGEMENT WITHOUT DIALYSIS, STAGE 5 (HCC): ICD-10-CM

## 2023-03-31 DIAGNOSIS — M89.9 CHRONIC KIDNEY DISEASE-MINERAL AND BONE DISORDER: ICD-10-CM

## 2023-03-31 DIAGNOSIS — I12.0 BENIGN HYPERTENSION WITH CKD (CHRONIC KIDNEY DISEASE) STAGE V (HCC): ICD-10-CM

## 2023-03-31 NOTE — PROGRESS NOTES
NEPHROLOGY OFFICE VISIT   Higinio Simeon 24 y o  female MRN: [id number]  3/31/2023    Reason for Visit: Advanced chronic kidney disease    History of Present Illness (HPI):    I had the pleasure of seeing Pari Wu today in the renal clinic for the continued management of advanced chronic kidney disease  Since our last visit, there has been no ER visits or hospitilizations  Currently reporting no overt uremic symptoms  Currently in evaluation for renal transplant with [facility] transplant team and Dr Marques Kennedy  Patient reports she has multiple family members that are willing to be evaluated for transplant  She is still obese and has actually gained weight since the last nephrology visit in December  She has not seen vascular surgery for an evaluation of a fistula or graft though I did explain that having an access would be an important step in preparation for possible renal replacement therapy if there is any delay in getting a transplant  Her creatinine overall is stable at 7 8 mg/dL with stable electrolytes  I sent a message to Dr Marques Kennedy to have the transplant team reach out to her to see if there is any further testing that needs to be required  ASSESSMENT and PLAN:    1 ) Chronic Kidney Disease Stage V  --Imaging:  Right kidney 9 4 cm, left kidney 9 cm   It should be noted that there was a decrease in the right kidney size compared to 2015 ultrasound where it was 12 8 cm   Both kidneys are diffusely echogenic consistent with chronic kidney disease  --Urinalysis:  Negative blood   2+ protein    --Proteinuria: Urine protein creatinine ratio has improved to 0 96  --Baseline creatinine: high 7's to mid 8's  --Etiology:  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  --serologies: HIV nonreactive, anti GBM negative, LUCY negative, ANCA negative but VT-3 elevated to 5 4 hemolysis smear negative for schistocytes, C3/C4 normal, IgG/IgA/IgM normal, hepatitis negative, no peripheral eosinophilia  --Biopsy Proven:  Diffuse global glomerular sclerosis which is severe with severe tubular atrophy interstitial fibrosis and interstitial inflammation   Mild arterial sclerosis   Severe chronic pathologic changes, irreversible with poor prognostic sign  --Status: Creatinine currently at 7 87 mg/dL appears to be stable with no overt uremic symptoms or critical electrolyte or acid-base disturbance  --Being evaluated by transplant and has seen Dr Gina Gutierrez  --Completed nephrology advanced care meeting in September 2022  --Management: Not an optimal PD candidate  I did recommend placement of a fistula or graft if there is any delay in getting a renal transplant best to have a access in place as a backup  They would like to hold off on this time and focus on transplant  I discussed my concerns they understand it but they seem to have a poor understanding of what the axis is for thinking that automatically she would go on dialysis after placement of the fistula which I told him that was not necessarily true and that it would be more for backup  --Reducing Cardiovascular Risk Factors:  Simvastatin+ Ezetimibe reduced atherosclerotic events in CKD (SHARP trial); Low dose aspirin safe (if no contraindications exist); smoking is an independent risk factor for Chronic Kidney Disease and progression, strongly recommend smoking cessation     2  ) Hypertension  -- Stage II (American College of Cardiology/American Heart Association)  -- with underlying chronic kidney disease and morbid obesity  -- Body mass index is 42 kg/m²  -- Volume status: Euvolemic  -- Etiology:  Morbid obesity, underlying chronic kidney disease  -- Secondary Work-Up:  Renal artery Doppler was negative, a m   Cortisol 14, TSH 1 5, normal metanephrines and catecholamine levels, renin 0 935, Aldosterone 11 6  -- Target Goal: < 130/80 (ACC/AHA, CKD with proteinuria, CKD in diabetics) ; if tolerated can target SBP < 120 mmHg in high cardiovascular risk ( Age > 75, CKD, CVD, No Diabetics SPRINT)  -- Lifestyle Modifications: DASH Diet, Weight Loss for ideal body weight, 45 mins of cardiovascular exercise 3 times a week as tolerated, and if no contraindications exist, smoking cessation, limit alcohol use, avoid NSAIDS, monitor blood pressure at home  -- Status: Blood pressure  at target  -- Current antihypertensive regiment:  Amlodipine 5 mg twice a day, labetalol 100 mg twice a day  -- Changes: Continue current management     3  ) Obesity  -- BMI 43 22  --Recommend decreasing portion sizes, encouraging healthy choices of fruits and vegetables, consuming healthier snacks and moderation in carbohydrate intake  Exercise recommendations; 3-5 times a week, the American Heart Association recommends 150 minutes a week moderate exercise  --Strongly recommend evaluation by nutritionist  --Overweight and obesity have been associated with increased mortality and morbidity  Associated with a reduction in life expectancy, associated with increased all cause and cardiovascular mortality  --Metabolic risks, including increased risk of diabetes type 2, insulin resistance with hyperinsulinemia (weight loss of 5 to 7% associated with a decreased risk of type 2 diabetes among individuals with prediabetes and weight loss of 15% can lead to dramatic improvement of diabetes in nearly half of people)  Dyslipidemia, hypertension and heart disease are all increased in patients with obesity  Along with increased risk of stroke increased risk of multiple cancer types  Can also be associated with increased renal dysfunction, strongest risk factor for new onset chronic kidney disease  There is also a degree of compensatory hyperfiltration to meet high in the metabolic demands of increased body weight    This can also result in increased increased risk for nephrolithiasis        4 )  Mineral bone disorder-chronic kidney disease  --secondary hyperparathyroidism renal origin  --Phosphorus is normal  --PTH is elevated at 614 2  --Currently on ergocalciferol 50,000 units weekly  --Calcitriol 0 25 mcg daily, PTH elevated at the next visit increased to 0 5 mcg daily     5 )  Anemia of chronic kidney disease  --Iron stores are adequate  --Hemoglobin stable and below target  --If it drifts down further we may need to consider starting Epogen  --Check repeat iron studies at the next visit  --Colonoscopy screening for transplant evaluation     6 )  Vitamin-D deficiency  --please see above     7 )  Low bicarb level  --continue oral sodium bicarbonate         PATIENT INSTRUCTIONS:    Patient Instructions   1 )  Low 2 g sodium diet    2 )  Monitor weights at home    3 )  Avoid NSAIDs (ibuprofen, Motrin, Advil, Aleve, naproxen)    4 )  Monitor blood pressure at home, call if blood pressure greater than 150/90 persistently    5 ) I will plan to discuss all results including blood work, and/or imaging at our next visit, unless there is an urgent indication, in which case I will call you earlier  If you have any questions or concerns about your results, please feel free to call our office      6 )  Refer to vascular surgery for placement of an access    7 )  Weight loss, in preparation for future renal transplant, continue transplant work-up    8 )  Follow-up in 3 months    9 )  If you develop any symptoms of a metallic taste on the tongue, nausea vomiting loss of appetite, shortness of breath, please call or go to the nearest emergency room          Orders Placed This Encounter   Procedures   • Cystatin C With eGFR     Standing Status:   Future     Standing Expiration Date:   3/31/2024   • PTH, intact     Standing Status:   Future     Standing Expiration Date:   3/31/2024   • Phosphorus     Standing Status:   Future     Standing Expiration Date:   3/31/2024   • Microalbumin / creatinine urine ratio     Standing Status:   Future     Standing Expiration Date:   3/31/2024   • Comprehensive "metabolic panel     This is a patient instruction: Patient fasting for 8 hours or longer recommended  Standing Status:   Future     Standing Expiration Date:   3/31/2024   • CBC     Standing Status:   Future     Standing Expiration Date:   3/31/2024       OBJECTIVE:  Current Weight: Weight - Scale: 114 kg (251 lb 12 8 oz)  Vitals:    03/31/23 1516   Weight: 114 kg (251 lb 12 8 oz)   Height: 5' 4\" (1 626 m)    Body mass index is 43 22 kg/m²  REVIEW OF SYSTEMS:    Review of Systems   Constitutional: Negative for activity change and fever  Respiratory: Negative for cough, chest tightness, shortness of breath and wheezing  Cardiovascular: Negative for chest pain and leg swelling  Gastrointestinal: Negative for abdominal pain, diarrhea, nausea and vomiting  Endocrine: Negative for polyuria  Genitourinary: Negative for difficulty urinating, dysuria, flank pain, frequency and urgency  Skin: Negative for rash  Neurological: Negative for dizziness, syncope, light-headedness and headaches  PHYSICAL EXAM:      Physical Exam  Vitals and nursing note reviewed  Exam conducted with a chaperone present  Constitutional:       General: She is not in acute distress  Appearance: She is well-developed  She is obese  HENT:      Head: Normocephalic and atraumatic  Eyes:      General: No scleral icterus  Conjunctiva/sclera: Conjunctivae normal       Pupils: Pupils are equal, round, and reactive to light  Cardiovascular:      Rate and Rhythm: Normal rate and regular rhythm  Heart sounds: S1 normal and S2 normal  No murmur heard  No friction rub  No gallop  Pulmonary:      Effort: Pulmonary effort is normal  No respiratory distress  Breath sounds: Normal breath sounds  No wheezing or rales  Abdominal:      General: Bowel sounds are normal       Palpations: Abdomen is soft  Tenderness: There is no abdominal tenderness  There is no rebound     Musculoskeletal:         General: " Normal range of motion  Cervical back: Normal range of motion and neck supple  Skin:     Findings: No rash  Neurological:      Mental Status: She is alert and oriented to person, place, and time     Psychiatric:         Behavior: Behavior normal          Medications:    Current Outpatient Medications:   •  acetaminophen (TYLENOL) 325 mg tablet, Take 2 tablets (650 mg total) by mouth every 6 (six) hours as needed for mild pain or headaches, Disp: 30 tablet, Rfl: 0  •  albuterol (2 5 mg/3 mL) 0 083 % nebulizer solution, Take 3 mL (2 5 mg total) by nebulization every 6 (six) hours as needed for wheezing or shortness of breath, Disp: 30 mL, Rfl: 0  •  albuterol (Ventolin HFA) 90 mcg/act inhaler, Inhale 2 puffs every 6 (six) hours as needed for wheezing or shortness of breath (cough), Disp: 18 g, Rfl: 0  •  amLODIPine (NORVASC) 5 mg tablet, TAKE 1 TABLET BY MOUTH TWICE A DAY, Disp: 60 tablet, Rfl: 3  •  calcitriol (ROCALTROL) 0 25 mcg capsule, Take 1 capsule (0 25 mcg total) by mouth daily, Disp: 90 capsule, Rfl: 0  •  Diclofenac Sodium (VOLTAREN) 1 %, Apply 2 g topically 4 (four) times a day (Patient taking differently: Apply 2 g topically 4 (four) times a day as needed), Disp: 150 g, Rfl: 2  •  ergocalciferol (VITAMIN D2) 50,000 units, Take 1 capsule (50,000 Units total) by mouth once a week, Disp: 12 capsule, Rfl: 1  •  etonogestrel (NEXPLANON) subdermal implant, 68 mg by Subdermal route once, Disp: , Rfl:   •  ferrous sulfate 325 (65 Fe) mg tablet, Take 1 tablet (325 mg total) by mouth daily with breakfast, Disp: 30 tablet, Rfl: 3  •  fluticasone (FLONASE) 50 mcg/act nasal spray, 1 spray into each nostril daily (Patient taking differently: 1 spray into each nostril daily as needed), Disp: 11 1 mL, Rfl: 0  •  fluticasone-salmeterol (Advair HFA) 115-21 MCG/ACT inhaler, Inhale 2 puffs 2 (two) times a day Rinse mouth after use , Disp: 12 g, Rfl: 2  •  labetalol (NORMODYNE) 100 mg tablet, TAKE 1 TABLET BY MOUTH TWICE A DAY, Disp: 60 tablet, Rfl: 3  •  sodium bicarbonate 650 mg tablet, Take 1 tablet (650 mg total) by mouth 2 (two) times a day, Disp: 180 tablet, Rfl: 3  •  Blood Pressure KIT, by Does not apply route daily (Patient not taking: Reported on 1/11/2023), Disp: 1 each, Rfl: 0    Laboratory Results:        Invalid input(s): ALBUMIN    Results for orders placed or performed in visit on 27/52/57   Basic metabolic panel   Result Value Ref Range    Sodium 138 135 - 147 mmol/L    Potassium 4 1 3 5 - 5 3 mmol/L    Chloride 109 (H) 96 - 108 mmol/L    CO2 21 21 - 32 mmol/L    ANION GAP 8 4 - 13 mmol/L    BUN 44 (H) 5 - 25 mg/dL    Creatinine 7 87 (H) 0 60 - 1 30 mg/dL    Glucose 76 65 - 140 mg/dL    Calcium 8 8 8 3 - 10 1 mg/dL    eGFR 6 ml/min/1 73sq m   CBC   Result Value Ref Range    WBC 6 44 4 31 - 10 16 Thousand/uL    RBC 3 87 3 81 - 5 12 Million/uL    Hemoglobin 8 3 (L) 11 5 - 15 4 g/dL    Hematocrit 27 8 (L) 34 8 - 46 1 %    MCV 72 (L) 82 - 98 fL    MCH 21 4 (L) 26 8 - 34 3 pg    MCHC 29 9 (L) 31 4 - 37 4 g/dL    RDW 15 8 (H) 11 6 - 15 1 %    Platelets 069 433 - 315 Thousands/uL    MPV 11 9 8 9 - 12 7 fL   Phosphorus   Result Value Ref Range    Phosphorus 3 7 2 7 - 4 5 mg/dL   Magnesium   Result Value Ref Range    Magnesium 2 1 1 6 - 2 6 mg/dL   Protein / creatinine ratio, urine   Result Value Ref Range    Creatinine, Ur 73 8 mg/dL    Protein Urine Random 71 mg/dL    Prot/Creat Ratio, Ur 0 96 (H) 0 00 - 0 10   PTH, intact   Result Value Ref Range     2 (H) 18 4 - 80 1 pg/mL   Vitamin D 25 hydroxy   Result Value Ref Range    Vit D, 25-Hydroxy 70 4 30 0 - 100 0 ng/mL   Iron Saturation %   Result Value Ref Range    Iron Saturation 32 15 - 50 %    TIBC 207 (L) 250 - 450 ug/dL    Iron 66 50 - 170 ug/dL   Ferritin   Result Value Ref Range    Ferritin 150 8 - 388 ng/mL

## 2023-03-31 NOTE — PATIENT INSTRUCTIONS
1  )  Low 2 g sodium diet    2 )  Monitor weights at home    3 )  Avoid NSAIDs (ibuprofen, Motrin, Advil, Aleve, naproxen)    4 )  Monitor blood pressure at home, call if blood pressure greater than 150/90 persistently    5 ) I will plan to discuss all results including blood work, and/or imaging at our next visit, unless there is an urgent indication, in which case I will call you earlier  If you have any questions or concerns about your results, please feel free to call our office      6 )  Refer to vascular surgery for placement of an access    7 )  Weight loss, in preparation for future renal transplant, continue transplant work-up    8 )  Follow-up in 3 months    9 )  If you develop any symptoms of a metallic taste on the tongue, nausea vomiting loss of appetite, shortness of breath, please call or go to the nearest emergency room

## 2023-04-02 ENCOUNTER — APPOINTMENT (EMERGENCY)
Dept: CT IMAGING | Facility: HOSPITAL | Age: 28
End: 2023-04-02

## 2023-04-02 ENCOUNTER — HOSPITAL ENCOUNTER (EMERGENCY)
Facility: HOSPITAL | Age: 28
Discharge: HOME | End: 2023-04-02
Attending: EMERGENCY MEDICINE

## 2023-04-02 ENCOUNTER — APPOINTMENT (EMERGENCY)
Dept: RADIOLOGY | Facility: HOSPITAL | Age: 28
End: 2023-04-02

## 2023-04-02 VITALS
RESPIRATION RATE: 17 BRPM | OXYGEN SATURATION: 100 % | TEMPERATURE: 98.3 F | SYSTOLIC BLOOD PRESSURE: 146 MMHG | DIASTOLIC BLOOD PRESSURE: 77 MMHG | HEART RATE: 72 BPM

## 2023-04-02 DIAGNOSIS — R51.9 ACUTE HEADACHE: Primary | ICD-10-CM

## 2023-04-02 LAB
ALBUMIN SERPL BCP-MCNC: 4.4 G/DL (ref 3.5–5)
ALP SERPL-CCNC: 61 U/L (ref 34–104)
ALT SERPL W P-5'-P-CCNC: 7 U/L (ref 7–52)
ANION GAP SERPL CALCULATED.3IONS-SCNC: 13 MMOL/L (ref 4–13)
AST SERPL W P-5'-P-CCNC: 10 U/L (ref 13–39)
ATRIAL RATE: 66 BPM
BASOPHILS # BLD AUTO: 0.03 THOUSANDS/ÂΜL (ref 0–0.1)
BASOPHILS NFR BLD AUTO: 1 % (ref 0–1)
BILIRUB SERPL-MCNC: 0.33 MG/DL (ref 0.2–1)
BUN SERPL-MCNC: 52 MG/DL (ref 5–25)
CALCIUM SERPL-MCNC: 8.2 MG/DL (ref 8.4–10.2)
CARDIAC TROPONIN I PNL SERPL HS: 4 NG/L
CHLORIDE SERPL-SCNC: 106 MMOL/L (ref 96–108)
CO2 SERPL-SCNC: 18 MMOL/L (ref 21–32)
CREAT SERPL-MCNC: 8.45 MG/DL (ref 0.6–1.3)
EOSINOPHIL # BLD AUTO: 0.16 THOUSAND/ÂΜL (ref 0–0.61)
EOSINOPHIL NFR BLD AUTO: 3 % (ref 0–6)
ERYTHROCYTE [DISTWIDTH] IN BLOOD BY AUTOMATED COUNT: 15 % (ref 11.6–15.1)
EXT PREGNANCY TEST URINE: NEGATIVE
EXT. CONTROL: NORMAL
GFR SERPL CREATININE-BSD FRML MDRD: 5 ML/MIN/1.73SQ M
GLUCOSE SERPL-MCNC: 71 MG/DL (ref 65–140)
GLUCOSE SERPL-MCNC: 79 MG/DL (ref 65–140)
HCT VFR BLD AUTO: 29.9 % (ref 34.8–46.1)
HGB BLD-MCNC: 8.9 G/DL (ref 11.5–15.4)
IMM GRANULOCYTES # BLD AUTO: 0.02 THOUSAND/UL (ref 0–0.2)
IMM GRANULOCYTES NFR BLD AUTO: 0 % (ref 0–2)
LYMPHOCYTES # BLD AUTO: 0.97 THOUSANDS/ÂΜL (ref 0.6–4.47)
LYMPHOCYTES NFR BLD AUTO: 17 % (ref 14–44)
MCH RBC QN AUTO: 21.5 PG (ref 26.8–34.3)
MCHC RBC AUTO-ENTMCNC: 29.8 G/DL (ref 31.4–37.4)
MCV RBC AUTO: 72 FL (ref 82–98)
MONOCYTES # BLD AUTO: 0.37 THOUSAND/ÂΜL (ref 0.17–1.22)
MONOCYTES NFR BLD AUTO: 7 % (ref 4–12)
NEUTROPHILS # BLD AUTO: 4.01 THOUSANDS/ÂΜL (ref 1.85–7.62)
NEUTS SEG NFR BLD AUTO: 72 % (ref 43–75)
NRBC BLD AUTO-RTO: 0 /100 WBCS
P AXIS: 39 DEGREES
PLATELET # BLD AUTO: 236 THOUSANDS/UL (ref 149–390)
PMV BLD AUTO: 10.7 FL (ref 8.9–12.7)
POTASSIUM SERPL-SCNC: 4.3 MMOL/L (ref 3.5–5.3)
PR INTERVAL: 140 MS
PROT SERPL-MCNC: 7.5 G/DL (ref 6.4–8.4)
QRS AXIS: -4 DEGREES
QRSD INTERVAL: 86 MS
QT INTERVAL: 434 MS
QTC INTERVAL: 454 MS
RBC # BLD AUTO: 4.13 MILLION/UL (ref 3.81–5.12)
SODIUM SERPL-SCNC: 137 MMOL/L (ref 135–147)
T WAVE AXIS: 54 DEGREES
VENTRICULAR RATE: 66 BPM
WBC # BLD AUTO: 5.56 THOUSAND/UL (ref 4.31–10.16)

## 2023-04-02 RX ORDER — METOCLOPRAMIDE HYDROCHLORIDE 5 MG/ML
10 INJECTION INTRAMUSCULAR; INTRAVENOUS ONCE
Status: COMPLETED | OUTPATIENT
Start: 2023-04-02 | End: 2023-04-02

## 2023-04-02 RX ORDER — DIPHENHYDRAMINE HYDROCHLORIDE 50 MG/ML
25 INJECTION INTRAMUSCULAR; INTRAVENOUS ONCE
Status: COMPLETED | OUTPATIENT
Start: 2023-04-02 | End: 2023-04-02

## 2023-04-02 RX ORDER — MAGNESIUM SULFATE HEPTAHYDRATE 40 MG/ML
2 INJECTION, SOLUTION INTRAVENOUS ONCE
Status: COMPLETED | OUTPATIENT
Start: 2023-04-02 | End: 2023-04-02

## 2023-04-02 RX ORDER — DEXAMETHASONE SODIUM PHOSPHATE 4 MG/ML
10 INJECTION, SOLUTION INTRA-ARTICULAR; INTRALESIONAL; INTRAMUSCULAR; INTRAVENOUS; SOFT TISSUE ONCE
Status: COMPLETED | OUTPATIENT
Start: 2023-04-02 | End: 2023-04-02

## 2023-04-02 RX ORDER — ACETAMINOPHEN 325 MG/1
650 TABLET ORAL ONCE
Status: COMPLETED | OUTPATIENT
Start: 2023-04-02 | End: 2023-04-02

## 2023-04-02 RX ADMIN — DEXAMETHASONE SODIUM PHOSPHATE 10 MG: 4 INJECTION, SOLUTION INTRAMUSCULAR; INTRAVENOUS at 20:49

## 2023-04-02 RX ADMIN — SODIUM CHLORIDE 1000 ML: 0.9 INJECTION, SOLUTION INTRAVENOUS at 21:08

## 2023-04-02 RX ADMIN — DIPHENHYDRAMINE HYDROCHLORIDE 25 MG: 50 INJECTION, SOLUTION INTRAMUSCULAR; INTRAVENOUS at 20:45

## 2023-04-02 RX ADMIN — ACETAMINOPHEN 650 MG: 325 TABLET ORAL at 20:47

## 2023-04-02 RX ADMIN — MAGNESIUM SULFATE HEPTAHYDRATE 2 G: 40 INJECTION, SOLUTION INTRAVENOUS at 21:09

## 2023-04-02 RX ADMIN — METOCLOPRAMIDE 10 MG: 5 INJECTION, SOLUTION INTRAMUSCULAR; INTRAVENOUS at 21:07

## 2023-04-03 NOTE — ED PROVIDER NOTES
History  Chief Complaint   Patient presents with   • Fall     Pt states she fell approx 1 hr ago  States she does not recall anything from the incident  +dizziness and HA, GCS 15     Patient is a 33-year-old female with PMH of HTN, CKD, asthma, and cholecystectomy presenting for evaluation of right-sided headache for 1 hour  The patient notes that she was at the store with her mother and sister when she developed a right-sided headache  She questions if she fell or passed out because she feels as if she does not recall the few minutes leading up to the onset of this right-sided headache  She describes a 5/10 right-sided, constant, aching/pressure like headache that is worse with loud noises  She has not yet taken anything for this headache  She denies recent illnesses, fevers, and chills  She denies vision changes including blurred vision, double vision, floaters, loss of vision  She denies sore throat, cough, chest pain, palpitations, shortness of breath, abdominal pain, N/V/D, urinary complaints, skin changes, and lower leg swelling  History provided by:  Patient   used: No        Prior to Admission Medications   Prescriptions Last Dose Informant Patient Reported? Taking?    Blood Pressure KIT   No No   Sig: by Does not apply route daily   Patient not taking: Reported on 1/11/2023   Diclofenac Sodium (VOLTAREN) 1 %   No No   Sig: Apply 2 g topically 4 (four) times a day   Patient taking differently: Apply 2 g topically 4 (four) times a day as needed   acetaminophen (TYLENOL) 325 mg tablet   No No   Sig: Take 2 tablets (650 mg total) by mouth every 6 (six) hours as needed for mild pain or headaches   albuterol (2 5 mg/3 mL) 0 083 % nebulizer solution   No No   Sig: Take 3 mL (2 5 mg total) by nebulization every 6 (six) hours as needed for wheezing or shortness of breath   albuterol (Ventolin HFA) 90 mcg/act inhaler   No No   Sig: Inhale 2 puffs every 6 (six) hours as needed for wheezing or shortness of breath (cough)   amLODIPine (NORVASC) 5 mg tablet   No No   Sig: TAKE 1 TABLET BY MOUTH TWICE A DAY   calcitriol (ROCALTROL) 0 25 mcg capsule   No No   Sig: Take 1 capsule (0 25 mcg total) by mouth daily   ergocalciferol (VITAMIN D2) 50,000 units   No No   Sig: Take 1 capsule (50,000 Units total) by mouth once a week   etonogestrel (NEXPLANON) subdermal implant   Yes No   Si mg by Subdermal route once   ferrous sulfate 325 (65 Fe) mg tablet   No No   Sig: Take 1 tablet (325 mg total) by mouth daily with breakfast   fluticasone (FLONASE) 50 mcg/act nasal spray   No No   Si spray into each nostril daily   Patient taking differently: 1 spray into each nostril daily as needed   fluticasone-salmeterol (Advair HFA) 115-21 MCG/ACT inhaler   No No   Sig: Inhale 2 puffs 2 (two) times a day Rinse mouth after use  labetalol (NORMODYNE) 100 mg tablet   No No   Sig: TAKE 1 TABLET BY MOUTH TWICE A DAY   sodium bicarbonate 650 mg tablet   No No   Sig: Take 1 tablet (650 mg total) by mouth 2 (two) times a day      Facility-Administered Medications: None       Past Medical History:   Diagnosis Date   • Asthma    • Chronic kidney disease    • Eczema    • Headache    • Hypertension    • Wears glasses        Past Surgical History:   Procedure Laterality Date   • CHOLECYSTECTOMY     • CT NEEDLE BIOPSY KIDNEY  2020   • KELOID EXCISION Left 3/30/2021    Procedure: POSTERIOR EAR EXCISION KELOID, FLAP RECONSTRUCTION;  Surgeon: Janet Gleason MD;  Location: [facility];  Service: Plastics       Family History   Problem Relation Age of Onset   • Asthma Mother    • No Known Problems Father      I have reviewed and agree with the history as documented      E-Cigarette/Vaping   • E-Cigarette Use Never User      E-Cigarette/Vaping Substances   • Nicotine No    • THC No    • CBD No    • Flavoring No    • Other No    • Unknown No      Social History     Tobacco Use   • Smoking status: Never   • Smokeless tobacco: Never   Vaping Use   • Vaping Use: Never used   Substance Use Topics   • Alcohol use: Yes     Comment: occassionally on social events   • Drug use: No       Review of Systems   Constitutional: Negative for chills and fever  HENT: Negative for congestion and sore throat  Eyes: Negative for photophobia, pain and visual disturbance  Respiratory: Negative for cough and shortness of breath  Cardiovascular: Negative for chest pain, palpitations and leg swelling  Gastrointestinal: Negative for abdominal pain, diarrhea, nausea and vomiting  Genitourinary: Negative  Musculoskeletal: Negative for back pain and gait problem  Skin: Negative for color change, rash and wound  Allergic/Immunologic: Positive for immunocompromised state (CKD)  Neurological: Positive for headaches (right sided x1 hour)  Negative for dizziness, tremors, seizures, syncope (?passing out/fall prior to HA onset, no witnessed fall or syncope; denies waking up on floor), facial asymmetry, speech difficulty, weakness, light-headedness and numbness  All other systems reviewed and are negative  Physical Exam  Physical Exam  Vitals and nursing note reviewed  Constitutional:       General: She is awake  She is in acute distress (Evidencing mild distress)  Appearance: Normal appearance  She is well-developed  She is obese  She is not ill-appearing, toxic-appearing or diaphoretic  HENT:      Head: Normocephalic and atraumatic  Jaw: There is normal jaw occlusion  No trismus  Nose: Nose normal       Mouth/Throat:      Lips: Pink  No lesions  Mouth: Mucous membranes are moist       Pharynx: Oropharynx is clear  Uvula midline  Eyes:      General: Lids are normal  Vision grossly intact  Gaze aligned appropriately  No visual field deficit  Extraocular Movements: Extraocular movements intact  Right eye: Normal extraocular motion and no nystagmus        Left eye: Normal extraocular motion and no nystagmus  Conjunctiva/sclera: Conjunctivae normal       Pupils: Pupils are equal, round, and reactive to light  Visual Fields: Right eye visual fields normal and left eye visual fields normal    Neck:      Vascular: No carotid bruit  Trachea: Trachea and phonation normal  No abnormal tracheal secretions  Cardiovascular:      Rate and Rhythm: Normal rate  Pulses:           Radial pulses are 2+ on the right side and 2+ on the left side  Dorsalis pedis pulses are 2+ on the right side and 2+ on the left side  Posterior tibial pulses are 2+ on the right side and 2+ on the left side  Heart sounds: Normal heart sounds, S1 normal and S2 normal  No murmur heard  Pulmonary:      Effort: Pulmonary effort is normal  No tachypnea or respiratory distress  Breath sounds: Normal breath sounds and air entry  No stridor, decreased air movement or transmitted upper airway sounds  No decreased breath sounds  Abdominal:      General: There is no distension  Palpations: Abdomen is soft  Tenderness: There is no abdominal tenderness  There is no guarding or rebound  Musculoskeletal:         General: Normal range of motion  Cervical back: Full passive range of motion without pain, normal range of motion and neck supple  No rigidity  Muscular tenderness (Right-sided) present  No spinous process tenderness  Normal range of motion  Right lower leg: No edema  Left lower leg: No edema  Comments: [place], 5/5 strength throughout, sensation intact, no focal joint swelling  Ambulatory with steady gait  Skin:     General: Skin is warm and dry  Capillary Refill: Capillary refill takes less than 2 seconds  Findings: No rash or wound  Neurological:      General: No focal deficit present  Mental Status: She is alert and oriented to person, place, and time  Mental status is at baseline  GCS: GCS eye subscore is 4  GCS verbal subscore is 5   GCS motor subscore is 6  Cranial Nerves: Cranial nerves 2-12 are intact  Sensory: Sensation is intact  Motor: Motor function is intact  Coordination: Coordination is intact  Gait: Gait is intact  Psychiatric:         Behavior: Behavior is cooperative           Vital Signs  ED Triage Vitals [04/02/23 2008]   Temperature Pulse Respirations Blood Pressure SpO2   98 3 °F (36 8 °C) 90 18 133/77 100 %      Temp Source Heart Rate Source Patient Position - Orthostatic VS BP Location FiO2 (%)   Oral Monitor Lying Right arm --      Pain Score       9           Vitals:    04/02/23 2008 04/02/23 2116 04/02/23 2145   BP: 133/77  146/77   Pulse: 90 65 72   Patient Position - Orthostatic VS: Lying           Visual Acuity  Visual Acuity    Flowsheet Row Most Recent Value   L Pupil Size (mm) 3   R Pupil Size (mm) 3          ED Medications  Medications   sodium chloride 0 9 % bolus 1,000 mL (0 mL Intravenous Stopped 4/2/23 2313)   diphenhydrAMINE (BENADRYL) injection 25 mg (25 mg Intravenous Given 4/2/23 2045)   metoclopramide (REGLAN) injection 10 mg (10 mg Intravenous Given 4/2/23 2107)   magnesium sulfate 2 g/50 mL IVPB (premix) 2 g (0 g Intravenous Stopped 4/2/23 2300)   dexamethasone (DECADRON) injection 10 mg (10 mg Intravenous Given 4/2/23 2049)   acetaminophen (TYLENOL) tablet 650 mg (650 mg Oral Given 4/2/23 2047)       Diagnostic Studies  Results Reviewed     Procedure Component Value Units Date/Time    POCT pregnancy, urine [[id number]]  (Normal) Resulted: 04/02/23 2137    Lab Status: Final result Specimen: Urine Updated: 04/02/23 2139     EXT Preg Test, Ur Negative     Control Valid    HS Troponin 0hr (reflex protocol) [[id number]]  (Normal) Collected: 04/02/23 2033    Lab Status: Final result Specimen: Blood from Arm, Left Updated: 04/02/23 2106     hs TnI 0hr 4 ng/L     Fingerstick Glucose (POCT) [[id number]]  (Normal) Collected: 04/02/23 2054    Lab Status: Final result Updated: 04/02/23 2102     POC Glucose 71 mg/dl     Comprehensive metabolic panel [[id number]]  (Abnormal) Collected: 04/02/23 2033    Lab Status: Final result Specimen: Blood from Arm, Left Updated: 04/02/23 2059     Sodium 137 mmol/L      Potassium 4 3 mmol/L      Chloride 106 mmol/L      CO2 18 mmol/L      ANION GAP 13 mmol/L      BUN 52 mg/dL      Creatinine 8 45 mg/dL      Glucose 79 mg/dL      Calcium 8 2 mg/dL      AST 10 U/L      ALT 7 U/L      Alkaline Phosphatase 61 U/L      Total Protein 7 5 g/dL      Albumin 4 4 g/dL      Total Bilirubin 0 33 mg/dL      eGFR 5 ml/min/1 73sq m     Narrative:      National Kidney Disease Foundation guidelines for Chronic Kidney Disease (CKD):   •  Stage 1 with normal or high GFR (GFR > 90 mL/min/1 73 square meters)  •  Stage 2 Mild CKD (GFR = 60-89 mL/min/1 73 square meters)  •  Stage 3A Moderate CKD (GFR = 45-59 mL/min/1 73 square meters)  •  Stage 3B Moderate CKD (GFR = 30-44 mL/min/1 73 square meters)  •  Stage 4 Severe CKD (GFR = 15-29 mL/min/1 73 square meters)  •  Stage 5 End Stage CKD (GFR <15 mL/min/1 73 square meters)  Note: GFR calculation is accurate only with a steady state creatinine    CBC and differential [[id number]]  (Abnormal) Collected: 04/02/23 2033    Lab Status: Final result Specimen: Blood from Arm, Left Updated: 04/02/23 2041     WBC 5 56 Thousand/uL      RBC 4 13 Million/uL      Hemoglobin 8 9 g/dL      Hematocrit 29 9 %      MCV 72 fL      MCH 21 5 pg      MCHC 29 8 g/dL      RDW 15 0 %      MPV 10 7 fL      Platelets 995 Thousands/uL      nRBC 0 /100 WBCs      Neutrophils Relative 72 %      Immat GRANS % 0 %      Lymphocytes Relative 17 %      Monocytes Relative 7 %      Eosinophils Relative 3 %      Basophils Relative 1 %      Neutrophils Absolute 4 01 Thousands/µL      Immature Grans Absolute 0 02 Thousand/uL      Lymphocytes Absolute 0 97 Thousands/µL      Monocytes Absolute 0 37 Thousand/µL      Eosinophils Absolute 0 16 Thousand/µL      Basophils Absolute 0 03 Thousands/µL XR chest 1 view portable   ED Interpretation by Stephanie Elias (04/02 2116)   No acute cardiopulmonary disease identified by me  CT head without contrast   Final Result by Bella Huertas MD (04/02 2202)      No acute intracranial abnormality  Workstation performed: [id number]                    Procedures  ECG 12 Lead Documentation Only    Date/Time: 4/2/2023 8:43 PM  Performed by: Stephanie Elias  Authorized by: SUZAN Elias     Indications / Diagnosis:  HA, ACS w/u  ECG reviewed by me, the ED Provider: yes    Patient location:  ED  Previous ECG:     Previous ECG:  Compared to current    Comparison ECG info:   June 18, 2022    Similarity:  No change    Comparison to cardiac monitor: Yes    Interpretation:     Interpretation: normal    Rate:     ECG rate:  66    ECG rate assessment: normal    Rhythm:     Rhythm: sinus rhythm    Ectopy:     Ectopy: none    QRS:     QRS axis:  Normal    QRS intervals:  Normal  Conduction:     Conduction: normal    ST segments:     ST segments:  Normal  T waves:     T waves: normal    Comments:      Sinus rhythm, normal axis, normal intervals, no acute ischemic changes read by me             ED Course  ED Course as of 04/03/23 0007   Sun Apr 02, 2023 2015 Triage VS stable   2047 WBC: 5 56  No leukocytosis   2047 CBC and differential(!)  Microcytic, hypochromic anemia consistent with prior measurements    Likely secondary to chronic kidney disease   2114 BUN(!): 52  Elevated above baseline of 40, giving 1 L IV fluid resuscitation   2114 Creatinine(!): 8 45  Known CKD with range of 7-8, consistent with prior measurements   2115 Comprehensive metabolic panel(!)  No electrolyte derangement, no transaminitis   2115 POC Glucose: 71  No hypo or hyperglycemia   2115 hs TnI 0hr: 4  Normal sinus rhythm EKG, troponin less than 5, no chest pain or shortness of breath, will discontinue 2 and 4-hour troponins based on protocol   2208 PREGNANCY TEST URINE: Negative  noted   2208 CT head without contrast  IMPRESSION:     No acute intracranial abnormality  2210 Patient updated on blood work, urine, CT, and x-ray results  She notes a vast improvement in her headache and she is resting comfortably in bed  Plan to complete magnesium sulfate and IV fluid bolus  Suspicion highest for benign pathology of headache  Differential includes acute headache versus migraine headache versus tension headache  ACS very unlikely given negative troponin normal sinus rhythm EKG  Question for cardiac arrhythmia low given patient in sinus rhythm while monitored here  Will make plan for discharged home with follow-up with primary care provider in the next week  2250 IV fluids and magnesium completed  Patient continues to note she feels vastly improved  Signs remained stable  DC paperwork reviewed with emphasis on follow-up with primary care provider, notification to nephrologist of visit to the ER, use of Tylenol as needed for headaches, and strict return precautions  HEART Risk Score    Flowsheet Row Most Recent Value   Heart Score Risk Calculator    History 0 Filed at: 04/02/2023 2133   ECG 0 Filed at: 04/02/2023 2133   Age 0 Filed at: 04/02/2023 2133   Risk Factors 2 Filed at: 04/02/2023 2133   Troponin 0 Filed at: 04/02/2023 2133   HEART Score 2 Filed at: 04/02/2023 2133                        SBIRT 22yo+    Flowsheet Row Most Recent Value   SBIRT (25 yo +)    In order to provide better care to our patients, we are screening all of our patients for alcohol and drug use  Would it be okay to ask you these screening questions? Yes Filed at: 04/02/2023 2024   Initial Alcohol Screen: US AUDIT-C     1  How often do you have a drink containing alcohol? 0 Filed at: 04/02/2023 2024   2  How many drinks containing alcohol do you have on a typical day you are drinking? 0 Filed at: 04/02/2023 2024   3b  FEMALE Any Age, or MALE 65+:  How often do you have 4 or more drinks on one occassion? 0 Filed at: 04/02/2023 2024   Audit-C Score 0 Filed at: 04/02/2023 2024   CELY: How many times in the past year have you    Used an illegal drug or used a prescription medication for non-medical reasons? Never Filed at: 04/02/2023 2024                    Medical Decision Making  DDx including but not limited to: tension headache, cluster headache, migraine; considered but doubt ICH, SAH, tumor, ACS, MI, PE    Patient is a 80-year-old female presenting for evaluation of 1 hour of right-sided headache with questionable syncope/fall  Patient's triage vital signs are stable  On initial exam she is evidencing mild distress but is sitting comfortably in bed  Plan made for her CMP and CBC to screen for electrolyte derangement, organ dysfunction, anemia, and infection  ACS work-up initiated with EKG and troponin  Obtain EKG to rule out cardiac arrhythmia and acute ischemic changes  EKG with sinus rhythm  Obtain chest x-ray to rule out acute cardiopulmonary disease  Obtain CT head given this is a new/worse headache than her baseline  Giving IV fluids and migraine cocktail and will reassess  See ED course for further MDM and disposition discussion    Acute headache: acute illness or injury  Amount and/or Complexity of Data Reviewed  Labs: ordered  Decision-making details documented in ED Course  Radiology: ordered and independent interpretation performed  Decision-making details documented in ED Course  Risk  OTC drugs  Prescription drug management            Disposition  Final diagnoses:   Acute headache     Time reflects when diagnosis was documented in both MDM as applicable and the Disposition within this note     Time User Action Codes Description Comment    4/2/2023 10:15 PM Kris Organ Add [R51 9] Acute headache       ED Disposition     ED Disposition   Discharge    Condition   Stable    Date/Time   Sun Apr 2, 2023 10:14 PM    Asif 72 discharge to home/self care                 Follow-up Information     Follow up With Specialties Details Why Contact Info Additional Information    [facility] [address] [facility] Schedule an appointment as soon as possible for a visit in 1 week  [address] [place] [id number] [phone] YZ GARETHU'V [address] [facility] [address], [facility], [place], [address], [address] [place] [address] Emergency Department Emergency Medicine Go to  If symptoms worsen [address] [facility] [id number] [place] [address] Emergency Department, [address], [address], [address]          Discharge Medication List as of 4/2/2023 10:17 PM      CONTINUE these medications which have NOT CHANGED    Details   acetaminophen (TYLENOL) 325 mg tablet Take 2 tablets (650 mg total) by mouth every 6 (six) hours as needed for mild pain or headaches, Starting Sun 1/26/2020, No Print      albuterol (2 5 mg/3 mL) 0 083 % nebulizer solution Take 3 mL (2 5 mg total) by nebulization every 6 (six) hours as needed for wheezing or shortness of breath, Starting Mon 2/6/2023, Normal      albuterol (Ventolin HFA) 90 mcg/act inhaler Inhale 2 puffs every 6 (six) hours as needed for wheezing or shortness of breath (cough), Starting Fri 2/24/2023, Normal      amLODIPine (NORVASC) 5 mg tablet TAKE 1 TABLET BY MOUTH TWICE A DAY, Normal      Blood Pressure KIT by Does not apply route daily, Starting Wed 6/24/2020, Normal      calcitriol (ROCALTROL) 0 25 mcg capsule Take 1 capsule (0 25 mcg total) by mouth daily, Starting Mon 1/16/2023, Normal      Diclofenac Sodium (VOLTAREN) 1 % Apply 2 g topically 4 (four) times a day, Starting Tue 1/10/2023, Normal      ergocalciferol (VITAMIN D2) 50,000 units Take 1 capsule (50,000 Units total) by mouth once a week, Starting Wed 1/4/2023, Normal      etonogestrel (NEXPLANON) subdermal implant 68 mg by Subdermal route once, Historical Med      ferrous sulfate 325 (65 Fe) mg tablet Take 1 tablet (325 mg total) by mouth daily with breakfast, Starting Mon 11/28/2022, Normal      fluticasone (FLONASE) 50 mcg/act nasal spray 1 spray into each nostril daily, Starting Sat 3/11/2023, Normal      fluticasone-salmeterol (Advair HFA) 115-21 MCG/ACT inhaler Inhale 2 puffs 2 (two) times a day Rinse mouth after use , Starting Fri 2/24/2023, Normal      labetalol (NORMODYNE) 100 mg tablet TAKE 1 TABLET BY MOUTH TWICE A DAY, Normal      sodium bicarbonate 650 mg tablet Take 1 tablet (650 mg total) by mouth 2 (two) times a day, Starting Mon 10/31/2022, Normal             No discharge procedures on file      PDMP Review       Value Time User    PDMP Reviewed  Yes 4/21/2021 10:24 PM Uche Guerrero MD          ED Provider  Electronically Signed by           SUZAN Elias  04/03/23 0007

## 2023-04-03 NOTE — DISCHARGE INSTRUCTIONS
Continue to take 650 mg of Tylenol every 4 hours as needed for headache  Increase your fluids to maintain your hydration  Schedule appoint with your primary care provider in the next week for reevaluation  Return to the ER if you develop fever, severe headache, vision changes, vomiting, chest pain, difficulty breathing, weakness, neck stiffness, or lethargy

## 2023-04-07 ENCOUNTER — OFFICE VISIT (OUTPATIENT)
Dept: URGENT CARE | Facility: CLINIC | Age: 28
End: 2023-04-07

## 2023-04-07 VITALS
HEART RATE: 74 BPM | WEIGHT: 250 LBS | SYSTOLIC BLOOD PRESSURE: 174 MMHG | RESPIRATION RATE: 18 BRPM | DIASTOLIC BLOOD PRESSURE: 86 MMHG | OXYGEN SATURATION: 100 % | HEIGHT: 64 IN | BODY MASS INDEX: 42.68 KG/M2 | TEMPERATURE: 98.7 F

## 2023-04-07 DIAGNOSIS — R51.9 RIGHT SIDED FACIAL PAIN: Primary | ICD-10-CM

## 2023-04-07 DIAGNOSIS — M54.2 CERVICAL PAIN: ICD-10-CM
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
## 2023-04-07 NOTE — PATIENT INSTRUCTIONS
--Please proceed directly to ER for further evaluation and treatment    --Wear cervical collar until cleared by treating doctor

## 2023-04-08 DIAGNOSIS — J01.90 ACUTE NON-RECURRENT SINUSITIS, UNSPECIFIED LOCATION: ICD-10-CM

## 2023-04-14 PROBLEM — Z00.00 PREVENTATIVE HEALTH CARE: Status: ACTIVE | Noted: 2022-09-20

## 2023-04-21 ENCOUNTER — HOSPITAL ENCOUNTER (OUTPATIENT)
Dept: PULMONOLOGY | Facility: HOSPITAL | Age: 28
Discharge: HOME | End: 2023-04-21

## 2023-04-21 DIAGNOSIS — J45.20 MILD INTERMITTENT ASTHMA WITHOUT COMPLICATION: ICD-10-CM

## 2023-04-21 RX ORDER — ALBUTEROL SULFATE 2.5 MG/3ML
2.5 SOLUTION RESPIRATORY (INHALATION) ONCE
Status: COMPLETED | OUTPATIENT
Start: 2023-04-21 | End: 2023-04-21

## 2023-04-21 RX ADMIN — ALBUTEROL SULFATE 2.5 MG: 2.5 SOLUTION RESPIRATORY (INHALATION) at 15:37

## 2023-04-24 ENCOUNTER — TELEPHONE (OUTPATIENT)
Dept: OBGYN CLINIC | Facility: CLINIC | Age: 28
End: 2023-04-24
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
## 2023-05-09 ENCOUNTER — OFFICE VISIT (OUTPATIENT)
Dept: OBGYN CLINIC | Facility: HOSPITAL | Age: 28
End: 2023-05-09

## 2023-05-09 ENCOUNTER — HOSPITAL ENCOUNTER (OUTPATIENT)
Dept: RADIOLOGY | Facility: HOSPITAL | Age: 28
Discharge: HOME | End: 2023-05-09
Attending: ORTHOPAEDIC SURGERY

## 2023-05-09 VITALS
WEIGHT: 240.3 LBS | HEIGHT: 64 IN | SYSTOLIC BLOOD PRESSURE: 138 MMHG | HEART RATE: 73 BPM | DIASTOLIC BLOOD PRESSURE: 82 MMHG | BODY MASS INDEX: 41.03 KG/M2

## 2023-05-09 DIAGNOSIS — R52 PAIN: ICD-10-CM

## 2023-05-09 DIAGNOSIS — M54.2 NECK PAIN: ICD-10-CM

## 2023-05-09 DIAGNOSIS — R52 PAIN: Primary | ICD-10-CM
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
## 2023-05-13 ENCOUNTER — OFFICE VISIT (OUTPATIENT)
Dept: URGENT CARE | Age: 28
End: 2023-05-13

## 2023-05-13 VITALS
SYSTOLIC BLOOD PRESSURE: 160 MMHG | RESPIRATION RATE: 12 BRPM | DIASTOLIC BLOOD PRESSURE: 83 MMHG | OXYGEN SATURATION: 100 % | HEART RATE: 73 BPM | TEMPERATURE: 97.9 F

## 2023-05-13 DIAGNOSIS — S61.208A AVULSION OF SKIN OF INDEX FINGER, INITIAL ENCOUNTER: Primary | ICD-10-CM

## 2023-05-13 NOTE — PROGRESS NOTES
[facility]        NAME: Dennis Beauchamp is a 24 y o  female  : 1995    MRN: [id number]  DATE: May 13, 2023  TIME: 7:48 PM    Assessment and Plan   Avulsion of skin of index finger, initial encounter [R65 188A]  1  Avulsion of skin of index finger, initial encounter              Patient Instructions     Please keep wound clean, dry and open to air  Apply bacitracin to affected area at bedtime  If you develop signs of infection including redness, increased drainage, fevers or chills, please seek care immediately  Follow up with PCP in 3-5 days  Proceed to  ER if symptoms worsen  Chief Complaint     Chief Complaint   Patient presents with   • Hand Pain     Injury to left index finger on ; possibly infected, yellow drainage; injured with needle nose pliers         History of Present Illness       Patient presenting for evaluation of left finger injury  Patient states that approximately 1 week ago she was attempting to fix a drain when she accidentally clipped her left index finger with a pair of needle-nose pliers  She said over the past 2 days she has been having scant amount of yellow drainage from the area  She states that she has been applying Neosporin to the area daily  She denies any fevers, chills or pain at this time  Patient is up-to-date on her tetanus vaccine  Hand Pain   Pertinent negatives include no chest pain  Review of Systems   Review of Systems   Constitutional: Negative for chills and fever  Respiratory: Negative for shortness of breath  Cardiovascular: Negative for chest pain  Skin: Positive for wound  All other systems reviewed and are negative          Current Medications       Current Outpatient Medications:   •  acetaminophen (TYLENOL) 325 mg tablet, Take 2 tablets (650 mg total) by mouth every 6 (six) hours as needed for mild pain or headaches, Disp: 30 tablet, Rfl: 0  •  albuterol (2 5 mg/3 mL) 0 083 % nebulizer solution, Take 3 mL (2 5 mg total) by nebulization every 6 (six) hours as needed for wheezing or shortness of breath, Disp: 30 mL, Rfl: 0  •  albuterol (Ventolin HFA) 90 mcg/act inhaler, Inhale 2 puffs every 6 (six) hours as needed for wheezing or shortness of breath (cough), Disp: 18 g, Rfl: 0  •  amLODIPine (NORVASC) 5 mg tablet, TAKE 1 TABLET BY MOUTH TWICE A DAY, Disp: 60 tablet, Rfl: 3  •  Blood Pressure KIT, by Does not apply route daily (Patient not taking: Reported on 1/11/2023), Disp: 1 each, Rfl: 0  •  calcitriol (ROCALTROL) 0 25 mcg capsule, Take 1 capsule (0 25 mcg total) by mouth daily, Disp: 90 capsule, Rfl: 0  •  Diclofenac Sodium (VOLTAREN) 1 %, Apply 2 g topically 4 (four) times a day (Patient taking differently: Apply 2 g topically 4 (four) times a day as needed), Disp: 150 g, Rfl: 2  •  ergocalciferol (VITAMIN D2) 50,000 units, Take 1 capsule (50,000 Units total) by mouth once a week, Disp: 12 capsule, Rfl: 1  •  etonogestrel (NEXPLANON) subdermal implant, 68 mg by Subdermal route once, Disp: , Rfl:   •  ferrous sulfate 325 (65 Fe) mg tablet, Take 1 tablet (325 mg total) by mouth daily with breakfast, Disp: 30 tablet, Rfl: 0  •  fluticasone (FLONASE) 50 mcg/act nasal spray, 1 spray into each nostril daily (Patient taking differently: 1 spray into each nostril daily as needed), Disp: 11 1 mL, Rfl: 0  •  fluticasone-salmeterol (Advair HFA) 115-21 MCG/ACT inhaler, Inhale 2 puffs 2 (two) times a day Rinse mouth after use , Disp: 12 g, Rfl: 2  •  labetalol (NORMODYNE) 100 mg tablet, TAKE 1 TABLET BY MOUTH TWICE A DAY, Disp: 60 tablet, Rfl: 3  •  sodium bicarbonate 650 mg tablet, Take 1 tablet (650 mg total) by mouth 2 (two) times a day, Disp: 180 tablet, Rfl: 3    Current Allergies     Allergies as of 05/13/2023 - Reviewed 05/13/2023   Allergen Reaction Noted   • Seasonal ic [cholestatin] Itching 04/14/2023   • Wheat bran - food allergy Itching 12/19/2019            The following portions of the patient's history were reviewed and updated as appropriate: allergies, current medications, past family history, past medical history, past social history, past surgical history and problem list      Past Medical History:   Diagnosis Date   • Asthma    • Chronic kidney disease    • Eczema    • Headache    • Hypertension    • Wears glasses        Past Surgical History:   Procedure Laterality Date   • CHOLECYSTECTOMY     • CT NEEDLE BIOPSY KIDNEY  1/29/2020   • KELOID EXCISION Left 3/30/2021    Procedure: POSTERIOR EAR EXCISION KELOID, FLAP RECONSTRUCTION;  Surgeon: Werner Salmeron MD;  Location: [facility];  Service: Plastics       Family History   Problem Relation Age of Onset   • Asthma Mother    • No Known Problems Father          Medications have been verified  Objective   /83 (BP Location: Right arm, Patient Position: Sitting, Cuff Size: Large)   Pulse 73   Temp 97 9 °F (36 6 °C) (Temporal)   Resp 12   SpO2 100%        Physical Exam     Physical Exam  Vitals and nursing note reviewed  Constitutional:       General: She is not in acute distress  Appearance: Normal appearance  She is not ill-appearing, toxic-appearing or diaphoretic  HENT:      Head: Normocephalic and atraumatic  Eyes:      General:         Right eye: No discharge  Cardiovascular:      Rate and Rhythm: Normal rate  Pulses: Normal pulses  Pulmonary:      Effort: Pulmonary effort is normal    Musculoskeletal:         General: No swelling or tenderness  Normal range of motion  Skin:     General: Skin is warm and dry  Findings: No bruising or erythema  Comments: Professional skin avulsion with flap to lateral aspect of patient's left index finger, no active bleeding or drainage, mild maceration is appreciated, no erythema or signs of infection noted  Neurological:      Mental Status: She is alert     Psychiatric:         Mood and Affect: Mood normal          Behavior: Behavior normal  normal...

## 2023-05-13 NOTE — PATIENT INSTRUCTIONS
Please keep wound clean, dry and open to air  Apply bacitracin to affected area at bedtime  If you develop signs of infection including redness, increased drainage, fevers or chills, please seek care immediately

## 2023-05-16 DIAGNOSIS — D50.9 IRON DEFICIENCY ANEMIA, UNSPECIFIED IRON DEFICIENCY ANEMIA TYPE: ICD-10-CM

## 2023-05-17 ENCOUNTER — EVALUATION (OUTPATIENT)
Dept: PHYSICAL THERAPY | Facility: CLINIC | Age: 28
End: 2023-05-17

## 2023-05-17 DIAGNOSIS — G89.29 CHRONIC PAIN OF BOTH SHOULDERS: Primary | ICD-10-CM

## 2023-05-17 DIAGNOSIS — M54.2 NECK PAIN: ICD-10-CM

## 2023-05-17 DIAGNOSIS — M25.512 CHRONIC PAIN OF BOTH SHOULDERS: Primary | ICD-10-CM

## 2023-05-17 DIAGNOSIS — M25.511 CHRONIC PAIN OF BOTH SHOULDERS: Primary | ICD-10-CM

## 2023-05-17 RX ORDER — FERROUS SULFATE 325(65) MG
1 TABLET ORAL
Qty: 30 TABLET | Refills: 4 | Status: SHIPPED | OUTPATIENT
Start: 2023-05-17
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
## 2023-05-22 ENCOUNTER — OFFICE VISIT (OUTPATIENT)
Dept: PHYSICAL THERAPY | Facility: CLINIC | Age: 28
End: 2023-05-22

## 2023-05-22 DIAGNOSIS — G89.29 CHRONIC PAIN OF BOTH SHOULDERS: ICD-10-CM

## 2023-05-22 DIAGNOSIS — M25.511 CHRONIC PAIN OF BOTH SHOULDERS: ICD-10-CM

## 2023-05-22 DIAGNOSIS — M25.512 CHRONIC PAIN OF BOTH SHOULDERS: ICD-10-CM

## 2023-05-22 DIAGNOSIS — M54.2 NECK PAIN: Primary | ICD-10-CM

## 2023-05-22 NOTE — PROGRESS NOTES
Daily Note     Today's date: 2023  Patient name: Yang Jo  : 1995  MRN: [id number]  Referring provider: Shae Escalante MD  Dx:   Encounter Diagnosis     ICD-10-CM    1  Neck pain  M54 2       2  Chronic pain of both shoulders  M25 511     G89 29     M25 512           Start Time:   Stop Time:   Total time in clinic (min): 40 minutes    Subjective: Pt reports that she has some in her neck today that goes into her shoulder some  Objective: See treatment diary below      Assessment: Tolerated treatment well  Treatment today began with warm up on UBE with good tolerance  She reported some discomfort into her shoulder and neck today reporting it as tightness  Worked into musculature of cervical and shoulder  Continued to stretches and exercises as per flowsheet  Pt reported decreased stiffness after chin tucks today, reporting that it felt like it decreased pain  Strengthening into ER was reported to be painful with RTB resistance, but reported decreased discomfort with YTB  Pt reported pain decreased from 5/10 to a 3/10 by end of treatment session today  Patient would benefit from continued PT      Plan: Continue per plan of care        Diagnosis: cervical and shoulder pain   Precautions:    Primary Goals: cervical and shoulder ROM, cervical mobility, shoulder strength   *asterisks by exercise = given for HEP   Manuals - IE       Cervical downglides        STW/DTW/kasandraton cervical musculature  AK supine      PROM cervical        Sub occipital release  supine      Cervical distraction                                There Ex        AROM        UT stretch  2x30 sec B      LS stretch  2x30 sec B      SCM stretch  2x30 sec B                                              Neuro Re-Ed        Scap squeeze  2x10 supine      Chin tucks  2x10 supine      No Monies  2x10 RTB decreased to YTB Re-evaluation              Ther Act                                         Modalities

## 2023-05-26 ENCOUNTER — OFFICE VISIT (OUTPATIENT)
Dept: PHYSICAL THERAPY | Facility: CLINIC | Age: 28
End: 2023-05-26

## 2023-05-26 DIAGNOSIS — M25.511 CHRONIC PAIN OF BOTH SHOULDERS: ICD-10-CM

## 2023-05-26 DIAGNOSIS — M25.512 CHRONIC PAIN OF BOTH SHOULDERS: ICD-10-CM

## 2023-05-26 DIAGNOSIS — G89.29 CHRONIC PAIN OF BOTH SHOULDERS: ICD-10-CM

## 2023-05-26 DIAGNOSIS — M54.2 NECK PAIN: Primary | ICD-10-CM

## 2023-05-26 NOTE — PROGRESS NOTES
"Daily Note     Today's date: 2023  Patient name: Julien Su  : 1995  MRN: [id number]  Referring provider: Tiara Paige MD  Dx:   Encounter Diagnosis     ICD-10-CM    1  Neck pain  M54 2       2  Chronic pain of both shoulders  M25 511     G89 29     M25 512           Start Time: 1533  Stop Time: 1600  Total time in clinic (min): 27 minutes    Subjective: Patient arrives to session 15 minutes late  Reports that she is feeling good today and felt find following last session  Offers no new updates  Objective: See treatment diary below      Assessment: Tolerated treatment well  Manual treatment was held this session due to patient not reporting much neck pain  Focused on stretching and light strengthening/postural correction exercises  Noted some mild shoulder tightness with no-money exercises  Added in doorway pec stretch with good tolerance  Session shortened today due to patient arriving late and clinic closing  Encouraged her to continue with HEP until next session  Will progress as able  Plan: Continue per plan of care        Diagnosis: cervical and shoulder pain   Precautions:    Primary Goals: cervical and shoulder ROM, cervical mobility, shoulder strength   *asterisks by exercise = given for HEP   Manuals - IE      Cervical downglides        STW/DTW/graston cervical musculature  AK supine      PROM cervical        Sub occipital release  supine      Cervical distraction                                There Ex        AROM        UT stretch  2x30 sec B 2x30 sec B     LS stretch  2x30 sec B 2x30 sec B     SCM stretch  2x30 sec B      Doorway pec stretch   10\" 10x                             UBE   2'/2' RPE 3/10     Neuro Re-Ed        Scap squeeze  2x10 supine 2x10 seated      Chin tucks  2x10 supine 2x10 seated      No Monies  2x10 RTB decreased to YTB 2x10 YTB                                                                              Re-evaluation              " Ther Act                                         Modalities

## 2023-06-01 ENCOUNTER — OFFICE VISIT (OUTPATIENT)
Dept: PHYSICAL THERAPY | Facility: CLINIC | Age: 28
End: 2023-06-01

## 2023-06-01 DIAGNOSIS — M25.511 CHRONIC PAIN OF BOTH SHOULDERS: ICD-10-CM

## 2023-06-01 DIAGNOSIS — M25.512 CHRONIC PAIN OF BOTH SHOULDERS: ICD-10-CM

## 2023-06-01 DIAGNOSIS — M54.2 NECK PAIN: Primary | ICD-10-CM

## 2023-06-01 DIAGNOSIS — G89.29 CHRONIC PAIN OF BOTH SHOULDERS: ICD-10-CM

## 2023-06-01 NOTE — PROGRESS NOTES
"Daily Note     Today's date: 2023  Patient name: Maribel Richards  : 1995  MRN: [id number]  Referring provider: Lita Handley MD  Dx:   Encounter Diagnosis     ICD-10-CM    1  Neck pain  M54 2       2  Chronic pain of both shoulders  M25 511     G89 29     M25 512           Start Time:   Stop Time: 1900  Total time in clinic (min): 45 minutes    Subjective: Pt reports that she is feeling pretty good coming in today  She reports she felt some tightness after last treatment session  Objective: See treatment diary below      Assessment: Tolerated treatment well  Treatment today began with warm up on UBE with good tolerance  Continued onto stretches and postural strengthening as per flowsheet with good tolerance  Added rows and SAPD today with good tolerance to new addition of exercise  Patient would benefit from continued PT      Plan: Continue per plan of care        Diagnosis: cervical and shoulder pain   Precautions:    Primary Goals: cervical and shoulder ROM, cervical mobility, shoulder strength   *asterisks by exercise = given for HEP   Manuals - IE     Cervical downglides        STW/DTW/graston cervical musculature  AK supine      PROM cervical        Sub occipital release  supine      Cervical distraction                                There Ex        AROM        UT stretch  2x30 sec B 2x30 sec B 2x30 sec B    LS stretch  2x30 sec B 2x30 sec B 2x30 sec B    SCM stretch  2x30 sec B      Doorway pec stretch   10\" 10x                             UBE   2'/2' RPE 3/10 2'/2' RPE 3/10    Neuro Re-Ed        Scap squeeze  2x10 supine 2x10 seated  2x10x5\" seated     Chin tucks  2x10 supine 2x10 seated  2x10x3\" seated     No Monies  2x10 RTB decreased to YTB 2x10 YTB     Rows    2x10 RTB    SAPD    2x10 RTB                                                             Re-evaluation              Ther Act                                         Modalities                   "

## 2023-06-06 ENCOUNTER — OFFICE VISIT (OUTPATIENT)
Dept: PHYSICAL THERAPY | Facility: CLINIC | Age: 28
End: 2023-06-06
Payer: MEDICARE

## 2023-06-06 DIAGNOSIS — M25.511 CHRONIC PAIN OF BOTH SHOULDERS: ICD-10-CM

## 2023-06-06 DIAGNOSIS — M54.2 NECK PAIN: Primary | ICD-10-CM

## 2023-06-06 DIAGNOSIS — M25.512 CHRONIC PAIN OF BOTH SHOULDERS: ICD-10-CM

## 2023-06-06 DIAGNOSIS — G89.29 CHRONIC PAIN OF BOTH SHOULDERS: ICD-10-CM

## 2023-06-06 PROCEDURE — 97110 THERAPEUTIC EXERCISES: CPT

## 2023-06-06 PROCEDURE — 97112 NEUROMUSCULAR REEDUCATION: CPT

## 2023-06-06 NOTE — PROGRESS NOTES
"Daily Note     Today's date: 2023  Patient name: Kaylie Stroud  : 1995  MRN: [id number]  Referring provider: Leanna Celestin MD  Dx:   Encounter Diagnosis     ICD-10-CM    1  Neck pain  M54 2       2  Chronic pain of both shoulders  M25 511     G89 29     M25 512           Start Time: 1545  Stop Time: 1630  Total time in clinic (min): 45 minutes    Subjective: Pt reports that she is coming in today with some tightness in her neck, but not a lot of pain  Objective: See treatment diary below      Assessment: Tolerated treatment well  Treatment today began with warm up on UBE to increase blood flow and decrease tightness noted  Proceeded to stretching of cervical musculature with good tolerance  Followed with strengthening activities  Pt tolerated increase in reps well today with no report of pain  Added exercises to pt's HEP now that pain has decreased  She reports she has been working on her stretches and was ready for more strengthening at home as well  Pt reported some fatigue in postural musculature by end of treatment session  Patient would benefit from continued PT      Plan: Continue per plan of care        Diagnosis: cervical and shoulder pain   Precautions:    Primary Goals: cervical and shoulder ROM, cervical mobility, shoulder strength   *asterisks by exercise = given for HEP   Manuals     Cervical downglides        STW/DTW/graston cervical musculature  AK supine      PROM cervical        Sub occipital release  supine      Cervical distraction                                There Ex        AROM        UT stretch  2x30 sec B 2x30 sec B 2x30 sec B 2x30 sec B   LS stretch  2x30 sec B 2x30 sec B 2x30 sec B 2x30 sec B   SCM stretch  2x30 sec B      Doorway pec stretch   10\" 10x                             UBE   2'/2' RPE 3/10 2'/2' RPE 3/10 2'/2' RPE 3/10   Neuro Re-Ed        Scap squeeze  2x10 supine 2x10 seated  2x10x5\" seated  2x15x5\" seated    Chin tucks  " "2x10 supine 2x10 seated  2x10x3\" seated  2x10x3\" seated    No Monies  2x10 RTB decreased to YTB 2x10 YTB  2x15 YTB   Rows    2x10 RTB 2x10 RTB   SAPD    2x10 RTB 2x10 RTB                                                            Re-evaluation             Ther Act                                      Modalities                                                                                           "

## 2023-06-08 DIAGNOSIS — N25.81 SECONDARY HYPERPARATHYROIDISM OF RENAL ORIGIN (HCC): ICD-10-CM

## 2023-06-09 RX ORDER — CALCITRIOL 0.25 UG/1
0.25 CAPSULE, LIQUID FILLED ORAL DAILY
Qty: 90 CAPSULE | Refills: 3 | Status: SHIPPED | OUTPATIENT
Start: 2023-06-09

## 2023-06-12 DIAGNOSIS — N18.30 BENIGN HYPERTENSION WITH CKD (CHRONIC KIDNEY DISEASE) STAGE III (HCC): ICD-10-CM

## 2023-06-12 DIAGNOSIS — I12.9 BENIGN HYPERTENSION WITH CKD (CHRONIC KIDNEY DISEASE) STAGE III (HCC): ICD-10-CM

## 2023-06-13 RX ORDER — LABETALOL 100 MG/1
TABLET, FILM COATED ORAL
Qty: 60 TABLET | Refills: 3 | Status: SHIPPED | OUTPATIENT
Start: 2023-06-13

## 2023-06-14 ENCOUNTER — APPOINTMENT (OUTPATIENT)
Dept: PHYSICAL THERAPY | Facility: CLINIC | Age: 28
End: 2023-06-14
Payer: MEDICARE

## 2023-06-15 ENCOUNTER — TELEPHONE (OUTPATIENT)
Dept: NEPHROLOGY | Facility: CLINIC | Age: 28
End: 2023-06-15

## 2023-06-21 NOTE — TELEPHONE ENCOUNTER
Called pt to schedule dec pre-transplant follow up - Patient states she has to check with her mom's schedule and will give us a call back

## 2023-06-26 ENCOUNTER — TELEPHONE (OUTPATIENT)
Dept: FAMILY MEDICINE CLINIC | Facility: CLINIC | Age: 28
End: 2023-06-26

## 2023-06-26 ENCOUNTER — APPOINTMENT (OUTPATIENT)
Dept: LAB | Facility: HOSPITAL | Age: 28
End: 2023-06-26
Attending: INTERNAL MEDICINE
Payer: MEDICARE

## 2023-06-26 DIAGNOSIS — E66.01 MORBID OBESITY WITH BMI OF 40.0-44.9, ADULT (HCC): ICD-10-CM

## 2023-06-26 DIAGNOSIS — I10 PRIMARY HYPERTENSION: ICD-10-CM

## 2023-06-26 DIAGNOSIS — N18.9 CHRONIC KIDNEY DISEASE-MINERAL AND BONE DISORDER: ICD-10-CM

## 2023-06-26 DIAGNOSIS — I12.0 BENIGN HYPERTENSION WITH CKD (CHRONIC KIDNEY DISEASE) STAGE V (HCC): ICD-10-CM

## 2023-06-26 DIAGNOSIS — N18.5 BENIGN HYPERTENSION WITH CKD (CHRONIC KIDNEY DISEASE) STAGE V (HCC): ICD-10-CM

## 2023-06-26 DIAGNOSIS — N18.5 CHRONIC KIDNEY DISEASE (CKD), ACTIVE MEDICAL MANAGEMENT WITHOUT DIALYSIS, STAGE 5 (HCC): ICD-10-CM

## 2023-06-26 DIAGNOSIS — M89.9 CHRONIC KIDNEY DISEASE-MINERAL AND BONE DISORDER: ICD-10-CM

## 2023-06-26 DIAGNOSIS — N25.81 SECONDARY HYPERPARATHYROIDISM OF RENAL ORIGIN (HCC): ICD-10-CM

## 2023-06-26 DIAGNOSIS — E83.9 CHRONIC KIDNEY DISEASE-MINERAL AND BONE DISORDER: ICD-10-CM

## 2023-06-26 LAB
ALBUMIN SERPL BCP-MCNC: 4.5 G/DL (ref 3.5–5)
ALP SERPL-CCNC: 58 U/L (ref 34–104)
ALT SERPL W P-5'-P-CCNC: 5 U/L (ref 7–52)
ANION GAP SERPL CALCULATED.3IONS-SCNC: 15 MMOL/L
AST SERPL W P-5'-P-CCNC: 8 U/L (ref 13–39)
BILIRUB SERPL-MCNC: 0.35 MG/DL (ref 0.2–1)
BUN SERPL-MCNC: 74 MG/DL (ref 5–25)
CALCIUM SERPL-MCNC: 8 MG/DL (ref 8.4–10.2)
CHLORIDE SERPL-SCNC: 107 MMOL/L (ref 96–108)
CO2 SERPL-SCNC: 16 MMOL/L (ref 21–32)
CREAT SERPL-MCNC: 12.75 MG/DL (ref 0.6–1.3)
ERYTHROCYTE [DISTWIDTH] IN BLOOD BY AUTOMATED COUNT: 15.5 % (ref 11.6–15.1)
FERRITIN SERPL-MCNC: 177 NG/ML (ref 11–307)
GFR SERPL CREATININE-BSD FRML MDRD: 3 ML/MIN/1.73SQ M
GLUCOSE P FAST SERPL-MCNC: 96 MG/DL (ref 65–99)
HCT VFR BLD AUTO: 25.2 % (ref 34.8–46.1)
HGB BLD-MCNC: 7.8 G/DL (ref 11.5–15.4)
IRON SATN MFR SERPL: 32 % (ref 15–50)
IRON SERPL-MCNC: 62 UG/DL (ref 50–170)
MCH RBC QN AUTO: 21.1 PG (ref 26.8–34.3)
MCHC RBC AUTO-ENTMCNC: 31 G/DL (ref 31.4–37.4)
MCV RBC AUTO: 68 FL (ref 82–98)
PHOSPHATE SERPL-MCNC: 7 MG/DL (ref 2.7–4.5)
PLATELET # BLD AUTO: 274 THOUSANDS/UL (ref 149–390)
PMV BLD AUTO: 11.3 FL (ref 8.9–12.7)
POTASSIUM SERPL-SCNC: 3.7 MMOL/L (ref 3.5–5.3)
PROT SERPL-MCNC: 7.5 G/DL (ref 6.4–8.4)
PTH-INTACT SERPL-MCNC: 671.9 PG/ML (ref 12–88)
RBC # BLD AUTO: 3.69 MILLION/UL (ref 3.81–5.12)
SODIUM SERPL-SCNC: 138 MMOL/L (ref 135–147)
TIBC SERPL-MCNC: 195 UG/DL (ref 250–450)
WBC # BLD AUTO: 7.33 THOUSAND/UL (ref 4.31–10.16)

## 2023-06-26 PROCEDURE — 82728 ASSAY OF FERRITIN: CPT

## 2023-06-26 PROCEDURE — 84100 ASSAY OF PHOSPHORUS: CPT

## 2023-06-26 PROCEDURE — 83970 ASSAY OF PARATHORMONE: CPT

## 2023-06-26 PROCEDURE — 83550 IRON BINDING TEST: CPT

## 2023-06-26 PROCEDURE — 83540 ASSAY OF IRON: CPT

## 2023-06-26 PROCEDURE — 85027 COMPLETE CBC AUTOMATED: CPT

## 2023-06-26 PROCEDURE — 80053 COMPREHEN METABOLIC PANEL: CPT

## 2023-06-26 PROCEDURE — 82610 CYSTATIN C: CPT

## 2023-06-26 PROCEDURE — 36415 COLL VENOUS BLD VENIPUNCTURE: CPT

## 2023-06-26 NOTE — TELEPHONE ENCOUNTER
Called patient regarding review of PFT results with:    Vida Campo shows a moderately severe obstructive ventilatory defect  Significant response after administration of bronchodilator according to the ats standards   Normal lung volumes  Mildly reduced diffusion capacity  Increased airway resistance    Discussed the continued need to pursue good management of her asthma  Since switching from Flovent to Advair she has significantly reduced exacerbations and need for albuterol is down to 2 times per month  Encouraged indoor air filter to help with poor air quality with neighbors smoking  Patient is encouraged to follow up in office in 6 weeks

## 2023-06-27 ENCOUNTER — TELEPHONE (OUTPATIENT)
Dept: OTHER | Facility: HOSPITAL | Age: 28
End: 2023-06-27

## 2023-06-27 NOTE — TELEPHONE ENCOUNTER
Nephrology    Call the patient in regards to the blood work that was done yesterday  More recently she has advanced chronic kidney disease stage V, and her creatinine has been fluctuating in the 7-8 range    Her creatinine increased to 12 7 mg/dL    Serum potassium is normal     BUN is in the 70s    Bicarbonate level is 16, I asked her to increase her sodium bicarbonate to 1300 mg twice a day  I told her if she is having any symptoms of renal failure she should go to the nearest emergency room immediately  I asked her to call me back so that we can discuss further

## 2023-06-28 LAB
CYSTATIN C SERPL-MCNC: 4.84 MG/L (ref 0.6–1)
GFR/BSA.PRED SERPLBLD CYS-BASED-ARV: 10 ML/MIN/1.73

## 2023-07-03 ENCOUNTER — EVALUATION (OUTPATIENT)
Dept: PHYSICAL THERAPY | Facility: CLINIC | Age: 28
End: 2023-07-03
Payer: MEDICARE

## 2023-07-03 DIAGNOSIS — M25.511 CHRONIC PAIN OF BOTH SHOULDERS: ICD-10-CM

## 2023-07-03 DIAGNOSIS — M54.2 NECK PAIN: Primary | ICD-10-CM

## 2023-07-03 DIAGNOSIS — G89.29 CHRONIC PAIN OF BOTH SHOULDERS: ICD-10-CM

## 2023-07-03 DIAGNOSIS — S59.902A ELBOW INJURY, LEFT, INITIAL ENCOUNTER: ICD-10-CM

## 2023-07-03 DIAGNOSIS — M25.512 CHRONIC PAIN OF BOTH SHOULDERS: ICD-10-CM

## 2023-07-03 PROCEDURE — 97530 THERAPEUTIC ACTIVITIES: CPT

## 2023-07-03 PROCEDURE — 97112 NEUROMUSCULAR REEDUCATION: CPT
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
## 2023-07-04 ENCOUNTER — APPOINTMENT (EMERGENCY)
Dept: RADIOLOGY | Facility: HOSPITAL | Age: 28
End: 2023-07-04
Payer: MEDICARE

## 2023-07-04 ENCOUNTER — HOSPITAL ENCOUNTER (EMERGENCY)
Facility: HOSPITAL | Age: 28
Discharge: HOME | End: 2023-07-04
Attending: EMERGENCY MEDICINE | Admitting: EMERGENCY MEDICINE
Payer: MEDICARE

## 2023-07-04 VITALS
DIASTOLIC BLOOD PRESSURE: 63 MMHG | HEART RATE: 94 BPM | OXYGEN SATURATION: 100 % | RESPIRATION RATE: 18 BRPM | TEMPERATURE: 98.3 F | SYSTOLIC BLOOD PRESSURE: 138 MMHG

## 2023-07-04 DIAGNOSIS — S50.11XA CONTUSION OF RIGHT FOREARM, INITIAL ENCOUNTER: ICD-10-CM
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
## 2023-07-04 PROCEDURE — 73060 X-RAY EXAM OF HUMERUS: CPT

## 2023-07-04 PROCEDURE — 73090 X-RAY EXAM OF FOREARM: CPT

## 2023-07-04 RX ORDER — ACETAMINOPHEN 325 MG/1
650 TABLET ORAL ONCE
Status: COMPLETED | OUTPATIENT
Start: 2023-07-04 | End: 2023-07-04

## 2023-07-04 RX ADMIN — ACETAMINOPHEN 650 MG: 325 TABLET, FILM COATED ORAL at 23:18

## 2023-07-05 ENCOUNTER — VBI (OUTPATIENT)
Dept: ADMINISTRATIVE | Facility: OTHER | Age: 28
End: 2023-07-05

## 2023-07-05 NOTE — ED ATTENDING ATTESTATION
7/4/2023  I, Sarahi Bucio MD, saw and evaluated the patient. I have discussed the patient with the resident/non-physician practitioner and agree with the resident's/non-physician practitioner's findings, Plan of Care, and MDM as documented in the resident's/non-physician practitioner's note, except where noted. All available labs and Radiology studies were reviewed. I was present for key portions of any procedure(s) performed by the resident/non-physician practitioner and I was immediately available to provide assistance. At this point I agree with the current assessment done in the Emergency Department. I have conducted an independent evaluation of this patient a history and physical is as follows:    12year-old female presented for evaluation after she tripped on her stoop landing on her right forearm. Any head strike, any headache, dizziness, neck pain, back pain, chest or abdominal pain. No other injuries other than the ulnar aspect of the right forearm. Some mild soft tissue edema. No ecchymosis. Tender along the length of the ulna and proximal humerus. Will check x-ray to rule out fracture. She is able to range the elbow and wrist with some minor pain. Normal radial pulse. Sensation and strength intact. ED Course  ED Course as of 07/04/23 2323 Tue Jul 04, 2023 2323 No fractures on forearm or humerus x-rays. Stable for discharge home. Tylenol as needed for pain. Activity as tolerated.     Critical Care Time  Procedures

## 2023-07-05 NOTE — TELEPHONE ENCOUNTER
[address]    ED Visit Information     Ed visit date: 7/4/23  Diagnosis Description: Arm injury  In Network? Yes [address]  Discharge status: Home  Discharged with meds ? Yes  Number of ED visits to date: 3  ED Severity:4     Outreach Information    Outreach successful: Yes 1  Date letter mailed:no  Date Finalized:7/5/23    Care Coordination    Follow up appointment with pcp: no patient is feelng better  Transportation issues ?  No    [employer] type: 3 Day Outreach  Emergent necessity warranted by diagnosis: Yes  [facility] PCP: Yes  Transportation: Self Transport  Called PCP first?: No  Feels able to call PCP for urgent problems ?: Yes  Understands what emergencies can be handled by PCP ?: Yes  Ever any problems getting appointment with PCP for minor emergency/urgency problems?: No  Practice Contacted Patient ?: No  Pt had ED follow up with pcp/staff ?: No    Reason Patient went to ED instead of Urgent Care or PCP?: Perceived Severity of Illness  Urgent care Education?: No  Call Complete - spoke with patient and she is feeling better does not need anything from the office

## 2023-07-05 NOTE — ED PROVIDER NOTES
History  Chief Complaint   Patient presents with   • Arm Injury     Pt reports she tripped and fell on right arm, reports pain in entire arm. 34year old Female with PMH of CKD, HTN, and asthma presents to the ED following a fall on the stairs. She says that while sitting on the stairs, she stood up and turned while losing her balance which caused the fall. She landed on her bent forearm and elbow which is now hurting as a result. Patient came in with lidocaine patches for pain control with minimal relief. The pain was over the ulnar aspect of the forearm as well as the distal humerus. She states she did not hit her head or lose consciousness. Denies chest pain, SOB, abdominal pain, n/v/d, weakness, fatigue. Prior to Admission Medications   Prescriptions Last Dose Informant Patient Reported? Taking?    Blood Pressure KIT  Self No No   Sig: by Does not apply route daily   Patient not taking: Reported on 1/11/2023   Diclofenac Sodium (VOLTAREN) 1 %  Self No No   Sig: Apply 2 g topically 4 (four) times a day   Patient taking differently: Apply 2 g topically 4 (four) times a day as needed   acetaminophen (TYLENOL) 325 mg tablet  Self No No   Sig: Take 2 tablets (650 mg total) by mouth every 6 (six) hours as needed for mild pain or headaches   albuterol (2.5 mg/3 mL) 0.083 % nebulizer solution  Self No No   Sig: Take 3 mL (2.5 mg total) by nebulization every 6 (six) hours as needed for wheezing or shortness of breath   albuterol (Ventolin HFA) 90 mcg/act inhaler  Self No No   Sig: Inhale 2 puffs every 6 (six) hours as needed for wheezing or shortness of breath (cough)   amLODIPine (NORVASC) 5 mg tablet  Self No No   Sig: TAKE 1 TABLET BY MOUTH TWICE A DAY   calcitriol (ROCALTROL) 0.25 mcg capsule   No No   Sig: Take 1 capsule (0.25 mcg total) by mouth daily   ergocalciferol (VITAMIN D2) 50,000 units  Self No No   Sig: Take 1 capsule (50,000 Units total) by mouth once a week   etonogestrel (Christine Caban) subdermal implant  Self Yes No   Si mg by Subdermal route once   ferrous sulfate 325 (65 Fe) mg tablet   No No   Sig: Take 1 tablet (325 mg total) by mouth daily with breakfast   fluticasone (FLONASE) 50 mcg/act nasal spray  Self No No   Si spray into each nostril daily   Patient taking differently: 1 spray into each nostril daily as needed   fluticasone-salmeterol (Advair HFA) 115-21 MCG/ACT inhaler   No No   Sig: Inhale 2 puffs 2 (two) times a day Rinse mouth after use. labetalol (NORMODYNE) 100 mg tablet   No No   Sig: TAKE 1 TABLET BY MOUTH TWICE A DAY   sodium bicarbonate 650 mg tablet  Self No No   Sig: Take 1 tablet (650 mg total) by mouth 2 (two) times a day      Facility-Administered Medications: None       Past Medical History:   Diagnosis Date   • Asthma    • Chronic kidney disease    • Eczema    • Headache    • Hypertension    • Wears glasses        Past Surgical History:   Procedure Laterality Date   • CHOLECYSTECTOMY     • CT NEEDLE BIOPSY KIDNEY  2020   • KELOID EXCISION Left 3/30/2021    Procedure: POSTERIOR EAR EXCISION KELOID, FLAP RECONSTRUCTION;  Surgeon: Carmelita Muñoz MD;  Location: [facility];  Service: Plastics       Family History   Problem Relation Age of Onset   • Asthma Mother    • No Known Problems Father      I have reviewed and agree with the history as documented. E-Cigarette/Vaping   • E-Cigarette Use Never User      E-Cigarette/Vaping Substances   • Nicotine No    • THC No    • CBD No    • Flavoring No    • Other No    • Unknown No      Social History     Tobacco Use   • Smoking status: Never   • Smokeless tobacco: Never   Vaping Use   • Vaping Use: Never used   Substance Use Topics   • Alcohol use: Not Currently     Comment: occassionally on social events   • Drug use: No        Review of Systems   Constitutional: Negative for fatigue and fever. HENT: Negative. Respiratory: Negative. Negative for cough and shortness of breath.     Cardiovascular: Negative. Negative for chest pain. Gastrointestinal: Negative. Negative for abdominal pain, diarrhea, nausea and vomiting. Genitourinary: Negative. Negative for dysuria. Musculoskeletal:        Pain of the Right distal humerus and the ulnar aspect of the forearm. Neurological: Negative. Negative for dizziness and headaches. All other systems reviewed and are negative. Physical Exam  ED Triage Vitals   Temperature Pulse Respirations Blood Pressure SpO2   07/04/23 2240 07/04/23 2240 07/04/23 2240 07/04/23 2240 07/04/23 2240   98.3 °F (36.8 °C) 94 18 138/63 100 %      Temp Source Heart Rate Source Patient Position - Orthostatic VS BP Location FiO2 (%)   07/04/23 2240 07/04/23 2240 07/04/23 2240 07/04/23 2240 --   Oral Monitor Sitting Right arm       Pain Score       07/04/23 2318       5             Orthostatic Vital Signs  Vitals:    07/04/23 2240   BP: 138/63   Pulse: 94   Patient Position - Orthostatic VS: Sitting       Physical Exam  Constitutional:       General: She is not in acute distress. Appearance: She is well-developed. She is obese. She is not ill-appearing, toxic-appearing or diaphoretic. HENT:      Head: Normocephalic and atraumatic. Right Ear: External ear normal.      Left Ear: External ear normal.      Nose: Nose normal.      Mouth/Throat:      Mouth: Mucous membranes are moist.      Pharynx: Oropharynx is clear. Eyes:      Conjunctiva/sclera: Conjunctivae normal.   Cardiovascular:      Rate and Rhythm: Normal rate and regular rhythm. Pulses: Normal pulses. Heart sounds: Normal heart sounds. Pulmonary:      Effort: Pulmonary effort is normal.      Breath sounds: Normal breath sounds. Abdominal:      Palpations: Abdomen is soft. Tenderness: There is no abdominal tenderness. Musculoskeletal:         General: Tenderness present. Normal range of motion. Cervical back: Normal range of motion.       Comments: Tenderness of the Right ulnar aspect of the forearm. Skin:     General: Skin is warm and dry. Capillary Refill: Capillary refill takes less than 2 seconds. Neurological:      General: No focal deficit present. Mental Status: She is alert and oriented to person, place, and time. Mental status is at baseline. Psychiatric:         Mood and Affect: Mood normal.         Behavior: Behavior normal.         ED Medications  Medications   acetaminophen (TYLENOL) tablet 650 mg (650 mg Oral Given 7/4/23 4046)       Diagnostic Studies  Results Reviewed     None                 XR forearm 2 views RIGHT    (Results Pending)   XR humerus RIGHT    (Results Pending)         Procedures  Procedures      ED Course                             SBIRT 22yo+    Flowsheet Row Most Recent Value   Initial Alcohol Screen: US AUDIT-C     1. How often do you have a drink containing alcohol? 0 Filed at: 07/04/2023 2257   2. How many drinks containing alcohol do you have on a typical day you are drinking? 0 Filed at: 07/04/2023 2257   3a. Male UNDER 65: How often do you have five or more drinks on one occasion? 0 Filed at: 07/04/2023 2257   3b. FEMALE Any Age, or MALE 65+: How often do you have 4 or more drinks on one occassion? 0 Filed at: 07/04/2023 2257   Audit-C Score 0 Filed at: 07/04/2023 2257   CELY: How many times in the past year have you. .. Used an illegal drug or used a prescription medication for non-medical reasons? Never Filed at: 07/04/2023 2257                Medical Decision Making  34year old female with PMH of CKD, HTN, and asthma presented to the ED after a fall onto her right forearm and elbow. She stood up from a seated position when she lost her balance. Patient was examined at bedside with tenderness of the ulnar aspect of the Right forearm. Right forearm and humeral x rays were ordered. Patient was given 650 mg of Tylenol for pain control. The x ray images were reviewed and showed no deformities.  Patient was discharged with instructions to continue applying ice and taking pain medication as needed. Amount and/or Complexity of Data Reviewed  Radiology: ordered. Risk  OTC drugs. Disposition  Final diagnoses:   Fall, initial encounter   Contusion of right forearm, initial encounter     Time reflects when diagnosis was documented in both MDM as applicable and the Disposition within this note     Time User Action Codes Description Comment    7/4/2023 11:30 PM Mahnaz Cargo Add [H19. XXXA] Fall, initial encounter     7/4/2023 11:30 PM Mahnaz Cargo Add [S50.11XA] Contusion of right forearm, initial encounter       ED Disposition     ED Disposition   Discharge    Condition   Stable    Date/Time   Tue Jul 4, 2023 11:30 PM    Comment   [address] discharge to home/self care.                Follow-up Information     Follow up With Specialties Details Why Contact Info Additional [address] Family Medicine Call  As needed [address] [facility] [phone]  [address] [facility] [address]          Discharge Medication List as of 7/4/2023 11:33 PM      CONTINUE these medications which have NOT CHANGED    Details   acetaminophen (TYLENOL) 325 mg tablet Take 2 tablets (650 mg total) by mouth every 6 (six) hours as needed for mild pain or headaches, Starting Sun 1/26/2020, No Print      albuterol (2.5 mg/3 mL) 0.083 % nebulizer solution Take 3 mL (2.5 mg total) by nebulization every 6 (six) hours as needed for wheezing or shortness of breath, Starting Mon 2/6/2023, Normal      albuterol (Ventolin HFA) 90 mcg/act inhaler Inhale 2 puffs every 6 (six) hours as needed for wheezing or shortness of breath (cough), Starting Fri 2/24/2023, Normal      amLODIPine (NORVASC) 5 mg tablet TAKE 1 TABLET BY MOUTH TWICE A DAY, Normal      Blood Pressure KIT by Does not apply route daily, Starting Wed 6/24/2020, Normal      calcitriol (ROCALTROL) 0.25 mcg capsule Take 1 capsule (0.25 mcg total) by mouth daily, Starting Fri 6/9/2023, Normal      Diclofenac Sodium (VOLTAREN) 1 % Apply 2 g topically 4 (four) times a day, Starting Tue 1/10/2023, Normal      ergocalciferol (VITAMIN D2) 50,000 units Take 1 capsule (50,000 Units total) by mouth once a week, Starting Wed 1/4/2023, Normal      etonogestrel (NEXPLANON) subdermal implant 68 mg by Subdermal route once, Historical Med      ferrous sulfate 325 (65 Fe) mg tablet Take 1 tablet (325 mg total) by mouth daily with breakfast, Starting Wed 5/17/2023, Normal      fluticasone (FLONASE) 50 mcg/act nasal spray 1 spray into each nostril daily, Starting Sat 3/11/2023, Normal      fluticasone-salmeterol (Advair HFA) 115-21 MCG/ACT inhaler Inhale 2 puffs 2 (two) times a day Rinse mouth after use., Starting Fri 4/14/2023, Normal      labetalol (NORMODYNE) 100 mg tablet TAKE 1 TABLET BY MOUTH TWICE A DAY, Normal      sodium bicarbonate 650 mg tablet Take 1 tablet (650 mg total) by mouth 2 (two) times a day, Starting Mon 10/31/2022, Normal           No discharge procedures on file. PDMP Review       Value Time User    PDMP Reviewed  Yes 4/8/2023  1:24 AM Kevin Scott MD           ED Provider  Attending physically available and evaluated Ev Gil. I managed the patient along with the ED Attending.     Electronically Signed by         Imelda Gallegos MD  07/04/23 0876

## 2023-07-07 RX ORDER — FLUTICASONE PROPIONATE 50 MCG
SPRAY, SUSPENSION (ML) NASAL
Qty: 16 ML | OUTPATIENT
Start: 2023-07-07

## 2023-07-09 DIAGNOSIS — N25.81 SECONDARY HYPERPARATHYROIDISM OF RENAL ORIGIN (HCC): ICD-10-CM

## 2023-07-09 DIAGNOSIS — N18.9 ACUTE KIDNEY INJURY SUPERIMPOSED ON CHRONIC KIDNEY DISEASE (HCC): ICD-10-CM

## 2023-07-09 DIAGNOSIS — N17.9 ACUTE KIDNEY INJURY SUPERIMPOSED ON CHRONIC KIDNEY DISEASE (HCC): ICD-10-CM

## 2023-07-09 DIAGNOSIS — N18.5 CHRONIC KIDNEY DISEASE (CKD), ACTIVE MEDICAL MANAGEMENT WITHOUT DIALYSIS, STAGE 5 (HCC): ICD-10-CM

## 2023-07-10 DIAGNOSIS — I12.0 BENIGN HYPERTENSION WITH CKD (CHRONIC KIDNEY DISEASE) STAGE V (HCC): ICD-10-CM

## 2023-07-10 DIAGNOSIS — N18.9 CHRONIC KIDNEY DISEASE-MINERAL AND BONE DISORDER: ICD-10-CM

## 2023-07-10 DIAGNOSIS — R80.1 PERSISTENT PROTEINURIA: ICD-10-CM

## 2023-07-10 DIAGNOSIS — D50.9 IRON DEFICIENCY ANEMIA, UNSPECIFIED IRON DEFICIENCY ANEMIA TYPE: ICD-10-CM

## 2023-07-10 DIAGNOSIS — N18.5 BENIGN HYPERTENSION WITH CKD (CHRONIC KIDNEY DISEASE) STAGE V (HCC): ICD-10-CM

## 2023-07-10 DIAGNOSIS — N18.5 CHRONIC KIDNEY DISEASE (CKD), ACTIVE MEDICAL MANAGEMENT WITHOUT DIALYSIS, STAGE 5 (HCC): ICD-10-CM

## 2023-07-10 DIAGNOSIS — N25.81 SECONDARY HYPERPARATHYROIDISM OF RENAL ORIGIN (HCC): ICD-10-CM

## 2023-07-10 DIAGNOSIS — E83.9 CHRONIC KIDNEY DISEASE-MINERAL AND BONE DISORDER: ICD-10-CM

## 2023-07-10 DIAGNOSIS — M89.9 CHRONIC KIDNEY DISEASE-MINERAL AND BONE DISORDER: ICD-10-CM

## 2023-07-10 DIAGNOSIS — I10 HYPERTENSION, UNSPECIFIED TYPE: ICD-10-CM

## 2023-07-11 RX ORDER — CALCITRIOL 0.25 UG/1
0.25 CAPSULE, LIQUID FILLED ORAL DAILY
Qty: 90 CAPSULE | Refills: 0 | Status: SHIPPED | OUTPATIENT
Start: 2023-07-11

## 2023-07-11 RX ORDER — SODIUM BICARBONATE 650 MG/1
650 TABLET ORAL 2 TIMES DAILY
Qty: 180 TABLET | Refills: 0 | Status: SHIPPED | OUTPATIENT
Start: 2023-07-11

## 2023-07-11 RX ORDER — AMLODIPINE BESYLATE 5 MG/1
TABLET ORAL
Qty: 60 TABLET | Refills: 3 | Status: SHIPPED | OUTPATIENT
Start: 2023-07-11

## 2023-07-14 ENCOUNTER — OFFICE VISIT (OUTPATIENT)
Dept: PHYSICAL THERAPY | Facility: CLINIC | Age: 28
End: 2023-07-14
Payer: MEDICARE

## 2023-07-14 DIAGNOSIS — G89.29 CHRONIC PAIN OF BOTH SHOULDERS: ICD-10-CM

## 2023-07-14 DIAGNOSIS — M25.512 CHRONIC PAIN OF BOTH SHOULDERS: ICD-10-CM

## 2023-07-14 DIAGNOSIS — M54.2 NECK PAIN: Primary | ICD-10-CM

## 2023-07-14 DIAGNOSIS — M25.511 CHRONIC PAIN OF BOTH SHOULDERS: ICD-10-CM

## 2023-07-14 PROCEDURE — 97112 NEUROMUSCULAR REEDUCATION: CPT

## 2023-07-14 PROCEDURE — 97110 THERAPEUTIC EXERCISES: CPT

## 2023-07-14 NOTE — PROGRESS NOTES
Daily Note     Today's date: 2023  Patient name: Saundra Anton  : 1995  MRN: [id number]  Referring provider: Jacob Song MD  Dx:   Encounter Diagnosis     ICD-10-CM    1. Neck pain  M54.2       2. Chronic pain of both shoulders  M25.511     G89.29     M25.512           Start Time: 1522  Stop Time: 1600  Total time in clinic (min): 38 minutes    Subjective: Pt reports that she has been feeling pretty good, though reports having some tightness throughout the day. Objective: See treatment diary below      Assessment: Tolerated treatment well. Treatment today began with warm up on UBE to work on muscular endurance and decrease tightness with good tolerance. Pt reported some fatigue by end of UBE warm up. Proceeded to stretching for neck musculature. Pt reported having some discomfort when moving into AB motions throughout the day, so added pulleys. Pt tolerated flexion motion well B, while AB was reported as more challenging. Added strengthening activities to improve postural strength and awareness with good tolerance. Patient would benefit from continued PT      Plan: Continue per plan of care.       Diagnosis: cervical and shoulder pain   Precautions:    Primary Goals: cervical and shoulder ROM, cervical mobility, shoulder strength   *asterisks by exercise = given for HEP   Manuals 7/3 7/14  6/1 6/6   Cervical downglides        STW/DTW/graston cervical musculature        PROM cervical        Sub occipital release        Cervical distraction                                There Ex        AROM        UT stretch  2x30 sec B  2x30 sec B 2x30 sec B   LS stretch  2x30 sec B  2x30 sec B 2x30 sec B   SCM stretch        Doorway pec stretch  2x30 sec B      Heaven's   1x10 flex/AB                      UBE  2.5 forward/back  2'/2' RPE 3/10 2'/2' RPE 3/10   Neuro Re-Ed        Scap squeeze    2x10x5" seated  2x15x5" seated    Chin tucks    2x10x3" seated  2x10x3" seated    No Monies  2x10 RTB 2x15 YTB   Rows    2x10 RTB 2x10 RTB   SAPD    2x10 RTB 2x10 RTB   Horizontal AB  2x10 RTB      Cheerleaders  2x10 RTB      Banded ER  2x10 B      Banded IR  2x10 B      SA ball pushes                         Re-evaluation AK          Ther Act           education Importance of strengthening, posture                    Modalities

## 2023-07-18 DIAGNOSIS — J45.20 MILD INTERMITTENT ASTHMA WITHOUT COMPLICATION: ICD-10-CM

## 2023-07-18 RX ORDER — ALBUTEROL SULFATE 90 UG/1
2 AEROSOL, METERED RESPIRATORY (INHALATION) EVERY 6 HOURS PRN
Qty: 18 G | Refills: 0 | Status: SHIPPED | OUTPATIENT
Start: 2023-07-18

## 2023-07-18 RX ORDER — FLUTICASONE PROPIONATE AND SALMETEROL XINAFOATE 115; 21 UG/1; UG/1
2 AEROSOL, METERED RESPIRATORY (INHALATION) 2 TIMES DAILY
Qty: 12 G | Refills: 2 | Status: SHIPPED | OUTPATIENT
Start: 2023-07-18

## 2023-07-18 RX ORDER — ALBUTEROL SULFATE 2.5 MG/3ML
2.5 SOLUTION RESPIRATORY (INHALATION) EVERY 6 HOURS PRN
Qty: 30 ML | Refills: 0 | Status: SHIPPED | OUTPATIENT
Start: 2023-07-18

## 2023-07-19 ENCOUNTER — APPOINTMENT (OUTPATIENT)
Dept: PHYSICAL THERAPY | Facility: CLINIC | Age: 28
End: 2023-07-19
Payer: MEDICARE

## 2023-07-19 RX ORDER — FLUTICASONE PROPIONATE AND SALMETEROL XINAFOATE 115; 21 UG/1; UG/1
AEROSOL, METERED RESPIRATORY (INHALATION)
Refills: 2 | OUTPATIENT
Start: 2023-07-19

## 2023-08-08 DIAGNOSIS — E55.9 VITAMIN D DEFICIENCY: ICD-10-CM

## 2023-08-08 DIAGNOSIS — N17.9 ACUTE KIDNEY INJURY SUPERIMPOSED ON CHRONIC KIDNEY DISEASE (HCC): ICD-10-CM

## 2023-08-08 DIAGNOSIS — N25.81 SECONDARY HYPERPARATHYROIDISM OF RENAL ORIGIN (HCC): ICD-10-CM

## 2023-08-08 DIAGNOSIS — N18.9 ACUTE KIDNEY INJURY SUPERIMPOSED ON CHRONIC KIDNEY DISEASE (HCC): ICD-10-CM

## 2023-08-08 DIAGNOSIS — N18.5 CHRONIC KIDNEY DISEASE (CKD), ACTIVE MEDICAL MANAGEMENT WITHOUT DIALYSIS, STAGE 5 (HCC): ICD-10-CM

## 2023-08-08 RX ORDER — CALCITRIOL 0.25 UG/1
0.25 CAPSULE, LIQUID FILLED ORAL DAILY
Qty: 90 CAPSULE | Refills: 3 | Status: SHIPPED | OUTPATIENT
Start: 2023-08-08

## 2023-08-08 RX ORDER — SODIUM BICARBONATE 650 MG/1
650 TABLET ORAL 2 TIMES DAILY
Qty: 180 TABLET | Refills: 3 | Status: SHIPPED | OUTPATIENT
Start: 2023-08-08

## 2023-08-10 RX ORDER — ERGOCALCIFEROL 1.25 MG/1
50000 CAPSULE ORAL WEEKLY
Qty: 12 CAPSULE | Refills: 0 | Status: SHIPPED | OUTPATIENT
Start: 2023-08-10

## 2023-08-16 DIAGNOSIS — J45.20 MILD INTERMITTENT ASTHMA WITHOUT COMPLICATION: ICD-10-CM

## 2023-08-16 RX ORDER — ALBUTEROL SULFATE 90 UG/1
AEROSOL, METERED RESPIRATORY (INHALATION)
Qty: 6.7 G | Refills: 1 | Status: SHIPPED | OUTPATIENT
Start: 2023-08-16

## 2023-08-22 ENCOUNTER — TELEPHONE (OUTPATIENT)
Dept: NEPHROLOGY | Facility: CLINIC | Age: 28
End: 2023-08-22

## 2023-08-22 DIAGNOSIS — D63.8 ANEMIA, CHRONIC DISEASE: Primary | ICD-10-CM

## 2023-08-22 DIAGNOSIS — R80.1 PERSISTENT PROTEINURIA: ICD-10-CM

## 2023-08-22 DIAGNOSIS — N18.5 CHRONIC KIDNEY DISEASE (CKD), ACTIVE MEDICAL MANAGEMENT WITHOUT DIALYSIS, STAGE 5 (HCC): ICD-10-CM

## 2023-08-22 NOTE — TELEPHONE ENCOUNTER
----- Message from Leonor Kirkland PA-C sent at 8/22/2023  2:30 PM EDT -----  Pt definitely needs repeat labs. Please obtain CBC, CMP, phosphorus, PTH, UPCR.

## 2023-08-24 ENCOUNTER — APPOINTMENT (OUTPATIENT)
Dept: LAB | Facility: HOSPITAL | Age: 28
End: 2023-08-24
Payer: MEDICARE

## 2023-08-24 DIAGNOSIS — D63.8 ANEMIA, CHRONIC DISEASE: ICD-10-CM

## 2023-08-24 DIAGNOSIS — R80.1 PERSISTENT PROTEINURIA: ICD-10-CM

## 2023-08-24 DIAGNOSIS — N18.5 CHRONIC KIDNEY DISEASE (CKD), ACTIVE MEDICAL MANAGEMENT WITHOUT DIALYSIS, STAGE 5 (HCC): ICD-10-CM

## 2023-08-24 PROBLEM — E83.39 HYPERPHOSPHATEMIA: Status: ACTIVE | Noted: 2023-08-24

## 2023-08-24 PROBLEM — D63.1 ANEMIA DUE TO STAGE 5 CHRONIC KIDNEY DISEASE, NOT ON CHRONIC DIALYSIS (HCC): Status: ACTIVE | Noted: 2020-01-27

## 2023-08-24 PROBLEM — E56.9 VITAMIN DEFICIENCY: Status: ACTIVE | Noted: 2023-08-24

## 2023-08-24 LAB
ALBUMIN SERPL BCP-MCNC: 4.4 G/DL (ref 3.5–5)
ALP SERPL-CCNC: 60 U/L (ref 34–104)
ALT SERPL W P-5'-P-CCNC: 9 U/L (ref 7–52)
ANION GAP SERPL CALCULATED.3IONS-SCNC: 12 MMOL/L
AST SERPL W P-5'-P-CCNC: 9 U/L (ref 13–39)
BASOPHILS # BLD AUTO: 0.03 THOUSANDS/ÂΜL (ref 0–0.1)
BASOPHILS NFR BLD AUTO: 0 % (ref 0–1)
BILIRUB SERPL-MCNC: 0.27 MG/DL (ref 0.2–1)
BUN SERPL-MCNC: 54 MG/DL (ref 5–25)
CALCIUM SERPL-MCNC: 10 MG/DL (ref 8.4–10.2)
CHLORIDE SERPL-SCNC: 105 MMOL/L (ref 96–108)
CO2 SERPL-SCNC: 22 MMOL/L (ref 21–32)
CREAT SERPL-MCNC: 13.45 MG/DL (ref 0.6–1.3)
EOSINOPHIL # BLD AUTO: 0.23 THOUSAND/ÂΜL (ref 0–0.61)
EOSINOPHIL NFR BLD AUTO: 3 % (ref 0–6)
ERYTHROCYTE [DISTWIDTH] IN BLOOD BY AUTOMATED COUNT: 14.7 % (ref 11.6–15.1)
GFR SERPL CREATININE-BSD FRML MDRD: 3 ML/MIN/1.73SQ M
GLUCOSE SERPL-MCNC: 82 MG/DL (ref 65–140)
HCT VFR BLD AUTO: 24.8 % (ref 34.8–46.1)
HGB BLD-MCNC: 7.6 G/DL (ref 11.5–15.4)
IMM GRANULOCYTES # BLD AUTO: 0.03 THOUSAND/UL (ref 0–0.2)
IMM GRANULOCYTES NFR BLD AUTO: 0 % (ref 0–2)
LYMPHOCYTES # BLD AUTO: 1.58 THOUSANDS/ÂΜL (ref 0.6–4.47)
LYMPHOCYTES NFR BLD AUTO: 22 % (ref 14–44)
MCH RBC QN AUTO: 21.8 PG (ref 26.8–34.3)
MCHC RBC AUTO-ENTMCNC: 30.6 G/DL (ref 31.4–37.4)
MCV RBC AUTO: 71 FL (ref 82–98)
MONOCYTES # BLD AUTO: 0.43 THOUSAND/ÂΜL (ref 0.17–1.22)
MONOCYTES NFR BLD AUTO: 6 % (ref 4–12)
NEUTROPHILS # BLD AUTO: 5.03 THOUSANDS/ÂΜL (ref 1.85–7.62)
NEUTS SEG NFR BLD AUTO: 69 % (ref 43–75)
NRBC BLD AUTO-RTO: 0 /100 WBCS
PHOSPHATE SERPL-MCNC: 5.5 MG/DL (ref 2.7–4.5)
PLATELET # BLD AUTO: 240 THOUSANDS/UL (ref 149–390)
PMV BLD AUTO: 9.9 FL (ref 8.9–12.7)
POTASSIUM SERPL-SCNC: 3.8 MMOL/L (ref 3.5–5.3)
PROT SERPL-MCNC: 7.3 G/DL (ref 6.4–8.4)
PTH-INTACT SERPL-MCNC: 181.7 PG/ML (ref 12–88)
RBC # BLD AUTO: 3.49 MILLION/UL (ref 3.81–5.12)
SODIUM SERPL-SCNC: 139 MMOL/L (ref 135–147)
WBC # BLD AUTO: 7.33 THOUSAND/UL (ref 4.31–10.16)

## 2023-08-24 PROCEDURE — 83970 ASSAY OF PARATHORMONE: CPT

## 2023-08-24 PROCEDURE — 84100 ASSAY OF PHOSPHORUS: CPT

## 2023-08-24 PROCEDURE — 80053 COMPREHEN METABOLIC PANEL: CPT

## 2023-08-24 PROCEDURE — 85025 COMPLETE CBC W/AUTO DIFF WBC: CPT

## 2023-08-24 PROCEDURE — 36415 COLL VENOUS BLD VENIPUNCTURE: CPT

## 2023-08-25 ENCOUNTER — OFFICE VISIT (OUTPATIENT)
Dept: NEPHROLOGY | Facility: CLINIC | Age: 28
End: 2023-08-25
Payer: MEDICARE

## 2023-08-25 ENCOUNTER — TELEPHONE (OUTPATIENT)
Dept: NEPHROLOGY | Facility: CLINIC | Age: 28
End: 2023-08-25

## 2023-08-25 VITALS
HEIGHT: 64 IN | SYSTOLIC BLOOD PRESSURE: 162 MMHG | HEART RATE: 87 BPM | WEIGHT: 253 LBS | DIASTOLIC BLOOD PRESSURE: 80 MMHG | BODY MASS INDEX: 43.19 KG/M2

## 2023-08-25 DIAGNOSIS — N18.5 CHRONIC KIDNEY DISEASE (CKD), ACTIVE MEDICAL MANAGEMENT WITHOUT DIALYSIS, STAGE 5 (HCC): Primary | ICD-10-CM

## 2023-08-25 DIAGNOSIS — E66.01 MORBID OBESITY WITH BMI OF 40.0-44.9, ADULT (HCC): ICD-10-CM

## 2023-08-25 DIAGNOSIS — E83.9 CHRONIC KIDNEY DISEASE-MINERAL AND BONE DISORDER: ICD-10-CM

## 2023-08-25 DIAGNOSIS — D63.1 ANEMIA DUE TO STAGE 5 CHRONIC KIDNEY DISEASE, NOT ON CHRONIC DIALYSIS (HCC): ICD-10-CM

## 2023-08-25 DIAGNOSIS — I12.0 BENIGN HYPERTENSION WITH CKD (CHRONIC KIDNEY DISEASE) STAGE V (HCC): ICD-10-CM

## 2023-08-25 DIAGNOSIS — R80.1 PERSISTENT PROTEINURIA: ICD-10-CM

## 2023-08-25 DIAGNOSIS — M89.9 CHRONIC KIDNEY DISEASE-MINERAL AND BONE DISORDER: ICD-10-CM

## 2023-08-25 DIAGNOSIS — N25.81 SECONDARY HYPERPARATHYROIDISM OF RENAL ORIGIN (HCC): ICD-10-CM

## 2023-08-25 DIAGNOSIS — E56.9 VITAMIN DEFICIENCY: ICD-10-CM

## 2023-08-25 DIAGNOSIS — E83.39 HYPERPHOSPHATEMIA: ICD-10-CM

## 2023-08-25 DIAGNOSIS — E87.20 METABOLIC ACIDOSIS: ICD-10-CM

## 2023-08-25 DIAGNOSIS — N18.5 ANEMIA DUE TO STAGE 5 CHRONIC KIDNEY DISEASE, NOT ON CHRONIC DIALYSIS (HCC): ICD-10-CM

## 2023-08-25 DIAGNOSIS — N18.9 CHRONIC KIDNEY DISEASE-MINERAL AND BONE DISORDER: ICD-10-CM

## 2023-08-25 DIAGNOSIS — N18.5 BENIGN HYPERTENSION WITH CKD (CHRONIC KIDNEY DISEASE) STAGE V (HCC): ICD-10-CM

## 2023-08-25 PROCEDURE — 99214 OFFICE O/P EST MOD 30 MIN: CPT | Performed by: PHYSICIAN ASSISTANT

## 2023-08-25 NOTE — TELEPHONE ENCOUNTER
Hi Dr Lisbet De Jesus, this pt needs to be seen by you in 4-6 weeks. I have a few dates and times I seen and want your permission if its okay to put her at:  September 12th @ 12pm  September 13th @ 12:30pm  September 26th @ 12pm  September 28th @ 12pm or 1:30pm  October 5th @12pm or 1:30pm    Thanks!

## 2023-08-25 NOTE — PATIENT INSTRUCTIONS
Your kidney function remains stable. Most recent creatinine 13.45, above baseline. Repeat labs in 2 weeks and prior to follow-up appointment with Dr. Valentina Gordon.  Avoid all NSAIDs to include ibuprofen, Motrin, Aleve, Advil, Naproxen, Celebrex, Indomethacin, Toradol. Stay well hydrated. Continue all prescribed medications. Call if blood pressure consistently more than 140/90 or less than 110/50s. High and low blood pressures may affect your kidney function. Recommend low salt diet (2 gm sodium diet). Please let us know if you are scheduled for any studies with IV contrast (ex: CT scan, arteriogram or cardiac catheterization)   Follow up in 4-6 weeks with Dr. Valentina Gordon with repeat labs prior to appointment. .  Recommend considering referral to vascular surgery to discuss creation of access for hemodialysis. Will require vein mapping prior to appointment  Please contact the office with new symptoms or concerns. Will send message to Dr. Beatriz Hays to discuss transplant status.
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
## 2023-08-28 ENCOUNTER — TELEPHONE (OUTPATIENT)
Dept: NEPHROLOGY | Facility: CLINIC | Age: 28
End: 2023-08-28

## 2023-08-28 NOTE — TELEPHONE ENCOUNTER
This is a late note entry. We reviewed the patient's case between transplant coordinator and myself at [facility] last week. We had determined that the patient was still not completing all of the testing as she was advised to for listing purposes. This has been an ongoing issue. We are going to try to reach out to her again to have her complete. She completed some testing in January. I then received a message from the primary advance practitioner who is caring for the patient from a nephrology standpoint. Patient is reluctant to follow through with their recommendations regarding dialysis planning given advanced kidney dysfunction. I have also reached out to our transplant coordinator again to attempt to reach the patient to complete the testing. Overall patient has been very noncompliant with completing the testing and I have requested our  and psychiatrist to also eval the patient.

## 2023-08-28 NOTE — TELEPHONE ENCOUNTER
lvm to mother to schedule f/u appt for pt. Let her know that I am putting pt on the schedule for the date and time Dr. Lisbet De Jesus approved and call back if their are any issues.

## 2023-08-30 ENCOUNTER — HOSPITAL ENCOUNTER (EMERGENCY)
Facility: HOSPITAL | Age: 28
Discharge: HOME | End: 2023-08-30
Attending: EMERGENCY MEDICINE
Payer: MEDICARE

## 2023-08-30 VITALS
DIASTOLIC BLOOD PRESSURE: 88 MMHG | OXYGEN SATURATION: 100 % | HEART RATE: 100 BPM | TEMPERATURE: 98.4 F | SYSTOLIC BLOOD PRESSURE: 174 MMHG | RESPIRATION RATE: 18 BRPM

## 2023-08-30 DIAGNOSIS — T30.0 THERMAL BURN: ICD-10-CM

## 2023-08-30 DIAGNOSIS — T30.0 FIRST DEGREE BURN: Primary | ICD-10-CM

## 2023-08-30 PROCEDURE — NC001 PR NO CHARGE: Performed by: EMERGENCY MEDICINE

## 2023-08-30 PROCEDURE — 99284 EMERGENCY DEPT VISIT MOD MDM: CPT | Performed by: EMERGENCY MEDICINE

## 2023-08-30 PROCEDURE — 99283 EMERGENCY DEPT VISIT LOW MDM: CPT

## 2023-08-30 RX ORDER — LIDOCAINE 40 MG/G
CREAM TOPICAL ONCE
Status: COMPLETED | OUTPATIENT
Start: 2023-08-30 | End: 2023-08-30

## 2023-08-30 RX ORDER — ACETAMINOPHEN 325 MG/1
650 TABLET ORAL ONCE
Status: COMPLETED | OUTPATIENT
Start: 2023-08-30 | End: 2023-08-30

## 2023-08-30 RX ADMIN — ACETAMINOPHEN 650 MG: 325 TABLET, FILM COATED ORAL at 01:41

## 2023-08-30 RX ADMIN — LIDOCAINE 1 APPLICATION: 40 CREAM TOPICAL at 02:08
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
## 2023-08-30 NOTE — ED PROVIDER NOTES
History  Chief Complaint   Patient presents with   • Burn     Patient here for eval of burns that occurred a few  minutes ago with hot water, pt states she was in the shower when she dropped the soap and the hot water hit her left hand/foot and left side of chest. States there is a lot of pain. Denies any blisters. HPI    Prior to Admission Medications   Prescriptions Last Dose Informant Patient Reported? Taking? Blood Pressure KIT  Self No No   Sig: by Does not apply route daily   Patient not taking: Reported on 2023   Diclofenac Sodium (VOLTAREN) 1 %   No No   Sig: Apply 2 g topically 4 (four) times a day   acetaminophen (TYLENOL) 325 mg tablet  Self No No   Sig: Take 2 tablets (650 mg total) by mouth every 6 (six) hours as needed for mild pain or headaches   albuterol (2.5 mg/3 mL) 0.083 % nebulizer solution   No No   Sig: Take 3 mL (2.5 mg total) by nebulization every 6 (six) hours as needed for wheezing or shortness of breath   albuterol (PROVENTIL HFA,VENTOLIN HFA) 90 mcg/act inhaler   No No   Sig: INHALE 2 PUFFS EVERY 6 HOURS AS NEEDED FOR WHEEZING OR SHORTNESS OF BREATH (COUGH). amLODIPine (NORVASC) 5 mg tablet   No No   Sig: TAKE 1 TABLET BY MOUTH TWICE A DAY   calcitriol (ROCALTROL) 0.25 mcg capsule   No No   Sig: Take 1 capsule (0.25 mcg total) by mouth daily   ergocalciferol (VITAMIN D2) 50,000 units   No No   Sig: Take 1 capsule (50,000 Units total) by mouth once a week   etonogestrel (NEXPLANON) subdermal implant  Self Yes No   Si mg by Subdermal route once   ferrous sulfate 325 (65 Fe) mg tablet   No No   Sig: Take 1 tablet (325 mg total) by mouth daily with breakfast   fluticasone (FLONASE) 50 mcg/act nasal spray  Self No No   Si spray into each nostril daily   Patient taking differently: 1 spray into each nostril daily as needed   fluticasone-salmeterol (Advair HFA) 115-21 MCG/ACT inhaler   No No   Sig: Inhale 2 puffs 2 (two) times a day Rinse mouth after use.    labetalol (NORMODYNE) 100 mg tablet   No No   Sig: TAKE 1 TABLET BY MOUTH TWICE A DAY   sodium bicarbonate 650 mg tablet   No No   Sig: Take 1 tablet (650 mg total) by mouth 2 (two) times a day      Facility-Administered Medications: None       Past Medical History:   Diagnosis Date   • Asthma    • Chronic kidney disease    • Eczema    • Headache    • Hypertension    • Wears glasses        Past Surgical History:   Procedure Laterality Date   • CHOLECYSTECTOMY     • CT NEEDLE BIOPSY KIDNEY  1/29/2020   • KELOID EXCISION Left 3/30/2021    Procedure: POSTERIOR EAR EXCISION KELOID, FLAP RECONSTRUCTION;  Surgeon: Leobardo Phipps MD;  Location: [facility];  Service: Plastics       Family History   Problem Relation Age of Onset   • Asthma Mother    • No Known Problems Father      I have reviewed and agree with the history as documented.     E-Cigarette/Vaping   • E-Cigarette Use Never User      E-Cigarette/Vaping Substances   • Nicotine No    • THC No    • CBD No    • Flavoring No    • Other No    • Unknown No      Social History     Tobacco Use   • Smoking status: Never   • Smokeless tobacco: Never   Vaping Use   • Vaping Use: Never used   Substance Use Topics   • Alcohol use: Not Currently     Comment: occassionally on social events   • Drug use: No        Review of Systems    Physical Exam  ED Triage Vitals [08/30/23 0113]   Temperature Pulse Respirations Blood Pressure SpO2   98.4 °F (36.9 °C) 100 18 (!) 174/88 100 %      Temp Source Heart Rate Source Patient Position - Orthostatic VS BP Location FiO2 (%)   Oral Monitor Sitting Right arm --      Pain Score       8             Orthostatic Vital Signs  Vitals:    08/30/23 0113   BP: (!) 174/88   Pulse: 100   Patient Position - Orthostatic VS: Sitting       Physical Exam    ED Medications  Medications - No data to display    Diagnostic Studies  Results Reviewed     None                 No orders to display         Procedures  Procedures      ED Course SBIRT 22yo+    Flowsheet Row Most Recent Value   Initial Alcohol Screen: US AUDIT-C     1. How often do you have a drink containing alcohol? 0 Filed at: 08/30/2023 0116   2. How many drinks containing alcohol do you have on a typical day you are drinking? 0 Filed at: 08/30/2023 0116   3b. FEMALE Any Age, or MALE 65+: How often do you have 4 or more drinks on one occassion? 0 Filed at: 08/30/2023 0116   Audit-C Score 0 Filed at: 08/30/2023 0116   CELY: How many times in the past year have you. .. Used an illegal drug or used a prescription medication for non-medical reasons? Never Filed at: 08/30/2023 0116                MDM      Disposition  Final diagnoses:   None     ED Disposition     None      Follow-up Information    None         Patient's Medications   Discharge Prescriptions    No medications on file     No discharge procedures on file. PDMP Review       Value Time User    PDMP Reviewed  Yes 4/8/2023  1:24 AM Melisa Menezes MD           ED Provider  Attending physically available and evaluated Fannie Bhandari. I managed the patient along with the ED Attending.     Electronically Signed by Psychiatric:         Mood and Affect: Mood normal.         ED Medications  Medications   acetaminophen (TYLENOL) tablet 650 mg (650 mg Oral Given 8/30/23 0141)   lidocaine (LMX) 4 % cream (1 Application Topical Given 8/30/23 0208)       Diagnostic Studies  Results Reviewed     None                 No orders to display         Procedures  Procedures      ED Course  ED Course as of 09/08/23 0003   Wed Aug 30, 2023   0139 Blood Pressure(!): 174/88   0139 Temperature: 98.4 °F (36.9 °C)   0139 Pulse: 100   0139 Respirations: 18   0139 SpO2: 100 %   0139 35 yo F h/o CKD who presents to ED for burn to LUE, bilateral feet, and L side of chest. Pt was in the shower when a large bar of soap fell, hit the hot water facet knob, increasing the water temp. Patient sts the hot water burned her LUE, bilateral feet, and L side of chest. Patient notes that's he tried using cold water without much relief. Patient denies any other complaints at this time. Physical exam: Mild erythema to dorsal aspect of left forearm, anterior aspect of left upper arm and anterior aspect of left chest mild tenderness to these areas. No blister formation. No obvious erythema to bilateral feet however some mild tenderness to the dorsum of bilateral feet. Heart is regular rate and rhythm, lungs are clear to auscultation bilaterally. Concern for: First-degree burn   0210 Patient was given Tylenol and Lidoderm cane cream for pain. She notes improvement in symptoms. Plan: Discharge patient home with instruction to follow-up with primary care doctor, instructions to return emergency department for any new or worsening symptoms, instructed to obtain over-the-counter aloe cream/lotion to help with pain. Also instructed take Tylenol as needed for pain. Patient was given the opportunity to question the emergency department. All questions and concerns were addressed the emergency department.                              SBIRT 22yo+    Flowsheet Row Most Recent Value   Initial Alcohol Screen: US AUDIT-C     1. How often do you have a drink containing alcohol? 0 Filed at: 08/30/2023 0116   2. How many drinks containing alcohol do you have on a typical day you are drinking? 0 Filed at: 08/30/2023 0116   3b. FEMALE Any Age, or MALE 65+: How often do you have 4 or more drinks on one occassion? 0 Filed at: 08/30/2023 0116   Audit-C Score 0 Filed at: 08/30/2023 0116   CELY: How many times in the past year have you. .. Used an illegal drug or used a prescription medication for non-medical reasons? Never Filed at: 08/30/2023 0116                Medical Decision Making  See ED course for more details of medical decision making    Risk  OTC drugs. Disposition  Final diagnoses:   First degree burn   Thermal burn     Time reflects when diagnosis was documented in both MDM as applicable and the Disposition within this note     Time User Action Codes Description Comment    8/30/2023  2:38 AM Ojan Cedars N Add [T30.0] Burn     8/30/2023  2:38 AM Amaya Lata Remove [T30.0] Burn     8/30/2023  2:38 AM Joan Cedars N Add [T30.0] First degree burn     8/30/2023  2:39 AM Amaya Alta Add [T30.0] Thermal burn       ED Disposition     ED Disposition   Discharge    Condition   Stable    Date/Time   Wed Aug 30, 2023  2:38 AM    Comment   [address] discharge to home/self care.                Follow-up Information     Follow up With Specialties Details Why Contact Info Additional [facility] Emergency Medicine Go to  As needed, If symptoms worsen [phone] [facility], [place], [place], [address]    Your Primary Care Doctor  Call today let them know you were evaluated in the ER and would like to schedule a follow-up appointment            Discharge Medication List as of 8/30/2023  2:41 AM      CONTINUE these medications which have NOT CHANGED    Details   acetaminophen (TYLENOL) 325 mg tablet Take 2 tablets (650 mg total) by mouth every 6 (six) hours as needed for mild pain or headaches, Starting Sun 1/26/2020, No Print      albuterol (2.5 mg/3 mL) 0.083 % nebulizer solution Take 3 mL (2.5 mg total) by nebulization every 6 (six) hours as needed for wheezing or shortness of breath, Starting Tue 7/18/2023, Normal      albuterol (PROVENTIL HFA,VENTOLIN HFA) 90 mcg/act inhaler INHALE 2 PUFFS EVERY 6 HOURS AS NEEDED FOR WHEEZING OR SHORTNESS OF BREATH (COUGH). , Normal      amLODIPine (NORVASC) 5 mg tablet TAKE 1 TABLET BY MOUTH TWICE A DAY, Normal      calcitriol (ROCALTROL) 0.25 mcg capsule Take 1 capsule (0.25 mcg total) by mouth daily, Starting Tue 8/8/2023, Normal      Diclofenac Sodium (VOLTAREN) 1 % Apply 2 g topically 4 (four) times a day, Starting Thu 7/6/2023, Normal      ergocalciferol (VITAMIN D2) 50,000 units Take 1 capsule (50,000 Units total) by mouth once a week, Starting Thu 8/10/2023, Normal      etonogestrel (NEXPLANON) subdermal implant 68 mg by Subdermal route once, Historical Med      ferrous sulfate 325 (65 Fe) mg tablet Take 1 tablet (325 mg total) by mouth daily with breakfast, Starting Wed 5/17/2023, Normal      fluticasone (FLONASE) 50 mcg/act nasal spray 1 spray into each nostril daily, Starting Sat 3/11/2023, Normal      fluticasone-salmeterol (Advair HFA) 115-21 MCG/ACT inhaler Inhale 2 puffs 2 (two) times a day Rinse mouth after use., Starting Tue 7/18/2023, Normal      labetalol (NORMODYNE) 100 mg tablet TAKE 1 TABLET BY MOUTH TWICE A DAY, Normal      sodium bicarbonate 650 mg tablet Take 1 tablet (650 mg total) by mouth 2 (two) times a day, Starting Tue 8/8/2023, Normal      Blood Pressure KIT by Does not apply route daily, Starting Wed 6/24/2020, Normal           No discharge procedures on file.     PDMP Review       Value Time User    PDMP Reviewed  Yes 4/8/2023  1:24 AM Gail King MD           ED Provider  Attending physically available and evaluated Vida Patterson. I managed the patient along with the ED Attending.     Electronically Signed by         Chandrika Caldera DO  09/08/23 0003

## 2023-08-31 ENCOUNTER — VBI (OUTPATIENT)
Dept: ADMINISTRATIVE | Facility: OTHER | Age: 28
End: 2023-08-31

## 2023-08-31 NOTE — TELEPHONE ENCOUNTER
[address]    ED Visit Information     Ed visit date: 8/30/23  Diagnosis Description: Burn First Degree  In Network? Yes [address]  Discharge status: Home  Discharged with meds ? No  Number of ED visits to date: 4  ED Severity:4     Outreach Information    Outreach successful: Yes 1  Date letter mailed:none  Date Finalized:8/31/23    Care Coordination    Follow up appointment with pcp: no none  Transportation issues ? No    Value [employer] type: 3 Day Outreach  Emergent necessity warranted by diagnosis: Yes  [facility] PCP: Yes  Transportation: Self Transport  If able to choose an ED. Would you have chosen [facility]: Yes  Called PCP first?: No  Feels able to call PCP for urgent problems ?: No  Understands what emergencies can be handled by PCP ?: Yes  Ever any problems getting appointment with PCP for minor emergency/urgency problems?: Yes  Practice Contacted Patient ?: No  Pt had ED follow up with pcp/staff ?: No    Seen for follow-up out of network ?: No  Reason Patient went to ED instead of Urgent Care or PCP?: Perceived Severity of Illness  08/31/2023 09:29 AM EDT by Karsten Vega 08/31/2023 09:29 AM EDT by Karsten Vega [address] (Self) [phone] (Mobile)  [phone] (Mobile) Remove  - Call [facility] - 1st attempt    Patient had many complaints about pcp office gave her the info link number. Will route her pcp for a f/u apt.

## 2023-09-04 ENCOUNTER — TELEPHONE (OUTPATIENT)
Dept: OTHER | Facility: OTHER | Age: 28
End: 2023-09-04

## 2023-09-04 NOTE — ED ATTENDING ATTESTATION
8/30/2023  I, Mary White MD, saw and evaluated the patient. I have discussed the patient with the resident/non-physician practitioner and agree with the resident's/non-physician practitioner's findings, Plan of Care, and MDM as documented in the resident's/non-physician practitioner's note, except where noted. All available labs and Radiology studies were reviewed. I was present for key portions of any procedure(s) performed by the resident/non-physician practitioner and I was immediately available to provide assistance. At this point I agree with the current assessment done in the Emergency Department. I have conducted an independent evaluation of this patient a history and physical is as follows:    33-year-old female presented for evaluation after a hot water scald burn while she was in the shower. Stated that she dropped the soap, accidentally knocked the faucet to all the way hot and was bending over to pick the soap up and was called on her left side including the left hand, foot and the left chest wall. Reported the areas were erythematous previously this has faded at the time of my examination. She is some tenderness to the area. Areas burned superficial only. No blistering. Total body surface area burned no more than 3%. Advised topical aloe preparations for symptomatic relief. Stable for discharge home.     ED Course         Critical Care Time  Procedures

## 2023-09-05 ENCOUNTER — PATIENT OUTREACH (OUTPATIENT)
Dept: FAMILY MEDICINE CLINIC | Facility: CLINIC | Age: 28
End: 2023-09-05

## 2023-09-05 DIAGNOSIS — Z71.89 COMPLEX CARE COORDINATION: Primary | ICD-10-CM

## 2023-09-05 DIAGNOSIS — Z59.9 INADEQUATE COMMUNITY RESOURCES: Primary | ICD-10-CM

## 2023-09-05 SDOH — ECONOMIC STABILITY - INCOME SECURITY: PROBLEM RELATED TO HOUSING AND ECONOMIC CIRCUMSTANCES, UNSPECIFIED: Z59.9

## 2023-09-05 NOTE — TELEPHONE ENCOUNTER
Message left on patient's VM that sodium bicarb  Is prescribed as BID dosing. Any further questions, to call the office.

## 2023-09-05 NOTE — PROGRESS NOTES
referral received via IB. Chart reviewed. I called patient and introduced myself and explained CCM services. Patient states the burn on her left hand has improved. She denies any complications or questions. Patient denies any fever or chills. Patient denies any CP, chest tightness, SOB, coughing or swelling. Patient has a h/o Stage V CKD. Patient is not on dialysis. Last Creat was 13.45 on 8/24/23. She states she is getting a kidney transplant. She has a living donor-a family member. She says she will be meeting with the transplant team and surgeon at [facility]. She says her donor still needs to have testing. We reviewed patients medications. She sates she manages her medications for the most part but her mother can help as well. Patient is familiar with her medications and able to tell me what they are for and how her BP can effect her kidneys. Patient states she has medication insurance and has no barriers to getting them. Patient has all of her medications and says she takes them as directed. She denies any questions. Patient states she does have financial issues and issues with transportation. She says her mother can drive her to doctors apt's if she is not working but there are times she has no transport and cannot get to apt's. Patient also states she has been trying to get on disability and get SNAP benefits. She requests any help she can get. She says she doesn't understand why they are looking at her mothers income when she is 34years old. She said some one told her when she is 27 she can apply for herself. I told her I was not very familiar in this area and we could make a referral to our OPSW. Patient is agreeable and would like a referral. A referral was placed to Ambulatory [facility] for follow up. Patient has an apt with Nephrology on 9/29. She says she is unsure if she will have a ride.     I offered patient my contact information and she states she has nothing to write it down on. She is agreeable on a follow up call in 2 weeks. RNCM to follow.

## 2023-09-14 ENCOUNTER — TELEPHONE (OUTPATIENT)
Dept: NEPHROLOGY | Facility: CLINIC | Age: 28
End: 2023-09-14

## 2023-09-14 ENCOUNTER — APPOINTMENT (OUTPATIENT)
Dept: LAB | Facility: CLINIC | Age: 28
End: 2023-09-14
Payer: MEDICARE

## 2023-09-14 DIAGNOSIS — R80.1 PERSISTENT PROTEINURIA: ICD-10-CM

## 2023-09-14 DIAGNOSIS — E83.9 CHRONIC KIDNEY DISEASE-MINERAL AND BONE DISORDER: ICD-10-CM

## 2023-09-14 DIAGNOSIS — N18.5 CHRONIC KIDNEY DISEASE (CKD), ACTIVE MEDICAL MANAGEMENT WITHOUT DIALYSIS, STAGE 5 (HCC): ICD-10-CM

## 2023-09-14 DIAGNOSIS — E83.39 HYPERPHOSPHATEMIA: ICD-10-CM

## 2023-09-14 DIAGNOSIS — E56.9 VITAMIN DEFICIENCY: ICD-10-CM

## 2023-09-14 DIAGNOSIS — N18.5 ANEMIA DUE TO STAGE 5 CHRONIC KIDNEY DISEASE, NOT ON CHRONIC DIALYSIS: ICD-10-CM

## 2023-09-14 DIAGNOSIS — N25.81 SECONDARY HYPERPARATHYROIDISM OF RENAL ORIGIN (HCC): ICD-10-CM

## 2023-09-14 DIAGNOSIS — E87.20 METABOLIC ACIDOSIS: ICD-10-CM

## 2023-09-14 DIAGNOSIS — D63.1 ANEMIA DUE TO STAGE 5 CHRONIC KIDNEY DISEASE, NOT ON CHRONIC DIALYSIS: ICD-10-CM

## 2023-09-14 DIAGNOSIS — M89.9 CHRONIC KIDNEY DISEASE-MINERAL AND BONE DISORDER: ICD-10-CM

## 2023-09-14 DIAGNOSIS — N18.9 CHRONIC KIDNEY DISEASE-MINERAL AND BONE DISORDER: ICD-10-CM

## 2023-09-14 LAB
ALBUMIN SERPL BCP-MCNC: 4.7 G/DL (ref 3.5–5)
ALP SERPL-CCNC: 54 U/L (ref 34–104)
ALT SERPL W P-5'-P-CCNC: 6 U/L (ref 7–52)
ANION GAP SERPL CALCULATED.3IONS-SCNC: 13 MMOL/L
AST SERPL W P-5'-P-CCNC: 9 U/L (ref 13–39)
BILIRUB SERPL-MCNC: 0.28 MG/DL (ref 0.2–1)
BUN SERPL-MCNC: 60 MG/DL (ref 5–25)
CALCIUM SERPL-MCNC: 8.6 MG/DL (ref 8.4–10.2)
CHLORIDE SERPL-SCNC: 108 MMOL/L (ref 96–108)
CO2 SERPL-SCNC: 19 MMOL/L (ref 21–32)
CREAT SERPL-MCNC: 14.96 MG/DL (ref 0.6–1.3)
CREAT UR-MCNC: 112.7 MG/DL
CREAT UR-MCNC: 114.2 MG/DL
ERYTHROCYTE [DISTWIDTH] IN BLOOD BY AUTOMATED COUNT: 15.1 % (ref 11.6–15.1)
GFR SERPL CREATININE-BSD FRML MDRD: 2 ML/MIN/1.73SQ M
GLUCOSE SERPL-MCNC: 69 MG/DL (ref 65–140)
HCT VFR BLD AUTO: 23.9 % (ref 34.8–46.1)
HGB BLD-MCNC: 7.1 G/DL (ref 11.5–15.4)
IRON SATN MFR SERPL: 33 % (ref 15–50)
IRON SERPL-MCNC: 73 UG/DL (ref 50–212)
MAGNESIUM SERPL-MCNC: 1.8 MG/DL (ref 1.9–2.7)
MCH RBC QN AUTO: 21.6 PG (ref 26.8–34.3)
MCHC RBC AUTO-ENTMCNC: 29.7 G/DL (ref 31.4–37.4)
MCV RBC AUTO: 73 FL (ref 82–98)
MICROALBUMIN UR-MCNC: 818.9 MG/L
MICROALBUMIN/CREAT 24H UR: 727 MG/G CREATININE (ref 0–30)
PHOSPHATE SERPL-MCNC: 5.6 MG/DL (ref 2.7–4.5)
PLATELET # BLD AUTO: 241 THOUSANDS/UL (ref 149–390)
PMV BLD AUTO: 12 FL (ref 8.9–12.7)
POTASSIUM SERPL-SCNC: 3.8 MMOL/L (ref 3.5–5.3)
PROT SERPL-MCNC: 7.6 G/DL (ref 6.4–8.4)
PROT UR-MCNC: 153 MG/DL
PROT/CREAT UR: 1.34 MG/G{CREAT} (ref 0–0.1)
PTH-INTACT SERPL-MCNC: 361.7 PG/ML (ref 12–88)
RBC # BLD AUTO: 3.29 MILLION/UL (ref 3.81–5.12)
SODIUM SERPL-SCNC: 140 MMOL/L (ref 135–147)
TIBC SERPL-MCNC: 224 UG/DL (ref 250–450)
TSH SERPL DL<=0.05 MIU/L-ACNC: 3.47 UIU/ML (ref 0.45–4.5)
UIBC SERPL-MCNC: 151 UG/DL (ref 155–355)
WBC # BLD AUTO: 6.65 THOUSAND/UL (ref 4.31–10.16)

## 2023-09-14 PROCEDURE — 82570 ASSAY OF URINE CREATININE: CPT

## 2023-09-14 PROCEDURE — 83970 ASSAY OF PARATHORMONE: CPT

## 2023-09-14 PROCEDURE — 83540 ASSAY OF IRON: CPT

## 2023-09-14 PROCEDURE — 83550 IRON BINDING TEST: CPT

## 2023-09-14 PROCEDURE — 84156 ASSAY OF PROTEIN URINE: CPT

## 2023-09-14 PROCEDURE — 80053 COMPREHEN METABOLIC PANEL: CPT

## 2023-09-14 PROCEDURE — 84443 ASSAY THYROID STIM HORMONE: CPT

## 2023-09-14 PROCEDURE — 84100 ASSAY OF PHOSPHORUS: CPT

## 2023-09-14 PROCEDURE — 85027 COMPLETE CBC AUTOMATED: CPT

## 2023-09-14 PROCEDURE — 83735 ASSAY OF MAGNESIUM: CPT

## 2023-09-14 PROCEDURE — 82610 CYSTATIN C: CPT

## 2023-09-14 PROCEDURE — 36415 COLL VENOUS BLD VENIPUNCTURE: CPT

## 2023-09-14 NOTE — TELEPHONE ENCOUNTER
Pt advised that CR worsening. She denies any uremic symptoms. Advised to go to ER if she does not feel well per Albaro Lockwood.    ----- Message from Patrica Cui PA-C sent at 9/14/2023  4:25 PM EDT -----  Labs reviewed and renal function continues to worsen. Please call patient and inquire regarding uremic symptoms. If symptoms, pt should be referred to ER.

## 2023-09-18 ENCOUNTER — PATIENT OUTREACH (OUTPATIENT)
Dept: FAMILY MEDICINE CLINIC | Facility: CLINIC | Age: 28
End: 2023-09-18

## 2023-09-18 LAB
CYSTATIN C SERPL-MCNC: 4.82 MG/L (ref 0.6–1)
GFR/BSA.PRED SERPLBLD CYS-BASED-ARV: 10 ML/MIN/1.73

## 2023-09-18 NOTE — PROGRESS NOTES
SUSANNE HUYNH received a referral from RN CM regarding patient's need for social work follow up due to financial and transportation barriers. SUSANNE HUYNH attempted to contact patient at this time. LVM requesting a call in return at patient's earliest convenience. Will continue attempts to reach patient.

## 2023-09-20 ENCOUNTER — PATIENT OUTREACH (OUTPATIENT)
Dept: FAMILY MEDICINE CLINIC | Facility: CLINIC | Age: 28
End: 2023-09-20

## 2023-09-20 NOTE — PROGRESS NOTES
IB message received for follow up from last outreach. Patient says she is doing okay. She says she has a lot of cramps all over. Generalized and diffuse in extrmeties and abdomen. She says it has been going on for 2 weeks now. It has been progressively getting worse. I asked if she spoke with her doctor regarding this and she says she has spoken with her Nephrologist but they haven't given her an answer of what it is from or what she can do. I told her I would message the office. Patient states other then the cramping she is feeling good. She denies any CP, chest tightness, SOB, lightheadedness, dizziness or coughing. She denies any swelling in her extremities, face or abdomen. She denies any itching. She denies any confusion, weight loss or weight gain. She denies any appetite change. Patient states she has all of her medications and states she is taking them as directed. Patient states she is still having the social issues discussed at previous out reach. Patient still has not secured transportation for her upcoming Nephrology apt on 9/28/23. Note routed to Dr Jluis Hensley and [facility] Clinical team.    [facility] to follow.

## 2023-09-21 ENCOUNTER — PATIENT OUTREACH (OUTPATIENT)
Dept: FAMILY MEDICINE CLINIC | Facility: CLINIC | Age: 28
End: 2023-09-21

## 2023-09-21 NOTE — PROGRESS NOTES
SUSANNE HUYNH received a referral from RN CM regarding patient's need for social work follow up. SUSANNE HUYNH has made multiple attempts to contact patient to assist, however, attempts to reach patient have been unsuccessful at this time. UTR letter sent.

## 2023-09-21 NOTE — LETTER
[address] [facility] [id number] [phone]    Re: Ashley Gregorio to reach    9/21/2023       Dear Vitaliy Richardson,    We tried to reach you by phone and were unfortunately unable to reach you. It is important that you contact the [address] [place] [address] Road at [phone].     Sincerely,         Lieutenant Kunz, MSW,LSW

## 2023-09-23 DIAGNOSIS — E55.9 VITAMIN D DEFICIENCY: ICD-10-CM

## 2023-09-25 RX ORDER — ERGOCALCIFEROL 1.25 MG/1
50000 CAPSULE ORAL WEEKLY
Qty: 12 CAPSULE | Refills: 0 | Status: SHIPPED | OUTPATIENT
Start: 2023-09-25

## 2023-09-27 ENCOUNTER — OFFICE VISIT (OUTPATIENT)
Dept: URGENT CARE | Facility: CLINIC | Age: 28
End: 2023-09-27
Payer: MEDICARE

## 2023-09-27 VITALS
SYSTOLIC BLOOD PRESSURE: 180 MMHG | TEMPERATURE: 98.5 F | OXYGEN SATURATION: 100 % | DIASTOLIC BLOOD PRESSURE: 82 MMHG | HEART RATE: 76 BPM | RESPIRATION RATE: 18 BRPM

## 2023-09-27 DIAGNOSIS — J01.00 ACUTE NON-RECURRENT MAXILLARY SINUSITIS: Primary | ICD-10-CM

## 2023-09-27 PROCEDURE — 87635 SARS-COV-2 COVID-19 AMP PRB: CPT | Performed by: PHYSICIAN ASSISTANT

## 2023-09-27 PROCEDURE — 99213 OFFICE O/P EST LOW 20 MIN: CPT | Performed by: PHYSICIAN ASSISTANT

## 2023-09-27 RX ORDER — AZELASTINE 1 MG/ML
1 SPRAY, METERED NASAL 2 TIMES DAILY
Qty: 1 ML | Refills: 0 | Status: SHIPPED | OUTPATIENT
Start: 2023-09-27

## 2023-09-27 RX ORDER — AMOXICILLIN AND CLAVULANATE POTASSIUM 875; 125 MG/1; MG/1
1 TABLET, FILM COATED ORAL EVERY 12 HOURS SCHEDULED
Qty: 14 TABLET | Refills: 0 | Status: SHIPPED | OUTPATIENT
Start: 2023-09-27 | End: 2023-10-04

## 2023-09-28 ENCOUNTER — TELEPHONE (OUTPATIENT)
Dept: URGENT CARE | Facility: CLINIC | Age: 28
End: 2023-09-28

## 2023-09-28 LAB — SARS-COV-2 RNA RESP QL NAA+PROBE: POSITIVE

## 2023-09-28 NOTE — PROGRESS NOTES
[facility]        NAME: Vida Patterson is a 34 y.o. female  : 1995    MRN: [id number]  DATE: 2023  TIME: 11:45 AM    Assessment and Plan   Acute non-recurrent maxillary sinusitis [J01.00]  1. Acute non-recurrent maxillary sinusitis  COVID Only -Office Collect    amoxicillin-clavulanate (AUGMENTIN) 875-125 mg per tablet    azelastine (ASTELIN) 0.1 % nasal spray            Patient Instructions     Take antibiotic as directed until completed. If COVID comes back positive discontinue. Use additional medications as needed for symptomatic relief  Motrin and or Tylenol as needed for fever pain  Follow up with PCP in 3-5 days. Proceed to  ER if symptoms worsen. Chief Complaint     Chief Complaint   Patient presents with   • Sinusitis     X3 days ago, post nasal drip, B/L earache, congestion          History of Present Illness       72-year-old female presents with sinus pain pressure congestion bilateral ear aches runny nose mild cough headache. Symptoms started 4 to 5 days ago got worse over the past 3 days. Reports sick contact at home that was never tested for COVID. Denies any chest pain or shortness of breath. No abdominal pain nausea vomiting or diarrhea    Sinusitis  This is a new problem. The current episode started in the past 7 days. The problem has been gradually worsening since onset. There has been no fever. The pain is moderate. Associated symptoms include congestion, coughing, ear pain, sinus pressure and a sore throat. Pertinent negatives include no chills, hoarse voice or neck pain. Past treatments include lying down, nasal decongestants, sitting up and oral decongestants. The treatment provided no relief. Review of Systems   Review of Systems   Constitutional: Positive for fatigue. Negative for chills. HENT: Positive for congestion, ear pain, postnasal drip, sinus pressure, sinus pain and sore throat. Negative for hoarse voice. Eyes: Negative. Respiratory: Positive for cough. Cardiovascular: Negative. Gastrointestinal: Negative. Musculoskeletal: Negative. Negative for neck pain. Skin: Negative. Neurological: Negative. Current Medications       Current Outpatient Medications:   •  acetaminophen (TYLENOL) 325 mg tablet, Take 2 tablets (650 mg total) by mouth every 6 (six) hours as needed for mild pain or headaches, Disp: 30 tablet, Rfl: 0  •  albuterol (2.5 mg/3 mL) 0.083 % nebulizer solution, Take 3 mL (2.5 mg total) by nebulization every 6 (six) hours as needed for wheezing or shortness of breath, Disp: 30 mL, Rfl: 0  •  albuterol (PROVENTIL HFA,VENTOLIN HFA) 90 mcg/act inhaler, INHALE 2 PUFFS EVERY 6 HOURS AS NEEDED FOR WHEEZING OR SHORTNESS OF BREATH (COUGH). , Disp: 6.7 g, Rfl: 1  •  amLODIPine (NORVASC) 5 mg tablet, TAKE 1 TABLET BY MOUTH TWICE A DAY, Disp: 60 tablet, Rfl: 3  •  amoxicillin-clavulanate (AUGMENTIN) 875-125 mg per tablet, Take 1 tablet by mouth every 12 (twelve) hours for 7 days, Disp: 14 tablet, Rfl: 0  •  azelastine (ASTELIN) 0.1 % nasal spray, 1 spray into each nostril 2 (two) times a day Use in each nostril as directed, Disp: 1 mL, Rfl: 0  •  calcitriol (ROCALTROL) 0.25 mcg capsule, Take 1 capsule (0.25 mcg total) by mouth daily, Disp: 90 capsule, Rfl: 3  •  Diclofenac Sodium (VOLTAREN) 1 %, Apply 2 g topically 4 (four) times a day, Disp: 150 g, Rfl: 0  •  ergocalciferol (VITAMIN D2) 50,000 units, Take 1 capsule (50,000 Units total) by mouth once a week, Disp: 12 capsule, Rfl: 0  •  etonogestrel (NEXPLANON) subdermal implant, 68 mg by Subdermal route once, Disp: , Rfl:   •  ferrous sulfate 325 (65 Fe) mg tablet, Take 1 tablet (325 mg total) by mouth daily with breakfast, Disp: 30 tablet, Rfl: 4  •  fluticasone (FLONASE) 50 mcg/act nasal spray, 1 spray into each nostril daily (Patient taking differently: 1 spray into each nostril daily as needed), Disp: 11.1 mL, Rfl: 0  •  fluticasone-salmeterol (Advair HFA) 115-21 MCG/ACT inhaler, Inhale 2 puffs 2 (two) times a day Rinse mouth after use., Disp: 12 g, Rfl: 2  •  labetalol (NORMODYNE) 100 mg tablet, TAKE 1 TABLET BY MOUTH TWICE A DAY, Disp: 60 tablet, Rfl: 3  •  sodium bicarbonate 650 mg tablet, Take 1 tablet (650 mg total) by mouth 2 (two) times a day, Disp: 180 tablet, Rfl: 3  •  Blood Pressure KIT, by Does not apply route daily (Patient not taking: Reported on 9/5/2023), Disp: 1 each, Rfl: 0    Current Allergies     Allergies as of 09/27/2023 - Reviewed 09/27/2023   Allergen Reaction Noted   • Seasonal ic [cholestatin] Itching 04/14/2023   • Wheat bran - food allergy Itching 12/19/2019            The following portions of the patient's history were reviewed and updated as appropriate: allergies, current medications, past family history, past medical history, past social history, past surgical history and problem list.     Past Medical History:   Diagnosis Date   • Asthma    • Chronic kidney disease    • Eczema    • Headache    • Hypertension    • Wears glasses        Past Surgical History:   Procedure Laterality Date   • CHOLECYSTECTOMY     • CT NEEDLE BIOPSY KIDNEY  1/29/2020   • KELOID EXCISION Left 3/30/2021    Procedure: POSTERIOR EAR EXCISION KELOID, FLAP RECONSTRUCTION;  Surgeon: Wanda Bah MD;  Location: [facility];  Service: Plastics       Family History   Problem Relation Age of Onset   • Asthma Mother    • No Known Problems Father          Medications have been verified. Objective   BP (!) 180/82   Pulse 76   Temp 98.5 °F (36.9 °C)   Resp 18   SpO2 100%   No LMP recorded. Physical Exam     Physical Exam  Vitals and nursing note reviewed. Constitutional:       General: She is not in acute distress. Appearance: Normal appearance. She is well-developed. HENT:      Head: Normocephalic and atraumatic. Right Ear: Hearing, tympanic membrane, ear canal and external ear normal. There is no impacted cerumen.       Left Ear: Hearing, tympanic membrane, ear canal and external ear normal. There is no impacted cerumen. Nose:      Right Sinus: Maxillary sinus tenderness present. Left Sinus: Maxillary sinus tenderness present. Mouth/Throat:      Pharynx: Uvula midline. No oropharyngeal exudate. Eyes:      General:         Right eye: No discharge. Left eye: No discharge. Conjunctiva/sclera: Conjunctivae normal.   Cardiovascular:      Rate and Rhythm: Normal rate and regular rhythm. Heart sounds: Normal heart sounds. No murmur heard. Pulmonary:      Effort: Pulmonary effort is normal. No respiratory distress. Breath sounds: Normal breath sounds. No wheezing or rales. Abdominal:      General: Bowel sounds are normal.      Palpations: Abdomen is soft. Tenderness: There is no abdominal tenderness. Musculoskeletal:         General: Normal range of motion. Cervical back: Normal range of motion and neck supple. Lymphadenopathy:      Cervical: No cervical adenopathy. Skin:     General: Skin is warm and dry. Neurological:      Mental Status: She is alert and oriented to person, place, and time. Psychiatric:         Mood and Affect: Mood normal.         MDM: Discussed with patient to start taking antibiotic and if COVID comes back positive to discontinue and continue with symptomatic meds. Follow-up with PCP if COVID is positive.

## 2023-09-28 NOTE — PATIENT INSTRUCTIONS
Take antibiotic as directed until completed. If COVID comes back positive discontinue. Use additional medications as needed for symptomatic relief  Motrin and or Tylenol as needed for fever pain  Follow up with PCP in 3-5 days. Proceed to  ER if symptoms worsen. Sinusitis   AMBULATORY CARE:   Sinusitis  is inflammation or infection of your sinuses. Sinusitis is most often caused by a virus. Acute sinusitis may last up to 12 weeks. Chronic sinusitis lasts longer than 12 weeks. Recurrent sinusitis means you have 4 or more infections in 1 year. Common signs and symptoms:   Fever    Pain, pressure, redness, or swelling around the forehead, cheeks, or eyes    Thick yellow or green discharge from your nose    Tenderness when you touch your face over your sinuses    Dry cough that happens mostly at night or when you lie down    Headache and face pain that is worse when you lean forward    Tooth pain, or pain when you chew    Seek care immediately if:   You have trouble breathing or wheezing that is getting worse. You have a stiff neck, a fever, or a bad headache. You cannot open your eye. Your eyeball bulges out or you cannot move your eye. You are more sleepy than normal, or you notice changes in your ability to think, move, or talk. You have swelling of your forehead or scalp. Call your doctor if:   You have vision changes, such as double vision. Your eye and eyelid are red, swollen, and painful. Your symptoms do not improve or go away after 10 days. You have nausea and are vomiting. Your nose is bleeding. You have questions or concerns about your condition or care. Medicines: Your symptoms may go away on their own. Your healthcare provider may recommend watchful waiting for up to 10 days before starting antibiotics. You may need any of the following:  Acetaminophen  decreases pain and fever. It is available without a doctor's order.  Ask how much to take and how often to take it. Follow directions. Read the labels of all other medicines you are using to see if they also contain acetaminophen, or ask your doctor or pharmacist. Acetaminophen can cause liver damage if not taken correctly. NSAIDs , such as ibuprofen, help decrease swelling, pain, and fever. This medicine is available with or without a doctor's order. NSAIDs can cause stomach bleeding or kidney problems in certain people. If you take blood thinner medicine, always ask your healthcare provider if NSAIDs are safe for you. Always read the medicine label and follow directions. Nasal steroid sprays  may help decrease inflammation in your nose and sinuses. Decongestants  help reduce swelling and drain mucus in the nose and sinuses. They may help you breathe easier. Antihistamines  help dry mucus in the nose and relieve sneezing. Antibiotics  help treat or prevent a bacterial infection. Self-care:   Rinse your sinuses as directed. Use a sinus rinse device to rinse your nasal passages with a saline (salt water) solution or distilled water. Do not use tap water. This will help thin the mucus in your nose and rinse away pollen and dirt. It will also help reduce swelling so you can breathe normally. Use a humidifier  to increase air moisture in your home. This may make it easier for you to breathe and help decrease your cough. Sleep with your head elevated. Place an extra pillow under your head before you go to sleep to help your sinuses drain. Drink liquids as directed. Ask your healthcare provider how much liquid to drink each day and which liquids are best for you. Liquids will thin the mucus in your nose and help it drain. Avoid drinks that contain alcohol or caffeine. Do not smoke, and avoid secondhand smoke. Nicotine and other chemicals in cigarettes and cigars can make your symptoms worse. Ask your healthcare provider for information if you currently smoke and need help to quit. E-cigarettes or smokeless tobacco still contain nicotine. Talk to your healthcare provider before you use these products. Prevent the spread of germs:   Wash your hands often with soap and water. Wash your hands after you use the bathroom, change a child's diaper, or sneeze. Wash your hands before you prepare or eat food. Stay away from people who are sick. Some germs spread easily and quickly through contact. Follow up with your doctor as directed: You may be referred to an ear, nose, and throat specialist. Write down your questions so you remember to ask them during your visits. © Copyright [employer] 2023 Information is for End User's use only and may not be sold, redistributed or otherwise used for commercial purposes. The above information is an  only. It is not intended as medical advice for individual conditions or treatments. Talk to your doctor, nurse or pharmacist before following any medical regimen to see if it is safe and effective for you.

## 2023-10-11 DIAGNOSIS — J45.20 MILD INTERMITTENT ASTHMA WITHOUT COMPLICATION: ICD-10-CM

## 2023-10-12 RX ORDER — ALBUTEROL SULFATE 90 UG/1
AEROSOL, METERED RESPIRATORY (INHALATION)
Qty: 18 G | Refills: 1 | Status: SHIPPED | OUTPATIENT
Start: 2023-10-12

## 2023-10-12 NOTE — TELEPHONE ENCOUNTER
I will refill, but please ask her to schedule an appointment soon to assess asthma control. Thank you.

## 2023-10-15 DIAGNOSIS — I12.9 BENIGN HYPERTENSION WITH CKD (CHRONIC KIDNEY DISEASE) STAGE III (HCC): ICD-10-CM

## 2023-10-15 DIAGNOSIS — D50.9 IRON DEFICIENCY ANEMIA, UNSPECIFIED IRON DEFICIENCY ANEMIA TYPE: ICD-10-CM

## 2023-10-15 DIAGNOSIS — N18.30 BENIGN HYPERTENSION WITH CKD (CHRONIC KIDNEY DISEASE) STAGE III (HCC): ICD-10-CM

## 2023-10-16 ENCOUNTER — TELEPHONE (OUTPATIENT)
Dept: NEPHROLOGY | Facility: CLINIC | Age: 28
End: 2023-10-16

## 2023-10-16 RX ORDER — LABETALOL 100 MG/1
TABLET, FILM COATED ORAL
Qty: 60 TABLET | Refills: 3 | Status: SHIPPED | OUTPATIENT
Start: 2023-10-16

## 2023-10-16 RX ORDER — FERROUS SULFATE 325(65) MG
1 TABLET ORAL
Qty: 30 TABLET | Refills: 4 | Status: SHIPPED | OUTPATIENT
Start: 2023-10-16

## 2023-10-20 ENCOUNTER — TELEPHONE (OUTPATIENT)
Dept: NEPHROLOGY | Facility: CLINIC | Age: 28
End: 2023-10-20

## 2023-10-20 NOTE — TELEPHONE ENCOUNTER
Called pt to offer her sooner appointment (10/23) @ 11am. Pt stated she can't do mornings then disconnected.

## 2023-10-23 ENCOUNTER — OFFICE VISIT (OUTPATIENT)
Dept: NEPHROLOGY | Facility: CLINIC | Age: 28
End: 2023-10-23
Payer: MEDICARE

## 2023-10-23 ENCOUNTER — PATIENT OUTREACH (OUTPATIENT)
Dept: FAMILY MEDICINE CLINIC | Facility: CLINIC | Age: 28
End: 2023-10-23

## 2023-10-23 VITALS
HEIGHT: 64 IN | BODY MASS INDEX: 42.17 KG/M2 | DIASTOLIC BLOOD PRESSURE: 80 MMHG | OXYGEN SATURATION: 99 % | SYSTOLIC BLOOD PRESSURE: 138 MMHG | WEIGHT: 247 LBS | HEART RATE: 68 BPM

## 2023-10-23 DIAGNOSIS — N25.81 SECONDARY HYPERPARATHYROIDISM OF RENAL ORIGIN (HCC): Primary | ICD-10-CM

## 2023-10-23 DIAGNOSIS — N17.9 ACUTE KIDNEY INJURY SUPERIMPOSED ON CHRONIC KIDNEY DISEASE: ICD-10-CM

## 2023-10-23 DIAGNOSIS — E83.9 CHRONIC KIDNEY DISEASE-MINERAL AND BONE DISORDER: ICD-10-CM

## 2023-10-23 DIAGNOSIS — N18.9 CHRONIC KIDNEY DISEASE-MINERAL AND BONE DISORDER: ICD-10-CM

## 2023-10-23 DIAGNOSIS — N18.5 BENIGN HYPERTENSION WITH CKD (CHRONIC KIDNEY DISEASE) STAGE V (HCC): ICD-10-CM

## 2023-10-23 DIAGNOSIS — N18.5 ANEMIA DUE TO STAGE 5 CHRONIC KIDNEY DISEASE, NOT ON CHRONIC DIALYSIS: ICD-10-CM

## 2023-10-23 DIAGNOSIS — N18.5 CHRONIC KIDNEY DISEASE (CKD), ACTIVE MEDICAL MANAGEMENT WITHOUT DIALYSIS, STAGE 5 (HCC): ICD-10-CM

## 2023-10-23 DIAGNOSIS — I12.0 BENIGN HYPERTENSION WITH CKD (CHRONIC KIDNEY DISEASE) STAGE V (HCC): ICD-10-CM

## 2023-10-23 DIAGNOSIS — M89.9 CHRONIC KIDNEY DISEASE-MINERAL AND BONE DISORDER: ICD-10-CM

## 2023-10-23 DIAGNOSIS — D63.1 ANEMIA DUE TO STAGE 5 CHRONIC KIDNEY DISEASE, NOT ON CHRONIC DIALYSIS: ICD-10-CM

## 2023-10-23 DIAGNOSIS — I10 PRIMARY HYPERTENSION: ICD-10-CM

## 2023-10-23 DIAGNOSIS — N18.9 ACUTE KIDNEY INJURY SUPERIMPOSED ON CHRONIC KIDNEY DISEASE: ICD-10-CM

## 2023-10-23 DIAGNOSIS — E87.20 METABOLIC ACIDOSIS: ICD-10-CM

## 2023-10-23 DIAGNOSIS — E66.01 MORBID OBESITY WITH BMI OF 40.0-44.9, ADULT (HCC): ICD-10-CM

## 2023-10-23 PROCEDURE — 99215 OFFICE O/P EST HI 40 MIN: CPT | Performed by: INTERNAL MEDICINE

## 2023-10-23 RX ORDER — SODIUM BICARBONATE 650 MG/1
1300 TABLET ORAL 2 TIMES DAILY
Qty: 180 TABLET | Refills: 3 | Status: SHIPPED | OUTPATIENT
Start: 2023-10-23

## 2023-10-23 RX ORDER — CALCITRIOL 0.5 UG/1
0.5 CAPSULE, LIQUID FILLED ORAL DAILY
Qty: 90 CAPSULE | Refills: 3 | Status: SHIPPED | OUTPATIENT
Start: 2023-10-23

## 2023-10-23 NOTE — PROGRESS NOTES
NEPHROLOGY OFFICE VISIT   Shelley Simeon 34 y.o. female MRN: [id number]  10/23/2023    Reason for Visit: Advanced chronic kidney disease    History of Present Illness (HPI):    I had the pleasure of seeing Heron Alpers today in the renal clinic for the continued management of advanced chronic kidney disease with progression. Since our last visit, she was in urgent care last month for acute sinusitis which was treated with oral antibiotics. This has now resolved. She did test positive for SARS Cov 2 on September 27. Patient still in evaluation for transplant evaluation. I sent a message to the transplant nephrologist just to get an update on the latest status. Based on the last assessment I see the end of August patient still had testing that was pending and the patient was reluctant to follow recommendations and had been noncompliant with some of the testing that has been ordered. Along with also pending  and psychiatric evaluation. At this time she reports no uremic symptoms. He has no complaints except for some muscle cramps. No chest pain or shortness of breath. Still urinating. Weights have been slowly trending down, intentional weight loss. They have met followed through on any of the recommendations regards to her dialysis access. We have gone through this many times. Her PTH continues to rise. We discussed about increasing calcitriol. Serum bicarb level remains low but she reports she is compliant we will increase oral sodium bicarbonate. Her hemoglobin is continuing to drop now at 7.1. Iron studies from June appear to be adequate we will start her on Epogen. Agreeable to start. Discussed concerning uremic symptoms if she is to develop these she should go to the hospital immediately as she may require urgent renal replacement therapy.     ASSESSMENT and PLAN:    Worsening renal function  -- Progression of underlying chronic kidney disease, creatinine now close to 15 mg/dL  -- GFR based on Cystatin C is 10 mL/min  -- No uremic symptoms at this time  -- BUN is 60  -- Has not followed through on recommendations regarding the dialysis access. They get very upset and hostile when renal replacement therapy as mentioned. -- Completed nephrology advanced care meeting in September 2022. -- Being evaluated by transplant team but has not completed majority of the testing.  -- Close follow-up with nephrology. -- Uremic symptoms discussed at length if she develops any of these she should go to the hospital immediately for urgent dialysis. Chronic Kidney Disease Stage V, with progression  --Imaging:  Right kidney 9.4 cm, left kidney 9 cm. It should be noted that there was a decrease in the right kidney size compared to 2015 ultrasound where it was 12.8 cm. Both kidneys are diffusely echogenic consistent with chronic kidney disease. --Urinalysis:  Negative blood. 2+ protein. 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 Mild arterial sclerosis. Severe chronic pathologic changes, irreversible with poor prognostic sign. --Status: Creatinine continues to slowly worsen now up to 14 mg/dL  --Being evaluated by transplant and has seen Dr. Hosea Santos  --Completed nephrology advanced care meeting in September 2022  --Management: Not a great PD candidate in my opinion.   I have discussed at length multiple times about dialysis access such as a graft or fistula. This is usually led to a hostile discussion with the family. They are not interested in proceeding with that. She is already being evaluated transplant team but has not completed a majority of testing. Uremic symptoms discussed at length. --Reducing Cardiovascular Risk Factors:  Simvastatin+ Ezetimibe reduced atherosclerotic events in CKD (SHARP trial); Low dose aspirin safe (if no contraindications exist); smoking is an independent risk factor for Chronic Kidney Disease and progression, strongly recommend smoking cessation     Hypertension  -- Stage II (American College of Cardiology/American Heart Association)  -- with underlying chronic kidney disease and morbid obesity  -- Body mass index is 42 kg/m². -- Volume status: Euvolemic  -- Etiology: Morbid obesity, underlying chronic kidney disease  -- Secondary Work-Up:  Renal artery Doppler was negative, a.m. Cortisol 14, TSH 1.5, normal metanephrines and catecholamine levels, renin 0.935, Aldosterone 11.6  -- Target Goal: < 130/80 (ACC/AHA, CKD with proteinuria, CKD in diabetics) ; if tolerated can target SBP < 120 mmHg in high cardiovascular risk ( Age > 75, CKD, CVD, No Diabetics SPRINT)  -- Lifestyle Modifications: DASH Diet, Weight Loss for ideal body weight, 45 mins of cardiovascular exercise 3 times a week as tolerated, and if no contraindications exist, smoking cessation, limit alcohol use, avoid NSAIDS, monitor blood pressure at home  -- Status: Blood pressure above target  -- Current antihypertensive regiment:  Amlodipine 5 mg twice a day, labetalol 100 mg twice a day  -- Changes: Avoid ACE inhibitor and angiotensin receptor blocker. Continue current regimen. Continue weight loss and exercise. Obesity  -- BMI 42.4  --Recommend decreasing portion sizes, encouraging healthy choices of fruits and vegetables, consuming healthier snacks and moderation in carbohydrate intake.  Exercise recommendations; 3-5 times a week, the American Heart Association recommends 150 minutes a week moderate exercise  --Strongly recommend evaluation by nutritionist  --Overweight and obesity have been associated with increased mortality and morbidity. Associated with a reduction in life expectancy, associated with increased all cause and cardiovascular mortality  --Metabolic risks, including increased risk of diabetes type 2, insulin resistance with hyperinsulinemia (weight loss of 5 to 7% associated with a decreased risk of type 2 diabetes among individuals with prediabetes and weight loss of 15% can lead to dramatic improvement of diabetes in nearly half of people). Dyslipidemia, hypertension and heart disease are all increased in patients with obesity. Along with increased risk of stroke increased risk of multiple cancer types. Can also be associated with increased renal dysfunction, strongest risk factor for new onset chronic kidney disease. There is also a degree of compensatory hyperfiltration to meet high in the metabolic demands of increased body weight. This can also result in increased increased risk for nephrolithiasis.         Mineral bone disorder-chronic kidney disease  --secondary hyperparathyroidism renal origin  --Phosphorus is normal  --PTH increased to 361.7 from 181.7, and initially improving from June at 671.9  -- Increase calcitriol to 0.5 mcg daily  --Elevated phosphorus, recommend low phosphorus diet  --If phosphorus continues to increase we will plan to start a phosphate binder     Anemia of chronic kidney disease  --Iron stores are adequate  --Hemoglobin continues to slowly drift down  -- Colonoscopy screening  --Start Epogen     Vitamin-D deficiency  -- Completed ergocalciferol     Low bicarbonate level  -- Increase oral sodium bicarbonate to 1300 mg twice a day      PATIENT INSTRUCTIONS:    Patient Instructions   1.)  Low 2 g sodium diet    2.)  Monitor weights at home    3.)  Avoid NSAIDs (ibuprofen, Motrin, Advil, Aleve, naproxen)    4.)  Monitor blood pressure at home, call if blood pressure greater than 150/90 persistently    5.) I will plan to discuss all results including blood work, and/or imaging at our next visit, unless there is an urgent indication, in which case I will call you earlier. If you have any questions or concerns about your results, please feel free to call our office. 6.)  Strongly recommend you consider placement of a dialysis access for future renal replacement therapy. 7.)  Increase sodium bicarbonate to 1300 mg twice a day. 8.)  Continue close follow-up with nephrology to monitor your kidney function closely. Follow-up in 4 to 6 weeks with repeat blood work. 9.)  If you experience any symptoms of nausea, vomiting, loss of urination, metallic taste, tremors, shortness of breath, chest pain, worsening swelling please go to the nearest emergency room or hospital.    10.)  Increase calcitriol to 0.5 mcg daily    11.)  Low phosphorus diet        Orders Placed This Encounter   Procedures    Cystatin C With eGFR     Standing Status:   Future     Standing Expiration Date:   10/23/2024    PTH, intact     Standing Status:   Future     Standing Expiration Date:   10/23/2024    Phosphorus     Standing Status:   Future     Standing Expiration Date:   10/23/2024    Comprehensive metabolic panel     This is a patient instruction: Patient fasting for 8 hours or longer recommended. Standing Status:   Future     Standing Expiration Date:   10/23/2024       OBJECTIVE:  Current Weight: Weight - Scale: 112 kg (247 lb)  Vitals:    10/23/23 1612 10/23/23 1632   BP:  138/80   Pulse: 68    SpO2: 99%    Weight: 112 kg (247 lb)    Height: 5' 4" (1.626 m)     Body mass index is 42.4 kg/m². REVIEW OF SYSTEMS:    Review of Systems   Constitutional:  Negative for activity change and fever. Respiratory:  Negative for cough, chest tightness, shortness of breath and wheezing. Cardiovascular:  Negative for chest pain and leg swelling. Gastrointestinal:  Negative for abdominal pain, diarrhea, nausea and vomiting. Endocrine: Negative for polyuria. Genitourinary:  Negative for difficulty urinating, dysuria, flank pain, frequency and urgency. Musculoskeletal:  Positive for myalgias. Skin:  Negative for rash. Neurological:  Negative for dizziness, syncope, light-headedness and headaches. PHYSICAL EXAM:      Physical Exam  Vitals and nursing note reviewed. Exam conducted with a chaperone present. Constitutional:       General: She is not in acute distress. Appearance: She is well-developed. She is obese. She is not diaphoretic. HENT:      Head: Normocephalic and atraumatic. Eyes:      General: No scleral icterus. Pupils: Pupils are equal, round, and reactive to light. Cardiovascular:      Rate and Rhythm: Normal rate and regular rhythm. Heart sounds: Normal heart sounds. No murmur heard. No friction rub. No gallop. Pulmonary:      Effort: Pulmonary effort is normal. No respiratory distress. Breath sounds: Normal breath sounds. No wheezing or rales. Chest:      Chest wall: No tenderness. Abdominal:      General: Bowel sounds are normal. There is no distension. Palpations: Abdomen is soft. Tenderness: There is no abdominal tenderness. There is no rebound. Musculoskeletal:         General: Normal range of motion. Cervical back: Normal range of motion and neck supple. Skin:     Findings: No rash. Neurological:      Mental Status: She is alert and oriented to person, place, and time.          Medications:    Current Outpatient Medications:     albuterol (2.5 mg/3 mL) 0.083 % nebulizer solution, Take 3 mL (2.5 mg total) by nebulization every 6 (six) hours as needed for wheezing or shortness of breath, Disp: 30 mL, Rfl: 0    albuterol (PROVENTIL HFA,VENTOLIN HFA) 90 mcg/act inhaler, INHALE 2 PUFFS EVERY 6 HOURS AS NEEDED FOR WHEEZING OR SHORTNESS OF BREATH (COUGH). , Disp: 18 g, Rfl: 1    amLODIPine (NORVASC) 5 mg tablet, TAKE 1 TABLET BY MOUTH TWICE A DAY, Disp: 60 tablet, Rfl: 3    azelastine (ASTELIN) 0.1 % nasal spray, 1 spray into each nostril 2 (two) times a day Use in each nostril as directed, Disp: 1 mL, Rfl: 0    calcitriol (ROCALTROL) 0.5 MCG capsule, Take 1 capsule (0.5 mcg total) by mouth daily, Disp: 90 capsule, Rfl: 3    ergocalciferol (VITAMIN D2) 50,000 units, Take 1 capsule (50,000 Units total) by mouth once a week, Disp: 12 capsule, Rfl: 0    ferrous sulfate 325 (65 Fe) mg tablet, TAKE 1 TABLET BY MOUTH EVERY DAY WITH BREAKFAST, Disp: 30 tablet, Rfl: 4    fluticasone-salmeterol (Advair HFA) 115-21 MCG/ACT inhaler, Inhale 2 puffs 2 (two) times a day Rinse mouth after use., Disp: 12 g, Rfl: 2    labetalol (NORMODYNE) 100 mg tablet, TAKE 1 TABLET BY MOUTH TWICE A DAY, Disp: 60 tablet, Rfl: 3    sodium bicarbonate 650 mg tablet, Take 2 tablets (1,300 mg total) by mouth 2 (two) times a day, Disp: 180 tablet, Rfl: 3    acetaminophen (TYLENOL) 325 mg tablet, Take 2 tablets (650 mg total) by mouth every 6 (six) hours as needed for mild pain or headaches, Disp: 30 tablet, Rfl: 0    Blood Pressure KIT, by Does not apply route daily (Patient not taking: Reported on 9/5/2023), Disp: 1 each, Rfl: 0    Diclofenac Sodium (VOLTAREN) 1 %, Apply 2 g topically 4 (four) times a day, Disp: 150 g, Rfl: 0    etonogestrel (NEXPLANON) subdermal implant, 68 mg by Subdermal route once, Disp: , Rfl:     fluticasone (FLONASE) 50 mcg/act nasal spray, 1 spray into each nostril daily (Patient taking differently: 1 spray into each nostril daily as needed), Disp: 11.1 mL, Rfl: 0    Laboratory Results:        Invalid input(s): "ALBUMIN"    Results for orders placed or performed in visit on 09/27/23   COVID Only -Office Collect    Specimen: Nose; Nares   Result Value Ref Range    SARS-CoV-2 Positive (A) Negative

## 2023-10-23 NOTE — PROGRESS NOTES
Chart reviewed and this RNCM attempted to call patient for follow up. There was no answer and a detailed message was left with a request for a call back.

## 2023-10-23 NOTE — PATIENT INSTRUCTIONS
1. )  Low 2 g sodium diet    2.)  Monitor weights at home    3.)  Avoid NSAIDs (ibuprofen, Motrin, Advil, Aleve, naproxen)    4.)  Monitor blood pressure at home, call if blood pressure greater than 150/90 persistently    5.) I will plan to discuss all results including blood work, and/or imaging at our next visit, unless there is an urgent indication, in which case I will call you earlier. If you have any questions or concerns about your results, please feel free to call our office. 6.)  Strongly recommend you consider placement of a dialysis access for future renal replacement therapy. 7.)  Increase sodium bicarbonate to 1300 mg twice a day. 8.)  Continue close follow-up with nephrology to monitor your kidney function closely. Follow-up in 4 to 6 weeks with repeat blood work.     9.)  If you experience any symptoms of nausea, vomiting, loss of urination, metallic taste, tremors, shortness of breath, chest pain, worsening swelling please go to the nearest emergency room or hospital.    10.)  Increase calcitriol to 0.5 mcg daily    11.)  Low phosphorus diet

## 2023-10-24 ENCOUNTER — DOCUMENTATION (OUTPATIENT)
Dept: NEPHROLOGY | Facility: CLINIC | Age: 28
End: 2023-10-24

## 2023-10-24 NOTE — PROGRESS NOTES
Pt scheduled for Epo on 10/31/23 at [facility] 1:30 pm.  Pt aware to have labs done prior to Epo and going forward every 3 weeks thereafter.

## 2023-10-27 ENCOUNTER — TELEPHONE (OUTPATIENT)
Dept: INFUSION CENTER | Facility: CLINIC | Age: 28
End: 2023-10-27

## 2023-10-27 NOTE — TELEPHONE ENCOUNTER
New patient phone call completed. Appointment time confirmed with patient. Patient aware of need for lab work prior to appointment, patient states she will have labs drawn Monday before appointment. Infusion center policies reviewed to include visitor policy and time out procedure. All questions answered to patient's satisfaction.

## 2023-10-30 ENCOUNTER — APPOINTMENT (OUTPATIENT)
Dept: LAB | Facility: HOSPITAL | Age: 28
End: 2023-10-30
Payer: MEDICARE

## 2023-10-30 DIAGNOSIS — E66.01 MORBID OBESITY WITH BMI OF 40.0-44.9, ADULT (HCC): ICD-10-CM

## 2023-10-30 DIAGNOSIS — N18.9 CHRONIC KIDNEY DISEASE-MINERAL AND BONE DISORDER: ICD-10-CM

## 2023-10-30 DIAGNOSIS — I12.0 BENIGN HYPERTENSION WITH CKD (CHRONIC KIDNEY DISEASE) STAGE V (HCC): ICD-10-CM

## 2023-10-30 DIAGNOSIS — D63.1 ANEMIA DUE TO STAGE 5 CHRONIC KIDNEY DISEASE, NOT ON CHRONIC DIALYSIS: ICD-10-CM

## 2023-10-30 DIAGNOSIS — N25.81 SECONDARY HYPERPARATHYROIDISM OF RENAL ORIGIN (HCC): ICD-10-CM

## 2023-10-30 DIAGNOSIS — E83.9 CHRONIC KIDNEY DISEASE-MINERAL AND BONE DISORDER: ICD-10-CM

## 2023-10-30 DIAGNOSIS — I10 PRIMARY HYPERTENSION: ICD-10-CM

## 2023-10-30 DIAGNOSIS — N18.5 CHRONIC KIDNEY DISEASE (CKD), ACTIVE MEDICAL MANAGEMENT WITHOUT DIALYSIS, STAGE 5 (HCC): ICD-10-CM

## 2023-10-30 DIAGNOSIS — N18.5 BENIGN HYPERTENSION WITH CKD (CHRONIC KIDNEY DISEASE) STAGE V (HCC): ICD-10-CM

## 2023-10-30 DIAGNOSIS — E87.20 METABOLIC ACIDOSIS: ICD-10-CM

## 2023-10-30 DIAGNOSIS — N18.5 ANEMIA DUE TO STAGE 5 CHRONIC KIDNEY DISEASE, NOT ON CHRONIC DIALYSIS: ICD-10-CM

## 2023-10-30 DIAGNOSIS — M89.9 CHRONIC KIDNEY DISEASE-MINERAL AND BONE DISORDER: ICD-10-CM

## 2023-10-30 LAB
ALBUMIN SERPL BCP-MCNC: 4.5 G/DL (ref 3.5–5)
ALP SERPL-CCNC: 45 U/L (ref 34–104)
ALT SERPL W P-5'-P-CCNC: 4 U/L (ref 7–52)
ANION GAP SERPL CALCULATED.3IONS-SCNC: 16 MMOL/L
AST SERPL W P-5'-P-CCNC: 7 U/L (ref 13–39)
BILIRUB SERPL-MCNC: 0.29 MG/DL (ref 0.2–1)
BUN SERPL-MCNC: 83 MG/DL (ref 5–25)
CALCIUM SERPL-MCNC: 9.1 MG/DL (ref 8.4–10.2)
CHLORIDE SERPL-SCNC: 103 MMOL/L (ref 96–108)
CO2 SERPL-SCNC: 20 MMOL/L (ref 21–32)
CREAT SERPL-MCNC: 20.08 MG/DL (ref 0.6–1.3)
GFR SERPL CREATININE-BSD FRML MDRD: 2 ML/MIN/1.73SQ M
GLUCOSE SERPL-MCNC: 131 MG/DL (ref 65–140)
HGB BLD-MCNC: 7 G/DL (ref 11.5–15.4)
PHOSPHATE SERPL-MCNC: 6.4 MG/DL (ref 2.7–4.5)
POTASSIUM SERPL-SCNC: 3.5 MMOL/L (ref 3.5–5.3)
PROT SERPL-MCNC: 7.4 G/DL (ref 6.4–8.4)
PTH-INTACT SERPL-MCNC: 320.7 PG/ML (ref 12–88)
SODIUM SERPL-SCNC: 139 MMOL/L (ref 135–147)

## 2023-10-30 PROCEDURE — 84100 ASSAY OF PHOSPHORUS: CPT

## 2023-10-30 PROCEDURE — 83970 ASSAY OF PARATHORMONE: CPT

## 2023-10-30 PROCEDURE — 85018 HEMOGLOBIN: CPT

## 2023-10-30 PROCEDURE — 36415 COLL VENOUS BLD VENIPUNCTURE: CPT

## 2023-10-30 PROCEDURE — 82610 CYSTATIN C: CPT

## 2023-10-30 PROCEDURE — 80053 COMPREHEN METABOLIC PANEL: CPT

## 2023-10-31 ENCOUNTER — HOSPITAL ENCOUNTER (OUTPATIENT)
Dept: INFUSION CENTER | Facility: CLINIC | Age: 28
Discharge: HOME | End: 2023-10-31
Payer: MEDICARE

## 2023-10-31 VITALS — SYSTOLIC BLOOD PRESSURE: 145 MMHG | WEIGHT: 249 LBS | BODY MASS INDEX: 42.74 KG/M2 | DIASTOLIC BLOOD PRESSURE: 81 MMHG

## 2023-10-31 DIAGNOSIS — N18.5 ANEMIA DUE TO STAGE 5 CHRONIC KIDNEY DISEASE, NOT ON CHRONIC DIALYSIS: Primary | ICD-10-CM

## 2023-10-31 DIAGNOSIS — D63.1 ANEMIA DUE TO STAGE 5 CHRONIC KIDNEY DISEASE, NOT ON CHRONIC DIALYSIS: Primary | ICD-10-CM

## 2023-10-31 PROCEDURE — 96372 THER/PROPH/DIAG INJ SC/IM: CPT

## 2023-10-31 RX ADMIN — EPOETIN ALFA 5650 UNITS: 3000 SOLUTION INTRAVENOUS; SUBCUTANEOUS at 13:48

## 2023-10-31 NOTE — PROGRESS NOTES
Detail Level: Zone Patient arrived for first Epogen injection. BP and labs reviewed. Patient tolerated injection without complication. Patient aware to make follow up appointments. Detail Level: Detailed

## 2023-11-01 LAB
CYSTATIN C SERPL-MCNC: 5.11 MG/L (ref 0.6–1)
GFR/BSA.PRED SERPLBLD CYS-BASED-ARV: 10 ML/MIN/1.73

## 2023-11-03 ENCOUNTER — PATIENT OUTREACH (OUTPATIENT)
Dept: FAMILY MEDICINE CLINIC | Facility: CLINIC | Age: 28
End: 2023-11-03

## 2023-11-03 NOTE — PROGRESS NOTES
Chart reviewed and this RNCM called patient for follow up. Patient says she is doing well. She says she is waiting for her CM at [facility] to call regarding an apt for her transplant work up. Patient says she has all of her medications and she is taking them as directed. She says her appetitive is good and she is avoiding salt. Patient denies any CP, chest tightness, SOB, dizziness, lightheadedness or swelling. Patient denies any CCM needs. Patient declines the need for further follow up from case management.     This RNCM removed self from care team.

## 2023-11-15 DIAGNOSIS — N18.9 ACUTE KIDNEY INJURY SUPERIMPOSED ON CHRONIC KIDNEY DISEASE: ICD-10-CM

## 2023-11-15 DIAGNOSIS — N25.81 SECONDARY HYPERPARATHYROIDISM OF RENAL ORIGIN (HCC): ICD-10-CM

## 2023-11-15 DIAGNOSIS — N17.9 ACUTE KIDNEY INJURY SUPERIMPOSED ON CHRONIC KIDNEY DISEASE: ICD-10-CM

## 2023-11-15 DIAGNOSIS — E55.9 VITAMIN D DEFICIENCY: ICD-10-CM

## 2023-11-15 DIAGNOSIS — D50.9 IRON DEFICIENCY ANEMIA, UNSPECIFIED IRON DEFICIENCY ANEMIA TYPE: ICD-10-CM

## 2023-11-15 DIAGNOSIS — N18.5 CHRONIC KIDNEY DISEASE (CKD), ACTIVE MEDICAL MANAGEMENT WITHOUT DIALYSIS, STAGE 5 (HCC): ICD-10-CM

## 2023-11-16 DIAGNOSIS — J45.20 MILD INTERMITTENT ASTHMA WITHOUT COMPLICATION: ICD-10-CM

## 2023-11-16 RX ORDER — ALBUTEROL SULFATE 90 UG/1
AEROSOL, METERED RESPIRATORY (INHALATION)
Qty: 18 G | Refills: 0 | Status: SHIPPED | OUTPATIENT
Start: 2023-11-16

## 2023-11-16 RX ORDER — ERGOCALCIFEROL 1.25 MG/1
50000 CAPSULE ORAL WEEKLY
Qty: 12 CAPSULE | Refills: 0 | Status: SHIPPED | OUTPATIENT
Start: 2023-11-16

## 2023-11-16 RX ORDER — CALCITRIOL 0.5 UG/1
0.5 CAPSULE, LIQUID FILLED ORAL DAILY
Qty: 90 CAPSULE | Refills: 0 | Status: SHIPPED | OUTPATIENT
Start: 2023-11-16

## 2023-11-16 RX ORDER — FLUTICASONE PROPIONATE AND SALMETEROL XINAFOATE 115; 21 UG/1; UG/1
2 AEROSOL, METERED RESPIRATORY (INHALATION) 2 TIMES DAILY
Qty: 12 G | Refills: 0 | Status: SHIPPED | OUTPATIENT
Start: 2023-11-16

## 2023-11-16 RX ORDER — FERROUS SULFATE 325(65) MG
1 TABLET ORAL
Qty: 30 TABLET | Refills: 0 | Status: SHIPPED | OUTPATIENT
Start: 2023-11-16

## 2023-11-16 RX ORDER — SODIUM BICARBONATE 650 MG/1
1300 TABLET ORAL 2 TIMES DAILY
Qty: 180 TABLET | Refills: 0 | Status: SHIPPED | OUTPATIENT
Start: 2023-11-16

## 2023-11-18 DIAGNOSIS — I10 HYPERTENSION, UNSPECIFIED TYPE: ICD-10-CM

## 2023-11-18 DIAGNOSIS — D50.9 IRON DEFICIENCY ANEMIA, UNSPECIFIED IRON DEFICIENCY ANEMIA TYPE: ICD-10-CM

## 2023-11-18 DIAGNOSIS — N18.9 CHRONIC KIDNEY DISEASE-MINERAL AND BONE DISORDER: ICD-10-CM

## 2023-11-18 DIAGNOSIS — M89.9 CHRONIC KIDNEY DISEASE-MINERAL AND BONE DISORDER: ICD-10-CM

## 2023-11-18 DIAGNOSIS — R80.1 PERSISTENT PROTEINURIA: ICD-10-CM

## 2023-11-18 DIAGNOSIS — N18.5 CHRONIC KIDNEY DISEASE (CKD), ACTIVE MEDICAL MANAGEMENT WITHOUT DIALYSIS, STAGE 5 (HCC): ICD-10-CM

## 2023-11-18 DIAGNOSIS — I12.0 BENIGN HYPERTENSION WITH CKD (CHRONIC KIDNEY DISEASE) STAGE V (HCC): ICD-10-CM

## 2023-11-18 DIAGNOSIS — N18.5 BENIGN HYPERTENSION WITH CKD (CHRONIC KIDNEY DISEASE) STAGE V (HCC): ICD-10-CM

## 2023-11-18 DIAGNOSIS — E83.9 CHRONIC KIDNEY DISEASE-MINERAL AND BONE DISORDER: ICD-10-CM

## 2023-11-18 DIAGNOSIS — N25.81 SECONDARY HYPERPARATHYROIDISM OF RENAL ORIGIN (HCC): ICD-10-CM

## 2023-11-20 ENCOUNTER — APPOINTMENT (OUTPATIENT)
Dept: LAB | Facility: AMBULARY SURGERY CENTER | Age: 28
End: 2023-11-20
Payer: MEDICARE

## 2023-11-20 DIAGNOSIS — D63.1 ANEMIA DUE TO STAGE 5 CHRONIC KIDNEY DISEASE, NOT ON CHRONIC DIALYSIS: ICD-10-CM

## 2023-11-20 DIAGNOSIS — N18.5 ANEMIA DUE TO STAGE 5 CHRONIC KIDNEY DISEASE, NOT ON CHRONIC DIALYSIS: ICD-10-CM

## 2023-11-20 LAB — HGB BLD-MCNC: 7 G/DL (ref 11.5–15.4)

## 2023-11-20 PROCEDURE — 36415 COLL VENOUS BLD VENIPUNCTURE: CPT

## 2023-11-20 PROCEDURE — 85018 HEMOGLOBIN: CPT

## 2023-11-20 RX ORDER — AMLODIPINE BESYLATE 5 MG/1
TABLET ORAL
Qty: 60 TABLET | Refills: 3 | Status: SHIPPED | OUTPATIENT
Start: 2023-11-20

## 2023-11-21 ENCOUNTER — HOSPITAL ENCOUNTER (OUTPATIENT)
Dept: INFUSION CENTER | Facility: CLINIC | Age: 28
Discharge: HOME | End: 2023-11-21
Payer: MEDICARE

## 2023-11-21 VITALS — SYSTOLIC BLOOD PRESSURE: 128 MMHG | BODY MASS INDEX: 41.45 KG/M2 | DIASTOLIC BLOOD PRESSURE: 82 MMHG | WEIGHT: 241.5 LBS

## 2023-11-21 DIAGNOSIS — N18.5 ANEMIA DUE TO STAGE 5 CHRONIC KIDNEY DISEASE, NOT ON CHRONIC DIALYSIS: Primary | ICD-10-CM

## 2023-11-21 DIAGNOSIS — D63.1 ANEMIA DUE TO STAGE 5 CHRONIC KIDNEY DISEASE, NOT ON CHRONIC DIALYSIS: Primary | ICD-10-CM

## 2023-11-21 PROCEDURE — 96372 THER/PROPH/DIAG INJ SC/IM: CPT

## 2023-11-21 RX ADMIN — EPOETIN ALFA 5500 UNITS: 3000 SOLUTION INTRAVENOUS; SUBCUTANEOUS at 14:44

## 2023-11-27 ENCOUNTER — TELEPHONE (OUTPATIENT)
Dept: NEPHROLOGY | Facility: CLINIC | Age: 28
End: 2023-11-27

## 2023-11-27 NOTE — TELEPHONE ENCOUNTER
Pt called office and stated that she picked up her niece today, she felt something deflate and she has lower back pain in kidney area and her legs went numb. I advised pt if she continues in pain to go to ED for evaluation.  And to follow up with provider

## 2023-11-28 ENCOUNTER — OFFICE VISIT (OUTPATIENT)
Dept: URGENT CARE | Age: 28
End: 2023-11-28
Payer: MEDICARE

## 2023-11-28 ENCOUNTER — APPOINTMENT (OUTPATIENT)
Dept: RADIOLOGY | Age: 28
End: 2023-11-28
Payer: MEDICARE

## 2023-11-28 VITALS
SYSTOLIC BLOOD PRESSURE: 160 MMHG | TEMPERATURE: 98.5 F | HEART RATE: 76 BPM | OXYGEN SATURATION: 99 % | DIASTOLIC BLOOD PRESSURE: 92 MMHG | RESPIRATION RATE: 18 BRPM

## 2023-11-28 DIAGNOSIS — M25.562 ACUTE PAIN OF LEFT KNEE: Primary | ICD-10-CM

## 2023-11-28 DIAGNOSIS — M25.562 ACUTE PAIN OF LEFT KNEE: ICD-10-CM

## 2023-11-28 PROCEDURE — 99213 OFFICE O/P EST LOW 20 MIN: CPT | Performed by: PHYSICIAN ASSISTANT

## 2023-11-28 PROCEDURE — 73564 X-RAY EXAM KNEE 4 OR MORE: CPT

## 2023-11-28 NOTE — PROGRESS NOTES
[facility]        NAME: Rodríguez Vo is a 34 y.o. female  : 1995    MRN: [id number]  DATE: 2023  TIME: 7:16 PM    Assessment and Plan   Acute pain of left knee [M25.562]  1. Acute pain of left knee  XR knee 4+ vw left injury    Ambulatory Referral to Orthopedic Surgery            Patient Instructions   No fracture noted on xray. Discussed this is likely a contusion. Rest, ice, elevate, tylenol for discomfort. Ortho referral given   Follow up with PCP in 3-5 days. Proceed to  ER if symptoms worsen. Chief Complaint     Chief Complaint   Patient presents with    Knee Pain     Started Friday and is getting progressively worse. History of Present Illness       HPI  This is a 21-year-old female here complaining of lateral and medial left knee pain. She notes on Friday she was getting into a car and hit her leg off the passenger seat in front of her. She rates her pain a 6 out of 10 at sitting however with bearing weight she rates her pain an 8 out of 10. She notes the pain is occurring on both the medial and lateral side of her knee. She notes she took Tylenol for the pain without any relief. She denies any numbness or tingling of the lower extremity, fever, chills, shortness of breath or chest pain. Review of Systems   Review of Systems   Respiratory:  Negative for shortness of breath. Cardiovascular:  Negative for chest pain. Musculoskeletal:  Positive for arthralgias. Negative for joint swelling.          Current Medications       Current Outpatient Medications:     acetaminophen (TYLENOL) 325 mg tablet, Take 2 tablets (650 mg total) by mouth every 6 (six) hours as needed for mild pain or headaches, Disp: 30 tablet, Rfl: 0    albuterol (2.5 mg/3 mL) 0.083 % nebulizer solution, Take 3 mL (2.5 mg total) by nebulization every 6 (six) hours as needed for wheezing or shortness of breath, Disp: 30 mL, Rfl: 0    albuterol (PROVENTIL HFA,VENTOLIN HFA) 90 mcg/act inhaler, Inhale 2 puffs every 6 hours as needed for wheezing or shortness of breath., Disp: 18 g, Rfl: 0    amLODIPine (NORVASC) 5 mg tablet, TAKE 1 TABLET BY MOUTH TWICE A DAY, Disp: 60 tablet, Rfl: 3    azelastine (ASTELIN) 0.1 % nasal spray, 1 spray into each nostril 2 (two) times a day Use in each nostril as directed, Disp: 1 mL, Rfl: 0    calcitriol (ROCALTROL) 0.5 MCG capsule, Take 1 capsule (0.5 mcg total) by mouth daily, Disp: 90 capsule, Rfl: 0    Diclofenac Sodium (VOLTAREN) 1 %, Apply 2 g topically 4 (four) times a day, Disp: 150 g, Rfl: 0    ergocalciferol (VITAMIN D2) 50,000 units, Take 1 capsule (50,000 Units total) by mouth once a week, Disp: 12 capsule, Rfl: 0    etonogestrel (NEXPLANON) subdermal implant, 68 mg by Subdermal route once, Disp: , Rfl:     ferrous sulfate 325 (65 Fe) mg tablet, Take 1 tablet (325 mg total) by mouth daily with breakfast, Disp: 30 tablet, Rfl: 0    fluticasone (FLONASE) 50 mcg/act nasal spray, 1 spray into each nostril daily (Patient taking differently: 1 spray into each nostril daily as needed), Disp: 11.1 mL, Rfl: 0    fluticasone-salmeterol (Advair HFA) 115-21 MCG/ACT inhaler, Inhale 2 puffs 2 (two) times a day Rinse mouth after use., Disp: 12 g, Rfl: 0    labetalol (NORMODYNE) 100 mg tablet, TAKE 1 TABLET BY MOUTH TWICE A DAY, Disp: 60 tablet, Rfl: 3    sodium bicarbonate 650 mg tablet, Take 2 tablets (1,300 mg total) by mouth 2 (two) times a day, Disp: 180 tablet, Rfl: 0    Blood Pressure KIT, by Does not apply route daily (Patient not taking: Reported on 9/5/2023), Disp: 1 each, Rfl: 0    Current Allergies     Allergies as of 11/28/2023 - Reviewed 11/28/2023   Allergen Reaction Noted    Seasonal ic [cholestatin] Itching 04/14/2023    Wheat bran - food allergy Itching 12/19/2019            The following portions of the patient's history were reviewed and updated as appropriate: allergies, current medications, past family history, past medical history, past social history, past surgical history and problem list.     Past Medical History:   Diagnosis Date    Asthma     Chronic kidney disease     Eczema     Headache     Hypertension     Wears glasses        Past Surgical History:   Procedure Laterality Date    CHOLECYSTECTOMY      CT NEEDLE BIOPSY KIDNEY  1/29/2020    KELOID EXCISION Left 3/30/2021    Procedure: POSTERIOR EAR EXCISION KELOID, FLAP RECONSTRUCTION;  Surgeon: Dino Morse MD;  Location: [facility];  Service: Plastics       Family History   Problem Relation Age of Onset    Asthma Mother     No Known Problems Father          Medications have been verified. Objective   /92   Pulse 76   Temp 98.5 °F (36.9 °C)   Resp 18   SpO2 99%        Physical Exam     Physical Exam  Vitals and nursing note reviewed. Constitutional:       General: She is not in acute distress. Appearance: Normal appearance. She is normal weight. She is not ill-appearing or toxic-appearing. Cardiovascular:      Rate and Rhythm: Normal rate and regular rhythm. Pulmonary:      Effort: Pulmonary effort is normal.      Breath sounds: Normal breath sounds. Musculoskeletal:      Right knee: Normal.      Left knee: No swelling, deformity or effusion. Decreased range of motion. Tenderness present over the medial joint line and lateral joint line. Comments: Decreased ROM with flexion of the knee. Neurological:      Mental Status: She is alert.

## 2023-12-04 ENCOUNTER — OFFICE VISIT (OUTPATIENT)
Dept: OBGYN CLINIC | Facility: CLINIC | Age: 28
End: 2023-12-04
Payer: MEDICARE

## 2023-12-04 VITALS — HEART RATE: 77 BPM | HEIGHT: 64 IN | WEIGHT: 248 LBS | OXYGEN SATURATION: 96 % | BODY MASS INDEX: 42.34 KG/M2

## 2023-12-04 DIAGNOSIS — M25.562 ACUTE PAIN OF LEFT KNEE: ICD-10-CM

## 2023-12-04 DIAGNOSIS — S80.02XA CONTUSION OF LEFT KNEE, INITIAL ENCOUNTER: Primary | ICD-10-CM

## 2023-12-04 DIAGNOSIS — G57.32 NEURALGIA OF LEFT PERONEAL NERVE: ICD-10-CM

## 2023-12-04 PROCEDURE — 99213 OFFICE O/P EST LOW 20 MIN: CPT | Performed by: PHYSICIAN ASSISTANT

## 2023-12-04 NOTE — PROGRESS NOTES
Assessment/Plan   Diagnoses and all orders for this visit:    Contusion of left knee, initial encounter    Acute pain of left knee    Neuritis of left peroneal nerve    - Ice, Tylenol as needed  - Start PT  - Follow up with Dr. Maribell Venegas or Dr. Ahmet Cummins in 4 weeks      Subjective   Patient ID: Gonzalo Boles is a 34 y.o. female. Vitals:    12/04/23 1635   Pulse: 77   SpO2: 96%     27yo female comes in for an evaluation of her left knee. She was injured on 11/25 when she struck her knee while getting out of the car. She then struck it again when getting back in the car. She struck the knee in the area of the fibular head. The pain is in this area and she gets occasional numbness in the 5th toe. She is not able to take NSAIDs due to renal disease, but has been taking Tylenol instead.           The following portions of the patient's history were reviewed and updated as appropriate: allergies, current medications, past family history, past medical history, past social history, past surgical history, and problem list.    Review of Systems  Ortho Exam  Past Medical History:   Diagnosis Date    Asthma     Chronic kidney disease     Eczema     Headache     Hypertension     Wears glasses      Past Surgical History:   Procedure Laterality Date    CHOLECYSTECTOMY      CT NEEDLE BIOPSY KIDNEY  1/29/2020    KELOID EXCISION Left 3/30/2021    Procedure: POSTERIOR EAR EXCISION KELOID, FLAP RECONSTRUCTION;  Surgeon: Terra Lockwood MD;  Location: [facility];  Service: Plastics     Family History   Problem Relation Age of Onset    Asthma Mother     No Known Problems Father      Social History     Occupational History    Occupation: [employer]   Tobacco Use    Smoking status: Never    Smokeless tobacco: Never   Vaping Use    Vaping Use: Never used   Substance and Sexual Activity    Alcohol use: Not Currently     Comment: occassionally on social events    Drug use: No    Sexual activity: Not on file       Review of Systems Constitutional: Negative. HENT: Negative. Eyes: Negative. Respiratory: Negative. Cardiovascular: Negative. Gastrointestinal: Negative. Endocrine: Negative. Genitourinary: Negative. Musculoskeletal: As below. .   Allergic/Immunologic: Negative. Neurological: Negative. Hematological: Negative. Psychiatric/Behavioral: Negative. Objective   Physical Exam    Constitutional: Awake, Alert, Oriented  Eyes: EOMI  Psych: Mood and affect appropriate  Heart: regular rate   Lungs: No audible wheezing  Abdomen: No guarding  Lymph: no lymphedema  left Knee:  Appearance  No swelling, discoloration, deformity, or ecchymosis  Effusion  none  Palpation  + Tenderness lateral joint line and fibular head, mild. No tenderness about the medial / lateral joint line, patella, patellar tendon, MCL, hamstrings, or medial / lateral tibial plateau. ROM  Extension: 0 and Flexion: 90  Special Tests  Kiara's Test negative, Anterior Drawer Test negative, Posterior Drawer Test negative, Valgus Stress Test negative, Varus Stress Test negative, and Patellar apprehension negative  Motor  normal 5/5 in all planes  NVI distally at this time  Xray  I have personally reviewed pertinent films in PACS and my interpretation is no acute displaced fracture.

## 2023-12-11 ENCOUNTER — APPOINTMENT (OUTPATIENT)
Dept: LAB | Facility: CLINIC | Age: 28
End: 2023-12-11
Payer: MEDICARE

## 2023-12-11 DIAGNOSIS — D63.1 ANEMIA DUE TO STAGE 5 CHRONIC KIDNEY DISEASE, NOT ON CHRONIC DIALYSIS: ICD-10-CM

## 2023-12-11 DIAGNOSIS — N18.5 ANEMIA DUE TO STAGE 5 CHRONIC KIDNEY DISEASE, NOT ON CHRONIC DIALYSIS: ICD-10-CM

## 2023-12-11 LAB — HGB BLD-MCNC: 7 G/DL (ref 11.5–15.4)

## 2023-12-11 PROCEDURE — 85018 HEMOGLOBIN: CPT

## 2023-12-11 PROCEDURE — 36415 COLL VENOUS BLD VENIPUNCTURE: CPT

## 2023-12-12 ENCOUNTER — HOSPITAL ENCOUNTER (OUTPATIENT)
Dept: INFUSION CENTER | Facility: CLINIC | Age: 28
Discharge: HOME | End: 2023-12-12
Payer: MEDICARE

## 2023-12-12 VITALS — WEIGHT: 241 LBS | BODY MASS INDEX: 41.37 KG/M2 | SYSTOLIC BLOOD PRESSURE: 142 MMHG | DIASTOLIC BLOOD PRESSURE: 82 MMHG

## 2023-12-12 DIAGNOSIS — D63.1 ANEMIA DUE TO STAGE 5 CHRONIC KIDNEY DISEASE, NOT ON CHRONIC DIALYSIS: Primary | ICD-10-CM

## 2023-12-12 DIAGNOSIS — N18.5 ANEMIA DUE TO STAGE 5 CHRONIC KIDNEY DISEASE, NOT ON CHRONIC DIALYSIS: Primary | ICD-10-CM

## 2023-12-12 PROCEDURE — 96372 THER/PROPH/DIAG INJ SC/IM: CPT

## 2023-12-12 RX ADMIN — EPOETIN ALFA 5450 UNITS: 3000 SOLUTION INTRAVENOUS; SUBCUTANEOUS at 15:01

## 2023-12-12 NOTE — PROGRESS NOTES
Patient here for epogen. Labs reviewed, hgb 7.0, appears to be around patients baseline. /82. Injection given in LUE. Patient tolerated treatment without complications. Patient given AVS. Reviewed upcoming appointments with patient. Patient is aware of [facility] appointment scheduled [facility] on 1/2 at 2:30.

## 2023-12-17 DIAGNOSIS — D50.9 IRON DEFICIENCY ANEMIA, UNSPECIFIED IRON DEFICIENCY ANEMIA TYPE: ICD-10-CM

## 2023-12-18 RX ORDER — FERROUS SULFATE 325(65) MG
1 TABLET ORAL
Qty: 30 TABLET | Refills: 0 | Status: SHIPPED | OUTPATIENT
Start: 2023-12-18

## 2023-12-19 DIAGNOSIS — N25.81 SECONDARY HYPERPARATHYROIDISM OF RENAL ORIGIN (HCC): ICD-10-CM

## 2023-12-19 DIAGNOSIS — E55.9 VITAMIN D DEFICIENCY: ICD-10-CM

## 2023-12-21 ENCOUNTER — OFFICE VISIT (OUTPATIENT)
Dept: URGENT CARE | Facility: CLINIC | Age: 28
End: 2023-12-21
Payer: MEDICARE

## 2023-12-21 VITALS
HEART RATE: 84 BPM | DIASTOLIC BLOOD PRESSURE: 92 MMHG | TEMPERATURE: 98.1 F | RESPIRATION RATE: 16 BRPM | OXYGEN SATURATION: 100 % | SYSTOLIC BLOOD PRESSURE: 183 MMHG

## 2023-12-21 DIAGNOSIS — J30.81 CAT ALLERGY, AIRBORNE: Primary | ICD-10-CM

## 2023-12-21 PROCEDURE — 99213 OFFICE O/P EST LOW 20 MIN: CPT

## 2023-12-21 RX ORDER — CALCITRIOL 0.5 UG/1
0.5 CAPSULE, LIQUID FILLED ORAL DAILY
Qty: 90 CAPSULE | Refills: 0 | Status: SHIPPED | OUTPATIENT
Start: 2023-12-21

## 2023-12-21 RX ORDER — FLUTICASONE PROPIONATE 50 MCG
1 SPRAY, SUSPENSION (ML) NASAL DAILY
Qty: 15.8 ML | Refills: 0 | Status: SHIPPED | OUTPATIENT
Start: 2023-12-21

## 2023-12-21 RX ORDER — LORATADINE 10 MG/1
10 TABLET ORAL DAILY
Qty: 20 TABLET | Refills: 0 | Status: SHIPPED | OUTPATIENT
Start: 2023-12-21

## 2023-12-21 RX ORDER — ERGOCALCIFEROL 1.25 MG/1
50000 CAPSULE ORAL WEEKLY
Qty: 12 CAPSULE | Refills: 0 | Status: SHIPPED | OUTPATIENT
Start: 2023-12-21

## 2023-12-22 NOTE — PROGRESS NOTES
[facility]        NAME: Raven Simeon is a 28 y.o. female  : 1995    MRN: [id number]  DATE: 2023  TIME: 8:00 PM    Assessment and Plan   Cat allergy, airborne [J30.81]  1. Cat allergy, airborne  fluticasone (Flonase Allergy Relief) 50 mcg/act nasal spray    loratadine (CLARITIN) 10 mg tablet            Patient Instructions   Take allergy medications as prescribed.  You can get this medication over the counter as well or contact your PCP for a long term supply.      Chief Complaint     Chief Complaint   Patient presents with   • Nasal Congestion     Congestion, itchy throat. States there a lot of stray cats around. Took sister to the bus and started sneezing.          History of Present Illness       Stray cats around outside, started with sneezing, itchy eyes 2 days ago. Used vicks without relief.         Review of Systems   Review of Systems   HENT:  Positive for rhinorrhea and sneezing.    Eyes:  Positive for itching.   Respiratory:  Positive for cough. Negative for shortness of breath.    Cardiovascular:  Negative for chest pain.         Current Medications       Current Outpatient Medications:   •  acetaminophen (TYLENOL) 325 mg tablet, Take 2 tablets (650 mg total) by mouth every 6 (six) hours as needed for mild pain or headaches, Disp: 30 tablet, Rfl: 0  •  albuterol (2.5 mg/3 mL) 0.083 % nebulizer solution, Take 3 mL (2.5 mg total) by nebulization every 6 (six) hours as needed for wheezing or shortness of breath, Disp: 30 mL, Rfl: 0  •  albuterol (PROVENTIL HFA,VENTOLIN HFA) 90 mcg/act inhaler, Inhale 2 puffs every 6 hours as needed for wheezing or shortness of breath., Disp: 18 g, Rfl: 0  •  amLODIPine (NORVASC) 5 mg tablet, TAKE 1 TABLET BY MOUTH TWICE A DAY, Disp: 60 tablet, Rfl: 3  •  calcitriol (ROCALTROL) 0.5 MCG capsule, Take 1 capsule (0.5 mcg total) by mouth daily, Disp: 90 capsule, Rfl: 0  •  Diclofenac Sodium (VOLTAREN) 1 %, Apply 2 g topically 4 (four) times a  day, Disp: 150 g, Rfl: 0  •  ergocalciferol (VITAMIN D2) 50,000 units, Take 1 capsule (50,000 Units total) by mouth once a week, Disp: 12 capsule, Rfl: 0  •  etonogestrel (NEXPLANON) subdermal implant, 68 mg by Subdermal route once, Disp: , Rfl:   •  ferrous sulfate 325 (65 Fe) mg tablet, TAKE 1 TABLET BY MOUTH EVERY DAY WITH BREAKFAST, Disp: 30 tablet, Rfl: 0  •  fluticasone (Flonase Allergy Relief) 50 mcg/act nasal spray, 1 spray into each nostril daily, Disp: 15.8 mL, Rfl: 0  •  fluticasone-salmeterol (Advair HFA) 115-21 MCG/ACT inhaler, Inhale 2 puffs 2 (two) times a day Rinse mouth after use., Disp: 12 g, Rfl: 0  •  labetalol (NORMODYNE) 100 mg tablet, TAKE 1 TABLET BY MOUTH TWICE A DAY, Disp: 60 tablet, Rfl: 3  •  loratadine (CLARITIN) 10 mg tablet, Take 1 tablet (10 mg total) by mouth daily, Disp: 20 tablet, Rfl: 0  •  sodium bicarbonate 650 mg tablet, Take 2 tablets (1,300 mg total) by mouth 2 (two) times a day, Disp: 180 tablet, Rfl: 0  •  azelastine (ASTELIN) 0.1 % nasal spray, 1 spray into each nostril 2 (two) times a day Use in each nostril as directed (Patient not taking: Reported on 12/21/2023), Disp: 1 mL, Rfl: 0  •  Blood Pressure KIT, by Does not apply route daily (Patient not taking: Reported on 9/5/2023), Disp: 1 each, Rfl: 0  •  fluticasone (FLONASE) 50 mcg/act nasal spray, 1 spray into each nostril daily (Patient not taking: Reported on 12/21/2023), Disp: 11.1 mL, Rfl: 0    Current Allergies     Allergies as of 12/21/2023 - Reviewed 12/21/2023   Allergen Reaction Noted   • Seasonal ic [cholestatin] Itching 04/14/2023   • Wheat bran - food allergy Itching 12/19/2019            The following portions of the patient's history were reviewed and updated as appropriate: allergies, current medications, past family history, past medical history, past social history, past surgical history and problem list.     Past Medical History:   Diagnosis Date   • Asthma    • Chronic kidney disease    • Eczema    •  Headache    • Hypertension    • Wears glasses        Past Surgical History:   Procedure Laterality Date   • CHOLECYSTECTOMY     • CT NEEDLE BIOPSY KIDNEY  1/29/2020   • KELOID EXCISION Left 3/30/2021    Procedure: POSTERIOR EAR EXCISION KELOID, FLAP RECONSTRUCTION;  Surgeon: Beka Hugo MD;  Location[facility];  Service: Plastics       Family History   Problem Relation Age of Onset   • Asthma Mother    • No Known Problems Father          Medications have been verified.        Objective   BP (!) 183/92   Pulse 84   Temp 98.1 °F (36.7 °C) (Temporal)   Resp 16   SpO2 100%   No LMP recorded.       Physical Exam     Physical Exam  Vitals and nursing note reviewed.   Constitutional:       Appearance: Normal appearance.   HENT:      Head: Normocephalic and atraumatic.   Eyes:      General: Allergic shiner present.      Pupils: Pupils are equal, round, and reactive to light.   Pulmonary:      Effort: Pulmonary effort is normal.      Breath sounds: Normal breath sounds. No wheezing, rhonchi or rales.   Skin:     General: Skin is warm and dry.      Capillary Refill: Capillary refill takes less than 2 seconds.   Neurological:      General: No focal deficit present.      Mental Status: She is alert and oriented to person, place, and time. Mental status is at baseline.      Sensory: No sensory deficit.      Motor: No weakness.   Psychiatric:         Mood and Affect: Mood normal.         Behavior: Behavior normal.         Thought Content: Thought content normal.

## 2023-12-22 NOTE — PATIENT INSTRUCTIONS
Take allergy medications as prescribed.  You can get this medication over the counter as well or contact your PCP for a long term supply.

## 2023-12-28 ENCOUNTER — APPOINTMENT (OUTPATIENT)
Dept: LAB | Facility: HOSPITAL | Age: 28
End: 2023-12-28
Payer: MEDICARE

## 2023-12-28 DIAGNOSIS — N18.5 ANEMIA DUE TO STAGE 5 CHRONIC KIDNEY DISEASE, NOT ON CHRONIC DIALYSIS: ICD-10-CM

## 2023-12-28 DIAGNOSIS — D63.1 ANEMIA DUE TO STAGE 5 CHRONIC KIDNEY DISEASE, NOT ON CHRONIC DIALYSIS: ICD-10-CM

## 2023-12-28 LAB — HGB BLD-MCNC: 7.5 G/DL (ref 11.5–15.4)

## 2023-12-28 PROCEDURE — 85018 HEMOGLOBIN: CPT

## 2023-12-28 PROCEDURE — 36415 COLL VENOUS BLD VENIPUNCTURE: CPT

## 2024-01-02 ENCOUNTER — HOSPITAL ENCOUNTER (OUTPATIENT)
Dept: INFUSION CENTER | Facility: CLINIC | Age: 29
Discharge: HOME | End: 2024-01-02
Payer: MEDICARE

## 2024-01-02 VITALS — SYSTOLIC BLOOD PRESSURE: 132 MMHG | BODY MASS INDEX: 41.11 KG/M2 | WEIGHT: 239.5 LBS | DIASTOLIC BLOOD PRESSURE: 78 MMHG

## 2024-01-02 DIAGNOSIS — D63.1 ANEMIA DUE TO STAGE 5 CHRONIC KIDNEY DISEASE, NOT ON CHRONIC DIALYSIS: Primary | ICD-10-CM

## 2024-01-02 DIAGNOSIS — N18.5 ANEMIA DUE TO STAGE 5 CHRONIC KIDNEY DISEASE, NOT ON CHRONIC DIALYSIS: Primary | ICD-10-CM

## 2024-01-02 RX ADMIN — EPOETIN ALFA 5450 UNITS: 3000 SOLUTION INTRAVENOUS; SUBCUTANEOUS at 14:49

## 2024-01-02 NOTE — PROGRESS NOTES
Pt resting comfortably with no complaints. Epogen Shot administered as ordered. Pt tolerated well. Declines AVS, aware of next appointment 1/23/24 at 1400 Pt has mychart.

## 2024-01-06 DIAGNOSIS — E55.9 VITAMIN D DEFICIENCY: ICD-10-CM

## 2024-01-06 DIAGNOSIS — J45.20 MILD INTERMITTENT ASTHMA WITHOUT COMPLICATION: ICD-10-CM

## 2024-01-08 RX ORDER — ALBUTEROL SULFATE 90 UG/1
AEROSOL, METERED RESPIRATORY (INHALATION)
Qty: 18 G | Refills: 1 | Status: SHIPPED | OUTPATIENT
Start: 2024-01-08

## 2024-01-09 RX ORDER — ERGOCALCIFEROL 1.25 MG/1
50000 CAPSULE ORAL WEEKLY
Qty: 12 CAPSULE | Refills: 0 | OUTPATIENT
Start: 2024-01-09

## 2024-01-11 PROCEDURE — 99282 EMERGENCY DEPT VISIT SF MDM: CPT

## 2024-01-12 ENCOUNTER — HOSPITAL ENCOUNTER (EMERGENCY)
Facility: HOSPITAL | Age: 29
Discharge: HOME | End: 2024-01-12
Attending: EMERGENCY MEDICINE
Payer: MEDICARE

## 2024-01-12 VITALS
SYSTOLIC BLOOD PRESSURE: 169 MMHG | HEART RATE: 82 BPM | OXYGEN SATURATION: 100 % | HEIGHT: 64 IN | DIASTOLIC BLOOD PRESSURE: 86 MMHG | BODY MASS INDEX: 41.11 KG/M2 | RESPIRATION RATE: 18 BRPM | TEMPERATURE: 98.2 F

## 2024-01-12 DIAGNOSIS — N18.9 ACUTE KIDNEY INJURY SUPERIMPOSED ON CHRONIC KIDNEY DISEASE: ICD-10-CM

## 2024-01-12 DIAGNOSIS — H57.89 IRRITATION OF LEFT EYE: ICD-10-CM

## 2024-01-12 DIAGNOSIS — H10.9 CONJUNCTIVITIS: Primary | ICD-10-CM

## 2024-01-12 DIAGNOSIS — N17.9 ACUTE KIDNEY INJURY SUPERIMPOSED ON CHRONIC KIDNEY DISEASE: ICD-10-CM

## 2024-01-12 DIAGNOSIS — N18.5 CHRONIC KIDNEY DISEASE (CKD), ACTIVE MEDICAL MANAGEMENT WITHOUT DIALYSIS, STAGE 5 (HCC): ICD-10-CM

## 2024-01-12 PROCEDURE — 99284 EMERGENCY DEPT VISIT MOD MDM: CPT

## 2024-01-12 RX ORDER — SODIUM BICARBONATE 650 MG/1
1300 TABLET ORAL 2 TIMES DAILY
Qty: 180 TABLET | Refills: 0 | Status: SHIPPED | OUTPATIENT
Start: 2024-01-12

## 2024-01-12 RX ORDER — TETRACAINE HYDROCHLORIDE 5 MG/ML
2 SOLUTION OPHTHALMIC ONCE
Status: COMPLETED | OUTPATIENT
Start: 2024-01-12 | End: 2024-01-12

## 2024-01-12 RX ORDER — ERYTHROMYCIN 5 MG/G
0.5 OINTMENT OPHTHALMIC ONCE
Status: COMPLETED | OUTPATIENT
Start: 2024-01-12 | End: 2024-01-12

## 2024-01-12 RX ORDER — ERYTHROMYCIN 5 MG/G
OINTMENT OPHTHALMIC
Qty: 3.5 G | Refills: 0 | Status: SHIPPED | OUTPATIENT
Start: 2024-01-12

## 2024-01-12 RX ADMIN — TETRACAINE HYDROCHLORIDE 2 DROP: 5 SOLUTION OPHTHALMIC at 00:32

## 2024-01-12 RX ADMIN — FLUORESCEIN SODIUM 1 STRIP: 1 STRIP OPHTHALMIC at 00:33

## 2024-01-12 RX ADMIN — ERYTHROMYCIN 0.5 INCH: 5 OINTMENT OPHTHALMIC at 01:14

## 2024-01-12 NOTE — DISCHARGE INSTRUCTIONS
Today I provided prescription for erythromycin ointment.  Use as directed.  Follow-up with your ophthalmologist as soon as they have availability for further evaluation.  Return to ED for new or worsening symptoms.

## 2024-01-12 NOTE — ED PROVIDER NOTES
History  Chief Complaint   Patient presents with    Eye Problem     Pt reports increased irritation to her L eye +redness +swelling +drainage States feels like something is scratching her eye      28-year-old female presents to ED for evaluation of left eye redness, irritation, drainage.  Patient states her left eye feels pruritic.  Symptoms have been present since earlier today.  Denies working around flying debris such as metal scraps, sawdust, etc.. No welding. Patient states earlier she was trying to remove a dried eye crust. In the process, she itched her eye. Since then there has been irritation. Denies loss of vision of the left eye, blurry vision, headache, nausea, vomiting, fever, chills, shortness of breath, chest pain. Has an ophthalmologist who she can see if she makes an appointment. Drinage from left eye was clear and non purulent. Has history of various allergies, including cats. Her neighbors have been feeding local outdoor cats which is increasing the amount of cat dander in her environment. Patient denies angioedema, facial swelling, difficulty breathing, sensation of airway collapse.        Prior to Admission Medications   Prescriptions Last Dose Informant Patient Reported? Taking?   Blood Pressure KIT  Self No No   Sig: by Does not apply route daily   Patient not taking: Reported on 9/5/2023   Diclofenac Sodium (VOLTAREN) 1 %  Self No No   Sig: Apply 2 g topically 4 (four) times a day   acetaminophen (TYLENOL) 325 mg tablet  Self No No   Sig: Take 2 tablets (650 mg total) by mouth every 6 (six) hours as needed for mild pain or headaches   albuterol (2.5 mg/3 mL) 0.083 % nebulizer solution  Self No No   Sig: Take 3 mL (2.5 mg total) by nebulization every 6 (six) hours as needed for wheezing or shortness of breath   albuterol (PROVENTIL HFA,VENTOLIN HFA) 90 mcg/act inhaler   No No   Sig: Inhale 2 puffs every 6 hours as needed for wheezing or shortness of breath.   amLODIPine (NORVASC) 5 mg tablet   Self No No   Sig: TAKE 1 TABLET BY MOUTH TWICE A DAY   azelastine (ASTELIN) 0.1 % nasal spray  Self No No   Si spray into each nostril 2 (two) times a day Use in each nostril as directed   Patient not taking: Reported on 2023   calcitriol (ROCALTROL) 0.5 MCG capsule  Self No No   Sig: Take 1 capsule (0.5 mcg total) by mouth daily   etonogestrel (NEXPLANON) subdermal implant  Self Yes No   Si mg by Subdermal route once   ferrous sulfate 325 (65 Fe) mg tablet  Self No No   Sig: TAKE 1 TABLET BY MOUTH EVERY DAY WITH BREAKFAST   fluticasone (FLONASE) 50 mcg/act nasal spray  Self No No   Si spray into each nostril daily   Patient not taking: Reported on 2023   fluticasone (Flonase Allergy Relief) 50 mcg/act nasal spray   No No   Si spray into each nostril daily   fluticasone-salmeterol (Advair HFA) 115-21 MCG/ACT inhaler  Self No No   Sig: Inhale 2 puffs 2 (two) times a day Rinse mouth after use.   labetalol (NORMODYNE) 100 mg tablet  Self No No   Sig: TAKE 1 TABLET BY MOUTH TWICE A DAY   loratadine (CLARITIN) 10 mg tablet   No No   Sig: Take 1 tablet (10 mg total) by mouth daily   sodium bicarbonate 650 mg tablet  Self No No   Sig: Take 2 tablets (1,300 mg total) by mouth 2 (two) times a day      Facility-Administered Medications: None       Past Medical History:   Diagnosis Date    Asthma     Chronic kidney disease     Eczema     Headache     Hypertension     Wears glasses        Past Surgical History:   Procedure Laterality Date    CHOLECYSTECTOMY      CT NEEDLE BIOPSY KIDNEY  2020    KELOID EXCISION Left 3/30/2021    Procedure: POSTERIOR EAR EXCISION KELOID, FLAP RECONSTRUCTION;  Surgeon: Beka Hugo MD;  Location[facility];  Service: Plastics       Family History   Problem Relation Age of Onset    Asthma Mother     No Known Problems Father      I have reviewed and agree with the history as documented.    E-Cigarette/Vaping    E-Cigarette Use Never User       E-Cigarette/Vaping Substances    Nicotine No     THC No     CBD No     Flavoring No     Other No     Unknown No      Social History     Tobacco Use    Smoking status: Never    Smokeless tobacco: Never   Vaping Use    Vaping status: Never Used   Substance Use Topics    Alcohol use: Not Currently     Comment: occassionally on social events    Drug use: No       Review of Systems   Constitutional:  Negative for chills, diaphoresis, fatigue and fever.   HENT:  Negative for ear pain and sore throat.    Eyes:  Positive for discharge, redness and itching. Negative for photophobia, pain and visual disturbance.   Respiratory:  Negative for cough, shortness of breath, wheezing and stridor.    Cardiovascular:  Negative for chest pain and palpitations.   Gastrointestinal:  Negative for abdominal pain and vomiting.   Genitourinary:  Negative for dysuria and hematuria.   Musculoskeletal:  Negative for arthralgias and back pain.   Skin:  Negative for color change and rash.   Neurological:  Negative for dizziness, tremors, seizures, syncope, facial asymmetry, speech difficulty, weakness, light-headedness, numbness and headaches.   All other systems reviewed and are negative.      Physical Exam  Physical Exam  Vitals and nursing note reviewed.   Constitutional:       General: She is not in acute distress.     Appearance: Normal appearance. She is well-developed. She is not ill-appearing, toxic-appearing or diaphoretic.   HENT:      Head: Normocephalic and atraumatic.      Nose: No congestion or rhinorrhea.      Mouth/Throat:      Mouth: Mucous membranes are moist.      Pharynx: Oropharynx is clear.   Eyes:      General: Lids are normal. Lids are everted, no foreign bodies appreciated. Vision grossly intact. Gaze aligned appropriately. No visual field deficit or scleral icterus.     Extraocular Movements: Extraocular movements intact.      Right eye: Normal extraocular motion and no nystagmus.      Left eye: Normal extraocular  motion and no nystagmus.      Conjunctiva/sclera:      Right eye: Right conjunctiva is not injected. No chemosis, exudate or hemorrhage.     Left eye: Left conjunctiva is injected. No chemosis, exudate or hemorrhage.     Pupils: Pupils are equal, round, and reactive to light. Pupils are equal.      Left eye: Pupil is round and reactive. No corneal abrasion or fluorescein uptake.   Cardiovascular:      Rate and Rhythm: Normal rate and regular rhythm.      Pulses: Normal pulses.      Heart sounds: Normal heart sounds. No murmur heard.     No friction rub. No gallop.   Pulmonary:      Effort: Pulmonary effort is normal. No respiratory distress.      Breath sounds: Normal breath sounds. No stridor. No wheezing, rhonchi or rales.   Abdominal:      Palpations: Abdomen is soft.      Tenderness: There is no abdominal tenderness.   Musculoskeletal:         General: No swelling.      Cervical back: Neck supple.   Skin:     General: Skin is warm and dry.      Capillary Refill: Capillary refill takes less than 2 seconds.   Neurological:      Mental Status: She is alert.   Psychiatric:         Mood and Affect: Mood normal.         Vital Signs  ED Triage Vitals [01/12/24 0008]   Temperature Pulse Respirations Blood Pressure SpO2   98.2 °F (36.8 °C) 83 17 169/86 100 %      Temp Source Heart Rate Source Patient Position - Orthostatic VS BP Location FiO2 (%)   Oral Monitor Sitting Right arm --      Pain Score       9           Vitals:    01/12/24 0008   BP: 169/86   Pulse: 83   Patient Position - Orthostatic VS: Sitting         Visual Acuity  Visual Acuity      Flowsheet Row Most Recent Value   Visual acuity R eye is 20/20   Visual acuity Left eye is Other  [20/15]   Visual acuity in both eyes is Other  [20/13]   Wearing corrective eyewear/lenses? No            ED Medications  Medications   erythromycin (ILOTYCIN) 0.5 % ophthalmic ointment 0.5 inch (has no administration in time range)   fluorescein sodium sterile ophthalmic strip 1  strip (1 strip Left Eye Given by Other 1/12/24 0033)   tetracaine 0.5 % ophthalmic solution 2 drop (2 drops Left Eye Given by Other 1/12/24 0032)       Diagnostic Studies  Results Reviewed       None                   No orders to display              Procedures  Procedures         ED Course                                             Medical Decision Making  28-year-old female presents to ED for evaluation of left eye problem as above.  Since earlier today patient has had eye irritation, pruritus, conjunctival injection, clear drainage.  Has history of allergies with recent increase in exposure to one of her allergens which is cats.  No evidence of airway compromise.  Patient normotensive, nontachycardic, afebrile, without respiratory distress. No angioedema. Normal breath sounds. No murmur.  Bilateral visual acuity tested without evidence of acute disruption of visual acuity.  Left eye with conjunctival injection.  Pupils equal and reactive bilaterally.  No pain with EOM.  No periorbital swelling.  No foreign bodies seen under patient's eyelids.  Performed fluorescein staining which revealed no fluorescein uptake.  No Scott sign.  Patient symptoms improved after tetracaine eyedrops which supports possibility of a mild corneal abrasion as the cause of eye irritation.  No purulent drainage to suggest a bacterial conjunctivitis.  Patient's description of a thin, clear drainage from the eye is more consistent with a allergic/viral cause of conjunctivitis.  Regardless, feel patient would benefit from a topical erythromycin ointment in the event that there is a corneal abrasion present from frequent rubbing of the left eye today.  Patient instructed to follow-up with her ophthalmologist as soon as there is availability for continued monitoring of left eye irritation.  Advised to return to ED for new or worsening symptoms.  Prescription for erythromycin ointment sent to patient's pharmacy.  First dose given in ED.   Patient expresses understanding of instructions.    Prior to discharge, discharge instructions were discussed with patient at bedside. Patient was provided both verbal and written instructions. Patient is understanding of the discharge instructions and is agreeable to plan of care. Return precautions were discussed with patient bedside, patient verbalized understanding of signs and symptoms that would necessitate return to the ED. All questions were answered. Patient was comfortable with the plan of care and discharged to home.     Risk  Prescription drug management.             Disposition  Final diagnoses:   Conjunctivitis   Irritation of left eye     Time reflects when diagnosis was documented in both MDM as applicable and the Disposition within this note       Time User Action Codes Description Comment    1/12/2024 12:57 AM Samm Montes [H10.9] Conjunctivitis     1/12/2024 12:58 AM Samm Montes [H57.89] Irritation of left eye           ED Disposition       ED Disposition   Discharge    Condition   Stable    Date/Time   Fri Jan 12, 2024 12:57 AM    Comment   Raven Simeon discharge to home/self care.                   Follow-up Information       Follow up With Specialties Details Why Contact Info    Zuleika Sheffield DO Family Medicine   [address] [place] [id number] [phone]              Patient's Medications   Discharge Prescriptions    ERYTHROMYCIN (ILOTYCIN) OPHTHALMIC OINTMENT    Place a 1/2 inch ribbon of ointment into the lower eyelid.       Start Date: 1/12/2024 End Date: --       Order Dose: --       Quantity: 3.5 g    Refills: 0       No discharge procedures on file.    PDMP Review         Value Time User    PDMP Reviewed  Yes 4/8/2023  1:24 AM Saurav Hagan MD            ED Provider  Electronically Signed by             Samm Montes PA-C  01/12/24 0138

## 2024-01-15 ENCOUNTER — VBI (OUTPATIENT)
Dept: FAMILY MEDICINE CLINIC | Facility: CLINIC | Age: 29
End: 2024-01-15

## 2024-01-15 NOTE — TELEPHONE ENCOUNTER
01/15/24 10:43 AM    Patient contacted post ED visit, first outreach attempt made. Message was left for patient to return a call to the [facility] Department at [facility]: Phone [phone].    Thank you.  Ava Delgado MA  [facility]

## 2024-01-16 NOTE — TELEPHONE ENCOUNTER
01/16/24 10:46 AM    Patient contacted post ED visit, [facility] department spoke with patient/caregiver and outreach was successful.    Thank you.  Ava Delgado MA  [facility]

## 2024-01-18 DIAGNOSIS — D50.9 IRON DEFICIENCY ANEMIA, UNSPECIFIED IRON DEFICIENCY ANEMIA TYPE: ICD-10-CM

## 2024-01-18 RX ORDER — FERROUS SULFATE 325(65) MG
1 TABLET ORAL
Qty: 30 TABLET | Refills: 0 | Status: SHIPPED | OUTPATIENT
Start: 2024-01-18

## 2024-01-23 ENCOUNTER — HOSPITAL ENCOUNTER (OUTPATIENT)
Dept: INFUSION CENTER | Facility: CLINIC | Age: 29
Discharge: HOME | End: 2024-01-23
Payer: MEDICARE

## 2024-01-23 ENCOUNTER — HOSPITAL ENCOUNTER (INPATIENT)
Facility: HOSPITAL | Age: 29
LOS: 1 days | Discharge: HOME | DRG: 469 | End: 2024-01-24
Attending: EMERGENCY MEDICINE | Admitting: INTERNAL MEDICINE
Payer: MEDICARE

## 2024-01-23 ENCOUNTER — APPOINTMENT (OUTPATIENT)
Dept: LAB | Facility: CLINIC | Age: 29
End: 2024-01-23
Payer: MEDICARE

## 2024-01-23 VITALS — WEIGHT: 236 LBS | BODY MASS INDEX: 40.51 KG/M2

## 2024-01-23 DIAGNOSIS — N18.5 ANEMIA DUE TO STAGE 5 CHRONIC KIDNEY DISEASE, NOT ON CHRONIC DIALYSIS: Primary | ICD-10-CM

## 2024-01-23 DIAGNOSIS — E87.20 METABOLIC ACIDOSIS: Primary | ICD-10-CM

## 2024-01-23 DIAGNOSIS — D63.1 ANEMIA DUE TO STAGE 5 CHRONIC KIDNEY DISEASE, NOT ON CHRONIC DIALYSIS: ICD-10-CM

## 2024-01-23 DIAGNOSIS — D63.1 ANEMIA DUE TO STAGE 5 CHRONIC KIDNEY DISEASE, NOT ON CHRONIC DIALYSIS: Primary | ICD-10-CM

## 2024-01-23 DIAGNOSIS — R42 LIGHTHEADEDNESS: ICD-10-CM

## 2024-01-23 DIAGNOSIS — N18.5 ANEMIA DUE TO STAGE 5 CHRONIC KIDNEY DISEASE, NOT ON CHRONIC DIALYSIS: ICD-10-CM

## 2024-01-23 DIAGNOSIS — N18.5 STAGE 5 CHRONIC KIDNEY DISEASE (HCC): ICD-10-CM

## 2024-01-23 LAB
ABO GROUP BLD: NORMAL
ABO GROUP BLD: NORMAL
ALBUMIN SERPL BCP-MCNC: 4.5 G/DL (ref 3.5–5)
ALP SERPL-CCNC: 42 U/L (ref 34–104)
ALT SERPL W P-5'-P-CCNC: 3 U/L (ref 7–52)
ANION GAP SERPL CALCULATED.3IONS-SCNC: 18 MMOL/L
AST SERPL W P-5'-P-CCNC: 10 U/L (ref 13–39)
BACTERIA UR QL AUTO: NORMAL /HPF
BASOPHILS # BLD AUTO: 0.03 THOUSANDS/ÂΜL (ref 0–0.1)
BASOPHILS NFR BLD AUTO: 0 % (ref 0–1)
BILIRUB SERPL-MCNC: 0.36 MG/DL (ref 0.2–1)
BILIRUB UR QL STRIP: NEGATIVE
BLD GP AB SCN SERPL QL: NEGATIVE
BUN SERPL-MCNC: 90 MG/DL (ref 5–25)
CALCIUM SERPL-MCNC: 10.1 MG/DL (ref 8.4–10.2)
CHLORIDE SERPL-SCNC: 103 MMOL/L (ref 96–108)
CLARITY UR: CLEAR
CO2 SERPL-SCNC: 17 MMOL/L (ref 21–32)
COLOR UR: COLORLESS
CREAT SERPL-MCNC: 22.29 MG/DL (ref 0.6–1.3)
EOSINOPHIL # BLD AUTO: 0.25 THOUSAND/ÂΜL (ref 0–0.61)
EOSINOPHIL NFR BLD AUTO: 3 % (ref 0–6)
ERYTHROCYTE [DISTWIDTH] IN BLOOD BY AUTOMATED COUNT: 15.1 % (ref 11.6–15.1)
FLUAV RNA RESP QL NAA+PROBE: NEGATIVE
FLUBV RNA RESP QL NAA+PROBE: NEGATIVE
GFR SERPL CREATININE-BSD FRML MDRD: 1 ML/MIN/1.73SQ M
GLUCOSE SERPL-MCNC: 90 MG/DL (ref 65–140)
GLUCOSE UR STRIP-MCNC: NEGATIVE MG/DL
HCG SERPL QL: NEGATIVE
HCT VFR BLD AUTO: 23 % (ref 34.8–46.1)
HGB BLD-MCNC: 6.8 G/DL (ref 11.5–15.4)
HGB BLD-MCNC: 6.9 G/DL (ref 11.5–15.4)
HGB UR QL STRIP.AUTO: ABNORMAL
IMM GRANULOCYTES # BLD AUTO: 0.02 THOUSAND/UL (ref 0–0.2)
IMM GRANULOCYTES NFR BLD AUTO: 0 % (ref 0–2)
KETONES UR STRIP-MCNC: NEGATIVE MG/DL
LEUKOCYTE ESTERASE UR QL STRIP: NEGATIVE
LYMPHOCYTES # BLD AUTO: 1.23 THOUSANDS/ÂΜL (ref 0.6–4.47)
LYMPHOCYTES NFR BLD AUTO: 16 % (ref 14–44)
MCH RBC QN AUTO: 22.1 PG (ref 26.8–34.3)
MCHC RBC AUTO-ENTMCNC: 30 G/DL (ref 31.4–37.4)
MCV RBC AUTO: 74 FL (ref 82–98)
MONOCYTES # BLD AUTO: 0.35 THOUSAND/ÂΜL (ref 0.17–1.22)
MONOCYTES NFR BLD AUTO: 5 % (ref 4–12)
NEUTROPHILS # BLD AUTO: 5.88 THOUSANDS/ÂΜL (ref 1.85–7.62)
NEUTS SEG NFR BLD AUTO: 76 % (ref 43–75)
NITRITE UR QL STRIP: NEGATIVE
NON-SQ EPI CELLS URNS QL MICRO: NORMAL /HPF
NRBC BLD AUTO-RTO: 0 /100 WBCS
PH UR STRIP.AUTO: 6.5 [PH]
PLATELET # BLD AUTO: 237 THOUSANDS/UL (ref 149–390)
PMV BLD AUTO: 12 FL (ref 8.9–12.7)
POTASSIUM SERPL-SCNC: 5.2 MMOL/L (ref 3.5–5.3)
PROT SERPL-MCNC: 7.6 G/DL (ref 6.4–8.4)
PROT UR STRIP-MCNC: ABNORMAL MG/DL
RBC # BLD AUTO: 3.12 MILLION/UL (ref 3.81–5.12)
RBC #/AREA URNS AUTO: NORMAL /HPF
RH BLD: POSITIVE
RH BLD: POSITIVE
RSV RNA RESP QL NAA+PROBE: NEGATIVE
SARS-COV-2 RNA RESP QL NAA+PROBE: NEGATIVE
SODIUM SERPL-SCNC: 138 MMOL/L (ref 135–147)
SP GR UR STRIP.AUTO: 1.01 (ref 1–1.03)
SPECIMEN EXPIRATION DATE: NORMAL
UROBILINOGEN UR STRIP-ACNC: <2 MG/DL
WBC # BLD AUTO: 7.76 THOUSAND/UL (ref 4.31–10.16)
WBC #/AREA URNS AUTO: NORMAL /HPF

## 2024-01-23 PROCEDURE — 85025 COMPLETE CBC W/AUTO DIFF WBC: CPT

## 2024-01-23 PROCEDURE — 99284 EMERGENCY DEPT VISIT MOD MDM: CPT

## 2024-01-23 PROCEDURE — 85018 HEMOGLOBIN: CPT

## 2024-01-23 PROCEDURE — 80053 COMPREHEN METABOLIC PANEL: CPT

## 2024-01-23 PROCEDURE — 96372 THER/PROPH/DIAG INJ SC/IM: CPT

## 2024-01-23 PROCEDURE — 86900 BLOOD TYPING SEROLOGIC ABO: CPT

## 2024-01-23 PROCEDURE — 93005 ELECTROCARDIOGRAM TRACING: CPT

## 2024-01-23 PROCEDURE — P9058 RBC, L/R, CMV-NEG, IRRAD: HCPCS

## 2024-01-23 PROCEDURE — 86920 COMPATIBILITY TEST SPIN: CPT

## 2024-01-23 PROCEDURE — 0241U HB NFCT DS VIR RESP RNA 4 TRGT: CPT

## 2024-01-23 PROCEDURE — 86901 BLOOD TYPING SEROLOGIC RH(D): CPT

## 2024-01-23 PROCEDURE — 99223 1ST HOSP IP/OBS HIGH 75: CPT | Performed by: INTERNAL MEDICINE

## 2024-01-23 PROCEDURE — 84703 CHORIONIC GONADOTROPIN ASSAY: CPT

## 2024-01-23 PROCEDURE — 99291 CRITICAL CARE FIRST HOUR: CPT | Performed by: EMERGENCY MEDICINE

## 2024-01-23 PROCEDURE — 81001 URINALYSIS AUTO W/SCOPE: CPT

## 2024-01-23 PROCEDURE — 36415 COLL VENOUS BLD VENIPUNCTURE: CPT

## 2024-01-23 PROCEDURE — 86850 RBC ANTIBODY SCREEN: CPT

## 2024-01-23 PROCEDURE — 30233N0 TRANSFUSION OF AUTOLOGOUS RED BLOOD CELLS INTO PERIPHERAL VEIN, PERCUTANEOUS APPROACH: ICD-10-PCS | Performed by: INTERNAL MEDICINE

## 2024-01-23 RX ORDER — FLUTICASONE FUROATE AND VILANTEROL 100; 25 UG/1; UG/1
1 POWDER RESPIRATORY (INHALATION) DAILY
Status: DISCONTINUED | OUTPATIENT
Start: 2024-01-24 | End: 2024-01-24 | Stop reason: HOSPADM

## 2024-01-23 RX ORDER — FERROUS SULFATE 325(65) MG
325 TABLET ORAL
Status: DISCONTINUED | OUTPATIENT
Start: 2024-01-24 | End: 2024-01-24 | Stop reason: HOSPADM

## 2024-01-23 RX ORDER — AMLODIPINE BESYLATE 5 MG/1
5 TABLET ORAL 2 TIMES DAILY
Status: DISCONTINUED | OUTPATIENT
Start: 2024-01-23 | End: 2024-01-24 | Stop reason: HOSPADM

## 2024-01-23 RX ORDER — SODIUM BICARBONATE 650 MG/1
1300 TABLET ORAL 2 TIMES DAILY
Status: DISCONTINUED | OUTPATIENT
Start: 2024-01-23 | End: 2024-01-24 | Stop reason: HOSPADM

## 2024-01-23 RX ORDER — LABETALOL 100 MG/1
100 TABLET, FILM COATED ORAL 2 TIMES DAILY
Status: DISCONTINUED | OUTPATIENT
Start: 2024-01-23 | End: 2024-01-24 | Stop reason: HOSPADM

## 2024-01-23 RX ORDER — ACETAMINOPHEN 325 MG/1
650 TABLET ORAL EVERY 6 HOURS PRN
Status: DISCONTINUED | OUTPATIENT
Start: 2024-01-23 | End: 2024-01-24 | Stop reason: HOSPADM

## 2024-01-23 RX ORDER — FLUTICASONE PROPIONATE AND SALMETEROL 100; 50 UG/1; UG/1
1 POWDER RESPIRATORY (INHALATION) EVERY 12 HOURS SCHEDULED
Status: DISCONTINUED | OUTPATIENT
Start: 2024-01-23 | End: 2024-01-23

## 2024-01-23 RX ORDER — ALBUTEROL SULFATE 90 UG/1
2 AEROSOL, METERED RESPIRATORY (INHALATION) EVERY 6 HOURS PRN
Status: DISCONTINUED | OUTPATIENT
Start: 2024-01-23 | End: 2024-01-24 | Stop reason: HOSPADM

## 2024-01-23 RX ADMIN — SODIUM BICARBONATE 650 MG TABLET 1300 MG: at 21:45

## 2024-01-23 RX ADMIN — EPOETIN ALFA 5350 UNITS: 3000 SOLUTION INTRAVENOUS; SUBCUTANEOUS at 14:34

## 2024-01-23 RX ADMIN — AMLODIPINE BESYLATE 5 MG: 5 TABLET ORAL at 21:45

## 2024-01-23 RX ADMIN — LABETALOL HYDROCHLORIDE 100 MG: 100 TABLET, FILM COATED ORAL at 21:45

## 2024-01-23 NOTE — PROGRESS NOTES
Pt to clinic for epoetin injection. Pt went to out patient lab prior to appointment for stat Hgb. Results were critical at 6.8. reached out to Dr Vargas Remy regarding critical value. Dr Remy wanted pt to still receive epoetin injection then go to ED for blood transfusion. Pt in agreement with this due to feeling dizzy. Called and spoke with charge nurse from ED. Pt did receive epoetin in right arm and tolerated before escorting to ED. Scheduling will reach out and make next appointment.

## 2024-01-23 NOTE — ED PROVIDER NOTES
History  Chief Complaint   Patient presents with    Abnormal Lab     Pt presents with dizziness and low hemoglobin. GCS 15, was sent by provider     Raven is a 28 yof with stage 5 CKD, not on dialysis, sent over from Dr. Remy's office for blood transfusion.  Has anemia secondary to chronic kidney disease, is on EPO injections outpatient, however over the past few days, has developed lightheadedness and significant fatigue.  Hemoglobin noted to be 6.8 on outpatient labs today, was sent to ED for transfusion.  Patient denies fever, chills, URI symptoms, chest pain, shortness of breath, vision changes, weakness, numbness, abdominal pain, nausea, vomiting, peripheral edema, bleeding, or possibility of pregnancy.  She continues to make small amounts of urine. Has not required a blood transfusion in the past.  She is very upset because her mother does not want to receive blood transfusion.  Per chart review, Dr. Remy has recommended fistula placement for dialysis multiple times in the past, however patient and her mother have adamantly refused, state that she does not need it and can wait until transplant. Per notes from [facility], they do not accept her insurance and she has been non-compliant with her pre-transplant workup and no transplant has been scheduled and she does not have any definitive follow up with [facility].        Prior to Admission Medications   Prescriptions Last Dose Informant Patient Reported? Taking?   Blood Pressure KIT Not Taking Self No No   Sig: by Does not apply route daily   Patient not taking: Reported on 9/5/2023   Diclofenac Sodium (VOLTAREN) 1 % More than a month Self No No   Sig: Apply 2 g topically 4 (four) times a day   acetaminophen (TYLENOL) 325 mg tablet More than a month Self No No   Sig: Take 2 tablets (650 mg total) by mouth every 6 (six) hours as needed for mild pain or headaches   albuterol (2.5 mg/3 mL) 0.083 % nebulizer solution Past Month Self No Yes   Sig: Take 3 mL (2.5 mg total) by  nebulization every 6 (six) hours as needed for wheezing or shortness of breath   albuterol (PROVENTIL HFA,VENTOLIN HFA) 90 mcg/act inhaler 2024  No Yes   Sig: Inhale 2 puffs every 6 hours as needed for wheezing or shortness of breath.   amLODIPine (NORVASC) 5 mg tablet 2024 Self No Yes   Sig: TAKE 1 TABLET BY MOUTH TWICE A DAY   calcitriol (ROCALTROL) 0.5 MCG capsule Past Month Self No Yes   Sig: Take 1 capsule (0.5 mcg total) by mouth daily   erythromycin (ILOTYCIN) ophthalmic ointment Past Week  No Yes   Sig: Place a 1/2 inch ribbon of ointment into the lower eyelid.   etonogestrel (NEXPLANON) subdermal implant Unknown Self Yes No   Si mg by Subdermal route once   ferrous sulfate 325 (65 Fe) mg tablet 2024  No Yes   Sig: TAKE 1 TABLET BY MOUTH EVERY DAY WITH BREAKFAST   fluticasone (Flonase Allergy Relief) 50 mcg/act nasal spray More than a month  No No   Si spray into each nostril daily   fluticasone-salmeterol (Advair HFA) 115-21 MCG/ACT inhaler 2024 Self No Yes   Sig: Inhale 2 puffs 2 (two) times a day Rinse mouth after use.   labetalol (NORMODYNE) 100 mg tablet 2024 Self No Yes   Sig: TAKE 1 TABLET BY MOUTH TWICE A DAY   loratadine (CLARITIN) 10 mg tablet Past Week  No Yes   Sig: Take 1 tablet (10 mg total) by mouth daily   sodium bicarbonate 650 mg tablet 2024  No Yes   Sig: Take 2 tablets (1,300 mg total) by mouth 2 (two) times a day      Facility-Administered Medications: None       Past Medical History:   Diagnosis Date    Asthma     Chronic kidney disease     Eczema     Headache     Hypertension     Wears glasses        Past Surgical History:   Procedure Laterality Date    CHOLECYSTECTOMY      CT NEEDLE BIOPSY KIDNEY  2020    KELOID EXCISION Left 3/30/2021    Procedure: POSTERIOR EAR EXCISION KELOID, FLAP RECONSTRUCTION;  Surgeon: Beka Hugo MD;  Location: [facility];  Service: Plastics       Family History   Problem Relation Age of Onset    Asthma  Mother     No Known Problems Father      I have reviewed and agree with the history as documented.    E-Cigarette/Vaping    E-Cigarette Use Never User      E-Cigarette/Vaping Substances    Nicotine No     THC No     CBD No     Flavoring No     Other No     Unknown No      Social History     Tobacco Use    Smoking status: Never    Smokeless tobacco: Never   Vaping Use    Vaping status: Never Used   Substance Use Topics    Alcohol use: Not Currently     Comment: occassionally on social events    Drug use: No        Review of Systems   Constitutional:  Positive for fatigue. Negative for appetite change, chills, diaphoresis and fever.   HENT: Negative.     Eyes: Negative.  Negative for visual disturbance.   Respiratory: Negative.  Negative for shortness of breath.    Cardiovascular: Negative.  Negative for chest pain, palpitations and leg swelling.   Gastrointestinal: Negative.  Negative for abdominal distention, abdominal pain, blood in stool, diarrhea, nausea and vomiting.   Endocrine: Negative.    Genitourinary:  Negative for dysuria, flank pain, hematuria and vaginal bleeding.   Musculoskeletal:  Negative for back pain and neck pain.   Skin: Negative.    Neurological:  Positive for light-headedness. Negative for dizziness, syncope, weakness and headaches.   Hematological:  Does not bruise/bleed easily.   All other systems reviewed and are negative.      Physical Exam  ED Triage Vitals   Temperature Pulse Respirations Blood Pressure SpO2   01/23/24 1853 01/23/24 1533 01/23/24 1533 01/23/24 1533 01/23/24 1533   97.8 °F (36.6 °C) 83 18 163/86 100 %      Temp Source Heart Rate Source Patient Position - Orthostatic VS BP Location FiO2 (%)   01/23/24 1853 01/23/24 1533 01/23/24 1533 01/23/24 1533 --   Oral Monitor Sitting Right arm       Pain Score       01/23/24 1533       No Pain             Orthostatic Vital Signs  Vitals:    01/23/24 2145 01/24/24 0757 01/24/24 0825 01/24/24 1509   BP:  152/97 152/97 165/98   Pulse:  81 91     Patient Position - Orthostatic VS:    Sitting       Physical Exam  Vitals and nursing note reviewed.   Constitutional:       General: She is not in acute distress.     Appearance: Normal appearance. She is not ill-appearing or diaphoretic.   HENT:      Head: Normocephalic.      Nose: Nose normal.      Mouth/Throat:      Mouth: Mucous membranes are moist.      Pharynx: Oropharynx is clear.   Eyes:      General: No scleral icterus.     Extraocular Movements: Extraocular movements intact.      Comments: Pale conjunctiva.   Cardiovascular:      Rate and Rhythm: Normal rate and regular rhythm.      Pulses: Normal pulses.      Heart sounds: Normal heart sounds. No murmur heard.     No friction rub. No gallop.   Pulmonary:      Effort: Pulmonary effort is normal. No respiratory distress.      Breath sounds: Normal breath sounds.   Abdominal:      General: Abdomen is flat. Bowel sounds are normal. There is no distension.      Palpations: Abdomen is soft.      Tenderness: There is no abdominal tenderness.   Musculoskeletal:         General: Normal range of motion.      Cervical back: Normal range of motion and neck supple. No tenderness.      Right lower leg: No edema.      Left lower leg: No edema.   Lymphadenopathy:      Cervical: No cervical adenopathy.   Skin:     General: Skin is warm and dry.      Capillary Refill: Capillary refill takes less than 2 seconds.      Coloration: Skin is not pale.      Findings: No rash.   Neurological:      General: No focal deficit present.      Mental Status: She is alert.   Psychiatric:         Mood and Affect: Mood normal.         Behavior: Behavior normal.      Comments: Has poor insight into her chronic medical conditions.         ED Medications  Medications - No data to display    Diagnostic Studies  Results Reviewed       Procedure Component Value Units Date/Time    Basic metabolic panel [[id number]]  (Abnormal) Collected: 01/24/24 0827    Lab Status: Final result  Specimen: Blood from Arm, Right Updated: 01/24/24 0639     Sodium 139 mmol/L      Potassium 5.0 mmol/L      Chloride 104 mmol/L      CO2 19 mmol/L      ANION GAP 16 mmol/L      BUN 93 mg/dL      Creatinine 22.95 mg/dL      Glucose 84 mg/dL      Calcium 9.6 mg/dL      eGFR 1 ml/min/1.73sq m     Narrative:      National Kidney Disease Foundation guidelines for Chronic Kidney Disease (CKD):     Stage 1 with normal or high GFR (GFR > 90 mL/min/1.73 square meters)    Stage 2 Mild CKD (GFR = 60-89 mL/min/1.73 square meters)    Stage 3A Moderate CKD (GFR = 45-59 mL/min/1.73 square meters)    Stage 3B Moderate CKD (GFR = 30-44 mL/min/1.73 square meters)    Stage 4 Severe CKD (GFR = 15-29 mL/min/1.73 square meters)    Stage 5 End Stage CKD (GFR <15 mL/min/1.73 square meters)  Note: GFR calculation is accurate only with a steady state creatinine    CBC and differential [[id number]]  (Abnormal) Collected: 01/24/24 0546    Lab Status: Final result Specimen: Blood from Arm, Right Updated: 01/24/24 0616     WBC 9.02 Thousand/uL      RBC 3.30 Million/uL      Hemoglobin 7.6 g/dL      Hematocrit 24.5 %      MCV 74 fL      MCH 23.0 pg      MCHC 31.0 g/dL      RDW 15.2 %      MPV 12.0 fL      Platelets 237 Thousands/uL      nRBC 0 /100 WBCs      Neutrophils Relative 75 %      Immat GRANS % 1 %      Lymphocytes Relative 16 %      Monocytes Relative 5 %      Eosinophils Relative 3 %      Basophils Relative 0 %      Neutrophils Absolute 6.77 Thousands/µL      Immature Grans Absolute 0.05 Thousand/uL      Lymphocytes Absolute 1.44 Thousands/µL      Monocytes Absolute 0.48 Thousand/µL      Eosinophils Absolute 0.26 Thousand/µL      Basophils Absolute 0.02 Thousands/µL     hCG, qualitative pregnancy [[id number]]  (Normal) Collected: 01/23/24 1607    Lab Status: Final result Specimen: Blood from Arm, Left Updated: 01/23/24 1855     Preg, Serum Negative    Urine Microscopic [[id number]]  (Normal) Collected: 01/23/24 1731    Lab Status: Final  result Specimen: Urine, Clean Catch Updated: 01/23/24 1757     RBC, UA None Seen /hpf      WBC, UA 1-2 /hpf      Epithelial Cells Occasional /hpf      Bacteria, UA Occasional /hpf     UA w Reflex to Microscopic w Reflex to Culture [[id number]]  (Abnormal) Collected: 01/23/24 1731    Lab Status: Final result Specimen: Urine, Clean Catch Updated: 01/23/24 1757     Color, UA Colorless     Clarity, UA Clear     Specific Gravity, UA 1.010     pH, UA 6.5     Leukocytes, UA Negative     Nitrite, UA Negative     Protein,  (2+) mg/dl      Glucose, UA Negative mg/dl      Ketones, UA Negative mg/dl      Urobilinogen, UA <2.0 mg/dl      Bilirubin, UA Negative     Occult Blood, UA Small    FLU/RSV/COVID - if FLU/RSV clinically relevant [[id number]]  (Normal) Collected: 01/23/24 1607    Lab Status: Final result Specimen: Nares from Nose Updated: 01/23/24 1705     SARS-CoV-2 Negative     INFLUENZA A PCR Negative     INFLUENZA B PCR Negative     RSV PCR Negative    Narrative:      FOR PEDIATRIC PATIENTS - copy/paste COVID Guidelines URL to browser: [email or web address]    SARS-CoV-2 assay is a Nucleic Acid Amplification assay intended for the  qualitative detection of nucleic acid from SARS-CoV-2 in nasopharyngeal  swabs. Results are for the presumptive identification of SARS-CoV-2 RNA.    Positive results are indicative of infection with SARS-CoV-2, the virus  causing COVID-19, but do not rule out bacterial infection or co-infection  with other viruses. Laboratories within the [place] and its  territories are required to report all positive results to the appropriate  public health authorities. Negative results do not preclude SARS-CoV-2  infection and should not be used as the sole basis for treatment or other  patient management decisions. Negative results must be combined with  clinical observations, patient history, and epidemiological information.  This test has  not been FDA cleared or approved.    This test has been authorized by FDA under an Emergency Use Authorization  (EUA). This test is only authorized for the duration of time the  declaration that circumstances exist justifying the authorization of the  emergency use of an in vitro diagnostic tests for detection of SARS-CoV-2  virus and/or diagnosis of COVID-19 infection under section 564(b)(1) of  the Act, 21 U.S.C. 360bbb-3(b)(1), unless the authorization is terminated  or revoked sooner. The test has been validated but independent review by FDA  and CLIA is pending.    Test performed using [employer] GeneXpert: This RT-PCR assay targets N2,  a region unique to SARS-CoV-2. A conserved region in the E-gene was chosen  for pan-Sarbecovirus detection which includes SARS-CoV-2.    According to CMS-2020-01-R, this platform meets the definition of high-throughput technology.    Comprehensive metabolic panel [[id number]]  (Abnormal) Collected: 01/23/24 1607    Lab Status: Final result Specimen: Blood from Arm, Left Updated: 01/23/24 1654     Sodium 138 mmol/L      Potassium 5.2 mmol/L      Chloride 103 mmol/L      CO2 17 mmol/L      ANION GAP 18 mmol/L      BUN 90 mg/dL      Creatinine 22.29 mg/dL      Glucose 90 mg/dL      Calcium 10.1 mg/dL      AST 10 U/L      ALT 3 U/L      Alkaline Phosphatase 42 U/L      Total Protein 7.6 g/dL      Albumin 4.5 g/dL      Total Bilirubin 0.36 mg/dL      eGFR 1 ml/min/1.73sq m     Narrative:      National Kidney Disease Foundation guidelines for Chronic Kidney Disease (CKD):     Stage 1 with normal or high GFR (GFR > 90 mL/min/1.73 square meters)    Stage 2 Mild CKD (GFR = 60-89 mL/min/1.73 square meters)    Stage 3A Moderate CKD (GFR = 45-59 mL/min/1.73 square meters)    Stage 3B Moderate CKD (GFR = 30-44 mL/min/1.73 square meters)    Stage 4 Severe CKD (GFR = 15-29 mL/min/1.73 square meters)    Stage 5 End Stage CKD (GFR <15 mL/min/1.73 square meters)  Note: GFR calculation is accurate  only with a steady state creatinine    CBC and differential [[id number]]  (Abnormal) Collected: 01/23/24 1607    Lab Status: Final result Specimen: Blood from Arm, Left Updated: 01/23/24 1619     WBC 7.76 Thousand/uL      RBC 3.12 Million/uL      Hemoglobin 6.9 g/dL      Hematocrit 23.0 %      MCV 74 fL      MCH 22.1 pg      MCHC 30.0 g/dL      RDW 15.1 %      MPV 12.0 fL      Platelets 237 Thousands/uL      nRBC 0 /100 WBCs      Neutrophils Relative 76 %      Immat GRANS % 0 %      Lymphocytes Relative 16 %      Monocytes Relative 5 %      Eosinophils Relative 3 %      Basophils Relative 0 %      Neutrophils Absolute 5.88 Thousands/µL      Immature Grans Absolute 0.02 Thousand/uL      Lymphocytes Absolute 1.23 Thousands/µL      Monocytes Absolute 0.35 Thousand/µL      Eosinophils Absolute 0.25 Thousand/µL      Basophils Absolute 0.03 Thousands/µL                    No orders to display         Procedures  ECG 12 Lead Documentation Only    Date/Time: 1/23/2024 4:19 PM    Performed by: Reina Su DO  Authorized by: Reina Su DO    Indications / Diagnosis:  Lightheadedness  ECG reviewed by me, the ED Provider: yes    Patient location:  ED  Previous ECG:     Previous ECG:  Compared to current    Comparison ECG info:  04/23    Similarity:  No change  Interpretation:     Interpretation: normal    Rate:     ECG rate:  80    ECG rate assessment: normal    Rhythm:     Rhythm: sinus rhythm    Ectopy:     Ectopy: none    QRS:     QRS axis:  Normal    QRS intervals:  Normal  Conduction:     Conduction: normal    ST segments:     ST segments:  Normal  T waves:     T waves: normal          ED Course  ED Course as of 01/24/24 2155 Tue Jan 23, 2024   1617 Hemoglobin(!): 6.9  Will consent for transfusion.   1635 After extensive risk versus benefit discussion, patient consents to blood transfusion.  Will order 1 unit PRBCs.   1655 Comprehensive metabolic panel(!)  CO2 17, slightly worsening acidosis from  "baseline despite sodium bicarbonate supplementation.  BUN 90, creatinine 22.29, increasing from prior.  eGFR 1.  K 5.2, while this is not \"high\", this is significantly up-trended from prior.   1707 Tigertext sent to Dr. Kilgore, on call nephrologist, given worsening BMP trends, pending recommendations.   1748 As per Dr. Kilgore, pt will require 1 unit leukoreduced, irradiated, and CMV negative blood given need for eventual kidney transplant.  Blood bank contacted, product order changed.    Additionally, he recommends admission given worsening labs and likely need for dialysis.  Though pt does not feel she needs dialysis, she is amenable to admission and nephrology consult.   1758 POCT URINE PROTEIN(!): 100 (2+)   1758 Blood, UA(!): Small   1758 Urine Microscopic  No evidence of infection.   1758 FLU/RSV/COVID - if FLU/RSV clinically relevant  Negative.   1758 Accepted for admission by Dr. Mcdermott.                                       Medical Decision Making  Patient with CKD stage V not on dialysis presents for lightheadedness and fatigue, had outpatient hemoglobin of 6.8.  Vital signs within normal limits, patient is fatigued appearing with pale conjunctiva, major physical exam unremarkable.  Will obtain repeat hemoglobin level, however we will also obtain CMP given she has not had repeat blood work for her CKD in the past several months and her symptoms this could represent progression of her disease or electrolyte abnormality.  Will also obtain ECG, UA to evaluate for less likely UTI, and COVID/FLU test.  See ED course for remainder of MDM.    Amount and/or Complexity of Data Reviewed  External Data Reviewed: labs, ECG and notes.  Labs: ordered. Decision-making details documented in ED Course.  ECG/medicine tests: ordered and independent interpretation performed.    Risk  Decision regarding hospitalization.          Disposition  Final diagnoses:   Metabolic acidosis   Stage 5 chronic kidney disease (HCC)   Anemia due " to stage 5 chronic kidney disease, not on chronic dialysis    Lightheadedness     Time reflects when diagnosis was documented in both MDM as applicable and the Disposition within this note       Time User Action Codes Description Comment    1/23/2024  5:41 PM Reina Su [E87.20] Metabolic acidosis     1/23/2024  5:41 PM Reina Su [N18.5] Stage 5 chronic kidney disease (HCC)     1/23/2024  5:47 PM Reina Su [N18.5,  D63.1] Anemia due to stage 5 chronic kidney disease, not on chronic dialysis      1/23/2024  5:47 PM Reina Su [R42] Lightheadedness           ED Disposition       ED Disposition   Admit    Condition   Stable    Date/Time   Tue Jan 23, 2024  5:57 PM    Comment   Case was discussed with Dr. Mcdermott and the patient's admission status was agreed to be Admission Status: inpatient status to the service of Dr. Mcdermott .               Follow-up Information    None         Discharge Medication List as of 1/24/2024  2:58 PM        CONTINUE these medications which have NOT CHANGED    Details   albuterol (2.5 mg/3 mL) 0.083 % nebulizer solution Take 3 mL (2.5 mg total) by nebulization every 6 (six) hours as needed for wheezing or shortness of breath, Starting Tue 7/18/2023, Normal      albuterol (PROVENTIL HFA,VENTOLIN HFA) 90 mcg/act inhaler Inhale 2 puffs every 6 hours as needed for wheezing or shortness of breath., Normal      amLODIPine (NORVASC) 5 mg tablet TAKE 1 TABLET BY MOUTH TWICE A DAY, Normal      calcitriol (ROCALTROL) 0.5 MCG capsule Take 1 capsule (0.5 mcg total) by mouth daily, Starting Thu 12/21/2023, Normal      erythromycin (ILOTYCIN) ophthalmic ointment Place a 1/2 inch ribbon of ointment into the lower eyelid., Normal      ferrous sulfate 325 (65 Fe) mg tablet TAKE 1 TABLET BY MOUTH EVERY DAY WITH BREAKFAST, Starting Thu 1/18/2024, Normal      fluticasone-salmeterol (Advair HFA) 115-21 MCG/ACT inhaler Inhale 2 puffs 2 (two) times a day Rinse mouth after use.,  Starting Thu 11/16/2023, Normal      labetalol (NORMODYNE) 100 mg tablet TAKE 1 TABLET BY MOUTH TWICE A DAY, Normal      loratadine (CLARITIN) 10 mg tablet Take 1 tablet (10 mg total) by mouth daily, Starting Thu 12/21/2023, Normal      sodium bicarbonate 650 mg tablet Take 2 tablets (1,300 mg total) by mouth 2 (two) times a day, Starting Fri 1/12/2024, Normal      acetaminophen (TYLENOL) 325 mg tablet Take 2 tablets (650 mg total) by mouth every 6 (six) hours as needed for mild pain or headaches, Starting Sun 1/26/2020, No Print      Blood Pressure KIT by Does not apply route daily, Starting Wed 6/24/2020, Normal      Diclofenac Sodium (VOLTAREN) 1 % Apply 2 g topically 4 (four) times a day, Starting Thu 7/6/2023, Normal      etonogestrel (NEXPLANON) subdermal implant 68 mg by Subdermal route once, Historical Med      fluticasone (Flonase Allergy Relief) 50 mcg/act nasal spray 1 spray into each nostril daily, Starting Thu 12/21/2023, Normal           Outpatient Discharge Orders   Basic metabolic panel   Standing Status: Future Standing Exp. Date: 02/24/24     CBC   Standing Status: Future Standing Exp. Date: 02/24/24       PDMP Review         Value Time User    PDMP Reviewed  Yes 4/8/2023  1:24 AM Saurav Hagan MD             ED Provider  Attending physically available and evaluated Raven Simeon. I managed the patient along with the ED Attending.    Electronically Signed by              Reina Su DO  01/24/24 6601

## 2024-01-23 NOTE — ED ATTENDING ATTESTATION
1/23/2024  I, Jonathan Loera DO, saw and evaluated the patient. I have discussed the patient with the resident/non-physician practitioner and agree with the resident's/non-physician practitioner's findings, Plan of Care, and MDM as documented in the resident's/non-physician practitioner's note, except where noted. All available labs and Radiology studies were reviewed.  I was present for key portions of any procedure(s) performed by the resident/non-physician practitioner and I was immediately available to provide assistance.       At this point I agree with the current assessment done in the Emergency Department.  I have conducted an independent evaluation of this patient a history and physical is as follows:    Patient is a 28-year-old female with a history of chronic kidney disease, hypertension, anemia and obesity who presents with abnormal labs.  Patient is followed by nephrology, and she had blood work done today which revealed acute on chronic anemia.  Her hemoglobin was less than 7.0 and she was directed to the emergency department.  Patient has not initiated dialysis and is undergoing evaluation for possible kidney transplant.  However this evaluation has not been completed and according to previous notes patient and mother have been noncompliant with certain testing, social work and psychiatric evaluations.  Therefore patient has not been approved for transplant at this time.  Patient does admit to lightheadedness which started today.  She denies syncope, shortness of breath, vaginal bleeding, hematuria.  She does admit to intermittent nosebleeds.  Her last nosebleed was this morning and it stopped spontaneously.  She has irregular menstrual periods and is not on her period currently.    On exam, patient is in no acute distress.  Heart is regular rate and rhythm.  Breath sounds normal.  Abdomen is soft, nontender, nondistended.  No rebound or guarding.  No lower extremity edema.    Hemoglobin less than  "7.0.  Will transfuse with leukoreduced, irradiated and CMV negative unit.  Patient's creatinine is worsening with associated metabolic acidosis.  Discussed case with nephrology who recommends hospitalization.  Patient was initially reluctant but is agreeable to hospitalization and blood transfusion.  After a risk, benefit and alternative discussion regarding blood transfusion, she agrees and signed consent.  Consent can be found on the chart.  Will hospitalize for further workup and treatment.    Portions of the above record have been created with voice recognition software.  Occasional wrong word or \"sound alike\" substitutions may have occurred due to the inherent limitations of voice recognition software.  Read the chart carefully and recognize, using context, where substitutions may have occurred.      ED Course         Critical Care Time  CriticalCare Time    Date/Time: 1/23/2024 5:41 PM    Performed by: Jonathan Loera DO  Authorized by: Jonathan Loera DO    Critical care provider statement:     Critical care time (minutes):  30    Critical care time was exclusive of:  Separately billable procedures and treating other patients    Critical care was necessary to treat or prevent imminent or life-threatening deterioration of the following conditions: acute on chronic anemia.    Critical care was time spent personally by me on the following activities:  Blood draw for specimens, obtaining history from patient or surrogate, development of treatment plan with patient or surrogate, discussions with consultants, evaluation of patient's response to treatment, examination of patient, interpretation of cardiac output measurements, ordering and performing treatments and interventions, ordering and review of laboratory studies, re-evaluation of patient's condition and review of old charts        "

## 2024-01-24 VITALS
TEMPERATURE: 98 F | SYSTOLIC BLOOD PRESSURE: 165 MMHG | HEART RATE: 91 BPM | OXYGEN SATURATION: 99 % | DIASTOLIC BLOOD PRESSURE: 98 MMHG | RESPIRATION RATE: 18 BRPM

## 2024-01-24 LAB
ABO GROUP BLD BPU: NORMAL
ANION GAP SERPL CALCULATED.3IONS-SCNC: 16 MMOL/L
BASOPHILS # BLD AUTO: 0.02 THOUSANDS/ÂΜL (ref 0–0.1)
BASOPHILS NFR BLD AUTO: 0 % (ref 0–1)
BPU ID: NORMAL
BUN SERPL-MCNC: 93 MG/DL (ref 5–25)
CALCIUM SERPL-MCNC: 9.6 MG/DL (ref 8.4–10.2)
CHLORIDE SERPL-SCNC: 104 MMOL/L (ref 96–108)
CO2 SERPL-SCNC: 19 MMOL/L (ref 21–32)
CREAT SERPL-MCNC: 22.95 MG/DL (ref 0.6–1.3)
CROSSMATCH: NORMAL
EOSINOPHIL # BLD AUTO: 0.26 THOUSAND/ÂΜL (ref 0–0.61)
EOSINOPHIL NFR BLD AUTO: 3 % (ref 0–6)
ERYTHROCYTE [DISTWIDTH] IN BLOOD BY AUTOMATED COUNT: 15.2 % (ref 11.6–15.1)
GFR SERPL CREATININE-BSD FRML MDRD: 1 ML/MIN/1.73SQ M
GLUCOSE SERPL-MCNC: 84 MG/DL (ref 65–140)
HCT VFR BLD AUTO: 23.7 % (ref 34.8–46.1)
HCT VFR BLD AUTO: 24.5 % (ref 34.8–46.1)
HGB BLD-MCNC: 7.3 G/DL (ref 11.5–15.4)
HGB BLD-MCNC: 7.6 G/DL (ref 11.5–15.4)
IMM GRANULOCYTES # BLD AUTO: 0.05 THOUSAND/UL (ref 0–0.2)
IMM GRANULOCYTES NFR BLD AUTO: 1 % (ref 0–2)
LYMPHOCYTES # BLD AUTO: 1.44 THOUSANDS/ÂΜL (ref 0.6–4.47)
LYMPHOCYTES NFR BLD AUTO: 16 % (ref 14–44)
MCH RBC QN AUTO: 23 PG (ref 26.8–34.3)
MCHC RBC AUTO-ENTMCNC: 31 G/DL (ref 31.4–37.4)
MCV RBC AUTO: 74 FL (ref 82–98)
MONOCYTES # BLD AUTO: 0.48 THOUSAND/ÂΜL (ref 0.17–1.22)
MONOCYTES NFR BLD AUTO: 5 % (ref 4–12)
NEUTROPHILS # BLD AUTO: 6.77 THOUSANDS/ÂΜL (ref 1.85–7.62)
NEUTS SEG NFR BLD AUTO: 75 % (ref 43–75)
NRBC BLD AUTO-RTO: 0 /100 WBCS
PLATELET # BLD AUTO: 237 THOUSANDS/UL (ref 149–390)
PMV BLD AUTO: 12 FL (ref 8.9–12.7)
POTASSIUM SERPL-SCNC: 5 MMOL/L (ref 3.5–5.3)
RBC # BLD AUTO: 3.3 MILLION/UL (ref 3.81–5.12)
SODIUM SERPL-SCNC: 139 MMOL/L (ref 135–147)
UNIT DISPENSE STATUS: NORMAL
UNIT PRODUCT CODE: NORMAL
UNIT PRODUCT VOLUME: 350 ML
UNIT RH: NORMAL
WBC # BLD AUTO: 9.02 THOUSAND/UL (ref 4.31–10.16)

## 2024-01-24 PROCEDURE — 85014 HEMATOCRIT: CPT | Performed by: INTERNAL MEDICINE

## 2024-01-24 PROCEDURE — 99254 IP/OBS CNSLTJ NEW/EST MOD 60: CPT | Performed by: PHYSICIAN ASSISTANT

## 2024-01-24 PROCEDURE — 85025 COMPLETE CBC W/AUTO DIFF WBC: CPT

## 2024-01-24 PROCEDURE — 99239 HOSP IP/OBS DSCHRG MGMT >30: CPT | Performed by: INTERNAL MEDICINE

## 2024-01-24 PROCEDURE — 80048 BASIC METABOLIC PNL TOTAL CA: CPT

## 2024-01-24 PROCEDURE — 85018 HEMOGLOBIN: CPT | Performed by: INTERNAL MEDICINE

## 2024-01-24 RX ADMIN — LABETALOL HYDROCHLORIDE 100 MG: 100 TABLET, FILM COATED ORAL at 08:25

## 2024-01-24 RX ADMIN — SODIUM BICARBONATE 650 MG TABLET 1300 MG: at 08:25

## 2024-01-24 RX ADMIN — AMLODIPINE BESYLATE 5 MG: 5 TABLET ORAL at 08:25

## 2024-01-24 RX ADMIN — FERROUS SULFATE TAB 325 MG (65 MG ELEMENTAL FE) 325 MG: 325 (65 FE) TAB at 08:25

## 2024-01-24 RX ADMIN — FLUTICASONE FUROATE AND VILANTEROL TRIFENATATE 1 PUFF: 100; 25 POWDER RESPIRATORY (INHALATION) at 08:26

## 2024-01-24 NOTE — ASSESSMENT & PLAN NOTE
Bicarb up to 19 from 17 on admission.  During last nephrology outpatient visit 10/23 sodium bicarb dose was increased to 1,300 mg BID.

## 2024-01-24 NOTE — ASSESSMENT & PLAN NOTE
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   Patient's BUN and cr has been trending up, patient and her mother are resistant to initiating dialysis.  There is no progress in renal transplant process due to insurance and pre transplant lab work non-compliance.    Patient will likely need dialysis soon, counseled patient on importance of dialysis while awaiting renal transplant.  Patient is not showing any signs of azotemia at this time.    Plan:  Monitor BMP  Monitor for signs of Azotemia  Nephrology consult appreciate recommendations.

## 2024-01-24 NOTE — ED NOTES
Transfusion started at this time. Pt educated on symptoms to alert RN to. Hospitalist bedside.      Andie Montes RN  01/23/24 0628

## 2024-01-24 NOTE — ASSESSMENT & PLAN NOTE
BP elevated in 160's on admission.   Has not taken her BP meds today.  Has not been monitoring BP at home as she is not able to afford a BP machine.    Continue home BP meds Amlodipine and labetalol.  Monitor BP

## 2024-01-24 NOTE — UTILIZATION REVIEW
NOTIFICATION OF ADMISSION DISCHARGE   This is a Notification of Discharge from [facility]. Please be advised that this patient has been discharge from our facility. Below you will find the admission and discharge date and time including the patient’s disposition.   UTILIZATION REVIEW CONTACT:  Glenn Ibrahim  Utilization   Network Utilization Review Department  Phone: [phone] x carefully listen to the prompts. All voicemails are confidential.  Email: [email or web address]     ADMISSION INFORMATION  PRESENTATION DATE: 1/23/2024  2:49 PM  OBERVATION ADMISSION DATE:   INPATIENT ADMISSION DATE: 1/23/24  5:58 PM   DISCHARGE DATE: 1/24/2024  3:57 PM   DISPOSITION:Home/Self Care    Network Utilization Review Department  ATTENTION: Please call with any questions or concerns to [phone] and carefully listen to the prompts so that you are directed to the right person. All voicemails are confidential.   For Discharge needs, contact Care Management DC Support Team at [phone] opt. 2  Send all requests for admission clinical reviews, approved or denied determinations and any other requests to dedicated fax number below belonging to the campus where the patient is receiving treatment. List of dedicated fax numbers for the Facilities:  FACILITY NAME UR FAX NUMBER   ADMISSION DENIALS (Administrative/Medical Necessity) [phone]   DISCHARGE SUPPORT TEAM ([facility]) [phone]   PARENT CHILD HEALTH (Maternity/NICU/Pediatrics) [phone]   [facility] [phone]   [facility] [phone]   [facility] [phone]   [facility] [phone]   [facility] [phone]   [facility] [phone]   [facility] [phone]   [facility] [phone]   [facility]  [facility] [phone]   [facility] [phone]   [facility] [phone]

## 2024-01-24 NOTE — ASSESSMENT & PLAN NOTE
On epogen enjections, last dose today at infusion center.  Hb 6.9, nephrology recommended admission for blood transfusion and evaluation for possible dialysis.  One unit PRBC transfused.    Monitor CBC in the AM  Nephrology consulted appreciate recommendations

## 2024-01-24 NOTE — UTILIZATION REVIEW
NOTIFICATION OF INPATIENT ADMISSION   AUTHORIZATION REQUEST   SERVICING FACILITY:   [place] [address]  Tax ID: [id number]  NPI: [id number]   ATTENDING PROVIDER:  Attending Name and NPI#: Poppy Israel Md [[id number]]  Address: [address] [place] [address]  Phone: [phone]     ADMISSION INFORMATION:  Place of Service: Inpatient [facility]  Place of Service Code: 21  Inpatient Admission Date/Time: 1/23/24  5:58 PM  Discharge Date/Time: No discharge date for patient encounter.  Admitting Diagnosis Code/Description:  Lightheadedness [R42]  Metabolic acidosis [E87.20]  Stage 5 chronic kidney disease (HCC) [N18.5]  Anemia due to stage 5 chronic kidney disease, not on chronic dialysis  [N18.5, D63.1]     UTILIZATION REVIEW CONTACT:  Glenn Ibrahim, Utilization   Network Utilization Review Department  Phone: [phone]  Fax: [phone]  Email: [email or web address]  Contact for approvals/pending authorizations, clinical reviews, and discharge.     PHYSICIAN ADVISORY SERVICES:  Medical Necessity Denial & Dpwq-hk-Iiys Review  Phone: [phone]  Fax: [phone]  Email: [email or web address]     DISCHARGE SUPPORT TEAM:  For Patients Discharge Needs & Updates  Phone: [phone] opt. 2 Fax: [phone]  Email: [email or web address]

## 2024-01-24 NOTE — ASSESSMENT & PLAN NOTE
Bicarb 17 on admission.  During last nephrology outpatient visit 10/23 sodium bicarb dose was increased to 1,300 mg BID.  Patient has not taken her medications today.    Continue sodium bicarb BID  Monitor BMP  Nephrology consult

## 2024-01-24 NOTE — PROGRESS NOTES
[facility]  Progress Note  Name: Raven Simeon I  MRN: [id number]  Unit/Bed#: W -01 I Date of Admission: 1/23/2024   Date of Service: 1/24/2024 I Hospital Day: 1    Assessment/Plan   * Anemia due to stage 5 chronic kidney disease, not on chronic dialysis   Assessment & Plan  On epogen enjections, last dose yesterday at infusion center.  One unit PRBC transfused. Hb 6.8 --> 7.6.     Metabolic acidosis  Assessment & Plan  Bicarb up to 19 from 17 on admission.  During last nephrology outpatient visit 10/23 sodium bicarb dose was increased to 1,300 mg BID.    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   Patient's BUN and cr has been trending up. Counseled patient on importance of dialysis while awaiting renal transplant, pt declines at this time.There is no progress in renal transplant process due to insurance and pt not  yet obtaining pre-transplant labs.    Patient is not showing any signs of azotemia at this time.    Hypertension  Assessment & Plan  BP elevated in 150s since admission. Baseline unknown due to pt not being able to afford a BP cuff for home use.  Continue home BP meds Amlodipine and labetalol.         Medical Problems       Resolved Problems  Date Reviewed: 1/23/2024   None       Discharging Resident: Lida Murrieta  Discharging Attending: Poppy Israel MD  PCP: Roberto Britt DO  Admission Date:   Admission Orders (From admission, onward)       Ordered        01/23/24 9737  INPATIENT ADMISSION  Once                          Discharge Date: 01/24/24    Consultations During Hospital  Stay:  Nephrology    Procedures Performed:   None    Significant Findings / Test Results:      Latest Reference Range & Units 01/24/24 05:46   Sodium 135 - 147 mmol/L 139   Potassium 3.5 - 5.3 mmol/L 5.0   Chloride 96 - 108 mmol/L 104   CO2 21 - 32 mmol/L 19 (L)   Anion Gap mmol/L 16   BUN 5 - 25 mg/dL 93 (H)   Creatinine 0.60 - 1.30 mg/dL 22.95 (H)   Glucose, Random 65 - 140 mg/dL 84   Calcium 8.4 - 10.2 mg/dL 9.6   eGFR ml/min/1.73sq m 1      Latest Reference Range & Units 01/24/24 05:46   WBC 4.31 - 10.16 Thousand/uL 9.02   Red Blood Cell Count 3.81 - 5.12 Million/uL 3.30 (L)   Hemoglobin 11.5 - 15.4 g/dL 7.6 (L)   HCT 34.8 - 46.1 % 24.5 (L)   MCV 82 - 98 fL 74 (L)   MCH 26.8 - 34.3 pg 23.0 (L)   MCHC 31.4 - 37.4 g/dL 31.0 (L)   RDW 11.6 - 15.1 % 15.2 (H)   Platelet Count 149 - 390 Thousands/uL 237   MPV 8.9 - 12.7 fL 12.0   nRBC /100 WBCs 0     Incidental Findings:   None     Test Results Pending at Discharge (will require follow up):  None     Outpatient Tests Requested:  None    Complications:  None    Reason for Admission: None    Hospital Course:   Raven Simeon is a 28 y.o. female patient who originally presented to the hospital on 1/23/2024 due to hgb 6.8 at infusion center. Pt also felt lightheaded and fatigued. Pt was admitted for blood transfusion. After receiving pRBC, pt's hgb increased to 7.6.    The patient, initially admitted to the hospital as inpatient, was discharged earlier than expected given the following: hgb better and no acute complaints.    Please see above list of diagnoses and related plan for additional information.     Condition at Discharge: stable    Discharge Day Visit / Exam:   Subjective:  Pt evaluated at bedside and states she feels better. No acute complaints at this time.  Vitals: Blood Pressure: 152/97 (01/24/24 0825)  Pulse: 91 (01/24/24 0757)  Temperature: 98.2 °F (36.8 °C) (01/24/24 0757)  Temp Source: Oral (01/23/24 2145)  Respirations: 18 (01/24/24 0757)  SpO2: 99  % (01/24/24 5754)  Exam:   Physical Exam  Vitals and nursing note reviewed.   Constitutional:       General: She is not in acute distress.     Appearance: Normal appearance. She is not ill-appearing.   HENT:      Head: Normocephalic and atraumatic.   Eyes:      Conjunctiva/sclera: Conjunctivae normal.   Cardiovascular:      Rate and Rhythm: Normal rate and regular rhythm.      Heart sounds: Normal heart sounds.   Pulmonary:      Effort: Pulmonary effort is normal. No respiratory distress.      Breath sounds: Normal breath sounds. No wheezing.   Abdominal:      General: There is no distension.      Palpations: Abdomen is soft.      Tenderness: There is no abdominal tenderness.   Musculoskeletal:      Right lower leg: No edema.      Left lower leg: No edema.   Skin:     General: Skin is warm and dry.   Neurological:      Mental Status: She is alert and oriented to person, place, and time.      Comments: No lightheadedness   Psychiatric:         Mood and Affect: Mood normal.         Behavior: Behavior normal.          Discussion with Family: Patient declined call to .     Discharge instructions/Information to patient and family:   See after visit summary for information provided to patient and family.      Provisions for Follow-Up Care:  See after visit summary for information related to follow-up care and any pertinent home health orders.      Mobility at time of Discharge:   Basic Mobility Inpatient Raw Score: 24  JH-HLM Goal: 8: Walk 250 feet or more  JH-HLM Achieved: 7: Walk 25 feet or more  HLM Goal NOT achieved. Continue to encourage mobility in post discharge setting.     Disposition:   Home    Planned Readmission: None    Discharge Medications:  See after visit summary for reconciled discharge medications provided to patient and/or family.      **Please Note: This note may have been constructed using a voice recognition system**

## 2024-01-24 NOTE — H&P
[facility]  H&P  Name: Raven Simeon 28 y.o. female I MRN: [id number]  Unit/Bed#: W -01 I Date of Admission: 1/23/2024   Date of Service: 1/23/2024 I Hospital Day: 0      Assessment/Plan   * Anemia due to stage 5 chronic kidney disease, not on chronic dialysis   Assessment & Plan  On epogen enjections, last dose today at infusion center.  Hb 6.9, nephrology recommended admission for blood transfusion and evaluation for possible dialysis.  One unit PRBC transfused.    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   Patient's BUN and cr has been trending up, patient and her mother are resistant to initiating dialysis.  There is no progress in renal transplant process due to insurance and pre transplant lab work non-compliance.    Patient will likely need dialysis soon, counseled patient on importance of dialysis while awaiting renal transplant.  Patient is not showing any signs of azotemia at this time.    Plan:  Monitor BMP  Monitor for signs of Azotemia  Nephrology consult appreciate recommendations.    Metabolic acidosis  Assessment & Plan  Bicarb 17 on admission.  During last nephrology outpatient visit 10/23 sodium bicarb dose was increased to 1,300 mg BID.  Patient has not taken her medications today.    Continue sodium bicarb BID  Monitor BMP  Nephrology consult    Hypertension  Assessment & Plan  BP elevated in 160's on admission.   Has not taken her BP meds today.  Has not been monitoring BP at home as she is not able to afford a BP machine.    Continue home BP meds  Amlodipine and labetalol.  Monitor BP           VTE Pharmacologic Prophylaxis: VTE Score: 1 Low Risk (Score 0-2) - Encourage Ambulation.  Code Status: Level 1 - Full Code Patient  Discussion with family: Updated  (mother) via phone.    Anticipated Length of Stay: Patient will be admitted on an inpatient basis with an anticipated length of stay of greater than 2 midnights secondary to Anemia and renal failure.    Chief Complaint: Fatigue and lightheadedness    History of Present Illness:  Raven Simeon is a 28 y.o. female with a PMH of HTN, CKD stage 5 not on dialysis, anemia due to CKD on epogen, metabolic acidosis who presents with due to symptomatic anemia. She completed outpatient lab work and went to infusion center for epogen administration, patient was then advised to come to the ED due to low hb requiring blood transfusion. Patient's creatinine and BUN are elevated but patient is not azotemic at this time. She has CKD secondary to hypertensive nephrosclerosis. She is very resistant to starting dialysis at this time, and has not been compliant with some of the pretransplant testing. She reported some fatigue and lightheadedness, denies any other complaints at this time.    Review of Systems:  Review of Systems   Constitutional:  Negative for activity change, appetite change, chills and fever.   HENT:  Negative for congestion, rhinorrhea, sneezing and sore throat.    Respiratory:  Negative for cough, chest tightness, wheezing and stridor.    Cardiovascular:  Negative for chest pain, palpitations and leg swelling.   Gastrointestinal:  Negative for anal bleeding, constipation, diarrhea, nausea and vomiting.   Genitourinary:  Negative for dysuria.   Musculoskeletal:  Negative for arthralgias and myalgias.   Skin:  Negative for color change and wound.   Neurological:  Negative for dizziness, syncope and headaches.   Psychiatric/Behavioral:  Negative for confusion.        Past Medical and  Surgical History:   Past Medical History:   Diagnosis Date    Asthma     Chronic kidney disease     Eczema     Headache     Hypertension     Wears glasses        Past Surgical History:   Procedure Laterality Date    CHOLECYSTECTOMY      CT NEEDLE BIOPSY KIDNEY  1/29/2020    KELOID EXCISION Left 3/30/2021    Procedure: POSTERIOR EAR EXCISION KELOID, FLAP RECONSTRUCTION;  Surgeon: Beka Hugo MD;  Location: [facility];  Service: Plastics       Meds/Allergies:  Prior to Admission medications    Medication Sig Start Date End Date Taking? Authorizing Provider   albuterol (2.5 mg/3 mL) 0.083 % nebulizer solution Take 3 mL (2.5 mg total) by nebulization every 6 (six) hours as needed for wheezing or shortness of breath 7/18/23  Yes Beka Lozano MD   albuterol (PROVENTIL HFA,VENTOLIN HFA) 90 mcg/act inhaler Inhale 2 puffs every 6 hours as needed for wheezing or shortness of breath. 1/8/24  Yes Roberto Britt, DO   amLODIPine (NORVASC) 5 mg tablet TAKE 1 TABLET BY MOUTH TWICE A DAY 11/20/23  Yes Vargas Remy MD   calcitriol (ROCALTROL) 0.5 MCG capsule Take 1 capsule (0.5 mcg total) by mouth daily 12/21/23  Yes Vargas Remy MD   erythromycin (ILOTYCIN) ophthalmic ointment Place a 1/2 inch ribbon of ointment into the lower eyelid. 1/12/24  Yes Samm Montes PA-C   ferrous sulfate 325 (65 Fe) mg tablet TAKE 1 TABLET BY MOUTH EVERY DAY WITH BREAKFAST 1/18/24  Yes Vargas Remy MD   fluticasone-salmeterol (Advair HFA) 115-21 MCG/ACT inhaler Inhale 2 puffs 2 (two) times a day Rinse mouth after use. 11/16/23  Yes Roberto Britt,    labetalol (NORMODYNE) 100 mg tablet TAKE 1 TABLET BY MOUTH TWICE A DAY 10/16/23  Yes Divina Mendoza PA-C   loratadine (CLARITIN) 10 mg tablet Take 1 tablet (10 mg total) by mouth daily 12/21/23  Yes SUZAN Wen   sodium bicarbonate 650 mg tablet Take 2 tablets (1,300 mg total) by mouth 2 (two) times a day 1/12/24  Yes Jojo Borso Halsey, CRNP   acetaminophen (TYLENOL) 325 mg tablet Take 2  tablets (650 mg total) by mouth every 6 (six) hours as needed for mild pain or headaches 1/26/20   Guillermina Velasquez PA-C   Blood Pressure KIT by Does not apply route daily  Patient not taking: Reported on 9/5/2023 6/24/20   SUZAN Gray   Diclofenac Sodium (VOLTAREN) 1 % Apply 2 g topically 4 (four) times a day 7/6/23   Kasi Lizama DO   etonogestrel (NEXPLANON) subdermal implant 68 mg by Subdermal route once    Historical Provider, MD   fluticasone (Flonase Allergy Relief) 50 mcg/act nasal spray 1 spray into each nostril daily 12/21/23   SUZAN Wen   azelastine (ASTELIN) 0.1 % nasal spray 1 spray into each nostril 2 (two) times a day Use in each nostril as directed  Patient not taking: Reported on 12/21/2023 9/27/23 1/23/24  Manuel Ghotra PA-C   fluticasone (FLONASE) 50 mcg/act nasal spray 1 spray into each nostril daily  Patient not taking: Reported on 12/21/2023 3/11/23 1/23/24  SUZAN Ricardo   methocarbamol (ROBAXIN) 500 mg tablet Take 1 tablet (500 mg total) by mouth 3 (three) times a day as needed for muscle spasms  Patient not taking: Reported on 5/13/2022 1/18/22 6/18/22  SUZAN Brandon     I have reviewed home medications with patient personally.    Allergies:   Allergies   Allergen Reactions    Seasonal Ic [Cholestatin] Itching    Wheat Bran - Food Allergy Itching       Social History:  Marital Status: Single   Occupation: Non-contriburatory  Patient Pre-hospital Living Situation: Home  Patient Pre-hospital Level of Mobility: walks  Patient Pre-hospital Diet Restrictions: renal diet  Substance Use History:   Social History     Substance and Sexual Activity   Alcohol Use Not Currently    Comment: occassionally on social events     Social History     Tobacco Use   Smoking Status Never   Smokeless Tobacco Never     Social History     Substance and Sexual Activity   Drug Use No       Family History:  Family History   Problem Relation Age of Onset    Asthma Mother     No Known  Problems Father        Physical Exam:     Vitals:   Blood Pressure: 158/80 (01/23/24 1915)  Pulse: 75 (01/23/24 1915)  Temperature: 98 °F (36.7 °C) (01/23/24 1915)  Temp Source: Oral (01/23/24 1915)  Respirations: 16 (01/23/24 1915)  SpO2: 100 % (01/23/24 1915)    Physical Exam  Vitals reviewed.   Constitutional:       General: She is not in acute distress.     Appearance: Normal appearance. She is obese. She is not ill-appearing.   HENT:      Head: Normocephalic and atraumatic.      Mouth/Throat:      Mouth: Mucous membranes are moist.      Pharynx: Oropharynx is clear.   Eyes:      General: No scleral icterus.     Extraocular Movements: Extraocular movements intact.      Conjunctiva/sclera: Conjunctivae normal.   Cardiovascular:      Rate and Rhythm: Normal rate and regular rhythm.      Pulses: Normal pulses.      Heart sounds: No murmur heard.  Pulmonary:      Effort: Pulmonary effort is normal. No respiratory distress.      Breath sounds: No stridor. No wheezing.   Abdominal:      General: Bowel sounds are normal. There is no distension.      Palpations: Abdomen is soft. There is no mass.      Tenderness: There is no abdominal tenderness.      Hernia: No hernia is present.   Musculoskeletal:         General: No swelling or tenderness. Normal range of motion.      Cervical back: No rigidity or tenderness.      Right lower leg: No edema.      Left lower leg: No edema.   Lymphadenopathy:      Cervical: No cervical adenopathy.   Skin:     General: Skin is warm.      Findings: No lesion or rash.   Neurological:      Mental Status: She is alert and oriented to person, place, and time. Mental status is at baseline.   Psychiatric:         Mood and Affect: Mood normal.         Behavior: Behavior normal.         Thought Content: Thought content normal.         Judgment: Judgment normal.        Additional Data:     Lab Results:  Results from last 7 days   Lab Units 01/23/24  1607   WBC Thousand/uL 7.76   HEMOGLOBIN g/dL  6.9*   HEMATOCRIT % 23.0*   PLATELETS Thousands/uL 237   NEUTROS PCT % 76*   LYMPHS PCT % 16   MONOS PCT % 5   EOS PCT % 3     Results from last 7 days   Lab Units 01/23/24  1607   SODIUM mmol/L 138   POTASSIUM mmol/L 5.2   CHLORIDE mmol/L 103   CO2 mmol/L 17*   BUN mg/dL 90*   CREATININE mg/dL 22.29*   ANION GAP mmol/L 18   CALCIUM mg/dL 10.1   ALBUMIN g/dL 4.5   TOTAL BILIRUBIN mg/dL 0.36   ALK PHOS U/L 42   ALT U/L 3*   AST U/L 10*   GLUCOSE RANDOM mg/dL 90                       Lines/Drains:  Invasive Devices       Peripheral Intravenous Line  Duration             Peripheral IV 01/23/24 Left Antecubital <1 day                        Imaging: No pertinent imaging reviewed.  No orders to display       EKG and Other Studies Reviewed on Admission:   EKG: NSR. HR 80.    ** Please Note: This note has been constructed using a voice recognition system. **

## 2024-01-24 NOTE — CONSULTS
Consultation - Nephrology   Raven Simeon 28 y.o. female MRN: [id number]  Unit/Bed#: W -01 Encounter: [id number]    ASSESSMENT/PLAN:  Acute on chronic anemia of CKD:  -Symptomatic with fatigue and lightheadedness  -on Epogen q 3 weeks as outpatient  -Admission Hgb 6.8 on 1/23/2024  -s/p 1 unit PRBC 1/23/2024 with today's Hgb 7.6.  Goal >10  -Iron studies 9/14/2023: Iron saturation 33%  -Hgb improved and stable, at recent baseline  -Continue Epogen as outpatient  -continue to trend  -Transfuse for Hgb <7.0  -Outpatient follow-up in office 1/31/2024 with repeat BMP and CBC  -Patient stable from nephrology standpoint for hospital discharge when medically cleared as patient continues to refuse dialysis.  -d/w primary team who agrees patient stable for discharge    Chronic kidney disease V not on HD:    -Etiology:  Suspect due to hypertensive arteriolar nephrosclerosis and chronic tubular interstitial nephropathy  -With proteinuria  -Admission creatinine 22.2 on 1/23/2024.  Today's creatinine 22.9  -Baseline creatinine previously in the high 7s to mid 8s but has been steadily increasing over the past year now in the low 20s.  Last creatinine 20 on 10/30/2023.  -Renal biopsy: Diffuse global glomerulosclerosis severe with severe tubular atrophy, interstitial fibrosis and interstitial inflammation.  Mild arterial sclerosis.  Severe chronic pathologic changes, irreversible and poor prognostic sign  -Nephrologist: Dr. Remy  -No uremic symptoms  -Patient has refused initiation of renal placement therapy multiple times as an outpatient.  She continues to refuse dialysis.  -Avoid nephrotoxins, NSAIDs, IV contrast if possible  -Outpatient follow-up scheduled with Dr. Remy 1/31/2024    Metabolic acidosis:  -In the setting of advanced kidney disease  -Stable  -Serum bicarb 19, AG 16  -continue sodium bicarbonate 1300 mg BID  -Continue to monitor    Hypertension/Volume status:  -BP elevated but acceptable  -volume status  euvolemic  -Home medications: Amlodipine 5 mg twice daily, labetalol 100 mg twice daily  -Current medications: Amlodipine 5 mg twice daily, labetalol 100 mg twice daily  -Continue current regimen  -Optimize hemodynamic status to avoid delay in renal recovery  -recommend hold parameters on antihypertensive's for SBP <130 mmHg.  -Avoid hypotension or fluctuations in blood pressure.  Maintain MAP >65  -low sodium (2 gm) diet  -continue to trend    Bone Mineral Disease of CKD:  -Calcium stable  -Hx hyperphosphatemia: Continue low phosphorus diet.  Follows outpatient  -Secondary hyperparathyroidism of renal origin: Worsening as outpatient.  Continue calcitriol  -Renal diet  -continue to monitor and follow as outpatient    Obesity:  -continue to encourage weight management, lifestyle modifications and diet modifications  -Management per primary team      HISTORY OF PRESENT ILLNESS:  Requesting Physician: Poppy Israel MD  Reason for Consult: CKD V    Raven Simeon is a 28 y.o. year old female who was admitted to [facility]  after presenting with symptomatic anemia with hemoglobin 6.8 with associated fatigue and lightheadedness.  S/p 1 unit PRBC with increase in hemoglobin to 7.6 and resolution of presenting symptoms.  Creatinine 22.2 on admission which has been steadily increasing over the past year.  Most recent creatinine 20.08 on 10/30/2023.  Initiation of renal placement therapy has been discussed multiple times as an outpatient and patient and mother continue to refuse dialysis and are pursuing transplant.  However, patient noncompliant with follow-up appointments and required diagnostic procedures for transplant.  Dialysis access planning has been discussed on multiple occasions as well and patient continues to refuse referral.  Patient with no evidence of uremic symptoms.  Patient denies fatigue, dyspnea, chest pain, edema, confusion, tremors, nausea, vomiting, abdominal pain or change in urine output.   Outpatient appointment scheduled with Dr. Remy on 1/31/2024.. A renal consultation is requested today for assistance in the management of CKD V.    PAST MEDICAL HISTORY:  Past Medical History:   Diagnosis Date    Asthma     Chronic kidney disease     Eczema     Headache     Hypertension     Wears glasses        PAST SURGICAL HISTORY:  Past Surgical History:   Procedure Laterality Date    CHOLECYSTECTOMY      CT NEEDLE BIOPSY KIDNEY  1/29/2020    KELOID EXCISION Left 3/30/2021    Procedure: POSTERIOR EAR EXCISION KELOID, FLAP RECONSTRUCTION;  Surgeon: Beka Hugo MD;  Location: [facility];  Service: Plastics       ALLERGIES:  Allergies   Allergen Reactions    Seasonal Ic [Cholestatin] Itching    Wheat Bran - Food Allergy Itching       SOCIAL HISTORY:  Social History     Substance and Sexual Activity   Alcohol Use Not Currently    Comment: occassionally on social events     Social History     Substance and Sexual Activity   Drug Use No     Social History     Tobacco Use   Smoking Status Never   Smokeless Tobacco Never       FAMILY HISTORY:  Family History   Problem Relation Age of Onset    Asthma Mother     No Known Problems Father        MEDICATIONS:    Current Facility-Administered Medications:     acetaminophen (TYLENOL) tablet 650 mg, 650 mg, Oral, Q6H PRN, Lino Ahmadi MD    albuterol (PROVENTIL HFA,VENTOLIN HFA) inhaler 2 puff, 2 puff, Inhalation, Q6H PRN, Lino Ahmadi MD    amLODIPine (NORVASC) tablet 5 mg, 5 mg, Oral, BID, Lino Ahmadi MD, 5 mg at 01/24/24 0825    ferrous sulfate tablet 325 mg, 325 mg, Oral, Daily With Breakfast, Lino Ahmadi MD, 325 mg at 01/24/24 0825    Fluticasone Furoate-Vilanterol 100-25 mcg/actuation 1 puff, 1 puff, Inhalation, Daily, Lino Ahmadi MD, 1 puff at 01/24/24 0826    labetalol (NORMODYNE) tablet 100 mg, 100 mg, Oral, BID, Lino Ahmadi MD, 100 mg at 01/24/24 0825    sodium bicarbonate tablet 1,300 mg, 1,300 mg, Oral, BID, Lino Ahmadi MD, 1,300 mg at  01/24/24 0825    REVIEW OF SYSTEMS:  A complete 10 point review of systems was performed and found to be negative unless otherwise noted in the history of present illness.  General: No fevers, chills.   Cardiovascular:  No chest pain, No leg edema.  Respiratory: No cough, sputum production,  No shortness of breath.  Gastrointestinal:  No nausea/vomiting,  No diarrhea,  No abdominal pain.  Genitourinary: No hematuria.  No foamy urine.  No dysuria    PHYSICAL EXAM:  Current Weight:    First Weight:    Vitals:    01/23/24 2130 01/23/24 2145 01/24/24 0757 01/24/24 0825   BP: 130/88  152/97 152/97   BP Location:       Pulse: 81 81 91    Resp: 16 16 18    Temp: 98.2 °F (36.8 °C) 98.2 °F (36.8 °C) 98.2 °F (36.8 °C)    TempSrc:  Oral     SpO2:   99%        Intake/Output Summary (Last 24 hours) at 1/24/2024 1029  Last data filed at 1/24/2024 0837  Gross per 24 hour   Intake 1113.17 ml   Output --   Net 1113.17 ml     General:  Awake, alert, appears comfortable and in no acute distress.  Nontoxic.  Obese.  Skin:  No rash, warm, good skin turgor   Eyes:  PERRL, EOMI, sclerae nonicteric.  no periorbital edema   ENT:  Moist mucous membranes  Neck:  Trachea midline, symmetric.  No JVD.  No carotid bruits.  Chest:  Clear to auscultation bilaterally without wheezes, crackles or rhonchi  CVS:  Regular rate and rhythm without murmur, gallop or rub.  S1 and S2 identified and normal.  No S3, S4.   Abdomen:  Soft, nontender, nondistended without masses.  Normal bowel sounds x 4 quadrants.  No bruit.  Extremities:  Warm, pink, motor and sensory intact and well perfused.  No cyanosis, pallor.  No BLE edema.  No asterixis  Neuro:  Awake, alert, oriented x3.  Grossly intact  Psych:  Appropriate affect.  Mentating appropriately.  Normal mental status exam    Invasive Devices: none       Lab Results:   Results from last 7 days   Lab Units 01/24/24  0546 01/24/24  0140 01/23/24  1607   WBC Thousand/uL 9.02  --  7.76   HEMOGLOBIN g/dL 7.6* 7.3*  6.9*   HEMATOCRIT % 24.5* 23.7* 23.0*   PLATELETS Thousands/uL 237  --  237   SODIUM mmol/L 139  --  138   POTASSIUM mmol/L 5.0  --  5.2   CHLORIDE mmol/L 104  --  103   CO2 mmol/L 19*  --  17*   BUN mg/dL 93*  --  90*   CREATININE mg/dL 22.95*  --  22.29*   CALCIUM mg/dL 9.6  --  10.1   ALK PHOS U/L  --   --  42   ALT U/L  --   --  3*   AST U/L  --   --  10*       EMR, including Epic and Care Everywhere, reviewed.  I have personally reviewed the blood work as stated above and in my note.  I have personally reviewed internal Medicine, co-consultants and prior nephrology notes.

## 2024-01-24 NOTE — DISCHARGE SUMMARY
[facility]  Discharge- Raven Simeon 1995, 28 y.o. female MRN: [id number]  Unit/Bed#: W -01 Encounter: [id number]  Primary Care Provider: Roberto Britt DO   Date and time admitted to hospital: 1/23/2024  2:49 PM    Metabolic acidosis  Assessment & Plan  Bicarb up to 19 from 17 on admission.  During last nephrology outpatient visit 10/23 sodium bicarb dose was increased to 1,300 mg BID.    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   Patient's BUN and cr has been trending up. Counseled patient on importance of dialysis while awaiting renal transplant, pt declines at this time.There is no progress in renal transplant process due to insurance and pt not  yet obtaining pre-transplant labs.    Patient is not showing any signs of azotemia at this time.  Consulted nephrology.  Per nephrology she is at baseline creatinine.  Cleared from nephrology for discharge.    Hypertension  Assessment & Plan  BP elevated in 150s since admission. Baseline unknown due to pt not being able to afford a BP cuff for home use.  Continue home BP meds Amlodipine and labetalol.    * Anemia due to stage 5 chronic kidney disease, not on chronic dialysis   Assessment & Plan  On epogen enjections, last dose yesterday at infusion center.  One unit PRBC transfused. Hb 6.8 --> 7.6.  Patient is stable for discharge today.      Medical Problems       Resolved Problems  Date Reviewed: 1/23/2024   None       Discharging Resident: Damion Gonzalez MD  Discharging Attending: Poppy Israel MD  PCP: Roberto Britt DO  Admission Date:   Admission Orders (From  admission, onward)       Ordered        01/23/24 1757  INPATIENT ADMISSION  Once                          Discharge Date: 01/24/24     Consultations During Hospital Stay:  Nephrology     Procedures Performed:   None     Significant Findings / Test Results:        Latest Reference Range & Units 01/24/24 05:46   Sodium 135 - 147 mmol/L 139   Potassium 3.5 - 5.3 mmol/L 5.0   Chloride 96 - 108 mmol/L 104   CO2 21 - 32 mmol/L 19 (L)   Anion Gap mmol/L 16   BUN 5 - 25 mg/dL 93 (H)   Creatinine 0.60 - 1.30 mg/dL 22.95 (H)   Glucose, Random 65 - 140 mg/dL 84   Calcium 8.4 - 10.2 mg/dL 9.6   eGFR ml/min/1.73sq m 1        Latest Reference Range & Units 01/24/24 05:46   WBC 4.31 - 10.16 Thousand/uL 9.02   Red Blood Cell Count 3.81 - 5.12 Million/uL 3.30 (L)   Hemoglobin 11.5 - 15.4 g/dL 7.6 (L)   HCT 34.8 - 46.1 % 24.5 (L)   MCV 82 - 98 fL 74 (L)   MCH 26.8 - 34.3 pg 23.0 (L)   MCHC 31.4 - 37.4 g/dL 31.0 (L)   RDW 11.6 - 15.1 % 15.2 (H)   Platelet Count 149 - 390 Thousands/uL 237   MPV 8.9 - 12.7 fL 12.0   nRBC /100 WBCs 0      Incidental Findings:   None      Test Results Pending at Discharge (will require follow up):  None     Outpatient Tests Requested:  Follow up CBC 1-week to scheduled nephrology appointment     Complications:  None     Reason for Admission: None     Hospital Course:   Raven Simeon is a 28 y.o. 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 Pt also felt lightheaded and fatigued. Pt was admitted due to anemia.  She received 1 unit of packed red cells.  She is afebrile, normotensive, satting 99% on room air.  Status post PRBC transfusion her hemoglobin increased to 7.6.  Patient states that she is feeling better.  Patient is stable for discharge today.      The patient, initially admitted to the hospital as inpatient, was discharged  earlier than expected given the following: improvement of symptoms.    Please see above list of diagnoses and related plan for additional information.     Condition at Discharge: stable    Discharge Day Visit / Exam:   Subjective:  Pt evaluated at bedside and states she feels better. No acute complaints at this time.   Vitals: Blood Pressure: 152/97 (01/24/24 0825)  Pulse: 91 (01/24/24 0757)  Temperature: 98.2 °F (36.8 °C) (01/24/24 0757)  Temp Source: Oral (01/23/24 2145)  Respirations: 18 (01/24/24 0757)  SpO2: 99 % (01/24/24 0757)  Exam:   Physical Exam  Vitals and nursing note reviewed.   Constitutional:       General: She is not in acute distress.     Appearance: Normal appearance.   HENT:      Head: Normocephalic.   Eyes:      Conjunctiva/sclera: Conjunctivae normal.   Cardiovascular:      Rate and Rhythm: Normal rate and regular rhythm.      Heart sounds: Normal heart sounds.   Pulmonary:      Effort: Pulmonary effort is normal.      Breath sounds: Normal breath sounds.   Abdominal:      General: Bowel sounds are normal. There is no distension.      Palpations: Abdomen is soft.      Tenderness: There is no abdominal tenderness. There is no guarding.   Musculoskeletal:      Cervical back: Neck supple.      Right lower leg: No edema.      Left lower leg: No edema.   Skin:     General: Skin is warm and dry.   Neurological:      Mental Status: She is alert and oriented to person, place, and time.   Psychiatric:         Mood and Affect: Mood normal.         Behavior: Behavior normal.         Discussion with Family: Updated  (mother) via phone.    Discharge instructions/Information to patient and family:   See after visit summary for information provided to patient and family.      Provisions for Follow-Up Care:  See after visit summary for information related to follow-up care and any pertinent home health orders.      Mobility at time of Discharge:   Basic Mobility Inpatient Raw Score: 24  JH-HLM  Goal: 8: Walk 250 feet or more  JH-HLM Achieved: 7: Walk 25 feet or more  HLM Goal NOT achieved. Continue to encourage mobility in post discharge setting.     Disposition:   Home    Planned Readmission: None    Discharge Medications:  See after visit summary for reconciled discharge medications provided to patient and/or family.      **Please Note: This note may have been constructed using a voice recognition system**

## 2024-01-24 NOTE — UTILIZATION REVIEW
Initial Clinical Review    Admission: Date/Time/Statement:   Admission Orders (From admission, onward)       Ordered        01/23/24 1757  INPATIENT ADMISSION  Once                          Orders Placed This Encounter   Procedures    INPATIENT ADMISSION     Standing Status:   Standing     Number of Occurrences:   1     Order Specific Question:   Level of Care     Answer:   Med Surg [16]     Order Specific Question:   Estimated length of stay     Answer:   More than 2 Midnights     Order Specific Question:   Certification     Answer:   I certify that inpatient services are medically necessary for this patient for a duration of greater than two midnights. See H&P and MD Progress Notes for additional information about the patient's course of treatment.     ED Arrival Information       Expected   -    Arrival   1/23/2024 14:47    Acuity   Urgent              Means of arrival   Wheelchair    Escorted by   Self    Service   Hospitalist    Admission type   Emergency              Arrival complaint   -             Chief Complaint   Patient presents with    Abnormal Lab     Pt presents with dizziness and low hemoglobin. GCS 15, was sent by provider       Initial Presentation: 28 y.o. female with hx HTN, CKD stage 5 not on dialysis, (CKD 2/2 hypertensive nephrosclerosis ),anemia due to CKD on epogen-last received today , metabolic acidosis who presents to ED from outpt infusion center  due to symptomatic anemia .Reports lightheaded, fatigue .  Pt very resistant to starting dialysis at this time, and has not been compliant with some of the pretransplant testing. On exam, elevated BP- hasn't taken BP meds today or na bicarb tabs , no signs  azotemia . Labs Hgb 6.9 , creat 22.29 , BUN 90 .Bicarb 17 .  ECG- NSR. Pt  admitted as Inpatient with anemia 2/2 CKD5. Plan - Transfuse 1 U PRBC. CBC in am . Nephrology consult. Monitor for signs azotemia. Monitor BMP . Continue home na bicarb tabs  BID . Continue home BP meds Amlodipine and  labetalol. Monitor BP .     Date: 1/24   Day 2:   Hgb 7.6 this am s/p 1 U PRBC yesterday . Creat 22.95, BUN 93. Await nephrology consult.    Nephrology consult- Baseline creatinine in high 7 to mid 8 but gradually increasing and now in low 20s since October 2023 . Pt does not have any uremic symptoms and no urgent indication for dialysis initiation and she is not interested in dialysis.  She does mention that she may consider dialysis when it comes to life and death . Pt feels better and wants to go home .Anemia- Hgb 7.6 today   Continue Epogen as outpatient  .Metabolic acidosis: Serum bicarbonate 19 today, continue oral sodium bicarbonate tablet  .   Blood pressure elevated.  Stressed on compliance with home medications, continue amlodipine and labetalol at current dose.  If it is persistently elevated may need to add another agent .  Secondary hyperparathyroidism of renal origin, continue calcitriol.  Continue low phosphorus diet     ED Triage Vitals   Temperature Pulse Respirations Blood Pressure SpO2   01/23/24 1853 01/23/24 1533 01/23/24 1533 01/23/24 1533 01/23/24 1533   97.8 °F (36.6 °C) 83 18 163/86 100 %      Temp Source Heart Rate Source Patient Position - Orthostatic VS BP Location FiO2 (%)   01/23/24 1853 01/23/24 1533 01/23/24 1533 01/23/24 1533 --   Oral Monitor Sitting Right arm       Pain Score       01/23/24 1533       No Pain          Wt Readings from Last 1 Encounters:   01/23/24 107 kg (236 lb)     Additional Vital Signs:   ate/Time Temp Pulse Resp BP MAP (mmHg) SpO2   01/24/24 0825 -- -- -- 152/97 -- --   01/24/24 07:57:28 98.2 °F (36.8 °C) 91 18 152/97 115 99 %   01/23/24 2145 98.2 °F (36.8 °C) 81 16 -- -- --   01/23/24 2130 98.2 °F (36.8 °C) 81 16 130/88 -- --   01/23/24 2012 98.2 °F (36.8 °C) 84 20 156/98 117 99 %   01/23/24 2010 -- -- -- -- -- --   01/23/24 1915 98 °F (36.7 °C) 75 16 158/80 111 100 %   01/23/24 1900 97.6 °F (36.4 °C) 79 16 166/83 115 100 %   01/23/24 1853 97.8 °F (36.6 °C)  68 16 167/81 -- 100 %   01/23/24 1731 -- 85 18 166/74 106 100 %     Pertinent Labs/Diagnostic Test Results:    1/23 ECG- ED read - Interpretation: normal    Rate:     ECG rate:  80     ECG rate assessment: normal    Rhythm:     Rhythm: sinus rhythm    Ectopy:     Ectopy: non   No orders to display     Results from last 7 days   Lab Units 01/23/24  1607   SARS-COV-2  Negative     Results from last 7 days   Lab Units 01/24/24  0546 01/24/24  0140 01/23/24  1607 01/23/24  1333   WBC Thousand/uL 9.02  --  7.76  --    HEMOGLOBIN g/dL 7.6* 7.3* 6.9* 6.8*   HEMATOCRIT % 24.5* 23.7* 23.0*  --    PLATELETS Thousands/uL 237  --  237  --    NEUTROS ABS Thousands/µL 6.77  --  5.88  --          Results from last 7 days   Lab Units 01/24/24  0546 01/23/24  1607   SODIUM mmol/L 139 138   POTASSIUM mmol/L 5.0 5.2   CHLORIDE mmol/L 104 103   CO2 mmol/L 19* 17*   ANION GAP mmol/L 16 18   BUN mg/dL 93* 90*   CREATININE mg/dL 22.95* 22.29*   EGFR ml/min/1.73sq m 1 1   CALCIUM mg/dL 9.6 10.1     Results from last 7 days   Lab Units 01/23/24  1607   AST U/L 10*   ALT U/L 3*   ALK PHOS U/L 42   TOTAL PROTEIN g/dL 7.6   ALBUMIN g/dL 4.5   TOTAL BILIRUBIN mg/dL 0.36         Results from last 7 days   Lab Units 01/24/24  0546 01/23/24  1607   GLUCOSE RANDOM mg/dL 84 90                               Results from last 7 days   Lab Units 01/24/24  0547   UNIT PRODUCT CODE  [id number]   UNIT NUMBER  [id number]   UNITABO  O   UNITRH  POS   CROSSMATCH  Compatible   UNIT DISPENSE STATUS  Presumed Trans   UNIT PRODUCT VOL mL 350                         Results from last 7 days   Lab Units 01/23/24  1731   CLARITY UA  Clear   COLOR UA  Colorless   SPEC GRAV UA  1.010   PH UA  6.5   GLUCOSE UA mg/dl Negative   KETONES UA mg/dl Negative   BLOOD UA  Small*   PROTEIN UA mg/dl 100 (2+)*   NITRITE UA  Negative   BILIRUBIN UA  Negative   UROBILINOGEN UA (BE) mg/dl <2.0   LEUKOCYTES UA  Negative   WBC UA /hpf 1-2   RBC UA /hpf None Seen   BACTERIA UA /hpf  Occasional   EPITHELIAL CELLS WET PREP /hpf Occasional     Results from last 7 days   Lab Units 01/23/24  1607   INFLUENZA A PCR  Negative   INFLUENZA B PCR  Negative   RSV PCR  Negative             ED Treatment:   Medication Administration from 01/23/2024 1447 to 01/23/2024 2007       None          Past Medical History:   Diagnosis Date    Asthma     Chronic kidney disease     Eczema     Headache     Hypertension     Wears glasses      Present on Admission:   Metabolic acidosis   Anemia due to stage 5 chronic kidney disease, not on chronic dialysis    Hypertension   Chronic kidney disease (CKD), active medical management without dialysis, stage 5 (HCC)      Admitting Diagnosis: Lightheadedness [R42]  Metabolic acidosis [E87.20]  Stage 5 chronic kidney disease (HCC) [N18.5]  Anemia due to stage 5 chronic kidney disease, not on chronic dialysis  [N18.5, D63.1]  Age/Sex: 28 y.o. female  Admission Orders:  Scheduled Medications:  amLODIPine, 5 mg, Oral, BID  ferrous sulfate, 325 mg, Oral, Daily With Breakfast  Fluticasone Furoate-Vilanterol, 1 puff, Inhalation, Daily  labetalol, 100 mg, Oral, BID  sodium bicarbonate, 1,300 mg, Oral, BID      Continuous IV Infusions:     PRN Meds:  acetaminophen, 650 mg, Oral, Q6H PRN  albuterol, 2 puff, Inhalation, Q6H PRN    Renal diet       IP CONSULT TO NEPHROLOGY    Network Utilization Review Department  ATTENTION: Please call with any questions or concerns to [phone] and carefully listen to the prompts so that you are directed to the right person. All voicemails are confidential.   For Discharge needs, contact Care Management DC Support Team at [phone] opt. 2  Send all requests for admission clinical reviews, approved or denied determinations and any other requests to dedicated fax number below belonging to the campus where the patient is receiving treatment. List of dedicated fax numbers for the Facilities:  FACILITY NAME UR FAX NUMBER   ADMISSION DENIALS  (Administrative/Medical Necessity) [phone]   DISCHARGE SUPPORT TEAM (NETWORK) [phone]   PARENT CHILD HEALTH (Maternity/NICU/Pediatrics) [phone]   [facility] [phone]   [facility] [phone]   [facility] [phone]   [facility] [phone]   [facility] [phone]   [facility] [phone]   [facility] [phone]   [facility] [phone]   [facility] [phone]   [facility] [phone]   [facility] [phone]

## 2024-01-24 NOTE — ASSESSMENT & PLAN NOTE
BP elevated in 150s since admission. Baseline unknown due to pt not being able to afford a BP cuff for home use.  Continue home BP meds Amlodipine and labetalol.

## 2024-01-24 NOTE — DISCHARGE INSTR - AVS FIRST PAGE
Dear Raven Simeon,     It was our pleasure to care for you here at [facility].  It is our hope that we were always able to exceed the expected standards for your care during your stay.  You were hospitalized due to anemia.  You were cared for on the fourth floor by Damion Gonzalez MD under the service of Poppy Israel MD with the [facility] who covers for your primary care physician (PCP), Roberto Britt DO, while you were hospitalized.  If you have any questions or concerns related to this hospitalization, you may contact us at .  For follow up as well as any medication refills, we recommend that you follow up with your primary care physician.  A registered nurse will reach out to you by phone within a few days after your discharge to answer any additional questions that you may have after going home.  However, at this time we provide for you here, the most important instructions / recommendations at discharge:     Notable Medication Adjustments -   No medication changes have been made.  Continue taking all your home medications as prescribed to you.  Testing Required after Discharge -   Follow-up CBC in 1 week  ** Please contact your PCP to request testing orders for any of the testing recommended here **  Important follow up information -   Please follow-up with your primary care physician in 1 week.  Please follow-up with your nephrologist in 1 week  Other Instructions -   If you have any dizziness, lightheadedness, fatigue, nosebleed, please call your primary care doctor.  If you have back out, excessive bleeding from nose, mouth, for rectal bleeding blood in your urine please visit to the emergency department.  Please review this entire after visit summary as additional general instructions including medication list, appointments, activity, diet, any pertinent wound care, and other additional recommendations from your care team  that may be provided for you.      Sincerely,     Damion Gonazlez MD

## 2024-01-24 NOTE — ASSESSMENT & PLAN NOTE
On epogen enjections, last dose yesterday at infusion center.  One unit PRBC transfused. Hb 6.8 --> 7.6.  Patient is stable for discharge today.

## 2024-01-27 LAB
ATRIAL RATE: 80 BPM
P AXIS: 36 DEGREES
PR INTERVAL: 152 MS
QRS AXIS: 27 DEGREES
QRSD INTERVAL: 86 MS
QT INTERVAL: 402 MS
QTC INTERVAL: 463 MS
T WAVE AXIS: 48 DEGREES
VENTRICULAR RATE: 80 BPM

## 2024-01-27 PROCEDURE — 93010 ELECTROCARDIOGRAM REPORT: CPT | Performed by: INTERNAL MEDICINE

## 2024-01-29 ENCOUNTER — APPOINTMENT (OUTPATIENT)
Dept: LAB | Facility: CLINIC | Age: 29
End: 2024-01-29
Payer: MEDICARE

## 2024-01-29 DIAGNOSIS — D63.1 ANEMIA DUE TO STAGE 5 CHRONIC KIDNEY DISEASE, NOT ON CHRONIC DIALYSIS: ICD-10-CM

## 2024-01-29 DIAGNOSIS — N18.5 ANEMIA DUE TO STAGE 5 CHRONIC KIDNEY DISEASE, NOT ON CHRONIC DIALYSIS: ICD-10-CM

## 2024-01-29 DIAGNOSIS — N18.5 STAGE 5 CHRONIC KIDNEY DISEASE (HCC): ICD-10-CM

## 2024-01-29 LAB
ANION GAP SERPL CALCULATED.3IONS-SCNC: 14 MMOL/L
BUN SERPL-MCNC: 88 MG/DL (ref 5–25)
CALCIUM SERPL-MCNC: 9.9 MG/DL (ref 8.4–10.2)
CHLORIDE SERPL-SCNC: 99 MMOL/L (ref 96–108)
CO2 SERPL-SCNC: 24 MMOL/L (ref 21–32)
CREAT SERPL-MCNC: 22.73 MG/DL (ref 0.6–1.3)
ERYTHROCYTE [DISTWIDTH] IN BLOOD BY AUTOMATED COUNT: 16.4 % (ref 11.6–15.1)
GFR SERPL CREATININE-BSD FRML MDRD: 1 ML/MIN/1.73SQ M
GLUCOSE SERPL-MCNC: 82 MG/DL (ref 65–140)
HCT VFR BLD AUTO: 26.9 % (ref 34.8–46.1)
HGB BLD-MCNC: 8 G/DL (ref 11.5–15.4)
MCH RBC QN AUTO: 22.7 PG (ref 26.8–34.3)
MCHC RBC AUTO-ENTMCNC: 29.7 G/DL (ref 31.4–37.4)
MCV RBC AUTO: 76 FL (ref 82–98)
PLATELET # BLD AUTO: 267 THOUSANDS/UL (ref 149–390)
PMV BLD AUTO: 12.1 FL (ref 8.9–12.7)
POTASSIUM SERPL-SCNC: 4.9 MMOL/L (ref 3.5–5.3)
RBC # BLD AUTO: 3.53 MILLION/UL (ref 3.81–5.12)
SODIUM SERPL-SCNC: 137 MMOL/L (ref 135–147)
WBC # BLD AUTO: 10.98 THOUSAND/UL (ref 4.31–10.16)

## 2024-01-29 PROCEDURE — 85027 COMPLETE CBC AUTOMATED: CPT

## 2024-01-29 PROCEDURE — 80048 BASIC METABOLIC PNL TOTAL CA: CPT

## 2024-01-29 PROCEDURE — 36415 COLL VENOUS BLD VENIPUNCTURE: CPT

## 2024-01-31 ENCOUNTER — OFFICE VISIT (OUTPATIENT)
Dept: NEPHROLOGY | Facility: CLINIC | Age: 29
End: 2024-01-31
Payer: MEDICARE

## 2024-01-31 VITALS — HEIGHT: 64 IN | WEIGHT: 239 LBS | BODY MASS INDEX: 40.8 KG/M2

## 2024-01-31 DIAGNOSIS — N18.5 BENIGN HYPERTENSION WITH CKD (CHRONIC KIDNEY DISEASE) STAGE V (HCC): Primary | ICD-10-CM

## 2024-01-31 DIAGNOSIS — I12.0 BENIGN HYPERTENSION WITH CKD (CHRONIC KIDNEY DISEASE) STAGE V (HCC): Primary | ICD-10-CM

## 2024-01-31 DIAGNOSIS — N18.5 CHRONIC KIDNEY DISEASE (CKD), ACTIVE MEDICAL MANAGEMENT WITHOUT DIALYSIS, STAGE 5 (HCC): ICD-10-CM

## 2024-01-31 PROCEDURE — 99214 OFFICE O/P EST MOD 30 MIN: CPT | Performed by: INTERNAL MEDICINE

## 2024-01-31 NOTE — PROGRESS NOTES
NEPHROLOGY OFFICE VISIT   Luan Simeon 28 y.o. female MRN: [id number]  1/31/2024    Reason for Visit: Advanced chronic kidney disease, hospital follow-up    History of Present Illness (HPI):    I had the pleasure of seeing luan who is accompanied by her mother today in the renal clinic for the continued management of advanced chronic kidney disease and hospital follow-up. Since our last visit, I sent her to the hospital after she received her last Epogen dose due to her hemoglobin being below 7 and requiring a blood transfusion.    Her creatinine has remained stable at around 22 with a BUN that fluctuates in the 80s.  Potassium and acid-base status are stable.  Reports good urine output.  Reports no uremic symptoms.  About euvolemic.  Blood pressure is controlled.    Despite the very high creatinine she has no emergent indication for renal placement therapy.  I have tried my best about having her get placement of an access for future dialysis such as PD or HD.  They have declined this on multiple occasions.  She continues to undergo evaluation by transplant team but I know there have been some issues with getting testing done due to compliance issues.    I did send a message to the transplant nephrologist just to have the transplant team follow-up with her.    I will release to hold on the Epogen dosing therapy plan and have it restarted so that she can continue to get her Epogen.  Her most recent hemoglobin has improved to 8.  I will increase her Epogen dosing as well.    ASSESSMENT and PLAN:    Chronic Kidney Disease Stage V  --Imaging:  Right kidney 9.4 cm, left kidney 9 cm.  It should be noted that there was a decrease in the right kidney size compared to 2015 ultrasound where it was 12.8 cm.  Both kidneys are diffusely echogenic consistent with chronic kidney disease.  --Urinalysis:  Negative blood.  2+ protein.  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  Mild arterial sclerosis.  Severe chronic pathologic changes, irreversible with poor prognostic sign.  --Function stable since October 2023  --Being evaluated by transplant and has seen Dr. Mac, at the message to the transplant team  --Completed nephrology advanced care meeting in September 2022  --Management: I have on multiple occasions discussed access placement such as PD catheter and HD access.  They continue to decline this.  They are hoping for transplant sooner.  I did educate them and recommended strongly that they should have an access placement as a bridge.  I did discuss the symptoms of renal failure that they should go to the emergency room immediately if they need more sooner renal placement therapy  --Reducing Cardiovascular Risk Factors:  Simvastatin+ Ezetimibe reduced atherosclerotic events in CKD (SHARP trial); Low dose aspirin safe (if no contraindications exist); smoking is an independent risk factor for Chronic Kidney Disease and progression, strongly recommend smoking cessation     Hypertension  -- Stage II (American College of Cardiology/American Heart Association)  -- with underlying chronic kidney disease and morbid obesity  -- Body mass index is 41.02 kg/m².  -- Volume status: Euvolemic  -- Etiology:  Morbid obesity, underlying chronic kidney disease  --  Secondary Work-Up:  Renal artery Doppler was negative, a.m. Cortisol 14, TSH 1.5, normal metanephrines and catecholamine levels, renin 0.935, Aldosterone 11.6  -- Target Goal: < 130/80 (ACC/AHA, CKD with proteinuria, CKD in diabetics) ; if tolerated can target SBP < 120 mmHg in high cardiovascular risk ( Age > 75, CKD, CVD, No Diabetics SPRINT)  -- Lifestyle Modifications: DASH Diet, Weight Loss for ideal body weight, 45 mins of cardiovascular exercise 3 times a week as tolerated, and if no contraindications exist, smoking cessation, limit alcohol use, avoid NSAIDS, monitor blood pressure at home  -- Status: Blood pressure at target  -- Current antihypertensive regiment:  Amlodipine 5 mg twice a day, labetalol 100 mg twice a day  -- Changes: Continue current management     Obesity  -- BMI 41, slight improvement, it is to get her weight better for transplant  --Recommend decreasing portion sizes, encouraging healthy choices of fruits and vegetables, consuming healthier snacks and moderation in carbohydrate intake. Exercise recommendations; 3-5 times a week, the American Heart Association recommends 150 minutes a week moderate exercise  --Strongly recommend evaluation by nutritionist  --Overweight and obesity have been associated with increased mortality and morbidity.  Associated with a reduction in life expectancy, associated with increased all cause and cardiovascular mortality  --Metabolic risks, including increased risk of diabetes type 2, insulin resistance with hyperinsulinemia (weight loss of 5 to 7% associated with a decreased risk of type 2 diabetes among individuals with prediabetes and weight loss of 15% can lead to dramatic improvement of diabetes in nearly half of people).  Dyslipidemia, hypertension and heart disease are all increased in patients with obesity.  Along with increased risk of stroke increased risk of multiple cancer types.  Can also be associated with increased renal dysfunction, strongest  risk factor for new onset chronic kidney disease.  There is also a degree of compensatory hyperfiltration to meet high in the metabolic demands of increased body weight.  This can also result in increased increased risk for nephrolithiasis.        Mineral bone disorder-chronic kidney disease  --secondary hyperparathyroidism renal origin  --Phosphorus is normal  --Last vitamin D 25-hydroxy level was 70.  Check a repeat vitamin D 25-hydroxy level at the next visit  --Check PTH at the next visit  --Continue calcitriol to 0.5 mcg daily  -- Continue low phosphorus diet  --If phosphorus elevated at next visit start calcium acetate.  --If vitamin D low will start supplementation     Anemia of chronic kidney disease  -- Check iron studies, iron infusions as needed  --Increase Epogen and restart dosing.  I sent a message to the clinical staff to restart Epogen injections I released the hold of the therapy plan  --Status post blood transfusion hemoglobin improved to 8  --Secondary to advanced chronic kidney disease     Vitamin-D deficiency  -- Completed ergocalciferol  --Check repeat vitamin D level     Low bicarbonate level--resolved  --Continue oral sodium bicarbonate to 1300 mg twice a day      PATIENT INSTRUCTIONS:    Patient Instructions   1.)  Low 2 g sodium diet    2.)  Monitor weights at home    3.)  Avoid NSAIDs (ibuprofen, Motrin, Advil, Aleve, naproxen)    4.)  Monitor blood pressure at home, call if blood pressure greater than 150/90 persistently    5.) I will plan to discuss all results including blood work, and/or imaging at our next visit, unless there is an urgent indication, in which case I will call you earlier. If you have any questions or concerns about your results, please feel free to call our office.    6.) Epogen injections        Orders Placed This Encounter   Procedures    Comprehensive metabolic panel     This is a patient instruction: Patient fasting for 8 hours or longer recommended.     Standing  "Status:   Future     Standing Expiration Date:   1/31/2025    CBC     Standing Status:   Future     Standing Expiration Date:   1/31/2025    PTH, intact     Standing Status:   Future     Standing Expiration Date:   1/31/2025    Vitamin D 25 hydroxy     Standing Status:   Future     Standing Expiration Date:   1/31/2025    Phosphorus     Standing Status:   Future     Standing Expiration Date:   1/31/2025    Cystatin C With eGFR     Standing Status:   Future     Standing Expiration Date:   1/31/2025       OBJECTIVE:  Current Weight: Weight - Scale: 108 kg (239 lb)  Vitals:    01/31/24 1635   Weight: 108 kg (239 lb)   Height: 5' 4\" (1.626 m)    Body mass index is 41.02 kg/m².      REVIEW OF SYSTEMS:    Review of Systems   Constitutional:  Negative for activity change and fever.   Respiratory:  Negative for cough, chest tightness, shortness of breath and wheezing.    Cardiovascular:  Negative for chest pain and leg swelling.   Gastrointestinal:  Negative for abdominal pain, diarrhea, nausea and vomiting.   Endocrine: Negative for polyuria.   Genitourinary:  Negative for difficulty urinating, dysuria, flank pain, frequency and urgency.   Skin:  Negative for rash.   Neurological:  Negative for dizziness, syncope, light-headedness and headaches.   All other systems reviewed and are negative.      PHYSICAL EXAM:      Physical Exam  Vitals and nursing note reviewed. Exam conducted with a chaperone present.   Constitutional:       General: She is not in acute distress.     Appearance: She is well-developed. She is obese.   HENT:      Head: Normocephalic and atraumatic.   Eyes:      General: No scleral icterus.     Conjunctiva/sclera: Conjunctivae normal.      Pupils: Pupils are equal, round, and reactive to light.   Cardiovascular:      Rate and Rhythm: Normal rate and regular rhythm.      Heart sounds: S1 normal and S2 normal. No murmur heard.     No friction rub. No gallop.   Pulmonary:      Effort: Pulmonary effort is " normal. No respiratory distress.      Breath sounds: Normal breath sounds. No wheezing or rales.   Abdominal:      General: Bowel sounds are normal.      Palpations: Abdomen is soft.      Tenderness: There is no abdominal tenderness. There is no rebound.   Musculoskeletal:         General: Normal range of motion.      Cervical back: Normal range of motion and neck supple.   Skin:     Findings: No rash.   Neurological:      Mental Status: She is alert and oriented to person, place, and time.   Psychiatric:         Behavior: Behavior normal.         Medications:    Current Outpatient Medications:     acetaminophen (TYLENOL) 325 mg tablet, Take 2 tablets (650 mg total) by mouth every 6 (six) hours as needed for mild pain or headaches, Disp: 30 tablet, Rfl: 0    albuterol (2.5 mg/3 mL) 0.083 % nebulizer solution, Take 3 mL (2.5 mg total) by nebulization every 6 (six) hours as needed for wheezing or shortness of breath, Disp: 30 mL, Rfl: 0    albuterol (PROVENTIL HFA,VENTOLIN HFA) 90 mcg/act inhaler, Inhale 2 puffs every 6 hours as needed for wheezing or shortness of breath., Disp: 18 g, Rfl: 1    amLODIPine (NORVASC) 5 mg tablet, TAKE 1 TABLET BY MOUTH TWICE A DAY, Disp: 60 tablet, Rfl: 3    calcitriol (ROCALTROL) 0.5 MCG capsule, Take 1 capsule (0.5 mcg total) by mouth daily, Disp: 90 capsule, Rfl: 0    erythromycin (ILOTYCIN) ophthalmic ointment, Place a 1/2 inch ribbon of ointment into the lower eyelid., Disp: 3.5 g, Rfl: 0    etonogestrel (NEXPLANON) subdermal implant, 68 mg by Subdermal route once, Disp: , Rfl:     ferrous sulfate 325 (65 Fe) mg tablet, TAKE 1 TABLET BY MOUTH EVERY DAY WITH BREAKFAST, Disp: 30 tablet, Rfl: 0    fluticasone (Flonase Allergy Relief) 50 mcg/act nasal spray, 1 spray into each nostril daily, Disp: 15.8 mL, Rfl: 0    fluticasone-salmeterol (Advair HFA) 115-21 MCG/ACT inhaler, Inhale 2 puffs 2 (two) times a day Rinse mouth after use., Disp: 12 g, Rfl: 0    labetalol (NORMODYNE) 100 mg  tablet, TAKE 1 TABLET BY MOUTH TWICE A DAY, Disp: 60 tablet, Rfl: 3    loratadine (CLARITIN) 10 mg tablet, Take 1 tablet (10 mg total) by mouth daily, Disp: 20 tablet, Rfl: 0    sodium bicarbonate 650 mg tablet, Take 2 tablets (1,300 mg total) by mouth 2 (two) times a day, Disp: 180 tablet, Rfl: 0    Blood Pressure KIT, by Does not apply route daily (Patient not taking: Reported on 9/5/2023), Disp: 1 each, Rfl: 0    Diclofenac Sodium (VOLTAREN) 1 %, Apply 2 g topically 4 (four) times a day, Disp: 150 g, Rfl: 0    Laboratory Results:  Results from last 7 days   Lab Units 01/29/24  1743   WBC Thousand/uL 10.98*   HEMOGLOBIN g/dL 8.0*   HEMATOCRIT % 26.9*   PLATELETS Thousands/uL 267   POTASSIUM mmol/L 4.9   CHLORIDE mmol/L 99   CO2 mmol/L 24   BUN mg/dL 88*   CREATININE mg/dL 22.73*   CALCIUM mg/dL 9.9       Results for orders placed or performed in visit on 01/29/24   Basic metabolic panel   Result Value Ref Range    Sodium 137 135 - 147 mmol/L    Potassium 4.9 3.5 - 5.3 mmol/L    Chloride 99 96 - 108 mmol/L    CO2 24 21 - 32 mmol/L    ANION GAP 14 mmol/L    BUN 88 (H) 5 - 25 mg/dL    Creatinine 22.73 (H) 0.60 - 1.30 mg/dL    Glucose 82 65 - 140 mg/dL    Calcium 9.9 8.4 - 10.2 mg/dL    eGFR 1 ml/min/1.73sq m   CBC   Result Value Ref Range    WBC 10.98 (H) 4.31 - 10.16 Thousand/uL    RBC 3.53 (L) 3.81 - 5.12 Million/uL    Hemoglobin 8.0 (L) 11.5 - 15.4 g/dL    Hematocrit 26.9 (L) 34.8 - 46.1 %    MCV 76 (L) 82 - 98 fL    MCH 22.7 (L) 26.8 - 34.3 pg    MCHC 29.7 (L) 31.4 - 37.4 g/dL    RDW 16.4 (H) 11.6 - 15.1 %    Platelets 267 149 - 390 Thousands/uL    MPV 12.1 8.9 - 12.7 fL

## 2024-01-31 NOTE — PATIENT INSTRUCTIONS
1.)  Low 2 g sodium diet    2.)  Monitor weights at home    3.)  Avoid NSAIDs (ibuprofen, Motrin, Advil, Aleve, naproxen)    4.)  Monitor blood pressure at home, call if blood pressure greater than 150/90 persistently    5.) I will plan to discuss all results including blood work, and/or imaging at our next visit, unless there is an urgent indication, in which case I will call you earlier. If you have any questions or concerns about your results, please feel free to call our office.    6.) Epogen injections

## 2024-02-01 ENCOUNTER — TELEPHONE (OUTPATIENT)
Dept: NEPHROLOGY | Facility: CLINIC | Age: 29
End: 2024-02-01

## 2024-02-01 NOTE — TELEPHONE ENCOUNTER
Left message on patient's VM that Epogen is scheduled for 2/13/24 at 2 PM and again  3/5/24 @ 2 PM.  She can schedule her   Q3 weeks thereafter.  (Hold was released as she had been in the hospital).    Reminded her that Hgb. Needs to be checked within a week prior to injections.  ----- Message from Vargas Remy MD sent at 1/31/2024  4:53 PM EST -----  I released the hold on her Epogen injections I was wanted to make sure that she is back on schedule to receive them now that she is discharged

## 2024-02-12 ENCOUNTER — APPOINTMENT (OUTPATIENT)
Dept: LAB | Facility: HOSPITAL | Age: 29
End: 2024-02-12
Payer: MEDICARE

## 2024-02-12 DIAGNOSIS — D63.1 ANEMIA DUE TO STAGE 5 CHRONIC KIDNEY DISEASE, NOT ON CHRONIC DIALYSIS: ICD-10-CM

## 2024-02-12 DIAGNOSIS — N18.5 ANEMIA DUE TO STAGE 5 CHRONIC KIDNEY DISEASE, NOT ON CHRONIC DIALYSIS: ICD-10-CM

## 2024-02-12 DIAGNOSIS — J45.20 MILD INTERMITTENT ASTHMA WITHOUT COMPLICATION: ICD-10-CM

## 2024-02-12 LAB — HGB BLD-MCNC: 8.1 G/DL (ref 11.5–15.4)

## 2024-02-12 PROCEDURE — 85018 HEMOGLOBIN: CPT

## 2024-02-12 RX ORDER — FLUTICASONE PROPIONATE AND SALMETEROL XINAFOATE 115; 21 UG/1; UG/1
2 AEROSOL, METERED RESPIRATORY (INHALATION) 2 TIMES DAILY
Qty: 12 G | Refills: 3 | Status: SHIPPED | OUTPATIENT
Start: 2024-02-12

## 2024-02-13 ENCOUNTER — HOSPITAL ENCOUNTER (OUTPATIENT)
Dept: INFUSION CENTER | Facility: CLINIC | Age: 29
Discharge: HOME | End: 2024-02-13
Payer: MEDICARE

## 2024-02-13 VITALS — BODY MASS INDEX: 41.02 KG/M2 | DIASTOLIC BLOOD PRESSURE: 78 MMHG | SYSTOLIC BLOOD PRESSURE: 138 MMHG | WEIGHT: 239 LBS

## 2024-02-13 DIAGNOSIS — D63.1 ANEMIA DUE TO STAGE 5 CHRONIC KIDNEY DISEASE, NOT ON CHRONIC DIALYSIS: Primary | ICD-10-CM

## 2024-02-13 DIAGNOSIS — N18.5 ANEMIA DUE TO STAGE 5 CHRONIC KIDNEY DISEASE, NOT ON CHRONIC DIALYSIS: Primary | ICD-10-CM

## 2024-02-13 PROCEDURE — 96372 THER/PROPH/DIAG INJ SC/IM: CPT

## 2024-02-13 RX ADMIN — EPOETIN ALFA 8000 UNITS: 4000 SOLUTION INTRAVENOUS; SUBCUTANEOUS at 13:00

## 2024-02-13 NOTE — PROGRESS NOTES
Patient presents today for epogen injection. Patient offers no complaints. Labs from 2/12/2024 reviewed and within parameters to treat. BP stable. Epogen injection administered into left arm, tolerated without incident. Next appointment for injection confirmed for 3/5/2024. AVS offered and declined.

## 2024-02-14 ENCOUNTER — TRANSITIONAL CARE MANAGEMENT (OUTPATIENT)
Dept: FAMILY MEDICINE CLINIC | Facility: CLINIC | Age: 29
End: 2024-02-14

## 2024-02-15 DIAGNOSIS — N18.9 ACUTE KIDNEY INJURY SUPERIMPOSED ON CHRONIC KIDNEY DISEASE: ICD-10-CM

## 2024-02-15 DIAGNOSIS — N18.5 CHRONIC KIDNEY DISEASE (CKD), ACTIVE MEDICAL MANAGEMENT WITHOUT DIALYSIS, STAGE 5 (HCC): ICD-10-CM

## 2024-02-15 DIAGNOSIS — D50.9 IRON DEFICIENCY ANEMIA, UNSPECIFIED IRON DEFICIENCY ANEMIA TYPE: ICD-10-CM

## 2024-02-15 DIAGNOSIS — N17.9 ACUTE KIDNEY INJURY SUPERIMPOSED ON CHRONIC KIDNEY DISEASE: ICD-10-CM

## 2024-02-15 RX ORDER — FERROUS SULFATE 325(65) MG
1 TABLET ORAL
Qty: 30 TABLET | Refills: 0 | Status: SHIPPED | OUTPATIENT
Start: 2024-02-15

## 2024-02-15 RX ORDER — SODIUM BICARBONATE 650 MG/1
TABLET ORAL
Qty: 120 TABLET | Refills: 1 | Status: SHIPPED | OUTPATIENT
Start: 2024-02-15

## 2024-02-29 DIAGNOSIS — N18.30 BENIGN HYPERTENSION WITH CKD (CHRONIC KIDNEY DISEASE) STAGE III (HCC): ICD-10-CM

## 2024-02-29 DIAGNOSIS — I10 HYPERTENSION, UNSPECIFIED TYPE: ICD-10-CM

## 2024-02-29 DIAGNOSIS — N25.81 SECONDARY HYPERPARATHYROIDISM OF RENAL ORIGIN (HCC): ICD-10-CM

## 2024-02-29 DIAGNOSIS — D50.9 IRON DEFICIENCY ANEMIA, UNSPECIFIED IRON DEFICIENCY ANEMIA TYPE: ICD-10-CM

## 2024-02-29 DIAGNOSIS — N18.9 CHRONIC KIDNEY DISEASE-MINERAL AND BONE DISORDER: ICD-10-CM

## 2024-02-29 DIAGNOSIS — R80.1 PERSISTENT PROTEINURIA: ICD-10-CM

## 2024-02-29 DIAGNOSIS — M89.9 CHRONIC KIDNEY DISEASE-MINERAL AND BONE DISORDER: ICD-10-CM

## 2024-02-29 DIAGNOSIS — N18.5 BENIGN HYPERTENSION WITH CKD (CHRONIC KIDNEY DISEASE) STAGE V (HCC): ICD-10-CM

## 2024-02-29 DIAGNOSIS — N18.5 CHRONIC KIDNEY DISEASE (CKD), ACTIVE MEDICAL MANAGEMENT WITHOUT DIALYSIS, STAGE 5 (HCC): ICD-10-CM

## 2024-02-29 DIAGNOSIS — I12.0 BENIGN HYPERTENSION WITH CKD (CHRONIC KIDNEY DISEASE) STAGE V (HCC): ICD-10-CM

## 2024-02-29 DIAGNOSIS — I12.9 BENIGN HYPERTENSION WITH CKD (CHRONIC KIDNEY DISEASE) STAGE III (HCC): ICD-10-CM

## 2024-02-29 DIAGNOSIS — E83.9 CHRONIC KIDNEY DISEASE-MINERAL AND BONE DISORDER: ICD-10-CM

## 2024-02-29 RX ORDER — AMLODIPINE BESYLATE 5 MG/1
TABLET ORAL
Qty: 60 TABLET | Refills: 3 | Status: SHIPPED | OUTPATIENT
Start: 2024-02-29

## 2024-03-04 ENCOUNTER — APPOINTMENT (OUTPATIENT)
Dept: LAB | Facility: HOSPITAL | Age: 29
End: 2024-03-04
Payer: MEDICARE

## 2024-03-04 DIAGNOSIS — N18.5 BENIGN HYPERTENSION WITH CKD (CHRONIC KIDNEY DISEASE) STAGE V (HCC): ICD-10-CM

## 2024-03-04 DIAGNOSIS — N18.5 ANEMIA DUE TO STAGE 5 CHRONIC KIDNEY DISEASE, NOT ON CHRONIC DIALYSIS: ICD-10-CM

## 2024-03-04 DIAGNOSIS — D63.1 ANEMIA DUE TO STAGE 5 CHRONIC KIDNEY DISEASE, NOT ON CHRONIC DIALYSIS: ICD-10-CM

## 2024-03-04 DIAGNOSIS — I12.0 BENIGN HYPERTENSION WITH CKD (CHRONIC KIDNEY DISEASE) STAGE V (HCC): ICD-10-CM

## 2024-03-04 DIAGNOSIS — N18.5 CHRONIC KIDNEY DISEASE (CKD), ACTIVE MEDICAL MANAGEMENT WITHOUT DIALYSIS, STAGE 5 (HCC): ICD-10-CM

## 2024-03-04 LAB
25(OH)D3 SERPL-MCNC: 41.4 NG/ML (ref 30–100)
ALBUMIN SERPL BCP-MCNC: 4.4 G/DL (ref 3.5–5)
ALP SERPL-CCNC: 55 U/L (ref 34–104)
ALT SERPL W P-5'-P-CCNC: 6 U/L (ref 7–52)
ANION GAP SERPL CALCULATED.3IONS-SCNC: 20 MMOL/L
AST SERPL W P-5'-P-CCNC: 10 U/L (ref 13–39)
BILIRUB SERPL-MCNC: 0.28 MG/DL (ref 0.2–1)
BUN SERPL-MCNC: 96 MG/DL (ref 5–25)
CALCIUM SERPL-MCNC: 11 MG/DL (ref 8.4–10.2)
CHLORIDE SERPL-SCNC: 99 MMOL/L (ref 96–108)
CO2 SERPL-SCNC: 20 MMOL/L (ref 21–32)
CREAT SERPL-MCNC: 22.98 MG/DL (ref 0.6–1.3)
ERYTHROCYTE [DISTWIDTH] IN BLOOD BY AUTOMATED COUNT: 15.7 % (ref 11.6–15.1)
FERRITIN SERPL-MCNC: 257 NG/ML (ref 11–307)
GFR SERPL CREATININE-BSD FRML MDRD: 1 ML/MIN/1.73SQ M
GLUCOSE P FAST SERPL-MCNC: 81 MG/DL (ref 65–99)
HCT VFR BLD AUTO: 24.2 % (ref 34.8–46.1)
HGB BLD-MCNC: 7.4 G/DL (ref 11.5–15.4)
IRON SATN MFR SERPL: 54 % (ref 15–50)
IRON SERPL-MCNC: 103 UG/DL (ref 50–212)
MCH RBC QN AUTO: 22.4 PG (ref 26.8–34.3)
MCHC RBC AUTO-ENTMCNC: 30.6 G/DL (ref 31.4–37.4)
MCV RBC AUTO: 73 FL (ref 82–98)
PHOSPHATE SERPL-MCNC: 10 MG/DL (ref 2.7–4.5)
PLATELET # BLD AUTO: 214 THOUSANDS/UL (ref 149–390)
PMV BLD AUTO: 11.2 FL (ref 8.9–12.7)
POTASSIUM SERPL-SCNC: 4 MMOL/L (ref 3.5–5.3)
PROT SERPL-MCNC: 7.3 G/DL (ref 6.4–8.4)
PTH-INTACT SERPL-MCNC: 100.5 PG/ML (ref 12–88)
RBC # BLD AUTO: 3.3 MILLION/UL (ref 3.81–5.12)
SODIUM SERPL-SCNC: 139 MMOL/L (ref 135–147)
TIBC SERPL-MCNC: 190 UG/DL (ref 250–450)
UIBC SERPL-MCNC: 87 UG/DL (ref 155–355)
WBC # BLD AUTO: 8.68 THOUSAND/UL (ref 4.31–10.16)

## 2024-03-04 PROCEDURE — 83970 ASSAY OF PARATHORMONE: CPT

## 2024-03-04 PROCEDURE — 80053 COMPREHEN METABOLIC PANEL: CPT

## 2024-03-04 PROCEDURE — 85027 COMPLETE CBC AUTOMATED: CPT

## 2024-03-04 PROCEDURE — 83540 ASSAY OF IRON: CPT

## 2024-03-04 PROCEDURE — 82306 VITAMIN D 25 HYDROXY: CPT

## 2024-03-04 PROCEDURE — 83550 IRON BINDING TEST: CPT

## 2024-03-04 PROCEDURE — 36415 COLL VENOUS BLD VENIPUNCTURE: CPT

## 2024-03-04 PROCEDURE — 82728 ASSAY OF FERRITIN: CPT

## 2024-03-04 PROCEDURE — 84100 ASSAY OF PHOSPHORUS: CPT

## 2024-03-04 PROCEDURE — 82610 CYSTATIN C: CPT

## 2024-03-04 RX ORDER — LABETALOL 100 MG/1
TABLET, FILM COATED ORAL
Qty: 60 TABLET | Refills: 3 | Status: SHIPPED | OUTPATIENT
Start: 2024-03-04

## 2024-03-05 ENCOUNTER — HOSPITAL ENCOUNTER (OUTPATIENT)
Dept: INFUSION CENTER | Facility: CLINIC | Age: 29
Discharge: HOME | End: 2024-03-05
Payer: MEDICARE

## 2024-03-05 ENCOUNTER — APPOINTMENT (EMERGENCY)
Dept: RADIOLOGY | Facility: HOSPITAL | Age: 29
End: 2024-03-05
Payer: MEDICARE

## 2024-03-05 ENCOUNTER — HOSPITAL ENCOUNTER (EMERGENCY)
Facility: HOSPITAL | Age: 29
Discharge: HOME | End: 2024-03-05
Attending: EMERGENCY MEDICINE
Payer: MEDICARE

## 2024-03-05 VITALS
HEART RATE: 93 BPM | BODY MASS INDEX: 40.65 KG/M2 | OXYGEN SATURATION: 100 % | RESPIRATION RATE: 16 BRPM | SYSTOLIC BLOOD PRESSURE: 167 MMHG | WEIGHT: 238.1 LBS | TEMPERATURE: 98.2 F | HEIGHT: 64 IN | DIASTOLIC BLOOD PRESSURE: 83 MMHG

## 2024-03-05 DIAGNOSIS — M25.531 RIGHT WRIST PAIN: Primary | ICD-10-CM

## 2024-03-05 DIAGNOSIS — D63.1 ANEMIA DUE TO STAGE 5 CHRONIC KIDNEY DISEASE, NOT ON CHRONIC DIALYSIS: Primary | ICD-10-CM

## 2024-03-05 DIAGNOSIS — N18.5 ANEMIA DUE TO STAGE 5 CHRONIC KIDNEY DISEASE, NOT ON CHRONIC DIALYSIS: Primary | ICD-10-CM

## 2024-03-05 PROCEDURE — 73110 X-RAY EXAM OF WRIST: CPT

## 2024-03-05 PROCEDURE — 99284 EMERGENCY DEPT VISIT MOD MDM: CPT | Performed by: EMERGENCY MEDICINE

## 2024-03-05 PROCEDURE — 99283 EMERGENCY DEPT VISIT LOW MDM: CPT

## 2024-03-05 RX ORDER — ACETAMINOPHEN 325 MG/1
975 TABLET ORAL ONCE
Status: COMPLETED | OUTPATIENT
Start: 2024-03-05 | End: 2024-03-05

## 2024-03-05 RX ADMIN — ACETAMINOPHEN 975 MG: 325 TABLET, FILM COATED ORAL at 13:27

## 2024-03-05 RX ADMIN — EPOETIN ALFA 8000 UNITS: 4000 SOLUTION INTRAVENOUS; SUBCUTANEOUS at 12:34

## 2024-03-05 NOTE — ED PROVIDER NOTES
History  Chief Complaint   Patient presents with    Wrist Injury     Pt presenting for right wrist pain x1 week. Pt was playing with her niece when she landed on her wrist. Denies any other complaints. Full ROM.      28-year-old female with past medical history of hypertension and CKD presents today with right-sided wrist pain.  She reports that 1 week ago she was resting her head onto her hand, when her niece startled her, causing her right wrist to strike the table.  She has intermittently tried Tylenol throughout the week.  She is still complaining of continued pain to the radial aspect on the volar side of her right wrist.  Denies swelling, weakness, paresthesias.  Denies any other trauma or injuries.           Prior to Admission Medications   Prescriptions Last Dose Informant Patient Reported? Taking?   Blood Pressure KIT  Self No No   Sig: by Does not apply route daily   Patient not taking: Reported on 2023   Diclofenac Sodium (VOLTAREN) 1 %  Self No No   Sig: Apply 2 g topically 4 (four) times a day   acetaminophen (TYLENOL) 325 mg tablet  Self No No   Sig: Take 2 tablets (650 mg total) by mouth every 6 (six) hours as needed for mild pain or headaches   albuterol (2.5 mg/3 mL) 0.083 % nebulizer solution  Self No No   Sig: Take 3 mL (2.5 mg total) by nebulization every 6 (six) hours as needed for wheezing or shortness of breath   albuterol (PROVENTIL HFA,VENTOLIN HFA) 90 mcg/act inhaler  Self No No   Sig: Inhale 2 puffs every 6 hours as needed for wheezing or shortness of breath.   amLODIPine (NORVASC) 5 mg tablet   No No   Sig: TAKE 1 TABLET BY MOUTH TWICE A DAY   calcitriol (ROCALTROL) 0.5 MCG capsule  Self No No   Sig: Take 1 capsule (0.5 mcg total) by mouth daily   erythromycin (ILOTYCIN) ophthalmic ointment  Self No No   Sig: Place a 1/2 inch ribbon of ointment into the lower eyelid.   etonogestrel (NEXPLANON) subdermal implant  Self Yes No   Si mg by Subdermal route once   ferrous sulfate 325  (65 Fe) mg tablet   No No   Sig: TAKE 1 TABLET BY MOUTH EVERY DAY WITH BREAKFAST   fluticasone (Flonase Allergy Relief) 50 mcg/act nasal spray  Self No No   Si spray into each nostril daily   fluticasone-salmeterol (Advair HFA) 115-21 MCG/ACT inhaler   No No   Sig: Inhale 2 puffs 2 (two) times a day Rinse mouth after use.   labetalol (NORMODYNE) 100 mg tablet   No No   Sig: TAKE 1 TABLET BY MOUTH TWICE A DAY   loratadine (CLARITIN) 10 mg tablet  Self No No   Sig: Take 1 tablet (10 mg total) by mouth daily   sodium bicarbonate 650 mg tablet   No No   Sig: TAKE 2 TABLETS (1,300 MG TOTAL) BY MOUTH TWICE A DAY      Facility-Administered Medications: None       Past Medical History:   Diagnosis Date    Asthma     Chronic kidney disease     Eczema     Headache     Hypertension     Wears glasses        Past Surgical History:   Procedure Laterality Date    CHOLECYSTECTOMY      CT NEEDLE BIOPSY KIDNEY  2020    KELOID EXCISION Left 3/30/2021    Procedure: POSTERIOR EAR EXCISION KELOID, FLAP RECONSTRUCTION;  Surgeon: Beka Hugo MD;  Location: [facility];  Service: Plastics       Family History   Problem Relation Age of Onset    Asthma Mother     No Known Problems Father      I have reviewed and agree with the history as documented.    E-Cigarette/Vaping    E-Cigarette Use Never User      E-Cigarette/Vaping Substances    Nicotine No     THC No     CBD No     Flavoring No     Other No     Unknown No      Social History     Tobacco Use    Smoking status: Never    Smokeless tobacco: Never   Vaping Use    Vaping status: Never Used   Substance Use Topics    Alcohol use: Not Currently     Comment: occassionally on social events    Drug use: No        Review of Systems   Constitutional:  Negative for chills and fever.   HENT:  Negative for ear pain and sore throat.    Eyes:  Negative for pain and visual disturbance.   Respiratory:  Negative for cough and shortness of breath.    Cardiovascular:  Negative for chest pain  and palpitations.   Gastrointestinal:  Negative for abdominal pain and vomiting.   Genitourinary:  Negative for dysuria and hematuria.   Musculoskeletal:  Negative for arthralgias and back pain.        Right sided wrist pain   Skin:  Negative for color change and rash.   Neurological:  Negative for seizures and syncope.   All other systems reviewed and are negative.      Physical Exam  ED Triage Vitals [03/05/24 1308]   Temperature Pulse Respirations Blood Pressure SpO2   98.2 °F (36.8 °C) 93 16 167/83 100 %      Temp Source Heart Rate Source Patient Position - Orthostatic VS BP Location FiO2 (%)   Oral -- Sitting Left arm --      Pain Score       --             Orthostatic Vital Signs  Vitals:    03/05/24 1308   BP: 167/83   Pulse: 93   Patient Position - Orthostatic VS: Sitting       Physical Exam  Vitals and nursing note reviewed.   Constitutional:       General: She is not in acute distress.     Appearance: Normal appearance. She is well-developed.   HENT:      Head: Normocephalic and atraumatic.   Cardiovascular:      Rate and Rhythm: Normal rate and regular rhythm.      Heart sounds: No murmur heard.  Pulmonary:      Effort: Pulmonary effort is normal. No respiratory distress.      Breath sounds: Normal breath sounds.   Abdominal:      Palpations: Abdomen is soft.      Tenderness: There is no abdominal tenderness.   Musculoskeletal:      Cervical back: Neck supple.      Comments: Full passive range of motion in the right wrist, no swelling, no snuff box tenderness, neurovascularly intact with 2+ radial pulses and sensation intact to radial, ulnar and median nerve distributions, full range of motion at elbow and fingers    Skin:     General: Skin is warm and dry.      Capillary Refill: Capillary refill takes less than 2 seconds.   Neurological:      Mental Status: She is alert.         ED Medications  Medications   acetaminophen (TYLENOL) tablet 975 mg (975 mg Oral Given 3/5/24 1327)       Diagnostic  Studies  Results Reviewed       None                   XR wrist 3+ views RIGHT   ED Interpretation by Roberto Garvey MD (03/05 1350)   This film was interpreted independently by me.  No fracture or dislocation.                Procedures  Procedures      ED Course                             SBIRT 22yo+      Flowsheet Row Most Recent Value   Initial Alcohol Screen: US AUDIT-C     1. How often do you have a drink containing alcohol? 0 Filed at: 03/05/2024 1310   2. How many drinks containing alcohol do you have on a typical day you are drinking?  0 Filed at: 03/05/2024 1310   3a. Male UNDER 65: How often do you have five or more drinks on one occasion? 0 Filed at: 03/05/2024 1310   3b. FEMALE Any Age, or MALE 65+: How often do you have 4 or more drinks on one occassion? 0 Filed at: 03/05/2024 1310   Audit-C Score 0 Filed at: 03/05/2024 1310   CELY: How many times in the past year have you...    Used an illegal drug or used a prescription medication for non-medical reasons? Never Filed at: 03/05/2024 1310                  Medical Decision Making  28-year-old female with past medical history of hypertension and CKD presents today with right-sided wrist pain.  Differential diagnosis including but not limited to: sprain, strain, fracture, dislocation, contusion.  Physical exam grossly unremarkable, full ROM and neurovascularly intact with no snuff box tenderness.  XR unremarkable.  Provided with Velcro splint for comfort.  Reviewed return precautions.        Amount and/or Complexity of Data Reviewed  Radiology: ordered and independent interpretation performed.    Risk  OTC drugs.          Disposition  Final diagnoses:   Right wrist pain     Time reflects when diagnosis was documented in both MDM as applicable and the Disposition within this note       Time User Action Codes Description Comment    3/5/2024  1:42 PM Aniyah Herbert Add [M25.531] Right wrist pain           ED Disposition       ED Disposition    Discharge    Condition   Stable    Date/Time   Tue Mar 5, 2024 1342    Comment   Raven Simeon discharge to home/self care.                   Follow-up Information       Follow up With Specialties Details Why Contact Info Additional Information    [facility]  As needed, If symptoms worsen [address]  [facility] [id number] [phone] [facility] [place], [facility] [address], [address], [place], [id number] [phone]            Patient's Medications   Discharge Prescriptions    No medications on file     No discharge procedures on file.    PDMP Review         Value Time User    PDMP Reviewed  Yes 4/8/2023  1:24 AM Saurav Hagan MD             ED Provider  Attending physically available and evaluated Raven Simeon. I managed the patient along with the ED Attending.    Electronically Signed by           Aniyah Herbert MD  03/05/24 8390

## 2024-03-05 NOTE — PROGRESS NOTES
Patient here for aranesp. Labs reviewed from 3/4, hgb 7.4. Injection given in LUE. Patient aware of next appointment 3/28 1430 per patient Thursdays work better for her, declined AVS.

## 2024-03-05 NOTE — ED ATTENDING ATTESTATION
3/5/2024  I, Roberto Garvey MD, saw and evaluated the patient. I have discussed the patient with the resident/non-physician practitioner and agree with the resident's/non-physician practitioner's findings, Plan of Care, and MDM as documented in the resident's/non-physician practitioner's note, except where noted. All available labs and Radiology studies were reviewed.  I was present for key portions of any procedure(s) performed by the resident/non-physician practitioner and I was immediately available to provide assistance.       At this point I agree with the current assessment done in the Emergency Department.  I have conducted an independent evaluation of this patient a history and physical is as follows:    S:  Chief Complaint   Patient presents with    Wrist Injury     Pt presenting for right wrist pain x1 week. Pt was playing with her niece when she landed on her wrist. Denies any other complaints. Full ROM.        Raven is a 28 y.o. female who presents with the chief complaint of right wrist pain.  She reports that about a week ago she was resting her head against her hand when her niece jumped on her and this caused her to bend her wrist in an unusual way.  She felt/heard a crack and has had pain over the volar wrist since.  No swelling, no change in sensation, no wounds.    O:  ED Triage Vitals [03/05/24 1308]   Temperature Pulse Respirations Blood Pressure SpO2   98.2 °F (36.8 °C) 93 16 167/83 100 %      Temp Source Heart Rate Source Patient Position - Orthostatic VS BP Location FiO2 (%)   Oral -- Sitting Left arm --      Pain Score       --         Physical Exam  Vitals and nursing note reviewed.   Constitutional:       General: She is in acute distress (mild).      Appearance: She is well-developed.   HENT:      Head: Normocephalic and atraumatic.   Eyes:      Pupils: Pupils are equal, round, and reactive to light.   Neck:      Vascular: No JVD.   Cardiovascular:      Rate and Rhythm: Normal  rate and regular rhythm.      Heart sounds: Normal heart sounds. No murmur heard.     No friction rub. No gallop.   Pulmonary:      Effort: Pulmonary effort is normal. No respiratory distress.      Breath sounds: Normal breath sounds. No wheezing or rales.   Chest:      Chest wall: No tenderness.   Musculoskeletal:         General: Tenderness (volar aspect of right wrist) present. No swelling or deformity. Normal range of motion.      Cervical back: Normal range of motion.   Skin:     General: Skin is warm and dry.   Neurological:      General: No focal deficit present.      Mental Status: She is alert and oriented to person, place, and time.   Psychiatric:         Behavior: Behavior normal.         Thought Content: Thought content normal.         Judgment: Judgment normal.       A/P:  Background: 28 y.o. female with right wrist pain after injury    Differential DX includes but is not limited to: fracture vs contusion vs sprain     Plan: imaging, symptom control    Velcro wrist splint applied to right wrist by Dr. Herbert.  Well applied/Positioned.  Normal distal sensation and perfusion.     ED Course     XR wrist 3+ views RIGHT   ED Interpretation   This film was interpreted independently by me.  No fracture or dislocation.                Critical Care Time  Procedures  Time reflects when diagnosis was documented in both MDM as applicable and the Disposition within this note       Time User Action Codes Description Comment    3/5/2024  1:42 PM Aniyah Herbert Add [M25.531] Right wrist pain           ED Disposition       ED Disposition   Discharge    Condition   Stable    Date/Time   Tue Mar 5, 2024  1:42 PM    Comment   Raven Simeon discharge to home/self care.                   Follow-up Information       Follow up With Specialties Details Why Contact Info Additional Information    [facility]  As needed, If symptoms worsen [address]  [facility] [id number] [phone] [facility] [address], [address], [place], [address] [phone]

## 2024-03-06 ENCOUNTER — VBI (OUTPATIENT)
Dept: FAMILY MEDICINE CLINIC | Facility: CLINIC | Age: 29
End: 2024-03-06

## 2024-03-06 LAB
CYSTATIN C SERPL-MCNC: 5.65 MG/L (ref 0.6–1)
GFR/BSA.PRED SERPLBLD CYS-BASED-ARV: 8 ML/MIN/1.73

## 2024-03-06 NOTE — TELEPHONE ENCOUNTER
03/06/24 1:41 PM    Patient contacted post ED visit, outreach attempt made but message could not be left. Additional outreach attempt will be made.     Thank you.  Jacob Miller MA  [facility]

## 2024-03-07 NOTE — TELEPHONE ENCOUNTER
03/07/24 9:52 AM    Patient contacted post ED visit, [facility] department spoke with patient/caregiver and outreach was successful.    Thank you.  Jacob Miller MA  [facility]

## 2024-03-13 DIAGNOSIS — N25.81 SECONDARY HYPERPARATHYROIDISM OF RENAL ORIGIN (HCC): ICD-10-CM

## 2024-03-13 RX ORDER — CALCITRIOL 0.5 UG/1
0.5 CAPSULE, LIQUID FILLED ORAL DAILY
Qty: 30 CAPSULE | Refills: 5 | Status: SHIPPED | OUTPATIENT
Start: 2024-03-13

## 2024-03-14 DIAGNOSIS — D50.9 IRON DEFICIENCY ANEMIA, UNSPECIFIED IRON DEFICIENCY ANEMIA TYPE: ICD-10-CM

## 2024-03-14 RX ORDER — FERROUS SULFATE 325(65) MG
1 TABLET ORAL
Qty: 30 TABLET | Refills: 0 | Status: SHIPPED | OUTPATIENT
Start: 2024-03-14

## 2024-03-22 ENCOUNTER — OFFICE VISIT (OUTPATIENT)
Dept: NEPHROLOGY | Facility: CLINIC | Age: 29
End: 2024-03-22
Payer: MEDICARE

## 2024-03-22 VITALS
HEIGHT: 64 IN | BODY MASS INDEX: 40.29 KG/M2 | SYSTOLIC BLOOD PRESSURE: 132 MMHG | WEIGHT: 236 LBS | DIASTOLIC BLOOD PRESSURE: 80 MMHG

## 2024-03-22 DIAGNOSIS — E83.39 HYPERPHOSPHATEMIA: ICD-10-CM

## 2024-03-22 DIAGNOSIS — D63.1 ANEMIA DUE TO STAGE 5 CHRONIC KIDNEY DISEASE, NOT ON CHRONIC DIALYSIS  (HCC): ICD-10-CM

## 2024-03-22 DIAGNOSIS — R80.1 PERSISTENT PROTEINURIA: ICD-10-CM

## 2024-03-22 DIAGNOSIS — N18.9 CHRONIC KIDNEY DISEASE-MINERAL AND BONE DISORDER: ICD-10-CM

## 2024-03-22 DIAGNOSIS — N18.5 CHRONIC KIDNEY DISEASE (CKD), ACTIVE MEDICAL MANAGEMENT WITHOUT DIALYSIS, STAGE 5 (HCC): ICD-10-CM

## 2024-03-22 DIAGNOSIS — E87.20 METABOLIC ACIDOSIS: ICD-10-CM

## 2024-03-22 DIAGNOSIS — E66.01 MORBID OBESITY WITH BMI OF 40.0-44.9, ADULT (HCC): ICD-10-CM

## 2024-03-22 DIAGNOSIS — N25.81 SECONDARY HYPERPARATHYROIDISM OF RENAL ORIGIN (HCC): ICD-10-CM

## 2024-03-22 DIAGNOSIS — E83.9 CHRONIC KIDNEY DISEASE-MINERAL AND BONE DISORDER: ICD-10-CM

## 2024-03-22 DIAGNOSIS — N18.5 BENIGN HYPERTENSION WITH CKD (CHRONIC KIDNEY DISEASE) STAGE V (HCC): Primary | ICD-10-CM

## 2024-03-22 DIAGNOSIS — J45.20 MILD INTERMITTENT ASTHMA WITHOUT COMPLICATION: ICD-10-CM

## 2024-03-22 DIAGNOSIS — E83.52 HYPERCALCEMIA: ICD-10-CM

## 2024-03-22 DIAGNOSIS — I12.0 BENIGN HYPERTENSION WITH CKD (CHRONIC KIDNEY DISEASE) STAGE V (HCC): Primary | ICD-10-CM

## 2024-03-22 DIAGNOSIS — M89.9 CHRONIC KIDNEY DISEASE-MINERAL AND BONE DISORDER: ICD-10-CM

## 2024-03-22 DIAGNOSIS — N18.5 ANEMIA DUE TO STAGE 5 CHRONIC KIDNEY DISEASE, NOT ON CHRONIC DIALYSIS  (HCC): ICD-10-CM

## 2024-03-22 PROCEDURE — 99215 OFFICE O/P EST HI 40 MIN: CPT | Performed by: INTERNAL MEDICINE

## 2024-03-22 RX ORDER — SEVELAMER CARBONATE 800 MG/1
800 TABLET, FILM COATED ORAL
Qty: 180 TABLET | Refills: 3 | Status: SHIPPED | OUTPATIENT
Start: 2024-03-22

## 2024-03-22 NOTE — PATIENT INSTRUCTIONS
1.)  Low 2 g sodium diet    2.)  Monitor weights at home    3.)  Avoid NSAIDs (ibuprofen, Motrin, Advil, Aleve, naproxen)    4.)  Monitor blood pressure at home, call if blood pressure greater than 150/90 persistently    5.) I will plan to discuss all results including blood work, and/or imaging at our next visit, unless there is an urgent indication, in which case I will call you earlier. If you have any questions or concerns about your results, please feel free to call our office.    6.)  If you experience nausea vomiting chest pain shortness of breath or loss of urination please go to the nearest emergency room    7.)  Continue close follow-ups with me.    8.)  Do recommend that you have a dialysis access in place in anticipation for renal placement therapy in the future    9.)  Your phosphorus is elevated, please be on a low phosphorus diet    10.)  Please avoid any calcium supplement    11.)  Please reduce the frequency of the calcitriol to 0.5 mcg taken every other day instead of daily    12.)  Start sevelamer, this is a phosphorus binder, 1 tablet with meals    13.)  Repeat blood work in 6 weeks and follow-up with me in 6 to 8 weeks please thank you

## 2024-03-22 NOTE — PROGRESS NOTES
NEPHROLOGY OFFICE VISIT   Raven Simeon 28 y.o. female MRN: [id number]  3/22/2024    Reason for Visit: Advanced chronic kidney disease stage V not on dialysis    History of Present Illness (HPI):    I had the pleasure of seeing Raven today accompanied by her mother in the renal clinic for the continued management of advanced chronic kidney disease stage V not on dialysis. Since our last visit, she was in the emergency room on March 5 for wrist pain.  She reports no uremic symptoms though she does have some mild tremors of her fingertips.  No pleuritic chest pain no shortness of breath no swelling no decreasing urine output she reports to me.  No nausea no vomiting no metallic taste on the tongue.  She reports good oral intake.    Her renal function since she remained stable with a creatinine of 22 mg/dL and a BUN greater than 100.  Her potassium and acid-base status are stable.    I again discussed placement of a dialysis access such as a PD catheter or an permacatheter fistula.  The declined at this time and the mother expressed some anger about why it has been so slow for her to get a transplant given that they have a family member who is willing to donate a kidney.  I reassured her that we are doing everything we can to provide the most optimal treatment for her daughter and that I will talk to the transplant team and have them reach out to her and the mother to discuss the check list for transplant that is still pending.    Her calcium was noted to be high which is a first-time elevation.  I will have her reduce calcitriol to every other day and check an ionized calcium level.  Will starting on calcium based binder for the high phosphorus.    Is currently on Aranesp injections for her chronic anemia secondary to her advanced chronic kidney disease.    Greater than 50% of the time was spent counseling discussion of recommendations and listening to the patient's mother concerns and complaints.  I also  reached out to the transplant nephrologist Dr. Mac.      ASSESSMENT and PLAN:    Chronic Kidney Disease Stage V, not on dialysis as of yet  --Imaging:  Right kidney 9.4 cm, left kidney 9 cm.  It should be noted that there was a decrease in the right kidney size compared to 2015 ultrasound where it was 12.8 cm.  Both kidneys are diffusely echogenic consistent with chronic kidney disease.  --Urinalysis:  Negative blood.  2+ protein.  --Proteinuria: Subnephrotic range proteinuria  --Baseline creatinine: Creatinine remained stable at 22 mg/dL.  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  Mild arterial sclerosis.  Severe chronic pathologic changes, irreversible with poor prognostic sign.  --Renal function stable since October 2023  --Being evaluated by the renal transplant team at [facility] with Dr. Mac.  I have sent a message to him to reach out to the family  --Completed nephrology advanced care meeting in September 2022  --Management: Strongly recommended placement of a dialysis access for future renal replacement therapy.  Continue follow-up with transplant team and I have sent a message to the transplant team.  I did instruct him to go to the emergency room if they develop any symptoms of worsening uremia.  Continue close follow-ups.  --Reducing Cardiovascular Risk Factors:  Simvastatin+  Ezetimibe reduced atherosclerotic events in CKD (SHARP trial); Low dose aspirin safe (if no contraindications exist); smoking is an independent risk factor for Chronic Kidney Disease and progression, strongly recommend smoking cessation     Hypertension  -- Stage II (American College of Cardiology/American Heart Association)  -- with underlying chronic kidney disease and morbid obesity  -- Body mass index is 40.5 kg/m².  -- Volume status: Euvolemic  -- Etiology:  Morbid obesity, underlying chronic kidney disease  -- Secondary Work-Up:  Renal artery Doppler was negative, a.m. Cortisol 14, TSH 1.5, normal metanephrines and catecholamine levels, renin 0.935, Aldosterone 11.6  -- Target Goal: < 130/80 (ACC/AHA, CKD with proteinuria, CKD in diabetics) ; if tolerated can target SBP < 120 mmHg in high cardiovascular risk ( Age > 75, CKD, CVD, No Diabetics SPRINT)  -- Lifestyle Modifications: DASH Diet, Weight Loss for ideal body weight, 45 mins of cardiovascular exercise 3 times a week as tolerated, and if no contraindications exist, smoking cessation, limit alcohol use, avoid NSAIDS, monitor blood pressure at home  -- Status: Blood pressure at target  -- Current antihypertensive regiment:  Amlodipine 5 mg twice a day, labetalol 100 mg twice a day  -- Changes: Continue current management avoid ACE inhibitor and angiotensin receptor blocker due to advanced CKD     Obesity  -- BMI 40.5, continues to improve  --Recommend decreasing portion sizes, encouraging healthy choices of fruits and vegetables, consuming healthier snacks and moderation in carbohydrate intake. Exercise recommendations; 3-5 times a week, the American Heart Association recommends 150 minutes a week moderate exercise  --Strongly recommend evaluation by nutritionist  --Overweight and obesity have been associated with increased mortality and morbidity.  Associated with a reduction in life expectancy, associated with increased all cause and cardiovascular  mortality  --Metabolic risks, including increased risk of diabetes type 2, insulin resistance with hyperinsulinemia (weight loss of 5 to 7% associated with a decreased risk of type 2 diabetes among individuals with prediabetes and weight loss of 15% can lead to dramatic improvement of diabetes in nearly half of people).  Dyslipidemia, hypertension and heart disease are all increased in patients with obesity.  Along with increased risk of stroke increased risk of multiple cancer types.  Can also be associated with increased renal dysfunction, strongest risk factor for new onset chronic kidney disease.  There is also a degree of compensatory hyperfiltration to meet high in the metabolic demands of increased body weight.  This can also result in increased increased risk for nephrolithiasis.        Mineral bone disorder-chronic kidney disease  --secondary hyperparathyroidism renal origin  --Phos is worsening now at 10  --Vitamin D 25-hydroxy level is normal  --PTH has improved to 100 from 300  --Calcium level total serum is 11 first occurrence of hypercalcemia  --Recommend low phosphorus diet  --Check an ionized calcium level, PTH and phosphorus  --Start sevelamer 1 tablet with meals  --Reduce calcitriol to 0.5 mcg every other day     Anemia of chronic kidney disease  --Last iron stores were adequate, currently on oral iron  --Continue Aranesp injections  --Last hemoglobin below target  --Secondary to advanced chronic kidney disease     Vitamin-D deficiency--resolved  --Vitamin D 25 Doxy level is normal l     Low bicarbonate level  --Continue oral sodium bicarbonate to 1300 mg twice a day  --Continue to trend bicarbonate level and aim for 22 or higher    Hypercalcemia  -- Check ionized calcium and a repeat calcium level in a few weeks  -- Maintain good oral hydration  -- Reduce calcitriol to every other day  -- First occurrence      PATIENT INSTRUCTIONS:    Patient Instructions   1.)  Low 2 g sodium diet    2.)  Monitor  weights at home    3.)  Avoid NSAIDs (ibuprofen, Motrin, Advil, Aleve, naproxen)    4.)  Monitor blood pressure at home, call if blood pressure greater than 150/90 persistently    5.) I will plan to discuss all results including blood work, and/or imaging at our next visit, unless there is an urgent indication, in which case I will call you earlier. If you have any questions or concerns about your results, please feel free to call our office.    6.)  If you experience nausea vomiting chest pain shortness of breath or loss of urination please go to the nearest emergency room    7.)  Continue close follow-ups with me.    8.)  Do recommend that you have a dialysis access in place in anticipation for renal placement therapy in the future    9.)  Your phosphorus is elevated, please be on a low phosphorus diet    10.)  Please avoid any calcium supplement    11.)  Please reduce the frequency of the calcitriol to 0.5 mcg taken every other day instead of daily    12.)  Start sevelamer, this is a phosphorus binder, 1 tablet with meals    13.)  Repeat blood work in 6 weeks and follow-up with me in 6 to 8 weeks please thank you        Orders Placed This Encounter   Procedures    Phosphorus     Standing Status:   Future     Standing Expiration Date:   3/22/2025    Calcium, ionized     Standing Status:   Future     Standing Expiration Date:   3/22/2025    PTH, intact     Standing Status:   Future     Standing Expiration Date:   3/22/2025    Comprehensive metabolic panel     This is a patient instruction: Patient fasting for 8 hours or longer recommended.     Standing Status:   Future     Standing Expiration Date:   3/22/2025    Cystatin C With eGFR     Standing Status:   Future     Standing Expiration Date:   3/22/2025    Uric acid     Standing Status:   Future     Standing Expiration Date:   3/22/2025       OBJECTIVE:  Current Weight: Weight - Scale: 107 kg (236 lb)  Vitals:    03/22/24 1004   Weight: 107 kg (236 lb)   Height:  "5' 4\" (1.626 m)    Body mass index is 40.51 kg/m².      REVIEW OF SYSTEMS:    Review of Systems   Constitutional:  Negative for activity change and fever.   Respiratory:  Negative for cough, chest tightness, shortness of breath and wheezing.    Cardiovascular:  Negative for chest pain and leg swelling.   Gastrointestinal:  Negative for abdominal pain, diarrhea, nausea and vomiting.   Endocrine: Negative for polyuria.   Genitourinary:  Negative for difficulty urinating, dysuria, flank pain, frequency and urgency.   Skin:  Negative for rash.   Neurological:  Negative for dizziness, syncope, light-headedness and headaches.       PHYSICAL EXAM:      Physical Exam  Vitals and nursing note reviewed. Exam conducted with a chaperone present.   Constitutional:       General: She is not in acute distress.     Appearance: She is well-developed. She is obese.   HENT:      Head: Normocephalic and atraumatic.   Eyes:      General: No scleral icterus.     Conjunctiva/sclera: Conjunctivae normal.      Pupils: Pupils are equal, round, and reactive to light.   Cardiovascular:      Rate and Rhythm: Normal rate and regular rhythm.      Heart sounds: S1 normal and S2 normal. No murmur heard.     No friction rub. No gallop.   Pulmonary:      Effort: Pulmonary effort is normal. No respiratory distress.      Breath sounds: Normal breath sounds. No wheezing or rales.   Abdominal:      General: Bowel sounds are normal.      Palpations: Abdomen is soft.      Tenderness: There is no abdominal tenderness. There is no rebound.   Musculoskeletal:         General: Normal range of motion.      Cervical back: Normal range of motion and neck supple.   Skin:     Findings: No rash.   Neurological:      Mental Status: She is alert and oriented to person, place, and time.      Comments: Mild asterixis   Psychiatric:         Behavior: Behavior normal.         Medications:    Current Outpatient Medications:     acetaminophen (TYLENOL) 325 mg tablet, Take 2 " tablets (650 mg total) by mouth every 6 (six) hours as needed for mild pain or headaches, Disp: 30 tablet, Rfl: 0    albuterol (2.5 mg/3 mL) 0.083 % nebulizer solution, Take 3 mL (2.5 mg total) by nebulization every 6 (six) hours as needed for wheezing or shortness of breath, Disp: 30 mL, Rfl: 0    albuterol (PROVENTIL HFA,VENTOLIN HFA) 90 mcg/act inhaler, Inhale 2 puffs every 6 hours as needed for wheezing or shortness of breath., Disp: 18 g, Rfl: 1    amLODIPine (NORVASC) 5 mg tablet, TAKE 1 TABLET BY MOUTH TWICE A DAY, Disp: 60 tablet, Rfl: 3    calcitriol (ROCALTROL) 0.5 MCG capsule, TAKE 1 CAPSULE BY MOUTH DAILY., Disp: 30 capsule, Rfl: 5    Diclofenac Sodium (VOLTAREN) 1 %, Apply 2 g topically 4 (four) times a day, Disp: 150 g, Rfl: 0    erythromycin (ILOTYCIN) ophthalmic ointment, Place a 1/2 inch ribbon of ointment into the lower eyelid., Disp: 3.5 g, Rfl: 0    etonogestrel (NEXPLANON) subdermal implant, 68 mg by Subdermal route once, Disp: , Rfl:     ferrous sulfate 325 (65 Fe) mg tablet, TAKE 1 TABLET BY MOUTH EVERY DAY WITH BREAKFAST, Disp: 30 tablet, Rfl: 0    fluticasone (Flonase Allergy Relief) 50 mcg/act nasal spray, 1 spray into each nostril daily, Disp: 15.8 mL, Rfl: 0    fluticasone-salmeterol (Advair HFA) 115-21 MCG/ACT inhaler, Inhale 2 puffs 2 (two) times a day Rinse mouth after use., Disp: 12 g, Rfl: 3    labetalol (NORMODYNE) 100 mg tablet, TAKE 1 TABLET BY MOUTH TWICE A DAY, Disp: 60 tablet, Rfl: 3    loratadine (CLARITIN) 10 mg tablet, Take 1 tablet (10 mg total) by mouth daily, Disp: 20 tablet, Rfl: 0    sevelamer carbonate (RENVELA) 800 mg tablet, Take 1 tablet (800 mg total) by mouth 3 (three) times a day with meals, Disp: 180 tablet, Rfl: 3    sodium bicarbonate 650 mg tablet, TAKE 2 TABLETS (1,300 MG TOTAL) BY MOUTH TWICE A DAY, Disp: 120 tablet, Rfl: 1    Blood Pressure KIT, by Does not apply route daily (Patient not taking: Reported on 9/5/2023), Disp: 1 each, Rfl: 0    Laboratory  "Results:        Invalid input(s): \"ALBUMIN\"    Results for orders placed or performed in visit on 03/04/24   Comprehensive metabolic panel   Result Value Ref Range    Sodium 139 135 - 147 mmol/L    Potassium 4.0 3.5 - 5.3 mmol/L    Chloride 99 96 - 108 mmol/L    CO2 20 (L) 21 - 32 mmol/L    ANION GAP 20 mmol/L    BUN 96 (H) 5 - 25 mg/dL    Creatinine 22.98 (H) 0.60 - 1.30 mg/dL    Glucose, Fasting 81 65 - 99 mg/dL    Calcium 11.0 (H) 8.4 - 10.2 mg/dL    AST 10 (L) 13 - 39 U/L    ALT 6 (L) 7 - 52 U/L    Alkaline Phosphatase 55 34 - 104 U/L    Total Protein 7.3 6.4 - 8.4 g/dL    Albumin 4.4 3.5 - 5.0 g/dL    Total Bilirubin 0.28 0.20 - 1.00 mg/dL    eGFR 1 ml/min/1.73sq m   CBC   Result Value Ref Range    WBC 8.68 4.31 - 10.16 Thousand/uL    RBC 3.30 (L) 3.81 - 5.12 Million/uL    Hemoglobin 7.4 (L) 11.5 - 15.4 g/dL    Hematocrit 24.2 (L) 34.8 - 46.1 %    MCV 73 (L) 82 - 98 fL    MCH 22.4 (L) 26.8 - 34.3 pg    MCHC 30.6 (L) 31.4 - 37.4 g/dL    RDW 15.7 (H) 11.6 - 15.1 %    Platelets 214 149 - 390 Thousands/uL    MPV 11.2 8.9 - 12.7 fL   PTH, intact   Result Value Ref Range    .5 (H) 12.0 - 88.0 pg/mL   Vitamin D 25 hydroxy   Result Value Ref Range    Vit D, 25-Hydroxy 41.4 30.0 - 100.0 ng/mL   Phosphorus   Result Value Ref Range    Phosphorus 10.0 (H) 2.7 - 4.5 mg/dL   Cystatin C With eGFR   Result Value Ref Range    CYSTATIN C 5.65 (H) 0.60 - 1.00 mg/L    eGFR 8 (L) >59 mL/min/1.73   TIBC Panel (incl. Iron, TIBC, % Iron Saturation)   Result Value Ref Range    Iron Saturation 54 (H) 15 - 50 %    TIBC 190 (L) 250 - 450 ug/dL    Iron 103 50 - 212 ug/dL    UIBC 87 (L) 155 - 355 ug/dL   Ferritin   Result Value Ref Range    Ferritin 257 11 - 307 ng/mL         "

## 2024-03-25 DIAGNOSIS — J45.20 MILD INTERMITTENT ASTHMA WITHOUT COMPLICATION: ICD-10-CM

## 2024-03-26 ENCOUNTER — TELEPHONE (OUTPATIENT)
Dept: NEPHROLOGY | Facility: CLINIC | Age: 29
End: 2024-03-26

## 2024-03-26 NOTE — TELEPHONE ENCOUNTER
After we received a message from the patient's primary nephrologist Dr. Remy, our coordinators tried again to reach the patient on March 22.  Could not reach the patient but they did reach to patient's mother who stated she would give the patient a message to call us back as the patient's insurance is not up to par for [place].    I called the patient myself today as the patient did not call back.  Patient states that she did not receive the message from her mother.    I explained to the patient that her insurance is not approving [place] transplant program currently and that we need to review her insurance with her.  Our  was trying to reach the patient.  Patient states that she will be available for a phone call and I have informed our  and transplant coordinator to attempt to call the patient today or tomorrow to review.    I did explain to the patient that the renal transplant is not going to happen immediately and does take time to complete the testing for her and the donor and she should continue to follow with all of her recommendations from her nephrologist including dialysis planning if that is what is indicated.  Patient stated understanding of this.  Patient states her potential donor has been calling the donor team and has not heard back.  I explained to the patient that I will inform the donor team who can address further if needed.    I have informed her coordinators to assist the patient further and try calling the patient again if she is available by her phone.

## 2024-03-27 ENCOUNTER — APPOINTMENT (OUTPATIENT)
Dept: LAB | Facility: HOSPITAL | Age: 29
End: 2024-03-27
Payer: MEDICARE

## 2024-03-27 DIAGNOSIS — N18.5 CHRONIC KIDNEY DISEASE (CKD), ACTIVE MEDICAL MANAGEMENT WITHOUT DIALYSIS, STAGE 5 (HCC): ICD-10-CM

## 2024-03-27 DIAGNOSIS — E83.52 HYPERCALCEMIA: ICD-10-CM

## 2024-03-27 DIAGNOSIS — E83.9 CHRONIC KIDNEY DISEASE-MINERAL AND BONE DISORDER: ICD-10-CM

## 2024-03-27 DIAGNOSIS — E83.39 HYPERPHOSPHATEMIA: ICD-10-CM

## 2024-03-27 DIAGNOSIS — E87.20 METABOLIC ACIDOSIS: ICD-10-CM

## 2024-03-27 DIAGNOSIS — R80.1 PERSISTENT PROTEINURIA: ICD-10-CM

## 2024-03-27 DIAGNOSIS — I12.0 BENIGN HYPERTENSION WITH CKD (CHRONIC KIDNEY DISEASE) STAGE V (HCC): ICD-10-CM

## 2024-03-27 DIAGNOSIS — J45.20 MILD INTERMITTENT ASTHMA WITHOUT COMPLICATION: ICD-10-CM

## 2024-03-27 DIAGNOSIS — N18.9 CHRONIC KIDNEY DISEASE-MINERAL AND BONE DISORDER: ICD-10-CM

## 2024-03-27 DIAGNOSIS — N25.81 SECONDARY HYPERPARATHYROIDISM OF RENAL ORIGIN (HCC): ICD-10-CM

## 2024-03-27 DIAGNOSIS — N18.5 BENIGN HYPERTENSION WITH CKD (CHRONIC KIDNEY DISEASE) STAGE V (HCC): ICD-10-CM

## 2024-03-27 DIAGNOSIS — M89.9 CHRONIC KIDNEY DISEASE-MINERAL AND BONE DISORDER: ICD-10-CM

## 2024-03-27 DIAGNOSIS — N18.5 ANEMIA DUE TO STAGE 5 CHRONIC KIDNEY DISEASE, NOT ON CHRONIC DIALYSIS  (HCC): ICD-10-CM

## 2024-03-27 DIAGNOSIS — E66.01 MORBID OBESITY WITH BMI OF 40.0-44.9, ADULT (HCC): ICD-10-CM

## 2024-03-27 DIAGNOSIS — D63.1 ANEMIA DUE TO STAGE 5 CHRONIC KIDNEY DISEASE, NOT ON CHRONIC DIALYSIS  (HCC): ICD-10-CM

## 2024-03-27 LAB
ALBUMIN SERPL BCP-MCNC: 4.5 G/DL (ref 3.5–5)
ALP SERPL-CCNC: 55 U/L (ref 34–104)
ALT SERPL W P-5'-P-CCNC: 6 U/L (ref 7–52)
ANION GAP SERPL CALCULATED.3IONS-SCNC: 15 MMOL/L (ref 4–13)
AST SERPL W P-5'-P-CCNC: 11 U/L (ref 13–39)
BILIRUB SERPL-MCNC: 0.29 MG/DL (ref 0.2–1)
BUN SERPL-MCNC: 101 MG/DL (ref 5–25)
CA-I BLD-SCNC: 0.99 MMOL/L (ref 1.12–1.32)
CALCIUM SERPL-MCNC: 8.6 MG/DL (ref 8.4–10.2)
CHLORIDE SERPL-SCNC: 97 MMOL/L (ref 96–108)
CO2 SERPL-SCNC: 23 MMOL/L (ref 21–32)
CREAT SERPL-MCNC: 26.94 MG/DL (ref 0.6–1.3)
FERRITIN SERPL-MCNC: 310 NG/ML (ref 11–307)
GFR SERPL CREATININE-BSD FRML MDRD: 1 ML/MIN/1.73SQ M
GLUCOSE SERPL-MCNC: 87 MG/DL (ref 65–140)
HGB BLD-MCNC: 6.5 G/DL (ref 11.5–15.4)
IRON SATN MFR SERPL: 60 % (ref 15–50)
IRON SERPL-MCNC: 112 UG/DL (ref 50–212)
PHOSPHATE SERPL-MCNC: 7.1 MG/DL (ref 2.7–4.5)
POTASSIUM SERPL-SCNC: 3.7 MMOL/L (ref 3.5–5.3)
PROT SERPL-MCNC: 7.5 G/DL (ref 6.4–8.4)
PTH-INTACT SERPL-MCNC: 431.3 PG/ML (ref 12–88)
SODIUM SERPL-SCNC: 135 MMOL/L (ref 135–147)
TIBC SERPL-MCNC: 188 UG/DL (ref 250–450)
UIBC SERPL-MCNC: 76 UG/DL (ref 155–355)
URATE SERPL-MCNC: 10.5 MG/DL (ref 2–7.5)

## 2024-03-27 PROCEDURE — 83970 ASSAY OF PARATHORMONE: CPT

## 2024-03-27 PROCEDURE — 84550 ASSAY OF BLOOD/URIC ACID: CPT

## 2024-03-27 PROCEDURE — 82330 ASSAY OF CALCIUM: CPT

## 2024-03-27 PROCEDURE — 82728 ASSAY OF FERRITIN: CPT

## 2024-03-27 PROCEDURE — 82610 CYSTATIN C: CPT

## 2024-03-27 PROCEDURE — 83540 ASSAY OF IRON: CPT

## 2024-03-27 PROCEDURE — 80053 COMPREHEN METABOLIC PANEL: CPT

## 2024-03-27 PROCEDURE — 83550 IRON BINDING TEST: CPT

## 2024-03-27 PROCEDURE — 84100 ASSAY OF PHOSPHORUS: CPT

## 2024-03-27 PROCEDURE — 85018 HEMOGLOBIN: CPT

## 2024-03-27 PROCEDURE — 36415 COLL VENOUS BLD VENIPUNCTURE: CPT

## 2024-03-27 RX ORDER — ALBUTEROL SULFATE 90 UG/1
AEROSOL, METERED RESPIRATORY (INHALATION)
Qty: 18 G | Refills: 0 | Status: SHIPPED | OUTPATIENT
Start: 2024-03-27

## 2024-03-28 ENCOUNTER — HOSPITAL ENCOUNTER (OUTPATIENT)
Dept: INFUSION CENTER | Facility: CLINIC | Age: 29
Discharge: HOME | End: 2024-03-28
Payer: MEDICARE

## 2024-03-28 VITALS — SYSTOLIC BLOOD PRESSURE: 129 MMHG | DIASTOLIC BLOOD PRESSURE: 82 MMHG

## 2024-03-28 DIAGNOSIS — N18.5 ANEMIA DUE TO STAGE 5 CHRONIC KIDNEY DISEASE, NOT ON CHRONIC DIALYSIS  (HCC): Primary | ICD-10-CM

## 2024-03-28 DIAGNOSIS — D63.1 ANEMIA DUE TO STAGE 5 CHRONIC KIDNEY DISEASE, NOT ON CHRONIC DIALYSIS  (HCC): Primary | ICD-10-CM

## 2024-03-28 PROCEDURE — 96372 THER/PROPH/DIAG INJ SC/IM: CPT

## 2024-03-28 RX ADMIN — EPOETIN ALFA 8560 UNITS: 10000 SOLUTION INTRAVENOUS; SUBCUTANEOUS at 16:01

## 2024-03-28 NOTE — PROGRESS NOTES
Patient presents today for epogen injection. Patient offers no complaints. Hgb from 3/27/2024 reviewed and within parameters to treat. BP stable. Epogen injection administered into left arm, tolerated without incident. Next appointment for epogen confirmed for 4/18/2024 at 1630. AVS offered and declined  .

## 2024-03-31 LAB
CYSTATIN C SERPL-MCNC: 5.57 MG/L (ref 0.6–1)
GFR/BSA.PRED SERPLBLD CYS-BASED-ARV: 8 ML/MIN/1.73

## 2024-04-04 ENCOUNTER — OFFICE VISIT (OUTPATIENT)
Dept: FAMILY MEDICINE CLINIC | Facility: CLINIC | Age: 29
End: 2024-04-04

## 2024-04-04 VITALS
BODY MASS INDEX: 40.39 KG/M2 | WEIGHT: 236.6 LBS | SYSTOLIC BLOOD PRESSURE: 142 MMHG | OXYGEN SATURATION: 100 % | HEIGHT: 64 IN | DIASTOLIC BLOOD PRESSURE: 80 MMHG | TEMPERATURE: 98.9 F | HEART RATE: 83 BPM | RESPIRATION RATE: 18 BRPM
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
## 2024-04-04 DIAGNOSIS — J45.20 MILD INTERMITTENT ASTHMA WITHOUT COMPLICATION: Primary | ICD-10-CM

## 2024-04-04 RX ORDER — FLUTICASONE PROPIONATE AND SALMETEROL XINAFOATE 115; 21 UG/1; UG/1
2 AEROSOL, METERED RESPIRATORY (INHALATION) 2 TIMES DAILY
Qty: 12 G | Refills: 5 | Status: SHIPPED | OUTPATIENT
Start: 2024-04-04

## 2024-04-04 RX ORDER — ALBUTEROL SULFATE 90 UG/1
AEROSOL, METERED RESPIRATORY (INHALATION)
Qty: 18 G | Refills: 3 | Status: SHIPPED | OUTPATIENT
Start: 2024-04-04

## 2024-04-04 NOTE — PROGRESS NOTES
Name: Raven Simeon      : 1995      MRN: [id number]  Encounter Provider: Demarco Osuna MD  Encounter Date: 2024   Encounter department: [facility]    Assessment & Plan     1. Mild intermittent asthma without complication  Assessment & Plan:  Refilled controller and rescue inhalers. Pt does well with her controller inhaler requiring albuterol only 2-3 times a month typically.    Orders:  -     fluticasone-salmeterol (Advair HFA) 115-21 MCG/ACT inhaler; Inhale 2 puffs 2 (two) times a day Rinse mouth after use.  -     albuterol (PROVENTIL HFA,VENTOLIN HFA) 90 mcg/act inhaler; Inhale 2 puffs every 6 hours as needed for wheezing or shortness of breath.    2. Fall, initial encounter  Assessment & Plan:  Had a hyperfelxion injury to wrist which had a negative wrist XR diagnosed a sprain. Now with a fall where she caught herself on a stair striking the palmar aspect of R wrist at prior locaiton of pain. No LOC or headstrike. Tenderness greatest at dorsal aspect of wrist. Low suspicion for beak, however pt notes she has hx of fracture in her hand so would like to rule out definitely.  - XR wrist    Orders:  -     XR wrist 3+ vw right; Future; Expected date: 2024           Subjective      HPI  Pt presents for an asthma visit follow up.  Usually using Advair 2 puffs once a day, but has been without medication for 1 month. Noting she is starting to use the albuterol inhaler more often - frequently in the morning after walking up 2 hills, usually 1/d. Usually only using about 3x/month when feeling asthma attack coming, usually able to stop the asthma attack, and if it doesn't the nebulizer will stop the attack. Trigger is cold air; pt does have pollen allergies but this does not trigger asthma.    Pt hurt her wrist after a forced hyperflexion injury. At the ED she was diagnosed with a sprain. More recently, 3d ago, pt slipped on the stairs and caught herself on the stair, but  caught the stair directly on her palmar wrist below the thenar/hypothenar eminence.  Pt broke a bone under her thumb when she was 17, not sure how it happened, does not recall any serious trauma and the initial XR did not show an obvious break.    Review of Systems   Constitutional:  Negative for chills and fever.   HENT:  Negative for ear pain and sore throat.    Eyes:  Negative for pain and visual disturbance.   Respiratory:  Positive for shortness of breath and wheezing. Negative for cough.         No symptoms currently   Cardiovascular:  Negative for chest pain and palpitations.   Gastrointestinal:  Negative for abdominal pain and vomiting.   Genitourinary:  Negative for dysuria and hematuria.   Musculoskeletal:  Negative for arthralgias and back pain.   Skin:  Negative for color change and rash.   Neurological:  Negative for seizures and syncope.   All other systems reviewed and are negative.      Current Outpatient Medications on File Prior to Visit   Medication Sig    acetaminophen (TYLENOL) 325 mg tablet Take 2 tablets (650 mg total) by mouth every 6 (six) hours as needed for mild pain or headaches    albuterol (2.5 mg/3 mL) 0.083 % nebulizer solution Take 3 mL (2.5 mg total) by nebulization every 6 (six) hours as needed for wheezing or shortness of breath    amLODIPine (NORVASC) 5 mg tablet TAKE 1 TABLET BY MOUTH TWICE A DAY    calcitriol (ROCALTROL) 0.5 MCG capsule TAKE 1 CAPSULE BY MOUTH DAILY.    Diclofenac Sodium (VOLTAREN) 1 % Apply 2 g topically 4 (four) times a day    erythromycin (ILOTYCIN) ophthalmic ointment Place a 1/2 inch ribbon of ointment into the lower eyelid.    etonogestrel (NEXPLANON) subdermal implant 68 mg by Subdermal route once    ferrous sulfate 325 (65 Fe) mg tablet TAKE 1 TABLET BY MOUTH EVERY DAY WITH BREAKFAST    fluticasone (Flonase Allergy Relief) 50 mcg/act nasal spray 1 spray into each nostril daily    labetalol (NORMODYNE) 100 mg tablet TAKE 1 TABLET BY MOUTH TWICE A DAY     "loratadine (CLARITIN) 10 mg tablet Take 1 tablet (10 mg total) by mouth daily    sevelamer carbonate (RENVELA) 800 mg tablet Take 1 tablet (800 mg total) by mouth 3 (three) times a day with meals    sodium bicarbonate 650 mg tablet TAKE 2 TABLETS (1,300 MG TOTAL) BY MOUTH TWICE A DAY    [DISCONTINUED] albuterol (PROVENTIL HFA,VENTOLIN HFA) 90 mcg/act inhaler INHALE 2 PUFFS EVERY 6 HOURS AS NEEDED FOR WHEEZING OR SHORTNESS OF BREATH.    [DISCONTINUED] fluticasone-salmeterol (Advair HFA) 115-21 MCG/ACT inhaler Inhale 2 puffs 2 (two) times a day Rinse mouth after use.    Blood Pressure KIT by Does not apply route daily (Patient not taking: Reported on 9/5/2023)    [DISCONTINUED] methocarbamol (ROBAXIN) 500 mg tablet Take 1 tablet (500 mg total) by mouth 3 (three) times a day as needed for muscle spasms (Patient not taking: Reported on 5/13/2022)       Objective     /80 (BP Location: Right arm, Patient Position: Sitting, Cuff Size: Large)   Pulse 83   Temp 98.9 °F (37.2 °C) (Tympanic)   Resp 18   Ht 5' 4\" (1.626 m)   Wt 107 kg (236 lb 9.6 oz)   SpO2 100%   BMI 40.61 kg/m²     Physical Exam  Constitutional:       General: She is not in acute distress.     Appearance: She is not ill-appearing.   HENT:      Head: Normocephalic and atraumatic.      Mouth/Throat:      Mouth: Mucous membranes are moist.      Pharynx: Oropharynx is clear.   Eyes:      General:         Right eye: No discharge.         Left eye: No discharge.      Conjunctiva/sclera: Conjunctivae normal.   Cardiovascular:      Rate and Rhythm: Normal rate and regular rhythm.      Heart sounds: No murmur heard.     No friction rub. No gallop.   Pulmonary:      Effort: Pulmonary effort is normal. No respiratory distress.      Breath sounds: Normal breath sounds.   Abdominal:      General: Bowel sounds are normal.      Palpations: Abdomen is soft. There is no mass.      Tenderness: There is no abdominal tenderness. There is no right CVA tenderness, " left CVA tenderness or guarding.   Musculoskeletal:         General: Tenderness (tenderness to R wrist mroe so on dorsal surface) present. No swelling or deformity.      Cervical back: Neck supple. No tenderness.   Lymphadenopathy:      Cervical: No cervical adenopathy.   Skin:     General: Skin is warm and dry.      Coloration: Skin is not jaundiced.   Neurological:      Mental Status: She is alert and oriented to person, place, and time.   Psychiatric:         Mood and Affect: Mood normal.       Demarco Osuna MD

## 2024-04-04 NOTE — ASSESSMENT & PLAN NOTE
Had a hyperfelxion injury to wrist which had a negative wrist XR diagnosed a sprain. Now with a fall where she caught herself on a stair striking the palmar aspect of R wrist at prior locaiton of pain. No LOC or headstrike. Tenderness greatest at dorsal aspect of wrist. Low suspicion for beak, however pt notes she has hx of fracture in her hand so would like to rule out definitely.  - XR wrist

## 2024-04-04 NOTE — ASSESSMENT & PLAN NOTE
Refilled controller and rescue inhalers. Pt does well with her controller inhaler requiring albuterol only 2-3 times a month typically.

## 2024-04-05 ENCOUNTER — TELEPHONE (OUTPATIENT)
Dept: FAMILY MEDICINE CLINIC | Facility: CLINIC | Age: 29
End: 2024-04-05

## 2024-04-05 NOTE — TELEPHONE ENCOUNTER
[facility] states that the insurance do not cover Fluticasone-Salmeterol 115-21MCG/ACT Aerosol. They states that the insure do cover the following medication.     Breztri Aerosphere    Budesonide-Formoterol Fumarate    Fluticasone Furoate-Vilanterol        Please advise thank you

## 2024-04-06 DIAGNOSIS — J45.40 MODERATE PERSISTENT ASTHMA WITHOUT COMPLICATION: Primary | ICD-10-CM

## 2024-04-06 RX ORDER — BUDESONIDE AND FORMOTEROL FUMARATE DIHYDRATE 80; 4.5 UG/1; UG/1
2 AEROSOL RESPIRATORY (INHALATION) 2 TIMES DAILY
Qty: 30.6 G | Refills: 0 | Status: SHIPPED | OUTPATIENT
Start: 2024-04-06

## 2024-04-06 NOTE — TELEPHONE ENCOUNTER
When entering the alternative listed here, Epic is kicking back a warning that these are not preferred by the insurance and the original order is a preferred drug. I entered one of these and sent to pharmacy, please let me know if it is still not suitable for the plan. Thanks.

## 2024-04-09 ENCOUNTER — TELEPHONE (OUTPATIENT)
Dept: FAMILY MEDICINE CLINIC | Facility: CLINIC | Age: 29
End: 2024-04-09

## 2024-04-09 DIAGNOSIS — J45.40 MODERATE PERSISTENT ASTHMA WITHOUT COMPLICATION: Primary | ICD-10-CM

## 2024-04-09 RX ORDER — FLUTICASONE FUROATE AND VILANTEROL 100; 25 UG/1; UG/1
1 POWDER RESPIRATORY (INHALATION) DAILY
Qty: 180 BLISTER | Refills: 1 | Status: SHIPPED | OUTPATIENT
Start: 2024-04-09 | End: 2024-10-06

## 2024-04-09 RX ORDER — FLUTICASONE PROPIONATE AND SALMETEROL 100; 50 UG/1; UG/1
1 POWDER RESPIRATORY (INHALATION) 2 TIMES DAILY
Qty: 60 BLISTER | Refills: 5 | Status: SHIPPED | OUTPATIENT
Start: 2024-04-09 | End: 2024-10-06

## 2024-04-09 NOTE — TELEPHONE ENCOUNTER
I sent over another one of the pharmacy-suggested alternatives as well as the Epic-suggested alternative to one of the pharmacy-suggested alternatives (meaning there are 2 functionally identical medications for pt at the pharmacy, hopefully 1 is covered, pt only needs to  1). Is there a way we can determine what medication would be covered by her insurance? It doesn't make sense that her previous med would have been denied.

## 2024-04-09 NOTE — TELEPHONE ENCOUNTER
Patient called and stated that the medication that was sent over to the pharmacy is not going through insurance, patient stated that she needs it asap

## 2024-04-10 DIAGNOSIS — N18.5 CHRONIC KIDNEY DISEASE (CKD), ACTIVE MEDICAL MANAGEMENT WITHOUT DIALYSIS, STAGE 5 (HCC): ICD-10-CM

## 2024-04-10 DIAGNOSIS — N18.9 ACUTE KIDNEY INJURY SUPERIMPOSED ON CHRONIC KIDNEY DISEASE  (HCC): ICD-10-CM

## 2024-04-10 DIAGNOSIS — N17.9 ACUTE KIDNEY INJURY SUPERIMPOSED ON CHRONIC KIDNEY DISEASE  (HCC): ICD-10-CM

## 2024-04-10 RX ORDER — SODIUM BICARBONATE 650 MG/1
TABLET ORAL
Qty: 120 TABLET | Refills: 5 | Status: SHIPPED | OUTPATIENT
Start: 2024-04-10

## 2024-04-11 DIAGNOSIS — D50.9 IRON DEFICIENCY ANEMIA, UNSPECIFIED IRON DEFICIENCY ANEMIA TYPE: ICD-10-CM

## 2024-04-11 RX ORDER — FERROUS SULFATE 325(65) MG
1 TABLET ORAL
Qty: 30 TABLET | Refills: 5 | Status: SHIPPED | OUTPATIENT
Start: 2024-04-11

## 2024-04-17 ENCOUNTER — TELEPHONE (OUTPATIENT)
Dept: INFUSION CENTER | Facility: CLINIC | Age: 29
End: 2024-04-17

## 2024-04-17 ENCOUNTER — APPOINTMENT (OUTPATIENT)
Dept: LAB | Facility: CLINIC | Age: 29
End: 2024-04-17
Payer: MEDICARE

## 2024-04-17 DIAGNOSIS — D63.1 ANEMIA DUE TO STAGE 5 CHRONIC KIDNEY DISEASE, NOT ON CHRONIC DIALYSIS  (HCC): ICD-10-CM

## 2024-04-17 DIAGNOSIS — N18.5 ANEMIA DUE TO STAGE 5 CHRONIC KIDNEY DISEASE, NOT ON CHRONIC DIALYSIS  (HCC): ICD-10-CM

## 2024-04-17 LAB — HGB BLD-MCNC: 6 G/DL (ref 11.5–15.4)

## 2024-04-17 PROCEDURE — 85018 HEMOGLOBIN: CPT

## 2024-04-17 PROCEDURE — 36415 COLL VENOUS BLD VENIPUNCTURE: CPT

## 2024-04-17 NOTE — TELEPHONE ENCOUNTER
Called patient to remind her to get labs done prior to appointment tomorrow Thursday 4/18. Patient is aware and stated she will get labs done.

## 2024-04-18 ENCOUNTER — TELEPHONE (OUTPATIENT)
Dept: OTHER | Facility: HOSPITAL | Age: 29
End: 2024-04-18

## 2024-04-18 ENCOUNTER — TELEPHONE (OUTPATIENT)
Dept: NEPHROLOGY | Facility: CLINIC | Age: 29
End: 2024-04-18

## 2024-04-18 ENCOUNTER — HOSPITAL ENCOUNTER (EMERGENCY)
Facility: HOSPITAL | Age: 29
Discharge: HOME | End: 2024-04-19
Attending: EMERGENCY MEDICINE | Admitting: EMERGENCY MEDICINE
Payer: MEDICARE

## 2024-04-18 ENCOUNTER — TELEPHONE (OUTPATIENT)
Dept: FAMILY MEDICINE CLINIC | Facility: CLINIC | Age: 29
End: 2024-04-18

## 2024-04-18 DIAGNOSIS — N18.4 STAGE 4 CHRONIC KIDNEY DISEASE (HCC): ICD-10-CM

## 2024-04-18 DIAGNOSIS — D64.9 ANEMIA, UNSPECIFIED TYPE: Primary | ICD-10-CM

## 2024-04-18 LAB
ANION GAP SERPL CALCULATED.3IONS-SCNC: 17 MMOL/L (ref 4–13)
BUN SERPL-MCNC: 98 MG/DL (ref 5–25)
CALCIUM SERPL-MCNC: 9.2 MG/DL (ref 8.4–10.2)
CHLORIDE SERPL-SCNC: 100 MMOL/L (ref 96–108)
CO2 SERPL-SCNC: 21 MMOL/L (ref 21–32)
CREAT SERPL-MCNC: 27.02 MG/DL (ref 0.6–1.3)
GFR SERPL CREATININE-BSD FRML MDRD: 1 ML/MIN/1.73SQ M
GLUCOSE SERPL-MCNC: 91 MG/DL (ref 65–140)
POTASSIUM SERPL-SCNC: 4.7 MMOL/L (ref 3.5–5.3)
SODIUM SERPL-SCNC: 138 MMOL/L (ref 135–147)

## 2024-04-18 PROCEDURE — 99284 EMERGENCY DEPT VISIT MOD MDM: CPT

## 2024-04-18 PROCEDURE — 86901 BLOOD TYPING SEROLOGIC RH(D): CPT | Performed by: EMERGENCY MEDICINE

## 2024-04-18 PROCEDURE — 99285 EMERGENCY DEPT VISIT HI MDM: CPT | Performed by: EMERGENCY MEDICINE

## 2024-04-18 PROCEDURE — 80048 BASIC METABOLIC PNL TOTAL CA: CPT | Performed by: EMERGENCY MEDICINE

## 2024-04-18 PROCEDURE — 86923 COMPATIBILITY TEST ELECTRIC: CPT

## 2024-04-18 PROCEDURE — 86900 BLOOD TYPING SEROLOGIC ABO: CPT | Performed by: EMERGENCY MEDICINE

## 2024-04-18 PROCEDURE — 36415 COLL VENOUS BLD VENIPUNCTURE: CPT | Performed by: EMERGENCY MEDICINE

## 2024-04-18 PROCEDURE — 86850 RBC ANTIBODY SCREEN: CPT | Performed by: EMERGENCY MEDICINE

## 2024-04-18 PROCEDURE — 36430 TRANSFUSION BLD/BLD COMPNT: CPT

## 2024-04-18 NOTE — TELEPHONE ENCOUNTER
We received a form from the Pharmacy stating  a prior Auth is needed for the Breo Ellipta 100/25 MCG/ACT I don't see that you ordered that for the Patient it looks like you ordered the Fluticasone Furoate-Vilanterol  which would require a prior Auth as well . There is a preferred medication that would require no Prior auth for Patient which is the Dulera Please let me know if you would be willing to do that one or if the Fluticasone is best for the Patient Thank you

## 2024-04-18 NOTE — TELEPHONE ENCOUNTER
Nephrology    Multiple attempts made to call the patient.  Hemoglobin was noted to be 6.    I wanted her to go to the emergency room as she may require blood transfusion.    Patient planned to go for her DECLAN injection today otherwise    Her hemoglobin has been dropping

## 2024-04-18 NOTE — TELEPHONE ENCOUNTER
1:53 PM   Called again and left message that she should go to ER because of critically low  Hgb.  Asked that she call the office back to ensure she received my call.      Left message on patient's VM that Hgb is critically low.  Per Dr. Remy, pt to go to the ER (she may require a transfusion).  Asked pt to call back to ensure she received my message.      ----- Message from Vargas Remy MD sent at 4/18/2024  9:39 AM EDT -----  Can you have her go to the emergency room please her hemoglobin is 6, and she may require blood transfusion  ----- Message -----  From: Lab, Background User  Sent: 4/17/2024   6:25 PM EDT  To: Vargas Remy MD

## 2024-04-19 VITALS
DIASTOLIC BLOOD PRESSURE: 76 MMHG | HEART RATE: 67 BPM | OXYGEN SATURATION: 100 % | TEMPERATURE: 98.3 F | SYSTOLIC BLOOD PRESSURE: 176 MMHG | RESPIRATION RATE: 16 BRPM

## 2024-04-19 DIAGNOSIS — J45.40 MODERATE PERSISTENT ASTHMA WITHOUT COMPLICATION: Primary | ICD-10-CM

## 2024-04-19 LAB
ABO GROUP BLD BPU: NORMAL
ABO GROUP BLD: NORMAL
BLD GP AB SCN SERPL QL: NEGATIVE
BPU ID: NORMAL
CROSSMATCH: NORMAL
RH BLD: POSITIVE
SPECIMEN EXPIRATION DATE: NORMAL
UNIT DISPENSE STATUS: NORMAL
UNIT PRODUCT CODE: NORMAL
UNIT PRODUCT VOLUME: 350 ML
UNIT RH: NORMAL

## 2024-04-19 PROCEDURE — P9058 RBC, L/R, CMV-NEG, IRRAD: HCPCS

## 2024-04-19 NOTE — ED PROVIDER NOTES
History  Chief Complaint   Patient presents with    Abnormal Lab     Pts nephrologist checked pts hgb and it came back at 6. Has needed a transfusion before. Pt reports fatigue.      28 yr female with severe ckd- failed kidney txp- not currently on hd- gets labs checked month - hx of anemia-  has bee n receiving  iron infusion- has received prbcs last 1/24-- had r outpt labs yesterday for fatitue hb of 6- sent to er by nephrology for transfusion - no gi /gu bleeding- making normal amount of urine- no sob/de anda- no ble edema- ptg complaint with all meds       History provided by:  Patient and parent   used: No        Prior to Admission Medications   Prescriptions Last Dose Informant Patient Reported? Taking?   Blood Pressure KIT  Self No No   Sig: by Does not apply route daily   Patient not taking: Reported on 9/5/2023   Diclofenac Sodium (VOLTAREN) 1 %  Self No No   Sig: Apply 2 g topically 4 (four) times a day   Fluticasone Furoate-Vilanterol 100-25 mcg/actuation inhaler   No No   Sig: Inhale 1 puff daily Rinse mouth after use.   Fluticasone-Salmeterol (Advair) 100-50 mcg/dose inhaler   No No   Sig: Inhale 1 puff 2 (two) times a day Rinse mouth after use.   acetaminophen (TYLENOL) 325 mg tablet  Self No No   Sig: Take 2 tablets (650 mg total) by mouth every 6 (six) hours as needed for mild pain or headaches   albuterol (2.5 mg/3 mL) 0.083 % nebulizer solution  Self No No   Sig: Take 3 mL (2.5 mg total) by nebulization every 6 (six) hours as needed for wheezing or shortness of breath   albuterol (PROVENTIL HFA,VENTOLIN HFA) 90 mcg/act inhaler   No No   Sig: Inhale 2 puffs every 6 hours as needed for wheezing or shortness of breath.   amLODIPine (NORVASC) 5 mg tablet   No No   Sig: TAKE 1 TABLET BY MOUTH TWICE A DAY   budesonide-formoterol (Symbicort) 80-4.5 MCG/ACT inhaler   No No   Sig: Inhale 2 puffs 2 (two) times a day Rinse mouth after use.   calcitriol (ROCALTROL) 0.5 MCG capsule   No No    Sig: TAKE 1 CAPSULE BY MOUTH DAILY.   erythromycin (ILOTYCIN) ophthalmic ointment  Self No No   Sig: Place a 1/2 inch ribbon of ointment into the lower eyelid.   etonogestrel (NEXPLANON) subdermal implant  Self Yes No   Si mg by Subdermal route once   ferrous sulfate 325 (65 Fe) mg tablet   No No   Sig: TAKE 1 TABLET BY MOUTH EVERY DAY WITH BREAKFAST   fluticasone (Flonase Allergy Relief) 50 mcg/act nasal spray  Self No No   Si spray into each nostril daily   fluticasone-salmeterol (Advair HFA) 115-21 MCG/ACT inhaler   No No   Sig: Inhale 2 puffs 2 (two) times a day Rinse mouth after use.   labetalol (NORMODYNE) 100 mg tablet   No No   Sig: TAKE 1 TABLET BY MOUTH TWICE A DAY   loratadine (CLARITIN) 10 mg tablet  Self No No   Sig: Take 1 tablet (10 mg total) by mouth daily   sevelamer carbonate (RENVELA) 800 mg tablet   No No   Sig: Take 1 tablet (800 mg total) by mouth 3 (three) times a day with meals   sodium bicarbonate 650 mg tablet   No No   Sig: TAKE 2 TABLETS (1,300 MG TOTAL) BY MOUTH TWICE A DAY      Facility-Administered Medications: None       Past Medical History:   Diagnosis Date    Asthma     Chronic kidney disease     Eczema     Headache     Hypertension     Wears glasses        Past Surgical History:   Procedure Laterality Date    CHOLECYSTECTOMY      CT NEEDLE BIOPSY KIDNEY  2020    KELOID EXCISION Left 3/30/2021    Procedure: POSTERIOR EAR EXCISION KELOID, FLAP RECONSTRUCTION;  Surgeon: Beka Hugo MD;  Location: [facility];  Service: Plastics       Family History   Problem Relation Age of Onset    Asthma Mother     No Known Problems Father      I have reviewed and agree with the history as documented.    E-Cigarette/Vaping    E-Cigarette Use Never User      E-Cigarette/Vaping Substances    Nicotine No     THC No     CBD No     Flavoring No     Other No     Unknown No      Social History     Tobacco Use    Smoking status: Never    Smokeless tobacco: Never   Vaping Use    Vaping  status: Never Used   Substance Use Topics    Alcohol use: Not Currently     Comment: occassionally on social events    Drug use: No       Review of Systems   Constitutional:  Positive for fatigue. Negative for activity change, appetite change, chills, diaphoresis, fever and unexpected weight change.   HENT: Negative.     Eyes: Negative.    Respiratory: Negative.     Cardiovascular: Negative.    Gastrointestinal: Negative.    Endocrine: Negative.    Genitourinary: Negative.    Musculoskeletal: Negative.    Skin: Negative.    Allergic/Immunologic: Negative.    Neurological: Negative.    Hematological: Negative.    Psychiatric/Behavioral: Negative.         Physical Exam  Physical Exam  Vitals and nursing note reviewed.   Constitutional:       General: She is not in acute distress.     Appearance: Normal appearance. She is not ill-appearing, toxic-appearing or diaphoretic.      Comments: Avss- htnsive- in nad- pulse ox 100 % on ra- interpretation is normal- no intervention    HENT:      Head: Normocephalic and atraumatic.      Nose: Nose normal.      Mouth/Throat:      Mouth: Mucous membranes are moist.      Pharynx: Posterior oropharyngeal erythema: pale conj.   Eyes:      General: No scleral icterus.        Right eye: No discharge.         Left eye: No discharge.      Extraocular Movements: Extraocular movements intact.      Pupils: Pupils are equal, round, and reactive to light.      Comments: Mm pale   Neck:      Vascular: No carotid bruit.   Cardiovascular:      Rate and Rhythm: Normal rate and regular rhythm.      Pulses: Normal pulses.      Heart sounds: Normal heart sounds. No murmur heard.     No friction rub. No gallop.   Pulmonary:      Effort: Pulmonary effort is normal. No respiratory distress.      Breath sounds: Normal breath sounds. No stridor. No wheezing, rhonchi or rales.   Chest:      Chest wall: No tenderness.   Abdominal:      General: Bowel sounds are normal. There is no distension.       Palpations: Abdomen is soft. There is no mass.      Tenderness: There is no abdominal tenderness. There is no right CVA tenderness, left CVA tenderness, guarding or rebound.      Hernia: No hernia is present.      Comments: Soft nt/nd- no hsm - no ascites- no peritoneal signs   Musculoskeletal:         General: No swelling, tenderness, deformity or signs of injury. Normal range of motion.      Cervical back: Normal range of motion. No rigidity or tenderness.      Right lower leg: No edema.      Left lower leg: No edema.      Comments: Equal bilateral radial/dp pulses- no ble edema/calf tenderness/asym/ erythema   Lymphadenopathy:      Cervical: No cervical adenopathy.   Skin:     General: Skin is warm.      Capillary Refill: Capillary refill takes less than 2 seconds.      Coloration: Skin is not jaundiced or pale.      Findings: No bruising, erythema, lesion or rash.   Neurological:      General: No focal deficit present.      Mental Status: She is alert and oriented to person, place, and time. Mental status is at baseline.      Cranial Nerves: No cranial nerve deficit.      Sensory: No sensory deficit.      Motor: No weakness.      Coordination: Coordination normal.      Gait: Gait normal.      Comments: Normal non focal neuro exam    Psychiatric:         Mood and Affect: Mood normal.         Behavior: Behavior normal.         Thought Content: Thought content normal.         Judgment: Judgment normal.         Vital Signs  ED Triage Vitals [04/18/24 2126]   Temperature Pulse Respirations Blood Pressure SpO2   97.9 °F (36.6 °C) 72 19 (!) 177/83 100 %      Temp Source Heart Rate Source Patient Position - Orthostatic VS BP Location FiO2 (%)   Oral Monitor Sitting Right arm --      Pain Score       --           Vitals:    04/18/24 2126   BP: (!) 177/83   Pulse: 72   Patient Position - Orthostatic VS: Sitting         Visual Acuity      ED Medications  Medications - No data to display    Diagnostic Studies  Results  Reviewed       Procedure Component Value Units Date/Time    Basic metabolic panel [[id number]] Collected: 04/18/24 2228    Lab Status: No result Specimen: Blood from Arm, Right                    No orders to display              Procedures  Procedures         ED Course  ED Course as of 04/19/24 0529   Thu Apr 18, 2024 2229 Er md note- recent lab/ and lab trends reviewed by er md [place] Apr 19, 2024   0108 Er md note- tp- re-eval-- pt currently getting transfused- pt in nad- pt states did take all of her bp meds today                                              Medical Decision Making  Pt with hx of esrd with anemia- no acute gu/gyn/gi bleed- in er for low hb-- will need transfusion     Problems Addressed:  Anemia, unspecified type:     Details: Acute on chronic   Stage 4 chronic kidney disease (HCC): chronic illness or injury     Details: See chart     Amount and/or Complexity of Data Reviewed  Independent Historian: parent  Labs: ordered. Decision-making details documented in ED Course.     Details: All reviewed by er md  Discussion of management or test interpretation with external provider(s): Moderate amount of er md thought complexity    Risk  Decision regarding hospitalization.             Disposition  Final diagnoses:   None     ED Disposition       None          Follow-up Information    None         Patient's Medications   Discharge Prescriptions    No medications on file       No discharge procedures on file.    PDMP Review         Value Time User    PDMP Reviewed  Yes 4/8/2023  1:24 AM Saurav Hagan MD            ED Provider  Electronically Signed by             Beka Florez MD  04/19/24 0536

## 2024-04-19 NOTE — ED PROCEDURE NOTE
PROCEDURE  CriticalCare Time    Date/Time: 4/19/2024 5:37 AM    Performed by: Beka Florez MD  Authorized by: Beka Florez MD    Critical care provider statement:     Critical care time (minutes):  35    Critical care start time:  4/18/2024 11:00 PM    Critical care end time:  4/18/2024 1:35 PM    Critical care time was exclusive of:  Separately billable procedures and treating other patients and teaching time    Critical care was necessary to treat or prevent imminent or life-threatening deterioration of the following conditions:  Renal failure (er blood transfusion- hematologic failure)    Critical care was time spent personally by me on the following activities:  Examination of patient, obtaining history from patient or surrogate, development of treatment plan with patient or surrogate, re-evaluation of patient's condition, ordering and review of laboratory studies and ordering and performing treatments and interventions    I assumed direction of critical care for this patient from another provider in my specialty: no         Beka Florez MD  04/19/24 0538

## 2024-04-19 NOTE — DISCHARGE INSTRUCTIONS
Diagnosis: anemia- chronic kidney disease    - please call your nephrologist tomorrow to tell them that you received blood in the er and  had repeat  kidney function testing

## 2024-04-21 ENCOUNTER — HOSPITAL ENCOUNTER (EMERGENCY)
Facility: HOSPITAL | Age: 29
Discharge: HOME | End: 2024-04-21
Attending: EMERGENCY MEDICINE
Payer: MEDICARE

## 2024-04-21 ENCOUNTER — APPOINTMENT (EMERGENCY)
Dept: RADIOLOGY | Facility: HOSPITAL | Age: 29
End: 2024-04-21
Payer: MEDICARE

## 2024-04-21 VITALS
TEMPERATURE: 98.7 F | RESPIRATION RATE: 16 BRPM | WEIGHT: 233.69 LBS | HEIGHT: 64 IN | SYSTOLIC BLOOD PRESSURE: 171 MMHG | HEART RATE: 72 BPM | BODY MASS INDEX: 39.9 KG/M2 | DIASTOLIC BLOOD PRESSURE: 80 MMHG | OXYGEN SATURATION: 96 %

## 2024-04-21 DIAGNOSIS — S90.31XA CONTUSION OF RIGHT FOOT, INITIAL ENCOUNTER: Primary | ICD-10-CM

## 2024-04-21 PROCEDURE — 99284 EMERGENCY DEPT VISIT MOD MDM: CPT | Performed by: EMERGENCY MEDICINE

## 2024-04-21 PROCEDURE — 73630 X-RAY EXAM OF FOOT: CPT

## 2024-04-21 PROCEDURE — 99283 EMERGENCY DEPT VISIT LOW MDM: CPT

## 2024-04-21 RX ORDER — ACETAMINOPHEN 325 MG/1
975 TABLET ORAL ONCE
Status: COMPLETED | OUTPATIENT
Start: 2024-04-21 | End: 2024-04-21

## 2024-04-21 RX ADMIN — ACETAMINOPHEN 975 MG: 325 TABLET, FILM COATED ORAL at 21:07

## 2024-04-22 ENCOUNTER — VBI (OUTPATIENT)
Dept: FAMILY MEDICINE CLINIC | Facility: CLINIC | Age: 29
End: 2024-04-22

## 2024-04-22 DIAGNOSIS — N18.5 ANEMIA DUE TO STAGE 5 CHRONIC KIDNEY DISEASE, NOT ON CHRONIC DIALYSIS  (HCC): Primary | ICD-10-CM

## 2024-04-22 DIAGNOSIS — D63.1 ANEMIA DUE TO STAGE 5 CHRONIC KIDNEY DISEASE, NOT ON CHRONIC DIALYSIS  (HCC): Primary | ICD-10-CM

## 2024-04-22 NOTE — ED PROVIDER NOTES
History  Chief Complaint   Patient presents with    Foot Injury     Patient closed right foot into car door. Able to bear weight on foot but very painful, numbness and tingling when attempting to walk     28-year-old female with PMH of HTN, CKD, and asthma who presents to the ED for right foot pain following injury.  Patient states about a week ago she had slammed her right foot in a car door when she was trying to get in.  Patient has been taking Tylenol and ice with minimal to no improvement in her symptoms.  Patient's pain is located in her right fourth and fifth digits of her foot.  Patient has no active bleeding or signs of abrasion and is able to bear weight with pain.  No other acute concerns at this time. Denies chest pain, SOB, cough, abdominal pain, n/v/d, fever, chills, dizziness, lightheadedness, HA, dysuria, hematuria, hematochezia, or melena.               Prior to Admission Medications   Prescriptions Last Dose Informant Patient Reported? Taking?   Blood Pressure KIT  Self No No   Sig: by Does not apply route daily   Patient not taking: Reported on 9/5/2023   Diclofenac Sodium (VOLTAREN) 1 %  Self No No   Sig: Apply 2 g topically 4 (four) times a day   acetaminophen (TYLENOL) 325 mg tablet  Self No No   Sig: Take 2 tablets (650 mg total) by mouth every 6 (six) hours as needed for mild pain or headaches   albuterol (2.5 mg/3 mL) 0.083 % nebulizer solution  Self No No   Sig: Take 3 mL (2.5 mg total) by nebulization every 6 (six) hours as needed for wheezing or shortness of breath   albuterol (PROVENTIL HFA,VENTOLIN HFA) 90 mcg/act inhaler   No No   Sig: Inhale 2 puffs every 6 hours as needed for wheezing or shortness of breath.   amLODIPine (NORVASC) 5 mg tablet   No No   Sig: TAKE 1 TABLET BY MOUTH TWICE A DAY   calcitriol (ROCALTROL) 0.5 MCG capsule   No No   Sig: TAKE 1 CAPSULE BY MOUTH DAILY.   erythromycin (ILOTYCIN) ophthalmic ointment  Self No No   Sig: Place a 1/2 inch ribbon of ointment into  the lower eyelid.   etonogestrel (NEXPLANON) subdermal implant  Self Yes No   Si mg by Subdermal route once   ferrous sulfate 325 (65 Fe) mg tablet   No No   Sig: TAKE 1 TABLET BY MOUTH EVERY DAY WITH BREAKFAST   fluticasone (Flonase Allergy Relief) 50 mcg/act nasal spray  Self No No   Si spray into each nostril daily   labetalol (NORMODYNE) 100 mg tablet   No No   Sig: TAKE 1 TABLET BY MOUTH TWICE A DAY   loratadine (CLARITIN) 10 mg tablet  Self No No   Sig: Take 1 tablet (10 mg total) by mouth daily   mometasone-formoterol (DULERA) 100-5 MCG/ACT inhaler   No No   Sig: Inhale 2 puffs 2 (two) times a day Rinse mouth after use.   sevelamer carbonate (RENVELA) 800 mg tablet   No No   Sig: Take 1 tablet (800 mg total) by mouth 3 (three) times a day with meals   sodium bicarbonate 650 mg tablet   No No   Sig: TAKE 2 TABLETS (1,300 MG TOTAL) BY MOUTH TWICE A DAY      Facility-Administered Medications: None       Past Medical History:   Diagnosis Date    Asthma     Chronic kidney disease     Eczema     Headache     Hypertension     Wears glasses        Past Surgical History:   Procedure Laterality Date    CHOLECYSTECTOMY      CT NEEDLE BIOPSY KIDNEY  2020    KELOID EXCISION Left 3/30/2021    Procedure: POSTERIOR EAR EXCISION KELOID, FLAP RECONSTRUCTION;  Surgeon: Beka Hugo MD;  Location: [facility];  Service: Plastics       Family History   Problem Relation Age of Onset    Asthma Mother     No Known Problems Father      I have reviewed and agree with the history as documented.    E-Cigarette/Vaping    E-Cigarette Use Never User      E-Cigarette/Vaping Substances    Nicotine No     THC No     CBD No     Flavoring No     Other No     Unknown No      Social History     Tobacco Use    Smoking status: Never    Smokeless tobacco: Never   Vaping Use    Vaping status: Never Used   Substance Use Topics    Alcohol use: Not Currently     Comment: occassionally on social events    Drug use: No        Review of  Systems   Constitutional:  Negative for appetite change, chills, diaphoresis, fatigue and fever.   HENT:  Negative for congestion, ear pain, postnasal drip, rhinorrhea, sore throat and trouble swallowing.    Eyes:  Negative for pain and visual disturbance.   Respiratory:  Negative for cough and shortness of breath.    Cardiovascular:  Negative for chest pain and palpitations.   Gastrointestinal:  Negative for abdominal pain, constipation, diarrhea, nausea and vomiting.   Genitourinary:  Negative for decreased urine volume, dysuria and hematuria.   Musculoskeletal:  Negative for arthralgias and back pain.        Right foot pain   Skin:  Negative for color change and rash.   Neurological:  Negative for dizziness, seizures, syncope, weakness, light-headedness and headaches.   All other systems reviewed and are negative.      Physical Exam  ED Triage Vitals [04/21/24 1946]   Temperature Pulse Respirations Blood Pressure SpO2   98.7 °F (37.1 °C) 72 16 (!) 171/80 96 %      Temp Source Heart Rate Source Patient Position - Orthostatic VS BP Location FiO2 (%)   Oral Monitor Sitting Right arm --      Pain Score       5             Orthostatic Vital Signs  Vitals:    04/21/24 1946   BP: (!) 171/80   Pulse: 72   Patient Position - Orthostatic VS: Sitting       Physical Exam  Vitals and nursing note reviewed.   Constitutional:       Appearance: Normal appearance. She is normal weight.   HENT:      Head: Normocephalic and atraumatic.      Right Ear: External ear normal.      Left Ear: External ear normal.      Nose: Nose normal.      Mouth/Throat:      Pharynx: Oropharynx is clear.   Eyes:      Conjunctiva/sclera: Conjunctivae normal.   Cardiovascular:      Rate and Rhythm: Normal rate and regular rhythm.      Pulses: Normal pulses.      Heart sounds: Normal heart sounds.      Comments: RRR with +S1 and S2, no murmurs appreciated on exam. Peripheral pulses intact.    Pulmonary:      Effort: Pulmonary effort is normal. No  none required respiratory distress.      Breath sounds: Normal breath sounds. No wheezing, rhonchi or rales.      Comments: CTABL with no abnormal lung sounds such as wheezes or rales appreciated on exam.     Abdominal:      General: Abdomen is flat. Bowel sounds are normal. There is no distension.      Palpations: Abdomen is soft.      Tenderness: There is no abdominal tenderness.      Comments: Soft, non tender, normo-active bowel sounds. Without rigidity, guarding, or distension.     Musculoskeletal:         General: Tenderness present. Normal range of motion.      Cervical back: Normal range of motion.      Comments: Tenderness to palpation over the right fourth and fifth metatarsals.  No obvious deformity, ecchymosis, erythema, or edema noted on exam.  Patient had adequate range of motion of the toes with good cap refill less than 2 seconds.   Skin:     General: Skin is warm and dry.   Neurological:      General: No focal deficit present.      Mental Status: She is alert and oriented to person, place, and time. Mental status is at baseline.      Comments: CN grossly intact on visualization. No focal neurologic deficits noted on exam.  5/5 strength in all extremities. Neurovascularly intact with normal sensation and motor function.             ED Medications  Medications   acetaminophen (TYLENOL) tablet 975 mg (975 mg Oral Given 4/21/24 2107)       Diagnostic Studies  Results Reviewed       None                   XR foot 3+ views RIGHT   Final Result by Wilfredo Frazier MD (04/22 0842)      No acute osseous abnormality.      Findings are stable      Workstation performed: [id number]               Procedures  Procedures      ED Course                             SBIRT 22yo+      Flowsheet Row Most Recent Value   Initial Alcohol Screen: US AUDIT-C     1. How often do you have a drink containing alcohol? 0 Filed at: 04/21/2024 1948   2. How many drinks containing alcohol do you have on a typical day you are drinking?  0  Filed at: 04/21/2024 1948   3a. Male UNDER 65: How often do you have five or more drinks on one occasion? 0 Filed at: 04/21/2024 1948   3b. FEMALE Any Age, or MALE 65+: How often do you have 4 or more drinks on one occassion? 0 Filed at: 04/21/2024 1948   Audit-C Score 0 Filed at: 04/21/2024 1948   CELY: How many times in the past year have you...    Used an illegal drug or used a prescription medication for non-medical reasons? Never Filed at: 04/21/2024 1948                  Medical Decision Making  28-year-old female with PMH of HTN, CKD, and asthma who presented to the ED for right foot injury.  Patient's imaging was obtained and reviewed by the ED provider.  Patient's 3 view right foot x-ray showed no acute osseous abnormality as per radiology.  While in the emergency department, patient was given 975 mg of Tylenol for pain control.  Patient was reassured of her symptoms and explained that she is likely experiencing muscular pain with no fractures seen on x-ray.  Patient's right foot was wrapped with an Ace bandage for compression.  Through shared decision making to the patient the provider, the patient was planned for discharge.  Patient was advised to follow-up outpatient with her PCP and to take OTC Tylenol as needed for pain control at home.  Patient was also instructed to return to the ED if her symptoms worsen including but not limited to inability to bear weight, increasing pain, increased erythema or edema, color change of her right foot, fevers, chills, nausea or vomiting, or changes in her behavior.    Amount and/or Complexity of Data Reviewed  Radiology: ordered.    Risk  OTC drugs.          Disposition  Final diagnoses:   Contusion of right foot, initial encounter     Time reflects when diagnosis was documented in both MDM as applicable and the Disposition within this note       Time User Action Codes Description Comment    4/21/2024  9:19 PM Luis Javier Add [S90.31XA] Contusion of right foot,  initial encounter           ED Disposition       ED Disposition   Discharge    Condition   Stable    Date/Time   Sun Apr 21, 2024 2119    Comment   Raven Simeon discharge to home/self care.                   Follow-up Information       Follow up With Specialties Details Why Contact Info Additional Information    [facility] Emergency Medicine Go to  If symptoms worsen [address] [facility] [address]  [phone] [facility], [address], [place], [address]            Discharge Medication List as of 4/21/2024  9:20 PM        CONTINUE these medications which have NOT CHANGED    Details   acetaminophen (TYLENOL) 325 mg tablet Take 2 tablets (650 mg total) by mouth every 6 (six) hours as needed for mild pain or headaches, Starting Sun 1/26/2020, No Print      albuterol (2.5 mg/3 mL) 0.083 % nebulizer solution Take 3 mL (2.5 mg total) by nebulization every 6 (six) hours as needed for wheezing or shortness of breath, Starting Tue 7/18/2023, Normal      albuterol (PROVENTIL HFA,VENTOLIN HFA) 90 mcg/act inhaler Inhale 2 puffs every 6 hours as needed for wheezing or shortness of breath., Normal      amLODIPine (NORVASC) 5 mg tablet TAKE 1 TABLET BY MOUTH TWICE A DAY, Normal      Blood Pressure KIT by Does not apply route daily, Starting Wed 6/24/2020, Normal      calcitriol (ROCALTROL) 0.5 MCG capsule TAKE 1 CAPSULE BY MOUTH DAILY., Starting Wed 3/13/2024, Normal      Diclofenac Sodium (VOLTAREN) 1 % Apply 2 g topically 4 (four) times a day, Starting Thu 7/6/2023, Normal      erythromycin (ILOTYCIN) ophthalmic ointment Place a 1/2 inch ribbon of ointment into the lower eyelid., Normal      etonogestrel (NEXPLANON) subdermal implant 68 mg by Subdermal route once, Historical Med      ferrous sulfate 325 (65 Fe) mg tablet TAKE 1 TABLET BY MOUTH EVERY DAY WITH BREAKFAST, Starting Thu 4/11/2024, Normal       fluticasone (Flonase Allergy Relief) 50 mcg/act nasal spray 1 spray into each nostril daily, Starting Thu 12/21/2023, Normal      labetalol (NORMODYNE) 100 mg tablet TAKE 1 TABLET BY MOUTH TWICE A DAY, Normal      loratadine (CLARITIN) 10 mg tablet Take 1 tablet (10 mg total) by mouth daily, Starting Thu 12/21/2023, Normal      mometasone-formoterol (DULERA) 100-5 MCG/ACT inhaler Inhale 2 puffs 2 (two) times a day Rinse mouth after use., Starting Fri 4/19/2024, Normal      sevelamer carbonate (RENVELA) 800 mg tablet Take 1 tablet (800 mg total) by mouth 3 (three) times a day with meals, Starting Fri 3/22/2024, Normal      sodium bicarbonate 650 mg tablet TAKE 2 TABLETS (1,300 MG TOTAL) BY MOUTH TWICE A DAY, Normal           No discharge procedures on file.    PDMP Review         Value Time User    PDMP Reviewed  Yes 4/8/2023  1:24 AM Saurav Hagan MD             ED Provider  Attending physically available and evaluated Raven Stocktonlakeishamadhu. I managed the patient along with the ED Attending.    Electronically Signed by           Luis Javier MD  04/23/24 0227

## 2024-04-22 NOTE — TELEPHONE ENCOUNTER
04/22/24 11:29 AM    Patient contacted post ED visit, first outreach attempt made. Message was left for patient to return a call to the [facility] Department at [facility]: Phone [phone].    Thank you.  [facility] Johnson  [facility]

## 2024-04-23 ENCOUNTER — TELEPHONE (OUTPATIENT)
Dept: FAMILY MEDICINE CLINIC | Facility: CLINIC | Age: 29
End: 2024-04-23

## 2024-04-23 NOTE — TELEPHONE ENCOUNTER
04/23/24 10:45 AM    Patient contacted post ED visit, [facility] department spoke with patient/caregiver and outreach was successful.    Thank you.  [facility] Johnson  [facility]

## 2024-04-23 NOTE — TELEPHONE ENCOUNTER
Encounter for forms         Please refer to the following information:       Type of Form: [facility] for review only     Date of Visit (if applicable): 4/4/2024    Doctor: Balwinder      Original in Dr. Britt folder to be completed.

## 2024-05-01 ENCOUNTER — TELEPHONE (OUTPATIENT)
Dept: NEPHROLOGY | Facility: CLINIC | Age: 29
End: 2024-05-01

## 2024-05-01 NOTE — TELEPHONE ENCOUNTER
We have tried to reach the patient over 20-30 times in the past 2 years and patient has not been returning calls or completing transplant testing.  There is insurance issues for which our  has been trying to reach the patient since February and finally was able to make contact in March.  Patient is still not returning calls back per my review with our coordinator for the transplant evaluation.  I have informed the primary nephrologist of these issues.  Unfortunately patient is not completing testing and for this reason we cannot move forward with listing for transplantation.  I have also recommended patient's seek a second opinion for transplant evaluation at another center and I will defer this decision to the patient and primary nephrologist.

## 2024-05-01 NOTE — ED ATTENDING ATTESTATION
4/21/2024  I, Curtis Castillo DO, saw and evaluated the patient. I have discussed the patient with the resident/non-physician practitioner and agree with the resident's/non-physician practitioner's findings, Plan of Care, and MDM as documented in the resident's/non-physician practitioner's note, except where noted. All available labs and Radiology studies were reviewed.  I was present for key portions of any procedure(s) performed by the resident/non-physician practitioner and I was immediately available to provide assistance.       At this point I agree with the current assessment done in the Emergency Department.  I have conducted an independent evaluation of this patient a history and physical is as follows:    ED Course         Critical Care Time  Procedures

## 2024-05-02 ENCOUNTER — OFFICE VISIT (OUTPATIENT)
Dept: NEPHROLOGY | Facility: CLINIC | Age: 29
End: 2024-05-02
Payer: MEDICARE

## 2024-05-02 ENCOUNTER — TELEPHONE (OUTPATIENT)
Dept: NEPHROLOGY | Facility: CLINIC | Age: 29
End: 2024-05-02

## 2024-05-02 VITALS
SYSTOLIC BLOOD PRESSURE: 130 MMHG | HEIGHT: 64 IN | WEIGHT: 232 LBS | DIASTOLIC BLOOD PRESSURE: 80 MMHG | BODY MASS INDEX: 39.61 KG/M2

## 2024-05-02 DIAGNOSIS — M89.9 CHRONIC KIDNEY DISEASE-MINERAL AND BONE DISORDER: ICD-10-CM

## 2024-05-02 DIAGNOSIS — E83.9 CHRONIC KIDNEY DISEASE-MINERAL AND BONE DISORDER: ICD-10-CM

## 2024-05-02 DIAGNOSIS — E66.01 MORBID OBESITY WITH BMI OF 40.0-44.9, ADULT (HCC): ICD-10-CM

## 2024-05-02 DIAGNOSIS — N18.5 ANEMIA DUE TO STAGE 5 CHRONIC KIDNEY DISEASE, NOT ON CHRONIC DIALYSIS  (HCC): ICD-10-CM

## 2024-05-02 DIAGNOSIS — E83.39 HYPERPHOSPHATEMIA: ICD-10-CM

## 2024-05-02 DIAGNOSIS — E87.20 METABOLIC ACIDOSIS: ICD-10-CM

## 2024-05-02 DIAGNOSIS — N18.9 CHRONIC KIDNEY DISEASE-MINERAL AND BONE DISORDER: ICD-10-CM

## 2024-05-02 DIAGNOSIS — I12.0 BENIGN HYPERTENSION WITH CKD (CHRONIC KIDNEY DISEASE) STAGE V (HCC): ICD-10-CM

## 2024-05-02 DIAGNOSIS — E79.0 HYPERURICEMIA: Primary | ICD-10-CM

## 2024-05-02 DIAGNOSIS — R80.1 PERSISTENT PROTEINURIA: ICD-10-CM

## 2024-05-02 DIAGNOSIS — N18.5 BENIGN HYPERTENSION WITH CKD (CHRONIC KIDNEY DISEASE) STAGE V (HCC): ICD-10-CM

## 2024-05-02 DIAGNOSIS — D63.1 ANEMIA DUE TO STAGE 5 CHRONIC KIDNEY DISEASE, NOT ON CHRONIC DIALYSIS  (HCC): ICD-10-CM

## 2024-05-02 DIAGNOSIS — N18.5 CHRONIC KIDNEY DISEASE (CKD), ACTIVE MEDICAL MANAGEMENT WITHOUT DIALYSIS, STAGE 5 (HCC): ICD-10-CM

## 2024-05-02 PROCEDURE — 99215 OFFICE O/P EST HI 40 MIN: CPT | Performed by: INTERNAL MEDICINE

## 2024-05-02 NOTE — PROGRESS NOTES
NEPHROLOGY OFFICE VISIT   Raven Simeon 28 y.o. female MRN: [id number]  5/2/2024    Reason for Visit: Chronic kidney disease stage V    History of Present Illness (HPI):    I had the pleasure of seeing Raven today in the renal clinic for the continued management of chronic kidney disease stage V not on dialysis. Since our last visit, she was in the emergency room twice the first time I had sent her to the emergency room due to her hemoglobin being 6 she received 1 unit blood transfusion.  The second time in the emergency room she had an injury to her foot.  She did not receive or need admission.    Her mother is with her.  I had an at length discussion with her mother and the patient.  I strongly recommended placement of a dialysis access as her GFR fluctuates anywhere between 1 to 8 mL/min.  I am concerned that she also has mild tremors in her hands which could be signs of early uremia.  Her electrolytes are stable including a potassium and acid-base status.  She is not volume overloaded.  Otherwise she reports no over there overt uremic symptoms.    I had an at length discussion with Dr. Mac the transplant nephrologist as well.  Still awaiting final insurance coverages.  We provided a transplant kit today once the insurance process completes.    They want to hold off on dialysis access.  I did recommend it strongly.  I told him that failure to do so could result in placement of a permacath for urgent dialysis.    I updated the therapy plan for Epogen and mated every 14 days and increase the dose to be over 10,000 units to prevent further transfusions.    I spent a great time with greater than 50% of the encounter time with counseling.  I tried to report to them that my biggest goal and concern is the patient's wellbeing.  The mother feels that we are just trying to push dialysis for financial reasons and I have not told her on multiple occasions that this is not true.  I also encouraged them to consider  a second opinion outside of my group if they have concerns about this.    Been documented that there have been over 25 calls to the patient with a lot of times her not answering the call or responding.  She is sometimes very difficult to reach out to.    ASSESSMENT and PLAN:    Chronic Kidney Disease Stage V, not yet on dialysis  --Imaging:  Right kidney 9.4 cm, left kidney 9 cm.  It should be noted that there was a decrease in the right kidney size compared to 2015 ultrasound where it was 12.8 cm.  Both kidneys are diffusely echogenic consistent with chronic kidney disease.  --Urinalysis:  Negative blood.  2+ protein.  --Proteinuria: Subnephrotic range proteinuria  --Baseline creatinine: Baseline's renal function has progressively been worsening.  Her creatinine is stable but now at 26 mg/dL.  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  Mild arterial sclerosis.  Severe chronic pathologic changes, irreversible with poor prognostic sign.  --Being evaluated by the renal transplant team at [facility] with Dr. Mac.  Provided the transplant kit today.  Awaiting final confirmation on some insurance coverage as  --Completed nephrology advanced care meeting in September 2022  --Management: Strongly recommended placement of a dialysis access for future renal replacement therapy.  Continue to close transplant evaluation.  Continue close monitoring  of the renal function.  Encouraged a second opinion.  --Reducing Cardiovascular Risk Factors:  Simvastatin+ Ezetimibe reduced atherosclerotic events in CKD (SHARP trial); Low dose aspirin safe (if no contraindications exist); smoking is an independent risk factor for Chronic Kidney Disease and progression, strongly recommend smoking cessation     Hypertension  -- Stage II (American College of Cardiology/American Heart Association)  -- with underlying chronic kidney disease and morbid obesity  -- Body mass index is 39.8 kg/m².  -- Volume status: Euvolemic  -- Etiology:  Morbid obesity, underlying chronic kidney disease  -- Secondary Work-Up:  Renal artery Doppler was negative, a.m. Cortisol 14, TSH 1.5, normal metanephrines and catecholamine levels, renin 0.935, Aldosterone 11.6  -- Target Goal: < 130/80 (ACC/AHA, CKD with proteinuria, CKD in diabetics) ; if tolerated can target SBP < 120 mmHg in high cardiovascular risk ( Age > 75, CKD, CVD, No Diabetics SPRINT)  -- Lifestyle Modifications: DASH Diet, Weight Loss for ideal body weight, 45 mins of cardiovascular exercise 3 times a week as tolerated, and if no contraindications exist, smoking cessation, limit alcohol use, avoid NSAIDS, monitor blood pressure at home  -- Status: Blood pressure at target  -- Current antihypertensive regiment:  Amlodipine 5 mg twice a day, labetalol 100 mg twice a day  -- Changes: Continue current regimen.  Avoid RAAS blockade in the setting of advanced chronic kidney disease.     Obesity  -- BMI 39.8, continues to slowly improve  --Recommend decreasing portion sizes, encouraging healthy choices of fruits and vegetables, consuming healthier snacks and moderation in carbohydrate intake. Exercise recommendations; 3-5 times a week, the American Heart Association recommends 150 minutes a week moderate exercise  --Strongly recommend evaluation by nutritionist  --Overweight and obesity have been associated with increased mortality and  morbidity.  Associated with a reduction in life expectancy, associated with increased all cause and cardiovascular mortality  --Metabolic risks, including increased risk of diabetes type 2, insulin resistance with hyperinsulinemia (weight loss of 5 to 7% associated with a decreased risk of type 2 diabetes among individuals with prediabetes and weight loss of 15% can lead to dramatic improvement of diabetes in nearly half of people).  Dyslipidemia, hypertension and heart disease are all increased in patients with obesity.  Along with increased risk of stroke increased risk of multiple cancer types.  Can also be associated with increased renal dysfunction, strongest risk factor for new onset chronic kidney disease.  There is also a degree of compensatory hyperfiltration to meet high in the metabolic demands of increased body weight.  This can also result in increased increased risk for nephrolithiasis.        Mineral bone disorder-chronic kidney disease  --secondary hyperparathyroidism renal origin  --Phosphorus level is improving to 7.1  --Vitamin D 25-hydroxy level is normal 41.4  --PTH has worsened to 431.3 from 100.5, neck a repeat PTH if this is worsening we may need to consider starting Sensipar  --Calcium currently normal  --Recommend low phosphorus diet  -- Continue sevelamer 1 tablet with meals  -- Continue calcitriol 0.5 mcg daily     Anemia of chronic kidney disease  --Iron stores are now adequate  --Received a blood transfusion in mid April 2024  --Increase Epogen dosing and make it every 2 weeks  --Secondary to advanced chronic kidney disease     Vitamin-D deficiency--resolved     Low bicarbonate level--resolved  --Continue oral sodium bicarbonate to 1300 mg twice a day  --Continue to trend bicarbonate level and aim for 22 or higher      PATIENT INSTRUCTIONS:    Patient Instructions   1.)  Low 2 g sodium diet    2.)  Monitor weights at home    3.)  Avoid NSAIDs (ibuprofen, Motrin, Advil, Aleve,  "naproxen)    4.)  Monitor blood pressure at home, call if blood pressure greater than 150/90 persistently    5.) I will plan to discuss all results including blood work, and/or imaging at our next visit, unless there is an urgent indication, in which case I will call you earlier. If you have any questions or concerns about your results, please feel free to call our office.    6.)  Transplant kit.    7.)  Need placement of a dialysis access.  I highly recommend you get an access in place as you will require dialysis in the near future    8.)  Continue close follow-up.        Orders Placed This Encounter   Procedures    Cystatin C With eGFR     Standing Status:   Future     Standing Expiration Date:   5/2/2025    Comprehensive metabolic panel     This is a patient instruction: Patient fasting for 8 hours or longer recommended.     Standing Status:   Future     Standing Expiration Date:   5/2/2025    CBC and Platelet     Standing Status:   Future     Standing Expiration Date:   5/2/2025    Phosphorus     Standing Status:   Future     Standing Expiration Date:   5/2/2025       OBJECTIVE:  Current Weight: Weight - Scale: 105 kg (232 lb)  Vitals:    05/02/24 1530   Weight: 105 kg (232 lb)   Height: 5' 4\" (1.626 m)    Body mass index is 39.82 kg/m².      REVIEW OF SYSTEMS:    Review of Systems   Constitutional:  Negative for activity change and fever.   Respiratory:  Negative for cough, chest tightness, shortness of breath and wheezing.    Cardiovascular:  Negative for chest pain and leg swelling.   Gastrointestinal:  Negative for abdominal pain, diarrhea, nausea and vomiting.   Endocrine: Negative for polyuria.   Genitourinary:  Negative for difficulty urinating, dysuria, flank pain, frequency and urgency.   Skin:  Negative for rash.   Neurological:  Negative for dizziness, syncope, light-headedness and headaches.   All other systems reviewed and are negative.      PHYSICAL EXAM:      Physical Exam  Vitals and nursing " note reviewed. Exam conducted with a chaperone present.   Constitutional:       General: She is not in acute distress.     Appearance: She is well-developed. She is obese.   HENT:      Head: Normocephalic and atraumatic.   Eyes:      General: No scleral icterus.     Conjunctiva/sclera: Conjunctivae normal.      Pupils: Pupils are equal, round, and reactive to light.   Cardiovascular:      Rate and Rhythm: Normal rate and regular rhythm.      Heart sounds: S1 normal and S2 normal. No murmur heard.     No friction rub. No gallop.   Pulmonary:      Effort: Pulmonary effort is normal. No respiratory distress.      Breath sounds: Normal breath sounds. No wheezing or rales.   Abdominal:      General: Bowel sounds are normal.      Palpations: Abdomen is soft.      Tenderness: There is no abdominal tenderness. There is no rebound.   Musculoskeletal:         General: Normal range of motion.      Cervical back: Normal range of motion and neck supple.   Skin:     Findings: No rash.   Neurological:      Mental Status: She is alert and oriented to person, place, and time.   Psychiatric:         Behavior: Behavior normal.         Medications:    Current Outpatient Medications:     acetaminophen (TYLENOL) 325 mg tablet, Take 2 tablets (650 mg total) by mouth every 6 (six) hours as needed for mild pain or headaches, Disp: 30 tablet, Rfl: 0    albuterol (2.5 mg/3 mL) 0.083 % nebulizer solution, Take 3 mL (2.5 mg total) by nebulization every 6 (six) hours as needed for wheezing or shortness of breath, Disp: 30 mL, Rfl: 0    albuterol (PROVENTIL HFA,VENTOLIN HFA) 90 mcg/act inhaler, Inhale 2 puffs every 6 hours as needed for wheezing or shortness of breath., Disp: 18 g, Rfl: 3    amLODIPine (NORVASC) 5 mg tablet, TAKE 1 TABLET BY MOUTH TWICE A DAY, Disp: 60 tablet, Rfl: 3    calcitriol (ROCALTROL) 0.5 MCG capsule, TAKE 1 CAPSULE BY MOUTH DAILY., Disp: 30 capsule, Rfl: 5    Diclofenac Sodium (VOLTAREN) 1 %, Apply 2 g topically 4 (four)  "times a day, Disp: 150 g, Rfl: 0    erythromycin (ILOTYCIN) ophthalmic ointment, Place a 1/2 inch ribbon of ointment into the lower eyelid., Disp: 3.5 g, Rfl: 0    etonogestrel (NEXPLANON) subdermal implant, 68 mg by Subdermal route once, Disp: , Rfl:     ferrous sulfate 325 (65 Fe) mg tablet, TAKE 1 TABLET BY MOUTH EVERY DAY WITH BREAKFAST, Disp: 30 tablet, Rfl: 5    fluticasone (Flonase Allergy Relief) 50 mcg/act nasal spray, 1 spray into each nostril daily, Disp: 15.8 mL, Rfl: 0    labetalol (NORMODYNE) 100 mg tablet, TAKE 1 TABLET BY MOUTH TWICE A DAY, Disp: 60 tablet, Rfl: 3    loratadine (CLARITIN) 10 mg tablet, Take 1 tablet (10 mg total) by mouth daily, Disp: 20 tablet, Rfl: 0    mometasone-formoterol (DULERA) 100-5 MCG/ACT inhaler, Inhale 2 puffs 2 (two) times a day Rinse mouth after use., Disp: 39 g, Rfl: 3    sevelamer carbonate (RENVELA) 800 mg tablet, Take 1 tablet (800 mg total) by mouth 3 (three) times a day with meals, Disp: 180 tablet, Rfl: 3    sodium bicarbonate 650 mg tablet, TAKE 2 TABLETS (1,300 MG TOTAL) BY MOUTH TWICE A DAY, Disp: 120 tablet, Rfl: 5    Blood Pressure KIT, by Does not apply route daily (Patient not taking: Reported on 9/5/2023), Disp: 1 each, Rfl: 0    Laboratory Results:        Invalid input(s): \"ALBUMIN\"    Results for orders placed or performed during the hospital encounter of 04/18/24   Basic metabolic panel   Result Value Ref Range    Sodium 138 135 - 147 mmol/L    Potassium 4.7 3.5 - 5.3 mmol/L    Chloride 100 96 - 108 mmol/L    CO2 21 21 - 32 mmol/L    ANION GAP 17 (H) 4 - 13 mmol/L    BUN 98 (H) 5 - 25 mg/dL    Creatinine 27.02 (H) 0.60 - 1.30 mg/dL    Glucose 91 65 - 140 mg/dL    Calcium 9.2 8.4 - 10.2 mg/dL    eGFR 1 ml/min/1.73sq m   Type and screen   Result Value Ref Range    ABO Grouping O     Rh Factor Positive     Antibody Screen Negative     Specimen Expiration Date 20240421    Prepare Leukoreduced RBC: 1 Units, Irradiated, CMV Negative   Result Value Ref Range "    Unit Product Code [id number]     Unit Number [id number]     Unit ABO O     Unit RH POS     Crossmatch Compatible     Unit Dispense Status Presumed Trans     Unit Product Volume 350 ml

## 2024-05-02 NOTE — TELEPHONE ENCOUNTER
I spoke with the patient today and the patient's mother.  Patient and the patient's mother are stating that we have not called them or help them complete the evaluation process for renal transplant.  I did review that I reviewed the case with our transplant coordinators to help with this process and they have called the patient over 20-30 times over the past 2 years when we reviewed the phone records and unfortunately many of those times we are not able to get a hold of them or callbacks or completion of the testing.  With regards to the insurance, we are waiting on the .  Patient states that the  has called the patient already recently.  Once the patient's insurance is up to par, patient can complete the testing.  Patient also states that she has not received the donor coordinator number.  She states that whenever our coordinators have tried coordinator number, she has not been able to write it down fast enough.  I am leaving the donor coordinator phone number with patient's primary nephrologist who is seeing the patient today to please give to the patient.  Patient's mother expressed frustration about the process.  I attempted to state the facts involved and what has occurred thus far.  Once the patient is able to complete testing and the donor completes testing then we could plan for renal transplant if appropriate.  But in the interim I do believe and agree with the patient's nephrologist that she will need dialysis.  Her GFR is 1.  They have been very reluctant and against dialysis planning but I attempted to emphasize that she should follow her nephrologist advice and start dialysis planning.  There is high risk of death with the patient's current kidney function.  I educated the patient that dialysis will help her live longer if her kidneys were to fail completely.  She was under the impression that dialysis will shorten her life.  I attempted to explain that dialysis  helps to prolong life and it is the kidney failure that we will shorten our lifespan.  She stated understanding.

## 2024-05-02 NOTE — PATIENT INSTRUCTIONS
1.)  Low 2 g sodium diet    2.)  Monitor weights at home    3.)  Avoid NSAIDs (ibuprofen, Motrin, Advil, Aleve, naproxen)    4.)  Monitor blood pressure at home, call if blood pressure greater than 150/90 persistently    5.) I will plan to discuss all results including blood work, and/or imaging at our next visit, unless there is an urgent indication, in which case I will call you earlier. If you have any questions or concerns about your results, please feel free to call our office.    6.)  Transplant kit.    7.)  Need placement of a dialysis access.  I highly recommend you get an access in place as you will require dialysis in the near future    8.)  Continue close follow-up.

## 2024-05-03 ENCOUNTER — TELEPHONE (OUTPATIENT)
Dept: NEPHROLOGY | Facility: CLINIC | Age: 29
End: 2024-05-03

## 2024-05-03 NOTE — TELEPHONE ENCOUNTER
I was in the same clinic that the patient was in when she was seeing her nephrologist yesterday.  At the end of the visit I saw the patient and her mother and I explained to them that we have been tried to call them multiple times in the past and reviewed the phone calls.  That we are waiting on insurance approval to restart the patient's testing.  I gave them HLA kits to hold onto and as soon as insurance approval is finalized we can restart testing.  I highly encouraged the patient and her mother to return her calls when we call them.  They do not agree that we have been calling them as frequently as we are stating which I acknowledge but I also acknowledge that we have records that our coordinators are calling and leaving multiple voicemails multiple times over the past few months and over the past 2 years.  They did state understanding of my concerns.  I also explained to them that they really need to follow their nephrologist advice to consider starting dialysis and dialysis planning as soon as possible.  From a renal transplant perspective even if the patient has a living donor and even if the patient is approved for renal transplantation in the near future or future, we will still need the patient to be adequately dialyzed prior to transplantation for patient's safety and risk of cerebral edema and I was very clear with the patient and her mother regarding high risk of death of not starting dialysis given how poor the current renal function is based on the patient's primary nephrologist assessment and his concerns that were relayed to me also.  The patient and her mother state understanding but they state that they also disagree with her nephrologist.  I reiterated the high risk of death at the current kidney levels and they stated understanding

## 2024-05-06 DIAGNOSIS — J45.20 MILD INTERMITTENT ASTHMA WITHOUT COMPLICATION: ICD-10-CM

## 2024-05-06 DIAGNOSIS — J45.40 MODERATE PERSISTENT ASTHMA WITHOUT COMPLICATION: ICD-10-CM

## 2024-05-06 RX ORDER — ALBUTEROL SULFATE 90 UG/1
AEROSOL, METERED RESPIRATORY (INHALATION)
Qty: 18 G | Refills: 1 | Status: SHIPPED | OUTPATIENT
Start: 2024-05-06

## 2024-05-14 DIAGNOSIS — J45.20 MILD INTERMITTENT ASTHMA WITHOUT COMPLICATION: ICD-10-CM

## 2024-05-15 RX ORDER — ALBUTEROL SULFATE 90 UG/1
AEROSOL, METERED RESPIRATORY (INHALATION)
Qty: 18 G | Refills: 0 | OUTPATIENT
Start: 2024-05-15

## 2024-05-29 ENCOUNTER — OFFICE VISIT (OUTPATIENT)
Dept: NEPHROLOGY | Facility: CLINIC | Age: 29
End: 2024-05-29
Payer: MEDICARE

## 2024-05-29 ENCOUNTER — OFFICE VISIT (OUTPATIENT)
Dept: URGENT CARE | Facility: CLINIC | Age: 29
End: 2024-05-29
Payer: MEDICARE

## 2024-05-29 VITALS
SYSTOLIC BLOOD PRESSURE: 144 MMHG | HEART RATE: 59 BPM | WEIGHT: 225 LBS | BODY MASS INDEX: 38.41 KG/M2 | HEIGHT: 64 IN | DIASTOLIC BLOOD PRESSURE: 74 MMHG

## 2024-05-29 VITALS
SYSTOLIC BLOOD PRESSURE: 136 MMHG | OXYGEN SATURATION: 98 % | HEART RATE: 81 BPM | DIASTOLIC BLOOD PRESSURE: 66 MMHG | TEMPERATURE: 98 F | RESPIRATION RATE: 18 BRPM

## 2024-05-29 DIAGNOSIS — J34.89 NASAL PAIN: Primary | ICD-10-CM

## 2024-05-29 DIAGNOSIS — Z01.818 PRE-TRANSPLANT EVALUATION FOR CKD (CHRONIC KIDNEY DISEASE): Primary | ICD-10-CM

## 2024-05-29 PROCEDURE — 99215 OFFICE O/P EST HI 40 MIN: CPT | Performed by: INTERNAL MEDICINE

## 2024-05-29 PROCEDURE — 99213 OFFICE O/P EST LOW 20 MIN: CPT | Performed by: NURSE PRACTITIONER

## 2024-05-29 NOTE — TELEPHONE ENCOUNTER
Caprice Romero is a 48 year old female presenting for   Chief Complaint   Patient presents with    Physical     Denies Latex allergy or sensitivity.    Medication verified, no changes  Refills needed today: No       Health Maintenance       COVID-19 Vaccine (1 - 2023-24 season)  Never done    Breast Cancer Screening (Yearly)  Due since 5/25/2024           Following review of the above:  Patient wishes to discuss with clinician: Mammogram  Patient is not proceeding with: COVID-19    Note: Refer to final orders and clinician documentation.                Last lab results:   No results found for: \"HGBA1C\"  CHOLESTEROL (mg/dL)   Date Value   10/31/2018 201 (H)     HDL (mg/dL)   Date Value   10/31/2018 74     TRIGLYCERIDE (mg/dL)   Date Value   10/31/2018 89     CALCULATED LDL (mg/dL)   Date Value   10/31/2018 109     No results found for: \"URMIC\", \"UCR\", \"MALBCR\"  No results found for: \"IFOB\"                 Depression Screening:  Review Flowsheet  More data exists         3/25/2024   PHQ 2/9 Score   Adult PHQ 2 Score 0   Adult PHQ 2 Interpretation No further screening needed   Little interest or pleasure in activity? Not at all   Feeling down, depressed or hopeless? Not at all      Details                    Advance Directives:  not discussed     Patient would like to know if she should be taking one or two tabs of the prescribed sodium bicarbonate 650 mg tablet. Reviewed current script and it still indicates taking one tab twice per day. Please follow up with patient tomorrow, Tuesday 9/5.
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
## 2024-05-29 NOTE — PROGRESS NOTES
"  [facility]        NAME: Raven Simeon is a 29 y.o. female  : 1995    MRN: [id number]  DATE: May 29, 2024  TIME: 6:46 PM    Assessment and Plan   Nasal pain [J34.89]  1. Nasal pain              Patient Instructions       Follow up with PCP in 3-5 days.  Proceed to  ER if symptoms worsen.    Chief Complaint     Chief Complaint   Patient presents with   • Nasal Pain     Pt presents with right sided nose pain, reports felt like something was stuck, went to remove it and heard a crack, causing pain on right side of nose. Denies difficulty breathing.         History of Present Illness       Patient is a 29-year-old female presenting with right nostril pain.  She states that yesterday she had a bloody nose and went to stop she felt like there was a blood clot in her right nostril.  She states that along with a tissue she attempted to pick the blood clot out.  She states that during this time she felt a \"crack\" and had a pain on the lateral side of her nose on her face.  No facial swelling or further bleeding.  No over-the-counter treatments attempted.      Review of Systems   Review of Systems   Constitutional:  Negative for activity change, chills and fever.   HENT:  Positive for nosebleeds. Negative for congestion.    Respiratory:  Negative for shortness of breath.    Neurological:  Negative for headaches.         Current Medications       Current Outpatient Medications:   •  acetaminophen (TYLENOL) 325 mg tablet, Take 2 tablets (650 mg total) by mouth every 6 (six) hours as needed for mild pain or headaches, Disp: 30 tablet, Rfl: 0  •  albuterol (2.5 mg/3 mL) 0.083 % nebulizer solution, Take 3 mL (2.5 mg total) by nebulization every 6 (six) hours as needed for wheezing or shortness of breath, Disp: 30 mL, Rfl: 0  •  albuterol (PROVENTIL HFA,VENTOLIN HFA) 90 mcg/act inhaler, Inhale 2 puffs every 6 hours as needed for wheezing or shortness of breath., Disp: 18 g, Rfl: 1  •  amLODIPine (NORVASC) 5 " Patient extubated today at 1215, to Bipap, then four hours later nasal canula 5 LPM. Tolerating well. Patient to use Bipap at night, to start at eight PM. Patient alert and oriented x 4, answers all questions correctly. Patient daughter at bedside for visit and update.   mg tablet, TAKE 1 TABLET BY MOUTH TWICE A DAY, Disp: 60 tablet, Rfl: 3  •  Blood Pressure KIT, by Does not apply route daily (Patient not taking: Reported on 9/5/2023), Disp: 1 each, Rfl: 0  •  calcitriol (ROCALTROL) 0.5 MCG capsule, TAKE 1 CAPSULE BY MOUTH DAILY., Disp: 30 capsule, Rfl: 5  •  Diclofenac Sodium (VOLTAREN) 1 %, Apply 2 g topically 4 (four) times a day, Disp: 150 g, Rfl: 0  •  erythromycin (ILOTYCIN) ophthalmic ointment, Place a 1/2 inch ribbon of ointment into the lower eyelid., Disp: 3.5 g, Rfl: 0  •  etonogestrel (NEXPLANON) subdermal implant, 68 mg by Subdermal route once, Disp: , Rfl:   •  ferrous sulfate 325 (65 Fe) mg tablet, TAKE 1 TABLET BY MOUTH EVERY DAY WITH BREAKFAST, Disp: 30 tablet, Rfl: 5  •  fluticasone (Flonase Allergy Relief) 50 mcg/act nasal spray, 1 spray into each nostril daily, Disp: 15.8 mL, Rfl: 0  •  labetalol (NORMODYNE) 100 mg tablet, TAKE 1 TABLET BY MOUTH TWICE A DAY, Disp: 60 tablet, Rfl: 3  •  loratadine (CLARITIN) 10 mg tablet, Take 1 tablet (10 mg total) by mouth daily, Disp: 20 tablet, Rfl: 0  •  mometasone-formoterol (DULERA) 100-5 MCG/ACT inhaler, Inhale 2 puffs 2 (two) times a day Rinse mouth after use., Disp: 39 g, Rfl: 1  •  sevelamer carbonate (RENVELA) 800 mg tablet, Take 1 tablet (800 mg total) by mouth 3 (three) times a day with meals, Disp: 180 tablet, Rfl: 3  •  sodium bicarbonate 650 mg tablet, TAKE 2 TABLETS (1,300 MG TOTAL) BY MOUTH TWICE A DAY, Disp: 120 tablet, Rfl: 5    Current Allergies     Allergies as of 05/29/2024 - Reviewed 05/29/2024   Allergen Reaction Noted   • Seasonal ic [cholestatin] Itching 04/14/2023   • Wheat bran - food allergy Itching 12/19/2019            The following portions of the patient's history were reviewed and updated as appropriate: allergies, current medications, past family history, past medical history, past social history, past surgical history and problem list.     Past Medical History:   Diagnosis Date   • Asthma     • Chronic kidney disease    • Eczema    • Headache    • Hypertension    • Wears glasses        Past Surgical History:   Procedure Laterality Date   • CHOLECYSTECTOMY     • CT NEEDLE BIOPSY KIDNEY  1/29/2020   • KELOID EXCISION Left 3/30/2021    Procedure: POSTERIOR EAR EXCISION KELOID, FLAP RECONSTRUCTION;  Surgeon: Beka Hugo MD;  Location: [facility];  Service: Plastics       Family History   Problem Relation Age of Onset   • Asthma Mother    • No Known Problems Father          Medications have been verified.        Objective   /66   Pulse 81   Temp 98 °F (36.7 °C)   Resp 18   SpO2 98%        Physical Exam     Physical Exam  Vitals reviewed.   Constitutional:       General: She is awake. She is not in acute distress.     Appearance: Normal appearance.   HENT:      Head: Normocephalic.      Right Ear: Hearing normal.      Left Ear: Hearing normal.      Nose: Nose normal. No mucosal edema, congestion or rhinorrhea.   Skin:     General: Skin is warm and moist.   Neurological:      General: No focal deficit present.      Mental Status: She is alert and oriented to person, place, and time.   Psychiatric:         Behavior: Behavior is cooperative.

## 2024-05-29 NOTE — PATIENT INSTRUCTIONS
1) We will review your case at the transplant committee meeting and will review our decision with you in approximately 4 weeks over the phone.  2) If you do not receive a call from [facility] within 4 weeks, please call our local Nephrology office.  3) From a renal transplant evaluation purpose, we will see you in 6 mo in our local office for medical follow-up.  4) Once we call you with our decision regarding further evaluation, you will receive further instruction over the phone and in the mail regarding the next steps to complete the transplant evaluation.  5) All of your non transplant evaluation follow up regarding your regular medical follow ups, your renal care follow ups, and any other specialists visits you are following with should continue as you are advised by those respective physicians and continue to follow with their management and care.  And if there is any emergency please go to the nearest urgent care or emergency room.  6) if any symptoms of fatigue, or tremors get worse please go to the nearest emergency room. If you have any nausea, vomiting, trouble breathing, trouble urinating, confusion go right to the emergency room or call 911.   7) have labwork tomorrow for Dr Remy and then call Dr Remy to review further plans for kidney disease  8) you need to start dialysis as soon as possible and your kidney doctor Dr Remy will be assisting with this plan.
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
## 2024-05-31 ENCOUNTER — APPOINTMENT (OUTPATIENT)
Dept: LAB | Facility: CLINIC | Age: 29
End: 2024-05-31
Payer: MEDICARE

## 2024-05-31 ENCOUNTER — HOSPITAL ENCOUNTER (INPATIENT)
Facility: HOSPITAL | Age: 29
LOS: 4 days | Discharge: HOME | DRG: 469 | End: 2024-06-04
Attending: INTERNAL MEDICINE | Admitting: INTERNAL MEDICINE
Payer: MEDICARE

## 2024-05-31 DIAGNOSIS — I10 HYPERTENSION, UNSPECIFIED TYPE: ICD-10-CM

## 2024-05-31 DIAGNOSIS — E83.9 CHRONIC KIDNEY DISEASE-MINERAL AND BONE DISORDER: ICD-10-CM

## 2024-05-31 DIAGNOSIS — D63.1 ANEMIA DUE TO STAGE 5 CHRONIC KIDNEY DISEASE, NOT ON CHRONIC DIALYSIS  (HCC): ICD-10-CM

## 2024-05-31 DIAGNOSIS — M89.9 CHRONIC KIDNEY DISEASE-MINERAL AND BONE DISORDER: ICD-10-CM

## 2024-05-31 DIAGNOSIS — I12.0 BENIGN HYPERTENSION WITH CKD (CHRONIC KIDNEY DISEASE) STAGE V (HCC): ICD-10-CM

## 2024-05-31 DIAGNOSIS — D50.9 IRON DEFICIENCY ANEMIA, UNSPECIFIED IRON DEFICIENCY ANEMIA TYPE: ICD-10-CM

## 2024-05-31 DIAGNOSIS — N17.9 ACUTE RENAL FAILURE (HCC): Primary | ICD-10-CM

## 2024-05-31 DIAGNOSIS — N25.81 SECONDARY HYPERPARATHYROIDISM OF RENAL ORIGIN (HCC): ICD-10-CM

## 2024-05-31 DIAGNOSIS — N18.5 ANEMIA DUE TO STAGE 5 CHRONIC KIDNEY DISEASE, NOT ON CHRONIC DIALYSIS  (HCC): ICD-10-CM

## 2024-05-31 DIAGNOSIS — N18.5 CHRONIC KIDNEY DISEASE (CKD), ACTIVE MEDICAL MANAGEMENT WITHOUT DIALYSIS, STAGE 5 (HCC): ICD-10-CM

## 2024-05-31 DIAGNOSIS — N18.9 CHRONIC KIDNEY DISEASE-MINERAL AND BONE DISORDER: ICD-10-CM

## 2024-05-31 DIAGNOSIS — E87.20 METABOLIC ACIDOSIS: ICD-10-CM

## 2024-05-31 DIAGNOSIS — N18.5 BENIGN HYPERTENSION WITH CKD (CHRONIC KIDNEY DISEASE) STAGE V (HCC): ICD-10-CM

## 2024-05-31 DIAGNOSIS — N18.30 BENIGN HYPERTENSION WITH CKD (CHRONIC KIDNEY DISEASE) STAGE III (HCC): ICD-10-CM

## 2024-05-31 DIAGNOSIS — E66.01 MORBID OBESITY WITH BMI OF 40.0-44.9, ADULT (HCC): ICD-10-CM

## 2024-05-31 DIAGNOSIS — E83.39 HYPERPHOSPHATEMIA: ICD-10-CM

## 2024-05-31 DIAGNOSIS — N28.9 WORSENING RENAL FUNCTION: Primary | ICD-10-CM

## 2024-05-31 DIAGNOSIS — D64.9 SEVERE ANEMIA: ICD-10-CM

## 2024-05-31 DIAGNOSIS — R80.1 PERSISTENT PROTEINURIA: ICD-10-CM

## 2024-05-31 DIAGNOSIS — I12.9 BENIGN HYPERTENSION WITH CKD (CHRONIC KIDNEY DISEASE) STAGE III (HCC): ICD-10-CM

## 2024-05-31 DIAGNOSIS — E79.0 HYPERURICEMIA: ICD-10-CM

## 2024-05-31 PROBLEM — E87.29 INCREASED ANION GAP METABOLIC ACIDOSIS: Status: ACTIVE | Noted: 2022-02-28

## 2024-05-31 LAB
ALBUMIN SERPL BCP-MCNC: 4.4 G/DL (ref 3.5–5)
ALP SERPL-CCNC: 51 U/L (ref 34–104)
ALT SERPL W P-5'-P-CCNC: 5 U/L (ref 7–52)
ANION GAP SERPL CALCULATED.3IONS-SCNC: 20 MMOL/L (ref 4–13)
AST SERPL W P-5'-P-CCNC: 8 U/L (ref 13–39)
BILIRUB SERPL-MCNC: 0.3 MG/DL (ref 0.2–1)
BUN SERPL-MCNC: 129 MG/DL (ref 5–25)
CALCIUM SERPL-MCNC: 9.8 MG/DL (ref 8.4–10.2)
CHLORIDE SERPL-SCNC: 98 MMOL/L (ref 96–108)
CO2 SERPL-SCNC: 21 MMOL/L (ref 21–32)
CREAT SERPL-MCNC: 31.72 MG/DL (ref 0.6–1.3)
ERYTHROCYTE [DISTWIDTH] IN BLOOD BY AUTOMATED COUNT: 15.4 % (ref 11.6–15.1)
GFR SERPL CREATININE-BSD FRML MDRD: 1 ML/MIN/1.73SQ M
GLUCOSE SERPL-MCNC: 84 MG/DL (ref 65–140)
HCT VFR BLD AUTO: 16.5 % (ref 34.8–46.1)
HGB BLD-MCNC: 5.1 G/DL (ref 11.5–15.4)
MCH RBC QN AUTO: 22.3 PG (ref 26.8–34.3)
MCHC RBC AUTO-ENTMCNC: 30.9 G/DL (ref 31.4–37.4)
MCV RBC AUTO: 72 FL (ref 82–98)
PHOSPHATE SERPL-MCNC: 10.4 MG/DL (ref 2.7–4.5)
PLATELET # BLD AUTO: 148 THOUSANDS/UL (ref 149–390)
PMV BLD AUTO: 10.8 FL (ref 8.9–12.7)
POTASSIUM SERPL-SCNC: 4.6 MMOL/L (ref 3.5–5.3)
PROT SERPL-MCNC: 7.3 G/DL (ref 6.4–8.4)
RBC # BLD AUTO: 2.29 MILLION/UL (ref 3.81–5.12)
SODIUM SERPL-SCNC: 139 MMOL/L (ref 135–147)
WBC # BLD AUTO: 9.08 THOUSAND/UL (ref 4.31–10.16)

## 2024-05-31 PROCEDURE — NC001 PR NO CHARGE: Performed by: RADIOLOGY

## 2024-05-31 PROCEDURE — 36415 COLL VENOUS BLD VENIPUNCTURE: CPT

## 2024-05-31 PROCEDURE — 80053 COMPREHEN METABOLIC PANEL: CPT

## 2024-05-31 PROCEDURE — 86901 BLOOD TYPING SEROLOGIC RH(D): CPT

## 2024-05-31 PROCEDURE — 84100 ASSAY OF PHOSPHORUS: CPT

## 2024-05-31 PROCEDURE — 86850 RBC ANTIBODY SCREEN: CPT

## 2024-05-31 PROCEDURE — 86923 COMPATIBILITY TEST ELECTRIC: CPT

## 2024-05-31 PROCEDURE — 82610 CYSTATIN C: CPT

## 2024-05-31 PROCEDURE — 86900 BLOOD TYPING SEROLOGIC ABO: CPT

## 2024-05-31 PROCEDURE — 99223 1ST HOSP IP/OBS HIGH 75: CPT | Performed by: INTERNAL MEDICINE

## 2024-05-31 PROCEDURE — 85027 COMPLETE CBC AUTOMATED: CPT

## 2024-05-31 PROCEDURE — 30233N1 TRANSFUSION OF NONAUTOLOGOUS RED BLOOD CELLS INTO PERIPHERAL VEIN, PERCUTANEOUS APPROACH: ICD-10-PCS | Performed by: INTERNAL MEDICINE

## 2024-05-31 RX ORDER — AMLODIPINE BESYLATE 5 MG/1
5 TABLET ORAL 2 TIMES DAILY
Status: DISCONTINUED | OUTPATIENT
Start: 2024-05-31 | End: 2024-06-04 | Stop reason: HOSPADM

## 2024-05-31 RX ORDER — ALBUTEROL SULFATE 2.5 MG/3ML
2.5 SOLUTION RESPIRATORY (INHALATION) EVERY 6 HOURS PRN
Status: DISCONTINUED | OUTPATIENT
Start: 2024-05-31 | End: 2024-06-04 | Stop reason: HOSPADM

## 2024-05-31 RX ORDER — SODIUM BICARBONATE 650 MG/1
1300 TABLET ORAL
Status: DISCONTINUED | OUTPATIENT
Start: 2024-05-31 | End: 2024-06-02

## 2024-05-31 RX ORDER — LORATADINE 10 MG/1
10 TABLET ORAL DAILY
Status: DISCONTINUED | OUTPATIENT
Start: 2024-06-01 | End: 2024-06-04 | Stop reason: HOSPADM

## 2024-05-31 RX ORDER — FERROUS SULFATE 325(65) MG
325 TABLET ORAL
Status: DISCONTINUED | OUTPATIENT
Start: 2024-06-01 | End: 2024-06-02

## 2024-05-31 RX ORDER — CALCITRIOL 0.25 UG/1
0.5 CAPSULE, LIQUID FILLED ORAL DAILY
Status: DISCONTINUED | OUTPATIENT
Start: 2024-06-01 | End: 2024-06-04 | Stop reason: HOSPADM

## 2024-05-31 RX ORDER — FLUTICASONE PROPIONATE 50 MCG
1 SPRAY, SUSPENSION (ML) NASAL DAILY
Status: DISCONTINUED | OUTPATIENT
Start: 2024-06-01 | End: 2024-06-04 | Stop reason: HOSPADM

## 2024-05-31 RX ORDER — ONDANSETRON 2 MG/ML
4 INJECTION INTRAMUSCULAR; INTRAVENOUS EVERY 6 HOURS PRN
Status: DISCONTINUED | OUTPATIENT
Start: 2024-05-31 | End: 2024-06-04 | Stop reason: HOSPADM

## 2024-05-31 RX ORDER — FLUTICASONE FUROATE AND VILANTEROL 100; 25 UG/1; UG/1
1 POWDER RESPIRATORY (INHALATION)
Status: DISCONTINUED | OUTPATIENT
Start: 2024-06-01 | End: 2024-06-04 | Stop reason: HOSPADM

## 2024-05-31 RX ORDER — SEVELAMER HYDROCHLORIDE 800 MG/1
800 TABLET, FILM COATED ORAL
Status: DISCONTINUED | OUTPATIENT
Start: 2024-06-01 | End: 2024-06-04 | Stop reason: HOSPADM

## 2024-05-31 RX ORDER — LABETALOL 100 MG/1
100 TABLET, FILM COATED ORAL 2 TIMES DAILY
Status: DISCONTINUED | OUTPATIENT
Start: 2024-05-31 | End: 2024-06-04 | Stop reason: HOSPADM

## 2024-05-31 RX ADMIN — SODIUM BICARBONATE 1300 MG: 650 TABLET ORAL at 19:44

## 2024-05-31 RX ADMIN — DICLOFENAC SODIUM 2 G: 10 GEL TOPICAL at 23:33

## 2024-05-31 RX ADMIN — LABETALOL HYDROCHLORIDE 100 MG: 100 TABLET, FILM COATED ORAL at 19:45

## 2024-05-31 RX ADMIN — SODIUM BICARBONATE 75 ML/HR: 84 INJECTION, SOLUTION INTRAVENOUS at 23:26

## 2024-05-31 RX ADMIN — AMLODIPINE BESYLATE 5 MG: 5 TABLET ORAL at 19:44

## 2024-05-31 NOTE — PROGRESS NOTES
Nephrology    Spoke to the.  And also spoke to transplant nephrologist Dr. Mac a few days ago.    Patient has been having generalized weakness and fatigue.  She reports no metallic taste on the tongue no nausea no vomiting.  She did tell me that the tremors in her hands are worse.  Some dyspnea on exertion.    Her labs today were reviewed which were done this morning.    Her renal function continues to worsen her creatinine is up to 32.7 with a BUN of 129.  Her potassium is normal.  She does have a high anion gap metabolic acidosis.  We have had multiple discussions in the past along with her mother there is been a lot of delay in starting dialysis from the patient wanting to hold off as long as possible.  She asked me if she can wait for the fistula or graft before starting dialysis I advised against that given her worsening renal failure and severe anemia she could develop complications such as uremic pericarditis or life-threatening hyperkalemia.    I strongly advise she be admitted to the hospital to start hemodialysis likely on Monday with IR placing a permacath.    She will require blood transfusions over the weekend to stabilize her hemoglobin.    She is agreeable to coming to the hospital.    I will place an order for direct admission to be done at [facility].

## 2024-05-31 NOTE — ASSESSMENT & PLAN NOTE
Lab Results   Component Value Date    HGB 5.1 (LL) 05/31/2024    HGB 6.0 (L) 04/17/2024    HGB 6.5 (L) 03/27/2024   Patient with chronic anemia baseline hovering between 6-8, most likely secondary to chronic kidney disease.  No active bleeding at this point    Plan:  Consented, will be receiving 2 units of PRBCs  Will follow-up posttransfusion H&H  Continue to trend CBC  Continue iron supplementation

## 2024-05-31 NOTE — ASSESSMENT & PLAN NOTE
Increased anion gap metabolic acidosis, in the setting of acute renal failure, likely secondary to increased BUN.  No current infectious symptoms and white count within normal range    Plan:  Continue outpatient sodium bicarb tabs  Remainder of plan as above

## 2024-05-31 NOTE — ASSESSMENT & PLAN NOTE
Lab Results   Component Value Date    EGFR 1 05/31/2024    EGFR 1 04/18/2024    EGFR 1 03/27/2024    EGFR 8 (L) 03/27/2024    CREATININE 31.72 (H) 05/31/2024    CREATININE 27.02 (H) 04/18/2024    CREATININE 26.94 (H) 03/27/2024   History of CKD, for plan few above under acute renal failure

## 2024-05-31 NOTE — ASSESSMENT & PLAN NOTE
Patient with history of chronic kidney disease stage V, coming in after outpatient labs demonstrating acute renal failure.  BUN elevated to 129, creatinine 31.72.  Also having severe anemia 5.1 to receive blood on admission.  Has had previous biopsy which was sent to [place] for evaluation, unable to find final diagnosis.  Has been documented that this is secondary to hypertensive disease.  Current acute failure without obvious inciting event other than acute anemia, but may just be progression of disease.    Plan:  Nephrology consulted  IR consulted for tunneled catheter placement  To receive dialysis in the near future  Currently receiving transfusion as below  Continuing home medications for now  Continue to monitor for uremic symptoms  Bicarb drip 75/h

## 2024-05-31 NOTE — LETTER
Thank you for allowing us to participate in the care of your patient, Raven Simeon, who was hospitalized from [unfilled] through 6/4/2024 with the admitting diagnosis of ESRD requiring dialysis.      Medication Changes:  Stopping sodium bicarbonate tablet  Labetalol 200 mg twice daily.    Outpatient testing recommended:  No testing ordered in outpatient setting from primary care standpoint.  Patient was asked to follow-up with outpatient primary care provider within 1 week of discharge.  Patient was asked to follow-up with outpatient nephrology within 1 week of discharge.  Patient was asked to follow-up with our outpatient vascular surgery physician within 1 week of discharge for fistula evaluation.    If you have any additional questions or would like to discuss further, please feel free to contact me.    Rosamaria Hilliard MD  [facility] Internal Medicine, Hospitalist  [phone]

## 2024-05-31 NOTE — PLAN OF CARE
Problem: PAIN - ADULT  Goal: Verbalizes/displays adequate comfort level or baseline comfort level  Description: Interventions:  - Encourage patient to monitor pain and request assistance  - Assess pain using appropriate pain scale  - Administer analgesics based on type and severity of pain and evaluate response  - Implement non-pharmacological measures as appropriate and evaluate response  - Consider cultural and social influences on pain and pain management  - Notify physician/advanced practitioner if interventions unsuccessful or patient reports new pain  Outcome: Progressing     Problem: INFECTION - ADULT  Goal: Absence or prevention of progression during hospitalization  Description: INTERVENTIONS:  - Assess and monitor for signs and symptoms of infection  - Monitor lab/diagnostic results  - Monitor all insertion sites, i.e. indwelling lines, tubes, and drains  - Monitor endotracheal if appropriate and nasal secretions for changes in amount and color  - [place] appropriate cooling/warming therapies per order  - Administer medications as ordered  - Instruct and encourage patient and family to use good hand hygiene technique  - Identify and instruct in appropriate isolation precautions for identified infection/condition  Outcome: Progressing  Goal: Absence of fever/infection during neutropenic period  Description: INTERVENTIONS:  - Monitor WBC    Outcome: Progressing     Problem: SAFETY ADULT  Goal: Patient will remain free of falls  Description: INTERVENTIONS:  - Educate patient/family on patient safety including physical limitations  - Instruct patient to call for assistance with activity   - Consult OT/PT to assist with strengthening/mobility   - Keep Call bell within reach  - Keep bed low and locked with side rails adjusted as appropriate  - Keep care items and personal belongings within reach  - Initiate and maintain comfort rounds  - Make Fall Risk Sign visible to staff  - Apply yellow socks and bracelet  for high fall risk patients  - Consider moving patient to room near nurses station  Outcome: Progressing  Goal: Maintain or return to baseline ADL function  Description: INTERVENTIONS:  -  Assess patient's ability to carry out ADLs; assess patient's baseline for ADL function and identify physical deficits which impact ability to perform ADLs (bathing, care of mouth/teeth, toileting, grooming, dressing, etc.)  - Assess/evaluate cause of self-care deficits   - Assess range of motion  - Assess patient's mobility; develop plan if impaired  - Assess patient's need for assistive devices and provide as appropriate  - Encourage maximum independence but intervene and supervise when necessary  - Involve family in performance of ADLs  - Assess for home care needs following discharge   - Consider OT consult to assist with ADL evaluation and planning for discharge  - Provide patient education as appropriate  Outcome: Progressing  Goal: Maintains/Returns to pre admission functional level  Description: INTERVENTIONS:  - Perform AM-PAC 6 Click Basic Mobility/ Daily Activity assessment daily.  - Set and communicate daily mobility goal to care team and patient/family/caregiver.   - Collaborate with rehabilitation services on mobility goals if consulted  - Out of bed for toileting  - Record patient progress and toleration of activity level   Outcome: Progressing     Problem: DISCHARGE PLANNING  Goal: Discharge to home or other facility with appropriate resources  Description: INTERVENTIONS:  - Identify barriers to discharge w/patient and caregiver  - Arrange for needed discharge resources and transportation as appropriate  - Identify discharge learning needs (meds, wound care, etc.)  - Arrange for interpretive services to assist at discharge as needed  - Refer to Case Management Department for coordinating discharge planning if the patient needs post-hospital services based on physician/advanced practitioner order or complex needs  related to functional status, cognitive ability, or social support system  Outcome: Progressing     Problem: Knowledge Deficit  Goal: Patient/family/caregiver demonstrates understanding of disease process, treatment plan, medications, and discharge instructions  Description: Complete learning assessment and assess knowledge base.  Interventions:  - Provide teaching at level of understanding  - Provide teaching via preferred learning methods  Outcome: Progressing

## 2024-06-01 ENCOUNTER — APPOINTMENT (INPATIENT)
Dept: RADIOLOGY | Facility: HOSPITAL | Age: 29
DRG: 469 | End: 2024-06-01
Attending: RADIOLOGY
Payer: MEDICARE

## 2024-06-01 LAB
ABO GROUP BLD BPU: NORMAL
ABO GROUP BLD BPU: NORMAL
ABO GROUP BLD: NORMAL
ALT SERPL W P-5'-P-CCNC: 4 U/L (ref 7–52)
ANION GAP SERPL CALCULATED.3IONS-SCNC: 18 MMOL/L (ref 4–13)
BLD GP AB SCN SERPL QL: NEGATIVE
BPU ID: NORMAL
BPU ID: NORMAL
BUN SERPL-MCNC: 127 MG/DL (ref 5–25)
CALCIUM SERPL-MCNC: 9.3 MG/DL (ref 8.4–10.2)
CHLORIDE SERPL-SCNC: 99 MMOL/L (ref 96–108)
CO2 SERPL-SCNC: 21 MMOL/L (ref 21–32)
CREAT SERPL-MCNC: 32.72 MG/DL (ref 0.6–1.3)
CROSSMATCH: NORMAL
CROSSMATCH: NORMAL
ERYTHROCYTE [DISTWIDTH] IN BLOOD BY AUTOMATED COUNT: 16.9 % (ref 11.6–15.1)
GFR SERPL CREATININE-BSD FRML MDRD: 1 ML/MIN/1.73SQ M
GLUCOSE SERPL-MCNC: 91 MG/DL (ref 65–140)
HCT VFR BLD AUTO: 20.7 % (ref 34.8–46.1)
HCT VFR BLD AUTO: 23.2 % (ref 34.8–46.1)
HGB BLD-MCNC: 6.6 G/DL (ref 11.5–15.4)
HGB BLD-MCNC: 7.7 G/DL (ref 11.5–15.4)
MAGNESIUM SERPL-MCNC: 1.7 MG/DL (ref 1.9–2.7)
MCH RBC QN AUTO: 23.9 PG (ref 26.8–34.3)
MCHC RBC AUTO-ENTMCNC: 31.9 G/DL (ref 31.4–37.4)
MCV RBC AUTO: 75 FL (ref 82–98)
PHOSPHATE SERPL-MCNC: 9.8 MG/DL (ref 2.7–4.5)
PLATELET # BLD AUTO: 151 THOUSANDS/UL (ref 149–390)
PMV BLD AUTO: 11.2 FL (ref 8.9–12.7)
POTASSIUM SERPL-SCNC: 4.7 MMOL/L (ref 3.5–5.3)
RBC # BLD AUTO: 2.76 MILLION/UL (ref 3.81–5.12)
RH BLD: POSITIVE
SODIUM SERPL-SCNC: 138 MMOL/L (ref 135–147)
SPECIMEN EXPIRATION DATE: NORMAL
UNIT DISPENSE STATUS: NORMAL
UNIT DISPENSE STATUS: NORMAL
UNIT PRODUCT CODE: NORMAL
UNIT PRODUCT CODE: NORMAL
UNIT PRODUCT VOLUME: 350 ML
UNIT PRODUCT VOLUME: 350 ML
UNIT RH: NORMAL
UNIT RH: NORMAL
WBC # BLD AUTO: 9.46 THOUSAND/UL (ref 4.31–10.16)

## 2024-06-01 PROCEDURE — 86704 HEP B CORE ANTIBODY TOTAL: CPT

## 2024-06-01 PROCEDURE — 85018 HEMOGLOBIN: CPT

## 2024-06-01 PROCEDURE — 99232 SBSQ HOSP IP/OBS MODERATE 35: CPT | Performed by: HOSPITALIST

## 2024-06-01 PROCEDURE — 80048 BASIC METABOLIC PNL TOTAL CA: CPT

## 2024-06-01 PROCEDURE — 77001 FLUOROGUIDE FOR VEIN DEVICE: CPT | Performed by: RADIOLOGY

## 2024-06-01 PROCEDURE — P9058 RBC, L/R, CMV-NEG, IRRAD: HCPCS

## 2024-06-01 PROCEDURE — 30233N1 TRANSFUSION OF NONAUTOLOGOUS RED BLOOD CELLS INTO PERIPHERAL VEIN, PERCUTANEOUS APPROACH: ICD-10-PCS | Performed by: HOSPITALIST

## 2024-06-01 PROCEDURE — NC001 PR NO CHARGE: Performed by: RADIOLOGY

## 2024-06-01 PROCEDURE — 50432 PLMT NEPHROSTOMY CATHETER: CPT

## 2024-06-01 PROCEDURE — 83735 ASSAY OF MAGNESIUM: CPT

## 2024-06-01 PROCEDURE — 36558 INSERT TUNNELED CV CATH: CPT | Performed by: RADIOLOGY

## 2024-06-01 PROCEDURE — 76937 US GUIDE VASCULAR ACCESS: CPT | Performed by: RADIOLOGY

## 2024-06-01 PROCEDURE — 85014 HEMATOCRIT: CPT

## 2024-06-01 PROCEDURE — 84100 ASSAY OF PHOSPHORUS: CPT

## 2024-06-01 PROCEDURE — C1750 CATH, HEMODIALYSIS,LONG-TERM: HCPCS

## 2024-06-01 PROCEDURE — 85027 COMPLETE CBC AUTOMATED: CPT

## 2024-06-01 PROCEDURE — C1894 INTRO/SHEATH, NON-LASER: HCPCS

## 2024-06-01 PROCEDURE — 87340 HEPATITIS B SURFACE AG IA: CPT

## 2024-06-01 PROCEDURE — 99255 IP/OBS CONSLTJ NEW/EST HI 80: CPT | Performed by: INTERNAL MEDICINE

## 2024-06-01 PROCEDURE — 05HM33Z INSERTION OF INFUSION DEVICE INTO RIGHT INTERNAL JUGULAR VEIN, PERCUTANEOUS APPROACH: ICD-10-PCS | Performed by: RADIOLOGY

## 2024-06-01 PROCEDURE — 84460 ALANINE AMINO (ALT) (SGPT): CPT

## 2024-06-01 PROCEDURE — 87389 HIV-1 AG W/HIV-1&-2 AB AG IA: CPT

## 2024-06-01 PROCEDURE — 86803 HEPATITIS C AB TEST: CPT

## 2024-06-01 PROCEDURE — 86706 HEP B SURFACE ANTIBODY: CPT

## 2024-06-01 RX ORDER — LANOLIN ALCOHOL/MO/W.PET/CERES
800 CREAM (GRAM) TOPICAL ONCE
Status: COMPLETED | OUTPATIENT
Start: 2024-06-01 | End: 2024-06-01

## 2024-06-01 RX ORDER — FENTANYL CITRATE 50 UG/ML
INJECTION, SOLUTION INTRAMUSCULAR; INTRAVENOUS AS NEEDED
Status: COMPLETED | OUTPATIENT
Start: 2024-06-01 | End: 2024-06-01

## 2024-06-01 RX ORDER — CEFAZOLIN SODIUM 2 G/50ML
SOLUTION INTRAVENOUS
Status: COMPLETED | OUTPATIENT
Start: 2024-06-01 | End: 2024-06-01

## 2024-06-01 RX ORDER — MIDAZOLAM HYDROCHLORIDE 2 MG/2ML
INJECTION, SOLUTION INTRAMUSCULAR; INTRAVENOUS AS NEEDED
Status: COMPLETED | OUTPATIENT
Start: 2024-06-01 | End: 2024-06-01

## 2024-06-01 RX ADMIN — MIDAZOLAM 1 MG: 1 INJECTION INTRAMUSCULAR; INTRAVENOUS at 15:46

## 2024-06-01 RX ADMIN — MIDAZOLAM 0.5 MG: 1 INJECTION INTRAMUSCULAR; INTRAVENOUS at 15:27

## 2024-06-01 RX ADMIN — FENTANYL CITRATE 25 MCG: 50 INJECTION INTRAMUSCULAR; INTRAVENOUS at 15:34

## 2024-06-01 RX ADMIN — CALCITRIOL 0.5 MCG: 0.25 CAPSULE, LIQUID FILLED ORAL at 09:04

## 2024-06-01 RX ADMIN — MIDAZOLAM 0.5 MG: 1 INJECTION INTRAMUSCULAR; INTRAVENOUS at 15:23

## 2024-06-01 RX ADMIN — LABETALOL HYDROCHLORIDE 100 MG: 100 TABLET, FILM COATED ORAL at 17:19

## 2024-06-01 RX ADMIN — AMLODIPINE BESYLATE 5 MG: 5 TABLET ORAL at 09:03

## 2024-06-01 RX ADMIN — Medication 800 MG: at 12:17

## 2024-06-01 RX ADMIN — SEVELAMER HYDROCHLORIDE 800 MG: 800 TABLET ORAL at 12:17

## 2024-06-01 RX ADMIN — LABETALOL HYDROCHLORIDE 100 MG: 100 TABLET, FILM COATED ORAL at 09:03

## 2024-06-01 RX ADMIN — FENTANYL CITRATE 25 MCG: 50 INJECTION INTRAMUSCULAR; INTRAVENOUS at 15:23

## 2024-06-01 RX ADMIN — FERROUS SULFATE TAB 325 MG (65 MG ELEMENTAL FE) 325 MG: 325 (65 FE) TAB at 09:03

## 2024-06-01 RX ADMIN — FLUTICASONE FUROATE AND VILANTEROL TRIFENATATE 1 PUFF: 100; 25 POWDER RESPIRATORY (INHALATION) at 09:02

## 2024-06-01 RX ADMIN — MIDAZOLAM 0.5 MG: 1 INJECTION INTRAMUSCULAR; INTRAVENOUS at 15:43

## 2024-06-01 RX ADMIN — SEVELAMER HYDROCHLORIDE 800 MG: 800 TABLET ORAL at 17:19

## 2024-06-01 RX ADMIN — FENTANYL CITRATE 25 MCG: 50 INJECTION INTRAMUSCULAR; INTRAVENOUS at 15:39

## 2024-06-01 RX ADMIN — MIDAZOLAM 0.5 MG: 1 INJECTION INTRAMUSCULAR; INTRAVENOUS at 15:53

## 2024-06-01 RX ADMIN — LORATADINE 10 MG: 10 TABLET ORAL at 09:03

## 2024-06-01 RX ADMIN — FENTANYL CITRATE 25 MCG: 50 INJECTION INTRAMUSCULAR; INTRAVENOUS at 15:27

## 2024-06-01 RX ADMIN — SODIUM BICARBONATE 1300 MG: 650 TABLET ORAL at 09:03

## 2024-06-01 RX ADMIN — SODIUM BICARBONATE 1300 MG: 650 TABLET ORAL at 17:19

## 2024-06-01 RX ADMIN — Medication 20 ML: at 15:41

## 2024-06-01 RX ADMIN — AMLODIPINE BESYLATE 5 MG: 5 TABLET ORAL at 17:19

## 2024-06-01 RX ADMIN — FENTANYL CITRATE 25 MCG: 50 INJECTION INTRAMUSCULAR; INTRAVENOUS at 15:53

## 2024-06-01 RX ADMIN — MIDAZOLAM 0.5 MG: 1 INJECTION INTRAMUSCULAR; INTRAVENOUS at 15:39

## 2024-06-01 RX ADMIN — SEVELAMER HYDROCHLORIDE 800 MG: 800 TABLET ORAL at 09:03

## 2024-06-01 RX ADMIN — FLUTICASONE PROPIONATE 1 SPRAY: 50 SPRAY, METERED NASAL at 09:01

## 2024-06-01 RX ADMIN — MIDAZOLAM 0.5 MG: 1 INJECTION INTRAMUSCULAR; INTRAVENOUS at 15:33

## 2024-06-01 RX ADMIN — FENTANYL CITRATE 25 MCG: 50 INJECTION INTRAMUSCULAR; INTRAVENOUS at 15:43

## 2024-06-01 RX ADMIN — FENTANYL CITRATE 50 MCG: 50 INJECTION INTRAMUSCULAR; INTRAVENOUS at 15:46

## 2024-06-01 RX ADMIN — DICLOFENAC SODIUM 2 G: 10 GEL TOPICAL at 21:24

## 2024-06-01 RX ADMIN — CEFAZOLIN SODIUM 2000 MG: 2 SOLUTION INTRAVENOUS at 15:21

## 2024-06-01 NOTE — ASSESSMENT & PLAN NOTE
History of hypertension, currently in the 140s systolic on home medications  Will continue these medications currently, and continue to monitor.  May need additional intervention in the setting of acute renal failure

## 2024-06-01 NOTE — PROGRESS NOTES
The history and physical were reviewed, along with progress notes, and the patient was examined. There are no changes since it was written.    /85   Pulse 86   Temp 98.3 °F (36.8 °C) (Oral)   Resp 17   SpO2 98%

## 2024-06-01 NOTE — H&P
Count includes the [facility]  H&P  Name: Raven Simeon 29 y.o. female I MRN: [id number]  Unit/Bed#: S -01 I Date of Admission: 5/31/2024   Date of Service: 5/31/2024 I Hospital Day: 0      Assessment & Plan   * Acute renal failure (HCC)  Assessment & Plan  Patient with history of chronic kidney disease stage V, coming in after outpatient labs demonstrating acute renal failure.  BUN elevated to 129, creatinine 31.72.  Also having severe anemia 5.1 to receive blood on admission.  Has had previous biopsy which was sent to [place] for evaluation, unable to find final diagnosis.  Has been documented that this is secondary to hypertensive disease.  Current acute failure without obvious inciting event other than acute anemia, but may just be progression of disease.    Plan:  Nephrology consulted  IR consulted for tunneled catheter placement  To receive dialysis in the near future  Currently receiving transfusion as below  Continuing home medications for now  Continue to monitor for uremic symptoms  Bicarb drip 75/h    Acute on chronic anemia  Assessment & Plan  Lab Results   Component Value Date    HGB 5.1 (LL) 05/31/2024    HGB 6.0 (L) 04/17/2024    HGB 6.5 (L) 03/27/2024   Patient with chronic anemia baseline hovering between 6-8, most likely secondary to chronic kidney disease.  No active bleeding at this point    Plan:  Consented, will be receiving 2 units of PRBCs  Will follow-up posttransfusion H&H  Continue to trend CBC  Continue iron supplementation    Stage 5 chronic kidney disease not on chronic dialysis (HCC)  Assessment & Plan  Lab Results   Component Value Date    EGFR 1 05/31/2024    EGFR 1 04/18/2024    EGFR 1 03/27/2024    EGFR 8 (L) 03/27/2024    CREATININE 31.72 (H) 05/31/2024    CREATININE 27.02 (H) 04/18/2024    CREATININE 26.94 (H) 03/27/2024   History of CKD, for plan few above under acute renal failure    Increased anion gap metabolic acidosis  Assessment & Plan  Increased  "anion gap metabolic acidosis, in the setting of acute renal failure, likely secondary to increased BUN.  No current infectious symptoms and white count within normal range    Plan:  Continue outpatient sodium bicarb tabs  Remainder of plan as above    Hyperphosphatemia  Assessment & Plan  Lab Results   Component Value Date    PHOS 10.4 (H) 05/31/2024    PHOS 7.1 (H) 03/27/2024    PHOS 10.0 (H) 03/04/2024   Hyperphosphatemia in the setting of acute renal failure and CKD, continuing Renvela  Remainder of plan as above under acute renal failure.    Hypertension  Assessment & Plan  History of hypertension, currently in the 140s systolic on home medications  Will continue these medications currently, and continue to monitor.  May need additional intervention in the setting of acute renal failure           VTE Pharmacologic Prophylaxis: VTE Score: 2 Low Risk (Score 0-2) - Encourage Ambulation.  Code Status: Level 1 - Full Code discussed with patient and mother at bedside  Discussion with family: Updated  (mother) at bedside.  Had extensive questioning about current plan for dialysis and blood transfusion, explained all processes in detail and additional questions were answered appropriately.    Anticipated Length of Stay: Patient will be admitted on an inpatient basis with an anticipated length of stay of greater than 2 midnights secondary to acute renal failure.    Chief Complaint: \"Tired\"    History of Present Illness:  Raven Simeon is a 29 y.o. female with a PMH of hypertension, stage V chronic kidney disease with previous biopsy demonstrating diffuse global glomerulosclerosis who presents with acute on chronic renal failure.  Patient states she had gone for outpatient laboratories and was encouraged by her nephrology team to present to the emergency department as a direct admit.  Patient states that generally she is fatigued but recently has developed worsening fatigue and generalized weakness, " also notes that she is having some worsening over baseline tremors as well as dyspnea on exertion.  Otherwise denies any other symptoms.  As per the patient's mother she is at baseline mentation although very tired.  Patient denies any confusion, dizziness, syncope/presyncope, dysgeusia, abdominal pains, nausea, vomiting, diarrhea, dysuria/hematuria, hematochezia/melena, recent fevers/chills, chest pains, shortness of breath, swelling, skin changes.    Outpatient labs notable for elevated anion gap of 20 with coinciding BUN of 129 and creatinine of 31.72.  Estimated GFR at that time being 1.  Also noted for hyperphosphatemia of 10.4.  In conjunction has had a severe anemia of 5.1 which is lower than her baseline which tends to range from 6-8.    Review of Systems:  Review of Systems   Constitutional:  Positive for fatigue. Negative for chills and fever.   HENT:  Negative for rhinorrhea, sneezing and sore throat.    Eyes:  Negative for visual disturbance.   Respiratory:  Positive for shortness of breath (When ambulating). Negative for cough.    Cardiovascular:  Negative for chest pain, palpitations and leg swelling.   Gastrointestinal:  Negative for abdominal pain, blood in stool, constipation, diarrhea, nausea and vomiting.   Genitourinary:  Negative for dysuria and hematuria.   Musculoskeletal:  Negative for arthralgias and back pain.   Skin:  Negative for color change and rash.   Neurological:  Positive for weakness and light-headedness. Negative for dizziness, syncope and numbness.   Psychiatric/Behavioral:  Negative for confusion.    All other systems reviewed and are negative.      Past Medical and Surgical History:   Past Medical History:   Diagnosis Date    Asthma     Chronic kidney disease     Eczema     Headache     Hypertension     Wears glasses        Past Surgical History:   Procedure Laterality Date    CHOLECYSTECTOMY      CT NEEDLE BIOPSY KIDNEY  1/29/2020    KELOID EXCISION Left 3/30/2021     Procedure: POSTERIOR EAR EXCISION KELOID, FLAP RECONSTRUCTION;  Surgeon: Beka Hugo MD;  Location: [facility];  Service: Plastics       Meds/Allergies:  Prior to Admission medications    Medication Sig Start Date End Date Taking? Authorizing Provider   acetaminophen (TYLENOL) 325 mg tablet Take 2 tablets (650 mg total) by mouth every 6 (six) hours as needed for mild pain or headaches 1/26/20   Guillermina Velasquez PA-C   albuterol (2.5 mg/3 mL) 0.083 % nebulizer solution Take 3 mL (2.5 mg total) by nebulization every 6 (six) hours as needed for wheezing or shortness of breath 7/18/23   Beka Lozano MD   albuterol (PROVENTIL HFA,VENTOLIN HFA) 90 mcg/act inhaler Inhale 2 puffs every 6 hours as needed for wheezing or shortness of breath. 5/6/24   Demarco Parker MD   amLODIPine (NORVASC) 5 mg tablet TAKE 1 TABLET BY MOUTH TWICE A DAY 2/29/24   Vargas Remy MD   Blood Pressure KIT by Does not apply route daily  Patient not taking: Reported on 9/5/2023 6/24/20   SUZAN Gray   calcitriol (ROCALTROL) 0.5 MCG capsule TAKE 1 CAPSULE BY MOUTH DAILY. 3/13/24   Vargas Remy MD   Diclofenac Sodium (VOLTAREN) 1 % Apply 2 g topically 4 (four) times a day 7/6/23   Kasi Lizama DO   erythromycin (ILOTYCIN) ophthalmic ointment Place a 1/2 inch ribbon of ointment into the lower eyelid. 1/12/24   Samm Montes PA-C   etonogestrel (NEXPLANON) subdermal implant 68 mg by Subdermal route once    Historical Provider, MD   ferrous sulfate 325 (65 Fe) mg tablet TAKE 1 TABLET BY MOUTH EVERY DAY WITH BREAKFAST 4/11/24   Vargas Remy MD   fluticasone (Flonase Allergy Relief) 50 mcg/act nasal spray 1 spray into each nostril daily 12/21/23   SUZAN Wen   labetalol (NORMODYNE) 100 mg tablet TAKE 1 TABLET BY MOUTH TWICE A DAY 3/4/24   Divina Mendoza PA-C   loratadine (CLARITIN) 10 mg tablet Take 1 tablet (10 mg total) by mouth daily 12/21/23   SUZAN Wen   mometasone-formoterol (DULERA) 100-5 MCG/ACT inhaler  Inhale 2 puffs 2 (two) times a day Rinse mouth after use. 5/6/24   Demarco Parker MD   sevelamer carbonate (RENVELA) 800 mg tablet Take 1 tablet (800 mg total) by mouth 3 (three) times a day with meals 3/22/24   Vargas Remy MD   sodium bicarbonate 650 mg tablet TAKE 2 TABLETS (1,300 MG TOTAL) BY MOUTH TWICE A DAY 4/10/24   SUZAN Gray   methocarbamol (ROBAXIN) 500 mg tablet Take 1 tablet (500 mg total) by mouth 3 (three) times a day as needed for muscle spasms  Patient not taking: Reported on 5/13/2022 1/18/22 6/18/22  SUZAN Brandon     I have reviewed home medications with patient personally.    Allergies:   Allergies   Allergen Reactions    Seasonal Ic [Cholestatin] Itching    Wheat Bran - Food Allergy Itching       Social History:  Marital Status: Single   Occupation: Not discussed  Patient Pre-hospital Living Situation: Home  Patient Pre-hospital Level of Mobility: walks  Patient Pre-hospital Diet Restrictions: None  Substance Use History:   Social History     Substance and Sexual Activity   Alcohol Use Not Currently    Comment: occassionally on social events     Social History     Tobacco Use   Smoking Status Never   Smokeless Tobacco Never     Social History     Substance and Sexual Activity   Drug Use No       Family History:  Family History   Problem Relation Age of Onset    Asthma Mother     No Known Problems Father        Physical Exam:     Vitals:   Blood Pressure: 144/83 (05/31/24 1937)  Pulse: 76 (05/31/24 1937)  Temperature: 98.5 °F (36.9 °C) (05/31/24 1754)  Respirations: 18 (05/31/24 1754)  SpO2: 100 % (05/31/24 1937)    Physical Exam  Vitals and nursing note reviewed.   Constitutional:       General: She is not in acute distress.     Appearance: She is well-developed.   HENT:      Head: Normocephalic and atraumatic.      Mouth/Throat:      Mouth: Mucous membranes are moist.   Eyes:      Conjunctiva/sclera: Conjunctivae normal.   Cardiovascular:      Rate and Rhythm: Normal  rate and regular rhythm.      Heart sounds: Murmur (Likely systolic murmur secondary to anemia) heard.   Pulmonary:      Effort: Pulmonary effort is normal. No respiratory distress.      Breath sounds: No stridor. No wheezing, rhonchi or rales.   Abdominal:      General: Bowel sounds are normal. There is no distension.      Palpations: Abdomen is soft.      Tenderness: There is no abdominal tenderness. There is no guarding or rebound.   Musculoskeletal:         General: No swelling.   Skin:     General: Skin is warm and dry.      Capillary Refill: Capillary refill takes less than 2 seconds.   Neurological:      Mental Status: She is alert and oriented to person, place, and time.      Sensory: No sensory deficit.      Motor: No weakness.   Psychiatric:         Mood and Affect: Mood normal.          Additional Data:     Lab Results:  Results from last 7 days   Lab Units 05/31/24  0916   WBC Thousand/uL 9.08   HEMOGLOBIN g/dL 5.1*   HEMATOCRIT % 16.5*   PLATELETS Thousands/uL 148*     Results from last 7 days   Lab Units 05/31/24  0916   SODIUM mmol/L 139   POTASSIUM mmol/L 4.6   CHLORIDE mmol/L 98   CO2 mmol/L 21   BUN mg/dL 129*   CREATININE mg/dL 31.72*   ANION GAP mmol/L 20*   CALCIUM mg/dL 9.8   ALBUMIN g/dL 4.4   TOTAL BILIRUBIN mg/dL 0.30   ALK PHOS U/L 51   ALT U/L 5*   AST U/L 8*   GLUCOSE RANDOM mg/dL 84                       Lines/Drains:  Invasive Devices       None                       Imaging: No pertinent imaging reviewed.  IR tunneled dialysis catheter placement    (Results Pending)       EKG and Other Studies Reviewed on Admission:   EKG: No EKG obtained.  Will obtain EKG for baseline    ** Please Note: This note has been constructed using a voice recognition system. **

## 2024-06-01 NOTE — ASSESSMENT & PLAN NOTE
Lab Results   Component Value Date    HGB 6.6 (L) 06/01/2024    HGB 5.1 (LL) 05/31/2024    HGB 6.0 (L) 04/17/2024   Patient with chronic anemia baseline hovering between 6-8, most likely secondary to chronic kidney disease.  No active bleeding at this point.  Status post 2 units of PRBC overnight, hemoglobin improved to 6.6.  Patient endorses some improvement fatigue.      Plan:  Will give 1 more unit of PRBCs to bring hemoglobin above 7.  Will follow-up posttransfusion H&H  Continue to trend CBC  Continue iron supplementation

## 2024-06-01 NOTE — PROGRESS NOTES
[facility]  Progress Note  Name: Raven Simeon I  MRN: [id number]  Unit/Bed#: S -01 I Date of Admission: 5/31/2024   Date of Service: 6/1/2024 I Hospital Day: 1    Assessment & Plan   * Acute renal failure (HCC)  Assessment & Plan  Patient with history of chronic kidney disease stage V, coming in after outpatient labs demonstrating acute renal failure.  BUN elevated to 129, creatinine 31.72.  Also having severe anemia 5.1 to receive blood on admission.  Has had previous biopsy which was sent to [place] for evaluation, unable to find final diagnosis.  Has been documented that this is secondary to hypertensive disease.  Current acute failure without obvious inciting event other than acute anemia, but may just be progression of disease.    Plan:  Nephrology consulted appreciate recommendation  IR consulted: Patient is n.p.o. for planned tunneled dialysis catheter placement today.  To receive dialysis once the catheter has been placed.  Continuing home medications for now  Continue to monitor for uremic symptoms  Bicarb drip 75/h    Acute on chronic anemia  Assessment & Plan  Lab Results   Component Value Date    HGB 6.6 (L) 06/01/2024    HGB 5.1 (LL) 05/31/2024    HGB 6.0 (L) 04/17/2024   Patient with chronic anemia baseline hovering between 6-8, most likely secondary to chronic kidney disease.  No active bleeding at this point.  Status post 2 units of PRBC overnight, hemoglobin improved to 6.6.  Patient endorses some improvement fatigue.      Plan:  Will give 1 more unit of PRBCs to bring hemoglobin above 7.  Will follow-up posttransfusion H&H  Continue to trend CBC  Continue iron supplementation    Stage 5 chronic kidney disease not on chronic dialysis (HCC)  Assessment & Plan  Lab Results   Component Value Date    EGFR 1 06/01/2024    EGFR 1 05/31/2024    EGFR 1 04/18/2024    CREATININE 32.72 (H) 06/01/2024    CREATININE 31.72 (H) 05/31/2024    CREATININE 27.02 (H) 04/18/2024    History of CKD, plan for tunneled catheter placement to start dialysis.    Increased anion gap metabolic acidosis  Assessment & Plan  Increased anion gap metabolic acidosis, in the setting of acute renal failure, likely secondary to increased BUN.  No current infectious symptoms and white count within normal range    Plan:  Continue outpatient sodium bicarb tabs  Remainder of plan as above    Hyperphosphatemia  Assessment & Plan  Lab Results   Component Value Date    PHOS 9.8 (H) 2024    PHOS 10.4 (H) 2024    PHOS 7.1 (H) 2024   Hyperphosphatemia in the setting of acute renal failure and CKD, continuing Renvela  Remainder of plan as above under acute renal failure.    Hypertension  Assessment & Plan  140-150's  systolic on home medications amlodipine and labetalol.    Continue amlodipine and labetalol  Continue to monitor blood pressure  Awaiting tunneled catheter placement for dialysis.  monitor BP           VTE Pharmacologic Prophylaxis: VTE Score: 2 Low Risk (Score 0-2) - Encourage Ambulation.    Mobility:   Basic Mobility Inpatient Raw Score: 24  JH-HLM Goal: 8: Walk 250 feet or more  JH-HLM Achieved: 8: Walk 250 feet ot more  JH-HLM Goal achieved. Continue to encourage appropriate mobility.    Patient Centered Rounds: I performed bedside rounds with nursing staff today.  Discussions with Specialists or Other Care Team Provider: Nephrology    Education and Discussions with Family / Patient: Updated  (mother) via phone.    Current Length of Stay: 1 day(s)  Current Patient Status: Inpatient   Discharge Plan: Anticipate discharge in 48 hrs to home.    Code Status: Level 1 - Full Code    Subjective:   Patient evaluated at bedside, she denies some improvement in fatigue, has been walking to the bathroom and back with no issues.  Denies any fevers or chills.  No acute events overnight.    Objective:     Vitals:   Temp (24hrs), Av.5 °F (36.9 °C), Min:98.1 °F (36.7 °C), Max:99 °F  (37.2 °C)    Temp:  [98.1 °F (36.7 °C)-99 °F (37.2 °C)] 98.3 °F (36.8 °C)  HR:  [72-93] 74  Resp:  [17-18] 17  BP: (117-169)/() 150/90  SpO2:  [97 %-100 %] 100 %  There is no height or weight on file to calculate BMI.     Input and Output Summary (last 24 hours):   No intake or output data in the 24 hours ending 06/01/24 1139    Physical Exam:   Physical Exam  Vitals reviewed.   Constitutional:       General: She is not in acute distress.     Appearance: Normal appearance. She is obese. She is not ill-appearing.   HENT:      Head: Normocephalic and atraumatic.      Mouth/Throat:      Mouth: Mucous membranes are moist.      Pharynx: Oropharynx is clear.   Eyes:      General: No scleral icterus.     Extraocular Movements: Extraocular movements intact.      Conjunctiva/sclera: Conjunctivae normal.   Cardiovascular:      Rate and Rhythm: Normal rate and regular rhythm.      Heart sounds: Murmur heard.   Abdominal:      General: Bowel sounds are normal. There is no distension.      Palpations: Abdomen is soft. There is no mass.      Tenderness: There is no abdominal tenderness.   Musculoskeletal:         General: No swelling.      Right lower leg: No edema.      Left lower leg: No edema.   Skin:     General: Skin is warm.      Coloration: Skin is not jaundiced.      Findings: No bruising.   Neurological:      Mental Status: She is alert and oriented to person, place, and time. Mental status is at baseline.   Psychiatric:         Mood and Affect: Mood normal.         Behavior: Behavior normal.          Additional Data:     Labs:  Results from last 7 days   Lab Units 06/01/24  0851   WBC Thousand/uL 9.46   HEMOGLOBIN g/dL 6.6*   HEMATOCRIT % 20.7*   PLATELETS Thousands/uL 151     Results from last 7 days   Lab Units 06/01/24  0851 05/31/24  0916   SODIUM mmol/L 138 139   POTASSIUM mmol/L 4.7 4.6   CHLORIDE mmol/L 99 98   CO2 mmol/L 21 21   BUN mg/dL 127* 129*   CREATININE mg/dL 32.72* 31.72*   ANION GAP mmol/L 18*  20*   CALCIUM mg/dL 9.3 9.8   ALBUMIN g/dL  --  4.4   TOTAL BILIRUBIN mg/dL  --  0.30   ALK PHOS U/L  --  51   ALT U/L  --  5*   AST U/L  --  8*   GLUCOSE RANDOM mg/dL 91 84                       Lines/Drains:  Invasive Devices       Peripheral Intravenous Line  Duration             Peripheral IV 05/31/24 Left;Ventral (anterior) Forearm <1 day                          Imaging: No pertinent imaging reviewed.    Recent Cultures (last 7 days):         Last 24 Hours Medication List:   Current Facility-Administered Medications   Medication Dose Route Frequency Provider Last Rate    albuterol  2.5 mg Nebulization Q6H PRN Sohan Squires MD      amLODIPine  5 mg Oral BID Sohan Squires MD      calcitriol  0.5 mcg Oral Daily Sohan Squires MD      Diclofenac Sodium  2 g Topical 4x Daily Sohan Squires MD      ferrous sulfate  325 mg Oral Daily With Breakfast Sohan Squires MD      fluticasone  1 spray Nasal Daily Sohan Squires MD      Fluticasone Furoate-Vilanterol  1 puff Inhalation Daily Sohan Squires MD      labetalol  100 mg Oral BID Sohan Squires MD      loratadine  10 mg Oral Daily Sohan Squires MD      magnesium Oxide  800 mg Oral Once Lino Ahmadi MD      ondansetron  4 mg Intravenous Q6H PRN Sohan Squires MD      sevelamer  800 mg Oral TID With Meals Sohan Squires MD      sodium bicarbonate 150 mEq in dextrose 5 % 1,000 mL infusion  75 mL/hr Intravenous Continuous Sohan Squires MD 75 mL/hr (05/31/24 1156)    sodium bicarbonate  1,300 mg Oral BID after meals Sohan Squires MD          Today, Patient Was Seen By: Lino Ahmadi MD    **Please Note: This note may have been constructed using a voice recognition system.**

## 2024-06-01 NOTE — ASSESSMENT & PLAN NOTE
Lab Results   Component Value Date    EGFR 1 06/01/2024    EGFR 1 05/31/2024    EGFR 1 04/18/2024    CREATININE 32.72 (H) 06/01/2024    CREATININE 31.72 (H) 05/31/2024    CREATININE 27.02 (H) 04/18/2024   History of CKD, plan for tunneled catheter placement to start dialysis.

## 2024-06-01 NOTE — ASSESSMENT & PLAN NOTE
140-150's  systolic on home medications amlodipine and labetalol.    Continue amlodipine and labetalol  Continue to monitor blood pressure  Awaiting tunneled catheter placement for dialysis.  monitor BP

## 2024-06-01 NOTE — PLAN OF CARE
Problem: PAIN - ADULT  Goal: Verbalizes/displays adequate comfort level or baseline comfort level  Description: Interventions:  - Encourage patient to monitor pain and request assistance  - Assess pain using appropriate pain scale  - Administer analgesics based on type and severity of pain and evaluate response  - Implement non-pharmacological measures as appropriate and evaluate response  - Consider cultural and social influences on pain and pain management  - Notify physician/advanced practitioner if interventions unsuccessful or patient reports new pain  Outcome: Progressing     Problem: INFECTION - ADULT  Goal: Absence or prevention of progression during hospitalization  Description: INTERVENTIONS:  - Assess and monitor for signs and symptoms of infection  - Monitor lab/diagnostic results  - Monitor all insertion sites, i.e. indwelling lines, tubes, and drains  - Monitor endotracheal if appropriate and nasal secretions for changes in amount and color  - [place] appropriate cooling/warming therapies per order  - Administer medications as ordered  - Instruct and encourage patient and family to use good hand hygiene technique  - Identify and instruct in appropriate isolation precautions for identified infection/condition  Outcome: Progressing  Goal: Absence of fever/infection during neutropenic period  Description: INTERVENTIONS:  - Monitor WBC    Outcome: Progressing     Problem: SAFETY ADULT  Goal: Patient will remain free of falls  Description: INTERVENTIONS:  - Educate patient/family on patient safety including physical limitations  - Instruct patient to call for assistance with activity   - Consult OT/PT to assist with strengthening/mobility   - Keep Call bell within reach  - Keep bed low and locked with side rails adjusted as appropriate  - Keep care items and personal belongings within reach  - Initiate and maintain comfort rounds  - Make Fall Risk Sign visible to staff  - Offer Toileting every  Hours,  in advance of need  - Initiate/Maintain alarm  - Obtain necessary fall risk management equipment:   - Apply yellow socks and bracelet for high fall risk patients  - Consider moving patient to room near nurses station  Outcome: Progressing  Goal: Maintain or return to baseline ADL function  Description: INTERVENTIONS:  -  Assess patient's ability to carry out ADLs; assess patient's baseline for ADL function and identify physical deficits which impact ability to perform ADLs (bathing, care of mouth/teeth, toileting, grooming, dressing, etc.)  - Assess/evaluate cause of self-care deficits   - Assess range of motion  - Assess patient's mobility; develop plan if impaired  - Assess patient's need for assistive devices and provide as appropriate  - Encourage maximum independence but intervene and supervise when necessary  - Involve family in performance of ADLs  - Assess for home care needs following discharge   - Consider OT consult to assist with ADL evaluation and planning for discharge  - Provide patient education as appropriate  Outcome: Progressing  Goal: Maintains/Returns to pre admission functional level  Description: INTERVENTIONS:  - Perform AM-PAC 6 Click Basic Mobility/ Daily Activity assessment daily.  - Set and communicate daily mobility goal to care team and patient/family/caregiver.   - Collaborate with rehabilitation services on mobility goals if consulted  - Perform Range of Motion  times a day.  - Reposition patient every  hours.  - Dangle patient  times a day  - Stand patient  times a day  - Ambulate patient  times a day  - Out of bed to chair  times a day   - Out of bed for meals  times a day  - Out of bed for toileting  - Record patient progress and toleration of activity level   Outcome: Progressing     Problem: DISCHARGE PLANNING  Goal: Discharge to home or other facility with appropriate resources  Description: INTERVENTIONS:  - Identify barriers to discharge w/patient and caregiver  - Arrange for  needed discharge resources and transportation as appropriate  - Identify discharge learning needs (meds, wound care, etc.)  - Arrange for interpretive services to assist at discharge as needed  - Refer to Case Management Department for coordinating discharge planning if the patient needs post-hospital services based on physician/advanced practitioner order or complex needs related to functional status, cognitive ability, or social support system  Outcome: Progressing     Problem: Knowledge Deficit  Goal: Patient/family/caregiver demonstrates understanding of disease process, treatment plan, medications, and discharge instructions  Description: Complete learning assessment and assess knowledge base.  Interventions:  - Provide teaching at level of understanding  - Provide teaching via preferred learning methods  Outcome: Progressing

## 2024-06-01 NOTE — CONSULTS
Consultation - Nephrology   Raven PORRAS Cailin 29 y.o. female MRN: [id number]  Unit/Bed#: S -01 Encounter: [id number]    ASSESSMENT and PLAN:  Chronic kidney disease stage V  -Chronic kidney disease likely due to hypertensive arteriolar nephrosclerosis and chronic tubulointerstitial nephropathy.  Prior serological workup showed slightly elevated [place]-3 but other test were negative.  Status post kidney biopsy in the past suggestive of diffuse global glomerulosclerosis which was severe along with severe tubular atrophy interstitial fibrosis and interstitial inflammation.  -Patient being evaluated by Dr. Mac for kidney transplant  -She has been refusing kidney dialysis and so does not have any permanent access for dialysis  -Since January 2024 creatinine has been around 22 mg/dL but now worsened to 32 mg/dL and with worsening uremic symptoms in terms of generalized weakness fatigue and so patient agreeable to initiation of renal replacement therapy.  Consent was obtained after discussion about the risk and benefit, signed and placed in the chart.  IR consulted for placement of PermCath  -Plan for initiation of renal replacement therapy possibly today later on or on Monday.  No urgent indication as electrolytes are stable    Primary hypertension  -Blood pressure slightly elevated.  Continue current treatment with amlodipine 5 mg twice daily, labetalol 100 mg twice daily.  Avoid hypotension.  If blood pressure remains persistently elevated can increase the dose of labetalol    Metabolic acidosis: Bicarb level stable at 21, continue current dose of sodium bicarbonate tablet.  Once started on dialysis would be able to stop sodium bicarbonate    Hyperphosphatemia: Phosphorus level elevated at 9.8 mg/dL likely due to advanced CKD.  Continue sevelamer at current dose at 800 mg 3 times daily, expect phosphorus level to improve with initiation of HD    Secondary hyperparathyroidism of renal origin: On calcitriol monitor  PTH level as outpatient  Hypomagnesemia: Level 1.7-replaced    Anemia due to advanced CKD stage V  -Prior iron saturation was 60% in March 2024  -Status post PRBC  -Hemoglobin on admission was 5.1 g/dL, improved to 6.6 g/dL would need more PRBC  -Was receiving outpatient Epogen and will start on Epogen with hemodialysis treatment.    Discussed with primary team regarding plan to initiate renal replacement therapy once permacath is placed and primary team agreed with the plan.  Also agreed with plan for PRBC      HISTORY OF PRESENT ILLNESS:  Requesting Physician: Geovanni Park MD  Reason for Consult: Chronic kidney disease stage V    Raven Simeon is a 29 y.o. female with history of chronic kidney disease stage V, hypertension who was admitted to [facility] after presenting with complaint of generalized weakness and fatigue as well as metallic taste.  Denies any nausea vomiting.  On outpatient blood work was found to have creatinine trending up to 32.7 and  along with metabolic acidosis.  Also found to have severe anemia for which she needed blood transfusion and so was sent to the ED for PRBC as well as initiation of dialysis.  Admission hemoglobin was found to be 5.1 on 5/31, received 2 units PRBC and hemoglobin improved to 6.6 g/dL.  She denies any nausea or vomiting.  Nephrology consulted for his chronic kidney disease stage V    PAST MEDICAL HISTORY:  Past Medical History:   Diagnosis Date    Asthma     Chronic kidney disease     Eczema     Headache     Hypertension     Wears glasses        PAST SURGICAL HISTORY:  Past Surgical History:   Procedure Laterality Date    CHOLECYSTECTOMY      CT NEEDLE BIOPSY KIDNEY  1/29/2020    KELOID EXCISION Left 3/30/2021    Procedure: POSTERIOR EAR EXCISION KELOID, FLAP RECONSTRUCTION;  Surgeon: Beka Hugo MD;  Location: [facility];  Service: Plastics       SOCIAL HISTORY:  Social History     Substance and Sexual Activity   Alcohol Use Not  Currently    Comment: occassionally on social events     Social History     Substance and Sexual Activity   Drug Use No     Social History     Tobacco Use   Smoking Status Never   Smokeless Tobacco Never       FAMILY HISTORY:  Family History   Problem Relation Age of Onset    Asthma Mother     No Known Problems Father        ALLERGIES:  Allergies   Allergen Reactions    Seasonal Ic [Cholestatin] Itching    Wheat Bran - Food Allergy Itching       MEDICATIONS:    Current Facility-Administered Medications:     albuterol inhalation solution 2.5 mg, 2.5 mg, Nebulization, Q6H PRN, Sohan Squires MD    amLODIPine (NORVASC) tablet 5 mg, 5 mg, Oral, BID, Sohan Squires MD, 5 mg at 06/01/24 0903    calcitriol (ROCALTROL) capsule 0.5 mcg, 0.5 mcg, Oral, Daily, Sohan Squires MD, 0.5 mcg at 06/01/24 0904    Diclofenac Sodium (VOLTAREN) 1 % topical gel 2 g, 2 g, Topical, 4x Daily, Sohan Squires MD, 2 g at 05/31/24 2333    ferrous sulfate tablet 325 mg, 325 mg, Oral, Daily With Breakfast, Sohan Squires MD, 325 mg at 06/01/24 0903    fluticasone (FLONASE) 50 mcg/act nasal spray 1 spray, 1 spray, Nasal, Daily, Sohan Squires MD, 1 spray at 06/01/24 0901    Fluticasone Furoate-Vilanterol 100-25 mcg/actuation 1 puff, 1 puff, Inhalation, Daily, Sohan Squires MD, 1 puff at 06/01/24 0902    labetalol (NORMODYNE) tablet 100 mg, 100 mg, Oral, BID, Sohan Squires MD, 100 mg at 06/01/24 0903    loratadine (CLARITIN) tablet 10 mg, 10 mg, Oral, Daily, Sohan Squires MD, 10 mg at 06/01/24 0903    magnesium Oxide (MAG-OX) tablet 800 mg, 800 mg, Oral, Once, Lino Ahmadi MD    ondansetron (ZOFRAN) injection 4 mg, 4 mg, Intravenous, Q6H PRN, Sohan Squires MD    sevelamer (RENAGEL) tablet 800 mg, 800 mg, Oral, TID With Meals, Sohan Squires MD, 800 mg at 06/01/24 0903    sodium bicarbonate 150 mEq in dextrose 5 % 1,000 mL infusion, 75 mL/hr, Intravenous, Continuous, Sohan Squires MD, Last Rate:  75 mL/hr at 05/31/24 2326, 75 mL/hr at 05/31/24 2326    sodium bicarbonate tablet 1,300 mg, 1,300 mg, Oral, BID after meals, Sohan Squires MD, 1,300 mg at 06/01/24 0903    REVIEW OF SYSTEMS:   Review of Systems   Constitutional:  Negative for activity change, appetite change, chills, diaphoresis, fatigue and fever.   HENT:  Negative for congestion, facial swelling and nosebleeds.    Eyes:  Negative for pain and visual disturbance.   Respiratory:  Negative for cough, chest tightness and shortness of breath.    Cardiovascular:  Negative for chest pain and palpitations.   Gastrointestinal:  Negative for abdominal distention, abdominal pain, diarrhea, nausea and vomiting.   Genitourinary:  Negative for difficulty urinating, dysuria, flank pain, frequency and hematuria.   Musculoskeletal:  Negative for arthralgias, back pain and joint swelling.   Skin:  Negative for rash.   Neurological:  Negative for dizziness, seizures, syncope, weakness and headaches.   Psychiatric/Behavioral:  Negative for agitation and confusion. The patient is not nervous/anxious.        All the systems were reviewed and were negative except as documented on the HPI.    PHYSICAL EXAM:  Current Weight:    First Weight:    Vitals:    06/01/24 0804 06/01/24 1119 06/01/24 1121 06/01/24 1132   BP: 154/90 154/76  150/90   Pulse: 82 72  74   Resp:   17 17   Temp:  98.3 °F (36.8 °C)  98.3 °F (36.8 °C)   TempSrc:    Oral   SpO2: 99% 98%  100%     No intake or output data in the 24 hours ending 06/01/24 1152  Physical Exam  Constitutional:       General: She is not in acute distress.     Appearance: Normal appearance. She is well-developed.   HENT:      Head: Normocephalic and atraumatic.      Nose: Nose normal.      Mouth/Throat:      Mouth: Mucous membranes are moist.   Eyes:      General: No scleral icterus.     Conjunctiva/sclera: Conjunctivae normal.      Pupils: Pupils are equal, round, and reactive to light.   Neck:      Thyroid: No thyromegaly.  "     Vascular: No JVD.   Cardiovascular:      Rate and Rhythm: Normal rate and regular rhythm.      Heart sounds: Murmur (esm) heard.      No friction rub.   Pulmonary:      Effort: Pulmonary effort is normal. No respiratory distress.      Breath sounds: Normal breath sounds. No wheezing or rales.   Abdominal:      General: Bowel sounds are normal. There is no distension.      Palpations: Abdomen is soft.      Tenderness: There is no abdominal tenderness.   Musculoskeletal:         General: No deformity.      Cervical back: Neck supple.      Right lower leg: No edema.      Left lower leg: No edema.   Skin:     General: Skin is warm and dry.      Coloration: Skin is not jaundiced.      Findings: No rash.   Neurological:      Mental Status: She is alert and oriented to person, place, and time.   Psychiatric:         Mood and Affect: Mood normal.         Behavior: Behavior normal.         Thought Content: Thought content normal.           Invasive Devices:        Lab Results:   Results from last 7 days   Lab Units 06/01/24  0851 05/31/24  0916   WBC Thousand/uL 9.46 9.08   HEMOGLOBIN g/dL 6.6* 5.1*   HEMATOCRIT % 20.7* 16.5*   PLATELETS Thousands/uL 151 148*   POTASSIUM mmol/L 4.7 4.6   CHLORIDE mmol/L 99 98   CO2 mmol/L 21 21   BUN mg/dL 127* 129*   CREATININE mg/dL 32.72* 31.72*   CALCIUM mg/dL 9.3 9.8   MAGNESIUM mg/dL 1.7*  --    PHOSPHORUS mg/dL 9.8* 10.4*   ALK PHOS U/L  --  51   ALT U/L  --  5*   AST U/L  --  8*       Other Studies:        Portions of the record may have been created with voice recognition software. Occasional wrong word or \"sound a like\" substitutions may have occurred due to the inherent limitations of voice recognition software. Read the chart carefully and recognize, using context, where substitutions have occurred.If you have any questions, please contact the dictating provider.   "

## 2024-06-01 NOTE — ASSESSMENT & PLAN NOTE
Lab Results   Component Value Date    PHOS 10.4 (H) 05/31/2024    PHOS 7.1 (H) 03/27/2024    PHOS 10.0 (H) 03/04/2024   Hyperphosphatemia in the setting of acute renal failure and CKD, continuing Renvela  Remainder of plan as above under acute renal failure.

## 2024-06-01 NOTE — PROCEDURES
Central Line    Date/Time: 5/31/2024 5:46 PM    Performed by: Madeleine Lane MD  Authorized by: Madeleine Lane MD    Patient location:  IR  Consent:     Consent obtained:  Written    Consent given by:  Patient    Risks discussed:  Arterial puncture, bleeding and infection    Alternatives discussed:  No treatment and delayed treatment  Universal protocol:     Procedure explained and questions answered to patient or proxy's satisfaction: yes      Relevant documents present and verified: yes      Test results available and properly labeled: yes      Radiology Images displayed and confirmed.  If images not available, report reviewed: yes      Required blood products, implants, devices, and special equipment available: yes      Site/side marked: yes      Immediately prior to procedure, a time out was called: yes      Patient identity confirmed:  Verbally with patient and arm band  Pre-procedure details:     Hand hygiene: Hand hygiene performed prior to insertion      Sterile barrier technique: All elements of maximal sterile technique followed      Skin preparation:  2% chlorhexidine    Skin preparation agent: Skin preparation agent completely dried prior to procedure    Indications:     Central line indications: dialysis    Sedation:     Sedation type:  Moderate (conscious) sedation  Anesthesia (see MAR for exact dosages):     Anesthesia method:  Local infiltration    Local anesthetic:  Lidocaine 1% WITH epi  Procedure details:     Location:  Right internal jugular    Vessel type: vein      Laterality:  Right    Approach: percutaneous technique used      Patient position:  Flat    Catheter type:  Double lumen    Catheter size:  15.5 Fr    Landmarks identified: yes      Ultrasound guidance: yes      Ultrasound image availability:  Images available in PACS    Sterile ultrasound techniques: Sterile gel and sterile probe covers were used      Manometry confirmation: no      Number of attempts:  1    Successful placement:  yes      Catheter tip vessel location comment:  Right atrium  Post-procedure details:     Post-procedure:  Dressing applied and line sutured    Assessment:  Blood return through all ports and placement verified by x-ray    Post-procedure complications: none      Patient tolerance of procedure:  Tolerated well, no immediate complications

## 2024-06-01 NOTE — TELEMEDICINE
e-Consult (IPC)  - Interventional Radiology  Raven Simeon 29 y.o. female MRN: [id number]  Unit/Bed#: S -01 Encounter: [id number]          Interventional Radiology has been consulted to evaluate Raven Simeon    We were consulted by SHANNON concerning this patient with acute on chronic renal failure.    Inpatient Consult to IR  Consult performed by: Madeleine Lane MD  Consult ordered by: Sohan Squires MD        05/31/24    Assessment/Recommendation:   29-year-old female presenting with Acute on chronic renal failure with a GFR of 1, and a creatinine of 31.72.  Patient's potassium is 4.6.  A tunneled dialysis catheter has been requested.    The patient is also severely anemic with a hemoglobin of 5.1.  Hopefully this will be addressed prior to catheter placement, which will likely be in the afternoon tomorrow.  Orders have been placed, and the patient is n.p.o. after midnight.  Please do not give the patient blood thinners.    11-20 minutes, >50% of the total time devoted to medical consultative verbal/EMR discussion between providers. Written report will be generated in the EMR.     Thank you for allowing Interventional Radiology to participate in the care of Raven Simeon. Please don't hesitate to call or TigerText us with any questions.     Madeleine Lane MD

## 2024-06-01 NOTE — UTILIZATION REVIEW
Initial Clinical Review    Admission: Date/Time/Statement:   Admission Orders (From admission, onward)       Ordered        05/31/24 1830  INPATIENT ADMISSION  Once                          Orders Placed This Encounter   Procedures    INPATIENT ADMISSION     Standing Status:   Standing     Number of Occurrences:   1     Order Specific Question:   Level of Care     Answer:   Med Surg [16]     Order Specific Question:   Estimated length of stay     Answer:   More than 2 Midnights     Order Specific Question:   Certification     Answer:   I certify that inpatient services are medically necessary for this patient for a duration of greater than two midnights. See H&P and MD Progress Notes for additional information about the patient's course of treatment.      Arrival Information       Patient direct admission                         chief complaint:  tired      Initial Presentation: 29 y.o. female Direct admission  from home.    Admitted to inpatient with Dx: acute renal failure/acute on chronic anemia/stage 5 CKD not on dialysis/increased anion gap metabolic acidosis/hyperphosphatemia/Hpt.  Presented to with weakness and fatigue.  Worsening tremors of hands and dyspnea on exertion.  Unknown timeframe.   OP labs with worsening of creatinine and BUN,high anion gap metabolic acidosis and anemia .  Nephrology recommended patient be admitted.   PMHx: hypertension, stage V chronic kidney disease with previous biopsy demonstrating diffuse global glomerulosclerosis. On exam:  systolic murmur.   H&H 5.1/16.5.  baseline hgb 6 to 8.    bun 129.  Creatinine 31.72  with baseline of 22 since January.  .  anion gap 20.  Phos 10.4.       Plan includes  to consult nephrology.   IR for tunneled catheter placement.   Monitor for uremic symptoms.  Start IVF with Bicarb.  Transfuse 2 units PRBC.  Trend CBC.  Continue iron supplementation.    Continue home antihypertensives.     Anticipated Length of Stay/Certification Statement: Patient  will be admitted on an Inpatient basis with an anticipated length of stay of more than 2 midnights.   Justification for Hospital Stay: Needs nephrology follow-up likely will need start of dialysis but will defer this to nephrology naturally.  Will need transfusion we are working on getting this done     Date: 6/1/24    Day 2:  today with itching of skin.   On exam obese.  Cardiac murmur.  H&H 6.6/20.7.  bun 127. Creatinine 32.72.  phos 9.8.     transfuse additional PRBC.   NPO for IR.   Dialysis once tunneled dialysis catheter placed.   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  Status post kidney biopsy in the past suggestive of diffuse global glomerulosclerosis which was severe along with severe tubular atrophy interstitial fibrosis and interstitial inflammation.   Being evaluated for transplant.  Has been refusing dialysis.   Plan for initiation of renal replacement therapy possibly today later on or on Monday.   IR consulted for placement of PermCath .  Continue current treatment of hypertension with amlodipine 5 mg twice daily, labetalol 100 mg twice daily. Continue current dose of sodium bicarbonate.  Continue sevelamer at current dose at 800 mg 3 times daily. Needs transfusion of PRBC.        Procedure 6/1/24 Central Line. Tunneled dialysis catheter placed by Dr. Lane. Patient tolerated well and vss throughout procedure.  .IR Procedure Bedrest Start Time is 1605.      ED Triage Vitals   Temperature Pulse Respirations Blood Pressure SpO2   05/31/24 1754 05/31/24 1754 05/31/24 1754 05/31/24 1754 05/31/24 1754   98.5 °F (36.9 °C) 83 18 148/83 99 %      Temp Source Heart Rate Source Patient Position - Orthostatic VS BP Location FiO2 (%)   06/01/24 0054 06/01/24 0054 -- -- --    Oral Monitor         Pain Score       05/31/24 1824       No Pain          Wt Readings from Last 1 Encounters:   05/29/24 102 kg (225 lb)     Additional Vital Signs:   06/01/24 1557 -- 98 18 179/84 Abnormal  -- 93 % -- -- --   06/01/24 1551 -- 100 18 183/86 Abnormal  -- 98 % -- -- None (Room air)   06/01/24 1546 -- 96 16 179/83 Abnormal  -- 100 % -- -- --   06/01/24 1541 -- 86 18 173/81 Abnormal  -- 99 % -- -- --   06/01/24 1536 -- 86 20 171/74 Abnormal  -- 100 %        06/01/24 1451 -- -- 17 -- -- -- -- -- --   06/01/24 14:50:23 98.3 °F (36.8 °C) 86 17 162/85 111 98 % -- -- --   06/01/24 11:32:43 98.3 °F (36.8 °C) 74 17 150/90 110 100 % -- -- --   06/01/24 1121 -- -- 17 -- -- -- -- -- --   06/01/24 11:19:55 98.3 °F (36.8 °C) 72 -- 154/76 102 98 % -- -- --   06/01/24 08:04:25 -- 82 -- 154/90 111 99 % -- -- --   06/01/24 06:44:24 98.5 °F (36.9 °C) 89 -- 169/103 Abnormal  125 98 % -- -- --   06/01/24 04:48:41 98.3 °F (36.8 °C) 82 -- 140/69 93 97 % -- -- --   06/01/24 0418 -- -- 18 -- -- -- -- -- --   06/01/24 04:15:45 98.1 °F (36.7 °C) 87 -- 140/68 92 98 % -- -- --   06/01/24 03:43:21 99 °F (37.2 °C) 93 -- 145/81 102 98 % -- -- --   06/01/24 01:32:02 98.8 °F (37.1 °C) 80 -- 131/71 91 98 %        Pertinent Labs/Diagnostic Test Results:   IR tunneled dialysis catheter placement   Final Result by Madeleine Lane MD (06/01 1802)   Impression:   1. Successful ultrasound and fluoroscopically guided placement of a 24 cm Titan cath via the right internal jugular vein. The tip of the catheter is in the right atrium and may be used immediately.               Workstation performed: [id number]             Results from last 7 days   Lab Units 06/01/24  0851 05/31/24  0916   WBC Thousand/uL 9.46 9.08   HEMOGLOBIN g/dL 6.6* 5.1*   HEMATOCRIT % 20.7* 16.5*   PLATELETS Thousands/uL 151 148*     Results from last 7 days   Lab Units 06/01/24  0851 05/31/24  0916   SODIUM mmol/L 138 139   POTASSIUM mmol/L 4.7 4.6   CHLORIDE mmol/L 99  98   CO2 mmol/L 21 21   ANION GAP mmol/L 18* 20*   BUN mg/dL 127* 129*   CREATININE mg/dL 32.72* 31.72*   EGFR ml/min/1.73sq m 1 1   CALCIUM mg/dL 9.3 9.8   MAGNESIUM mg/dL 1.7*  --    PHOSPHORUS mg/dL 9.8* 10.4*     Results from last 7 days   Lab Units 05/31/24  0916   AST U/L 8*   ALT U/L 5*   ALK PHOS U/L 51   TOTAL PROTEIN g/dL 7.3   ALBUMIN g/dL 4.4   TOTAL BILIRUBIN mg/dL 0.30     Results from last 7 days   Lab Units 06/01/24  0851 05/31/24  0916   GLUCOSE RANDOM mg/dL 91 84     Results from last 7 days   Lab Units 06/01/24  1100 06/01/24  0547   UNIT PRODUCT CODE  [id number]  [id number]   UNIT NUMBER  [id number]  [id number]   UNITABO  O O  O   UNITRH  POS POS  POS   CROSSMATCH  Compatible Compatible  Compatible   UNIT DISPENSE STATUS  Issued Presumed Trans  Presumed Trans   UNIT PRODUCT VOL mL 350 350  350          Past Medical History:   Diagnosis Date    Asthma     Chronic kidney disease     Eczema     Headache     Hypertension     Wears glasses      Present on Admission:   Hypertension   Hyperphosphatemia   Increased anion gap metabolic acidosis   Acute on chronic anemia      Admitting Diagnosis: Worsening renal function [N28.9]  Anemia [D64.9]  Age/Sex: 29 y.o. female  Admission Orders:  Scheduled Medications:  amLODIPine, 5 mg, Oral, BID  calcitriol, 0.5 mcg, Oral, Daily  Diclofenac Sodium, 2 g, Topical, 4x Daily  ferrous sulfate, 325 mg, Oral, Daily With Breakfast  fluticasone, 1 spray, Nasal, Daily  Fluticasone Furoate-Vilanterol, 1 puff, Inhalation, Daily  labetalol, 100 mg, Oral, BID  loratadine, 10 mg, Oral, Daily  sevelamer, 800 mg, Oral, TID With Meals  sodium bicarbonate, 1,300 mg, Oral, BID after meals    magnesium Oxide (MAG-OX) tablet 800 mg  Dose: 800 mg  Freq: Once Route: PO  Start: 06/01/24 1145 End: 06/01/24 1217       Continuous IV Infusions:  sodium bicarbonate 150 mEq in dextrose 5 % 1,000 mL infusion, 75 mL/hr, Intravenous, Continuous      PRN  Meds:  albuterol, 2.5 mg, Nebulization, Q6H PRN  ondansetron, 4 mg, Intravenous, Q6H PRN    Transfusions already released     Start   06/01/24 1028  Transfuse Leukoreduced RBC, CMV Negative, Leukoreduced, Irradiated  Transfusion     Reprint      06/01/24 1028   05/31/24 1940  Transfuse Leukoreduced RBC: 2 Units, Irradiated, CMV Negative  Transfusion              IP CONSULT TO NEPHROLOGY  INPATIENT CONSULT TO IR    Network Utilization Review Department  ATTENTION: Please call with any questions or concerns to [phone] and carefully listen to the prompts so that you are directed to the right person. All voicemails are confidential.   For Discharge needs, contact Care Management DC Support Team at [phone] opt. 2  Send all requests for admission clinical reviews, approved or denied determinations and any other requests to dedicated fax number below belonging to the campus where the patient is receiving treatment. List of dedicated fax numbers for the Facilities:  FACILITY NAME UR FAX NUMBER   ADMISSION DENIALS (Administrative/Medical Necessity) [phone]   DISCHARGE SUPPORT TEAM (NETWORK) [phone]   PARENT CHILD HEALTH (Maternity/NICU/Pediatrics) [phone]   [facility] [phone]   [facility] [phone]   [facility] [phone]   [facility] [phone]   [facility] [phone]   [facility] [phone]   [facility] [phone]   [facility] [phone]   [facility] [phone]   [facility] [phone]   [facility] [phone]   [facility] [phone]

## 2024-06-01 NOTE — ASSESSMENT & PLAN NOTE
Lab Results   Component Value Date    PHOS 9.8 (H) 06/01/2024    PHOS 10.4 (H) 05/31/2024    PHOS 7.1 (H) 03/27/2024   Hyperphosphatemia in the setting of acute renal failure and CKD, continuing Renvela  Remainder of plan as above under acute renal failure.

## 2024-06-01 NOTE — DISCHARGE INSTRUCTIONS
Perma-cath Placement   WHAT YOU NEED TO KNOW:   A perma-cath is a catheter placed through a vein into or near your right atrium. Your right atrium is the right upper chamber of your heart. A perma-cath is used for dialysis in an emergency or until a long-term device is ready to use. After your procedure, you will have some pain and swelling on your chest and neck. You may have some bruises on your chest and neck. You may also have 2 dressings, one on your chest and one on your neck.   DISCHARGE INSTRUCTIONS:   Call 911 for any of the following:   You feel lightheaded, short of breath, and have chest pain.    Your catheter comes out   Contact Interventional Radiology at [phone] ([place] PATIENTS: Contact Interventional Radiology at [phone]) ([place] PATIENTS: Contact Interventional Radiology at [phone]) if:  Blood soaks through your bandage.   You have new swelling in your arm, neck, face, or chest on your right side.  Your catheter gets wet.    Your bruises or pain get worse.   You have a fever or chills.  Persistent nausea or vomiting.   Your incision is red, swollen, or draining pus.   You have questions or concerns about your condition or care.  Self-care:       Resume your normal diet.  Keep your dressings dry. Do not take a shower or swim. You may take a tub bath, but do not get your dressings wet. Water in your wound can cause bacteria to grow and cause an infection. If your dressing gets wet, dry it off and cover it with dry sterile gauze. Call your healthcare provider. Do not use soaps or ointments.  Do not change your dressings. Your healthcare provider or dialysis nurse will change your dressings. Your dressings should stay in place until your healthcare provider removes them. The dressing on your chest will stay as long as you have the catheter in place. The dressing prevents infection.    Do not remove the red and blue caps from the end of your catheter. The caps prevent air from getting  into your catheter.  Follow up with your healthcare provider as directed: Write down your questions so you remember to ask them during your visits.                Moderate Sedation   WHAT YOU NEED TO KNOW:   Moderate sedation, or conscious sedation, is medicine used during procedures to help you feel relaxed and calm. You will be awake and able to follow directions without anxiety or pain. You will remember little to none of the procedure. You may feel tired, weak, or unsteady on your feet after you get sedation. You may also have trouble concentrating or short-term memory loss. These symptoms should go away in 24 hours or less.   DISCHARGE INSTRUCTIONS:   Call 911 or have someone else call for any of the following:   You have sudden trouble breathing.     You cannot be woken.  Seek care immediately if:   You have a severe headache or dizziness.     Your heart is beating faster than usual.  Contact your healthcare provider if:   You have a fever.     You have nausea or are vomiting for more than 8 hours after the procedure.      Your skin is itchy, swollen, or you have a rash.     You have questions or concerns about your condition or care.  Self-care:   Have someone stay with you for 24 hours. This person can drive you to errands and help you do things around the house. This person can also watch for problems.      Rest and do quiet activities for 24 hours. Do not exercise, ride a bike, or play sports. Stand up slowly to prevent dizziness and falls. Take short walks around the house with another person. Slowly return to your usual activities the next day.      Do not drive or use dangerous machines or tools for 24 hours. You may injure yourself or others. Examples include a lawnmower, saw, or drill. Do not return to work for 24 hours if you use dangerous machines or tools for work.      Do not make important decisions for 24 hours. For example, do not sign important papers or invest money.      Drink liquids as  directed. Liquids help flush the sedation medicine out of your body. Ask how much liquid to drink each day and which liquids are best for you.      Eat small, frequent meals to prevent nausea and vomiting. Start with clear liquids such as juice or broth. If you do not vomit after clear liquids, you can eat your usual foods.      Do not drink alcohol or take medicines that make you drowsy. This includes medicines that help you sleep and anxiety medicines. Ask your healthcare provider if it is safe for you to take pain medicine.  Follow up with your healthcare provider as directed: Write down your questions so you remember to ask them during your visits.   © 2017 [employer] Information is for End User's use only and may not be sold, redistributed or otherwise used for commercial purposes. All illustrations and images included in CareNotes® are the copyrighted property of [employer] or [employer].  The above information is an  only. It is not intended as medical advice for individual conditions or treatments. Talk to your doctor, nurse or pharmacist before following any medical regimen to see if it is safe and effective for you.

## 2024-06-01 NOTE — SEDATION DOCUMENTATION
Tunneled dialysis catheter placed by Dr. Lane. Patient tolerated well and vss throughout procedure.  .IR Procedure Bedrest Start Time is 1605.  Patient transported back to unit and report given to Melissa BRIONES.

## 2024-06-01 NOTE — ASSESSMENT & PLAN NOTE
Patient with history of chronic kidney disease stage V, coming in after outpatient labs demonstrating acute renal failure.  BUN elevated to 129, creatinine 31.72.  Also having severe anemia 5.1 to receive blood on admission.  Has had previous biopsy which was sent to [place] for evaluation, unable to find final diagnosis.  Has been documented that this is secondary to hypertensive disease.  Current acute failure without obvious inciting event other than acute anemia, but may just be progression of disease.    Plan:  Nephrology consulted appreciate recommendation  IR consulted: Patient is n.p.o. for planned tunneled dialysis catheter placement today.  To receive dialysis once the catheter has been placed.  Continuing home medications for now  Continue to monitor for uremic symptoms  Bicarb drip 75/h

## 2024-06-01 NOTE — QUICK NOTE
Discussed with Dr. Lane, with IR. Dialysis line to be placed later today. WIll plan on new start on dialysis tomorrow, timing to be determined with coordination of HD nurse.     Thank you

## 2024-06-01 NOTE — NURSING NOTE
Unable to get labs. Multiple attempts have been made. ICU was consulted and they failed as well. Venous access consult now placed

## 2024-06-02 ENCOUNTER — APPOINTMENT (INPATIENT)
Dept: RADIOLOGY | Facility: HOSPITAL | Age: 29
DRG: 469 | End: 2024-06-02
Payer: MEDICARE

## 2024-06-02 ENCOUNTER — APPOINTMENT (INPATIENT)
Dept: DIALYSIS | Facility: HOSPITAL | Age: 29
DRG: 469 | End: 2024-06-02
Attending: INTERNAL MEDICINE
Payer: MEDICARE

## 2024-06-02 PROBLEM — N18.6 ESRD (END STAGE RENAL DISEASE) (HCC): Status: ACTIVE | Noted: 2024-05-31

## 2024-06-02 LAB
ABO GROUP BLD BPU: NORMAL
ALBUMIN SERPL BCP-MCNC: 4 G/DL (ref 3.5–5)
ALP SERPL-CCNC: 40 U/L (ref 34–104)
ALT SERPL W P-5'-P-CCNC: 3 U/L (ref 7–52)
ANION GAP SERPL CALCULATED.3IONS-SCNC: 17 MMOL/L (ref 4–13)
ANION GAP SERPL CALCULATED.3IONS-SCNC: 18 MMOL/L (ref 4–13)
AST SERPL W P-5'-P-CCNC: 7 U/L (ref 13–39)
BILIRUB SERPL-MCNC: 0.35 MG/DL (ref 0.2–1)
BPU ID: NORMAL
BUN SERPL-MCNC: 124 MG/DL (ref 5–25)
BUN SERPL-MCNC: 125 MG/DL (ref 5–25)
CALCIUM SERPL-MCNC: 8.9 MG/DL (ref 8.4–10.2)
CALCIUM SERPL-MCNC: 9 MG/DL (ref 8.4–10.2)
CHLORIDE SERPL-SCNC: 97 MMOL/L (ref 96–108)
CHLORIDE SERPL-SCNC: 98 MMOL/L (ref 96–108)
CO2 SERPL-SCNC: 23 MMOL/L (ref 21–32)
CO2 SERPL-SCNC: 24 MMOL/L (ref 21–32)
CREAT SERPL-MCNC: 32.33 MG/DL (ref 0.6–1.3)
CREAT SERPL-MCNC: 32.76 MG/DL (ref 0.6–1.3)
CROSSMATCH: NORMAL
CYSTATIN C SERPL-MCNC: 5.47 MG/L (ref 0.6–1)
ERYTHROCYTE [DISTWIDTH] IN BLOOD BY AUTOMATED COUNT: 15.9 % (ref 11.6–15.1)
ERYTHROCYTE [DISTWIDTH] IN BLOOD BY AUTOMATED COUNT: 15.9 % (ref 11.6–15.1)
GFR SERPL CREATININE-BSD FRML MDRD: 1 ML/MIN/1.73SQ M
GFR SERPL CREATININE-BSD FRML MDRD: 1 ML/MIN/1.73SQ M
GFR/BSA.PRED SERPLBLD CYS-BASED-ARV: 9 ML/MIN/1.73
GLUCOSE SERPL-MCNC: 106 MG/DL (ref 65–140)
GLUCOSE SERPL-MCNC: 95 MG/DL (ref 65–140)
HBV CORE AB SER QL: NORMAL
HBV SURFACE AB SER-ACNC: 9.29 MIU/ML
HBV SURFACE AG SER QL: NORMAL
HCT VFR BLD AUTO: 22.6 % (ref 34.8–46.1)
HCT VFR BLD AUTO: 22.9 % (ref 34.8–46.1)
HCV AB SER QL: NORMAL
HGB BLD-MCNC: 7.4 G/DL (ref 11.5–15.4)
HGB BLD-MCNC: 7.5 G/DL (ref 11.5–15.4)
HIV 1+2 AB+HIV1 P24 AG SERPL QL IA: NORMAL
HIV 2 AB SERPL QL IA: NORMAL
HIV1 AB SERPL QL IA: NORMAL
HIV1 P24 AG SERPL QL IA: NORMAL
INR PPP: 1.1 (ref 0.84–1.19)
MAGNESIUM SERPL-MCNC: 1.8 MG/DL (ref 1.9–2.7)
MCH RBC QN AUTO: 24.2 PG (ref 26.8–34.3)
MCH RBC QN AUTO: 24.8 PG (ref 26.8–34.3)
MCHC RBC AUTO-ENTMCNC: 32.3 G/DL (ref 31.4–37.4)
MCHC RBC AUTO-ENTMCNC: 33.2 G/DL (ref 31.4–37.4)
MCV RBC AUTO: 75 FL (ref 82–98)
MCV RBC AUTO: 75 FL (ref 82–98)
PHOSPHATE SERPL-MCNC: 8.1 MG/DL (ref 2.7–4.5)
PLATELET # BLD AUTO: 132 THOUSANDS/UL (ref 149–390)
PLATELET # BLD AUTO: 144 THOUSANDS/UL (ref 149–390)
PMV BLD AUTO: 10.3 FL (ref 8.9–12.7)
PMV BLD AUTO: 11.8 FL (ref 8.9–12.7)
POTASSIUM SERPL-SCNC: 4.3 MMOL/L (ref 3.5–5.3)
POTASSIUM SERPL-SCNC: 4.4 MMOL/L (ref 3.5–5.3)
PROT SERPL-MCNC: 6.3 G/DL (ref 6.4–8.4)
PROTHROMBIN TIME: 14.9 SECONDS (ref 11.6–14.5)
RBC # BLD AUTO: 3.03 MILLION/UL (ref 3.81–5.12)
RBC # BLD AUTO: 3.06 MILLION/UL (ref 3.81–5.12)
SODIUM SERPL-SCNC: 138 MMOL/L (ref 135–147)
SODIUM SERPL-SCNC: 139 MMOL/L (ref 135–147)
UNIT DISPENSE STATUS: NORMAL
UNIT PRODUCT CODE: NORMAL
UNIT PRODUCT VOLUME: 350 ML
UNIT RH: NORMAL
WBC # BLD AUTO: 8.92 THOUSAND/UL (ref 4.31–10.16)
WBC # BLD AUTO: 9.38 THOUSAND/UL (ref 4.31–10.16)

## 2024-06-02 PROCEDURE — 90935 HEMODIALYSIS ONE EVALUATION: CPT | Performed by: INTERNAL MEDICINE

## 2024-06-02 PROCEDURE — 99232 SBSQ HOSP IP/OBS MODERATE 35: CPT | Performed by: HOSPITALIST

## 2024-06-02 PROCEDURE — 85027 COMPLETE CBC AUTOMATED: CPT

## 2024-06-02 PROCEDURE — 84100 ASSAY OF PHOSPHORUS: CPT

## 2024-06-02 PROCEDURE — 80048 BASIC METABOLIC PNL TOTAL CA: CPT

## 2024-06-02 PROCEDURE — 70360 X-RAY EXAM OF NECK: CPT

## 2024-06-02 PROCEDURE — 85610 PROTHROMBIN TIME: CPT

## 2024-06-02 PROCEDURE — 83735 ASSAY OF MAGNESIUM: CPT

## 2024-06-02 PROCEDURE — 71046 X-RAY EXAM CHEST 2 VIEWS: CPT

## 2024-06-02 PROCEDURE — 5A1D70Z PERFORMANCE OF URINARY FILTRATION, INTERMITTENT, LESS THAN 6 HOURS PER DAY: ICD-10-PCS | Performed by: INTERNAL MEDICINE

## 2024-06-02 PROCEDURE — 80053 COMPREHEN METABOLIC PANEL: CPT | Performed by: HOSPITALIST

## 2024-06-02 RX ORDER — LIDOCAINE 50 MG/G
1 PATCH TOPICAL ONCE
Status: COMPLETED | OUTPATIENT
Start: 2024-06-02 | End: 2024-06-02

## 2024-06-02 RX ORDER — HEPARIN SODIUM 5000 [USP'U]/ML
5000 INJECTION, SOLUTION INTRAVENOUS; SUBCUTANEOUS EVERY 8 HOURS SCHEDULED
Status: DISCONTINUED | OUTPATIENT
Start: 2024-06-02 | End: 2024-06-04 | Stop reason: HOSPADM

## 2024-06-02 RX ORDER — ACETAMINOPHEN 325 MG/1
650 TABLET ORAL EVERY 6 HOURS PRN
Status: DISCONTINUED | OUTPATIENT
Start: 2024-06-02 | End: 2024-06-04 | Stop reason: HOSPADM

## 2024-06-02 RX ORDER — DESMOPRESSIN ACETATE 4 UG/ML
INJECTION, SOLUTION INTRAVENOUS; SUBCUTANEOUS
Status: CANCELLED | OUTPATIENT
Start: 2024-06-02

## 2024-06-02 RX ADMIN — SODIUM BICARBONATE 1300 MG: 650 TABLET ORAL at 09:16

## 2024-06-02 RX ADMIN — ACETAMINOPHEN 650 MG: 325 TABLET ORAL at 02:17

## 2024-06-02 RX ADMIN — LABETALOL HYDROCHLORIDE 100 MG: 100 TABLET, FILM COATED ORAL at 17:36

## 2024-06-02 RX ADMIN — SEVELAMER HYDROCHLORIDE 800 MG: 800 TABLET ORAL at 13:56

## 2024-06-02 RX ADMIN — HEPARIN SODIUM 5000 UNITS: 5000 INJECTION INTRAVENOUS; SUBCUTANEOUS at 21:27

## 2024-06-02 RX ADMIN — FLUTICASONE FUROATE AND VILANTEROL TRIFENATATE 1 PUFF: 100; 25 POWDER RESPIRATORY (INHALATION) at 09:17

## 2024-06-02 RX ADMIN — DICLOFENAC SODIUM 2 G: 10 GEL TOPICAL at 21:27

## 2024-06-02 RX ADMIN — DESMOPRESSIN ACETATE 30.8 MCG: 4 INJECTION, SOLUTION INTRAVENOUS; SUBCUTANEOUS at 10:33

## 2024-06-02 RX ADMIN — AMLODIPINE BESYLATE 5 MG: 5 TABLET ORAL at 09:17

## 2024-06-02 RX ADMIN — FLUTICASONE PROPIONATE 1 SPRAY: 50 SPRAY, METERED NASAL at 09:17

## 2024-06-02 RX ADMIN — LORATADINE 10 MG: 10 TABLET ORAL at 09:17

## 2024-06-02 RX ADMIN — HEPARIN SODIUM 5000 UNITS: 5000 INJECTION INTRAVENOUS; SUBCUTANEOUS at 15:38

## 2024-06-02 RX ADMIN — HEPARIN SODIUM 5000 UNITS: 5000 INJECTION INTRAVENOUS; SUBCUTANEOUS at 10:33

## 2024-06-02 RX ADMIN — AMLODIPINE BESYLATE 5 MG: 5 TABLET ORAL at 17:36

## 2024-06-02 RX ADMIN — FERROUS SULFATE TAB 325 MG (65 MG ELEMENTAL FE) 325 MG: 325 (65 FE) TAB at 09:16

## 2024-06-02 RX ADMIN — CALCITRIOL 0.5 MCG: 0.25 CAPSULE, LIQUID FILLED ORAL at 09:18

## 2024-06-02 RX ADMIN — LIDOCAINE 1 PATCH: 50 PATCH CUTANEOUS at 01:32

## 2024-06-02 RX ADMIN — SEVELAMER HYDROCHLORIDE 800 MG: 800 TABLET ORAL at 09:16

## 2024-06-02 RX ADMIN — LABETALOL HYDROCHLORIDE 100 MG: 100 TABLET, FILM COATED ORAL at 09:16

## 2024-06-02 RX ADMIN — SEVELAMER HYDROCHLORIDE 800 MG: 800 TABLET ORAL at 15:38

## 2024-06-02 RX ADMIN — DICLOFENAC SODIUM 2 G: 10 GEL TOPICAL at 09:18

## 2024-06-02 NOTE — ASSESSMENT & PLAN NOTE
Patient with history of chronic kidney disease stage V, coming in after outpatient labs demonstrating acute renal failure.  BUN elevated to 129, creatinine 31.72.  Also having severe anemia 5.1 to receive blood on admission.  Has had previous biopsy which was sent to [place] for evaluation, unable to find final diagnosis.  Has been documented that this is secondary to hypertensive disease.  Current acute failure without obvious inciting event other than acute anemia, but may just be progression of disease.    - PermCath placed on 6/1.  -Overnight, permacath site bleeding which has resolved with pressure dressing.  Discussed with IR and nephrology, DDAVP ordered for likely platelet dysfunction.    Plan:  Nephrology consulted appreciate recommendation  Planning for first HD today.  1 dose 0.3 mcg/kg DDAVP to be given.  Bicarb 75 cc for however can discontinue after hemodialysis.  Continuing home medications for now  Continue to monitor for uremic symptoms

## 2024-06-02 NOTE — PROGRESS NOTES
NEPHROLOGY PROGRESS NOTE   Hollynory Simeon 29 y.o. female MRN: [id number]  Unit/Bed#: S -01 Encounter: [id number]    ASSESSMENT & PLAN:   29 y.o. female with history of chronic kidney disease stage V, hypertension who was admitted to [facility] after presenting with complaint of generalized weakness and fatigue as well as metallic taste. .  On outpatient blood work was found to have creatinine trending up to 32.7 and  along with metabolic acidosis.  Also found to have severe anemia for which she needed blood transfusion and so was sent to the ED for PRBC as well as initiation of dialysis     Chronic kidney disease stage V, now likely ESRD on HD  -Chronic kidney disease likely due to hypertensive arteriolar nephrosclerosis and chronic tubulointerstitial nephropathy.  Prior serological workup showed slightly elevated NE-3 but other test were negative.  Status post kidney biopsy in the past suggestive of diffuse global glomerulosclerosis which was severe along with severe tubular atrophy interstitial fibrosis and interstitial inflammation.  -Patient being evaluated by Dr. Mac for kidney transplant  -She has been refusing kidney dialysis and so does not have any permanent access for dialysis  -Since January 2024 creatinine has been around 22 mg/dL but now worsened to 32 mg/dL and with worsening uremic symptoms in terms of generalized weakness fatigue and so patient agreeable to initiation of renal replacement therapy.  Consent was obtained after discussion about the risk and benefit, signed and placed in the chart.    -PermCath was placed by IR on 6/1.  Patient having bleeding from PermCath exit site and so received DDAVP today.  Will need dressing change possibly after hemodialysis treatment tomorrow.  Most likely bleeding due to platelet dysfunction as per discussion with IR by primary team.  -Patient was seen and examined on hemodialysis treatment, tolerating HD.  Reviewed vitals,  dialysis prescription and discussed with hemodialysis nurse  HD prescription:   Time:  2 hours  Sodium:  138   Blood flow (ml/min):  200   Dialyzer: F  160 Potassium:  3.0 Dialysate flow(ml/min):  35   Access:  permcath  Bicarbonate:  35 Ultrafiltration goal (kg):  even   Medications on HD:      -Next hemodialysis treatment tomorrow for second treatment.  Will need outpatient arrangement for HD    Primary hypertension  -Blood pressure acceptable.  Continue current treatment with amlodipine 5 mg twice daily, labetalol 100 mg twice daily.  Avoid hypotension.  If blood pressure remains persistently elevated can increase the dose of labetalol     Metabolic acidosis: Bicarb level stable at 24, was on sodium bicarbonate tablet but now started on hemodialysis treatments.  So stopped further sodium bicarbonate tablet     Hyperphosphatemia: Phosphorus level elevated at 8.1 mg/dL likely due to advanced CKD.  Continue sevelamer at current dose at 800 mg 3 times daily, expect phosphorus level to improve with initiation of HD.  If phosphorus level does not improve may need to increase the dose of binders    Hypomagnesemia: Magnesium level 1.8-repl, now likely ESRD on HD aced with IV magnesium sulfate     Secondary hyperparathyroidism of renal origin: On calcitriol monitor PTH level as outpatient or later this week     Anemia due to advanced CKD stage V  -Prior iron saturation was 60% in March 2024  -Status post PRBC-multiple PRBCs  -Hemoglobin on admission was 5.1 g/dL   -Hemoglobin improved to 7.5 g/dL, patient was receiving outpatient Epogen.  Started on  Procrit 8000 units with HD starting tomorrow.     Discussed with primary team regarding starting on intermittent HD treatment today and agreed with the plan    SUBJECTIVE:  Bleeding from site of PermCath.  No shortness of breath    OBJECTIVE:  Current Weight:    Vitals:    06/02/24 1145   BP: 145/84   Pulse: 88   Resp: 18   Temp:    SpO2: 98%       Intake/Output Summary (Last  24 hours) at 6/2/2024 1219  Last data filed at 6/2/2024 1131  Gross per 24 hour   Intake 1910.42 ml   Output --   Net 1910.42 ml       Physical Exam  General:  Ill looking, awake.  Eyes: Conjunctivae pink,  Sclera anicteric  ENT: lips and mucous membranes moist  Neck: supple   Chest: Clear to Auscultation both lungs,  no crackles, ronchus or wheezing.  CVS: S1 & S2 present, normal rate, regular rhythm, no murmur.  Abdomen: soft, non-tender, non-distended, Bowel sounds normoactive  Extremities: no edema of  legs  Skin: no rash  Neuro: awake, alert, oriented x 3   Psych: Mood and affect appropriate      Medications:    Current Facility-Administered Medications:     acetaminophen (TYLENOL) tablet 650 mg, 650 mg, Oral, Q6H PRN, Lennie White MD, 650 mg at 06/02/24 0217    albuterol inhalation solution 2.5 mg, 2.5 mg, Nebulization, Q6H PRN, Sohan Squires MD    amLODIPine (NORVASC) tablet 5 mg, 5 mg, Oral, BID, Sohan Squires MD, 5 mg at 06/02/24 0917    calcitriol (ROCALTROL) capsule 0.5 mcg, 0.5 mcg, Oral, Daily, Sohan Squires MD, 0.5 mcg at 06/02/24 0918    Diclofenac Sodium (VOLTAREN) 1 % topical gel 2 g, 2 g, Topical, 4x Daily, Sohan Squires MD, 2 g at 06/02/24 0918    ferrous sulfate tablet 325 mg, 325 mg, Oral, Daily With Breakfast, Sohan Squires MD, 325 mg at 06/02/24 0916    fluticasone (FLONASE) 50 mcg/act nasal spray 1 spray, 1 spray, Nasal, Daily, Sohan Squires MD, 1 spray at 06/02/24 0917    Fluticasone Furoate-Vilanterol 100-25 mcg/actuation 1 puff, 1 puff, Inhalation, Daily, Sohan Squires MD, 1 puff at 06/02/24 0917    heparin (porcine) subcutaneous injection 5,000 Units, 5,000 Units, Subcutaneous, Q8H LAMONT, David Cano MD, 5,000 Units at 06/02/24 1033    labetalol (NORMODYNE) tablet 100 mg, 100 mg, Oral, BID, Sohan Suqires MD, 100 mg at 06/02/24 0916    lidocaine (LIDODERM) 5 % patch 1 patch, 1 patch, Topical, Once, Lennie White MD, 1  "patch at 06/02/24 0132    loratadine (CLARITIN) tablet 10 mg, 10 mg, Oral, Daily, Sohan Squires MD, 10 mg at 06/02/24 0917    ondansetron (ZOFRAN) injection 4 mg, 4 mg, Intravenous, Q6H PRN, Sohan Squires MD    sevelamer (RENAGEL) tablet 800 mg, 800 mg, Oral, TID With Meals, Sohan Squires MD, 800 mg at 06/02/24 0916    sodium bicarbonate 150 mEq in dextrose 5 % 1,000 mL infusion, 75 mL/hr, Intravenous, Continuous, Sohan Squires MD, Last Rate: 75 mL/hr at 05/31/24 2326, 75 mL/hr at 05/31/24 2326    sodium bicarbonate tablet 1,300 mg, 1,300 mg, Oral, BID after meals, Sohan Squires MD, 1,300 mg at 06/02/24 0916    Invasive Devices:        Lab Results:   Results from last 7 days   Lab Units 06/02/24  0604 06/02/24  0105 06/02/24  0053 06/01/24  2203 06/01/24  1904 06/01/24  0851 05/31/24  0916   WBC Thousand/uL 9.38  --  8.92  --   --  9.46 9.08   HEMOGLOBIN g/dL 7.5*  --  7.4*  --  7.7* 6.6* 5.1*   HEMATOCRIT % 22.6*  --  22.9*  --  23.2* 20.7* 16.5*   PLATELETS Thousands/uL 144*  --  132*  --   --  151 148*   POTASSIUM mmol/L 4.4 4.3  --   --   --  4.7 4.6   CHLORIDE mmol/L 97 98  --   --   --  99 98   CO2 mmol/L 24 23  --   --   --  21 21   BUN mg/dL 125* 124*  --   --   --  127* 129*   CREATININE mg/dL 32.33* 32.76*  --   --   --  32.72* 31.72*   CALCIUM mg/dL 9.0 8.9  --   --   --  9.3 9.8   MAGNESIUM mg/dL 1.8*  --   --   --   --  1.7*  --    PHOSPHORUS mg/dL 8.1*  --   --   --   --  9.8* 10.4*   ALK PHOS U/L  --  40  --   --   --   --  51   ALT U/L  --  3*  --  4*  --   --  5*   AST U/L  --  7*  --   --   --   --  8*       Previous work up:  Chest x-ray without any acute cardiopulmonary disease      Portions of the record may have been created with voice recognition software. Occasional wrong word or \"sound a like\" substitutions may have occurred due to the inherent limitations of voice recognition software. Read the chart carefully and recognize, using context, where substitutions have " occurred.If you have any questions, please contact the dictating provider.

## 2024-06-02 NOTE — ASSESSMENT & PLAN NOTE
Lab Results   Component Value Date    EGFR 1 06/02/2024    EGFR 1 06/02/2024    EGFR 1 06/01/2024    CREATININE 32.33 (H) 06/02/2024    CREATININE 32.76 (H) 06/02/2024    CREATININE 32.72 (H) 06/01/2024   History of CKD 5, now ESRD.

## 2024-06-02 NOTE — PROGRESS NOTES
[facility]  Progress Note  Name: Raven Simeon I  MRN: [id number]  Unit/Bed#: S -01 I Date of Admission: 5/31/2024   Date of Service: 6/2/2024 I Hospital Day: 2    Assessment & Plan   * Acute renal failure (HCC)  Assessment & Plan  Patient with history of chronic kidney disease stage V, coming in after outpatient labs demonstrating acute renal failure.  BUN elevated to 129, creatinine 31.72.  Also having severe anemia 5.1 to receive blood on admission.  Has had previous biopsy which was sent to [facility] for evaluation, unable to find final diagnosis.  Has been documented that this is secondary to hypertensive disease.  Current acute failure without obvious inciting event other than acute anemia, but may just be progression of disease.    - PermCath placed on 6/1.  -Overnight, permacath site bleeding which has resolved with pressure dressing.  Discussed with IR and nephrology, DDAVP ordered for likely platelet dysfunction.    Plan:  Nephrology consulted appreciate recommendation  Planning for first HD today.  1 dose 0.3 mcg/kg DDAVP to be given.  Bicarb 75 cc for however can discontinue after hemodialysis.  Continuing home medications for now  Continue to monitor for uremic symptoms    ESRD (end stage renal disease) (HCC)  Assessment & Plan  Lab Results   Component Value Date    EGFR 1 06/02/2024    EGFR 1 06/02/2024    EGFR 1 06/01/2024    CREATININE 32.33 (H) 06/02/2024    CREATININE 32.76 (H) 06/02/2024    CREATININE 32.72 (H) 06/01/2024   History of CKD 5, now ESRD.    Hyperphosphatemia  Assessment & Plan  Lab Results   Component Value Date    PHOS 8.1 (H) 06/02/2024    PHOS 9.8 (H) 06/01/2024    PHOS 10.4 (H) 05/31/2024   Hyperphosphatemia in the setting of acute renal failure and CKD, continuing Renvela  Remainder of plan as above under acute renal failure.    Acute on chronic anemia  Assessment & Plan  Lab Results   Component Value Date    HGB 7.5 (L) 06/02/2024    HGB  7.4 (L) 06/02/2024    HGB 7.7 (L) 06/01/2024   Patient with chronic anemia baseline hovering between 6-8, most likely secondary to chronic kidney disease.  No active bleeding at this point.  Status post 2 units of PRBC overnight, hemoglobin improved to 6.6.  Patient endorses some improvement fatigue.      Plan:  Continue to trend CBC  Continue iron supplementation    Increased anion gap metabolic acidosis  Assessment & Plan  Increased anion gap metabolic acidosis, in the setting of acute renal failure, likely secondary to increased BUN.  No current infectious symptoms and white count within normal range    Plan:  Continue outpatient sodium bicarb tabs  Remainder of plan as above    Hypertension  Assessment & Plan  140-150's  systolic on home medications amlodipine and labetalol.    Plan:  Continue amlodipine and labetalol  Continue to monitor blood pressure  monitor BP             VTE Pharmacologic Prophylaxis: VTE Score: 4 Moderate Risk (Score 3-4) - Pharmacological DVT Prophylaxis Ordered: heparin.    Mobility:   Basic Mobility Inpatient Raw Score: 24  JH-HLM Goal: 8: Walk 250 feet or more  JH-HLM Achieved: 7: Walk 25 feet or more  JH-HLM Goal NOT achieved. Continue with multidisciplinary rounding and encourage appropriate mobility to improve upon JH-HLM goals.    Patient Centered Rounds: I performed bedside rounds with nursing staff today.  Discussions with Specialists or Other Care Team Provider: IR, nephrology    Education and Discussions with Family / Patient: Updated  (mother) via phone.    Current Length of Stay: 2 day(s)  Current Patient Status: Inpatient   Discharge Plan: Anticipate discharge in 48-72 hrs to home.    Code Status: Level 1 - Full Code    Subjective:   Patient was seen and examined at bedside this morning.  OOA x 4, NAD.  Overnight, patient permacath site was wheezing which has since stopped with pressure dressing.  Personally reviewed neck tissue x-ray-per my reading, cath  still in SVC/right atrium.  Reported neck pain.  Denied any shortness of breath, palpitation, chest pain, nausea vomiting diarrhea, abdominal pain.  Urinating okay.    I reach out to IR at nephrology regarding permacath site bleeding.  Suspected this is secondary to [place] dysfunction in the setting of uremia-DDAVP ordered.  Plan unchanged - proceed for hemodialysis today.    Objective:     Vitals:   Temp (24hrs), Av.8 °F (36.6 °C), Min:97.5 °F (36.4 °C), Max:98.3 °F (36.8 °C)    Temp:  [97.5 °F (36.4 °C)-98.3 °F (36.8 °C)] 97.5 °F (36.4 °C)  HR:  [] 88  Resp:  [16-21] 18  BP: (137-183)/(74-91) 145/84  SpO2:  [93 %-100 %] 98 %  There is no height or weight on file to calculate BMI.     Input and Output Summary (last 24 hours):     Intake/Output Summary (Last 24 hours) at 2024 1220  Last data filed at 2024 1131  Gross per 24 hour   Intake 1910.42 ml   Output --   Net 1910.42 ml       Physical Exam:   Physical Exam  Vitals reviewed.   Constitutional:       General: She is not in acute distress.     Appearance: Normal appearance. She is obese. She is not ill-appearing.   HENT:      Head: Normocephalic and atraumatic.      Mouth/Throat:      Mouth: Mucous membranes are moist.      Pharynx: Oropharynx is clear.   Eyes:      General: No scleral icterus.     Extraocular Movements: Extraocular movements intact.      Conjunctiva/sclera: Conjunctivae normal.   Neck:      Comments: Right permacath.  Pressure dressing.  No active oozing.  Blood clots around permacath clips.  Cardiovascular:      Rate and Rhythm: Normal rate and regular rhythm.      Pulses: Normal pulses.      Heart sounds: Murmur heard.   Pulmonary:      Effort: Pulmonary effort is normal. No respiratory distress.      Breath sounds: No wheezing, rhonchi or rales.   Abdominal:      General: Bowel sounds are normal. There is no distension.      Palpations: Abdomen is soft. There is no mass.      Tenderness: There is no abdominal tenderness.  There is no guarding or rebound.   Musculoskeletal:         General: No swelling.      Cervical back: Normal range of motion and neck supple. Tenderness present.      Right lower leg: No edema.      Left lower leg: No edema.   Skin:     General: Skin is warm.      Coloration: Skin is not jaundiced.      Findings: No bruising.   Neurological:      Mental Status: She is alert and oriented to person, place, and time. Mental status is at baseline.   Psychiatric:         Mood and Affect: Mood normal.         Behavior: Behavior normal.         Thought Content: Thought content normal.         Judgment: Judgment normal.          Additional Data:     Labs:  Results from last 7 days   Lab Units 06/02/24  0604   WBC Thousand/uL 9.38   HEMOGLOBIN g/dL 7.5*   HEMATOCRIT % 22.6*   PLATELETS Thousands/uL 144*     Results from last 7 days   Lab Units 06/02/24  0604 06/02/24  0105   SODIUM mmol/L 139 138   POTASSIUM mmol/L 4.4 4.3   CHLORIDE mmol/L 97 98   CO2 mmol/L 24 23   BUN mg/dL 125* 124*   CREATININE mg/dL 32.33* 32.76*   ANION GAP mmol/L 18* 17*   CALCIUM mg/dL 9.0 8.9   ALBUMIN g/dL  --  4.0   TOTAL BILIRUBIN mg/dL  --  0.35   ALK PHOS U/L  --  40   ALT U/L  --  3*   AST U/L  --  7*   GLUCOSE RANDOM mg/dL 95 106     Results from last 7 days   Lab Units 06/02/24  0053   INR  1.10                   Lines/Drains:  Invasive Devices       Central Venous Catheter Line  Duration             CVC Central Lines 05/31/24 Double 1 day              Peripheral Intravenous Line  Duration             Peripheral IV 05/31/24 Left;Ventral (anterior) Forearm 1 day              Hemodialysis Catheter  Duration             HD Permanent Double Catheter <1 day                    Central Line:  Goal for removal:  HD port             Imaging: Reviewed radiology reports from this admission including:    XR chest pa & lateral   Final Result by Codie Velasquez MD (06/02 0618)      No acute cardiopulmonary disease.            Workstation performed:  [id number]         XR neck soft tissue   Final Result by Geovanni Del Toro MD (06/02 0944)      Unremarkable neck soft tissue radiograph.      Resident: TONYA LARIOS I, the attending radiologist, have reviewed the images and agree with the final report above.      Workstation performed: [id number]         IR tunneled dialysis catheter placement   Final Result by Madeleine Lane MD (06/01 1802)   Impression:   1. Successful ultrasound and fluoroscopically guided placement of a 24 cm Titan cath via the right internal jugular vein. The tip of the catheter is in the right atrium and may be used immediately.               Workstation performed: [id number]             Recent Cultures (last 7 days):         Last 24 Hours Medication List:   Current Facility-Administered Medications   Medication Dose Route Frequency Provider Last Rate    acetaminophen  650 mg Oral Q6H PRN Lennie White MD      albuterol  2.5 mg Nebulization Q6H PRN Sohan Squires MD      amLODIPine  5 mg Oral BID Sohan Squires MD      calcitriol  0.5 mcg Oral Daily Sohan Squires MD      Diclofenac Sodium  2 g Topical 4x Daily Sohan Squires MD      ferrous sulfate  325 mg Oral Daily With Breakfast Sohan Squires MD      fluticasone  1 spray Nasal Daily Sohan Squires MD      Fluticasone Furoate-Vilanterol  1 puff Inhalation Daily Sohan Squires MD      heparin (porcine)  5,000 Units Subcutaneous Q8H LAMONT Cano MD      labetalol  100 mg Oral BID Sohan Squires MD      lidocaine  1 patch Topical Once Lennie White MD      loratadine  10 mg Oral Daily Sohan Squires MD      ondansetron  4 mg Intravenous Q6H PRN Sohan Squires MD      sevelamer  800 mg Oral TID With Meals Sohan Squries MD      sodium bicarbonate 150 mEq in dextrose 5 % 1,000 mL infusion  75 mL/hr Intravenous Continuous Sohan Squires MD 75 mL/hr (05/31/24 9551)    sodium bicarbonate  1,300 mg Oral BID  after meals Sohan Squires MD          Today, Patient Was Seen By: David Cano MD    **Please Note: This note may have been constructed using a voice recognition system.**

## 2024-06-02 NOTE — ASSESSMENT & PLAN NOTE
Lab Results   Component Value Date    PHOS 8.1 (H) 06/02/2024    PHOS 9.8 (H) 06/01/2024    PHOS 10.4 (H) 05/31/2024   Hyperphosphatemia in the setting of acute renal failure and CKD, continuing Renvela  Remainder of plan as above under acute renal failure.

## 2024-06-02 NOTE — ASSESSMENT & PLAN NOTE
Lab Results   Component Value Date    HGB 7.5 (L) 06/02/2024    HGB 7.4 (L) 06/02/2024    HGB 7.7 (L) 06/01/2024   Patient with chronic anemia baseline hovering between 6-8, most likely secondary to chronic kidney disease.  No active bleeding at this point.  Status post 2 units of PRBC overnight, hemoglobin improved to 6.6.  Patient endorses some improvement fatigue.      Plan:  Continue to trend CBC  Continue iron supplementation

## 2024-06-02 NOTE — PLAN OF CARE
Post-Dialysis RN Treatment Note    Blood Pressure:  Pre 159/89 mm/Hg  Post 147/83 mmHg   EDW  TBD kg    Weight:  Pre 104.4 kg   Post 104.4 kg   Mode of weight measurement: Standing Scale   Volume Removed  0 ml    Treatment duration 120 minutes    NS given  No    Treatment shortened? No   Medications given during Rx None Reported   Estimated Kt/V  None Reported   Access type: Permacath/TDC   Access Issues: No    Report called to primary nurse   Yes Phoebe Salgado RN      Tolerated first hemodialysis treatment with no issues.  Next treatment scheduled for tomorrow.    Current hemodialysis plan of care is to remove a total of 500 ml of fluid over a 2 hour treatment for a net of 0 liters as tolerated.  Monitor vital signs every 15 minutes while on treatment for patient safety.  Maintain cardiac monitoring for first treatment.  Utilize a 3 K+ bath for serum potassium of 4.4 to maintain electrolyte balance.  Report received from Phoebe Salgado RN.  Plan reviewed with Dr Pereira at bedside.      Problem: METABOLIC, FLUID AND ELECTROLYTES - ADULT  Goal: Electrolytes maintained within normal limits  Description: INTERVENTIONS:  - Monitor labs and assess patient for signs and symptoms of electrolyte imbalances  - Administer electrolyte replacement as ordered  - Monitor response to electrolyte replacements, including repeat lab results as appropriate  - Instruct patient on fluid and nutrition as appropriate  Outcome: Progressing  Goal: Fluid balance maintained  Description: INTERVENTIONS:  - Monitor labs   - Monitor I/O and WT  - Instruct patient on fluid and nutrition as appropriate  - Assess for signs & symptoms of volume excess or deficit  Outcome: Progressing

## 2024-06-02 NOTE — PLAN OF CARE
Problem: PAIN - ADULT  Goal: Verbalizes/displays adequate comfort level or baseline comfort level  Description: Interventions:  - Encourage patient to monitor pain and request assistance  - Assess pain using appropriate pain scale  - Administer analgesics based on type and severity of pain and evaluate response  - Implement non-pharmacological measures as appropriate and evaluate response  - Consider cultural and social influences on pain and pain management  - Notify physician/advanced practitioner if interventions unsuccessful or patient reports new pain  Outcome: Progressing     Problem: INFECTION - ADULT  Goal: Absence or prevention of progression during hospitalization  Description: INTERVENTIONS:  - Assess and monitor for signs and symptoms of infection  - Monitor lab/diagnostic results  - Monitor all insertion sites, i.e. indwelling lines, tubes, and drains  - Monitor endotracheal if appropriate and nasal secretions for changes in amount and color  - [place] appropriate cooling/warming therapies per order  - Administer medications as ordered  - Instruct and encourage patient and family to use good hand hygiene technique  - Identify and instruct in appropriate isolation precautions for identified infection/condition  Outcome: Progressing  Goal: Absence of fever/infection during neutropenic period  Description: INTERVENTIONS:  - Monitor WBC    Outcome: Progressing     Problem: SAFETY ADULT  Goal: Patient will remain free of falls  Description: INTERVENTIONS:  - Educate patient/family on patient safety including physical limitations  - Instruct patient to call for assistance with activity   - Consult OT/PT to assist with strengthening/mobility   - Keep Call bell within reach  - Keep bed low and locked with side rails adjusted as appropriate  - Keep care items and personal belongings within reach  - Initiate and maintain comfort rounds  - Make Fall Risk Sign visible to staff  - Offer Toileting every 2 Hours,  in advance of need  - Initiate/Maintain bed/chair alarm as needed  - Obtain necessary fall risk management equipment: yellow bracelet/socks as needed  - Apply yellow socks and bracelet for high fall risk patients  - Consider moving patient to room near nurses station  Outcome: Progressing  Goal: Maintain or return to baseline ADL function  Description: INTERVENTIONS:  -  Assess patient's ability to carry out ADLs; assess patient's baseline for ADL function and identify physical deficits which impact ability to perform ADLs (bathing, care of mouth/teeth, toileting, grooming, dressing, etc.)  - Assess/evaluate cause of self-care deficits   - Assess range of motion  - Assess patient's mobility; develop plan if impaired  - Assess patient's need for assistive devices and provide as appropriate  - Encourage maximum independence but intervene and supervise when necessary  - Involve family in performance of ADLs  - Assess for home care needs following discharge   - Consider OT consult to assist with ADL evaluation and planning for discharge  - Provide patient education as appropriate  Outcome: Progressing  Goal: Maintains/Returns to pre admission functional level  Description: INTERVENTIONS:  - Perform AM-PAC 6 Click Basic Mobility/ Daily Activity assessment daily.  - Set and communicate daily mobility goal to care team and patient/family/caregiver.   - Collaborate with rehabilitation services on mobility goals if consulted  - Perform Range of Motion 3 times a day.  - Reposition patient every 2 hours.  - Dangle patient 3 times a day  - Stand patient 3 times a day  - Ambulate patient 3 times a day  - Out of bed to chair 3 times a day   - Out of bed for meals 3 times a day  - Out of bed for toileting  - Record patient progress and toleration of activity level   Outcome: Progressing     Problem: DISCHARGE PLANNING  Goal: Discharge to home or other facility with appropriate resources  Description: INTERVENTIONS:  - Identify  barriers to discharge w/patient and caregiver  - Arrange for needed discharge resources and transportation as appropriate  - Identify discharge learning needs (meds, wound care, etc.)  - Arrange for interpretive services to assist at discharge as needed  - Refer to Case Management Department for coordinating discharge planning if the patient needs post-hospital services based on physician/advanced practitioner order or complex needs related to functional status, cognitive ability, or social support system  Outcome: Progressing     Problem: Knowledge Deficit  Goal: Patient/family/caregiver demonstrates understanding of disease process, treatment plan, medications, and discharge instructions  Description: Complete learning assessment and assess knowledge base.  Interventions:  - Provide teaching at level of understanding  - Provide teaching via preferred learning methods  Outcome: Progressing     Problem: METABOLIC, FLUID AND ELECTROLYTES - ADULT  Goal: Electrolytes maintained within normal limits  Description: INTERVENTIONS:  - Monitor labs and assess patient for signs and symptoms of electrolyte imbalances  - Administer electrolyte replacement as ordered  - Monitor response to electrolyte replacements, including repeat lab results as appropriate  - Instruct patient on fluid and nutrition as appropriate  Outcome: Progressing  Goal: Fluid balance maintained  Description: INTERVENTIONS:  - Monitor labs   - Monitor I/O and WT  - Instruct patient on fluid and nutrition as appropriate  - Assess for signs & symptoms of volume excess or deficit  Outcome: Progressing

## 2024-06-02 NOTE — ASSESSMENT & PLAN NOTE
140-150's  systolic on home medications amlodipine and labetalol.    Plan:  Continue amlodipine and labetalol  Continue to monitor blood pressure  monitor BP

## 2024-06-03 ENCOUNTER — APPOINTMENT (INPATIENT)
Dept: DIALYSIS | Facility: HOSPITAL | Age: 29
DRG: 469 | End: 2024-06-03
Payer: MEDICARE

## 2024-06-03 PROBLEM — E87.29 INCREASED ANION GAP METABOLIC ACIDOSIS: Status: RESOLVED | Noted: 2022-02-28 | Resolved: 2024-06-03

## 2024-06-03 LAB
ANION GAP SERPL CALCULATED.3IONS-SCNC: 13 MMOL/L (ref 4–13)
BUN SERPL-MCNC: 85 MG/DL (ref 5–25)
CALCIUM SERPL-MCNC: 9.4 MG/DL (ref 8.4–10.2)
CHLORIDE SERPL-SCNC: 98 MMOL/L (ref 96–108)
CO2 SERPL-SCNC: 27 MMOL/L (ref 21–32)
CREAT SERPL-MCNC: 21.73 MG/DL (ref 0.6–1.3)
ERYTHROCYTE [DISTWIDTH] IN BLOOD BY AUTOMATED COUNT: 16.7 % (ref 11.6–15.1)
GFR SERPL CREATININE-BSD FRML MDRD: 1 ML/MIN/1.73SQ M
GLUCOSE SERPL-MCNC: 90 MG/DL (ref 65–140)
HCT VFR BLD AUTO: 23.5 % (ref 34.8–46.1)
HGB BLD-MCNC: 7.6 G/DL (ref 11.5–15.4)
MAGNESIUM SERPL-MCNC: 1.8 MG/DL (ref 1.9–2.7)
MCH RBC QN AUTO: 24.6 PG (ref 26.8–34.3)
MCHC RBC AUTO-ENTMCNC: 32.3 G/DL (ref 31.4–37.4)
MCV RBC AUTO: 76 FL (ref 82–98)
PHOSPHATE SERPL-MCNC: 6.5 MG/DL (ref 2.7–4.5)
PLATELET # BLD AUTO: 135 THOUSANDS/UL (ref 149–390)
PMV BLD AUTO: 10.9 FL (ref 8.9–12.7)
POTASSIUM SERPL-SCNC: 4.5 MMOL/L (ref 3.5–5.3)
RBC # BLD AUTO: 3.09 MILLION/UL (ref 3.81–5.12)
SODIUM SERPL-SCNC: 138 MMOL/L (ref 135–147)
WBC # BLD AUTO: 9.5 THOUSAND/UL (ref 4.31–10.16)

## 2024-06-03 PROCEDURE — 80048 BASIC METABOLIC PNL TOTAL CA: CPT

## 2024-06-03 PROCEDURE — 99232 SBSQ HOSP IP/OBS MODERATE 35: CPT | Performed by: INTERNAL MEDICINE

## 2024-06-03 PROCEDURE — 84100 ASSAY OF PHOSPHORUS: CPT

## 2024-06-03 PROCEDURE — 99232 SBSQ HOSP IP/OBS MODERATE 35: CPT | Performed by: HOSPITALIST

## 2024-06-03 PROCEDURE — 83735 ASSAY OF MAGNESIUM: CPT

## 2024-06-03 PROCEDURE — 85027 COMPLETE CBC AUTOMATED: CPT

## 2024-06-03 RX ORDER — MAGNESIUM SULFATE HEPTAHYDRATE 40 MG/ML
2 INJECTION, SOLUTION INTRAVENOUS ONCE
Status: DISCONTINUED | OUTPATIENT
Start: 2024-06-03 | End: 2024-06-03

## 2024-06-03 RX ADMIN — LABETALOL HYDROCHLORIDE 100 MG: 100 TABLET, FILM COATED ORAL at 08:22

## 2024-06-03 RX ADMIN — CALCITRIOL 0.5 MCG: 0.25 CAPSULE, LIQUID FILLED ORAL at 08:22

## 2024-06-03 RX ADMIN — SEVELAMER HYDROCHLORIDE 800 MG: 800 TABLET ORAL at 08:22

## 2024-06-03 RX ADMIN — SEVELAMER HYDROCHLORIDE 800 MG: 800 TABLET ORAL at 17:16

## 2024-06-03 RX ADMIN — HEPARIN SODIUM 5000 UNITS: 5000 INJECTION INTRAVENOUS; SUBCUTANEOUS at 14:58

## 2024-06-03 RX ADMIN — LABETALOL HYDROCHLORIDE 100 MG: 100 TABLET, FILM COATED ORAL at 17:16

## 2024-06-03 RX ADMIN — DICLOFENAC SODIUM 2 G: 10 GEL TOPICAL at 21:36

## 2024-06-03 RX ADMIN — HEPARIN SODIUM 5000 UNITS: 5000 INJECTION INTRAVENOUS; SUBCUTANEOUS at 21:35

## 2024-06-03 RX ADMIN — HEPARIN SODIUM 5000 UNITS: 5000 INJECTION INTRAVENOUS; SUBCUTANEOUS at 05:36

## 2024-06-03 RX ADMIN — EPOETIN ALFA 8000 UNITS: 4000 SOLUTION INTRAVENOUS; SUBCUTANEOUS at 17:08

## 2024-06-03 RX ADMIN — FLUTICASONE FUROATE AND VILANTEROL TRIFENATATE 1 PUFF: 100; 25 POWDER RESPIRATORY (INHALATION) at 08:22

## 2024-06-03 RX ADMIN — FLUTICASONE PROPIONATE 1 SPRAY: 50 SPRAY, METERED NASAL at 08:22

## 2024-06-03 RX ADMIN — SEVELAMER HYDROCHLORIDE 800 MG: 800 TABLET ORAL at 12:41

## 2024-06-03 RX ADMIN — LORATADINE 10 MG: 10 TABLET ORAL at 08:22

## 2024-06-03 RX ADMIN — DICLOFENAC SODIUM 2 G: 10 GEL TOPICAL at 12:41

## 2024-06-03 RX ADMIN — AMLODIPINE BESYLATE 5 MG: 5 TABLET ORAL at 17:16

## 2024-06-03 RX ADMIN — AMLODIPINE BESYLATE 5 MG: 5 TABLET ORAL at 08:22

## 2024-06-03 NOTE — UTILIZATION REVIEW
Initial Clinical Review    Admission: Date/Time/Statement:   Admission Orders (From admission, onward)       Ordered        05/31/24 1830  INPATIENT ADMISSION  Once                          Orders Placed This Encounter   Procedures    INPATIENT ADMISSION     Standing Status:   Standing     Number of Occurrences:   1     Order Specific Question:   Level of Care     Answer:   Med Surg [16]     Order Specific Question:   Estimated length of stay     Answer:   More than 2 Midnights     Order Specific Question:   Certification     Answer:   I certify that inpatient services are medically necessary for this patient for a duration of greater than two midnights. See H&P and MD Progress Notes for additional information about the patient's course of treatment.      Arrival Information       Patient direct admission                         chief complaint:  tired      Initial Presentation: 29 y.o. female Direct admission  from home.    Admitted to inpatient with Dx: acute renal failure/acute on chronic anemia/stage 5 CKD not on dialysis/increased anion gap metabolic acidosis/hyperphosphatemia/Hpt.  Presented to with weakness and fatigue.  Worsening tremors of hands and dyspnea on exertion.  Unknown timeframe.   OP labs with worsening of creatinine and BUN,high anion gap metabolic acidosis and anemia .  Nephrology recommended patient be admitted.   PMHx: hypertension, stage V chronic kidney disease with previous biopsy demonstrating diffuse global glomerulosclerosis. On exam:  systolic murmur.   H&H 5.1/16.5.  baseline hgb 6 to 8.    bun 129.  Creatinine 31.72  with baseline of 22 since January.  .  anion gap 20.  Phos 10.4.       Plan includes  to consult nephrology.   IR for tunneled catheter placement.   Monitor for uremic symptoms.  Start IVF with Bicarb.  Transfuse 2 units PRBC.  Trend CBC.  Continue iron supplementation.    Continue home antihypertensives.     Anticipated Length of Stay/Certification Statement: Patient  will be admitted on an Inpatient basis with an anticipated length of stay of more than 2 midnights.   Justification for Hospital Stay: Needs nephrology follow-up likely will need start of dialysis but will defer this to nephrology naturally.  Will need transfusion we are working on getting this done     Date: 6/1/24    Day 2:  today with itching of skin.   On exam obese.  Cardiac murmur.  H&H 6.6/20.7.  bun 127. Creatinine 32.72.  phos 9.8.     transfuse additional PRBC.   NPO for IR.   Dialysis once tunneled dialysis catheter placed.   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  Status post kidney biopsy in the past suggestive of diffuse global glomerulosclerosis which was severe along with severe tubular atrophy interstitial fibrosis and interstitial inflammation.   Being evaluated for transplant.  Has been refusing dialysis.   Plan for initiation of renal replacement therapy possibly today later on or on Monday.   IR consulted for placement of PermCath .  Continue current treatment of hypertension with amlodipine 5 mg twice daily, labetalol 100 mg twice daily. Continue current dose of sodium bicarbonate.  Continue sevelamer at current dose at 800 mg 3 times daily. Needs transfusion of PRBC.        Procedure 6/1/24 Central Line. Tunneled dialysis catheter placed by Dr. Lane. Patient tolerated well and vss throughout procedure.  .IR Procedure Bedrest Start Time is 1605.      ED Triage Vitals   Temperature Pulse Respirations Blood Pressure SpO2   05/31/24 1754 05/31/24 1754 05/31/24 1754 05/31/24 1754 05/31/24 1754   98.5 °F (36.9 °C) 83 18 148/83 99 %      Temp Source Heart Rate Source Patient Position - Orthostatic VS BP Location FiO2 (%)   06/01/24 0054 06/01/24 0054 06/02/24 1131  06/02/24 1131 --   Oral Monitor Lying Right arm       Pain Score       05/31/24 1824       No Pain          Wt Readings from Last 1 Encounters:   05/29/24 102 kg (225 lb)     Additional Vital Signs:   06/01/24 1557 -- 98 18 179/84 Abnormal  -- 93 % -- -- --   06/01/24 1551 -- 100 18 183/86 Abnormal  -- 98 % -- -- None (Room air)   06/01/24 1546 -- 96 16 179/83 Abnormal  -- 100 % -- -- --   06/01/24 1541 -- 86 18 173/81 Abnormal  -- 99 % -- -- --   06/01/24 1536 -- 86 20 171/74 Abnormal  -- 100 %        06/01/24 1451 -- -- 17 -- -- -- -- -- --   06/01/24 14:50:23 98.3 °F (36.8 °C) 86 17 162/85 111 98 % -- -- --   06/01/24 11:32:43 98.3 °F (36.8 °C) 74 17 150/90 110 100 % -- -- --   06/01/24 1121 -- -- 17 -- -- -- -- -- --   06/01/24 11:19:55 98.3 °F (36.8 °C) 72 -- 154/76 102 98 % -- -- --   06/01/24 08:04:25 -- 82 -- 154/90 111 99 % -- -- --   06/01/24 06:44:24 98.5 °F (36.9 °C) 89 -- 169/103 Abnormal  125 98 % -- -- --   06/01/24 04:48:41 98.3 °F (36.8 °C) 82 -- 140/69 93 97 % -- -- --   06/01/24 0418 -- -- 18 -- -- -- -- -- --   06/01/24 04:15:45 98.1 °F (36.7 °C) 87 -- 140/68 92 98 % -- -- --   06/01/24 03:43:21 99 °F (37.2 °C) 93 -- 145/81 102 98 % -- -- --   06/01/24 01:32:02 98.8 °F (37.1 °C) 80 -- 131/71 91 98 %        Pertinent Labs/Diagnostic Test Results:   XR chest pa & lateral   Final Result by Codie Velasquez MD (06/02 0618)      No acute cardiopulmonary disease.            Workstation performed: [id number]         XR neck soft tissue   Final Result by Geovanni Del Toro MD (06/02 0944)      Unremarkable neck soft tissue radiograph.      Resident: TONYA LARIOS I, the attending radiologist, have reviewed the images and agree with the final report above.      Workstation performed: [id number]         IR tunneled dialysis catheter placement   Final Result by Madeleine Lane MD (06/01 1802)   Impression:   1. Successful ultrasound and fluoroscopically guided placement of a 24 cm Titan cath via the  right internal jugular vein. The tip of the catheter is in the right atrium and may be used immediately.               Workstation performed: [id number]             Results from last 7 days   Lab Units 06/03/24  0529 06/02/24  0604 06/02/24  0053 06/01/24  1904 06/01/24  0851   WBC Thousand/uL 9.50 9.38 8.92  --  9.46   HEMOGLOBIN g/dL 7.6* 7.5* 7.4* 7.7* 6.6*   HEMATOCRIT % 23.5* 22.6* 22.9* 23.2* 20.7*   PLATELETS Thousands/uL 135* 144* 132*  --  151     Results from last 7 days   Lab Units 06/03/24  0529 06/02/24 0604 06/02/24 0105 06/01/24  0851 05/31/24  0916   SODIUM mmol/L 138 139 138 138 139   POTASSIUM mmol/L 4.5 4.4 4.3 4.7 4.6   CHLORIDE mmol/L 98 97 98 99 98   CO2 mmol/L 27 24 23 21 21   ANION GAP mmol/L 13 18* 17* 18* 20*   BUN mg/dL 85* 125* 124* 127* 129*   CREATININE mg/dL 21.73* 32.33* 32.76* 32.72* 31.72*   EGFR ml/min/1.73sq m 1 1 1 1 9*  1   CALCIUM mg/dL 9.4 9.0 8.9 9.3 9.8   MAGNESIUM mg/dL 1.8* 1.8*  --  1.7*  --    PHOSPHORUS mg/dL 6.5* 8.1*  --  9.8* 10.4*     Results from last 7 days   Lab Units 06/02/24 0105 06/01/24  2203 05/31/24  0916   AST U/L 7*  --  8*   ALT U/L 3* 4* 5*   ALK PHOS U/L 40  --  51   TOTAL PROTEIN g/dL 6.3*  --  7.3   ALBUMIN g/dL 4.0  --  4.4   TOTAL BILIRUBIN mg/dL 0.35  --  0.30     Results from last 7 days   Lab Units 06/03/24  0529 06/02/24  0604 06/02/24 0105 06/01/24  0851 05/31/24  0916   GLUCOSE RANDOM mg/dL 90 95 106 91 84     Results from last 7 days   Lab Units 06/02/24  0547 06/01/24  0547   UNIT PRODUCT CODE  [id number]  [id number]   UNIT NUMBER  [id number]  [id number]   UNITABO  O O  O   UNITRH  POS POS  POS   CROSSMATCH  Compatible Compatible  Compatible   UNIT DISPENSE STATUS  Presumed Trans Presumed Trans  Presumed Trans   UNIT PRODUCT VOL mL 350 350  350          Past Medical History:   Diagnosis Date    Asthma     Chronic kidney disease     Eczema     Headache     Hypertension     Wears glasses       Present on Admission:   Hypertension   Hyperphosphatemia   Increased anion gap metabolic acidosis   Acute on chronic anemia      Admitting Diagnosis: Worsening renal function [N28.9]  Anemia [D64.9]  Age/Sex: 29 y.o. female  Admission Orders:  Scheduled Medications:  amLODIPine, 5 mg, Oral, BID  calcitriol, 0.5 mcg, Oral, Daily  Diclofenac Sodium, 2 g, Topical, 4x Daily  ferrous sulfate, 325 mg, Oral, Daily With Breakfast  fluticasone, 1 spray, Nasal, Daily  Fluticasone Furoate-Vilanterol, 1 puff, Inhalation, Daily  labetalol, 100 mg, Oral, BID  loratadine, 10 mg, Oral, Daily  sevelamer, 800 mg, Oral, TID With Meals  sodium bicarbonate, 1,300 mg, Oral, BID after meals    magnesium Oxide (MAG-OX) tablet 800 mg  Dose: 800 mg  Freq: Once Route: PO  Start: 06/01/24 1145 End: 06/01/24 1217       Continuous IV Infusions:       PRN Meds:  acetaminophen, 650 mg, Oral, Q6H PRN  albuterol, 2.5 mg, Nebulization, Q6H PRN  epoetin opal, 8,000 Units, Intravenous, After Dialysis  ondansetron, 4 mg, Intravenous, Q6H PRN    Transfusions already released     Start   06/01/24 1028  Transfuse Leukoreduced RBC, CMV Negative, Leukoreduced, Irradiated  Transfusion     Reprint      06/01/24 1028   05/31/24 1940  Transfuse Leukoreduced RBC: 2 Units, Irradiated, CMV Negative  Transfusion              IP CONSULT TO NEPHROLOGY  INPATIENT CONSULT TO IR    Network Utilization Review Department  ATTENTION: Please call with any questions or concerns to [phone] and carefully listen to the prompts so that you are directed to the right person. All voicemails are confidential.   For Discharge needs, contact Care Management DC Support Team at [phone] opt. 2  Send all requests for admission clinical reviews, approved or denied determinations and any other requests to dedicated fax number below belonging to the campus where the patient is receiving treatment. List of dedicated fax numbers for the Facilities:  FACILITY NAME UR FAX NUMBER    ADMISSION DENIALS (Administrative/Medical Necessity) [phone]   DISCHARGE SUPPORT TEAM (NETWORK) [phone]   PARENT CHILD HEALTH (Maternity/NICU/Pediatrics) [phone]   [facility] [phone]   [facility] [phone]   [facility] [phone]   [facility] [phone]   [facility] [phone]   [facility] [phone]   [facility] [phone]   [facility] [phone]   [facility] [phone]   [facility] [phone]   [facility] [phone]   [facility] [phone]

## 2024-06-03 NOTE — PROGRESS NOTES
[facility]  Progress Note  Name: Raven Simeon I  MRN: [id number]  Unit/Bed#: S -01 I Date of Admission: 5/31/2024   Date of Service: 6/3/2024 I Hospital Day: 3    Assessment & Plan   * Acute renal failure (HCC)  Assessment & Plan  Patient with history of chronic kidney disease stage V, coming in after outpatient labs demonstrating acute renal failure.  BUN elevated to 129, creatinine 31.72.  Also having severe anemia 5.1 to receive blood on admission.  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  Has been documented that this is secondary to hypertensive disease.  Current acute failure without obvious inciting event other than acute anemia, but may just be progression of disease.    - PermCath placed on 6/1.  -Overnight, permacath site bleeding which has resolved with pressure dressing.  Discussed with IR and nephrology, DDAVP ordered for likely platelet dysfunction.  Currently patient is on dialysis    Plan:  Nephrology consulted appreciate recommendation  Continuing home medications for now  Continue to monitor for uremic symptoms    ESRD (end stage renal disease) (HCC)  Assessment & Plan  Lab Results   Component Value Date    EGFR 1 06/03/2024    EGFR 1 06/02/2024    EGFR 1 06/02/2024    CREATININE 21.73 (H) 06/03/2024    CREATININE 32.33 (H) 06/02/2024    CREATININE 32.76 (H) 06/02/2024   History of CKD 5, now ESRD.    Plan:  Vital hemoglobin (goal baseline is more than 10)  Epogen  Vitamin D 25 analysis  Calcitriol to improve vitamin d  For hyperphosphatemia, start lumbar and low phosphorus diet.  If it does not improve, lanthanum carbonate can be considered.  Monitor for signs of bleeding, possible signs of platelet qualitative dysfunction because of uremia, DDAVP can be considered.  Monitor for signs of uremia  just like uremic signs like uremic encephalopathy, uremic pericarditis, uremic asterixis, metallic taste.      Hyperphosphatemia  Assessment & Plan  Lab Results   Component Value Date    PHOS 6.5 (H) 06/03/2024    PHOS 8.1 (H) 06/02/2024    PHOS 9.8 (H) 06/01/2024   Hyperphosphatemia in the setting of acute renal failure and CKD, continuing sevelamer  If the patient does not respond to celebrate, lanthanum carbonate can be considered.  Remainder of plan as above under acute renal failure.    Acute on chronic anemia  Assessment & Plan  Lab Results   Component Value Date    HGB 7.6 (L) 06/03/2024    HGB 7.5 (L) 06/02/2024    HGB 7.4 (L) 06/02/2024   Patient with chronic anemia baseline hovering between 6-8, most likely secondary to chronic kidney disease.  No active bleeding at this point..      Plan:  Continue to trend CBC  Epogen (the goal of hemoglobin is 10 or above then).    Hypertension  Assessment & Plan  140-150's  systolic on home medications amlodipine and labetalol.  There is absence of renal artery stenosis, cussing syndrome, Conn syndrome, pheochromocytoma    Plan:  Continue amlodipine and labetalol  Continue to monitor blood pressure  monitor BP      Increased anion gap metabolic acidosis-resolved as of 6/3/2024  Assessment & Plan  Increased anion gap metabolic acidosis, in the setting of acute renal failure, likely secondary to increased BUN.  No current infectious symptoms and white count within normal range    Plan:  Continue outpatient sodium bicarb tabs  Remainder of plan as above               VTE Pharmacologic Prophylaxis: VTE Score: 4 Moderate Risk (Score 3-4) - Pharmacological DVT Prophylaxis Ordered: heparin.    Mobility:   Basic Mobility Inpatient Raw Score: 24  JH-HLM Goal: 8: Walk 250 feet or more  JH-HLM Achieved: 6: Walk 10 steps or more  JH-HLM Goal achieved. Continue to encourage appropriate mobility.    Patient Centered Rounds: I performed bedside rounds with nursing staff  today.  Discussions with Specialists or Other Care Team Provider: Nephrology, IR    Education and Discussions with Family / Patient:  Will attempt informing patient's family member..     Current Length of Stay: 3 day(s)  Current Patient Status: Inpatient   Discharge Plan: Anticipate discharge in 24-48 hrs to home.    Code Status: Level 1 - Full Code    Subjective:   Patient is stable and doing well.  As per the patient there is no significant overnight events and compared to yesterday, patient is feeling better as per the patient, no drainage or bleeding from the permacath catheter site.    Objective:     Vitals:   Temp (24hrs), Av.3 °F (36.8 °C), Min:97.5 °F (36.4 °C), Max:98.6 °F (37 °C)    Temp:  [97.5 °F (36.4 °C)-98.6 °F (37 °C)] 98.3 °F (36.8 °C)  HR:  [79-92] 85  Resp:  [18-22] 22  BP: (124-181)/() 152/87  SpO2:  [96 %-100 %] 99 %  Body mass index is 39.39 kg/m².     Input and Output Summary (last 24 hours):     Intake/Output Summary (Last 24 hours) at 6/3/2024 1214  Last data filed at 2024 1730  Gross per 24 hour   Intake 420 ml   Output 508 ml   Net -88 ml       Physical Exam:   Physical Exam  Constitutional:       Appearance: Normal appearance.   Cardiovascular:      Rate and Rhythm: Normal rate and regular rhythm.      Pulses: Normal pulses.      Heart sounds: Normal heart sounds. No murmur heard.     No friction rub. No gallop.   Pulmonary:      Effort: Pulmonary effort is normal. No respiratory distress.      Breath sounds: Normal breath sounds. No stridor. No wheezing, rhonchi or rales.   Chest:      Chest wall: No tenderness.   Musculoskeletal:      Right lower leg: No edema.      Left lower leg: No edema.   Neurological:      General: No focal deficit present.      Mental Status: She is alert and oriented to person, place, and time. Mental status is at baseline.      Comments: No asterixis   Psychiatric:         Mood and Affect: Mood normal.         Behavior: Behavior normal.          Thought Content: Thought content normal.          Additional Data:     Labs:  Results from last 7 days   Lab Units 06/03/24  0529   WBC Thousand/uL 9.50   HEMOGLOBIN g/dL 7.6*   HEMATOCRIT % 23.5*   PLATELETS Thousands/uL 135*     Results from last 7 days   Lab Units 06/03/24  0529 06/02/24  0604 06/02/24  0105   SODIUM mmol/L 138   < > 138   POTASSIUM mmol/L 4.5   < > 4.3   CHLORIDE mmol/L 98   < > 98   CO2 mmol/L 27   < > 23   BUN mg/dL 85*   < > 124*   CREATININE mg/dL 21.73*   < > 32.76*   ANION GAP mmol/L 13   < > 17*   CALCIUM mg/dL 9.4   < > 8.9   ALBUMIN g/dL  --   --  4.0   TOTAL BILIRUBIN mg/dL  --   --  0.35   ALK PHOS U/L  --   --  40   ALT U/L  --   --  3*   AST U/L  --   --  7*   GLUCOSE RANDOM mg/dL 90   < > 106    < > = values in this interval not displayed.     Results from last 7 days   Lab Units 06/02/24  0053   INR  1.10                   Lines/Drains:  Invasive Devices       Central Venous Catheter Line  Duration             CVC Central Lines 05/31/24 Double 2 days              Peripheral Intravenous Line  Duration             Peripheral IV 05/31/24 Left;Ventral (anterior) Forearm 2 days              Hemodialysis Catheter  Duration             HD Permanent Double Catheter 1 day                    Central Line:  Goal for removal:  Patient will be requiring for permanent hemodialysis             Imaging: Reviewed radiology reports from this admission including: chest xray and xray(s)    Recent Cultures (last 7 days):         Last 24 Hours Medication List:   Current Facility-Administered Medications   Medication Dose Route Frequency Provider Last Rate    acetaminophen  650 mg Oral Q6H PRN Lennie White MD      albuterol  2.5 mg Nebulization Q6H PRN Sohan Squires MD      amLODIPine  5 mg Oral BID Sohan Squires MD      calcitriol  0.5 mcg Oral Daily Sohan Squires MD      Diclofenac Sodium  2 g Topical 4x Daily Sohan Squires MD      epoetin opal  8,000 Units  Intravenous After Dialysis Min Pereira MD      fluticasone  1 spray Nasal Daily Sohan Squires MD      Fluticasone Furoate-Vilanterol  1 puff Inhalation Daily Sohan Squires MD      heparin (porcine)  5,000 Units Subcutaneous Q8H [facility] David Cano MD      labetalol  100 mg Oral BID Sohan Squires MD      loratadine  10 mg Oral Daily Sohan Squires MD      ondansetron  4 mg Intravenous Q6H PRN Sohan Squires MD      sevelamer  800 mg Oral TID With Meals Sohan Squires MD          Today, Patient Was Seen By: Rosamaria Hilliard MD    **Please Note: This note may have been constructed using a voice recognition system.**

## 2024-06-03 NOTE — UTILIZATION REVIEW
NOTIFICATION OF INPATIENT ADMISSION   AUTHORIZATION REQUEST   SERVICING FACILITY:   [place] [address]  Tax ID: [id number]  NPI: [id number]   ATTENDING PROVIDER:  Attending Name and NPI#: Geovanni Park Md [[id number]]  Address: [address] [place] [address]  Phone: [phone]     ADMISSION INFORMATION:  Place of Service: Inpatient [facility]  Place of Service Code: 21  Inpatient Admission Date/Time: 5/31/24  5:46 PM  Discharge Date/Time: No discharge date for patient encounter.  Admitting Diagnosis Code/Description:  Worsening renal function [N28.9]  Anemia [D64.9]     UTILIZATION REVIEW CONTACT:  Rosalina Quintana Utilization   Network Utilization Review Department  Phone: [phone]  Fax: [phone]  Email: [email or web address]  Contact for approvals/pending authorizations, clinical reviews, and discharge.     PHYSICIAN ADVISORY SERVICES:  Medical Necessity Denial & Xpgk-tq-Pvbm Review  Phone: [phone]  Fax: [phone]  Email: [email or web address]     DISCHARGE SUPPORT TEAM:  For Patients Discharge Needs & Updates  Phone: [phone] opt. 2 Fax: [phone]  Email: [email or web address]

## 2024-06-03 NOTE — PLAN OF CARE
Post-Dialysis RN Treatment Note    Blood Pressure:  Pre 150/102 mm/Hg  Post 155/87 mmHg   EDW  TBD kg    Weight:  Pre 104.1 kg   Post 103.5 kg   Mode of weight measurement: Standing Scale   Volume Removed  500 ml    Treatment duration 120 minutes    NS given  No    Treatment shortened? No   Medications given during Rx Epogen 8,000u IV   Estimated Kt/V  Not Applicable   Access type: Permacath/TDC   Access Issues: No    Report called to primary nurse   Yes, via [employer] to Amy ZAVALA RN      Started patient on hemodialysis treatment with UF goal of 0.5L net as tolerated per HD order x 2 hours x 3K bath for serum k+ of 4.5 today 6/3/24.    Problem: METABOLIC, FLUID AND ELECTROLYTES - ADULT  Goal: Electrolytes maintained within normal limits  Description: INTERVENTIONS:  - Monitor labs and assess patient for signs and symptoms of electrolyte imbalances  - Administer electrolyte replacement as ordered  - Monitor response to electrolyte replacements, including repeat lab results as appropriate  - Instruct patient on fluid and nutrition as appropriate  Outcome: Progressing  Goal: Fluid balance maintained  Description: INTERVENTIONS:  - Monitor labs   - Monitor I/O and WT  - Instruct patient on fluid and nutrition as appropriate  - Assess for signs & symptoms of volume excess or deficit  Outcome: Progressing

## 2024-06-03 NOTE — ASSESSMENT & PLAN NOTE
140-150's  systolic on home medications amlodipine and labetalol.  There is absence of renal artery stenosis, cussing syndrome, Conn syndrome, pheochromocytoma    Plan:  Continue amlodipine and labetalol  Continue to monitor blood pressure  monitor BP
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
## 2024-06-03 NOTE — ASSESSMENT & PLAN NOTE
Patient with history of chronic kidney disease stage V, coming in after outpatient labs demonstrating acute renal failure.  BUN elevated to 129, creatinine 31.72.  Also having severe anemia 5.1 to receive blood on admission.  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  Has been documented that this is secondary to hypertensive disease.  Current acute failure without obvious inciting event other than acute anemia, but may just be progression of disease.    - PermCath placed on 6/1.  -Overnight, permacath site bleeding which has resolved with pressure dressing.  Discussed with IR and nephrology, DDAVP ordered for likely platelet dysfunction.  Currently patient is on dialysis    Plan:  Nephrology consulted appreciate recommendation  Continuing home medications for now  Continue to monitor for uremic symptoms

## 2024-06-03 NOTE — CASE MANAGEMENT
"   Case Management Progress Note    Patient name Raven Simeon  Location S /S -01 MRN [id number]  : 1995 Date 6/3/2024       LOS (days): 3  Geometric Mean LOS (GMLOS) (days):   Days to GMLOS:        OBJECTIVE:        Current admission status: Inpatient  Preferred Pharmacy:   [facility]/pharmacy #[id number] [address] [address]  Phone: [phone] Fax: [phone]    Primary Care Provider: Roberto Britt DO    Primary Insurance: [employer]  Secondary Insurance:     PROGRESS NOTE:      As per Agency Zohreh Taylor via [employer]: \"We have a confirmed schedule at [facility] with a regular chair time of MWF shift 3 @ 1:45 PM. The patient can start  @ 1:45 PM for intake.  Please ask your patient to bring two forms of ID, their insurance card, and a list of medications to their first appointment. \" Patient made aware at bedside -- CM awaiting formal welcome letter and will provide at bedside once available. CM will continue to follow.    "

## 2024-06-03 NOTE — ASSESSMENT & PLAN NOTE
Lab Results   Component Value Date    PHOS 6.5 (H) 06/03/2024    PHOS 8.1 (H) 06/02/2024    PHOS 9.8 (H) 06/01/2024   Hyperphosphatemia in the setting of acute renal failure and CKD, continuing sevelamer  If the patient does not respond to celebrate, lanthanum carbonate can be considered.  Remainder of plan as above under acute renal failure.

## 2024-06-03 NOTE — PROGRESS NOTES
"NEPHROLOGY HOSPITAL PROGRESS NOTE   Raven Simeon 29 y.o. female MRN: [id number]  Unit/Bed#: S -01 Encounter: [id number]  Reason for Consult: ESRD    ASSESSMENT and PLAN:    29-year-old female with a past medical history of CKD stage V, chronic GN, hypertension, who initially presents to the hospital with weakness, fatigue, mild asterixis and worsening lab work prompting nephrology team to recommend coming to the hospital to initiate dialysis.    1-CKD stage V now ESRD on dialysis    - Prior kidney biopsy with global glomerulosclerosis  - Initially patient was refusing dialysis for quite some time as outpatient-see prior notes by primary nephrologist  - Patient agreeable to be admitted this admission to initiate dialysis.  Significantly azotemic.  Developing signs of uremia.  - Tunneled dialysis catheter placed 6/1-with bleeding post requiring DDAVP.  - Consent for dialysis was obtained by prior rounding nephrologist  - Chest x-ray unrevealing    - Initiation of dialysis-6/2    6/3-will plan for short 2-hour treatment.  To avoid overcorrection of azotemia.  We will also use lower blood flow.    Plan  - Dialysis today short treatment again 2 hours-this is treatment #2  - Plan for dialysis treatment #3 on 6/4  - Monitor bleeding from dialysis catheter site.  If still continues to be issue today once dressing is changed with dialysis, please have IR team reevaluate site.  - Current access with tunneled dialysis catheter  - Magnesium is appropriate.  Does not urgently require repletion.  Hold for now.  - Case management for outpatient dialysis placement  - Reviewed case with primary team attending and we are in agreement renal plan for dialysis today and rest of renal plan    Portions of the record may have been created with voice recognition software. Occasional wrong word or \"sound a like\" substitutions may have occurred due to the inherent limitations of voice recognition software. Read the chart " carefully and recognize, using context, where substitutions have occurred.If you have any questions, please contact the dictating provider.      2-electrolytes-monitor with clearance of dialysis    3-acid/base-monitor with dialysis.  Hold bicarbonate tablets    4-hyperphosphatemia in the setting of advanced CKD.  On phosphorus binders.  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  Hemoglobin on admission was 5.1..  On Epogen with dialysis    7-hypertension-monitor with current regimen    SUBJECTIVE / 24H INTERVAL HISTORY:    Patient denies complaints.  No shortness of breath.    OBJECTIVE:  Current Weight:    Vitals:    06/02/24 1523 06/03/24 0029 06/03/24 0746 06/03/24 0748   BP: 154/89 168/97 (!) 181/108 163/91   BP Location:   Right arm Left arm   Pulse: 82 92 82 85   Resp:  18 22 22   Temp: 98.4 °F (36.9 °C) 98.3 °F (36.8 °C) 98.6 °F (37 °C) 98.6 °F (37 °C)   TempSrc:       SpO2: 97% 98% 100% 100%       Intake/Output Summary (Last 24 hours) at 6/3/2024 0845  Last data filed at 6/2/2024 1730  Gross per 24 hour   Intake 860 ml   Output 508 ml   Net 352 ml     General: NAD  Skin: no rash  Eyes: anicteric sclera  ENT: moist mucous membrane  Neck: supple  Chest: CTA b/l, no ronchii, no wheeze, no rubs, no rales  CVS: s1s2, no murmur, no gallop, no rub  Abdomen: soft, nontender, nl sounds  Extremities: no edema LE b/l, tunneled dialysis catheter site covered.  Does not appear to have fresh blood on current packing but difficult to tell as it is packed with gauze currently under dressing, mild asterixis  : no ramirez  Neuro: AAOX3  Psych: normal affect    Medications:    Current Facility-Administered Medications:     acetaminophen (TYLENOL) tablet 650 mg, 650 mg, Oral, Q6H PRN, Lennie White MD, 650 mg at 06/02/24 0217    albuterol inhalation solution 2.5 mg, 2.5 mg, Nebulization, Q6H PRN, Sohan Squires MD    amLODIPine (NORVASC) tablet 5 mg, 5 mg, Oral, BID, Sohan Szymanski  MD Shaw, 5 mg at 06/03/24 0822    calcitriol (ROCALTROL) capsule 0.5 mcg, 0.5 mcg, Oral, Daily, Sohan Squires MD, 0.5 mcg at 06/03/24 0822    Diclofenac Sodium (VOLTAREN) 1 % topical gel 2 g, 2 g, Topical, 4x Daily, Sohan Squires MD, 2 g at 06/02/24 2127    epoetin opal (EPOGEN,PROCRIT) injection 8,000 Units, 8,000 Units, Intravenous, After Dialysis, Min Pereira MD    fluticasone (FLONASE) 50 mcg/act nasal spray 1 spray, 1 spray, Nasal, Daily, Sohan Squires MD, 1 spray at 06/03/24 0822    Fluticasone Furoate-Vilanterol 100-25 mcg/actuation 1 puff, 1 puff, Inhalation, Daily, Sohan Squires MD, 1 puff at 06/03/24 0822    heparin (porcine) subcutaneous injection 5,000 Units, 5,000 Units, Subcutaneous, Q8H LAMONT, David Cano MD, 5,000 Units at 06/03/24 0536    labetalol (NORMODYNE) tablet 100 mg, 100 mg, Oral, BID, Sohan Squires MD, 100 mg at 06/03/24 0822    loratadine (CLARITIN) tablet 10 mg, 10 mg, Oral, Daily, Sohan Squires MD, 10 mg at 06/03/24 0822    magnesium sulfate 2 g/50 mL IVPB (premix) 2 g, 2 g, Intravenous, Once, Rosamaria Hilliard MD    ondansetron (ZOFRAN) injection 4 mg, 4 mg, Intravenous, Q6H PRN, Sohan Squires MD    sevelamer (RENAGEL) tablet 800 mg, 800 mg, Oral, TID With Meals, Sohan Squires MD, 800 mg at 06/03/24 0822    Laboratory Results:  Results from last 7 days   Lab Units 06/03/24  0529 06/02/24  0604 06/02/24  0105 06/02/24  0053 06/01/24  1904 06/01/24  0851 05/31/24  0916   WBC Thousand/uL 9.50 9.38  --  8.92  --  9.46 9.08   HEMOGLOBIN g/dL 7.6* 7.5*  --  7.4* 7.7* 6.6* 5.1*   HEMATOCRIT % 23.5* 22.6*  --  22.9* 23.2* 20.7* 16.5*   PLATELETS Thousands/uL 135* 144*  --  132*  --  151 148*   POTASSIUM mmol/L 4.5 4.4 4.3  --   --  4.7 4.6   CHLORIDE mmol/L 98 97 98  --   --  99 98   CO2 mmol/L 27 24 23  --   --  21 21   BUN mg/dL 85* 125* 124*  --   --  127* 129*   CREATININE mg/dL 21.73* 32.33* 32.76*  --   --  32.72* 31.72*   CALCIUM mg/dL 9.4 9.0  8.9  --   --  9.3 9.8   MAGNESIUM mg/dL 1.8* 1.8*  --   --   --  1.7*  --    PHOSPHORUS mg/dL 6.5* 8.1*  --   --   --  9.8* 10.4*

## 2024-06-03 NOTE — ASSESSMENT & PLAN NOTE
Lab Results   Component Value Date    HGB 7.6 (L) 06/03/2024    HGB 7.5 (L) 06/02/2024    HGB 7.4 (L) 06/02/2024   Patient with chronic anemia baseline hovering between 6-8, most likely secondary to chronic kidney disease.  No active bleeding at this point..      Plan:  Continue to trend CBC  Epogen (the goal of hemoglobin is 10 or above then).

## 2024-06-03 NOTE — QUICK NOTE
I did call patient's mother.  All the questions and concerns were addressed.  Patient's mother acknowledge understanding of her situation verbally on phone.

## 2024-06-03 NOTE — PLAN OF CARE
Problem: PAIN - ADULT  Goal: Verbalizes/displays adequate comfort level or baseline comfort level  Description: Interventions:  - Encourage patient to monitor pain and request assistance  - Assess pain using appropriate pain scale  - Administer analgesics based on type and severity of pain and evaluate response  - Implement non-pharmacological measures as appropriate and evaluate response  - Consider cultural and social influences on pain and pain management  - Notify physician/advanced practitioner if interventions unsuccessful or patient reports new pain  Outcome: Progressing     Problem: INFECTION - ADULT  Goal: Absence or prevention of progression during hospitalization  Description: INTERVENTIONS:  - Assess and monitor for signs and symptoms of infection  - Monitor lab/diagnostic results  - Monitor all insertion sites, i.e. indwelling lines, tubes, and drains  - Monitor endotracheal if appropriate and nasal secretions for changes in amount and color  - [place] appropriate cooling/warming therapies per order  - Administer medications as ordered  - Instruct and encourage patient and family to use good hand hygiene technique  - Identify and instruct in appropriate isolation precautions for identified infection/condition  Outcome: Progressing  Goal: Absence of fever/infection during neutropenic period  Description: INTERVENTIONS:  - Monitor WBC    Outcome: Progressing     Problem: SAFETY ADULT  Goal: Patient will remain free of falls  Description: INTERVENTIONS:  - Educate patient/family on patient safety including physical limitations  - Instruct patient to call for assistance with activity   - Consult OT/PT to assist with strengthening/mobility   - Keep Call bell within reach  - Keep bed low and locked with side rails adjusted as appropriate  - Keep care items and personal belongings within reach  - Initiate and maintain comfort rounds  - Make Fall Risk Sign visible to staff  - Apply yellow socks and bracelet  for high fall risk patients  - Consider moving patient to room near nurses station  Outcome: Progressing  Goal: Maintain or return to baseline ADL function  Description: INTERVENTIONS:  -  Assess patient's ability to carry out ADLs; assess patient's baseline for ADL function and identify physical deficits which impact ability to perform ADLs (bathing, care of mouth/teeth, toileting, grooming, dressing, etc.)  - Assess/evaluate cause of self-care deficits   - Assess range of motion  - Assess patient's mobility; develop plan if impaired  - Assess patient's need for assistive devices and provide as appropriate  - Encourage maximum independence but intervene and supervise when necessary  - Involve family in performance of ADLs  - Assess for home care needs following discharge   - Consider OT consult to assist with ADL evaluation and planning for discharge  - Provide patient education as appropriate  Outcome: Progressing  Goal: Maintains/Returns to pre admission functional level  Description: INTERVENTIONS:  - Perform AM-PAC 6 Click Basic Mobility/ Daily Activity assessment daily.  - Set and communicate daily mobility goal to care team and patient/family/caregiver.   - Collaborate with rehabilitation services on mobility goals if consulted  - Out of bed for toileting  - Record patient progress and toleration of activity level   Outcome: Progressing     Problem: DISCHARGE PLANNING  Goal: Discharge to home or other facility with appropriate resources  Description: INTERVENTIONS:  - Identify barriers to discharge w/patient and caregiver  - Arrange for needed discharge resources and transportation as appropriate  - Identify discharge learning needs (meds, wound care, etc.)  - Arrange for interpretive services to assist at discharge as needed  - Refer to Case Management Department for coordinating discharge planning if the patient needs post-hospital services based on physician/advanced practitioner order or complex needs  related to functional status, cognitive ability, or social support system  Outcome: Progressing     Problem: Knowledge Deficit  Goal: Patient/family/caregiver demonstrates understanding of disease process, treatment plan, medications, and discharge instructions  Description: Complete learning assessment and assess knowledge base.  Interventions:  - Provide teaching at level of understanding  - Provide teaching via preferred learning methods  Outcome: Progressing     Problem: METABOLIC, FLUID AND ELECTROLYTES - ADULT  Goal: Electrolytes maintained within normal limits  Description: INTERVENTIONS:  - Monitor labs and assess patient for signs and symptoms of electrolyte imbalances  - Administer electrolyte replacement as ordered  - Monitor response to electrolyte replacements, including repeat lab results as appropriate  - Instruct patient on fluid and nutrition as appropriate  Outcome: Progressing  Goal: Fluid balance maintained  Description: INTERVENTIONS:  - Monitor labs   - Monitor I/O and WT  - Instruct patient on fluid and nutrition as appropriate  - Assess for signs & symptoms of volume excess or deficit  Outcome: Progressing

## 2024-06-03 NOTE — ASSESSMENT & PLAN NOTE
Lab Results   Component Value Date    EGFR 1 06/03/2024    EGFR 1 06/02/2024    EGFR 1 06/02/2024    CREATININE 21.73 (H) 06/03/2024    CREATININE 32.33 (H) 06/02/2024    CREATININE 32.76 (H) 06/02/2024   History of CKD 5, now ESRD.    Plan:  Vital hemoglobin (goal baseline is more than 10)  Epogen  Vitamin D 25 analysis  Calcitriol to improve vitamin d  For hyperphosphatemia, start lumbar and low phosphorus diet.  If it does not improve, lanthanum carbonate can be considered.  Monitor for signs of bleeding, possible signs of platelet qualitative dysfunction because of uremia, DDAVP can be considered.  Monitor for signs of uremia just like uremic signs like uremic encephalopathy, uremic pericarditis, uremic asterixis, metallic taste.

## 2024-06-03 NOTE — PLAN OF CARE
Problem: PAIN - ADULT  Goal: Verbalizes/displays adequate comfort level or baseline comfort level  Description: Interventions:  - Encourage patient to monitor pain and request assistance  - Assess pain using appropriate pain scale  - Administer analgesics based on type and severity of pain and evaluate response  - Implement non-pharmacological measures as appropriate and evaluate response  - Consider cultural and social influences on pain and pain management  - Notify physician/advanced practitioner if interventions unsuccessful or patient reports new pain  Outcome: Progressing     Problem: INFECTION - ADULT  Goal: Absence or prevention of progression during hospitalization  Description: INTERVENTIONS:  - Assess and monitor for signs and symptoms of infection  - Monitor lab/diagnostic results  - Monitor all insertion sites, i.e. indwelling lines, tubes, and drains  - Monitor endotracheal if appropriate and nasal secretions for changes in amount and color  - [place] appropriate cooling/warming therapies per order  - Administer medications as ordered  - Instruct and encourage patient and family to use good hand hygiene technique  - Identify and instruct in appropriate isolation precautions for identified infection/condition  Outcome: Progressing  Goal: Absence of fever/infection during neutropenic period  Description: INTERVENTIONS:  - Monitor WBC    Outcome: Progressing     Problem: SAFETY ADULT  Goal: Patient will remain free of falls  Description: INTERVENTIONS:  - Educate patient/family on patient safety including physical limitations  - Instruct patient to call for assistance with activity   - Consult OT/PT to assist with strengthening/mobility   - Keep Call bell within reach  - Keep bed low and locked with side rails adjusted as appropriate  - Keep care items and personal belongings within reach  - Initiate and maintain comfort rounds  - Make Fall Risk Sign visible to staff  - Offer Toileting every  Hours,  in advance of need  - Initiate/Maintain alarm  - Obtain necessary fall risk management equipment:   - Apply yellow socks and bracelet for high fall risk patients  - Consider moving patient to room near nurses station  Outcome: Progressing  Goal: Maintain or return to baseline ADL function  Description: INTERVENTIONS:  -  Assess patient's ability to carry out ADLs; assess patient's baseline for ADL function and identify physical deficits which impact ability to perform ADLs (bathing, care of mouth/teeth, toileting, grooming, dressing, etc.)  - Assess/evaluate cause of self-care deficits   - Assess range of motion  - Assess patient's mobility; develop plan if impaired  - Assess patient's need for assistive devices and provide as appropriate  - Encourage maximum independence but intervene and supervise when necessary  - Involve family in performance of ADLs  - Assess for home care needs following discharge   - Consider OT consult to assist with ADL evaluation and planning for discharge  - Provide patient education as appropriate  Outcome: Progressing  Goal: Maintains/Returns to pre admission functional level  Description: INTERVENTIONS:  - Perform AM-PAC 6 Click Basic Mobility/ Daily Activity assessment daily.  - Set and communicate daily mobility goal to care team and patient/family/caregiver.   - Collaborate with rehabilitation services on mobility goals if consulted  - Perform Range of Motion  times a day.  - Reposition patient every  hours.  - Dangle patient  times a day  - Stand patient  times a day  - Ambulate patient  times a day  - Out of bed to chair  times a day   - Out of bed for meals  times a day  - Out of bed for toileting  - Record patient progress and toleration of activity level   Outcome: Progressing     Problem: DISCHARGE PLANNING  Goal: Discharge to home or other facility with appropriate resources  Description: INTERVENTIONS:  - Identify barriers to discharge w/patient and caregiver  - Arrange for  needed discharge resources and transportation as appropriate  - Identify discharge learning needs (meds, wound care, etc.)  - Arrange for interpretive services to assist at discharge as needed  - Refer to Case Management Department for coordinating discharge planning if the patient needs post-hospital services based on physician/advanced practitioner order or complex needs related to functional status, cognitive ability, or social support system  Outcome: Progressing     Problem: Knowledge Deficit  Goal: Patient/family/caregiver demonstrates understanding of disease process, treatment plan, medications, and discharge instructions  Description: Complete learning assessment and assess knowledge base.  Interventions:  - Provide teaching at level of understanding  - Provide teaching via preferred learning methods  Outcome: Progressing     Problem: METABOLIC, FLUID AND ELECTROLYTES - ADULT  Goal: Electrolytes maintained within normal limits  Description: INTERVENTIONS:  - Monitor labs and assess patient for signs and symptoms of electrolyte imbalances  - Administer electrolyte replacement as ordered  - Monitor response to electrolyte replacements, including repeat lab results as appropriate  - Instruct patient on fluid and nutrition as appropriate  Outcome: Progressing  Goal: Fluid balance maintained  Description: INTERVENTIONS:  - Monitor labs   - Monitor I/O and WT  - Instruct patient on fluid and nutrition as appropriate  - Assess for signs & symptoms of volume excess or deficit  Outcome: Progressing

## 2024-06-04 ENCOUNTER — APPOINTMENT (INPATIENT)
Dept: DIALYSIS | Facility: HOSPITAL | Age: 29
DRG: 469 | End: 2024-06-04
Attending: INTERNAL MEDICINE
Payer: MEDICARE

## 2024-06-04 VITALS
DIASTOLIC BLOOD PRESSURE: 98 MMHG | RESPIRATION RATE: 14 BRPM | WEIGHT: 228.18 LBS | OXYGEN SATURATION: 98 % | BODY MASS INDEX: 39.17 KG/M2 | TEMPERATURE: 98.8 F | SYSTOLIC BLOOD PRESSURE: 180 MMHG | HEART RATE: 89 BPM

## 2024-06-04 LAB
ANION GAP SERPL CALCULATED.3IONS-SCNC: 11 MMOL/L (ref 4–13)
BUN SERPL-MCNC: 60 MG/DL (ref 5–25)
CALCIUM SERPL-MCNC: 9.3 MG/DL (ref 8.4–10.2)
CHLORIDE SERPL-SCNC: 98 MMOL/L (ref 96–108)
CO2 SERPL-SCNC: 28 MMOL/L (ref 21–32)
CREAT SERPL-MCNC: 16.76 MG/DL (ref 0.6–1.3)
ERYTHROCYTE [DISTWIDTH] IN BLOOD BY AUTOMATED COUNT: 16.9 % (ref 11.6–15.1)
GFR SERPL CREATININE-BSD FRML MDRD: 2 ML/MIN/1.73SQ M
GLUCOSE SERPL-MCNC: 87 MG/DL (ref 65–140)
HCT VFR BLD AUTO: 23 % (ref 34.8–46.1)
HGB BLD-MCNC: 7.2 G/DL (ref 11.5–15.4)
MAGNESIUM SERPL-MCNC: 1.8 MG/DL (ref 1.9–2.7)
MCH RBC QN AUTO: 24.7 PG (ref 26.8–34.3)
MCHC RBC AUTO-ENTMCNC: 31.3 G/DL (ref 31.4–37.4)
MCV RBC AUTO: 79 FL (ref 82–98)
PHOSPHATE SERPL-MCNC: 5.9 MG/DL (ref 2.7–4.5)
PLATELET # BLD AUTO: 129 THOUSANDS/UL (ref 149–390)
PMV BLD AUTO: 12.5 FL (ref 8.9–12.7)
POTASSIUM SERPL-SCNC: 4.5 MMOL/L (ref 3.5–5.3)
RBC # BLD AUTO: 2.92 MILLION/UL (ref 3.81–5.12)
SODIUM SERPL-SCNC: 137 MMOL/L (ref 135–147)
WBC # BLD AUTO: 8.88 THOUSAND/UL (ref 4.31–10.16)

## 2024-06-04 PROCEDURE — 85027 COMPLETE CBC AUTOMATED: CPT | Performed by: INTERNAL MEDICINE

## 2024-06-04 PROCEDURE — 99232 SBSQ HOSP IP/OBS MODERATE 35: CPT | Performed by: INTERNAL MEDICINE

## 2024-06-04 PROCEDURE — 5A1D70Z PERFORMANCE OF URINARY FILTRATION, INTERMITTENT, LESS THAN 6 HOURS PER DAY: ICD-10-PCS | Performed by: INTERNAL MEDICINE

## 2024-06-04 PROCEDURE — 83735 ASSAY OF MAGNESIUM: CPT | Performed by: INTERNAL MEDICINE

## 2024-06-04 PROCEDURE — 87081 CULTURE SCREEN ONLY: CPT | Performed by: INTERNAL MEDICINE

## 2024-06-04 PROCEDURE — 99239 HOSP IP/OBS DSCHRG MGMT >30: CPT | Performed by: INTERNAL MEDICINE

## 2024-06-04 PROCEDURE — 84100 ASSAY OF PHOSPHORUS: CPT | Performed by: INTERNAL MEDICINE

## 2024-06-04 PROCEDURE — 80048 BASIC METABOLIC PNL TOTAL CA: CPT | Performed by: INTERNAL MEDICINE

## 2024-06-04 RX ORDER — AMLODIPINE BESYLATE 10 MG/1
10 TABLET ORAL 2 TIMES DAILY
Qty: 60 TABLET | Refills: 0 | Status: CANCELLED | OUTPATIENT
Start: 2024-06-04 | End: 2024-07-04

## 2024-06-04 RX ORDER — AMLODIPINE BESYLATE 10 MG/1
10 TABLET ORAL 2 TIMES DAILY
Qty: 30 TABLET | Refills: 0 | Status: CANCELLED | OUTPATIENT
Start: 2024-06-04

## 2024-06-04 RX ORDER — LABETALOL 100 MG/1
200 TABLET, FILM COATED ORAL 2 TIMES DAILY
Qty: 60 TABLET | Refills: 0 | Status: SHIPPED | OUTPATIENT
Start: 2024-06-04

## 2024-06-04 RX ADMIN — LABETALOL HYDROCHLORIDE 100 MG: 100 TABLET, FILM COATED ORAL at 17:05

## 2024-06-04 RX ADMIN — FLUTICASONE FUROATE AND VILANTEROL TRIFENATATE 1 PUFF: 100; 25 POWDER RESPIRATORY (INHALATION) at 08:11

## 2024-06-04 RX ADMIN — AMLODIPINE BESYLATE 5 MG: 5 TABLET ORAL at 08:10

## 2024-06-04 RX ADMIN — LABETALOL HYDROCHLORIDE 100 MG: 100 TABLET, FILM COATED ORAL at 08:10

## 2024-06-04 RX ADMIN — AMLODIPINE BESYLATE 5 MG: 5 TABLET ORAL at 17:05

## 2024-06-04 RX ADMIN — SEVELAMER HYDROCHLORIDE 800 MG: 800 TABLET ORAL at 17:05

## 2024-06-04 RX ADMIN — SEVELAMER HYDROCHLORIDE 800 MG: 800 TABLET ORAL at 11:19

## 2024-06-04 RX ADMIN — SEVELAMER HYDROCHLORIDE 800 MG: 800 TABLET ORAL at 06:38

## 2024-06-04 RX ADMIN — CALCITRIOL 0.5 MCG: 0.25 CAPSULE, LIQUID FILLED ORAL at 08:11

## 2024-06-04 RX ADMIN — FLUTICASONE PROPIONATE 1 SPRAY: 50 SPRAY, METERED NASAL at 08:10

## 2024-06-04 RX ADMIN — HEPARIN SODIUM 5000 UNITS: 5000 INJECTION INTRAVENOUS; SUBCUTANEOUS at 05:45

## 2024-06-04 RX ADMIN — LORATADINE 10 MG: 10 TABLET ORAL at 08:10

## 2024-06-04 NOTE — QUICK NOTE
I did attempt to inform the family member about discharge but voicemail has not been setup yet. I did not put voicemail.

## 2024-06-04 NOTE — ASSESSMENT & PLAN NOTE
Lab Results   Component Value Date    PHOS 5.9 (H) 06/04/2024    PHOS 6.5 (H) 06/03/2024    PHOS 8.1 (H) 06/02/2024   Hyperphosphatemia in the setting of acute renal failure and CKD, continuing sevelamer  If the patient does not respond to celebrate, lanthanum carbonate can be considered.  Remainder of plan as above under acute renal failure.

## 2024-06-04 NOTE — DISCHARGE INSTR - AVS FIRST PAGE
Dear Raven Simeon,     It was our pleasure to care for you here at [facility].  It is our hope that we were always able to exceed the expected standards for your care during your stay.  You were hospitalized due to end-stage renal disease complications requiring dialysis.  You were cared for on the S424 floor by Rosamaria Hilliard MD under the service of Rashmi Baker MD with the [facility] who covers for your primary care physician (PCP), Roberto Britt DO, while you were hospitalized.  If you have any questions or concerns related to this hospitalization, you may contact us at .  For follow up as well as any medication refills, we recommend that you follow up with your primary care physician.  A registered nurse will reach out to you by phone within a few days after your discharge to answer any additional questions that you may have after going home.  However, at this time we provide for you here, the most important instructions / recommendations at discharge:     Notable Medication Adjustments -   Please stop taking sodium bicarbonate tablets  Please increase your labetalol to 200 mg twice a day  Please continue the rest of your medications as prescribed  Testing Required after Discharge -   None from our standpoint.  Your outpatient primary care provider or nephrologist may order some test.  Important follow up information -   Please keep your dialysis appointment at [facility] on 6/5/2024 at 1:45 pm, you are scheduled for dialysis MWF.  Please follow-up with outpatient nephrology within 1 week of discharge for evaluation of renal function.  Please follow-up with your outpatient vascular surgery physician within 1 week of Discharge for evaluation of dialysis fistula.  Please follow-up with your outpatient primary care provider within 1 week of discharge.  Other Instructions -   Please be compliant with your medications.  If  new symptoms happen or current symptoms worsen, call 911 or come to ED.  Please review this entire after visit summary as additional general instructions including medication list, appointments, activity, diet, any pertinent wound care, and other additional recommendations from your care team that may be provided for you.      Sincerely,     Rosamaria Hilliard MD

## 2024-06-04 NOTE — ASSESSMENT & PLAN NOTE
Patient with history of chronic kidney disease stage V, coming in after outpatient labs demonstrating acute renal failure.  BUN elevated to 129, creatinine 31.72.  Also having severe anemia 5.1 to receive blood on admission.  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  Has been documented that this is secondary to hypertensive disease.  Current acute failure without obvious inciting event other than acute anemia, but may just be progression of disease.    - PermCath placed on 6/1.  -Overnight, permacath site bleeding which has resolved with pressure dressing.  Discussed with IR and nephrology, DDAVP ordered for likely platelet dysfunction.  Currently patient is on dialysis    Plan:  Nephrology consulted appreciate recommendation  Continuing home medications for now  Continue to monitor for uremic symptoms

## 2024-06-04 NOTE — QUICK NOTE
Patient's mother was informed about the discharge.  All the questions and concerns were addressed.  As per the mother, stable, here to  her daughter.

## 2024-06-04 NOTE — PLAN OF CARE
Post-Dialysis RN Treatment Note    Blood Pressure:  Pre 172/73 mm/Hg  Post 180/98 mmHg   EDW  TBD kg    Weight:  Pre 103.3 kg   Post 102.8 kg   Mode of weight measurement: Standing Scale   Volume Removed  500 ml    Treatment duration 150 minutes    NS given  No    Treatment shortened? No   Medications given during Rx Not Applicable   Estimated Kt/V  Not Applicable   Access type: Permacath/TDC   Access Issues: No    Report called to primary nurse   Yes / Melissa Paz RN          500 ml as tolerated, 2 K bath, 2.5 hrs  Problem: METABOLIC, FLUID AND ELECTROLYTES - ADULT  Goal: Electrolytes maintained within normal limits  Description: INTERVENTIONS:  - Monitor labs and assess patient for signs and symptoms of electrolyte imbalances  - Administer electrolyte replacement as ordered  - Monitor response to electrolyte replacements, including repeat lab results as appropriate  - Instruct patient on fluid and nutrition as appropriate  Outcome: Progressing  Goal: Fluid balance maintained  Description: INTERVENTIONS:  - Monitor labs   - Monitor I/O and WT  - Instruct patient on fluid and nutrition as appropriate  - Assess for signs & symptoms of volume excess or deficit  Outcome: Progressing

## 2024-06-04 NOTE — DISCHARGE SUMMARY
[facility]  Discharge- Raven Simeon 1995, 29 y.o. female MRN: [id number]  Unit/Bed#: S -01 Encounter: [id number]  Primary Care Provider: Roberto Britt DO   Date and time admitted to hospital: 5/31/2024  5:46 PM    * Acute renal failure (HCC)  Assessment & Plan  Patient with history of chronic kidney disease stage V, coming in after outpatient labs demonstrating acute renal failure.  BUN elevated to 129, creatinine 31.72.  Also having severe anemia 5.1 to receive blood on admission.  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  Has been documented that this is secondary to hypertensive disease.  Current acute failure without obvious inciting event other than acute anemia, but may just be progression of disease.    - PermCath placed on 6/1.  -Overnight, permacath site bleeding which has resolved with pressure dressing.  Discussed with IR and nephrology, DDAVP ordered for likely platelet dysfunction.  Currently patient is on dialysis    Plan:  Nephrology consulted appreciate recommendation  Continuing home medications for now  Continue to monitor for uremic symptoms    ESRD (end stage renal disease) (HCC)  Assessment & Plan  Lab Results   Component Value Date    EGFR 2 06/04/2024    EGFR 1 06/03/2024    EGFR 1 06/02/2024    CREATININE 16.76 (H) 06/04/2024    CREATININE 21.73 (H) 06/03/2024    CREATININE 32.33 (H) 06/02/2024   History of CKD 5, now ESRD.    Plan:  Check hemoglobin (goal baseline is more than 10)  Epogen  Vitamin D 25 analysis  Calcitriol to improve vitamin d  For hyperphosphatemia, start sevelamer and low phosphorus diet.  If it does not improve, lanthanum carbonate can be considered.  Monitor for signs of bleeding, possible signs of  platelet qualitative dysfunction because of uremia, DDAVP can be considered.  Monitor for signs of uremia just like uremic signs like uremic encephalopathy, uremic pericarditis, uremic asterixis, metallic taste.      Hyperphosphatemia  Assessment & Plan  Lab Results   Component Value Date    PHOS 5.9 (H) 06/04/2024    PHOS 6.5 (H) 06/03/2024    PHOS 8.1 (H) 06/02/2024   Hyperphosphatemia in the setting of acute renal failure and CKD, continuing sevelamer  If the patient does not respond to celebrate, lanthanum carbonate can be considered.  Remainder of plan as above under acute renal failure.    Acute on chronic anemia  Assessment & Plan  Lab Results   Component Value Date    HGB 7.2 (L) 06/04/2024    HGB 7.6 (L) 06/03/2024    HGB 7.5 (L) 06/02/2024   Patient with chronic anemia baseline hovering between 6-8, most likely secondary to chronic kidney disease.  No active bleeding at this point..      Plan:  Continue to trend CBC  Epogen (the goal of hemoglobin is 10 or above then).    Hypertension  Assessment & Plan  140-150's  systolic on home medications amlodipine 5 mg bid and labetalol.  There is absence of renal artery stenosis, cussing syndrome, Conn syndrome, pheochromocytoma    Plan:  Continue amlodipine and labetalol  Continue to monitor blood pressure  Labetalol 200 mg twice daily should be considered          Medical Problems       Resolved Problems  Date Reviewed: 6/2/2024            Resolved    Increased anion gap metabolic acidosis 6/3/2024     Resolved by  Rosamaria Hilliard MD        Discharging Resident: Rosamaria Hilliard MD  Discharging Attending: Rashmi Baker MD  PCP: Roberto Britt DO  Admission Date:   Admission Orders (From admission, onward)       Ordered        05/31/24 1830  INPATIENT ADMISSION  Once                          Discharge Date: 06/04/24    Consultations During Hospital Stay:  Nephrology    Procedures Performed:   HD catheter placement    Significant Findings / Test Results:    No    Incidental Findings:   no  I reviewed the above mentioned incidental findings with the patient and/or family and they expressed understanding.    Test Results Pending at Discharge (will require follow up):  no     Outpatient Tests Requested:  no    Complications: ESRD requiring dialysis    Reason for Admission: ESRD related complications requiring dialysis    Hospital Course:   Raven Simeon is a 29 y.o. female patient who originally presented to the hospital on 5/31/2024 due to 40, generalized weakness, baseline tremors, dyspnea on exertion.  Patient has a significant past medical history of hypertensive urgency and hypertensive emergency and causing diffuse global glomerulosclerosis, CKD.  She was asked to admit to [facility] by outpatient nephrologist for dialysis and blood transfusion for low hemoglobin.    During this admission, she was found to having ESRD (GFR: 1), progression of CKD and outpatient nephrologist did recommend elective dialysis (not CRRT).  Because of high anion gap metabolic acidosis, she was treated with sodium bicarb drip.    On 6/4, patient was stable to be discharged home in a stable condition.  Patient did not have any ESR elevated complications like uremic pericarditis or encephalopathy or asterixis.    Condition at Discharge: good    Discharge Day Visit / Exam:   Subjective: Patient is stable and doing well.  As per the patient and nurse there were no significant overnight events.  Patient does not have any ESRD related signs or symptoms at this moment.  Vitals: Blood Pressure: (!) 171/95 (06/04/24 1109)  Pulse: 84 (06/04/24 1109)  Temperature: 98.8 °F (37.1 °C) (06/04/24 1109)  Temp Source: Oral (06/03/24 1700)  Respirations: 19 (06/04/24 1109)  Weight - Scale: 104 kg (228 lb 2.8 oz) (06/03/24 1700)  SpO2: 98 % (06/04/24 1109)  Exam:   Physical Exam   Constitutional:       Appearance: Normal appearance.   Cardiovascular:      Rate and Rhythm: Normal rate and regular  rhythm.      Pulses: Normal pulses.      Heart sounds: Normal heart sounds. No murmur heard.     No friction rub. No gallop.   Pulmonary:      Effort: Pulmonary effort is normal. No respiratory distress.      Breath sounds: Normal breath sounds. No stridor. No wheezing, rhonchi or rales.   Chest:      Chest wall: No tenderness.   Musculoskeletal:      Right lower leg: No edema.      Left lower leg: No edema.   Neurological:      General: No focal deficit present.      Mental Status: She is alert and oriented to person, place, and time. Mental status is at baseline.      Comments: No asterixis   Psychiatric:         Mood and Affect: Mood normal.         Behavior: Behavior normal.         Thought Content: Thought content normal.     Discussion with Family:  Will attempt informing patient's family member..     Discharge instructions/Information to patient and family:   See after visit summary for information provided to patient and family.      Provisions for Follow-Up Care:  See after visit summary for information related to follow-up care and any pertinent home health orders.      Mobility at time of Discharge:   Basic Mobility Inpatient Raw Score: 24  JH-HLM Goal: 8: Walk 250 feet or more  JH-HLM Achieved: 8: Walk 250 feet ot more  HLM Goal achieved. Continue to encourage appropriate mobility.     Disposition:   Home    Planned Readmission: no    Discharge Medications:  See after visit summary for reconciled discharge medications provided to patient and/or family.      **Please Note: This note may have been constructed using a voice recognition system**

## 2024-06-04 NOTE — ASSESSMENT & PLAN NOTE
Lab Results   Component Value Date    EGFR 2 06/04/2024    EGFR 1 06/03/2024    EGFR 1 06/02/2024    CREATININE 16.76 (H) 06/04/2024    CREATININE 21.73 (H) 06/03/2024    CREATININE 32.33 (H) 06/02/2024   History of CKD 5, now ESRD.    Plan:  Check hemoglobin (goal baseline is more than 10)  Epogen  Vitamin D 25 analysis  Calcitriol to improve vitamin d  For hyperphosphatemia, start sevelamer and low phosphorus diet.  If it does not improve, lanthanum carbonate can be considered.  Monitor for signs of bleeding, possible signs of platelet qualitative dysfunction because of uremia, DDAVP can be considered.  Monitor for signs of uremia just like uremic signs like uremic encephalopathy, uremic pericarditis, uremic asterixis, metallic taste.

## 2024-06-04 NOTE — PROGRESS NOTES
NEPHROLOGY HOSPITAL PROGRESS NOTE   Lázaropratik Simeon 29 y.o. female MRN: [id number]  Unit/Bed#: S -01 Encounter: [id number]  Reason for Consult: CKD V now ESRD    ASSESSMENT and PLAN:    29-year-old female with a past medical history of CKD stage V, chronic GN, hypertension, who initially presents to the hospital with weakness, fatigue, mild asterixis and worsening lab work prompting nephrology team to recommend coming to the hospital to initiate dialysis.     1-CKD stage V now ESRD on dialysis     - Prior kidney biopsy with global glomerulosclerosis  - Initially patient was refusing dialysis for quite some time as outpatient-see prior notes by primary nephrologist  - Patient agreeable to be admitted this admission to initiate dialysis.  Significantly azotemic.  Developing signs of uremia.  - Tunneled dialysis catheter placed 6/1-with bleeding post requiring DDAVP.  - Consent for dialysis was obtained by prior rounding nephrologist  - Chest x-ray unrevealing     - Initiation of dialysis-6/2     6/3-will plan for short 2-hour treatment.  To avoid overcorrection of azotemia.  We will also use lower blood flow.  - 6/4-plan for 2.5-hour dialysis treatment.    Overall, patient reports she feels much better since starting dialysis.  She can now hold things again without having significant tremors.  She states her fatigue is also improving.     Plan  - Plan for short dialysis treatment today with low flows.  We have been dialyzing the patient at the slower treatments then normal protocols for initiation treatments given severe azotemia for prolonged period to prevent risk of demyelination  - Plan for dialysis today  -HD at [place] 6/5 from renal standpoint patient is stable for final disposition after if discharged today. I have spoken with Thalia BRIONES at [facility] to rec no more than 3 hr HD tomorrow-  - access - TDC. No sign of bleeding. Dried blood noted.   - needs vascular referral on d/c for fistula  "placement evaluation  - Reviewed case with primary team resident we are in agreement renal plan for dialysis today and rest of renal plan.  Patient is stable for final disposition from renal standpoint.     Portions of the record may have been created with voice recognition software. Occasional wrong word or \"sound a like\" substitutions may have occurred due to the inherent limitations of voice recognition software. Read the chart carefully and recognize, using context, where substitutions have occurred.If you have any questions, please contact the dictating provider.        2-electrolytes-monitor with clearance of dialysis     3-acid/base-monitor with dialysis.  Hold bicarbonate tablets     4-hyperphosphatemia in the setting of advanced CKD.  On phosphorus binders.  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  Hemoglobin on admission was 5.1..  On Epogen with dialysis     7-hypertension-monitor with current regimen    SUBJECTIVE / 24H INTERVAL HISTORY:  Pt denies complaints. States tremors have improved significantly since starting dialysis. No SOB.     OBJECTIVE:  Current Weight: Weight - Scale: 104 kg (228 lb 2.8 oz)  Vitals:    06/03/24 1700 06/03/24 1716 06/03/24 1912 06/03/24 2227   BP: 155/87 146/76 157/87 162/94   BP Location: Left arm      Pulse: 83 83 95 86   Resp: 16  19 19   Temp: 98.4 °F (36.9 °C)  99.4 °F (37.4 °C) 98.2 °F (36.8 °C)   TempSrc: Oral      SpO2: 98%  96% 100%   Weight: 104 kg (228 lb 2.8 oz)          Intake/Output Summary (Last 24 hours) at 6/4/2024 0649  Last data filed at 6/3/2024 1700  Gross per 24 hour   Intake 740 ml   Output 1000 ml   Net -260 ml     General: NAD  Skin: no rash  Eyes: anicteric sclera  ENT: moist mucous membrane  Neck: supple  Chest: CTA b/l, no ronchii, no wheeze, no rubs, no rales  CVS: s1s2, no murmur, no gallop, no rub  Abdomen: soft, nontender, nl sounds  Extremities: no edema LE b/l, R IJ TDC  : no ramirez  Neuro: AAOX3  Psych: " normal affect    Medications:    Current Facility-Administered Medications:     acetaminophen (TYLENOL) tablet 650 mg, 650 mg, Oral, Q6H PRN, Lennie White MD, 650 mg at 06/02/24 0217    albuterol inhalation solution 2.5 mg, 2.5 mg, Nebulization, Q6H PRN, Sohan Squires MD    amLODIPine (NORVASC) tablet 5 mg, 5 mg, Oral, BID, Sohan Squires MD, 5 mg at 06/03/24 1716    calcitriol (ROCALTROL) capsule 0.5 mcg, 0.5 mcg, Oral, Daily, Sohan Squires MD, 0.5 mcg at 06/03/24 0822    Diclofenac Sodium (VOLTAREN) 1 % topical gel 2 g, 2 g, Topical, 4x Daily, Sohan Squires MD, 2 g at 06/03/24 2136    epoetin opal (EPOGEN,PROCRIT) injection 8,000 Units, 8,000 Units, Intravenous, After Dialysis, Min Pereira MD, 8,000 Units at 06/03/24 1708    fluticasone (FLONASE) 50 mcg/act nasal spray 1 spray, 1 spray, Nasal, Daily, Sohan Squires MD, 1 spray at 06/03/24 0822    Fluticasone Furoate-Vilanterol 100-25 mcg/actuation 1 puff, 1 puff, Inhalation, Daily, Sohan Squires MD, 1 puff at 06/03/24 0822    heparin (porcine) subcutaneous injection 5,000 Units, 5,000 Units, Subcutaneous, Q8H LAMONT, David Cano MD, 5,000 Units at 06/04/24 0545    labetalol (NORMODYNE) tablet 100 mg, 100 mg, Oral, BID, Sohan Squires MD, 100 mg at 06/03/24 1716    loratadine (CLARITIN) tablet 10 mg, 10 mg, Oral, Daily, Sohan Squires MD, 10 mg at 06/03/24 0822    ondansetron (ZOFRAN) injection 4 mg, 4 mg, Intravenous, Q6H PRN, Sohan Squires MD    sevelamer (RENAGEL) tablet 800 mg, 800 mg, Oral, TID With Meals, Sohan Squires MD, 800 mg at 06/04/24 0638    Laboratory Results:  Results from last 7 days   Lab Units 06/04/24  0525 06/03/24  0529 06/02/24  0604 06/02/24  0105 06/02/24  0053 06/01/24  1904 06/01/24  0851 05/31/24  0916   WBC Thousand/uL 8.88 9.50 9.38  --  8.92  --  9.46 9.08   HEMOGLOBIN g/dL 7.2* 7.6* 7.5*  --  7.4* 7.7* 6.6* 5.1*   HEMATOCRIT % 23.0* 23.5* 22.6*  --  22.9* 23.2* 20.7* 16.5*    PLATELETS Thousands/uL 129* 135* 144*  --  132*  --  151 148*   POTASSIUM mmol/L 4.5 4.5 4.4 4.3  --   --  4.7 4.6   CHLORIDE mmol/L 98 98 97 98  --   --  99 98   CO2 mmol/L 28 27 24 23  --   --  21 21   BUN mg/dL 60* 85* 125* 124*  --   --  127* 129*   CREATININE mg/dL 16.76* 21.73* 32.33* 32.76*  --   --  32.72* 31.72*   CALCIUM mg/dL 9.3 9.4 9.0 8.9  --   --  9.3 9.8   MAGNESIUM mg/dL 1.8* 1.8* 1.8*  --   --   --  1.7*  --    PHOSPHORUS mg/dL 5.9* 6.5* 8.1*  --   --   --  9.8* 10.4*

## 2024-06-04 NOTE — ASSESSMENT & PLAN NOTE
140-150's  systolic on home medications amlodipine 5 mg bid and labetalol.  There is absence of renal artery stenosis, cussing syndrome, Conn syndrome, pheochromocytoma    Plan:  Continue amlodipine and labetalol  Continue to monitor blood pressure  Labetalol 200 mg twice daily should be considered

## 2024-06-04 NOTE — ASSESSMENT & PLAN NOTE
Patient with history of chronic kidney disease stage V, coming in after outpatient labs demonstrating acute renal failure.  BUN elevated to 129, creatinine 31.72.  Also having severe anemia 5.1 to receive blood on admission.  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  Has been documented that this is secondary to hypertensive disease.  Current acute failure without obvious inciting event other than acute anemia, but may just be progression of disease.    - PermCath placed on 6/1.  -Overnight, permacath site bleeding which has resolved with pressure dressing.  Discussed with IR and nephrology, DDAVP ordered for likely platelet dysfunction.  Currently patient is on dialysis    Plan:  Nephrology consulted appreciate recommendation  Continuing home medications for now  Continue to monitor for uremic symptoms
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
## 2024-06-04 NOTE — ASSESSMENT & PLAN NOTE
Lab Results   Component Value Date    HGB 7.2 (L) 06/04/2024    HGB 7.6 (L) 06/03/2024    HGB 7.5 (L) 06/02/2024   Patient with chronic anemia baseline hovering between 6-8, most likely secondary to chronic kidney disease.  No active bleeding at this point..      Plan:  Continue to trend CBC  Epogen (the goal of hemoglobin is 10 or above then).

## 2024-06-04 NOTE — ASSESSMENT & PLAN NOTE
140-150's  systolic on home medications amlodipine 5 mg bid and labetalol.  There is absence of renal artery stenosis, cussing syndrome, Conn syndrome, pheochromocytoma    Plan:  Continue amlodipine and labetalol  Continue to monitor blood pressure  Amlodipine 10 mg should be considered

## 2024-06-05 DIAGNOSIS — Z71.89 COMPLEX CARE COORDINATION: Primary | ICD-10-CM

## 2024-06-05 LAB — MRSA NOSE QL CULT: NORMAL

## 2024-06-05 NOTE — UTILIZATION REVIEW
NOTIFICATION OF ADMISSION DISCHARGE   This is a Notification of Discharge from [facility]. Please be advised that this patient has been discharge from our facility. Below you will find the admission and discharge date and time including the patient’s disposition.   UTILIZATION REVIEW CONTACT:  Rosalina Quintana  Utilization   Network Utilization Review Department  Phone: [phone] carefully listen to the prompts. All voicemails are confidential.  Email: [email or web address]     ADMISSION INFORMATION  PRESENTATION DATE: 5/31/2024  5:46 PM  OBERVATION ADMISSION DATE:   INPATIENT ADMISSION DATE: 5/31/24  5:46 PM   DISCHARGE DATE: 6/4/2024  5:48 PM   DISPOSITION:Home/Self Care    Network Utilization Review Department  ATTENTION: Please call with any questions or concerns to [phone] and carefully listen to the prompts so that you are directed to the right person. All voicemails are confidential.   For Discharge needs, contact Care Management DC Support Team at [phone] opt. [phone]  Send all requests for admission clinical reviews, approved or denied determinations and any other requests to dedicated fax number below belonging to the campus where the patient is receiving treatment. List of dedicated fax numbers for the Facilities:  FACILITY NAME UR FAX NUMBER   ADMISSION DENIALS (Administrative/Medical Necessity) [phone]   DISCHARGE SUPPORT TEAM ([facility]) [phone]   PARENT CHILD HEALTH (Maternity/NICU/Pediatrics) [phone]   [facility] [phone]   [facility] [phone]   [facility] [phone]   [facility] [phone]   [facility] [phone]   [facility] [phone]   [facility] [phone]   [facility] [phone]    [facility] [phone]   [facility] [phone]   [facility] [phone]   [facility] [phone]

## 2024-06-06 ENCOUNTER — TELEPHONE (OUTPATIENT)
Dept: NEPHROLOGY | Facility: CLINIC | Age: 29
End: 2024-06-06

## 2024-06-06 ENCOUNTER — PATIENT OUTREACH (OUTPATIENT)
Dept: FAMILY MEDICINE CLINIC | Facility: CLINIC | Age: 29
End: 2024-06-06

## 2024-06-06 NOTE — PROGRESS NOTES
HRR referral received via IB. Chart reviewed. Patient admitted to IP at [facility] 5/31-6/4 with Acute Renal Failure. Patient with history of chronic kidney disease stage V, coming in after outpatient labs demonstrating acute renal failure. BUN elevated to 129, creatinine 31.72. Also having severe anemia 5.1 to receive blood on admission.   PermCath placed on 6/1.     PMH: CKD5 not on dialysis yet but most likely will need in the near future, anemia, htn, asthma    This RNCM called patient and introduced self and explained the role of the RNCM and CCM services. Patient says she is doing fine and feeling good. She says that the dialysis catheter site is good and she is keeping it dry. No drainage or pain. She denies any fever or pain. Patient declined reviewing her medications. Patient says she takes them as directed. Patient is now on hemodialysis at [facility] on M-W-F. At 1:45PM. She states she has transport with family but is in the process of applying for [facility] [place] services.     Patient has an apt with her Nephrologist on 6/13 which was cancelled because she will be seen by the Nephrologist while on dialysis. Patient does not have a PCP MANUEL apt set up. I encouraged her to call the office and set up. I also told her I would send the office a message.    An IB message sent to PCP and office staff in request to set up MANU[facility] apt.

## 2024-06-06 NOTE — TELEPHONE ENCOUNTER
MACKENZIE for pt informing her we were canceling her appointment for 6/13 with Dr. Remy due to starting dialysis. She will see the doctors in the unit and the appointment would not be covered by insurance. We will however call her to schedule her December transplant f/u with Dr. Mac once the schedule is released.

## 2024-06-10 ENCOUNTER — TRANSITIONAL CARE MANAGEMENT (OUTPATIENT)
Dept: FAMILY MEDICINE CLINIC | Facility: CLINIC | Age: 29
End: 2024-06-10

## 2024-06-12 ENCOUNTER — HOSPITAL ENCOUNTER (EMERGENCY)
Facility: HOSPITAL | Age: 29
Discharge: HOME | End: 2024-06-13
Attending: EMERGENCY MEDICINE
Payer: MEDICARE

## 2024-06-12 ENCOUNTER — APPOINTMENT (EMERGENCY)
Dept: CT IMAGING | Facility: HOSPITAL | Age: 29
End: 2024-06-12
Payer: MEDICARE

## 2024-06-12 DIAGNOSIS — M54.9 BACK PAIN: ICD-10-CM

## 2024-06-12 DIAGNOSIS — M54.2 NECK PAIN: ICD-10-CM
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
## 2024-06-12 PROCEDURE — 99284 EMERGENCY DEPT VISIT MOD MDM: CPT | Performed by: EMERGENCY MEDICINE

## 2024-06-12 PROCEDURE — 72125 CT NECK SPINE W/O DYE: CPT

## 2024-06-12 PROCEDURE — 70450 CT HEAD/BRAIN W/O DYE: CPT

## 2024-06-12 PROCEDURE — 99284 EMERGENCY DEPT VISIT MOD MDM: CPT

## 2024-06-12 RX ORDER — ACETAMINOPHEN 325 MG/1
650 TABLET ORAL ONCE
Status: COMPLETED | OUTPATIENT
Start: 2024-06-12 | End: 2024-06-13

## 2024-06-12 RX ORDER — LIDOCAINE 50 MG/G
1 PATCH TOPICAL ONCE
Status: DISCONTINUED | OUTPATIENT
Start: 2024-06-12 | End: 2024-06-13 | Stop reason: HOSPADM

## 2024-06-13 ENCOUNTER — APPOINTMENT (EMERGENCY)
Dept: RADIOLOGY | Facility: HOSPITAL | Age: 29
End: 2024-06-13
Payer: MEDICARE

## 2024-06-13 VITALS
RESPIRATION RATE: 18 BRPM | HEART RATE: 84 BPM | OXYGEN SATURATION: 100 % | DIASTOLIC BLOOD PRESSURE: 97 MMHG | TEMPERATURE: 98.6 F | SYSTOLIC BLOOD PRESSURE: 178 MMHG

## 2024-06-13 PROCEDURE — 72100 X-RAY EXAM L-S SPINE 2/3 VWS: CPT

## 2024-06-13 RX ORDER — LIDOCAINE 50 MG/G
1 PATCH TOPICAL EVERY 24 HOURS
Start: 2024-06-13 | End: 2024-06-25

## 2024-06-13 RX ADMIN — ACETAMINOPHEN 650 MG: 325 TABLET, FILM COATED ORAL at 00:11

## 2024-06-13 RX ADMIN — LIDOCAINE 5% 1 PATCH: 700 PATCH TOPICAL at 00:24
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
## 2024-06-14 ENCOUNTER — TELEPHONE (OUTPATIENT)
Dept: PHYSICAL THERAPY | Facility: OTHER | Age: 29
End: 2024-06-14

## 2024-06-14 ENCOUNTER — VBI (OUTPATIENT)
Dept: ADMINISTRATIVE | Facility: OTHER | Age: 29
End: 2024-06-14

## 2024-06-14 ENCOUNTER — TELEPHONE (OUTPATIENT)
Dept: VASCULAR SURGERY | Facility: CLINIC | Age: 29
End: 2024-06-14

## 2024-06-14 NOTE — TELEPHONE ENCOUNTER
called patient to schedule - left message     Yvonne Lozoya  [facility]  [address]  [place] [address]  [phone]    From: Meche Blancas <[email or web address]>  Sent: Wednesday, June 5, 2024 1:53 PM  To: Yvonne Lozoya <[email or web address]>; Cynthia La <[email or web address]>; Valentine Kimball <[email or web address]>  Cc: Thalia Fabian <[email or web address]>; KIRA SEVER <[email or web address]>; Idalia Liao <[email or web address]>; Thanh Berry <[email or web address]>; Glenn Johnson <[email or web address]>  Subject: [EXTERNAL] Binh Petty pt SS needs appts please     WARNING! Based upon the critical issue with ransomware targeting Healthcare systems ALL attachments and links from this email should be heavily scrutinized.    Hi Schedulers,         Binh Petty pt, Raven Simeon, is ready to start dialysis access planning.    Nephrologist is Dr. Reyes-Bahamonde and HD schedule is [facility] 2nd shift - chair time approx. 11a-3p.    Pt had access education today and is anticipating a call from your office to schedule vein mapping and surgical consult appts.

## 2024-06-18 DIAGNOSIS — J45.20 MILD INTERMITTENT ASTHMA WITHOUT COMPLICATION: ICD-10-CM

## 2024-06-19 RX ORDER — ALBUTEROL SULFATE 90 UG/1
AEROSOL, METERED RESPIRATORY (INHALATION)
Qty: 18 G | Refills: 5 | Status: SHIPPED | OUTPATIENT
Start: 2024-06-19

## 2024-06-25 ENCOUNTER — OFFICE VISIT (OUTPATIENT)
Dept: FAMILY MEDICINE CLINIC | Facility: CLINIC | Age: 29
End: 2024-06-25
Payer: MEDICARE

## 2024-06-25 VITALS
BODY MASS INDEX: 38.07 KG/M2 | RESPIRATION RATE: 18 BRPM | TEMPERATURE: 99 F | OXYGEN SATURATION: 99 % | HEART RATE: 86 BPM | SYSTOLIC BLOOD PRESSURE: 149 MMHG | WEIGHT: 223 LBS | DIASTOLIC BLOOD PRESSURE: 80 MMHG | HEIGHT: 64 IN

## 2024-06-25 DIAGNOSIS — M54.42 MIDLINE LOW BACK PAIN WITH LEFT-SIDED SCIATICA, UNSPECIFIED CHRONICITY: Primary | ICD-10-CM

## 2024-06-25 DIAGNOSIS — J45.40 MODERATE PERSISTENT ASTHMA WITHOUT COMPLICATION: ICD-10-CM

## 2024-06-25 DIAGNOSIS — M54.2 NECK PAIN: ICD-10-CM

## 2024-06-25 PROCEDURE — 99213 OFFICE O/P EST LOW 20 MIN: CPT

## 2024-06-25 RX ORDER — METHOCARBAMOL 500 MG/1
500 TABLET, FILM COATED ORAL 3 TIMES DAILY PRN
Qty: 25 TABLET | Refills: 0 | Status: CANCELLED | OUTPATIENT
Start: 2024-06-25

## 2024-06-25 RX ORDER — METHYL SALICYLATE MENTHOL CAPSAICIN LIDOCAINE 20; 5; .035; .5 G/100G; G/100G; G/100G; G/100G
1 PATCH TOPICAL DAILY PRN
Qty: 30 PATCH | Refills: 2 | Status: SHIPPED | OUTPATIENT
Start: 2024-06-25

## 2024-06-26 PROBLEM — M54.42 MIDLINE LOW BACK PAIN WITH LEFT-SIDED SCIATICA: Status: ACTIVE | Noted: 2024-06-26

## 2024-06-26 PROBLEM — M54.2 NECK PAIN: Status: ACTIVE | Noted: 2024-06-26
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
## 2024-06-26 NOTE — ASSESSMENT & PLAN NOTE
Worse with long periods of sitting e.g. in a car. Would avoid PO NSAIDs in the setting of CKD5. Can treat with voltaren gel alternating with lidocaine patch prn.     Referral placed to physical therapy at this time.
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
## 2024-06-29 ENCOUNTER — HOSPITAL ENCOUNTER (OUTPATIENT)
Dept: VASCULAR ULTRASOUND | Facility: HOSPITAL | Age: 29
Discharge: HOME | End: 2024-06-29
Payer: MEDICARE

## 2024-06-29 DIAGNOSIS — N18.5 CKD (CHRONIC KIDNEY DISEASE) STAGE 5, GFR LESS THAN 15 ML/MIN (HCC): ICD-10-CM

## 2024-06-29 PROCEDURE — 93985 DUP-SCAN HEMO COMPL BI STD: CPT

## 2024-06-30 PROCEDURE — 93985 DUP-SCAN HEMO COMPL BI STD: CPT | Performed by: SURGERY

## 2024-07-03 DIAGNOSIS — D64.9 ANEMIA, UNSPECIFIED TYPE: Primary | ICD-10-CM

## 2024-07-03 DIAGNOSIS — N25.81 SECONDARY HYPERPARATHYROIDISM OF RENAL ORIGIN (HCC): ICD-10-CM

## 2024-07-04 ENCOUNTER — DOCUMENTATION (OUTPATIENT)
Dept: NEPHROLOGY | Facility: CLINIC | Age: 29
End: 2024-07-04

## 2024-07-04 NOTE — PROGRESS NOTES
This is a late note from 7/3/24. I saw Raven at outpatient dialysis on 7/3 and due to a dropping HGB result and also for weekly rounds. We checked a stat HGB while on treatment. This resulted at a lower value of 5.8. In speaking to patient she was asymptomatic and denied dizziness, worsening fatigue, blood in stool or urine. She was hemodynamically stable on the dialysis machine. I discussed plan with her to go to the ER for acutely worsening HGB to receive a transfusion. She told me her mom would take her after treatment. Her mother came to the office upstairs to talk to me while she stated Raven was waiting in the car. I explained the need to go to the ER to Raven’s mother. She stated she would talk to Lázaro. I tried to impress the gravity of the situation and need for evaluation. I asked if they were going to present to the ER for evaluation and she said she had to talk to her daughter but would not answer my question.

## 2024-07-05 ENCOUNTER — HOSPITAL ENCOUNTER (OUTPATIENT)
Facility: HOSPITAL | Age: 29
Setting detail: OBSERVATION
Discharge: HOME | End: 2024-07-06
Attending: EMERGENCY MEDICINE | Admitting: INTERNAL MEDICINE
Payer: MEDICARE

## 2024-07-05 DIAGNOSIS — D64.9 ACUTE ON CHRONIC ANEMIA: Primary | ICD-10-CM

## 2024-07-05 DIAGNOSIS — Z99.2 ESRD ON DIALYSIS (HCC): ICD-10-CM

## 2024-07-05 DIAGNOSIS — E53.8 VITAMIN B 12 DEFICIENCY: ICD-10-CM

## 2024-07-05 DIAGNOSIS — N18.6 ESRD ON DIALYSIS (HCC): ICD-10-CM

## 2024-07-05 LAB
ABO GROUP BLD: NORMAL
ALBUMIN SERPL BCG-MCNC: 4.4 G/DL (ref 3.5–5)
ALP SERPL-CCNC: 49 U/L (ref 34–104)
ALT SERPL W P-5'-P-CCNC: 4 U/L (ref 7–52)
ANION GAP SERPL CALCULATED.3IONS-SCNC: 12 MMOL/L (ref 4–13)
AST SERPL W P-5'-P-CCNC: 7 U/L (ref 13–39)
BASOPHILS # BLD AUTO: 0.02 THOUSANDS/ÂΜL (ref 0–0.1)
BASOPHILS NFR BLD AUTO: 0 % (ref 0–1)
BILIRUB SERPL-MCNC: 0.32 MG/DL (ref 0.2–1)
BLD GP AB SCN SERPL QL: NEGATIVE
BUN SERPL-MCNC: 40 MG/DL (ref 5–25)
CALCIUM SERPL-MCNC: 10.5 MG/DL (ref 8.4–10.2)
CHLORIDE SERPL-SCNC: 101 MMOL/L (ref 96–108)
CO2 SERPL-SCNC: 24 MMOL/L (ref 21–32)
CREAT SERPL-MCNC: 13.03 MG/DL (ref 0.6–1.3)
EOSINOPHIL # BLD AUTO: 0.24 THOUSAND/ÂΜL (ref 0–0.61)
EOSINOPHIL NFR BLD AUTO: 3 % (ref 0–6)
ERYTHROCYTE [DISTWIDTH] IN BLOOD BY AUTOMATED COUNT: 16.9 % (ref 11.6–15.1)
GFR SERPL CREATININE-BSD FRML MDRD: 3 ML/MIN/1.73SQ M
GLUCOSE SERPL-MCNC: 89 MG/DL (ref 65–140)
HCT VFR BLD AUTO: 18.4 % (ref 34.8–46.1)
HGB BLD-MCNC: 5.7 G/DL (ref 11.5–15.4)
IMM GRANULOCYTES # BLD AUTO: 0.03 THOUSAND/UL (ref 0–0.2)
IMM GRANULOCYTES NFR BLD AUTO: 0 % (ref 0–2)
LYMPHOCYTES # BLD AUTO: 1.12 THOUSANDS/ÂΜL (ref 0.6–4.47)
LYMPHOCYTES NFR BLD AUTO: 15 % (ref 14–44)
MCH RBC QN AUTO: 23.5 PG (ref 26.8–34.3)
MCHC RBC AUTO-ENTMCNC: 30.4 G/DL (ref 31.4–37.4)
MCV RBC AUTO: 77 FL (ref 82–98)
MONOCYTES # BLD AUTO: 0.38 THOUSAND/ÂΜL (ref 0.17–1.22)
MONOCYTES NFR BLD AUTO: 5 % (ref 4–12)
NEUTROPHILS # BLD AUTO: 5.54 THOUSANDS/ÂΜL (ref 1.85–7.62)
NEUTS SEG NFR BLD AUTO: 77 % (ref 43–75)
NRBC BLD AUTO-RTO: 0 /100 WBCS
PLATELET # BLD AUTO: 222 THOUSANDS/UL (ref 149–390)
PMV BLD AUTO: 10.2 FL (ref 8.9–12.7)
POTASSIUM SERPL-SCNC: 3.7 MMOL/L (ref 3.5–5.3)
PROT SERPL-MCNC: 6.7 G/DL (ref 6.4–8.4)
RBC # BLD AUTO: 2.38 MILLION/UL (ref 3.81–5.12)
RH BLD: POSITIVE
SODIUM SERPL-SCNC: 137 MMOL/L (ref 135–147)
SPECIMEN EXPIRATION DATE: NORMAL
WBC # BLD AUTO: 7.33 THOUSAND/UL (ref 4.31–10.16)

## 2024-07-05 PROCEDURE — P9058 RBC, L/R, CMV-NEG, IRRAD: HCPCS

## 2024-07-05 PROCEDURE — 93005 ELECTROCARDIOGRAM TRACING: CPT

## 2024-07-05 PROCEDURE — 86644 CMV ANTIBODY: CPT

## 2024-07-05 PROCEDURE — 99223 1ST HOSP IP/OBS HIGH 75: CPT | Performed by: INTERNAL MEDICINE

## 2024-07-05 PROCEDURE — 80053 COMPREHEN METABOLIC PANEL: CPT

## 2024-07-05 PROCEDURE — 36415 COLL VENOUS BLD VENIPUNCTURE: CPT

## 2024-07-05 PROCEDURE — 86901 BLOOD TYPING SEROLOGIC RH(D): CPT

## 2024-07-05 PROCEDURE — 99285 EMERGENCY DEPT VISIT HI MDM: CPT

## 2024-07-05 PROCEDURE — 86923 COMPATIBILITY TEST ELECTRIC: CPT

## 2024-07-05 PROCEDURE — 99284 EMERGENCY DEPT VISIT MOD MDM: CPT

## 2024-07-05 PROCEDURE — 85025 COMPLETE CBC W/AUTO DIFF WBC: CPT

## 2024-07-05 PROCEDURE — 82607 VITAMIN B-12: CPT | Performed by: INTERNAL MEDICINE

## 2024-07-05 PROCEDURE — 36430 TRANSFUSION BLD/BLD COMPNT: CPT

## 2024-07-05 PROCEDURE — 86900 BLOOD TYPING SEROLOGIC ABO: CPT

## 2024-07-05 PROCEDURE — 86850 RBC ANTIBODY SCREEN: CPT

## 2024-07-05 PROCEDURE — 82746 ASSAY OF FOLIC ACID SERUM: CPT | Performed by: INTERNAL MEDICINE

## 2024-07-05 RX ORDER — LABETALOL 100 MG/1
200 TABLET, FILM COATED ORAL 2 TIMES DAILY
Status: DISCONTINUED | OUTPATIENT
Start: 2024-07-05 | End: 2024-07-06 | Stop reason: HOSPADM

## 2024-07-05 RX ORDER — ALBUTEROL SULFATE 90 UG/1
2 AEROSOL, METERED RESPIRATORY (INHALATION) EVERY 6 HOURS PRN
Status: DISCONTINUED | OUTPATIENT
Start: 2024-07-05 | End: 2024-07-06 | Stop reason: HOSPADM

## 2024-07-05 RX ORDER — LORATADINE 10 MG/1
5 TABLET ORAL DAILY
Status: DISCONTINUED | OUTPATIENT
Start: 2024-07-06 | End: 2024-07-06 | Stop reason: HOSPADM

## 2024-07-05 RX ORDER — ALBUTEROL SULFATE 2.5 MG/3ML
2.5 SOLUTION RESPIRATORY (INHALATION) EVERY 6 HOURS PRN
Status: DISCONTINUED | OUTPATIENT
Start: 2024-07-05 | End: 2024-07-06 | Stop reason: HOSPADM

## 2024-07-05 RX ORDER — FLUTICASONE PROPIONATE 50 MCG
1 SPRAY, SUSPENSION (ML) NASAL DAILY
Status: DISCONTINUED | OUTPATIENT
Start: 2024-07-06 | End: 2024-07-06 | Stop reason: HOSPADM

## 2024-07-05 RX ORDER — ONDANSETRON 2 MG/ML
4 INJECTION INTRAMUSCULAR; INTRAVENOUS EVERY 6 HOURS PRN
Status: DISCONTINUED | OUTPATIENT
Start: 2024-07-05 | End: 2024-07-06 | Stop reason: HOSPADM

## 2024-07-05 RX ORDER — CALCITRIOL 0.25 UG/1
0.5 CAPSULE, LIQUID FILLED ORAL DAILY
Status: DISCONTINUED | OUTPATIENT
Start: 2024-07-06 | End: 2024-07-06 | Stop reason: HOSPADM

## 2024-07-05 RX ORDER — AMLODIPINE BESYLATE 5 MG/1
5 TABLET ORAL 2 TIMES DAILY
Status: DISCONTINUED | OUTPATIENT
Start: 2024-07-05 | End: 2024-07-06 | Stop reason: HOSPADM

## 2024-07-05 RX ORDER — SEVELAMER HYDROCHLORIDE 800 MG/1
800 TABLET, FILM COATED ORAL
Status: DISCONTINUED | OUTPATIENT
Start: 2024-07-06 | End: 2024-07-06 | Stop reason: HOSPADM

## 2024-07-05 RX ORDER — HEPARIN SODIUM 5000 [USP'U]/ML
5000 INJECTION, SOLUTION INTRAVENOUS; SUBCUTANEOUS EVERY 8 HOURS SCHEDULED
Status: DISCONTINUED | OUTPATIENT
Start: 2024-07-05 | End: 2024-07-06 | Stop reason: HOSPADM

## 2024-07-05 RX ORDER — CALCITRIOL 0.5 UG/1
0.5 CAPSULE, LIQUID FILLED ORAL DAILY
Qty: 30 CAPSULE | Refills: 5 | Status: SHIPPED | OUTPATIENT
Start: 2024-07-05

## 2024-07-05 RX ORDER — ACETAMINOPHEN 325 MG/1
650 TABLET ORAL EVERY 6 HOURS PRN
Status: DISCONTINUED | OUTPATIENT
Start: 2024-07-05 | End: 2024-07-06 | Stop reason: HOSPADM

## 2024-07-05 RX ORDER — FLUTICASONE FUROATE AND VILANTEROL 100; 25 UG/1; UG/1
1 POWDER RESPIRATORY (INHALATION) DAILY
Status: DISCONTINUED | OUTPATIENT
Start: 2024-07-06 | End: 2024-07-06 | Stop reason: HOSPADM

## 2024-07-05 RX ADMIN — LABETALOL HYDROCHLORIDE 200 MG: 100 TABLET, FILM COATED ORAL at 22:29

## 2024-07-05 RX ADMIN — HEPARIN SODIUM 5000 UNITS: 5000 INJECTION INTRAVENOUS; SUBCUTANEOUS at 22:29

## 2024-07-05 RX ADMIN — AMLODIPINE BESYLATE 5 MG: 5 TABLET ORAL at 22:30
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
## 2024-07-06 ENCOUNTER — APPOINTMENT (OUTPATIENT)
Dept: DIALYSIS | Facility: HOSPITAL | Age: 29
End: 2024-07-06
Payer: MEDICARE

## 2024-07-06 VITALS
HEART RATE: 76 BPM | OXYGEN SATURATION: 100 % | BODY MASS INDEX: 39.33 KG/M2 | TEMPERATURE: 98.4 F | SYSTOLIC BLOOD PRESSURE: 128 MMHG | WEIGHT: 230.38 LBS | RESPIRATION RATE: 18 BRPM | HEIGHT: 64 IN | DIASTOLIC BLOOD PRESSURE: 75 MMHG

## 2024-07-06 LAB
ABO GROUP BLD BPU: NORMAL
ABO GROUP BLD BPU: NORMAL
ANION GAP SERPL CALCULATED.3IONS-SCNC: 14 MMOL/L (ref 4–13)
ATRIAL RATE: 80 BPM
BASOPHILS # BLD AUTO: 0.02 THOUSANDS/ÂΜL (ref 0–0.1)
BASOPHILS NFR BLD AUTO: 0 % (ref 0–1)
BPU ID: NORMAL
BPU ID: NORMAL
BUN SERPL-MCNC: 46 MG/DL (ref 5–25)
CALCIUM SERPL-MCNC: 10.4 MG/DL (ref 8.4–10.2)
CHLORIDE SERPL-SCNC: 102 MMOL/L (ref 96–108)
CO2 SERPL-SCNC: 22 MMOL/L (ref 21–32)
CREAT SERPL-MCNC: 13.36 MG/DL (ref 0.6–1.3)
CROSSMATCH: NORMAL
CROSSMATCH: NORMAL
EOSINOPHIL # BLD AUTO: 0.25 THOUSAND/ÂΜL (ref 0–0.61)
EOSINOPHIL NFR BLD AUTO: 3 % (ref 0–6)
ERYTHROCYTE [DISTWIDTH] IN BLOOD BY AUTOMATED COUNT: 16.2 % (ref 11.6–15.1)
FOLATE SERPL-MCNC: 4 NG/ML
GFR SERPL CREATININE-BSD FRML MDRD: 3 ML/MIN/1.73SQ M
GLUCOSE SERPL-MCNC: 92 MG/DL (ref 65–140)
HCT VFR BLD AUTO: 22.7 % (ref 34.8–46.1)
HGB BLD-MCNC: 7.3 G/DL (ref 11.5–15.4)
IMM GRANULOCYTES # BLD AUTO: 0.05 THOUSAND/UL (ref 0–0.2)
IMM GRANULOCYTES NFR BLD AUTO: 1 % (ref 0–2)
LYMPHOCYTES # BLD AUTO: 1.77 THOUSANDS/ÂΜL (ref 0.6–4.47)
LYMPHOCYTES NFR BLD AUTO: 23 % (ref 14–44)
MCH RBC QN AUTO: 25.2 PG (ref 26.8–34.3)
MCHC RBC AUTO-ENTMCNC: 32.2 G/DL (ref 31.4–37.4)
MCV RBC AUTO: 78 FL (ref 82–98)
MONOCYTES # BLD AUTO: 0.43 THOUSAND/ÂΜL (ref 0.17–1.22)
MONOCYTES NFR BLD AUTO: 6 % (ref 4–12)
NEUTROPHILS # BLD AUTO: 5.3 THOUSANDS/ÂΜL (ref 1.85–7.62)
NEUTS SEG NFR BLD AUTO: 67 % (ref 43–75)
NRBC BLD AUTO-RTO: 0 /100 WBCS
P AXIS: 132 DEGREES
PLATELET # BLD AUTO: 208 THOUSANDS/UL (ref 149–390)
PMV BLD AUTO: 9.6 FL (ref 8.9–12.7)
POTASSIUM SERPL-SCNC: 4 MMOL/L (ref 3.5–5.3)
PR INTERVAL: 178 MS
QRS AXIS: 70 DEGREES
QRSD INTERVAL: 96 MS
QT INTERVAL: 402 MS
QTC INTERVAL: 463 MS
RBC # BLD AUTO: 2.9 MILLION/UL (ref 3.81–5.12)
SODIUM SERPL-SCNC: 138 MMOL/L (ref 135–147)
T WAVE AXIS: 113 DEGREES
UNIT DISPENSE STATUS: NORMAL
UNIT DISPENSE STATUS: NORMAL
UNIT PRODUCT CODE: NORMAL
UNIT PRODUCT CODE: NORMAL
UNIT PRODUCT VOLUME: 350 ML
UNIT PRODUCT VOLUME: 350 ML
UNIT RH: NORMAL
UNIT RH: NORMAL
VENTRICULAR RATE: 80 BPM
VIT B12 SERPL-MCNC: 174 PG/ML (ref 180–914)
WBC # BLD AUTO: 7.82 THOUSAND/UL (ref 4.31–10.16)

## 2024-07-06 PROCEDURE — 87340 HEPATITIS B SURFACE AG IA: CPT | Performed by: INTERNAL MEDICINE

## 2024-07-06 PROCEDURE — 90935 HEMODIALYSIS ONE EVALUATION: CPT

## 2024-07-06 PROCEDURE — 86803 HEPATITIS C AB TEST: CPT | Performed by: INTERNAL MEDICINE

## 2024-07-06 PROCEDURE — 85025 COMPLETE CBC W/AUTO DIFF WBC: CPT | Performed by: INTERNAL MEDICINE

## 2024-07-06 PROCEDURE — 80048 BASIC METABOLIC PNL TOTAL CA: CPT | Performed by: INTERNAL MEDICINE

## 2024-07-06 PROCEDURE — 86705 HEP B CORE ANTIBODY IGM: CPT | Performed by: INTERNAL MEDICINE

## 2024-07-06 PROCEDURE — 93010 ELECTROCARDIOGRAM REPORT: CPT | Performed by: INTERNAL MEDICINE

## 2024-07-06 PROCEDURE — 99239 HOSP IP/OBS DSCHRG MGMT >30: CPT | Performed by: INTERNAL MEDICINE

## 2024-07-06 PROCEDURE — 99244 OFF/OP CNSLTJ NEW/EST MOD 40: CPT | Performed by: INTERNAL MEDICINE

## 2024-07-06 PROCEDURE — 86704 HEP B CORE ANTIBODY TOTAL: CPT | Performed by: INTERNAL MEDICINE

## 2024-07-06 PROCEDURE — 86706 HEP B SURFACE ANTIBODY: CPT | Performed by: INTERNAL MEDICINE

## 2024-07-06 RX ADMIN — HEPARIN SODIUM 5000 UNITS: 5000 INJECTION INTRAVENOUS; SUBCUTANEOUS at 13:32

## 2024-07-06 RX ADMIN — LABETALOL HYDROCHLORIDE 200 MG: 100 TABLET, FILM COATED ORAL at 08:34

## 2024-07-06 RX ADMIN — Medication 1000 MCG: at 08:46

## 2024-07-06 RX ADMIN — CALCITRIOL 0.5 MCG: 0.25 CAPSULE, LIQUID FILLED ORAL at 08:43

## 2024-07-06 RX ADMIN — FLUTICASONE FUROATE AND VILANTEROL TRIFENATATE 1 PUFF: 100; 25 POWDER RESPIRATORY (INHALATION) at 08:34

## 2024-07-06 RX ADMIN — LORATADINE 5 MG: 10 TABLET ORAL at 08:34

## 2024-07-06 RX ADMIN — AMLODIPINE BESYLATE 5 MG: 5 TABLET ORAL at 08:34

## 2024-07-06 RX ADMIN — SEVELAMER HYDROCHLORIDE 800 MG: 800 TABLET ORAL at 12:13

## 2024-07-06 RX ADMIN — FLUTICASONE PROPIONATE 1 SPRAY: 50 SPRAY, METERED NASAL at 08:34

## 2024-07-06 RX ADMIN — SEVELAMER HYDROCHLORIDE 800 MG: 800 TABLET ORAL at 15:59

## 2024-07-06 RX ADMIN — DICLOFENAC SODIUM 2 G: 10 GEL TOPICAL at 08:33

## 2024-07-06 RX ADMIN — HEPARIN SODIUM 5000 UNITS: 5000 INJECTION INTRAVENOUS; SUBCUTANEOUS at 05:43

## 2024-07-06 RX ADMIN — SEVELAMER HYDROCHLORIDE 800 MG: 800 TABLET ORAL at 08:34

## 2024-07-06 RX ADMIN — EPOETIN ALFA 8000 UNITS: 4000 SOLUTION INTRAVENOUS; SUBCUTANEOUS at 17:16

## 2024-07-06 NOTE — ASSESSMENT & PLAN NOTE
Lab Results   Component Value Date    EGFR 3 07/05/2024    EGFR 2 06/04/2024    EGFR 1 06/03/2024    CREATININE 13.03 (H) 07/05/2024    CREATININE 16.76 (H) 06/04/2024    CREATININE 21.73 (H) 06/03/2024     On dialysis MWF at [facility], Access-AVG catheter  May need vascular referral on discharge for fistula  Consult nephrology-to check if she needs dialysis with transfusion

## 2024-07-06 NOTE — PLAN OF CARE
Problem: PAIN - ADULT  Goal: Verbalizes/displays adequate comfort level or baseline comfort level  Description: Interventions:  - Encourage patient to monitor pain and request assistance  - Assess pain using appropriate pain scale  - Administer analgesics based on type and severity of pain and evaluate response  - Implement non-pharmacological measures as appropriate and evaluate response  - Consider cultural and social influences on pain and pain management  - Notify physician/advanced practitioner if interventions unsuccessful or patient reports new pain  Outcome: Progressing     Problem: INFECTION - ADULT  Goal: Absence or prevention of progression during hospitalization  Description: INTERVENTIONS:  - Assess and monitor for signs and symptoms of infection  - Monitor lab/diagnostic results  - Monitor all insertion sites, i.e. indwelling lines, tubes, and drains  - Monitor endotracheal if appropriate and nasal secretions for changes in amount and color  - Ririe appropriate cooling/warming therapies per order  - Administer medications as ordered  - Instruct and encourage patient and family to use good hand hygiene technique  - Identify and instruct in appropriate isolation precautions for identified infection/condition  Outcome: Progressing  Goal: Absence of fever/infection during neutropenic period  Description: INTERVENTIONS:  - Monitor WBC    Outcome: Progressing     Problem: SAFETY ADULT  Goal: Patient will remain free of falls  Description: INTERVENTIONS:  - Educate patient/family on patient safety including physical limitations  - Instruct patient to call for assistance with activity   - Consult OT/PT to assist with strengthening/mobility   - Keep Call bell within reach  - Keep bed low and locked with side rails adjusted as appropriate  - Keep care items and personal belongings within reach  - Initiate and maintain comfort rounds  - Make Fall Risk Sign visible to staff  - Offer Toileting every  Hours,  in advance of need  - Initiate/Maintain alarm  - Obtain necessary fall risk management equipment:   - Apply yellow socks and bracelet for high fall risk patients  - Consider moving patient to room near nurses station  Outcome: Progressing  Goal: Maintain or return to baseline ADL function  Description: INTERVENTIONS:  -  Assess patient's ability to carry out ADLs; assess patient's baseline for ADL function and identify physical deficits which impact ability to perform ADLs (bathing, care of mouth/teeth, toileting, grooming, dressing, etc.)  - Assess/evaluate cause of self-care deficits   - Assess range of motion  - Assess patient's mobility; develop plan if impaired  - Assess patient's need for assistive devices and provide as appropriate  - Encourage maximum independence but intervene and supervise when necessary  - Involve family in performance of ADLs  - Assess for home care needs following discharge   - Consider OT consult to assist with ADL evaluation and planning for discharge  - Provide patient education as appropriate  Outcome: Progressing  Goal: Maintains/Returns to pre admission functional level  Description: INTERVENTIONS:  - Perform AM-PAC 6 Click Basic Mobility/ Daily Activity assessment daily.  - Set and communicate daily mobility goal to care team and patient/family/caregiver.   - Collaborate with rehabilitation services on mobility goals if consulted  - Perform Range of Motion  times a day.  - Reposition patient every hours.  - Dangle patient  times a day  - Stand patient times a day  - Ambulate patient  times a day  - Out of bed to chair  times a day   - Out of bed for meals  times a day  - Out of bed for toileting  - Record patient progress and toleration of activity level   Outcome: Progressing     Problem: DISCHARGE PLANNING  Goal: Discharge to home or other facility with appropriate resources  Description: INTERVENTIONS:  - Identify barriers to discharge w/patient and caregiver  - Arrange for  needed discharge resources and transportation as appropriate  - Identify discharge learning needs (meds, wound care, etc.)  - Arrange for interpretive services to assist at discharge as needed  - Refer to Case Management Department for coordinating discharge planning if the patient needs post-hospital services based on physician/advanced practitioner order or complex needs related to functional status, cognitive ability, or social support system  Outcome: Progressing     Problem: Knowledge Deficit  Goal: Patient/family/caregiver demonstrates understanding of disease process, treatment plan, medications, and discharge instructions  Description: Complete learning assessment and assess knowledge base.  Interventions:  - Provide teaching at level of understanding  - Provide teaching via preferred learning methods  Outcome: Progressing

## 2024-07-06 NOTE — PLAN OF CARE
UF goal was not met d/t drop of BP to 76/51, asymptomatic, at the last hour of tx.    Post-Dialysis RN Treatment Note    Blood Pressure:  Pre 153/80 mm/Hg  Post 104/62 mmHg   EDW  102.5 kg    Weight:  Pre 107.6 kg   Post 104.5 kg   Mode of weight measurement: Standing Scale   Volume Removed  3,000 ml    Treatment duration 180 minutes    NS given  No    Treatment shortened? No   Medications given during Rx Epogen 8,000u IV   Estimated Kt/V  Not Applicable   Access type: Permacath/TDC   Access Issues: No    Report called to primary nurse   Yes, in person to HC RN       Started patient on hemodialysis treatment with UF goal of 3.5L net as tolerated x 3 hours x 3K bath for serum k+ of 4.0 today 7/6/24.    Problem: METABOLIC, FLUID AND ELECTROLYTES - ADULT  Goal: Electrolytes maintained within normal limits  Description: INTERVENTIONS:  - Monitor labs and assess patient for signs and symptoms of electrolyte imbalances  - Administer electrolyte replacement as ordered  - Monitor response to electrolyte replacements, including repeat lab results as appropriate  - Instruct patient on fluid and nutrition as appropriate  Outcome: Progressing  Goal: Fluid balance maintained  Description: INTERVENTIONS:  - Monitor labs   - Monitor I/O and WT  - Instruct patient on fluid and nutrition as appropriate  - Assess for signs & symptoms of volume excess or deficit  Outcome: Progressing

## 2024-07-06 NOTE — ASSESSMENT & PLAN NOTE
Lab Results   Component Value Date    EGFR 3 07/06/2024    EGFR 3 07/05/2024    EGFR 2 06/04/2024    CREATININE 13.36 (H) 07/06/2024    CREATININE 13.03 (H) 07/05/2024    CREATININE 16.76 (H) 06/04/2024     On dialysis MWF at [facility], Access-AVG catheter  May need vascular referral on discharge for fistula  Consult nephrology-to check if she needs dialysis with transfusion   Lacrimal canaliculi probing: LLL

## 2024-07-06 NOTE — DISCHARGE INSTR - AVS FIRST PAGE
Dear Raven Simeon,     It was our pleasure to care for you here at [facility].  It is our hope that we were always able to exceed the expected standards for your care during your stay.  You were hospitalized due to anemia , received transfusion of PRBC, hemoglobin improved, anemia likely thought to be secondary to ESRD.  Hemodialysis session on 7/6/2024 you were cared for on the medsurg floor under the service of Tawny Tanner MD with the [facility] who covers for your primary care physician (PCP), Roberto Britt DO, while you were hospitalized.  If you have any questions or concerns related to this hospitalization, you may contact us at .  For follow up as well as medication refills, we recommend that you follow up with your primary care physician.  A registered nurse will reach out to you by phone within a few days after your discharge to answer any additional questions that you may have after going home.  However, at this time we provide for you here, the most important instructions / recommendations at discharge:     Notable Medication Adjustments -   Vitamin B12 1000 mcg daily  Testing Required after Discharge -   CBC in 1 week and follow-up with nephrologist  ** Please contact your PCP to request testing orders for any of the testing recommended here **  Important follow up information -   F/u nephrology  Other Instructions -   nil  Please review this entire after visit summary as additional general instructions including medication list, appointments, activity, diet, any pertinent wound care, and other additional recommendations from your care team that may be provided for you.      Sincerely,     Tawny Tanner MD

## 2024-07-06 NOTE — DISCHARGE SUMMARY
[facility]  Discharge- Raven Simeon 1995, 29 y.o. female MRN: [id number]  Unit/Bed#: W -01 Encounter: [id number]  Primary Care Provider: Roberto Britt DO   Date and time admitted to hospital: 7/5/2024  6:10 PM    ESRD (end stage renal disease) ([facility])  Assessment & Plan  Lab Results   Component Value Date    EGFR 3 07/06/2024    EGFR 3 07/05/2024    EGFR 2 06/04/2024    CREATININE 13.36 (H) 07/06/2024    CREATININE 13.03 (H) 07/05/2024    CREATININE 16.76 (H) 06/04/2024     On dialysis MWF at [facility], Access-AVG catheter  May need vascular referral on discharge for fistula  Consult nephrology-to check if she needs dialysis with transfusion    Chronic kidney disease-mineral and bone disorder  Assessment & Plan  Last phosphorus in June-5.9, on phosphate binder        Hypertension  Assessment & Plan  Amlodipine, labetalol continue home meds  Monitor blood pressure    * Anemia due to stage 5 chronic kidney disease, not on chronic dialysis  ([facility])  Assessment & Plan  Baseline hemoglobin appears to be around 6-7  Present on admission 5.7  On Epogen with dialysis  No EGD and colonoscopy on file-May need referral to GI on discharge  Last iron panel from 3 months ago shows anemia chronic disease with high ferritin  Check B12, folate  Likely transfusion dependent  S/p 2 units PRBC  Monitor hemoglobin, O2 saturation            ESRD (end stage renal disease) ([facility])  Assessment & Plan  Lab Results   Component Value Date    EGFR 3 07/06/2024    EGFR 3 07/05/2024    EGFR 2 06/04/2024    CREATININE 13.36 (H) 07/06/2024    CREATININE 13.03 (H) 07/05/2024    CREATININE 16.76 (H) 06/04/2024     On dialysis MWF at [facility], Access-AVG catheter  May need vascular referral on discharge for fistula  Consult nephrology-to check if she needs dialysis with transfusion    Chronic kidney disease-mineral and bone disorder  Assessment & Plan  Last phosphorus in June-5.9, on phosphate  binder        Hypertension  Assessment & Plan  Amlodipine, labetalol continue home meds  Monitor blood pressure    * Anemia due to stage 5 chronic kidney disease, not on chronic dialysis  ([facility])  Assessment & Plan  Baseline hemoglobin appears to be around 6-7  Present on admission 5.7  On Epogen with dialysis  No EGD and colonoscopy on file-May need referral to GI on discharge  Last iron panel from 3 months ago shows anemia chronic disease with high ferritin  Check B12, folate  Likely transfusion dependent  S/p 2 units PRBC  Monitor hemoglobin, O2 saturation            Discharging Physician / Practitioner: Tawny Tanner MD  PCP: Roberto Britt DO  Admission Date:   Admission Orders (From admission, onward)       Ordered        07/05/24 2050  Place in Observation  Once                          Discharge Date: 07/06/24    Medical Problems       Resolved Problems  Date Reviewed: 7/6/2024   None         Consultations During Hospital Stay:  Nephrology consult    Significant Findings / Test Results:   Status post PRBC transfusion    Incidental Findings:   nil     Test Results Pending at Discharge (will require follow up):   nil     Outpatient Tests Requested:  Cbc in 1 week    Complications:  None    Reason for Admission: Significant anemia    Hospital Course:     Raven Simeon is a 29 y.o. female patient who originally presented to the hospital on 7/5/2024 due to significant anemia with hemoglobin of 5.7, patient has a history of hypertension, recently started on dialysis for end-stage renal disease on 6/2/2024 during the recent admission, she was found to have hemoglobin of 5.8 in the outpatient setting during the routine outpatient labs and was advised to come to the ED for blood transfusion on July 3.  However she only presented to ED on July 5, missed dialysis on Friday, July 5, hemoglobin was noted to be 5.7 and was transfused 2 units of PRBC.  Hemoglobin improved, was seen by nephrology team during  "the hospitalization and had dialysis session today and was cleared for discharge after dialysis.  Will give Epogen 8000 units with dialysis and also will replace vitamin B12 which is low.  Will continue rest of the medications at current doses.    Please see above list of diagnoses and related plan for additional information.     Condition at Discharge: good     Discharge Day Visit / Exam:     Subjective: Patient feels better denies of any dizziness or lightheadedness.  Vitals: Blood Pressure: 117/55 (07/06/24 1700)  Pulse: 80 (07/06/24 1700)  Temperature: 98 °F (36.7 °C) (07/06/24 1515)  Temp Source: Oral (07/06/24 1515)  Respirations: 15 (07/06/24 1700)  Height: 5' 4\" (162.6 cm) (07/05/24 2216)  Weight - Scale: 108 kg (237 lb 6.4 oz) (07/06/24 0600)  SpO2: 100 % (07/06/24 1630)  Exam:       HEENT-PERRLA, moist oral mucosa  Neck-supple, no JVD elevation   Respiratory-equal air entry bilaterally, no rales or rhonchi  Cardiovascular system-S1, S2 heard, no murmur or gallops or rubs  Abdomen-soft, nontender, no guarding or rigidity, bowel sounds heard  Extremities-no pedal edema  Peripheral pulses palpable  Musculoskeletal-no contractures  Central nervous system-no acute focal neurological deficit ,no sensory or motor deficit noted.  Skin-no rash noted        Discussion with Family: d/w mother over phone     Discharge instructions/Information to patient and family:   See after visit summary for information provided to patient and family.      Provisions for Follow-Up Care:  See after visit summary for information related to follow-up care and any pertinent home health orders.      Disposition:     Home    Planned Readmission: None     Discharge Statement:  I spent 45 minutes discharging the patient. This time was spent on the day of discharge. I had direct contact with the patient on the day of discharge. Greater than 50% of the total time was spent examining patient, answering all patient questions, arranging and " discussing plan of care with patient as well as directly providing post-discharge instructions.  Additional time then spent on discharge activities.    Discharge Medications:  See after visit summary for reconciled discharge medications provided to patient and family.      ** Please Note: This note has been constructed using a voice recognition system **

## 2024-07-06 NOTE — ASSESSMENT & PLAN NOTE
Baseline hemoglobin appears to be around 6-7  Present on admission 5.7  On Epogen with dialysis  No EGD and colonoscopy on file-May need referral to GI on discharge  Last iron panel from 3 months ago shows anemia chronic disease with high ferritin  Check B12, folate  Likely transfusion dependent  S/p 2 units PRBC  Monitor hemoglobin, O2 saturation

## 2024-07-06 NOTE — H&P
Former[facility]  H&P  Name: Raven Simeon 29 y.o. female I MRN: [id number]  Unit/Bed#: W -01 I Date of Admission: 7/5/2024   Date of Service: 7/5/2024 I Hospital Day: 0      Assessment & Plan   ESRD (end stage renal disease) ([facility])  Assessment & Plan  Lab Results   Component Value Date    EGFR 3 07/05/2024    EGFR 2 06/04/2024    EGFR 1 06/03/2024    CREATININE 13.03 (H) 07/05/2024    CREATININE 16.76 (H) 06/04/2024    CREATININE 21.73 (H) 06/03/2024     On dialysis MWF at [facility], Access-AVG catheter  May need vascular referral on discharge for fistula  Consult nephrology-to check if she needs dialysis with transfusion    Chronic kidney disease-mineral and bone disorder  Assessment & Plan  Last phosphorus in June-5.9, on phosphate binder        Anemia due to stage 5 chronic kidney disease, not on chronic dialysis  ([facility])  Assessment & Plan  Baseline hemoglobin appears to be around 6-7  Present on admission 5.7  On Epogen with dialysis  No EGD and colonoscopy on file-May need referral to GI on discharge  Last iron panel from 3 months ago shows anemia chronic disease with high ferritin  Check B12, folate  Likely transfusion dependent  S/p 2 units PRBC  Monitor hemoglobin, O2 saturation        Hypertension  Assessment & Plan  Amlodipine, labetalol continue home meds  Monitor blood pressure           VTE Pharmacologic Prophylaxis: VTE Score: 1  on VTE prophylaxis  Code Status: Level 1 - Full Code   Discussion with family: Patient declined call to .     Anticipated Length of Stay: Patient will be admitted on an observation basis with an anticipated length of stay of less than 2 midnights secondary to anemia.    Total Time Spent on Date of Encounter in care of patient: 75 mins. This time was spent on one or more of the following: performing physical exam; counseling and coordination of care; obtaining or reviewing history; documenting in the medical record; reviewing/ordering  tests, medications or procedures; communicating with other healthcare professionals and discussing with patient's family/caregivers.    Chief Complaint: Abnormal labs    History of Present Illness:  Raven Simeon is a 29 y.o. female with a PMH of asthma, ESRD on HD who presents with abnormal lab.  Patient had labs on Wednesday which showed hemoglobin of 5.8.  Was recommended to come to the ED, patient attempted to go to dialysis today, was referred to the ED.  She denies chest pain, lightheadedness, dizziness, palpitations.  She denies recent menstruation, dark stools, NSAIDs use.  Patient stated she is on Epogen with dialysis.  Has recently started dialysis, follows up with Dr. Remy.    Patient is admitted overnight for transfusion and to monitor volume status.    Review of Systems:  Review of Systems   All other systems reviewed and are negative.      Past Medical and Surgical History:   Past Medical History:   Diagnosis Date    Asthma     Chronic kidney disease     Eczema     Headache     Hypertension     Increased anion gap metabolic acidosis 02/28/2022    Wears glasses        Past Surgical History:   Procedure Laterality Date    CHOLECYSTECTOMY      CT NEEDLE BIOPSY KIDNEY  1/29/2020    IR TUNNELED DIALYSIS CATHETER PLACEMENT  6/1/2024    KELOID EXCISION Left 3/30/2021    Procedure: POSTERIOR EAR EXCISION KELOID, FLAP RECONSTRUCTION;  Surgeon: Beka Hugo MD;  Location: [facility];  Service: Plastics       Meds/Allergies:  Prior to Admission medications    Medication Sig Start Date End Date Taking? Authorizing Provider   acetaminophen (TYLENOL) 325 mg tablet Take 2 tablets (650 mg total) by mouth every 6 (six) hours as needed for mild pain or headaches 1/26/20   Guillermina Velasquez PA-C   albuterol (2.5 mg/3 mL) 0.083 % nebulizer solution Take 3 mL (2.5 mg total) by nebulization every 6 (six) hours as needed for wheezing or shortness of breath 7/18/23   Beka Lozano MD   albuterol (PROVENTIL  HFA,VENTOLIN HFA) 90 mcg/act inhaler INHALE 2 PUFFS EVERY 6 HOURS AS NEEDED FOR WHEEZING OR SHORTNESS OF BREATH. 6/19/24   Demarco Parker MD   amLODIPine (NORVASC) 5 mg tablet TAKE 1 TABLET BY MOUTH TWICE A DAY 2/29/24   Vargas Remy MD   calcitriol (ROCALTROL) 0.5 MCG capsule Take 1 capsule (0.5 mcg total) by mouth daily 7/5/24   Vargas Remy MD   Diclofenac Sodium (VOLTAREN) 1 % Apply 2 g topically 4 (four) times a day 6/25/24   Omayra Babb MD   erythromycin (ILOTYCIN) ophthalmic ointment Place a 1/2 inch ribbon of ointment into the lower eyelid. 1/12/24   Samm Montes PA-C   etonogestrel (NEXPLANON) subdermal implant 68 mg by Subdermal route once    Historical Provider, MD   fluticasone (Flonase Allergy Relief) 50 mcg/act nasal spray 1 spray into each nostril daily 12/21/23   SUZAN Wen   labetalol (NORMODYNE) 100 mg tablet Take 2 tablets (200 mg total) by mouth 2 (two) times a day 6/4/24   Rosamaria Hilliard MD   Lido-Capsaicin-Men-Methyl Sal (Medi-Patch-Lidocaine) 0.5-0.035-5-20 % PTCH Apply 1 patch topically daily as needed (back pain) 6/25/24   Omayra Babb MD   lidocaine (LIDODERM) 5 % Apply 1 patch topically over 12 hours every 24 hours for 10 doses Remove & Discard patch within 12 hours or as directed by MD 6/13/24 6/25/24  Kaity Rajan MD   loratadine (CLARITIN) 10 mg tablet Take 1 tablet (10 mg total) by mouth daily 12/21/23   SUZAN Wen   mometasone-formoterol (DULERA) 100-5 MCG/ACT inhaler Inhale 2 puffs 2 (two) times a day Rinse mouth after use. 6/25/24   Omayra Babb MD   sevelamer carbonate (RENVELA) 800 mg tablet Take 1 tablet (800 mg total) by mouth 3 (three) times a day with meals 3/22/24   Vargas Remy MD   methocarbamol (ROBAXIN) 500 mg tablet Take 1 tablet (500 mg total) by mouth 3 (three) times a day as needed for muscle spasms  Patient not taking: Reported on 5/13/2022 1/18/22 6/18/22  SUZAN Brandon     I have  "reviewed home medications with patient personally.    Allergies:   Allergies   Allergen Reactions    Seasonal Ic [Cholestatin] Itching    Wheat Bran - Food Allergy Itching       Social History:  Marital Status: Single     Patient Pre-hospital Living Situation: Home  Patient Pre-hospital Level of Mobility: walks  Patient Pre-hospital Diet Restrictions: None  Substance Use History:   Social History     Substance and Sexual Activity   Alcohol Use Not Currently    Comment: occassionally on social events     Social History     Tobacco Use   Smoking Status Never   Smokeless Tobacco Never     Social History     Substance and Sexual Activity   Drug Use No       Family History:  Family History   Problem Relation Age of Onset    Asthma Mother     No Known Problems Father        Physical Exam:     Vitals:   Blood Pressure: 163/84 (07/05/24 2215)  Pulse: 80 (07/05/24 2215)  Temperature: 100 °F (37.8 °C) (07/05/24 2215)  Temp Source: Oral (07/05/24 2215)  Respirations: 16 (07/05/24 2215)  Height: 5' 4\" (162.6 cm) (07/05/24 2216)  Weight - Scale: 108 kg (237 lb 3.2 oz) (07/05/24 2216)  SpO2: 100 % (07/05/24 2215)    Physical Exam  Vitals reviewed.   Constitutional:       Appearance: She is obese.   HENT:      Head: Normocephalic and atraumatic.      Nose: Nose normal.      Mouth/Throat:      Mouth: Mucous membranes are moist.      Pharynx: Oropharynx is clear.   Cardiovascular:      Rate and Rhythm: Normal rate.      Pulses: Normal pulses.   Pulmonary:      Effort: Pulmonary effort is normal.   Abdominal:      General: Bowel sounds are normal.      Palpations: Abdomen is soft.   Skin:     General: Skin is warm and dry.      Capillary Refill: Capillary refill takes 2 to 3 seconds.   Neurological:      General: No focal deficit present.      Mental Status: She is alert and oriented to person, place, and time.   Psychiatric:         Mood and Affect: Mood normal.         Behavior: Behavior normal.         Thought Content: Thought " content normal.         Judgment: Judgment normal.          Additional Data:     Lab Results:  Results from last 7 days   Lab Units 07/05/24  1908   WBC Thousand/uL 7.33   HEMOGLOBIN g/dL 5.7*   HEMATOCRIT % 18.4*   PLATELETS Thousands/uL 222   SEGS PCT % 77*   LYMPHO PCT % 15   MONO PCT % 5   EOS PCT % 3     Results from last 7 days   Lab Units 07/05/24  1908   SODIUM mmol/L 137   POTASSIUM mmol/L 3.7   CHLORIDE mmol/L 101   CO2 mmol/L 24   BUN mg/dL 40*   CREATININE mg/dL 13.03*   ANION GAP mmol/L 12   CALCIUM mg/dL 10.5*   ALBUMIN g/dL 4.4   TOTAL BILIRUBIN mg/dL 0.32   ALK PHOS U/L 49   ALT U/L 4*   AST U/L 7*   GLUCOSE RANDOM mg/dL 89             Lab Results   Component Value Date    HGBA1C 5.3 11/13/2021    HGBA1C 5.4 01/23/2020           Lines/Drains:  Invasive Devices       Central Venous Catheter Line  Duration             CVC Central Lines 05/31/24 Double 35 days              Peripheral Intravenous Line  Duration             Peripheral IV 07/05/24 Right Antecubital <1 day              Hemodialysis Catheter  Duration             HD Permanent Double Catheter 34 days                    Central Line:  Goal for removal: N/A - Chronic PICC           Imaging: No pertinent imaging reviewed.  No orders to display       EKG and Other Studies Reviewed on Admission:   EKG: Personally Reviewed.    ** Please Note: This note has been constructed using a voice recognition system. **

## 2024-07-06 NOTE — PLAN OF CARE
Problem: PAIN - ADULT  Goal: Verbalizes/displays adequate comfort level or baseline comfort level  Description: Interventions:  - Encourage patient to monitor pain and request assistance  - Assess pain using appropriate pain scale  - Administer analgesics based on type and severity of pain and evaluate response  - Implement non-pharmacological measures as appropriate and evaluate response  - Consider cultural and social influences on pain and pain management  - Notify physician/advanced practitioner if interventions unsuccessful or patient reports new pain  Outcome: Progressing     Problem: INFECTION - ADULT  Goal: Absence or prevention of progression during hospitalization  Description: INTERVENTIONS:  - Assess and monitor for signs and symptoms of infection  - Monitor lab/diagnostic results  - Monitor all insertion sites, i.e. indwelling lines, tubes, and drains  - Monitor endotracheal if appropriate and nasal secretions for changes in amount and color  - Independence appropriate cooling/warming therapies per order  - Administer medications as ordered  - Instruct and encourage patient and family to use good hand hygiene technique  - Identify and instruct in appropriate isolation precautions for identified infection/condition  Outcome: Progressing  Goal: Absence of fever/infection during neutropenic period  Description: INTERVENTIONS:  - Monitor WBC    Outcome: Progressing     Problem: SAFETY ADULT  Goal: Patient will remain free of falls  Description: INTERVENTIONS:  - Educate patient/family on patient safety including physical limitations  - Instruct patient to call for assistance with activity   - Consult OT/PT to assist with strengthening/mobility   - Keep Call bell within reach  - Keep bed low and locked with side rails adjusted as appropriate  - Keep care items and personal belongings within reach  - Initiate and maintain comfort rounds  - Make Fall Risk Sign visible to staff  - Offer Toileting every 2 Hours,  in advance of need  - Initiate/Maintain alarm  - Obtain necessary fall risk management equipment:   - Apply yellow socks and bracelet for high fall risk patients  - Consider moving patient to room near nurses station  Outcome: Progressing  Goal: Maintain or return to baseline ADL function  Description: INTERVENTIONS:  -  Assess patient's ability to carry out ADLs; assess patient's baseline for ADL function and identify physical deficits which impact ability to perform ADLs (bathing, care of mouth/teeth, toileting, grooming, dressing, etc.)  - Assess/evaluate cause of self-care deficits   - Assess range of motion  - Assess patient's mobility; develop plan if impaired  - Assess patient's need for assistive devices and provide as appropriate  - Encourage maximum independence but intervene and supervise when necessary  - Involve family in performance of ADLs  - Assess for home care needs following discharge   - Consider OT consult to assist with ADL evaluation and planning for discharge  - Provide patient education as appropriate  Outcome: Progressing  Goal: Maintains/Returns to pre admission functional level  Description: INTERVENTIONS:  - Perform AM-PAC 6 Click Basic Mobility/ Daily Activity assessment daily.  - Set and communicate daily mobility goal to care team and patient/family/caregiver.   - Collaborate with rehabilitation services on mobility goals if consulted  - Perform Range of Motion 3 times a day.  - Reposition patient every 2 hours.  - Dangle patient 3 times a day  - Stand patient 3 times a day  - Ambulate patient 3 times a day  - Out of bed to chair 3 times a day   - Out of bed for meals 3 times a day  - Out of bed for toileting  - Record patient progress and toleration of activity level   Outcome: Progressing     Problem: DISCHARGE PLANNING  Goal: Discharge to home or other facility with appropriate resources  Description: INTERVENTIONS:  - Identify barriers to discharge w/patient and caregiver  -  Arrange for needed discharge resources and transportation as appropriate  - Identify discharge learning needs (meds, wound care, etc.)  - Arrange for interpretive services to assist at discharge as needed  - Refer to Case Management Department for coordinating discharge planning if the patient needs post-hospital services based on physician/advanced practitioner order or complex needs related to functional status, cognitive ability, or social support system  Outcome: Progressing     Problem: Knowledge Deficit  Goal: Patient/family/caregiver demonstrates understanding of disease process, treatment plan, medications, and discharge instructions  Description: Complete learning assessment and assess knowledge base.  Interventions:  - Provide teaching at level of understanding  - Provide teaching via preferred learning methods  Outcome: Progressing

## 2024-07-06 NOTE — CONSULTS
NEPHROLOGY HOSPITAL CONSULTATION   Raven Simeon 29 y.o. female MRN: [id number]  Unit/Bed#: W -01 Encounter: [id number]    ASSESSMENT and PLAN:  ESRD on HD ([facility]):   Electrolytes are stable.  She missed dialysis yesterday, July 5.  Will arrange for dialysis today.    Access: R IJ permcath.     Hypertension:   BP is above goal.  Rx: Amlodipine 5 mg twice a day, labetalol 200 mg twice a day.  EDW is 102.5 kg.   Continue same BP meds and monitor BP with UF on HD.     Anemia:   Hgb below goal - 5.7 on admission on 7/5/24.   Hgb up to 7.3 today with PRBC x 2 units.   Give Epogen 8000 units with HD.     Mineral and bone disease:   Continue calcitriol for 2HPT.   Continue sevelamer 800 mg TID for hyperphosphatemia.    SUMMARY OF RECOMMENDATIONS:  Plan for HD today.  Monitor BP with UF on HD.  Give Epogen 8000 units with HD today.  From a renal standpoint, no objection to discharge after dialysis today.    HISTORY OF PRESENT ILLNESS:  Requesting Physician: Tawny Scott*  Reason for Consult: ESRD on HD    Raven Simeon is a 29 y.o. female who was admitted to [facility] on 7/5/24 after being sent in for anemia. A renal consultation is requested today for assistance in the management of ESRD. Raven Simeon is a known ESRD patient who undergoes maintenance hemodialysis at [facility] on a [facility] schedule.     Raven has a history of hypertension and end-stage renal disease.  She was started on dialysis on 6/2/24 during a recent admission to [facility].  She was found to have a hemoglobin of 5.8 in the outpatient setting during routine outpatient labs and was advised to come to the ER for blood transfusion on July 3, 2024.  However, she only presented to the ER on July 5, 2024.  She missed dialysis on Friday, July 5.  On admission, her hemoglobin was noted to be 5.7 and she was transfused 2 units of PRBC.  We are now being consulted for assistance with management of  ESRD on HD.  She denied any dizziness, lightheadedness, fever, chills, nausea, vomiting, abdominal pain, diarrhea, chest pain, shortness of breath, leg swelling, melena, hematochezia.    PAST MEDICAL HISTORY:  Past Medical History:   Diagnosis Date    Asthma     Chronic kidney disease     Eczema     Headache     Hypertension     Increased anion gap metabolic acidosis 02/28/2022    Wears glasses        PAST SURGICAL HISTORY:  Past Surgical History:   Procedure Laterality Date    CHOLECYSTECTOMY      CT NEEDLE BIOPSY KIDNEY  1/29/2020    IR TUNNELED DIALYSIS CATHETER PLACEMENT  6/1/2024    KELOID EXCISION Left 3/30/2021    Procedure: POSTERIOR EAR EXCISION KELOID, FLAP RECONSTRUCTION;  Surgeon: Beka Hugo MD;  Location: [facility];  Service: Plastics     ALLERGIES:  Allergies   Allergen Reactions    Seasonal Ic [Cholestatin] Itching    Wheat Bran - Food Allergy Itching     SOCIAL HISTORY:  Social History     Substance and Sexual Activity   Alcohol Use Not Currently    Comment: occassionally on social events     Social History     Substance and Sexual Activity   Drug Use No     Social History     Tobacco Use   Smoking Status Never   Smokeless Tobacco Never     FAMILY HISTORY:  Family History   Problem Relation Age of Onset    Asthma Mother     No Known Problems Father      MEDICATIONS:    Current Facility-Administered Medications:     acetaminophen (TYLENOL) tablet 650 mg, 650 mg, Oral, Q6H PRN, Shaheed Marquez MD    albuterol (PROVENTIL HFA,VENTOLIN HFA) inhaler 2 puff, 2 puff, Inhalation, Q6H PRN, Shaheed Marquez MD    albuterol inhalation solution 2.5 mg, 2.5 mg, Nebulization, Q6H PRN, Shaheed Marquez MD    amLODIPine (NORVASC) tablet 5 mg, 5 mg, Oral, BID, Shaheed Marquez MD, 5 mg at 07/06/24 0834    calcitriol (ROCALTROL) capsule 0.5 mcg, 0.5 mcg, Oral, Daily, Shaheed Marquez MD, 0.5 mcg at 07/06/24 0843    cyanocobalamin (VITAMIN B-12) tablet 1,000 mcg, 1,000 mcg, Oral, Daily, Tawny Gupta  MD Ciro, 1,000 mcg at 07/06/24 0846    Diclofenac Sodium (VOLTAREN) 1 % topical gel 2 g, 2 g, Topical, 4x Daily, Shaheed Marquez MD, 2 g at 07/06/24 0833    fluticasone (FLONASE) 50 mcg/act nasal spray 1 spray, 1 spray, Nasal, Daily, Shaheed Marquez MD, 1 spray at 07/06/24 0834    Fluticasone Furoate-Vilanterol 100-25 mcg/actuation 1 puff, 1 puff, Inhalation, Daily, Shaheed Marquez MD, 1 puff at 07/06/24 0834    heparin (porcine) subcutaneous injection 5,000 Units, 5,000 Units, Subcutaneous, Q8H LAMONT, Shaheed Marquez MD, 5,000 Units at 07/06/24 0543    labetalol (NORMODYNE) tablet 200 mg, 200 mg, Oral, BID, Shaheed Marquez MD, 200 mg at 07/06/24 0834    loratadine (CLARITIN) tablet 5 mg, 5 mg, Oral, Daily, Shaheed Marquez MD, 5 mg at 07/06/24 0834    ondansetron (ZOFRAN) injection 4 mg, 4 mg, Intravenous, Q6H PRN, Shaheed Marquez MD    sevelamer (RENAGEL) tablet 800 mg, 800 mg, Oral, TID With Meals, Shaheed Marquez MD, 800 mg at 07/06/24 0834    REVIEW OF SYSTEMS:  All the systems were reviewed and were negative except as documented on the HPI.    PHYSICAL EXAM:  Current Weight: Weight - Scale: 108 kg (237 lb 6.4 oz)  First Weight: Weight - Scale: 108 kg (237 lb 3.2 oz)  Vitals:    07/05/24 2306 07/06/24 0045 07/06/24 0600 07/06/24 0729   BP: 154/83 160/81  148/81   BP Location:       Pulse: 81 69  76   Resp:  18  17   Temp: 98.7 °F (37.1 °C) 97.8 °F (36.6 °C)  98.1 °F (36.7 °C)   TempSrc:       SpO2: 100%   99%   Weight:   108 kg (237 lb 6.4 oz)    Height:           Intake/Output Summary (Last 24 hours) at 7/6/2024 1008  Last data filed at 7/6/2024 0835  Gross per 24 hour   Intake 1400 ml   Output --   Net 1400 ml     Physical Exam  General: conscious, coherent, cooperative, not in distress.   Skin: warm, dry, good turgor.   Eyes: pale conjunctivae, no scleral icterus.   ENT: moist lips and mucous membranes.   Respiratory: equal chest expansion, clear breath sounds.   Cardiovascular: distinct heart  sounds, normal rate, regular rhythm, no rub  Abdomen: soft, non-tender, non-distended, normoactive bowel sounds  Extremities: no edema.  Genitourinary: no ramirez catheter.   Neuro: awake, alert, oriented to time, place and person.   Psych: appropriate affect.      Invasive Devices: R IJ permcath.      Lab Results:   Results from last 7 days   Lab Units 07/06/24  0643 07/05/24  1908 07/03/24  1030   WBC Thousand/uL 7.82 7.33  --    HEMOGLOBIN g/dL 7.3* 5.7* 5.8*   HEMATOCRIT % 22.7* 18.4*  --    PLATELETS Thousands/uL 208 222  --    POTASSIUM mmol/L 4.0 3.7  --    CHLORIDE mmol/L 102 101  --    CO2 mmol/L 22 24  --    BUN mg/dL 46* 40*  --    CREATININE mg/dL 13.36* 13.03*  --    CALCIUM mg/dL 10.4* 10.5*  --    ALK PHOS U/L  --  49  --    ALT U/L  --  4*  --    AST U/L  --  7*  --      Lab Results   Component Value Date    .3 (H) 03/27/2024    CALCIUM 10.4 (H) 07/06/2024    PHOS 5.9 (H) 06/04/2024     Other Studies: none.

## 2024-07-07 DIAGNOSIS — N25.81 SECONDARY HYPERPARATHYROIDISM OF RENAL ORIGIN (HCC): ICD-10-CM

## 2024-07-07 DIAGNOSIS — N18.9 CHRONIC KIDNEY DISEASE-MINERAL AND BONE DISORDER: ICD-10-CM

## 2024-07-07 DIAGNOSIS — N18.5 CHRONIC KIDNEY DISEASE (CKD), ACTIVE MEDICAL MANAGEMENT WITHOUT DIALYSIS, STAGE 5 (HCC): ICD-10-CM

## 2024-07-07 DIAGNOSIS — I10 HYPERTENSION, UNSPECIFIED TYPE: ICD-10-CM

## 2024-07-07 DIAGNOSIS — R80.1 PERSISTENT PROTEINURIA: ICD-10-CM

## 2024-07-07 DIAGNOSIS — I12.0 BENIGN HYPERTENSION WITH CKD (CHRONIC KIDNEY DISEASE) STAGE V (HCC): ICD-10-CM

## 2024-07-07 DIAGNOSIS — E83.9 CHRONIC KIDNEY DISEASE-MINERAL AND BONE DISORDER: ICD-10-CM

## 2024-07-07 DIAGNOSIS — D50.9 IRON DEFICIENCY ANEMIA, UNSPECIFIED IRON DEFICIENCY ANEMIA TYPE: ICD-10-CM

## 2024-07-07 DIAGNOSIS — N18.5 BENIGN HYPERTENSION WITH CKD (CHRONIC KIDNEY DISEASE) STAGE V (HCC): ICD-10-CM

## 2024-07-07 DIAGNOSIS — M89.9 CHRONIC KIDNEY DISEASE-MINERAL AND BONE DISORDER: ICD-10-CM

## 2024-07-07 RX ORDER — AMLODIPINE BESYLATE 5 MG/1
TABLET ORAL
Qty: 60 TABLET | Refills: 5 | Status: SHIPPED | OUTPATIENT
Start: 2024-07-07 | End: 2024-07-13 | Stop reason: SDUPTHER

## 2024-07-08 DIAGNOSIS — N18.5 ANEMIA DUE TO STAGE 5 CHRONIC KIDNEY DISEASE, NOT ON CHRONIC DIALYSIS  (HCC): Primary | ICD-10-CM

## 2024-07-08 DIAGNOSIS — D63.1 ANEMIA DUE TO STAGE 5 CHRONIC KIDNEY DISEASE, NOT ON CHRONIC DIALYSIS  (HCC): Primary | ICD-10-CM

## 2024-07-08 LAB
HBV CORE AB SER QL: NORMAL
HBV CORE IGM SER QL: NORMAL
HBV SURFACE AB SER-ACNC: 12.5 MIU/ML
HBV SURFACE AG SER QL: NORMAL
HCV AB SER QL: NORMAL

## 2024-07-09 ENCOUNTER — TRANSITIONAL CARE MANAGEMENT (OUTPATIENT)
Dept: FAMILY MEDICINE CLINIC | Facility: CLINIC | Age: 29
End: 2024-07-09

## 2024-07-12 ENCOUNTER — TELEPHONE (OUTPATIENT)
Dept: NEPHROLOGY | Facility: CLINIC | Age: 29
End: 2024-07-12

## 2024-07-12 ENCOUNTER — TELEPHONE (OUTPATIENT)
Dept: OTHER | Facility: HOSPITAL | Age: 29
End: 2024-07-12

## 2024-07-12 NOTE — TELEPHONE ENCOUNTER
Renal transplant evaluation note    We have tried mult times to reach pt to schedule visits with [facility]. Pt is not scheduling. So we are going to attempt as many testing and visits locally as possible and ultimately will need one visit for ABO to [facility].

## 2024-07-13 DIAGNOSIS — D50.9 IRON DEFICIENCY ANEMIA, UNSPECIFIED IRON DEFICIENCY ANEMIA TYPE: ICD-10-CM

## 2024-07-13 DIAGNOSIS — N18.5 CHRONIC KIDNEY DISEASE (CKD), ACTIVE MEDICAL MANAGEMENT WITHOUT DIALYSIS, STAGE 5 (HCC): ICD-10-CM

## 2024-07-13 DIAGNOSIS — N18.5 BENIGN HYPERTENSION WITH CKD (CHRONIC KIDNEY DISEASE) STAGE V (HCC): ICD-10-CM

## 2024-07-13 DIAGNOSIS — I12.9 BENIGN HYPERTENSION WITH CKD (CHRONIC KIDNEY DISEASE) STAGE III (HCC): ICD-10-CM

## 2024-07-13 DIAGNOSIS — I10 HYPERTENSION, UNSPECIFIED TYPE: ICD-10-CM

## 2024-07-13 DIAGNOSIS — E83.9 CHRONIC KIDNEY DISEASE-MINERAL AND BONE DISORDER: ICD-10-CM

## 2024-07-13 DIAGNOSIS — N25.81 SECONDARY HYPERPARATHYROIDISM OF RENAL ORIGIN (HCC): ICD-10-CM

## 2024-07-13 DIAGNOSIS — M89.9 CHRONIC KIDNEY DISEASE-MINERAL AND BONE DISORDER: ICD-10-CM

## 2024-07-13 DIAGNOSIS — I12.0 BENIGN HYPERTENSION WITH CKD (CHRONIC KIDNEY DISEASE) STAGE V (HCC): ICD-10-CM

## 2024-07-13 DIAGNOSIS — N18.9 CHRONIC KIDNEY DISEASE-MINERAL AND BONE DISORDER: ICD-10-CM

## 2024-07-13 DIAGNOSIS — N18.30 BENIGN HYPERTENSION WITH CKD (CHRONIC KIDNEY DISEASE) STAGE III (HCC): ICD-10-CM

## 2024-07-13 DIAGNOSIS — R80.1 PERSISTENT PROTEINURIA: ICD-10-CM

## 2024-07-13 DIAGNOSIS — E53.8 VITAMIN B 12 DEFICIENCY: ICD-10-CM

## 2024-07-13 RX ORDER — AMLODIPINE BESYLATE 5 MG/1
5 TABLET ORAL 2 TIMES DAILY
Qty: 60 TABLET | Refills: 5 | Status: SHIPPED | OUTPATIENT
Start: 2024-07-13

## 2024-07-13 RX ORDER — LABETALOL 100 MG/1
200 TABLET, FILM COATED ORAL 2 TIMES DAILY
Qty: 120 TABLET | Refills: 5 | Status: SHIPPED | OUTPATIENT
Start: 2024-07-13

## 2024-07-13 RX ORDER — CALCITRIOL 0.5 UG/1
0.5 CAPSULE, LIQUID FILLED ORAL DAILY
Qty: 30 CAPSULE | Refills: 5 | Status: SHIPPED | OUTPATIENT
Start: 2024-07-13

## 2024-07-23 ENCOUNTER — TELEPHONE (OUTPATIENT)
Dept: NEPHROLOGY | Facility: CLINIC | Age: 29
End: 2024-07-23

## 2024-07-23 NOTE — TELEPHONE ENCOUNTER
ESRD Follow Up Note    Raven Simeon was discharged from [facility] on 7/6/2024    Reason for admission: Hematology / Anemia    Outpatient unit: [facility]    Outpatient Shift: MWF 3    Follow up was done on 7/15/2024 , In-person    Changes made: Yes Mircera added

## 2024-07-24 NOTE — TELEPHONE ENCOUNTER
Darby Tom is a 8 year old male presenting for   Chief Complaint   Patient presents with    Well Child     Denies Latex allergy or sensitivity.    Medication verified, no changes  Refills needed today: No       Health Maintenance       COVID-19 Vaccine (1 - Pediatric 2023-24 season)  Never done    Annual Physical (ages 3-18) (Yearly)  Overdue since 12/27/2023           Following review of the above:  Health maintenance topics up to date.    Note: Refer to final orders and clinician documentation.                Last lab results:   No results found for: \"HGBA1C\"  No results found for: \"CHOLESTEROL\"  No results found for: \"HDL\"  No results found for: \"TRIGLYCERIDE\"  No results found for: \"CALCLDL\"  No results found for: \"URMIC\", \"UCR\", \"MALBCR\"  No results found for: \"IFOB\"                 Depression Screening:  Review Flowsheet           No data to display                 Advance Directives:  not discussed   changed

## 2024-08-01 ENCOUNTER — TELEPHONE (OUTPATIENT)
Age: 29
End: 2024-08-01

## 2024-08-01 NOTE — TELEPHONE ENCOUNTER
Patient did realize she missed her appointment for 3pm and would like to see if someone cancel today to still come in. Please reach out to the patient urgently thank you .

## 2024-08-06 ENCOUNTER — OFFICE VISIT (OUTPATIENT)
Dept: FAMILY MEDICINE CLINIC | Facility: CLINIC | Age: 29
End: 2024-08-06
Payer: MEDICARE

## 2024-08-06 VITALS
OXYGEN SATURATION: 100 % | DIASTOLIC BLOOD PRESSURE: 70 MMHG | WEIGHT: 224.8 LBS | SYSTOLIC BLOOD PRESSURE: 137 MMHG | HEIGHT: 64 IN | BODY MASS INDEX: 38.38 KG/M2 | HEART RATE: 72 BPM | TEMPERATURE: 100.3 F

## 2024-08-06 DIAGNOSIS — Z00.00 ANNUAL PHYSICAL EXAM: Primary | ICD-10-CM

## 2024-08-06 DIAGNOSIS — N18.6 ESRD ON DIALYSIS (HCC): ICD-10-CM

## 2024-08-06 DIAGNOSIS — Z99.2 ESRD ON DIALYSIS (HCC): ICD-10-CM

## 2024-08-06 PROCEDURE — 99395 PREV VISIT EST AGE 18-39: CPT

## 2024-08-06 RX ORDER — UBIDECARENONE 75 MG
CAPSULE ORAL
COMMUNITY
Start: 2024-07-23

## 2024-08-06 NOTE — ASSESSMENT & PLAN NOTE
Lab Results   Component Value Date    EGFR 3 07/06/2024    EGFR 3 07/05/2024    EGFR 2 06/04/2024    CREATININE 13.36 (H) 07/06/2024    CREATININE 13.03 (H) 07/05/2024    CREATININE 16.76 (H) 06/04/2024     Recently started dialysis last month; schedule [facility]  Renal transplant evaluation with Dr. Mac    Advised patient to follow up with Dr. Mac to start transplant process

## 2024-08-06 NOTE — PROGRESS NOTES
Adult Annual Physical  Name: Raven Simeon      : 1995      MRN: [id number]  Encounter Provider: Rosalinda Forde MD  Encounter Date: 2024   Encounter department: [facility]    Assessment & Plan   1. Annual physical exam  2. ESRD on dialysis (HCC)  Assessment & Plan:  Lab Results   Component Value Date    EGFR 3 2024    EGFR 3 2024    EGFR 2 2024    CREATININE 13.36 (H) 2024    CREATININE 13.03 (H) 2024    CREATININE 16.76 (H) 2024     Recently started dialysis last month; schedule MWF  Renal transplant evaluation with Dr. Mac    Advised patient to follow up with Dr. Mac to start transplant process   RTC in 4-6 weeks for Nexplanon removal and re-insertion     Immunizations and preventive care screenings were discussed with patient today. Appropriate education was printed on patient's after visit summary.    Counseling:  Alcohol/drug use: discussed moderation in alcohol intake, the recommendations for healthy alcohol use, and avoidance of illicit drug use.  Dental Health: discussed importance of regular tooth brushing, flossing, and dental visits.  Injury prevention: discussed safety/seat belts, safety helmets, smoke detectors, carbon dioxide detectors, and smoking near bedding or upholstery.  Sexual health: discussed sexually transmitted diseases, partner selection, use of condoms, avoidance of unintended pregnancy, and contraceptive alternatives.  Exercise: the importance of regular exercise/physical activity was discussed. Recommend exercise 3-5 times per week for at least 30 minutes.          History of Present Illness     Adult Annual Physical:  Patient presents for annual physical. Raven is a 29 year old female presenting for her annual physical. She recently started her dialysis over a month ago. She is receiving dialysis on MWF. She currently has a port and has an upcoming appointment on Thursday to determine AV  "access on UE. Patient will be starting workup for kidney transplant. She has to schedule said appointments; following with Dr. Mac.  Patient has no current concerns.  .     Diet and Physical Activity:  - Diet/Nutrition: low carb diet. Low sodium and low phosphate  - Exercise: walking.    Depression Screening:  - PHQ-2 Score: 0    General Health:  - Sleep: 4-6 hours of sleep on average. Sometimes sleeps poorly  - Hearing: normal hearing bilateral ears.  - Vision: most recent eye exam < 1 year ago.  - Dental: no dental visits for > 1 year and brushes teeth twice daily.    /GYN Health:  - Follows with GYN: yes.   - Menopause: premenopausal.   - History of STDs: no  - Contraception:. Nexplanon      Review of Systems   Constitutional:  Negative for fatigue and fever.   Respiratory:  Negative for shortness of breath.    Cardiovascular:  Negative for chest pain.   Gastrointestinal:  Negative for abdominal pain, constipation, diarrhea, nausea and vomiting.   Neurological:  Negative for dizziness, light-headedness and numbness.         Objective     /70 (BP Location: Right arm, Patient Position: Sitting, Cuff Size: Large)   Pulse 72   Temp 100.3 °F (37.9 °C) (Tympanic)   Ht 5' 4\" (1.626 m)   Wt 102 kg (224 lb 12.8 oz)   SpO2 100%   BMI 38.59 kg/m²     Physical Exam  Constitutional:       Appearance: Normal appearance.   HENT:      Head: Normocephalic and atraumatic.   Cardiovascular:      Rate and Rhythm: Normal rate and regular rhythm.      Pulses: Normal pulses.      Heart sounds: Normal heart sounds.   Pulmonary:      Effort: Pulmonary effort is normal.      Breath sounds: Normal breath sounds.   Chest:       Abdominal:      General: Bowel sounds are normal.      Palpations: Abdomen is soft.   Musculoskeletal:      Right lower leg: No edema.      Left lower leg: No edema.   Skin:     General: Skin is warm.      Capillary Refill: Capillary refill takes less than 2 seconds.   Neurological:      Mental " Status: She is alert.

## 2024-08-07 ENCOUNTER — TELEPHONE (OUTPATIENT)
Age: 29
End: 2024-08-07

## 2024-08-07 NOTE — TELEPHONE ENCOUNTER
Pt called in stating why she is getting call advised to schedule her GI OV. Pt refused to schedule said she does not need it and so closed the referral

## 2024-08-09 ENCOUNTER — TELEPHONE (OUTPATIENT)
Dept: VASCULAR SURGERY | Facility: CLINIC | Age: 29
End: 2024-08-09

## 2024-08-09 ENCOUNTER — CONSULT (OUTPATIENT)
Dept: VASCULAR SURGERY | Facility: CLINIC | Age: 29
End: 2024-08-09
Payer: MEDICARE

## 2024-08-09 ENCOUNTER — PREP FOR PROCEDURE (OUTPATIENT)
Dept: VASCULAR SURGERY | Facility: CLINIC | Age: 29
End: 2024-08-09

## 2024-08-09 VITALS
SYSTOLIC BLOOD PRESSURE: 134 MMHG | HEART RATE: 76 BPM | WEIGHT: 224 LBS | DIASTOLIC BLOOD PRESSURE: 86 MMHG | HEIGHT: 64 IN | BODY MASS INDEX: 38.24 KG/M2

## 2024-08-09 DIAGNOSIS — N17.9 ACUTE RENAL FAILURE (HCC): ICD-10-CM

## 2024-08-09 DIAGNOSIS — Z99.2 ESRD ON DIALYSIS (HCC): Primary | ICD-10-CM

## 2024-08-09 DIAGNOSIS — N18.6 ESRD ON DIALYSIS (HCC): Primary | ICD-10-CM

## 2024-08-09 PROCEDURE — 99244 OFF/OP CNSLTJ NEW/EST MOD 40: CPT | Performed by: SURGERY

## 2024-08-09 RX ORDER — CEFAZOLIN SODIUM 2 G/50ML
2000 SOLUTION INTRAVENOUS ONCE
OUTPATIENT
Start: 2024-08-09 | End: 2024-08-09

## 2024-08-09 RX ORDER — CHLORHEXIDINE GLUCONATE ORAL RINSE 1.2 MG/ML
15 SOLUTION DENTAL ONCE
OUTPATIENT
Start: 2024-08-09 | End: 2024-08-09

## 2024-08-09 NOTE — PATIENT INSTRUCTIONS
1. ESRD on dialysis (HCC)  Assessment & Plan:  Lab Results   Component Value Date    EGFR 3 07/06/2024    EGFR 3 07/05/2024    EGFR 2 06/04/2024    CREATININE 13.36 (H) 07/06/2024    CREATININE 13.03 (H) 07/05/2024    CREATININE 16.76 (H) 06/04/2024     29 year old female with ESRD in need of dialysis access.  She is currently dialyzed via a right chest catheter.  She underwent vein mapping which demonstrated left-sided venous anatomy inconsistent with support of a fistula.  Her right side demonstrates an upper arm possibility in the region of her brachiocephalic fistula.  We discussed the possibility of a left AV graft.  There is also a possibility that on the day of surgery the mapping would suggest a different or alternative fistula versus AV graft.  Informed consent was obtained in the office in the presence of the patient's mother.    2. Acute renal failure (HCC)  -     Ambulatory Referral to Vascular Surgery  -     Case request operating room: CREATION FISTULA ARTERIOVENOUS (AV); Standing  -     Case request operating room: CREATION FISTULA ARTERIOVEN[facility] (AV)

## 2024-08-09 NOTE — LETTER
2024     Roberto Britt DO  [address] [facility] [address]    Patient: Raven Simeon   YOB: 1995   Date of Visit: 2024       Dear Dr. Britt:    Thank you for referring Raven Simeon to me for evaluation. Below are my notes for this consultation.    If you have questions, please do not hesitate to call me. I look forward to following your patient along with you.         Sincerely,        Marcus Espitia MD        CC: Rosamaria Hilliard MD  Raven [place] Espitia MD  2024  9:23 AM  Sign when Signing Visit  Ambulatory Visit  Name: Raven Simeon      : 1995      MRN: [id number]  Encounter Provider: Marcus Espitia MD  Encounter Date: 2024   [facility] [place]    Assessment & Plan  1. ESRD on dialysis (HCC)  Assessment & Plan:  Lab Results   Component Value Date    EGFR 3 2024    EGFR 3 2024    EGFR 2 2024    CREATININE 13.36 (H) 2024    CREATININE 13.03 (H) 2024    CREATININE 16.76 (H) 2024     29 year old female with ESRD in need of dialysis access.  She is currently dialyzed via a right chest catheter.  She underwent vein mapping which demonstrated left-sided venous anatomy inconsistent with support of a fistula.  Her right side demonstrates an upper arm possibility in the region of her brachiocephalic fistula.  We discussed the possibility of a left AV graft.  There is also a possibility that on the day of surgery the mapping would suggest a different or alternative fistula versus AV graft.  Informed consent was obtained in the office in the presence of the patient's mother.    2. Acute renal failure (HCC)  -     Ambulatory Referral to Vascular Surgery  -     Case request operating room: CREATION FISTULA ARTERIOVENOUS (AV); Standing  -     Case request operating room: CREATION FISTULA ARTERIOVENOUS (AV)      History of Present  "Illness    Raven Simeon is a 29 y.o. female who presents for office evaluation following vein mapping for establishing a permanent arteriovenous access.  She underwent vein mapping which revealed only right proximal arm autologous options.  I discussed the possibility of a left upper extremity AV graft versus a right upper extremity brachiocephalic fistula.  There is a possibility would have to transition to an AV graft on the day of surgery even if we chose the right arm.  After a review of the risks and benefits of prosthetic versus autologous graft, the patient has decided on a right upper arm AV fistula attempt with the possibility of AV graft if the vein is not suitable.  The consent was reviewed at length with the patient and the patient's mother who was present in the room.  All questions were answered at that time.  The patient and her mother were encouraged to call with any questions or concerns they may have going forward.    New patient, presents to review . Patient is right handeed- currently on dialysis.     Review of Systems   Constitutional: Negative.    HENT: Negative.     Eyes: Negative.    Respiratory: Negative.     Cardiovascular: Negative.    Gastrointestinal: Negative.    Endocrine: Negative.    Genitourinary: Negative.    Musculoskeletal: Negative.    Skin: Negative.    Allergic/Immunologic: Negative.    Neurological: Negative.    Hematological: Negative.    Psychiatric/Behavioral: Negative.         Objective    /86 (BP Location: Left arm, Patient Position: Sitting, Cuff Size: Standard)   Pulse 76   Ht 5' 4\" (1.626 m)   Wt 102 kg (224 lb)   BMI 38.45 kg/m²     Physical Exam  Vitals and nursing note reviewed.   Constitutional:       General: She is not in acute distress.     Appearance: She is well-developed.   HENT:      Head: Normocephalic and atraumatic.   Eyes:      Conjunctiva/sclera: Conjunctivae normal.   Cardiovascular:      Rate and Rhythm: Normal rate and regular " rhythm.      Pulses:           Radial pulses are 1+ on the right side and 1+ on the left side.      Heart sounds: No murmur heard.  Pulmonary:      Effort: Pulmonary effort is normal. No respiratory distress.      Breath sounds: Normal breath sounds.   Abdominal:      Palpations: Abdomen is soft.      Tenderness: There is no abdominal tenderness.   Musculoskeletal:         General: No swelling.      Cervical back: Neck supple.   Skin:     General: Skin is warm and dry.      Capillary Refill: Capillary refill takes less than 2 seconds.   Neurological:      Mental Status: She is alert.   Psychiatric:         Mood and Affect: Mood normal.       Administrative Statements      Operative Scheduling Information:    Hospital:  [facility] OR    Physician:  Me    Surgery: Right arteriovenous fistula, possible AV graft    Urgency:  Standard    Level:  Level 3: Outpatients to be scheduled for elective surgery than can be delayed up to 4 weeks without reasonable expectation of detriment to patient    Case Length:  2 hours    Post-op Bed:  Outpatient    OR Table:  Standard    Equipment Needs:  Product/Implant: [place] 4-7 tapered graft.    Medication Instructions:  None    Hydration:  No

## 2024-08-09 NOTE — TELEPHONE ENCOUNTER
Verified patient's insurance   CONFIRMED - Patient's insurance is [employer] - (previously [employer])  Is patient requesting a call when authorization has been obtained? Patient did not request a call.    Surgery Date: 8/27/24  Primary Surgeon: CAP // Marcus Espitia (NPI: [id number])  Assisting Surgeon: Not Applicable (N/A)  Facility: [facility] (Tax: [id number] / NPI: [id number])  Inpatient / Outpatient: Outpatient  Level: 3    Clearance Received: No clearance ordered.  Consent Received: Yes, scanned into Epic on 8/9/24.  Medication Hold / Last Dose: Not Applicable (N/A)  IR Notified: Not Applicable (N/A)  Rep. Notified: Not Applicable (N/A)  Equipment Needs:  Tapered [place] Graft - 4-7  Vas Lab Requested: Not Applicable (N/A)  Patient Contacted: 8/9/24 - Spoke to patient during patient hours    Diagnosis: N17.9  Procedure/ CPT Code(s): (AV) Arteriovenous Fistula // CPT: 75614    For varicose vein related procedures:   Last LEVDR: Not Applicable (N/A)  CEAP Classification: Not Applicable (N/A)  VCSS: Not Applicable (N/A)    Post Operative Date/ Time: 9/12/24 , 1030a Nidia with Marcus Espitia (NPI: [id number])     *Please review medication hold(s), PATs, and check H&P with patient.*  PATIENT WAS MAILED SURGERY/SHOWERING/DISCHARGE/COVID INSTRUCTIONS AFTER REVIEWING WITH THEM VIA PHONE CALL.

## 2024-08-09 NOTE — H&P (VIEW-ONLY)
Ambulatory Visit  Name: Raven Simeon      : 1995      MRN: [id number]  Encounter Provider: Marcus Espitia MD  Encounter Date: 2024   Encounter department: [facility] [place]    Assessment & Plan   1. ESRD on dialysis (HCC)  Assessment & Plan:  Lab Results   Component Value Date    EGFR 3 2024    EGFR 3 2024    EGFR 2 2024    CREATININE 13.36 (H) 2024    CREATININE 13.03 (H) 2024    CREATININE 16.76 (H) 2024     29 year old female with ESRD in need of dialysis access.  She is currently dialyzed via a right chest catheter.  She underwent vein mapping which demonstrated left-sided venous anatomy inconsistent with support of a fistula.  Her right side demonstrates an upper arm possibility in the region of her brachiocephalic fistula.  We discussed the possibility of a left AV graft.  There is also a possibility that on the day of surgery the mapping would suggest a different or alternative fistula versus AV graft.  Informed consent was obtained in the office in the presence of the patient's mother.    2. Acute renal failure (HCC)  -     Ambulatory Referral to Vascular Surgery  -     Case request operating room: CREATION FISTULA ARTERIOVENOUS (AV); Standing  -     Case request operating room: CREATION FISTULA ARTERIOVENOUS (AV)      History of Present Illness     aRven Simeon is a 29 y.o. female who presents for office evaluation following vein mapping for establishing a permanent arteriovenous access.  She underwent vein mapping which revealed only right proximal arm autologous options.  I discussed the possibility of a left upper extremity AV graft versus a right upper extremity brachiocephalic fistula.  There is a possibility would have to transition to an AV graft on the day of surgery even if we chose the right arm.  After a review of the risks and benefits of prosthetic versus autologous graft, the patient has decided on a  "right upper arm AV fistula attempt with the possibility of AV graft if the vein is not suitable.  The consent was reviewed at length with the patient and the patient's mother who was present in the room.  All questions were answered at that time.  The patient and her mother were encouraged to call with any questions or concerns they may have going forward.    New patient, presents to review . Patient is right handeed- currently on dialysis.     Review of Systems   Constitutional: Negative.    HENT: Negative.     Eyes: Negative.    Respiratory: Negative.     Cardiovascular: Negative.    Gastrointestinal: Negative.    Endocrine: Negative.    Genitourinary: Negative.    Musculoskeletal: Negative.    Skin: Negative.    Allergic/Immunologic: Negative.    Neurological: Negative.    Hematological: Negative.    Psychiatric/Behavioral: Negative.         Objective     /86 (BP Location: Left arm, Patient Position: Sitting, Cuff Size: Standard)   Pulse 76   Ht 5' 4\" (1.626 m)   Wt 102 kg (224 lb)   BMI 38.45 kg/m²     Physical Exam  Vitals and nursing note reviewed.   Constitutional:       General: She is not in acute distress.     Appearance: She is well-developed.   HENT:      Head: Normocephalic and atraumatic.   Eyes:      Conjunctiva/sclera: Conjunctivae normal.   Cardiovascular:      Rate and Rhythm: Normal rate and regular rhythm.      Pulses:           Radial pulses are 1+ on the right side and 1+ on the left side.      Heart sounds: No murmur heard.  Pulmonary:      Effort: Pulmonary effort is normal. No respiratory distress.      Breath sounds: Normal breath sounds.   Abdominal:      Palpations: Abdomen is soft.      Tenderness: There is no abdominal tenderness.   Musculoskeletal:         General: No swelling.      Cervical back: Neck supple.   Skin:     General: Skin is warm and dry.      Capillary Refill: Capillary refill takes less than 2 seconds.   Neurological:      Mental Status: She is alert. "   Psychiatric:         Mood and Affect: Mood normal.       Administrative Statements       Operative Scheduling Information:    Hospital:  [facility] or [place] OR    Physician:  Me    Surgery: Right arteriovenous fistula, possible AV graft    Urgency:  Standard    Level:  Level 3: Outpatients to be scheduled for elective surgery than can be delayed up to 4 weeks without reasonable expectation of detriment to patient    Case Length:  2 hours    Post-op Bed:  Outpatient    OR Table:  Standard    Equipment Needs:  Product/Implant: [place] 4-7 tapered graft.    Medication Instructions:  None    Hydration:  No

## 2024-08-09 NOTE — ASSESSMENT & PLAN NOTE
Lab Results   Component Value Date    EGFR 3 07/06/2024    EGFR 3 07/05/2024    EGFR 2 06/04/2024    CREATININE 13.36 (H) 07/06/2024    CREATININE 13.03 (H) 07/05/2024    CREATININE 16.76 (H) 06/04/2024     29 year old female with ESRD in need of dialysis access.  She is currently dialyzed via a right chest catheter.  She underwent vein mapping which demonstrated left-sided venous anatomy inconsistent with support of a fistula.  Her right side demonstrates an upper arm possibility in the region of her brachiocephalic fistula.  We discussed the possibility of a left AV graft.  There is also a possibility that on the day of surgery the mapping would suggest a different or alternative fistula versus AV graft.  Informed consent was obtained in the office in the presence of the patient's mother.

## 2024-08-09 NOTE — TELEPHONE ENCOUNTER
Operative Scheduling Information:     Hospital:  [facility] OR     Physician:  Me     Surgery: Right arteriovenous fistula, possible AV graft     Urgency:  Standard     Level:  Level 3: Outpatients to be scheduled for elective surgery than can be delayed up to 4 weeks without reasonable expectation of detriment to patient     Case Length:  2 hours     Post-op Bed:  Outpatient     OR Table:  Standard     Equipment Needs:  Product/Implant: [place] 4-7 tapered graft.     Medication Instructions:  None     Hydration:  No

## 2024-08-09 NOTE — PROGRESS NOTES
Ambulatory Visit  Name: Raven Simeon      : 1995      MRN: [id number]  Encounter Provider: Marcus Espitia MD  Encounter Date: 2024   Encounter department: [facility] [place]    Assessment & Plan   1. ESRD on dialysis (HCC)  Assessment & Plan:  Lab Results   Component Value Date    EGFR 3 2024    EGFR 3 2024    EGFR 2 2024    CREATININE 13.36 (H) 2024    CREATININE 13.03 (H) 2024    CREATININE 16.76 (H) 2024     29 year old female with ESRD in need of dialysis access.  She is currently dialyzed via a right chest catheter.  She underwent vein mapping which demonstrated left-sided venous anatomy inconsistent with support of a fistula.  Her right side demonstrates an upper arm possibility in the region of her brachiocephalic fistula.  We discussed the possibility of a left AV graft.  There is also a possibility that on the day of surgery the mapping would suggest a different or alternative fistula versus AV graft.  Informed consent was obtained in the office in the presence of the patient's mother.    2. Acute renal failure (HCC)  -     Ambulatory Referral to Vascular Surgery  -     Case request operating room: CREATION FISTULA ARTERIOVENOUS (AV); Standing  -     Case request operating room: CREATION FISTULA ARTERIOVENOUS (AV)      History of Present Illness     Raven Simeon is a 29 y.o. female who presents for office evaluation following vein mapping for establishing a permanent arteriovenous access.  She underwent vein mapping which revealed only right proximal arm autologous options.  I discussed the possibility of a left upper extremity AV graft versus a right upper extremity brachiocephalic fistula.  There is a possibility would have to transition to an AV graft on the day of surgery even if we chose the right arm.  After a review of the risks and benefits of prosthetic versus autologous graft, the patient has decided on a  "right upper arm AV fistula attempt with the possibility of AV graft if the vein is not suitable.  The consent was reviewed at length with the patient and the patient's mother who was present in the room.  All questions were answered at that time.  The patient and her mother were encouraged to call with any questions or concerns they may have going forward.    New patient, presents to review . Patient is right handeed- currently on dialysis.     Review of Systems   Constitutional: Negative.    HENT: Negative.     Eyes: Negative.    Respiratory: Negative.     Cardiovascular: Negative.    Gastrointestinal: Negative.    Endocrine: Negative.    Genitourinary: Negative.    Musculoskeletal: Negative.    Skin: Negative.    Allergic/Immunologic: Negative.    Neurological: Negative.    Hematological: Negative.    Psychiatric/Behavioral: Negative.         Objective     /86 (BP Location: Left arm, Patient Position: Sitting, Cuff Size: Standard)   Pulse 76   Ht 5' 4\" (1.626 m)   Wt 102 kg (224 lb)   BMI 38.45 kg/m²     Physical Exam  Vitals and nursing note reviewed.   Constitutional:       General: She is not in acute distress.     Appearance: She is well-developed.   HENT:      Head: Normocephalic and atraumatic.   Eyes:      Conjunctiva/sclera: Conjunctivae normal.   Cardiovascular:      Rate and Rhythm: Normal rate and regular rhythm.      Pulses:           Radial pulses are 1+ on the right side and 1+ on the left side.      Heart sounds: No murmur heard.  Pulmonary:      Effort: Pulmonary effort is normal. No respiratory distress.      Breath sounds: Normal breath sounds.   Abdominal:      Palpations: Abdomen is soft.      Tenderness: There is no abdominal tenderness.   Musculoskeletal:         General: No swelling.      Cervical back: Neck supple.   Skin:     General: Skin is warm and dry.      Capillary Refill: Capillary refill takes less than 2 seconds.   Neurological:      Mental Status: She is alert. "   Psychiatric:         Mood and Affect: Mood normal.       Administrative Statements       Operative Scheduling Information:    Hospital:  [facility] or [place] OR    Physician:  Me    Surgery: Right arteriovenous fistula, possible AV graft    Urgency:  Standard    Level:  Level 3: Outpatients to be scheduled for elective surgery than can be delayed up to 4 weeks without reasonable expectation of detriment to patient    Case Length:  2 hours    Post-op Bed:  Outpatient    OR Table:  Standard    Equipment Needs:  Product/Implant: [place] 4-7 tapered graft.    Medication Instructions:  None    Hydration:  No

## 2024-08-14 ENCOUNTER — ANESTHESIA EVENT (OUTPATIENT)
Dept: PERIOP | Facility: HOSPITAL | Age: 29
End: 2024-08-14
Payer: MEDICARE

## 2024-08-16 ENCOUNTER — HOSPITAL ENCOUNTER (EMERGENCY)
Facility: HOSPITAL | Age: 29
Discharge: HOME | End: 2024-08-17
Attending: EMERGENCY MEDICINE | Admitting: EMERGENCY MEDICINE
Payer: MEDICARE

## 2024-08-16 VITALS
RESPIRATION RATE: 18 BRPM | OXYGEN SATURATION: 100 % | HEART RATE: 79 BPM | SYSTOLIC BLOOD PRESSURE: 183 MMHG | DIASTOLIC BLOOD PRESSURE: 88 MMHG | TEMPERATURE: 97.6 F

## 2024-08-16 DIAGNOSIS — J45.901 ASTHMA EXACERBATION: Primary | ICD-10-CM

## 2024-08-16 PROCEDURE — 94640 AIRWAY INHALATION TREATMENT: CPT

## 2024-08-16 PROCEDURE — 93005 ELECTROCARDIOGRAM TRACING: CPT

## 2024-08-16 PROCEDURE — 99284 EMERGENCY DEPT VISIT MOD MDM: CPT | Performed by: EMERGENCY MEDICINE

## 2024-08-16 PROCEDURE — 99283 EMERGENCY DEPT VISIT LOW MDM: CPT

## 2024-08-16 RX ORDER — PREDNISONE 20 MG/1
40 TABLET ORAL ONCE
Status: COMPLETED | OUTPATIENT
Start: 2024-08-17 | End: 2024-08-17

## 2024-08-16 RX ORDER — IPRATROPIUM BROMIDE AND ALBUTEROL SULFATE 2.5; .5 MG/3ML; MG/3ML
3 SOLUTION RESPIRATORY (INHALATION) ONCE
Status: COMPLETED | OUTPATIENT
Start: 2024-08-17 | End: 2024-08-17

## 2024-08-16 RX ORDER — IPRATROPIUM BROMIDE AND ALBUTEROL SULFATE .5; 3 MG/3ML; MG/3ML
1 SOLUTION RESPIRATORY (INHALATION) ONCE
Status: COMPLETED | OUTPATIENT
Start: 2024-08-16 | End: 2024-08-16

## 2024-08-17 RX ORDER — ALBUTEROL SULFATE 90 UG/1
2 AEROSOL, METERED RESPIRATORY (INHALATION) EVERY 4 HOURS PRN
Qty: 6.7 G | Refills: 0 | Status: SHIPPED | OUTPATIENT
Start: 2024-08-17

## 2024-08-17 RX ADMIN — IPRATROPIUM BROMIDE AND ALBUTEROL SULFATE 3 ML: .5; 3 SOLUTION RESPIRATORY (INHALATION) at 00:00

## 2024-08-17 RX ADMIN — PREDNISONE 40 MG: 20 TABLET ORAL at 00:00

## 2024-08-17 NOTE — ED ATTENDING ATTESTATION
8/16/2024  I, Jonathan Castaneda MD, saw and evaluated the patient. I have discussed the patient with the resident/non-physician practitioner and agree with the resident's/non-physician practitioner's findings, Plan of Care, and MDM as documented in the resident's/non-physician practitioner's note, except where noted. All available labs and Radiology studies were reviewed.  I was present for key portions of any procedure(s) performed by the resident/non-physician practitioner and I was immediately available to provide assistance.       At this point I agree with the current assessment done in the Emergency Department.  I have conducted an independent evaluation of this patient a history and physical is as follows:    Final Diagnosis:  1. Asthma exacerbation      Chief Complaint   Patient presents with    Asthma     Per EMS, pt was at [facility] and started having an asthma attack. [place] was able to smell other people smoking which triggered her asthma attack. EMS gave 1 duoneb.        29-year-old female who presents with shortness of breath.  Patient [place] and was placed in the smoking section.  States that the smoke triggered an asthma attack.  States that this feels similar to previous asthma exacerbations.      PMH:  Past Medical History:   Diagnosis Date    Asthma     Chronic kidney disease     Eczema     Headache     Hypertension     Increased anion gap metabolic acidosis 02/28/2022    Wears glasses        PSH:  Past Surgical History:   Procedure Laterality Date    CHOLECYSTECTOMY      CT NEEDLE BIOPSY KIDNEY  1/29/2020    IR TUNNELED DIALYSIS CATHETER PLACEMENT  6/1/2024    KELOID EXCISION Left 3/30/2021    Procedure: POSTERIOR EAR EXCISION KELOID, FLAP RECONSTRUCTION;  Surgeon: Beka Hugo MD;  Location: [facility];  Service: Plastics         PE:   Vitals:    08/16/24 2341   BP: (!) 183/88   BP Location: Right arm   Pulse: 79    Resp: 18   Temp: 97.6 °F (36.4 °C)   TempSrc: Oral   SpO2: 100%       Constitutional: Vital signs are normal. She appears well-developed. She is cooperative. No distress.   HENT:   Mouth/Throat: Uvula is midline, oropharynx is clear and moist and mucous membranes are normal.   Eyes: Pupils are equal, round, and reactive to light. Conjunctivae and EOM are normal.   Neck: Trachea normal. No thyroid mass and no thyromegaly present.   Cardiovascular: Normal rate, regular rhythm, normal heart sounds.   No murmur heard.  Pulmonary/Chest: Effort normal and breath sounds normal.  Speaking in full sentences.  Saturating well on room air.  Dialysis catheter right upper chest.  Abdominal: Soft. Normal appearance and bowel sounds are normal. There is no tenderness. There is no rebound, no guarding.   Neurological: She is alert.   Skin: Skin is warm, dry and intact.   Psychiatric: She has a normal mood and affect. Her speech is normal and behavior is normal. Thought content normal.        A:  -29-year-old female who presents with shortness of breath.  P:  -Likely mild asthma exacerbation.  Will give nebulizer treatment and p.o. prednisone.  Likely discharge.    - 13 point ROS was performed and all are normal unless stated in the history above.   - Nursing note reviewed. Vitals reviewed.   - Orders placed by myself and/or advanced practitioner / resident.    - Previous chart was reviewed  - No language barrier.   - History obtained from patient.   - There are no limitations to the history obtained. Reasons ROS could not be obtained:  N/A  - Critical care time: Not applicable for this patient.          Medications   ipratropium-albuterol (FOR EMS ONLY) (DUO-NEB) 0.5-2.5 mg/3 mL inhalation solution 3 mL (0 mL Does not apply Given to EMS 8/16/24 5165)   ipratropium-albuterol (DUO-NEB) 0.5-2.5 mg/3 mL inhalation solution 3 mL (3 mL Nebulization Given 8/17/24 0000)   predniSONE tablet 40 mg (40 mg Oral Given 8/17/24 0000)     No  orders to display     Orders Placed This Encounter   Procedures    Continuous cardiac monitoring    ECG 12 lead     Labs Reviewed - No data to display  Time reflects when diagnosis was documented in both MDM as applicable and the Disposition within this note       Time User Action Codes Description Comment    8/17/2024 12:55 AM Reina Shaikh [J45.901] Asthma exacerbation           ED Disposition       ED Disposition   Discharge    Condition   Stable    Date/Time   Sat Aug 17, 2024 12:55 AM    Comment   Raven Simeon discharge to home/self care.                   Follow-up Information    None       Patient's Medications   Discharge Prescriptions    ALBUTEROL (PROAIR HFA) 90 MCG/ACT INHALER    Inhale 2 puffs every 4 (four) hours as needed for wheezing       Start Date: 8/17/2024 End Date: --       Order Dose: 2 puffs       Quantity: 6.7 g    Refills: 0     No discharge procedures on file.  Prior to Admission Medications   Prescriptions Last Dose Informant Patient Reported? Taking?   Lido-Capsaicin-Men-Methyl Sal (Medi-Patch-Lidocaine) 0.5-0.035-5-20 % PTCH  Self No No   Sig: Apply 1 patch topically daily as needed (back pain)   acetaminophen (TYLENOL) 325 mg tablet  Self No No   Sig: Take 2 tablets (650 mg total) by mouth every 6 (six) hours as needed for mild pain or headaches   albuterol (2.5 mg/3 mL) 0.083 % nebulizer solution  Self No No   Sig: Take 3 mL (2.5 mg total) by nebulization every 6 (six) hours as needed for wheezing or shortness of breath   albuterol (PROVENTIL HFA,VENTOLIN HFA) 90 mcg/act inhaler  Self No No   Sig: INHALE 2 PUFFS EVERY 6 HOURS AS NEEDED FOR WHEEZING OR SHORTNESS OF BREATH.   amLODIPine (NORVASC) 5 mg tablet  Self No No   Sig: Take 1 tablet (5 mg total) by mouth 2 (two) times a day   calcitriol (ROCALTROL) 0.5 MCG capsule  Self No No   Sig: Take 1 capsule (0.5 mcg total) by mouth daily   cyanocobalamin (VITAMIN B-12) 100 mcg tablet  Self Yes No   cyanocobalamin (VITAMIN  "B-12) 1000 MCG tablet  Self No No   Sig: Take 1 tablet (1,000 mcg total) by mouth daily   etonogestrel (NEXPLANON) subdermal implant  Self Yes No   Si mg by Subdermal route once   fluticasone (Flonase Allergy Relief) 50 mcg/act nasal spray  Self No No   Si spray into each nostril daily   labetalol (NORMODYNE) 100 mg tablet  Self No No   Sig: Take 2 tablets (200 mg total) by mouth 2 (two) times a day   lidocaine (LIDODERM) 5 %   No No   Sig: Apply 1 patch topically over 12 hours every 24 hours for 10 doses Remove & Discard patch within 12 hours or as directed by MD   loratadine (CLARITIN) 10 mg tablet  Self No No   Sig: Take 1 tablet (10 mg total) by mouth daily   mometasone-formoterol (DULERA) 100-5 MCG/ACT inhaler  Self No No   Sig: Inhale 2 puffs 2 (two) times a day Rinse mouth after use.   sevelamer carbonate (RENVELA) 800 mg tablet  Self No No   Sig: Take 1 tablet (800 mg total) by mouth 3 (three) times a day with meals      Facility-Administered Medications: None       Portions of the record may have been created with voice recognition software. Occasional wrong word or \"sound a like\" substitutions may have occurred due to the inherent limitations of voice recognition software. Read the chart carefully and recognize, using context, where substitutions have occurred.      ED Course         Critical Care Time  Procedures      "

## 2024-08-17 NOTE — ED PROVIDER NOTES
History  Chief Complaint   Patient presents with    Asthma     Per EMS, pt was at [facility] and started having an asthma attack. [place] states [place] was able to smell other people smoking which triggered her asthma attack. EMS gave 1 duoneb.      Patient is a 29-year-old female, past medical history significant for ESRD on hemodialysis, asthma, presenting today for evaluation of shortness of breath.  Patient was getting dinner tonight at [facility] and was seated in the smoking section which appears to have triggered an asthma exacerbation.  Patient states that approximately 30 minutes after being exposed to the cigarette smoke, she developed shortness of breath.  She denies any tightness in her chest or chest pain.  She reports that cigarette smoke has been a trigger to her symptoms in the past.  Patient did get a DuoNeb treatment per EMS en route.  She reports that this is improved symptoms.  She additionally reports improvement of symptoms since leaving the environment where the smoke is.  Denies cough or recent fevers. Patient denies leg pain, leg swelling, recent travel or immobility, recent hospitalization/surgery, history or cancer, hemoptysis, or history of PE/DVT.             Prior to Admission Medications   Prescriptions Last Dose Informant Patient Reported? Taking?   Lido-Capsaicin-Men-Methyl Sal (Medi-Patch-Lidocaine) 0.5-0.035-5-20 % PTCH  Self No No   Sig: Apply 1 patch topically daily as needed (back pain)   acetaminophen (TYLENOL) 325 mg tablet  Self No No   Sig: Take 2 tablets (650 mg total) by mouth every 6 (six) hours as needed for mild pain or headaches   albuterol (2.5 mg/3 mL) 0.083 % nebulizer solution  Self No No   Sig: Take 3 mL (2.5 mg total) by nebulization every 6 (six) hours as needed for wheezing or shortness of breath   albuterol (PROVENTIL HFA,VENTOLIN HFA) 90 mcg/act inhaler  Self No No   Sig: INHALE 2 PUFFS EVERY 6 HOURS AS NEEDED FOR  WHEEZING OR SHORTNESS OF BREATH.   amLODIPine (NORVASC) 5 mg tablet  Self No No   Sig: Take 1 tablet (5 mg total) by mouth 2 (two) times a day   calcitriol (ROCALTROL) 0.5 MCG capsule  Self No No   Sig: Take 1 capsule (0.5 mcg total) by mouth daily   cyanocobalamin (VITAMIN B-12) 100 mcg tablet  Self Yes No   cyanocobalamin (VITAMIN B-12) 1000 MCG tablet  Self No No   Sig: Take 1 tablet (1,000 mcg total) by mouth daily   etonogestrel (NEXPLANON) subdermal implant  Self Yes No   Si mg by Subdermal route once   fluticasone (Flonase Allergy Relief) 50 mcg/act nasal spray  Self No No   Si spray into each nostril daily   labetalol (NORMODYNE) 100 mg tablet  Self No No   Sig: Take 2 tablets (200 mg total) by mouth 2 (two) times a day   lidocaine (LIDODERM) 5 %   No No   Sig: Apply 1 patch topically over 12 hours every 24 hours for 10 doses Remove & Discard patch within 12 hours or as directed by MD   loratadine (CLARITIN) 10 mg tablet  Self No No   Sig: Take 1 tablet (10 mg total) by mouth daily   mometasone-formoterol (DULERA) 100-5 MCG/ACT inhaler  Self No No   Sig: Inhale 2 puffs 2 (two) times a day Rinse mouth after use.   sevelamer carbonate (RENVELA) 800 mg tablet  Self No No   Sig: Take 1 tablet (800 mg total) by mouth 3 (three) times a day with meals      Facility-Administered Medications: None       Past Medical History:   Diagnosis Date    Asthma     Chronic kidney disease     Eczema     Headache     Hypertension     Increased anion gap metabolic acidosis 2022    Wears glasses        Past Surgical History:   Procedure Laterality Date    CHOLECYSTECTOMY      CT NEEDLE BIOPSY KIDNEY  2020    IR TUNNELED DIALYSIS CATHETER PLACEMENT  2024    KELOID EXCISION Left 3/30/2021    Procedure: POSTERIOR EAR EXCISION KELOID, FLAP RECONSTRUCTION;  Surgeon: Beka Hugo MD;  Location: [facility];  Service: Plastics       Family History   Problem Relation Age of Onset    Asthma Mother     No Known  Problems Father      I have reviewed and agree with the history as documented.    E-Cigarette/Vaping    E-Cigarette Use Never User      E-Cigarette/Vaping Substances    Nicotine No     THC No     CBD No     Flavoring No     Other No     Unknown No      Social History     Tobacco Use    Smoking status: Never    Smokeless tobacco: Never   Vaping Use    Vaping status: Never Used   Substance Use Topics    Alcohol use: Not Currently     Comment: occassionally on social events    Drug use: No        Review of Systems    Physical Exam  ED Triage Vitals [08/16/24 2341]   Temperature Pulse Respirations Blood Pressure SpO2   97.6 °F (36.4 °C) 79 18 (!) 183/88 100 %      Temp Source Heart Rate Source Patient Position - Orthostatic VS BP Location FiO2 (%)   Oral Monitor Lying Right arm --      Pain Score       --             Orthostatic Vital Signs  Vitals:    08/16/24 2341   BP: (!) 183/88   Pulse: 79   Patient Position - Orthostatic VS: Lying       Physical Exam  Vitals and nursing note reviewed.   Constitutional:       General: She is not in acute distress.     Appearance: Normal appearance. She is not ill-appearing or toxic-appearing.   HENT:      Head: Normocephalic and atraumatic.   Eyes:      General: No scleral icterus.     Extraocular Movements: Extraocular movements intact.   Cardiovascular:      Rate and Rhythm: Normal rate and regular rhythm.      Pulses: Normal pulses.      Heart sounds: Normal heart sounds. No murmur heard.  Pulmonary:      Effort: Pulmonary effort is normal. No respiratory distress.      Breath sounds: Normal breath sounds. No wheezing or rhonchi.      Comments: Mildly tachypneic and anxious appearing    Abdominal:      General: Abdomen is flat. There is no distension.      Palpations: Abdomen is soft.      Tenderness: There is no abdominal tenderness.   Musculoskeletal:         General: No swelling or tenderness. Normal range of motion.      Cervical back: Normal range of motion.      Right  lower leg: No edema.      Left lower leg: No edema.   Skin:     Capillary Refill: Capillary refill takes less than 2 seconds.   Neurological:      General: No focal deficit present.      Mental Status: She is alert.      Cranial Nerves: No cranial nerve deficit.      Sensory: No sensory deficit.      Motor: No weakness.      Gait: Gait normal.   Psychiatric:         Mood and Affect: Mood normal.         Behavior: Behavior normal.         ED Medications  Medications   ipratropium-albuterol (FOR EMS ONLY) (DUO-NEB) 0.5-2.5 mg/3 mL inhalation solution 3 mL (0 mL Does not apply Given to EMS 8/16/24 2349)   ipratropium-albuterol (DUO-NEB) 0.5-2.5 mg/3 mL inhalation solution 3 mL (3 mL Nebulization Given 8/17/24 0000)   predniSONE tablet 40 mg (40 mg Oral Given 8/17/24 0000)       Diagnostic Studies  Results Reviewed       None                   No orders to display         Procedures  Procedures      ED Course  ED Course as of 08/17/24 0141   Fri Aug 16, 2024   2340 Patient seen and evaluated by me  DDx: History and physical exam most consistent with asthma exacerbation at this time.  No PE risk factors, signs, symptoms.  No chest pain to suggest ischemia. No cough to suggest infectious source. No reported blood loss to suggest symptomatic anemia  Workup and plan: Duoneb, prednisone, EKG   Sat Aug 17, 2024   0010 EKG done at 0010 interpreted by me   rate 59  rhythm sinus bradycardia   axis normal  QRS complexes are normal  Intervals are normal  ST segments showing no ischemic changes      0058 Patient reports improvement of symptoms. Lungs clear to auscultation and with good aeration BL. Patient discharged at this time, given return precautions and follow up information. Given Rx for albuterol inhaler. Patient and/or parent report understanding, all questions answered.                                SBIRT 22yo+      Flowsheet Row Most Recent Value   Initial Alcohol Screen: US AUDIT-C     1. How often do you have a drink  containing alcohol? 0 Filed at: 08/17/2024 0013   2. How many drinks containing alcohol do you have on a typical day you are drinking?  0 Filed at: 08/17/2024 0013   3b. FEMALE Any Age, or MALE 65+: How often do you have 4 or more drinks on one occassion? 0 Filed at: 08/17/2024 0013   Audit-C Score 0 Filed at: 08/17/2024 0013   CELY: How many times in the past year have you...    Used an illegal drug or used a prescription medication for non-medical reasons? Never Filed at: 08/17/2024 0013                  Medical Decision Making  Please see ED course above regarding details of the MDM    Risk  Prescription drug management.          Disposition  Final diagnoses:   Asthma exacerbation     Time reflects when diagnosis was documented in both MDM as applicable and the Disposition within this note       Time User Action Codes Description Comment    8/17/2024 12:55 AM Reina Shaikh Add [J45.901] Asthma exacerbation           ED Disposition       ED Disposition   Discharge    Condition   Stable    Date/Time   Sat Aug 17, 2024 0055    Comment   Raven Simeon discharge to home/self care.                   Follow-up Information    None         Discharge Medication List as of 8/17/2024 12:58 AM        START taking these medications    Details   !! albuterol (ProAir HFA) 90 mcg/act inhaler Inhale 2 puffs every 4 (four) hours as needed for wheezing, Starting Sat 8/17/2024, Normal       !! - Potential duplicate medications found. Please discuss with provider.        CONTINUE these medications which have NOT CHANGED    Details   acetaminophen (TYLENOL) 325 mg tablet Take 2 tablets (650 mg total) by mouth every 6 (six) hours as needed for mild pain or headaches, Starting Sun 1/26/2020, No Print      albuterol (2.5 mg/3 mL) 0.083 % nebulizer solution Take 3 mL (2.5 mg total) by nebulization every 6 (six) hours as needed for wheezing or shortness of breath, Starting Tue 7/18/2023, Normal      !! albuterol (PROVENTIL  HFA,VENTOLIN HFA) 90 mcg/act inhaler INHALE 2 PUFFS EVERY 6 HOURS AS NEEDED FOR WHEEZING OR SHORTNESS OF BREATH., Normal      amLODIPine (NORVASC) 5 mg tablet Take 1 tablet (5 mg total) by mouth 2 (two) times a day, Starting Sat 7/13/2024, Normal      calcitriol (ROCALTROL) 0.5 MCG capsule Take 1 capsule (0.5 mcg total) by mouth daily, Starting Sat 7/13/2024, Normal      !! cyanocobalamin (VITAMIN B-12) 100 mcg tablet Historical Med      !! cyanocobalamin (VITAMIN B-12) 1000 MCG tablet Take 1 tablet (1,000 mcg total) by mouth daily, Starting Tue 7/23/2024, Normal      etonogestrel (NEXPLANON) subdermal implant 68 mg by Subdermal route once, Historical Med      fluticasone (Flonase Allergy Relief) 50 mcg/act nasal spray 1 spray into each nostril daily, Starting Thu 12/21/2023, Normal      labetalol (NORMODYNE) 100 mg tablet Take 2 tablets (200 mg total) by mouth 2 (two) times a day, Starting Sat 7/13/2024, Normal      Lido-Capsaicin-Men-Methyl Sal (Medi-Patch-Lidocaine) 0.5-0.035-5-20 % PTCH Apply 1 patch topically daily as needed (back pain), Starting Tue 6/25/2024, Normal      lidocaine (LIDODERM) 5 % Apply 1 patch topically over 12 hours every 24 hours for 10 doses Remove & Discard patch within 12 hours or as directed by MD, Starting Thu 6/13/2024, Until Tue 6/25/2024, No Print      loratadine (CLARITIN) 10 mg tablet Take 1 tablet (10 mg total) by mouth daily, Starting Thu 12/21/2023, Normal      mometasone-formoterol (DULERA) 100-5 MCG/ACT inhaler Inhale 2 puffs 2 (two) times a day Rinse mouth after use., Starting Tue 6/25/2024, Normal      sevelamer carbonate (RENVELA) 800 mg tablet Take 1 tablet (800 mg total) by mouth 3 (three) times a day with meals, Starting Fri 3/22/2024, Normal       !! - Potential duplicate medications found. Please discuss with provider.        No discharge procedures on file.    PDMP Review         Value Time User    PDMP Reviewed  Yes 6/12/2024 11:20 PM Saurav Hagan MD              ED Provider  Attending physically available and evaluated Raven Simeon. I managed the patient along with the ED Attending.    Electronically Signed by           Reina Shaikh MD  08/17/24 0141

## 2024-08-17 NOTE — DISCHARGE INSTRUCTIONS
You have been seen in the emergency department for evaluation of an asthma exacerbation. For this we have treated with nebulizers and steroids. Please follow up as directed below. Please return to the ED if you develop worsening symptoms or if new symptoms arise..

## 2024-08-19 ENCOUNTER — TELEPHONE (OUTPATIENT)
Dept: ADMINISTRATIVE | Facility: OTHER | Age: 29
End: 2024-08-19

## 2024-08-19 LAB
ATRIAL RATE: 59 BPM
P AXIS: -2 DEGREES
PR INTERVAL: 152 MS
QRS AXIS: -1 DEGREES
QRSD INTERVAL: 96 MS
QT INTERVAL: 462 MS
QTC INTERVAL: 457 MS
T WAVE AXIS: 52 DEGREES
VENTRICULAR RATE: 59 BPM

## 2024-08-19 PROCEDURE — 93010 ELECTROCARDIOGRAM REPORT: CPT | Performed by: INTERNAL MEDICINE

## 2024-08-19 NOTE — TELEPHONE ENCOUNTER
08/19/24 11:02 AM    Patient contacted post ED visit, first outreach attempt made. Message was left for patient to return a call to the [facility] Department at [facility]: Phone [phone].    Thank you.  Ava Delgado MA  [facility]

## 2024-08-20 NOTE — TELEPHONE ENCOUNTER
08/20/24 10:28 AM    Patient contacted post ED visit, second outreach attempt made. Message was left for patient to return a call to the VBI Department at [facility]: Phone [phone].    Thank you.  Ava Delgado MA  [facility]

## 2024-08-21 NOTE — TELEPHONE ENCOUNTER
08/21/24 2:14 PM    Patient contacted post ED visit, VBI department spoke with patient/caregiver and outreach was successful.    Thank you.  Ava Delgado MA  [facility]       Detail Level: Detailed Detail Level: Zone

## 2024-08-26 NOTE — PRE-PROCEDURE INSTRUCTIONS
Pre-Surgery Instructions:   Medication Instructions    acetaminophen (TYLENOL) 325 mg tablet Uses PRN- OK to take day of surgery    albuterol (2.5 mg/3 mL) 0.083 % nebulizer solution Uses PRN- OK to take day of surgery    albuterol (ProAir HFA) 90 mcg/act inhaler Take day of surgery.    amLODIPine (NORVASC) 5 mg tablet Take day of surgery.    calcitriol (ROCALTROL) 0.5 MCG capsule Hold day of surgery.    cyanocobalamin (VITAMIN B-12) 100 mcg tablet Hold day of surgery.    etonogestrel (NEXPLANON) subdermal implant Instructions provided by MD    fluticasone (Flonase Allergy Relief) 50 mcg/act nasal spray Uses PRN- OK to take day of surgery    labetalol (NORMODYNE) 100 mg tablet Take day of surgery.    mometasone-formoterol (DULERA) 100-5 MCG/ACT inhaler Take day of surgery.    sevelamer carbonate (RENVELA) 800 mg tablet Hold day of surgery.   Medication instructions for day surgery reviewed. Please use only a sip of water to take your instructed medications. Avoid all over the counter vitamins, supplements and NSAIDS for one week prior to surgery per anesthesia guidelines. Tylenol is ok to take as needed.     You will receive a call one business day prior to surgery with an arrival time and hospital directions. If your surgery is scheduled on a Monday, the hospital will be calling you on the Friday prior to your surgery. If you have not heard from anyone by 8pm, please call the hospital supervisor through the hospital  at [phone]. ([place] [phone] or [place] [phone]).    Do not eat or drink anything after midnight the night before your surgery, including candy, mints, lifesavers, or chewing gum. Do not drink alcohol 24hrs before your surgery. Try not to smoke at least 24hrs before your surgery.       Follow the pre surgery showering instructions as listed in the “My Surgical Experience Booklet” or otherwise provided by your surgeon's office. Do not use a blade to shave the surgical area 1  week before surgery. It is okay to use a clean electric clippers up to 24 hours before surgery. Do not apply any lotions, creams, including makeup, cologne, deodorant, or perfumes after showering on the day of your surgery. Do not use dry shampoo, hair spray, hair gel, or any type of hair products.     No contact lenses, eye make-up, or artificial eyelashes. Remove nail polish, including gel polish, and any artificial, gel, or acrylic nails if possible. Remove all jewelry including rings and body piercing jewelry.     Wear causal clothing that is easy to take on and off. Consider your type of surgery.    Keep any valuables, jewelry, piercings at home. Please bring any specially ordered equipment (sling, braces) if indicated.    Arrange for a responsible person to drive you to and from the hospital on the day of your surgery. Please confirm the visitor policy for the day of your procedure when you receive your phone call with an arrival time.     Call the surgeon's office with any new illnesses, exposures, or additional questions prior to surgery.    Please reference your “My Surgical Experience Booklet” for additional information to prepare for your upcoming surgery.

## 2024-08-27 ENCOUNTER — HOSPITAL ENCOUNTER (OUTPATIENT)
Facility: HOSPITAL | Age: 29
Setting detail: OUTPATIENT SURGERY
Discharge: HOME | End: 2024-08-27
Attending: SURGERY | Admitting: SURGERY
Payer: MEDICARE

## 2024-08-27 ENCOUNTER — ANESTHESIA (OUTPATIENT)
Dept: PERIOP | Facility: HOSPITAL | Age: 29
End: 2024-08-27
Payer: MEDICARE

## 2024-08-27 VITALS
OXYGEN SATURATION: 99 % | TEMPERATURE: 97.3 F | DIASTOLIC BLOOD PRESSURE: 88 MMHG | HEART RATE: 64 BPM | WEIGHT: 224.87 LBS | SYSTOLIC BLOOD PRESSURE: 146 MMHG | BODY MASS INDEX: 38.39 KG/M2 | RESPIRATION RATE: 18 BRPM | HEIGHT: 64 IN

## 2024-08-27 DIAGNOSIS — Z99.2 ESRD ON DIALYSIS (HCC): ICD-10-CM

## 2024-08-27 DIAGNOSIS — N18.6 ESRD ON DIALYSIS (HCC): ICD-10-CM

## 2024-08-27 DIAGNOSIS — I77.0 A-V FISTULA (HCC): Primary | ICD-10-CM

## 2024-08-27 LAB
EXT PREGNANCY TEST URINE: NEGATIVE
EXT. CONTROL: NORMAL

## 2024-08-27 PROCEDURE — 36821 AV FUSION DIRECT ANY SITE: CPT | Performed by: SURGERY

## 2024-08-27 RX ORDER — HEPARIN SODIUM 1000 [USP'U]/ML
INJECTION, SOLUTION INTRAVENOUS; SUBCUTANEOUS AS NEEDED
Status: DISCONTINUED | OUTPATIENT
Start: 2024-08-27 | End: 2024-08-27

## 2024-08-27 RX ORDER — CHLORHEXIDINE GLUCONATE ORAL RINSE 1.2 MG/ML
15 SOLUTION DENTAL ONCE
Status: COMPLETED | OUTPATIENT
Start: 2024-08-27 | End: 2024-08-27

## 2024-08-27 RX ORDER — CEFAZOLIN SODIUM 2 G/50ML
2000 SOLUTION INTRAVENOUS ONCE
Status: COMPLETED | OUTPATIENT
Start: 2024-08-27 | End: 2024-08-27

## 2024-08-27 RX ORDER — FENTANYL CITRATE 50 UG/ML
INJECTION, SOLUTION INTRAMUSCULAR; INTRAVENOUS AS NEEDED
Status: DISCONTINUED | OUTPATIENT
Start: 2024-08-27 | End: 2024-08-27

## 2024-08-27 RX ORDER — PROPOFOL 10 MG/ML
INJECTION, EMULSION INTRAVENOUS CONTINUOUS PRN
Status: DISCONTINUED | OUTPATIENT
Start: 2024-08-27 | End: 2024-08-27

## 2024-08-27 RX ORDER — DEXAMETHASONE SODIUM PHOSPHATE 10 MG/ML
INJECTION, SOLUTION INTRAMUSCULAR; INTRAVENOUS AS NEEDED
Status: DISCONTINUED | OUTPATIENT
Start: 2024-08-27 | End: 2024-08-27

## 2024-08-27 RX ORDER — SODIUM CHLORIDE 9 MG/ML
10 INJECTION, SOLUTION INTRAVENOUS CONTINUOUS
Status: CANCELLED | OUTPATIENT
Start: 2024-08-27

## 2024-08-27 RX ORDER — SODIUM CHLORIDE 9 MG/ML
INJECTION, SOLUTION INTRAVENOUS CONTINUOUS PRN
Status: DISCONTINUED | OUTPATIENT
Start: 2024-08-27 | End: 2024-08-27

## 2024-08-27 RX ORDER — LIDOCAINE HYDROCHLORIDE 10 MG/ML
INJECTION, SOLUTION EPIDURAL; INFILTRATION; INTRACAUDAL; PERINEURAL AS NEEDED
Status: DISCONTINUED | OUTPATIENT
Start: 2024-08-27 | End: 2024-08-27

## 2024-08-27 RX ORDER — PROPOFOL 10 MG/ML
INJECTION, EMULSION INTRAVENOUS AS NEEDED
Status: DISCONTINUED | OUTPATIENT
Start: 2024-08-27 | End: 2024-08-27

## 2024-08-27 RX ORDER — FENTANYL CITRATE/PF 50 MCG/ML
50 SYRINGE (ML) INJECTION
Status: DISCONTINUED | OUTPATIENT
Start: 2024-08-27 | End: 2024-08-27 | Stop reason: HOSPADM

## 2024-08-27 RX ORDER — ONDANSETRON 2 MG/ML
4 INJECTION INTRAMUSCULAR; INTRAVENOUS ONCE AS NEEDED
Status: DISCONTINUED | OUTPATIENT
Start: 2024-08-27 | End: 2024-08-27 | Stop reason: HOSPADM

## 2024-08-27 RX ORDER — MIDAZOLAM HYDROCHLORIDE 2 MG/2ML
INJECTION, SOLUTION INTRAMUSCULAR; INTRAVENOUS AS NEEDED
Status: DISCONTINUED | OUTPATIENT
Start: 2024-08-27 | End: 2024-08-27

## 2024-08-27 RX ORDER — GLYCOPYRROLATE 0.2 MG/ML
INJECTION INTRAMUSCULAR; INTRAVENOUS AS NEEDED
Status: DISCONTINUED | OUTPATIENT
Start: 2024-08-27 | End: 2024-08-27

## 2024-08-27 RX ORDER — SODIUM CHLORIDE 9 MG/ML
125 INJECTION, SOLUTION INTRAVENOUS CONTINUOUS
OUTPATIENT
Start: 2024-08-27

## 2024-08-27 RX ORDER — ONDANSETRON 2 MG/ML
INJECTION INTRAMUSCULAR; INTRAVENOUS AS NEEDED
Status: DISCONTINUED | OUTPATIENT
Start: 2024-08-27 | End: 2024-08-27

## 2024-08-27 RX ORDER — BUPIVACAINE HYDROCHLORIDE 5 MG/ML
INJECTION, SOLUTION EPIDURAL; INTRACAUDAL
Status: COMPLETED | OUTPATIENT
Start: 2024-08-27 | End: 2024-08-27

## 2024-08-27 RX ADMIN — CEFAZOLIN SODIUM 2000 MG: 2 SOLUTION INTRAVENOUS at 14:31

## 2024-08-27 RX ADMIN — SODIUM CHLORIDE: 0.9 INJECTION, SOLUTION INTRAVENOUS at 13:24

## 2024-08-27 RX ADMIN — MIDAZOLAM 2 MG: 1 INJECTION INTRAMUSCULAR; INTRAVENOUS at 13:25

## 2024-08-27 RX ADMIN — BUPIVACAINE HYDROCHLORIDE 30 ML: 5 INJECTION, SOLUTION EPIDURAL; INTRACAUDAL; PERINEURAL at 13:30

## 2024-08-27 RX ADMIN — HEPARIN SODIUM 3000 UNITS: 1000 INJECTION INTRAVENOUS; SUBCUTANEOUS at 15:22

## 2024-08-27 RX ADMIN — LIDOCAINE HYDROCHLORIDE 50 MG: 10 INJECTION, SOLUTION EPIDURAL; INFILTRATION; INTRACAUDAL; PERINEURAL at 14:30

## 2024-08-27 RX ADMIN — PROPOFOL 50 MCG/KG/MIN: 10 INJECTION, EMULSION INTRAVENOUS at 14:32

## 2024-08-27 RX ADMIN — PROPOFOL 200 MG: 10 INJECTION, EMULSION INTRAVENOUS at 14:30

## 2024-08-27 RX ADMIN — PHENYLEPHRINE HYDROCHLORIDE 50 MCG/MIN: 10 INJECTION INTRAVENOUS at 14:30

## 2024-08-27 RX ADMIN — FENTANYL CITRATE 100 MCG: 50 INJECTION INTRAMUSCULAR; INTRAVENOUS at 13:26

## 2024-08-27 RX ADMIN — DEXAMETHASONE SODIUM PHOSPHATE 5 MG: 10 INJECTION, SOLUTION INTRAMUSCULAR; INTRAVENOUS at 14:33

## 2024-08-27 RX ADMIN — GLYCOPYRROLATE 0.1 MG: 0.2 INJECTION, SOLUTION INTRAMUSCULAR; INTRAVENOUS at 13:25

## 2024-08-27 RX ADMIN — ONDANSETRON 4 MG: 2 INJECTION INTRAMUSCULAR; INTRAVENOUS at 14:36

## 2024-08-27 RX ADMIN — CHLORHEXIDINE GLUCONATE 15 ML: 1.2 SOLUTION ORAL at 13:12

## 2024-08-27 NOTE — ANESTHESIA POSTPROCEDURE EVALUATION
Post-Op Assessment Note    CV Status:  Stable    Pain management: adequate       Mental Status:  Sleepy and lethargic   Hydration Status:  Stable   PONV Controlled:  None   Airway Patency:  Patent     Post Op Vitals Reviewed: Yes    No anethesia notable event occurred.    Staff: CRNA               BP   172/95   Temp   97.6   Pulse  80   Resp   20   SpO2   98

## 2024-08-27 NOTE — DISCHARGE INSTR - AVS FIRST PAGE
DISCHARGE INSTRUCTIONS  DIALYSIS FISTULA SURGERY    ACTIVITY:  Limit use of the operated arm to what is necessary for the first day after surgery. On the second day after surgery, you may start to increase use of your arm as tolerated.  Avoid heavy lifting (no more than 15 lbs) for the first one week.  You should start to exercise your hand on the side of the fistula by squeezing a stress ball or a rolled-up sock. This increases blood flow in your fistula and arm so your fistula will function better.    Feel for a thrill every day. The thrill is the vibration or pulse you feel over the fistula that means the blood is flowing through it. If you cannot feel a thrill, call our office ([phone]).    DIET:   Resume your normal diet.  Good nutrition is important for healing of your incision.    DRESSING:   You may have surgical glue at your surgical site.  There are stitches present under the skin which will absorb on their own.  The glue is used to cover the incision, assist in closure, and prevent contamination. This adhesive will darken and peel away on its own within one to two weeks. Do not pick at it.  If you have a dressing over your surgical site, remove this on the second day after surgery.      INCISION:   If you do not have a dialysis catheter in place, you may shower and get your incision wet.  Wash incision daily with soap and water, but do not rub or scrub the incision; rinse thoroughly and pat dry.  You may have stitches or staples to close your incision and it is okay for these to get wet.  Do not bathe in a tub or swim for the first 4 week following surgery or if you have any open wounds.  It is normal to have mild swelling or discoloration around the incision.   If increasing redness or pain develops, call our office immediately.  Numbness in the region of the incision may occur following the surgery.  This normally improves over six to twelve months.  If you have numbness or pain in your hand,  please call our office immediately.  DO NOT put any powders, creams, ointments, or lotions on your incision.     ARM SWELLING:    Most patients have some noticeable arm swelling after surgery.  This usually disappears within a few weeks.  If swelling is present, elevate the arm whenever possible.      RESTRICTIONS:   Do NOT have blood draws, IV's, or blood pressures performed on the operated arm.    FISTULA USE:    Your fistula will not be used until it has fully matured - approximately 6 to 12 weeks. If you are using a catheter for dialysis, this will not be removed until after your fistula has matured and is being used for dialysis without any issues.    FOLLOW UP STUDIES:  A Doppler ultrasound will be performed about 5-6 weeks after surgery.  Your surgeon will arrange this at your first postoperative visit.     FOLLOW UP APPOINTMENTS:  Making and keeping follow up appointments and ultrasound tests are important to your recovery.  If you have difficulty making it to or keeping your follow up appointments, call the office.    If you have increased pain, fever >101.5, increased drainage, redness or a bad smell at your surgery site, new coldness/numbness of your arm or leg, please call us immediately and GO directly to the ER.    PLEASE CALL THE OFFICE IF YOU HAVE ANY QUESTIONS  [phone]  [phone] [address], [address], [place] [address]  [address] [facility], [address], [place] [address]  [address] [place] [address]  [address] [facility], [address], [place] [address]  [address] [facility], [place] [address]  [address] [facility], [address], [address], [place] [address]  [address] [facility], [address], [place] [address]  [address] [facility], 1st Floor, [address], [place] [address]  [address], [place] [address]  [address] [address]
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
## 2024-08-27 NOTE — ANESTHESIA PROCEDURE NOTES
Peripheral Block    Patient location during procedure: holding area  Start time: 8/27/2024 1:30 PM  Reason for block: at surgeon's request and post-op pain management  Staffing  Performed by: Michel Coles MD  Authorized by: Michel Coles MD    Preanesthetic Checklist  Completed: patient identified, IV checked, site marked, risks and benefits discussed, surgical consent, monitors and equipment checked, pre-op evaluation and timeout performed  Peripheral Block  Patient position: sitting  Prep: ChloraPrep  Patient monitoring: frequent blood pressure checks, continuous pulse oximetry and heart rate  Block type: Supraclavicular  Laterality: right  Injection technique: single-shot  Procedures: ultrasound guided, Ultrasound guidance required for the procedure to increase accuracy and safety of medication placement and decrease risk of complications.  Ultrasound permanent image saved  bupivacaine (PF) (MARCAINE) 0.5 % injection 20 mL - Perineural   30 mL - 8/27/2024 1:30:00 PM  Needle  Needle type: Stimuplex   Needle gauge: 20 G  Needle length: 4 in  Needle localization: anatomical landmarks and ultrasound guidance  Assessment  Injection assessment: incremental injection, frequent aspiration, injected with ease, negative aspiration, negative for heart rate change, no paresthesia on injection, no symptoms of intraneural/intravenous injection and needle tip visualized at all times  Paresthesia pain: none  Post-procedure:  site cleaned  patient tolerated the procedure well with no immediate complications

## 2024-08-27 NOTE — OP NOTE
DATE OF PROCEDURE: 08/27/24    PERFORMING SERVICE: Vascular and Endovascular Surgery    ATTENDING SURGEON: Marcus Espitia MD    PREOPERATIVE DIAGNOSIS: ESRD     POSTOPERATIVE DIAGNOSIS: Same    PROCEDURE NAME:   Right brachiocephalic arteriovenous fistula placement    ANESTHESIA: MAC sedation    EBL: Minimal    UOP:  0cc    IVF:  200 cc     SPECIMENS:  None     COMPLICATIONS: None    DRAINS: None    INDICATION:  29 year old female with ESRD secondary to hypertension.  She was evaluated in the office setting and underwent vein mapping.  The cephalic vein was appropriate for an access attempt at the proximal right upper extremity.  She underwent informed consent in the office setting in the presence of her mother Rosy Stockton.     INTRAOPERATIVE FINDINGS: Adequate cephalic vein for arteriovenous access placement    ASSISTING SURGEON: Dr. Beau Nieves MD    DESCRIPTION OF PROCEDURE:   The patient was transported to the operative suite where a timeout was performed confirming the patient, procedure and laterality.  Preoperative antibiotics were administered.  The patient was prepped and draped in usual sterile surgical fashion.  A second timeout was again performed.  Anesthesia was initiated.      An incision was made just proximal to the right antecubital fossa.  Blunt dissection and electrocautery allowed for isolation of the cephalic vein.  We adjusted the field to dissect and control the brachial artery.  The artery was loosely controlled.  The vein was marked, transected and the distal vein was ligated.  The patient was administered IV heparin.  An arteriotomy was made with an 11 blade scalpel and extended with Pott's scissors.  The anastomosis was completed with 6-0 prolene suture.  The anastomosis was flushed prior to completion of the knots.  The distal vasculature was assessed to be intact.  Hemostasis was obtained in the operative field.  The subcutaneous tissues were reapproximated with vicryl  suture.  The skin was closed with 4-0 monocryl suture and sealed with dermabond skin glue.      All counts were correct at the end of the case. The patient tolerated the procedure well, awoke from anesthesia uneventfully and was transported to the PACU in good condition.    Marcus Espitia MD  08/27/24  4:00 PM

## 2024-08-29 ENCOUNTER — TELEPHONE (OUTPATIENT)
Dept: VASCULAR SURGERY | Facility: CLINIC | Age: 29
End: 2024-08-29

## 2024-08-29 NOTE — TELEPHONE ENCOUNTER
Vascular Nurse Navigator Post Op Call    Procedure: Right brachiocephalic arteriovenous fistula placement    Date of Procedure: 8/27/24     Surgeon: MD Dr. Beau Siu MD     Painful tingling or numbness in your fingers?: No    Paleness/Coolness in hands/fingers?: No    Redness, swelling or pus from your wound?: No    Bleeding?: No    Thrill present?: Yes    Fever/chills?: No    Uncontrolled Pain?: No      Reviewed discharge instructions and incision care with patient.      Dialysis Days and Location:  MWF at [facility]    NEXT SCHEDULED OFFICE VISIT:  9/12/24 at 1 pm with Dr. Espitia at [facility]    Transportation Confirmed?: Yes      Any Questions or Concerns?    Patient stated that she is doing good since procedure.  She stated that she has swelling in her arm above the incision.  She stated that she has been elevating her arm.  Reviewed incision care with her - wash daily with soap and water.  Discussed doing hand exercises with her.  All questions answered.  No concerns expressed at this time.

## 2024-09-12 ENCOUNTER — OFFICE VISIT (OUTPATIENT)
Dept: VASCULAR SURGERY | Facility: CLINIC | Age: 29
End: 2024-09-12

## 2024-09-12 VITALS
HEIGHT: 64 IN | DIASTOLIC BLOOD PRESSURE: 82 MMHG | BODY MASS INDEX: 38.58 KG/M2 | HEART RATE: 75 BPM | SYSTOLIC BLOOD PRESSURE: 136 MMHG | WEIGHT: 226 LBS

## 2024-09-12 DIAGNOSIS — N18.6 ESRD ON DIALYSIS (HCC): Primary | ICD-10-CM

## 2024-09-12 DIAGNOSIS — Z99.2 ESRD ON DIALYSIS (HCC): Primary | ICD-10-CM

## 2024-09-12 PROCEDURE — 99024 POSTOP FOLLOW-UP VISIT: CPT | Performed by: SURGERY

## 2024-09-12 NOTE — PROGRESS NOTES
"Ambulatory Visit  Name: Raven Simeon      : 1995      MRN: [id number]  Encounter Provider: Marcus Espitia MD  Encounter Date: 2024   Enc[facility] department: [facility] [place]    Assessment & Plan  ESRD on dialysis (HCC)  Lab Results   Component Value Date    EGFR 3 2024    EGFR 3 2024    EGFR 2 2024    CREATININE 13.36 (H) 2024    CREATININE 13.03 (H) 2024    CREATININE 16.76 (H) 2024   Status post right brachiocephalic fistula creation on .  Wound healing progressing well.  Thrill within fistula.  Will plan for 6-week postop dialysis access duplex to assess for maturation.      Orders:    VAS DIALYSIS ACCESS EVALUATION - RIGHT AVF (Upper); Future      History of Present Illness     Raven Simeon is a 29 y.o. female who presents along with her mother to follow-up appointment.  There have been no issues with her fistula.  The surgical glue is nearly completely peeled off.  There are no signs of surgical site infection.  She denies any hand symptoms to include numbness or tingling or weakness or ulcer formation or pain.    Patient is s/p R AVF done 24 is here today for post-op visit. +Thrill & bruit. Patient denies numbness or tingling. Incision is C/D/I.       Review of Systems   Constitutional: Negative.    HENT: Negative.     Eyes: Negative.    Respiratory: Negative.     Cardiovascular: Negative.    Gastrointestinal: Negative.    Endocrine: Negative.    Genitourinary: Negative.    Musculoskeletal: Negative.    Skin: Negative.    Allergic/Immunologic: Negative.    Neurological: Negative.    Hematological: Negative.    Psychiatric/Behavioral: Negative.             Objective     /82 (BP Location: Left arm, Patient Position: Sitting, Cuff Size: Standard)   Pulse 75   Ht 5' 4\" (1.626 m)   Wt 103 kg (226 lb)   BMI 38.79 kg/m²     Physical Exam  Vitals and nursing note reviewed.   Constitutional:       General: " She is not in acute distress.     Appearance: She is well-developed.   HENT:      Head: Normocephalic and atraumatic.   Eyes:      Conjunctiva/sclera: Conjunctivae normal.   Cardiovascular:      Rate and Rhythm: Normal rate and regular rhythm.      Pulses:           Radial pulses are 1+ on the right side.      Heart sounds: No murmur heard.     Comments: Thrill in fistula.  Pulmonary:      Effort: Pulmonary effort is normal. No respiratory distress.      Breath sounds: Normal breath sounds.   Abdominal:      Palpations: Abdomen is soft.      Tenderness: There is no abdominal tenderness.   Musculoskeletal:         General: No swelling.      Cervical back: Neck supple.   Skin:     General: Skin is warm and dry.      Capillary Refill: Capillary refill takes less than 2 seconds.   Neurological:      Mental Status: She is alert.   Psychiatric:         Mood and Affect: Mood normal.

## 2024-09-12 NOTE — LETTER
2024     Roberto Britt DO  [address] [facility] [address]    Patient: Raven Simeon   YOB: 1995   Date of Visit: 2024       Dear Dr. Britt:    Thank you for referring Raven Simeon to me for evaluation. Below are my notes for this consultation.    If you have questions, please do not hesitate to call me. I look forward to following your patient along with you.         Sincerely,        Marcus Espitia MD        CC: Raven [place] Espitia MD  2024  9:57 PM  Incomplete  Ambulatory Visit  Name: Raven Simeon      : 1995      MRN: [id number]  Encounter Provider: Marcus Espitia MD  Encounter Date: 2024   Enc[facility] department: [facility] [place]    Assessment & Plan  ESRD on dialysis ([facility])  Lab Results   Component Value Date    EGFR 3 2024    EGFR 3 2024    EGFR 2 2024    CREATININE 13.36 (H) 2024    CREATININE 13.03 (H) 2024    CREATININE 16.76 (H) 2024       Orders:  •  VAS DIALYSIS ACCESS EVALUATION - RIGHT AVF (Upper); Future      History of Present Illness    Raven Simeon is a 29 y.o. female who presents along with her mother to follow-up appointment.  There have been no issues with her fistula.  The surgical glue is nearly completely peeled off.  There are no signs of surgical site infection.  She denies any hand symptoms to include numbness or tingling or weakness or ulcer formation or pain.    Patient is s/p R AVF done 24 is here today for post-op visit. +Thrill & bruit. Patient denies numbness or tingling. Incision is C/D/I.       Review of Systems   Constitutional: Negative.    HENT: Negative.     Eyes: Negative.    Respiratory: Negative.     Cardiovascular: Negative.    Gastrointestinal: Negative.    Endocrine: Negative.    Genitourinary: Negative.    Musculoskeletal: Negative.    Skin: Negative.    Allergic/Immunologic:  "Negative.    Neurological: Negative.    Hematological: Negative.    Psychiatric/Behavioral: Negative.             Objective    /82 (BP Location: Left arm, Patient Position: Sitting, Cuff Size: Standard)   Pulse 75   Ht 5' 4\" (1.626 m)   Wt 103 kg (226 lb)   BMI 38.79 kg/m²     Physical Exam  Vitals and nursing note reviewed.   Constitutional:       General: She is not in acute distress.     Appearance: She is well-developed.   HENT:      Head: Normocephalic and atraumatic.   Eyes:      Conjunctiva/sclera: Conjunctivae normal.   Cardiovascular:      Rate and Rhythm: Normal rate and regular rhythm.      Pulses:           Radial pulses are 1+ on the right side.      Heart sounds: No murmur heard.     Comments: Thrill in fistula.  Pulmonary:      Effort: Pulmonary effort is normal. No respiratory distress.      Breath sounds: Normal breath sounds.   Abdominal:      Palpations: Abdomen is soft.      Tenderness: There is no abdominal tenderness.   Musculoskeletal:         General: No swelling.      Cervical back: Neck supple.   Skin:     General: Skin is warm and dry.      Capillary Refill: Capillary refill takes less than 2 seconds.   Neurological:      Mental Status: She is alert.   Psychiatric:         Mood and Affect: Mood normal.            "

## 2024-09-13 ENCOUNTER — PROCEDURE VISIT (OUTPATIENT)
Dept: FAMILY MEDICINE CLINIC | Facility: CLINIC | Age: 29
End: 2024-09-13

## 2024-09-13 VITALS
SYSTOLIC BLOOD PRESSURE: 169 MMHG | DIASTOLIC BLOOD PRESSURE: 85 MMHG | TEMPERATURE: 97.2 F | HEART RATE: 80 BPM | BODY MASS INDEX: 38.58 KG/M2 | OXYGEN SATURATION: 100 % | WEIGHT: 226 LBS | HEIGHT: 64 IN

## 2024-09-13 DIAGNOSIS — Z30.46 ENCOUNTER FOR REMOVAL AND REINSERTION OF NEXPLANON: Primary | ICD-10-CM

## 2024-09-13 NOTE — PATIENT INSTRUCTIONS
1. ESRD on dialysis (HCC)  Assessment & Plan:  Lab Results   Component Value Date    EGFR 3 07/06/2024    EGFR 3 07/05/2024    EGFR 2 06/04/2024    CREATININE 13.36 (H) 07/06/2024    CREATININE 13.03 (H) 07/05/2024    CREATININE 16.76 (H) 06/04/2024   Status post right brachiocephalic fistula creation on August 27.  Wound healing progressing well.  Thrill within fistula.  Will plan for 6-week postop dialysis access duplex to assess for maturation.    Orders:  -     VAS DIALYSIS ACCESS EVALUATION - RIGHT AVF (Upper); Future; Expected date: 10/12/2024

## 2024-09-13 NOTE — PATIENT INSTRUCTIONS
Please follow these important steps now that you have the Nexplanon placed:    1) Keep the pressure dressing (outer gauze wrap and ACE bandage) on for 24 hours. Try not to get it wet.   2) After 24 hours, you may remove the gauze & ACE wrap, but please keep the skin strips on. You may shower normally with the strips on, but do not scrub the area.  3) The strips usually will fall off on their own after a week or so, but please do not remove them until it has been at least 3-5 days.   4) Please refrain from any heavy lifting with your arms for 3-5 days, and try not to manipulate the implant area if possible.   5) After the 3-5 days, you don't need to have any bandage over the site (unless you want to). Keep the implant insertion site area clean and dry.  6) Do not use rubbing alcohol, hydrogen peroxide, or harsh soaps (such as Dial) to clean the insertion site. Use only a gentle soap, such as Dove or Cetaphil.  7) Do not apply triple antibiotic ointment (such as Neosporin) to the insertion site area.  8) You MUST use a back-up method for birth control, such as condoms, for 7 days to allow the hormones to take effect.  9) Call the office if you have any signs of skin infection at the insertion site, such as excess redness, warmth, swelling, or discharge.  10) Please return for a follow up appointment in 6 weeks so we can check that the insertion site has properly healed.

## 2024-09-13 NOTE — PROGRESS NOTES
Universal Protocol:  Procedure performed by: (Dr. Lagos)  Consent: Verbal consent obtained. Written consent obtained.  Risks and benefits: risks, benefits and alternatives were discussed  Consent given by: patient  Patient consent: the patient's understanding of the procedure matches consent given  Procedure consent: procedure consent matches procedure scheduled  Relevant documents: relevant documents present and verified  Test results: test results available and properly labeled  Site marked: the operative site was marked  Radiology Images displayed and confirmed. If images not available, report reviewed: imaging studies not available  Patient identity confirmed: verbally with patient  Remove and insert drug implant    Date/Time: 9/13/2024 9:00 AM    Performed by: Rosalinda Forde MD  Authorized by: Rosalinda Forde MD    Indication:     Indication: Presence of non-biodegradable drug delivery implant    Pre-procedure:     Prepped with: povidone-iodine      Local anesthetic:  Lidocaine with epinephrine  Procedure:     Procedure:  Removal with reinsertion    Small stab incision was made in arm: yes      Left/right:  Left    Preloaded contraceptive capsule trocar was placed subdermally: yes      Visualization of implant was obtained: yes      Contraceptive capsule was inserted and trocar removed: yes      Visualization of notch in stylet and palpation of device: yes      Palpation confirms placement by provider and patient: yes      Site was closed with steri-strips and pressure bandage applied: yes    Comments:      Extensive scar tissue; Blunt dissection performed. New nexplanon inserted to prior location. Pressure dressing applied afterwards. Procedure tolerated well. No immediate complications.

## 2024-09-13 NOTE — ASSESSMENT & PLAN NOTE
Lab Results   Component Value Date    EGFR 3 07/06/2024    EGFR 3 07/05/2024    EGFR 2 06/04/2024    CREATININE 13.36 (H) 07/06/2024    CREATININE 13.03 (H) 07/05/2024    CREATININE 16.76 (H) 06/04/2024   Status post right brachiocephalic fistula creation on August 27.  Wound healing progressing well.  Thrill within fistula.  Will plan for 6-week postop dialysis access duplex to assess for maturation.      Orders:    VAS DIALYSIS ACCESS EVALUATION - RIGHT AVF (Upper); Future

## 2024-09-16 DIAGNOSIS — J45.901 ASTHMA EXACERBATION: ICD-10-CM

## 2024-09-26 NOTE — TELEPHONE ENCOUNTER
Can we confirm that she is requesting this? It looks like she was given 4 refills of albuterol inhaler back in June and has 2 different albuterol inhalers prescribed now. Thank you.

## 2024-09-27 RX ORDER — ALBUTEROL SULFATE 90 UG/1
2 INHALANT RESPIRATORY (INHALATION) EVERY 4 HOURS PRN
Qty: 6.7 G | Refills: 3 | Status: SHIPPED | OUTPATIENT
Start: 2024-09-27

## 2024-09-27 NOTE — TELEPHONE ENCOUNTER
I can provide a refill.  I will discontinue the other albuterol that is in her chart as this appears to be a duplicate.  Thank you.

## 2024-10-04 ENCOUNTER — HOSPITAL ENCOUNTER (OUTPATIENT)
Dept: VASCULAR ULTRASOUND | Facility: HOSPITAL | Age: 29
Discharge: HOME | End: 2024-10-04
Payer: MEDICARE

## 2024-10-04 DIAGNOSIS — Z99.2 ESRD ON DIALYSIS (HCC): ICD-10-CM

## 2024-10-04 DIAGNOSIS — N18.6 ESRD ON DIALYSIS (HCC): ICD-10-CM

## 2024-10-04 PROCEDURE — 93990 DOPPLER FLOW TESTING: CPT | Performed by: SURGERY

## 2024-10-04 PROCEDURE — 93990 DOPPLER FLOW TESTING: CPT

## 2024-10-10 ENCOUNTER — OFFICE VISIT (OUTPATIENT)
Dept: VASCULAR SURGERY | Facility: CLINIC | Age: 29
End: 2024-10-10

## 2024-10-10 VITALS
HEART RATE: 76 BPM | BODY MASS INDEX: 39.61 KG/M2 | DIASTOLIC BLOOD PRESSURE: 82 MMHG | SYSTOLIC BLOOD PRESSURE: 142 MMHG | WEIGHT: 232 LBS | HEIGHT: 64 IN

## 2024-10-10 DIAGNOSIS — N18.6 ESRD ON DIALYSIS (HCC): Primary | ICD-10-CM

## 2024-10-10 DIAGNOSIS — Z99.2 ESRD ON DIALYSIS (HCC): Primary | ICD-10-CM

## 2024-10-10 PROCEDURE — 99024 POSTOP FOLLOW-UP VISIT: CPT | Performed by: SURGERY

## 2024-10-10 NOTE — LETTER
October 10, 2024     Roberto Britt DO  [address] [facility] [address]    Patient: Raven Simeon   YOB: 1995   Date of Visit: 10/10/2024       Dear Dr. Britt:    Thank you for referring Raven Simeon to me for evaluation. Below are my notes for this consultation.    If you have questions, please do not hesitate to call me. I look forward to following your patient along with you.         Sincerely,        Marcus Espitia MD        CC: Raven Simeon  MD Marcus Nickerson MD  10/10/2024  3:54 PM  Sign when Signing Visit  Ambulatory Visit  Name: Raven Simeon      : 1995      MRN: [id number]  Encounter Provider: Marcus Espitia MD  Encounter Date: 10/10/2024   Enc[facility] department: [facility] [place]    Assessment & Plan  ESRD on dialysis ([facility])  Lab Results   Component Value Date    EGFR 3 2024    EGFR 3 2024    EGFR 2 2024    CREATININE 13.36 (H) 2024    CREATININE 13.03 (H) 2024    CREATININE 16.76 (H) 2024   29-year-old hypertensive female with end-stage renal disease currently dialyzed via right chest catheter who is now approximately 5 weeks postop from right brachiocephalic fistula placement.  Her follow-up duplex has demonstrated adequate maturity to attempt initial cannulations.  At this time no further vascular surgical outpatient visits are required, if she encounters dialysis difficulties please reach out with questions.           History of Present Illness    Raven Simeon is a 29 y.o. female who presents in follow-up after right brachiocephalic fistula placement.  She has been doing well with some mild bruising around her incision that is steadily improving.  She has no issues with her hand whatsoever.  There is no numbness, weakness or any other discernible issue.  Her incision is well-healed with no signs of infection.  There is no hematoma.  There is  "a palpable thrill within the fistula.    Patient presents to review HD dupex s/p R AVF done 8/27/24.      Review of Systems   Constitutional: Negative.    HENT: Negative.     Eyes: Negative.    Respiratory: Negative.     Cardiovascular: Negative.    Gastrointestinal: Negative.    Endocrine: Negative.    Genitourinary: Negative.    Musculoskeletal: Negative.    Skin: Negative.    Allergic/Immunologic: Negative.    Neurological: Negative.    Hematological: Negative.    Psychiatric/Behavioral: Negative.             Objective    /82 (BP Location: Left arm, Patient Position: Sitting, Cuff Size: Standard)   Pulse 76   Ht 5' 4\" (1.626 m)   Wt 105 kg (232 lb)   BMI 39.82 kg/m²     Physical Exam  Vitals and nursing note reviewed.   Constitutional:       General: She is not in acute distress.     Appearance: She is well-developed.   HENT:      Head: Normocephalic and atraumatic.   Eyes:      Conjunctiva/sclera: Conjunctivae normal.   Cardiovascular:      Rate and Rhythm: Normal rate and regular rhythm.      Pulses:           Radial pulses are 2+ on the right side.      Heart sounds: No murmur heard.     Comments: Thrill in fistula.  Well-healed incision.  Pulmonary:      Effort: Pulmonary effort is normal. No respiratory distress.      Breath sounds: Normal breath sounds.   Abdominal:      Palpations: Abdomen is soft.      Tenderness: There is no abdominal tenderness.   Musculoskeletal:         General: No swelling.      Cervical back: Neck supple.   Skin:     General: Skin is warm and dry.      Capillary Refill: Capillary refill takes less than 2 seconds.   Neurological:      Mental Status: She is alert.   Psychiatric:         Mood and Affect: Mood normal.         "

## 2024-10-10 NOTE — PATIENT INSTRUCTIONS
1. ESRD on dialysis (HCC)  Assessment & Plan:  Lab Results   Component Value Date    EGFR 3 07/06/2024    EGFR 3 07/05/2024    EGFR 2 06/04/2024    CREATININE 13.36 (H) 07/06/2024    CREATININE 13.03 (H) 07/05/2024    CREATININE 16.76 (H) 06/04/2024   29-year-old hypertensive female with end-stage renal disease currently dialyzed via right chest catheter who is now approximately 5 weeks postop from right brachiocephalic fistula placement.  Her follow-up duplex has demonstrated adequate maturity to attempt initial cannulations.  At this time no further vascular surgical outpatient visits are required, if she encounters dialysis difficulties please reach out with questions.

## 2024-10-10 NOTE — PROGRESS NOTES
Ambulatory Visit  Name: Raven Simeon      : 1995      MRN: [id number]  Encounter Provider: Marcus Espitia MD  Encounter Date: 10/10/2024   Enc[facility] department: [facility] [place]    Assessment & Plan  ESRD on dialysis ([facility])  Lab Results   Component Value Date    EGFR 3 2024    EGFR 3 2024    EGFR 2 2024    CREATININE 13.36 (H) 2024    CREATININE 13.03 (H) 2024    CREATININE 16.76 (H) 2024   29-year-old hypertensive female with end-stage renal disease currently dialyzed via right chest catheter who is now approximately 5 weeks postop from right brachiocephalic fistula placement.  Her follow-up duplex has demonstrated adequate maturity to attempt initial cannulations.  At this time no further vascular surgical outpatient visits are required, if she encounters dialysis difficulties please reach out with questions.           History of Present Illness     Raven Simeon is a 29 y.o. female who presents in follow-up after right brachiocephalic fistula placement.  She has been doing well with some mild bruising around her incision that is steadily improving.  She has no issues with her hand whatsoever.  There is no numbness, weakness or any other discernible issue.  Her incision is well-healed with no signs of infection.  There is no hematoma.  There is a palpable thrill within the fistula.    Patient presents to review HD dupex s/p R AVF done 24.      Review of Systems   Constitutional: Negative.    HENT: Negative.     Eyes: Negative.    Respiratory: Negative.     Cardiovascular: Negative.    Gastrointestinal: Negative.    Endocrine: Negative.    Genitourinary: Negative.    Musculoskeletal: Negative.    Skin: Negative.    Allergic/Immunologic: Negative.    Neurological: Negative.    Hematological: Negative.    Psychiatric/Behavioral: Negative.             Objective     /82 (BP Location: Left arm, Patient Position: Sitting,  "Cuff Size: Standard)   Pulse 76   Ht 5' 4\" (1.626 m)   Wt 105 kg (232 lb)   BMI 39.82 kg/m²     Physical Exam  Vitals and nursing note reviewed.   Constitutional:       General: She is not in acute distress.     Appearance: She is well-developed.   HENT:      Head: Normocephalic and atraumatic.   Eyes:      Conjunctiva/sclera: Conjunctivae normal.   Cardiovascular:      Rate and Rhythm: Normal rate and regular rhythm.      Pulses:           Radial pulses are 2+ on the right side.      Heart sounds: No murmur heard.     Comments: Thrill in fistula.  Well-healed incision.  Pulmonary:      Effort: Pulmonary effort is normal. No respiratory distress.      Breath sounds: Normal breath sounds.   Abdominal:      Palpations: Abdomen is soft.      Tenderness: There is no abdominal tenderness.   Musculoskeletal:         General: No swelling.      Cervical back: Neck supple.   Skin:     General: Skin is warm and dry.      Capillary Refill: Capillary refill takes less than 2 seconds.   Neurological:      Mental Status: She is alert.   Psychiatric:         Mood and Affect: Mood normal.         "

## 2024-10-10 NOTE — ASSESSMENT & PLAN NOTE
Lab Results   Component Value Date    EGFR 3 07/06/2024    EGFR 3 07/05/2024    EGFR 2 06/04/2024    CREATININE 13.36 (H) 07/06/2024    CREATININE 13.03 (H) 07/05/2024    CREATININE 16.76 (H) 06/04/2024   29-year-old hypertensive female with end-stage renal disease currently dialyzed via right chest catheter who is now approximately 5 weeks postop from right brachiocephalic fistula placement.  Her follow-up duplex has demonstrated adequate maturity to attempt initial cannulations.  At this time no further vascular surgical outpatient visits are required, if she encounters dialysis difficulties please reach out with questions.

## 2024-10-14 DIAGNOSIS — J30.81 CAT ALLERGY, AIRBORNE: ICD-10-CM

## 2024-10-14 DIAGNOSIS — J45.901 ASTHMA EXACERBATION: ICD-10-CM

## 2024-10-16 RX ORDER — FLUTICASONE PROPIONATE 50 MCG
1 SPRAY, SUSPENSION (ML) NASAL DAILY
Qty: 15.8 ML | Refills: 1 | Status: SHIPPED | OUTPATIENT
Start: 2024-10-16

## 2024-10-16 RX ORDER — LORATADINE 10 MG/1
10 TABLET ORAL DAILY
Qty: 20 TABLET | Refills: 1 | Status: SHIPPED | OUTPATIENT
Start: 2024-10-16 | End: 2024-10-25 | Stop reason: SDUPTHER

## 2024-10-16 RX ORDER — ALBUTEROL SULFATE 90 UG/1
2 INHALANT RESPIRATORY (INHALATION) EVERY 4 HOURS PRN
Qty: 6.7 G | Refills: 1 | Status: SHIPPED | OUTPATIENT
Start: 2024-10-16

## 2024-10-25 DIAGNOSIS — J30.81 CAT ALLERGY, AIRBORNE: ICD-10-CM

## 2024-10-25 RX ORDER — LORATADINE 10 MG/1
10 TABLET ORAL DAILY
Qty: 90 TABLET | Refills: 1 | Status: SHIPPED | OUTPATIENT
Start: 2024-10-25

## 2024-10-31 ENCOUNTER — PREP FOR PROCEDURE (OUTPATIENT)
Dept: INTERVENTIONAL RADIOLOGY/VASCULAR | Facility: CLINIC | Age: 29
End: 2024-10-31

## 2024-10-31 DIAGNOSIS — N18.5 CHRONIC KIDNEY DISEASE (CKD), ACTIVE MEDICAL MANAGEMENT WITHOUT DIALYSIS, STAGE 5 (HCC): Primary | ICD-10-CM

## 2024-11-04 DIAGNOSIS — J45.901 ASTHMA EXACERBATION: ICD-10-CM

## 2024-11-04 DIAGNOSIS — E53.8 VITAMIN B 12 DEFICIENCY: ICD-10-CM

## 2024-11-04 DIAGNOSIS — J30.81 CAT ALLERGY, AIRBORNE: ICD-10-CM

## 2024-11-05 RX ORDER — FLUTICASONE PROPIONATE 50 MCG
1 SPRAY, SUSPENSION (ML) NASAL DAILY
Qty: 15.8 ML | Refills: 0 | OUTPATIENT
Start: 2024-11-05

## 2024-11-05 RX ORDER — ALBUTEROL SULFATE 90 UG/1
2 INHALANT RESPIRATORY (INHALATION) EVERY 4 HOURS PRN
Qty: 6.7 G | Refills: 0 | OUTPATIENT
Start: 2024-11-05

## 2024-11-05 RX ORDER — LORATADINE 10 MG/1
10 TABLET ORAL DAILY
Qty: 90 TABLET | Refills: 0 | OUTPATIENT
Start: 2024-11-05

## 2024-11-07 ENCOUNTER — TELEPHONE (OUTPATIENT)
Dept: INTERVENTIONAL RADIOLOGY/VASCULAR | Facility: HOSPITAL | Age: 29
End: 2024-11-07

## 2024-11-11 ENCOUNTER — TELEPHONE (OUTPATIENT)
Dept: NEPHROLOGY | Facility: CLINIC | Age: 29
End: 2024-11-11

## 2024-11-11 NOTE — TELEPHONE ENCOUNTER
Patient's mother and I spoke over the phone.  Mother states that they have tried to call [facility] to get updates but have not heard back.  I see on return calls from [facility] team in June.  I have reached out to the [facility] coordinators to investigate further.  They had also questions about the HLA kit.  Mother understands that I do not know what the issue is currently but we will investigate further and she should expect a call from our coordinators.  And she will let me know if she does not receive a call.  She was appreciative of the call.

## 2024-11-13 ENCOUNTER — HOSPITAL ENCOUNTER (OUTPATIENT)
Dept: INTERVENTIONAL RADIOLOGY/VASCULAR | Facility: HOSPITAL | Age: 29
Discharge: HOME | End: 2024-11-13
Attending: RADIOLOGY
Payer: MEDICARE

## 2024-11-13 VITALS
DIASTOLIC BLOOD PRESSURE: 96 MMHG | RESPIRATION RATE: 16 BRPM | OXYGEN SATURATION: 100 % | SYSTOLIC BLOOD PRESSURE: 140 MMHG | BODY MASS INDEX: 39.61 KG/M2 | WEIGHT: 232 LBS | TEMPERATURE: 96.9 F | HEIGHT: 64 IN | HEART RATE: 77 BPM

## 2024-11-13 DIAGNOSIS — N18.5 CHRONIC KIDNEY DISEASE (CKD), ACTIVE MEDICAL MANAGEMENT WITHOUT DIALYSIS, STAGE 5 (HCC): ICD-10-CM

## 2024-11-13 PROCEDURE — 99152 MOD SED SAME PHYS/QHP 5/>YRS: CPT | Performed by: RADIOLOGY

## 2024-11-13 PROCEDURE — C1894 INTRO/SHEATH, NON-LASER: HCPCS

## 2024-11-13 PROCEDURE — C1769 GUIDE WIRE: HCPCS

## 2024-11-13 PROCEDURE — C1725 CATH, TRANSLUMIN NON-LASER: HCPCS

## 2024-11-13 PROCEDURE — 99153 MOD SED SAME PHYS/QHP EA: CPT

## 2024-11-13 PROCEDURE — 36902 INTRO CATH DIALYSIS CIRCUIT: CPT

## 2024-11-13 PROCEDURE — 99152 MOD SED SAME PHYS/QHP 5/>YRS: CPT

## 2024-11-13 PROCEDURE — C1887 CATHETER, GUIDING: HCPCS

## 2024-11-13 PROCEDURE — 36902 INTRO CATH DIALYSIS CIRCUIT: CPT | Performed by: RADIOLOGY

## 2024-11-13 RX ORDER — LIDOCAINE WITH 8.4% SOD BICARB 0.9%(10ML)
SYRINGE (ML) INJECTION AS NEEDED
Status: COMPLETED | OUTPATIENT
Start: 2024-11-13 | End: 2024-11-13

## 2024-11-13 RX ORDER — FENTANYL CITRATE 50 UG/ML
INJECTION, SOLUTION INTRAMUSCULAR; INTRAVENOUS AS NEEDED
Status: COMPLETED | OUTPATIENT
Start: 2024-11-13 | End: 2024-11-13

## 2024-11-13 RX ORDER — MIDAZOLAM HYDROCHLORIDE 2 MG/2ML
INJECTION, SOLUTION INTRAMUSCULAR; INTRAVENOUS AS NEEDED
Status: COMPLETED | OUTPATIENT
Start: 2024-11-13 | End: 2024-11-13

## 2024-11-13 RX ADMIN — Medication 2 ML: at 13:27

## 2024-11-13 RX ADMIN — MIDAZOLAM 1 MG: 1 INJECTION INTRAMUSCULAR; INTRAVENOUS at 13:27

## 2024-11-13 RX ADMIN — FENTANYL CITRATE 50 MCG: 50 INJECTION, SOLUTION INTRAMUSCULAR; INTRAVENOUS at 13:27

## 2024-11-13 RX ADMIN — IOHEXOL 20 ML: 350 INJECTION, SOLUTION INTRAVENOUS at 13:48

## 2024-11-13 RX ADMIN — FENTANYL CITRATE 50 MCG: 50 INJECTION, SOLUTION INTRAMUSCULAR; INTRAVENOUS at 13:41

## 2024-11-13 NOTE — DISCHARGE INSTRUCTIONS
Moderate Sedation   WHAT YOU NEED TO KNOW:   Moderate sedation, or conscious sedation, is medicine used during procedures to help you feel relaxed and calm. You will be awake and able to follow directions without anxiety or pain. You will remember little to none of the procedure. You may feel tired, weak, or unsteady on your feet after you get sedation. You may also have trouble concentrating or short-term memory loss. These symptoms should go away in 24 hours or less.   DISCHARGE INSTRUCTIONS:   Call 911 or have someone else call for any of the following:   You have sudden trouble breathing.     You cannot be woken.  Seek care immediately if:   You have a severe headache or dizziness.     Your heart is beating faster than usual.  Contact your healthcare provider if:   You have a fever.     You have nausea or are vomiting for more than 8 hours after the procedure.      Your skin is itchy, swollen, or you have a rash.     You have questions or concerns about your condition or care.  Self-care:   Have someone stay with you for 24 hours. This person can drive you to errands and help you do things around the house. This person can also watch for problems.      Rest and do quiet activities for 24 hours. Do not exercise, ride a bike, or play sports. Stand up slowly to prevent dizziness and falls. Take short walks around the house with another person. Slowly return to your usual activities the next day.      Do not drive or use dangerous machines or tools for 24 hours. You may injure yourself or others. Examples include a lawnmower, saw, or drill. Do not return to work for 24 hours if you use dangerous machines or tools for work.      Do not make important decisions for 24 hours. For example, do not sign important papers or invest money.      Drink liquids as directed. Liquids help flush the sedation medicine out of your body. Ask how much liquid to drink each day and which liquids are best for you.      Eat small,  frequent meals to prevent nausea and vomiting. Start with clear liquids such as juice or broth. If you do not vomit after clear liquids, you can eat your usual foods.      Do not drink alcohol or take medicines that make you drowsy. This includes medicines that help you sleep and anxiety medicines. Ask your healthcare provider if it is safe for you to take pain medicine.  Follow up with your healthcare provider as directed: Write down your questions so you remember to ask them during your visits.   © 2017 [employer] Information is for End User's use only and may not be sold, redistributed or otherwise used for commercial purposes. All illustrations and images included in CareNotes® are the copyrighted property of [employer] or [employer].  The above information is an  only. It is not intended as medical advice for individual conditions or treatments. Talk to your doctor, nurse or pharmacist before following any medical regimen to see if it is safe and effective for you.      Fistulagram   WHAT YOU NEED TO KNOW:   Your arm or leg my  be sore, swollen, and bruised after the procedure. This is normal and should get better in a few days.     DISCHARGE INSTRUCTIONS:     Contact Interventional Radiology at [phone] and follow prompts if you experience any:    You have a fever or chills.    Your puncture site is red, swollen, or draining pus.    You have nausea or are vomiting.    Your skin is itchy, swollen, or you have a rash.    You cannot feel a thrill over your graft or fistula.     You have questions or concerns about your condition or care.    Seek care immediately if:     You have bleeding that does not stop after 10 minutes of holding firm, direct pressure over the puncture site.    Blood soaks through your bandage.    Your hand or foot closest to the graft or fistula feels cold, painful, or numb.     Your hand or foot closest to the graft or fistula is pale or  blue.     You have trouble moving your arm or leg closest to the graft or fistula.     Your bruise suddenly gets bigger.    Care for your wound as directed:  Remove the bandage in 4 to 6 hours or as         directed. Wash the area once a day with soap and water. Gently pat the area dry.     Apply firm, steady pressure to the puncture site if it bleeds.  Use a clean gauze or towel to hold pressure for 10 to 15 minutes. Call 911 if you cannot stop the bleeding or the bleeding gets heavier.     Feel for a thrill once a day or as directed.  Place your index and second finger over your fistula or graft as directed. You should feel a vibration. The vibration means that blood is flowing through your graft or fistula correctly.     Rest your arm or leg as directed.  Do not lift anything heavier than 5 pounds or do strenuous activity for 24 hours.     Prevent damage to your graft or fistula.  Do not wear tight-fitting clothing over your graft or fistula. Do not wear tight jewelry on the arm or leg with the graft or fistula. Tell healthcare providers not to do, IVs, blood draws, and blood pressure readings in the arm with your graft or fistula. Do not allow flu shots or vaccinations in your arm with your graft or fistula.    Follow up with your healthcare provider as directed

## 2024-11-13 NOTE — H&P
"Interventional Radiology  History and Physical 11/13/2024     Raven Russmadhu   1995   [id number]    H&P reviewed. There have been no interval changes since the time the H&P was written.    /67   Pulse 75   Temp (!) 97 °F (36.1 °C) (Temporal)   Resp 18   Ht 5' 4\" (1.626 m)   Wt 105 kg (232 lb)   LMP 11/06/2024 (Approximate)   SpO2 99%   BMI 39.82 kg/m²     Prior imaging was reviewed.  Presents today for evaluation of right upper extremity brachiocephalic fistula.  Had ultrasound 1 month ago which was normal.  She has been having difficult cannulation with the venous needle which has happened multiple times.  No prior fistula interventions.    On exam, there is good thrill peripherally but thrill is absent in the mid and upper arm.    Procedure discussed and all questions answered.    Informed written consent was obtained.    Simón Mac MD   "

## 2024-11-13 NOTE — BRIEF OP NOTE (RAD/CATH)
INTERVENTIONAL RADIOLOGY PROCEDURE NOTE    Date: 11/13/2024    Procedure:   Procedure Summary       Date: 11/13/24 Room / Location: [facility] Interventional Radiology    Anesthesia Start:  Anesthesia Stop:     Procedure: IR AV FISTULAGRAM/GRAFTOGRAM Diagnosis:       Chronic kidney disease (CKD), active medical management without dialysis, stage 5 (HCC)      (Difficult cannulation, High Venous Pressure Treatment on M,W,F)    Scheduled Providers:  Responsible Provider:     Anesthesia Type: Not recorded ASA Status: Not recorded            Preoperative diagnosis:   1. Chronic kidney disease (CKD), active medical management without dialysis, stage 5 (HCC)         Postoperative diagnosis: Same.    Surgeon: Simón Mac MD     Assistant: None. No qualified resident was available.    Blood loss: Less than 5 mL    Specimens: None    Findings: Focal mid arm cephalic vein stenosis treated with 10 mm angioplasty with good result.  No residual stenosis.  Excellent thrill postintervention.  No central venous stenosis.    Complications: None immediate.    Anesthesia: conscious sedation

## 2024-11-19 DIAGNOSIS — I12.0 BENIGN HYPERTENSION WITH CKD (CHRONIC KIDNEY DISEASE) STAGE V (HCC): ICD-10-CM

## 2024-11-19 DIAGNOSIS — E83.39 HYPERPHOSPHATEMIA: ICD-10-CM

## 2024-11-19 DIAGNOSIS — N18.5 BENIGN HYPERTENSION WITH CKD (CHRONIC KIDNEY DISEASE) STAGE V (HCC): ICD-10-CM

## 2024-11-19 DIAGNOSIS — N25.81 SECONDARY HYPERPARATHYROIDISM OF RENAL ORIGIN (HCC): ICD-10-CM

## 2024-11-19 RX ORDER — SEVELAMER CARBONATE 800 MG/1
800 TABLET, FILM COATED ORAL
Qty: 90 TABLET | Refills: 0 | Status: SHIPPED | OUTPATIENT
Start: 2024-11-19

## 2024-11-25 DIAGNOSIS — J45.901 ASTHMA EXACERBATION: ICD-10-CM

## 2024-11-25 DIAGNOSIS — J30.81 CAT ALLERGY, AIRBORNE: ICD-10-CM

## 2024-11-26 ENCOUNTER — HOSPITAL ENCOUNTER (OUTPATIENT)
Dept: RADIOLOGY | Facility: HOSPITAL | Age: 29
Discharge: HOME | End: 2024-11-26
Attending: STUDENT IN AN ORGANIZED HEALTH CARE EDUCATION/TRAINING PROGRAM
Payer: MEDICARE

## 2024-11-26 DIAGNOSIS — Z99.2 CKD (CHRONIC KIDNEY DISEASE) REQUIRING CHRONIC DIALYSIS (HCC): ICD-10-CM

## 2024-11-26 DIAGNOSIS — J45.40 MODERATE PERSISTENT ASTHMA WITHOUT COMPLICATION: ICD-10-CM

## 2024-11-26 DIAGNOSIS — N18.6 CKD (CHRONIC KIDNEY DISEASE) REQUIRING CHRONIC DIALYSIS (HCC): ICD-10-CM

## 2024-11-26 PROCEDURE — 36589 REMOVAL TUNNELED CV CATH: CPT | Performed by: RADIOLOGY

## 2024-11-26 PROCEDURE — 36589 REMOVAL TUNNELED CV CATH: CPT

## 2024-11-26 RX ADMIN — Medication 20 ML: at 12:21

## 2024-11-26 NOTE — SEDATION DOCUMENTATION
Pt tolerated tunneled dialysis line pull. Pressure held. Dry dressing to site. AVS printed and reviewed

## 2024-11-26 NOTE — BRIEF OP NOTE (RAD/CATH)
INTERVENTIONAL RADIOLOGY PROCEDURE NOTE    Date: 11/26/2024    Procedure:   Procedure Summary       Date: 11/26/24 Room / Location: [facility] Interventional Radiology    Anesthesia Start:  Anesthesia Stop:     Procedure: IR TUNNELED DIALYSIS CATHETER REMOVAL Diagnosis:       CKD (chronic kidney disease) requiring chronic dialysis (HCC)      (Mature avg/avf)    Scheduled Providers:  Responsible Provider:     Anesthesia Type: Not recorded ASA Status: Not recorded            Preoperative diagnosis:   1. CKD (chronic kidney disease) requiring chronic dialysis (HCC)         Postoperative diagnosis: Same.    Surgeon: Frankie Carrion MD     Assistant: None. No qualified resident was available.    Blood loss: Minimal    Specimens: None     Findings: Right IJ tunneled line removal    Complications: None immediate.    Anesthesia: local

## 2024-11-26 NOTE — DISCHARGE INSTRUCTIONS
Perma-cath Removal   WHAT YOU NEED TO KNOW:   A perma-cath is a catheter placed through a vein into or near your right atrium. Your right atrium is the right upper chamber of your heart. A perma-cath is used for dialysis in an emergency or until a long-term device is ready to use. Or you no longer need dialysis.    DISCHARGE INSTRUCTIONS:   Call 911 for any of the following:   You feel lightheaded, short of breath, and have chest pain.    You start bleeding    Contact Interventional Radiology at [phone] ([facility] PATIENTS: Contact Interventional Radiology at [phone]) ([facility] PATIENTS: Contact Interventional Radiology at [phone]) if:  Blood soaks through your bandage.   You have new swelling in your arm, neck, face, or chest on your right side.  Your bruises or pain get worse.   You have a fever or chills.  Persistent nausea or vomiting.   Your incision is red, swollen, or draining pus.   You have questions or concerns about your condition or care.  Self-care:   Resume your normal diet. Small sips of flat soda will help with nausea.  Keep your dressings dry. Do not get your perma-cath site wet until the incision closes.  You may take a tub bath, but do not get your dressings wet. Water in your wound can cause bacteria to grow and cause an infection. If your dressing gets wet, remove the wet dressing and apply a dry bandaid. Keep it covered until the incision closes. This should only take a few days to heal. Do not use soaps or ointments.  Immediately after your catheter is removed, no strenuous activity for twenty four hours and stay in an upright sitting position for two hours.   Follow up with your healthcare provider as directed: Write down your questions so you remember to ask them during your visits.

## 2024-11-27 ENCOUNTER — TELEPHONE (OUTPATIENT)
Dept: NEPHROLOGY | Facility: CLINIC | Age: 29
End: 2024-11-27

## 2024-11-27 RX ORDER — LORATADINE 10 MG/1
10 TABLET ORAL DAILY
Qty: 90 TABLET | Refills: 1 | Status: SHIPPED | OUTPATIENT
Start: 2024-11-27

## 2024-11-27 RX ORDER — ALBUTEROL SULFATE 90 UG/1
2 INHALANT RESPIRATORY (INHALATION) EVERY 4 HOURS PRN
Qty: 6.7 G | Refills: 1 | Status: SHIPPED | OUTPATIENT
Start: 2024-11-27

## 2024-11-27 NOTE — TELEPHONE ENCOUNTER
We reviewed patient's case at transplant committee meeting this morning.  Our coordinators are going to be reaching out to the patient to attempt again to have the patient complete the testing.  She has still yet not completed it.  We are also going to see if social work team and psychiatry team can also assess the patient again.    I have also personally spoken with the social work team at [facility] where the patient is dialyzed at [facility] in person.  I reviewed our concerns that the patient is not completing the testing.  We cannot move forward with any transplant listing until the testing is completed.  I advised that they continue to encourage the patient to complete the testing also and to reach out to us as we have called and they have not returned her call.

## 2024-12-02 ENCOUNTER — DOCUMENTATION (OUTPATIENT)
Dept: NEPHROLOGY | Facility: CLINIC | Age: 29
End: 2024-12-02

## 2024-12-02 DIAGNOSIS — N18.6 BENIGN HYPERTENSION WITH ESRD (END-STAGE RENAL DISEASE) (HCC): Primary | ICD-10-CM

## 2024-12-02 DIAGNOSIS — I12.0 BENIGN HYPERTENSION WITH ESRD (END-STAGE RENAL DISEASE) (HCC): Primary | ICD-10-CM

## 2024-12-02 RX ORDER — TORSEMIDE 100 MG/1
100 TABLET ORAL DAILY
Qty: 90 TABLET | Refills: 2 | Status: SHIPPED | OUTPATIENT
Start: 2024-12-02

## 2024-12-17 ENCOUNTER — OFFICE VISIT (OUTPATIENT)
Dept: NEPHROLOGY | Facility: CLINIC | Age: 29
End: 2024-12-17
Payer: MEDICARE

## 2024-12-17 VITALS
SYSTOLIC BLOOD PRESSURE: 132 MMHG | WEIGHT: 235 LBS | HEIGHT: 64 IN | HEART RATE: 84 BPM | BODY MASS INDEX: 40.12 KG/M2 | DIASTOLIC BLOOD PRESSURE: 68 MMHG

## 2024-12-17 DIAGNOSIS — J45.901 ASTHMA EXACERBATION: ICD-10-CM

## 2024-12-17 DIAGNOSIS — Z01.818 PRE-TRANSPLANT EVALUATION FOR ESRD (END STAGE RENAL DISEASE): Primary | ICD-10-CM

## 2024-12-17 DIAGNOSIS — E83.39 HYPERPHOSPHATEMIA: Primary | ICD-10-CM

## 2024-12-17 PROCEDURE — 99214 OFFICE O/P EST MOD 30 MIN: CPT | Performed by: INTERNAL MEDICINE
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
## 2024-12-17 NOTE — PATIENT INSTRUCTIONS
1) We will review your case at the transplant committee meeting and will review our decision with you in approximately 4 weeks over the phone.  2) If you do not receive a call from [facility] within 4 weeks, please call our local Nephrology office.  3) From a renal transplant evaluation purpose, we will see you once a year in our local office for medical follow-up.  4) Once we call you with our decision regarding further evaluation, you will receive further instruction over the phone and in the mail regarding the next steps to complete the transplant evaluation.  5) All of your non transplant evaluation follow up regarding your regular medical follow ups, your renal care follow ups, and any other specialists visits you are following with should continue as you are advised by those respective physicians and continue to follow with their management and care.  And if there is any emergency please go to the nearest urgent care or emergency room.

## 2024-12-18 DIAGNOSIS — J45.40 MODERATE PERSISTENT ASTHMA WITHOUT COMPLICATION: ICD-10-CM

## 2024-12-18 RX ORDER — ALBUTEROL SULFATE 90 UG/1
2 INHALANT RESPIRATORY (INHALATION) EVERY 4 HOURS PRN
Qty: 6.7 G | Refills: 5 | Status: SHIPPED | OUTPATIENT
Start: 2024-12-18

## 2024-12-28 DIAGNOSIS — N25.81 SECONDARY HYPERPARATHYROIDISM OF RENAL ORIGIN (HCC): ICD-10-CM

## 2024-12-28 DIAGNOSIS — N18.5 BENIGN HYPERTENSION WITH CKD (CHRONIC KIDNEY DISEASE) STAGE V (HCC): ICD-10-CM

## 2024-12-28 DIAGNOSIS — E83.39 HYPERPHOSPHATEMIA: ICD-10-CM

## 2024-12-28 DIAGNOSIS — I12.0 BENIGN HYPERTENSION WITH CKD (CHRONIC KIDNEY DISEASE) STAGE V (HCC): ICD-10-CM

## 2024-12-30 DIAGNOSIS — J45.40 MODERATE PERSISTENT ASTHMA WITHOUT COMPLICATION: ICD-10-CM

## 2024-12-30 DIAGNOSIS — J45.901 ASTHMA EXACERBATION: ICD-10-CM

## 2024-12-30 RX ORDER — SEVELAMER CARBONATE 800 MG/1
800 TABLET, FILM COATED ORAL
Qty: 90 TABLET | Refills: 0 | Status: SHIPPED | OUTPATIENT
Start: 2024-12-30

## 2024-12-31 DIAGNOSIS — Z78.9 PROBLEM WITH VASCULAR ACCESS: Primary | ICD-10-CM

## 2024-12-31 RX ORDER — ALBUTEROL SULFATE 90 UG/1
2 INHALANT RESPIRATORY (INHALATION) EVERY 4 HOURS PRN
Qty: 6.7 G | Refills: 0 | OUTPATIENT
Start: 2024-12-31

## 2025-01-02 ENCOUNTER — OFFICE VISIT (OUTPATIENT)
Dept: URGENT CARE | Age: 30
End: 2025-01-02
Payer: MEDICARE

## 2025-01-02 VITALS
TEMPERATURE: 98 F | DIASTOLIC BLOOD PRESSURE: 92 MMHG | SYSTOLIC BLOOD PRESSURE: 150 MMHG | RESPIRATION RATE: 18 BRPM | HEART RATE: 78 BPM | OXYGEN SATURATION: 99 %

## 2025-01-02 DIAGNOSIS — J06.9 UPPER RESPIRATORY TRACT INFECTION, UNSPECIFIED TYPE: Primary | ICD-10-CM

## 2025-01-02 PROCEDURE — 99213 OFFICE O/P EST LOW 20 MIN: CPT | Performed by: STUDENT IN AN ORGANIZED HEALTH CARE EDUCATION/TRAINING PROGRAM

## 2025-01-02 RX ORDER — BROMPHENIRAMINE MALEATE, PSEUDOEPHEDRINE HYDROCHLORIDE, AND DEXTROMETHORPHAN HYDROBROMIDE 2; 30; 10 MG/5ML; MG/5ML; MG/5ML
5 SYRUP ORAL 4 TIMES DAILY PRN
Qty: 120 ML | Refills: 0 | Status: SHIPPED | OUTPATIENT
Start: 2025-01-02

## 2025-01-02 RX ORDER — FLUTICASONE PROPIONATE 50 MCG
1 SPRAY, SUSPENSION (ML) NASAL DAILY
Qty: 9.9 ML | Refills: 0 | Status: SHIPPED | OUTPATIENT
Start: 2025-01-02

## 2025-01-03 NOTE — PROGRESS NOTES
[facility]        NAME: Raven Simeon is a 29 y.o. female  : 1995    MRN: [id number]  DATE: 2025  TIME: 9:34 PM    Assessment and Plan   Upper respiratory tract infection, unspecified type [J06.9]  1. Upper respiratory tract infection, unspecified type  fluticasone (FLONASE) 50 mcg/act nasal spray    brompheniramine-pseudoephedrine-DM 30-2-10 MG/5ML syrup        Patient is afebrile and exam was benign except for some mild bilateral erythematous and edematous nasal turbinates. No sinus tenderness, tonsillar exudate or cervical lymphadenopathy noted. Mild congestion to bilateral ears. No abx therapy indicated at this time. Counseled patient on supportive therapy with plenty of fluids, tea with honey, throat lozenges. Can use saline nasal irrigation.  Prescribed Flonase for congestion and inflammation. Tylenol or Motrin as needed for pain.May use decongestants as directed.      Patient Instructions       Follow up with PCP in 3-5 days.  Proceed to  ER if symptoms worsen.    If tests have been performed at [facility], our office will contact you with results if changes need to be made to the care plan discussed with you at the visit.  You can review your full results on [facility].    Chief Complaint     Chief Complaint   Patient presents with   • Earache     Started Tuesday.    • Cold Like Symptoms         History of Present Illness       Patient is a 29-year-old female presenting with 2 days of runny nose, and bilateral ear congestion.  She denies fever, chills, chest pain, shortness of breath, difficulty breathing.,  Abdominal pain, nausea vomiting or diarrhea.  She has taken Tylenol for her symptoms without relief.  Patient reports her mother is sick with similar symptoms, being seen in clinic at this time as well.        Review of Systems   Review of Systems   Constitutional:  Negative for chills and fever.   HENT:  Positive for postnasal drip. Negative for congestion,  sinus pressure and sinus pain.    Gastrointestinal:  Negative for abdominal pain, nausea and vomiting.   Neurological:  Negative for dizziness, tremors, syncope, weakness, numbness and headaches.         Current Medications       Current Outpatient Medications:   •  acetaminophen (TYLENOL) 325 mg tablet, Take 2 tablets (650 mg total) by mouth every 6 (six) hours as needed for mild pain or headaches, Disp: 30 tablet, Rfl: 0  •  albuterol (2.5 mg/3 mL) 0.083 % nebulizer solution, Take 3 mL (2.5 mg total) by nebulization every 6 (six) hours as needed for wheezing or shortness of breath, Disp: 30 mL, Rfl: 0  •  albuterol (ProAir HFA) 90 mcg/act inhaler, Inhale 2 puffs every 4 (four) hours as needed for wheezing, Disp: 6.7 g, Rfl: 5  •  amLODIPine (NORVASC) 5 mg tablet, Take 1 tablet (5 mg total) by mouth 2 (two) times a day, Disp: 60 tablet, Rfl: 5  •  brompheniramine-pseudoephedrine-DM 30-2-10 MG/5ML syrup, Take 5 mL by mouth 4 (four) times a day as needed for cough or congestion, Disp: 120 mL, Rfl: 0  •  calcitriol (ROCALTROL) 0.5 MCG capsule, Take 1 capsule (0.5 mcg total) by mouth daily, Disp: 30 capsule, Rfl: 5  •  cyanocobalamin (VITAMIN B-12) 100 mcg tablet, , Disp: , Rfl:   •  cyanocobalamin (VITAMIN B-12) 1000 MCG tablet, Take 1 tablet (1,000 mcg total) by mouth daily, Disp: 30 tablet, Rfl: 5  •  etonogestrel (NEXPLANON) subdermal implant, 68 mg by Subdermal route once, Disp: , Rfl:   •  fluticasone (Flonase Allergy Relief) 50 mcg/act nasal spray, 1 spray into each nostril daily, Disp: 15.8 mL, Rfl: 1  •  fluticasone (FLONASE) 50 mcg/act nasal spray, 1 spray into each nostril daily, Disp: 9.9 mL, Rfl: 0  •  labetalol (NORMODYNE) 100 mg tablet, Take 2 tablets (200 mg total) by mouth 2 (two) times a day, Disp: 120 tablet, Rfl: 5  •  Lido-Capsaicin-Men-Methyl Sal (Medi-Patch-Lidocaine) 0.5-0.035-5-20 % PTCH, Apply 1 patch topically daily as needed (back pain), Disp: 30 patch, Rfl: 2  •  loratadine (CLARITIN) 10 mg  tablet, Take 1 tablet (10 mg total) by mouth daily, Disp: 90 tablet, Rfl: 1  •  mometasone-formoterol (DULERA) 100-5 MCG/ACT inhaler, Inhale 2 puffs 2 (two) times a day Rinse mouth after use., Disp: 39 g, Rfl: 1  •  sevelamer carbonate (RENVELA) 800 mg tablet, TAKE 1 TABLET BY MOUTH THREE TIMES A DAY WITH MEALS, Disp: 90 tablet, Rfl: 0  •  Sucroferric Oxyhydroxide 500 MG CHEW, Chew 1 tablet (500 mg total) 3 (three) times a day with meals, Disp: 90 tablet, Rfl: 3  •  torsemide (DEMADEX) 100 mg tablet, Take 1 tablet (100 mg total) by mouth daily, Disp: 90 tablet, Rfl: 2  •  lidocaine (LIDODERM) 5 %, Apply 1 patch topically over 12 hours every 24 hours for 10 doses Remove & Discard patch within 12 hours or as directed by MD, Disp: , Rfl:     Current Allergies     Allergies as of 01/02/2025 - Reviewed 01/02/2025   Allergen Reaction Noted   • Seasonal ic [cholestatin] Itching 04/14/2023   • Wheat bran - food allergy Itching 12/19/2019            The following portions of the patient's history were reviewed and updated as appropriate: allergies, current medications, past family history, past medical history, past social history, past surgical history and problem list.     Past Medical History:   Diagnosis Date   • Asthma    • Chronic kidney disease    • Eczema    • Headache    • History of transfusion    • Hypertension    • Increased anion gap metabolic acidosis 02/28/2022   • Wears glasses        Past Surgical History:   Procedure Laterality Date   • CHOLECYSTECTOMY     • CT NEEDLE BIOPSY KIDNEY  1/29/2020   • IR AV FISTULAGRAM/GRAFTOGRAM  11/13/2024   • IR TUNNELED DIALYSIS CATHETER PLACEMENT  6/1/2024   • IR TUNNELED DIALYSIS CATHETER REMOVAL  11/26/2024   • KELOID EXCISION Left 3/30/2021    Procedure: POSTERIOR EAR EXCISION KELOID, FLAP RECONSTRUCTION;  Surgeon: Beka Hugo MD;  Location: [facility];  Service: Plastics   • ND ARTERIOVENOUS ANASTOMOSIS OPEN DIRECT Right 8/27/2024    Procedure: CREATION FISTULA  ARTERIOVENOUS (AV);  Surgeon: Marcus Espitia MD;  Location: [facility];  Service: Vascular       Family History   Problem Relation Age of Onset   • Asthma Mother    • No Known Problems Father          Medications have been verified.        Objective   /92   Pulse 78   Temp 98 °F (36.7 °C)   Resp 18   SpO2 99%   No LMP recorded.       Physical Exam     Physical Exam  Vitals and nursing note reviewed.   Constitutional:       General: She is not in acute distress.     Appearance: Normal appearance. She is normal weight. She is not ill-appearing.   HENT:      Head: Normocephalic.      Right Ear: Hearing and ear canal normal. No middle ear effusion. Tympanic membrane is injected. Tympanic membrane is not erythematous.      Left Ear: Hearing and ear canal normal.  No middle ear effusion. Tympanic membrane is injected.      Nose: Rhinorrhea present. No congestion.      Mouth/Throat:      Mouth: Mucous membranes are dry.   Cardiovascular:      Rate and Rhythm: Normal rate and regular rhythm.      Pulses: Normal pulses.   Pulmonary:      Effort: Pulmonary effort is normal. No respiratory distress.      Breath sounds: Normal breath sounds. No stridor. No wheezing, rhonchi or rales.   Chest:      Chest wall: No tenderness.   Abdominal:      Palpations: Abdomen is soft.   Musculoskeletal:      Cervical back: Normal range of motion.   Lymphadenopathy:      Cervical: No cervical adenopathy.   Skin:     Capillary Refill: Capillary refill takes less than 2 seconds.   Neurological:      Mental Status: She is alert.

## 2025-01-08 ENCOUNTER — TRANSCRIBE ORDERS (OUTPATIENT)
Dept: RADIOLOGY | Facility: HOSPITAL | Age: 30
End: 2025-01-08

## 2025-01-08 ENCOUNTER — OFFICE VISIT (OUTPATIENT)
Dept: URGENT CARE | Age: 30
End: 2025-01-08
Payer: MEDICARE

## 2025-01-08 ENCOUNTER — TELEPHONE (OUTPATIENT)
Age: 30
End: 2025-01-08

## 2025-01-08 VITALS
TEMPERATURE: 97.7 F | HEART RATE: 114 BPM | SYSTOLIC BLOOD PRESSURE: 128 MMHG | DIASTOLIC BLOOD PRESSURE: 90 MMHG | OXYGEN SATURATION: 100 % | RESPIRATION RATE: 20 BRPM

## 2025-01-08 DIAGNOSIS — M54.41 ACUTE BILATERAL LOW BACK PAIN WITH BILATERAL SCIATICA: Primary | ICD-10-CM

## 2025-01-08 DIAGNOSIS — N18.9 CHRONIC KIDNEY DISEASE, UNSPECIFIED CKD STAGE: Primary | ICD-10-CM

## 2025-01-08 DIAGNOSIS — M54.42 ACUTE BILATERAL LOW BACK PAIN WITH BILATERAL SCIATICA: Primary | ICD-10-CM

## 2025-01-08 PROCEDURE — 99214 OFFICE O/P EST MOD 30 MIN: CPT

## 2025-01-08 RX ORDER — PREDNISONE 10 MG/1
TABLET ORAL
Qty: 21 TABLET | Refills: 0 | Status: SHIPPED | OUTPATIENT
Start: 2025-01-08

## 2025-01-08 RX ORDER — METHOCARBAMOL 500 MG/1
500 TABLET, FILM COATED ORAL 3 TIMES DAILY
Qty: 21 TABLET | Refills: 0 | Status: SHIPPED | OUTPATIENT
Start: 2025-01-08

## 2025-01-08 NOTE — TELEPHONE ENCOUNTER
Pt referred to us for: Problem with vascular access. REF Reyes Bahamonde. l/s 10/10/24 CAP.     Pt has order for VAS DIALYSIS EVAL. Attempted to schedule through Central Scheduling. Was told by the representative that one of the supervisors would have to schedule that and would reach out to the pt in 24-48 hours for scheduling. Pt was present at time of call. Advised the pt we would call her to schedule OV after the doppler was scheduled.    Provider Comments: ESRD on hemodialysis via right AV fistula with pain on right hand, hypoperfusion during dialysis.  Concern of a steal syndrome.     CB#: [phone]

## 2025-01-09 ENCOUNTER — PREP FOR PROCEDURE (OUTPATIENT)
Dept: INTERVENTIONAL RADIOLOGY/VASCULAR | Facility: CLINIC | Age: 30
End: 2025-01-09

## 2025-01-09 DIAGNOSIS — N18.6 ESRD ON DIALYSIS (HCC): Primary | ICD-10-CM

## 2025-01-09 DIAGNOSIS — Z99.2 ESRD ON DIALYSIS (HCC): Primary | ICD-10-CM

## 2025-01-09 NOTE — PATIENT INSTRUCTIONS
Take  Prednisone Taper dose pack as prescribed.   Do not take Ibuprofen while on steroids.     Take Tylenol as needed for pain/discomfort.     Robaxin as needed for muscle spasms, this medication may make your drowsy-do not drive or operate heavy machinery     Decrease your activity as directed.  Do not lift heavy objects or twist your back.    Apply ice to help decrease swelling and pain.  Use an ice pack, or put crushed ice in a plastic bag. Cover it with a towel before you place it on your back or leg. Apply ice for 15 to 20 minutes every hour, or as directed.    Apply heat to help decrease pain and muscle spasms. Use a heat pack or a heating pad set on low heat. Apply heat on your back or leg for 20 to 30 minutes every 2 hours for as many days as directed.    Stretches as directed    May use OTC Tart Cherry capsule or powder 1000 mg once per day  May use OTC Tumeric 500 mg capsule two times per day  CBD balm/ointment  OTC Biofreeze or Lidoderm patches    Referral placed for PT to evaluate and treat.     Follow up with your PCP as discussed  Differential diagnoses discussed with patient  Patient may need further imaging or if symptoms do not resolve consider referral to Ortho.   Proceed to ER if symptoms worsen.

## 2025-01-09 NOTE — PROGRESS NOTES
[facility]        NAME: Raven Simeon is a 29 y.o. female  : 1995    MRN: [id number]  DATE: 2025  TIME: 8:40 PM    Assessment and Plan   Acute bilateral low back pain with bilateral sciatica [M54.42, M54.41]  1. Acute bilateral low back pain with bilateral sciatica  predniSONE 10 mg tablet    methocarbamol (ROBAXIN) 500 mg tablet      Take  Prednisone Taper dose pack as prescribed.   Do not take Ibuprofen while on steroids.   Take Tylenol as needed for pain/discomfort.   Robaxin as needed for muscle spasms, this medication may make your drowsy-do not drive or operate heavy machinery   Decrease your activity as directed.  Do not lift heavy objects or twist your back.  Apply ice to help decrease swelling and pain.  Use an ice pack, or put crushed ice in a plastic bag. Cover it with a towel before you place it on your back or leg. Apply ice for 15 to 20 minutes every hour, or as directed.  Apply heat to help decrease pain and muscle spasms. Use a heat pack or a heating pad set on low heat. Apply heat on your back or leg for 20 to 30 minutes every 2 hours for as many days as directed.  Stretches as directed  May use OTC Tart Cherry capsule or powder 1000 mg once per day  May use OTC Tumeric 500 mg capsule two times per day  CBD balm/ointment  OTC Biofreeze or Lidoderm patches  May self refer placed for PT to evaluate and treat.   Follow up with your PCP as discussed  Differential diagnoses discussed with patient  Patient may need further imaging or if symptoms do not resolve consider referral to Ortho.   Proceed to ER if symptoms worsen.       Patient Instructions       Follow up with PCP in 3-5 days.  Proceed to  ER if symptoms worsen.    If tests have been performed at [facility], our office will contact you with results if changes need to be made to the care plan discussed with you at the visit.  You can review your full results on [facility].    Chief Complaint     Chief  Complaint   Patient presents with   • Back Pain     Patient states when she lift a case of water she felt pain mid back  radiation to both legs it happened 3 days ago.         History of Present Illness       Patient is a 29-year-old female stage V kidney failure presenting with 3 days of low back pain bilateral leg numbness after lifting a case of bottled water.  She reports she felt pain to both sides of her low back immediately.  She denies, tingling, weakness to lower extremities.  No loss of bowel or bladder control, no saddle anesthesia.  He is able to ambulate without difficulty.  She reports having pain with bending and twisting.  She has taken Tylenol for her symptoms without improvement    Back Pain        Review of Systems   Review of Systems   Musculoskeletal:  Positive for back pain.         Current Medications       Current Outpatient Medications:   •  methocarbamol (ROBAXIN) 500 mg tablet, Take 1 tablet (500 mg total) by mouth 3 (three) times a day, Disp: 21 tablet, Rfl: 0  •  predniSONE 10 mg tablet, 6 Tabs day 1, 5 tabs day 2, 4 tabs day 3, 3 tabs day 4, 2 tabs day 5, 1 tab day 6, Disp: 21 tablet, Rfl: 0  •  acetaminophen (TYLENOL) 325 mg tablet, Take 2 tablets (650 mg total) by mouth every 6 (six) hours as needed for mild pain or headaches, Disp: 30 tablet, Rfl: 0  •  albuterol (2.5 mg/3 mL) 0.083 % nebulizer solution, Take 3 mL (2.5 mg total) by nebulization every 6 (six) hours as needed for wheezing or shortness of breath, Disp: 30 mL, Rfl: 0  •  albuterol (ProAir HFA) 90 mcg/act inhaler, Inhale 2 puffs every 4 (four) hours as needed for wheezing, Disp: 6.7 g, Rfl: 5  •  amLODIPine (NORVASC) 5 mg tablet, Take 1 tablet (5 mg total) by mouth 2 (two) times a day, Disp: 60 tablet, Rfl: 5  •  brompheniramine-pseudoephedrine-DM 30-2-10 MG/5ML syrup, Take 5 mL by mouth 4 (four) times a day as needed for cough or congestion, Disp: 120 mL, Rfl: 0  •  calcitriol (ROCALTROL) 0.5 MCG capsule, Take 1 capsule  (0.5 mcg total) by mouth daily, Disp: 30 capsule, Rfl: 5  •  cyanocobalamin (VITAMIN B-12) 100 mcg tablet, , Disp: , Rfl:   •  cyanocobalamin (VITAMIN B-12) 1000 MCG tablet, Take 1 tablet (1,000 mcg total) by mouth daily, Disp: 30 tablet, Rfl: 5  •  etonogestrel (NEXPLANON) subdermal implant, 68 mg by Subdermal route once, Disp: , Rfl:   •  fluticasone (Flonase Allergy Relief) 50 mcg/act nasal spray, 1 spray into each nostril daily, Disp: 15.8 mL, Rfl: 1  •  fluticasone (FLONASE) 50 mcg/act nasal spray, 1 spray into each nostril daily, Disp: 9.9 mL, Rfl: 0  •  labetalol (NORMODYNE) 100 mg tablet, Take 2 tablets (200 mg total) by mouth 2 (two) times a day, Disp: 120 tablet, Rfl: 5  •  Lido-Capsaicin-Men-Methyl Sal (Medi-Patch-Lidocaine) 0.5-0.035-5-20 % PTCH, Apply 1 patch topically daily as needed (back pain), Disp: 30 patch, Rfl: 2  •  lidocaine (LIDODERM) 5 %, Apply 1 patch topically over 12 hours every 24 hours for 10 doses Remove & Discard patch within 12 hours or as directed by MD, Disp: , Rfl:   •  loratadine (CLARITIN) 10 mg tablet, Take 1 tablet (10 mg total) by mouth daily, Disp: 90 tablet, Rfl: 1  •  mometasone-formoterol (DULERA) 100-5 MCG/ACT inhaler, Inhale 2 puffs 2 (two) times a day Rinse mouth after use., Disp: 39 g, Rfl: 1  •  sevelamer carbonate (RENVELA) 800 mg tablet, TAKE 1 TABLET BY MOUTH THREE TIMES A DAY WITH MEALS, Disp: 90 tablet, Rfl: 0  •  Sucroferric Oxyhydroxide 500 MG CHEW, Chew 1 tablet (500 mg total) 3 (three) times a day with meals, Disp: 90 tablet, Rfl: 3  •  torsemide (DEMADEX) 100 mg tablet, Take 1 tablet (100 mg total) by mouth daily, Disp: 90 tablet, Rfl: 2    Current Allergies     Allergies as of 01/08/2025 - Reviewed 01/08/2025   Allergen Reaction Noted   • Seasonal ic [cholestatin] Itching 04/14/2023   • Wheat bran - food allergy Itching 12/19/2019            The following portions of the patient's history were reviewed and updated as appropriate: allergies, current  medications, past family history, past medical history, past social history, past surgical history and problem list.     Past Medical History:   Diagnosis Date   • Asthma    • Chronic kidney disease    • Eczema    • Headache    • History of transfusion    • Hypertension    • Increased anion gap metabolic acidosis 02/28/2022   • Wears glasses        Past Surgical History:   Procedure Laterality Date   • CHOLECYSTECTOMY     • CT NEEDLE BIOPSY KIDNEY  1/29/2020   • IR AV FISTULAGRAM/GRAFTOGRAM  11/13/2024   • IR TUNNELED DIALYSIS CATHETER PLACEMENT  6/1/2024   • IR TUNNELED DIALYSIS CATHETER REMOVAL  11/26/2024   • KELOID EXCISION Left 3/30/2021    Procedure: POSTERIOR EAR EXCISION KELOID, FLAP RECONSTRUCTION;  Surgeon: Beka Hugo MD;  Location: [facility];  Service: Plastics   • TX ARTERIOVENOUS ANASTOMOSIS OPEN DIRECT Right 8/27/2024    Procedure: CREATION FISTULA ARTERIOVENOUS (AV);  Surgeon: Marcus Espitia MD;  [facility]: [facility];  Service: Vascular       Family History   Problem Relation Age of Onset   • Asthma Mother    • No Known Problems Father          Medications have been verified.        Objective   /90   Pulse (!) 114   Temp 97.7 °F (36.5 °C)   Resp 20   SpO2 100%   No LMP recorded.       Physical Exam     Physical Exam  Vitals and nursing note reviewed.   Constitutional:       General: She is not in acute distress.     Appearance: Normal appearance. She is normal weight. She is not ill-appearing.   HENT:      Head: Normocephalic.   Cardiovascular:      Rate and Rhythm: Normal rate.      Pulses: Normal pulses.   Pulmonary:      Effort: Pulmonary effort is normal.      Breath sounds: Normal breath sounds.   Abdominal:      Palpations: Abdomen is soft.   Musculoskeletal:      Lumbar back: Spasms and tenderness present. No swelling, edema, deformity, lacerations or bony tenderness. Decreased range of motion. Negative right straight leg raise test and negative left straight leg  raise test.   Skin:     General: Skin is warm.      Capillary Refill: Capillary refill takes less than 2 seconds.   Neurological:      Mental Status: She is alert.

## 2025-01-14 ENCOUNTER — HOSPITAL ENCOUNTER (OUTPATIENT)
Dept: NON INVASIVE DIAGNOSTICS | Facility: CLINIC | Age: 30
Discharge: HOME | End: 2025-01-14
Attending: STUDENT IN AN ORGANIZED HEALTH CARE EDUCATION/TRAINING PROGRAM
Payer: MEDICARE

## 2025-01-14 DIAGNOSIS — Z78.9 PROBLEM WITH VASCULAR ACCESS: ICD-10-CM

## 2025-01-14 DIAGNOSIS — J45.901 ASTHMA EXACERBATION: ICD-10-CM

## 2025-01-14 PROCEDURE — 93990 DOPPLER FLOW TESTING: CPT

## 2025-01-15 PROCEDURE — 93990 DOPPLER FLOW TESTING: CPT | Performed by: SURGERY

## 2025-01-15 RX ORDER — ALBUTEROL SULFATE 90 UG/1
2 INHALANT RESPIRATORY (INHALATION) EVERY 4 HOURS PRN
Qty: 6.7 G | Refills: 0 | Status: SHIPPED | OUTPATIENT
Start: 2025-01-15

## 2025-01-15 NOTE — TELEPHONE ENCOUNTER
HD EVAL 1/14/25, STEAL ASSES 1/15/25, IR fistulagram 1/28/25    Binh SOFIA    Please assist with scheduling pt

## 2025-01-16 ENCOUNTER — HOSPITAL ENCOUNTER (OUTPATIENT)
Dept: NON INVASIVE DIAGNOSTICS | Facility: CLINIC | Age: 30
Discharge: HOME | End: 2025-01-16

## 2025-01-16 DIAGNOSIS — Z78.9 PROBLEM WITH VASCULAR ACCESS: ICD-10-CM

## 2025-01-19 DIAGNOSIS — R80.1 PERSISTENT PROTEINURIA: ICD-10-CM

## 2025-01-19 DIAGNOSIS — N18.9 CHRONIC KIDNEY DISEASE-MINERAL AND BONE DISORDER: ICD-10-CM

## 2025-01-19 DIAGNOSIS — I12.0 BENIGN HYPERTENSION WITH CKD (CHRONIC KIDNEY DISEASE) STAGE V (HCC): ICD-10-CM

## 2025-01-19 DIAGNOSIS — N18.5 CHRONIC KIDNEY DISEASE (CKD), ACTIVE MEDICAL MANAGEMENT WITHOUT DIALYSIS, STAGE 5 (HCC): ICD-10-CM

## 2025-01-19 DIAGNOSIS — E83.39 HYPERPHOSPHATEMIA: ICD-10-CM

## 2025-01-19 DIAGNOSIS — M89.9 CHRONIC KIDNEY DISEASE-MINERAL AND BONE DISORDER: ICD-10-CM

## 2025-01-19 DIAGNOSIS — D50.9 IRON DEFICIENCY ANEMIA, UNSPECIFIED IRON DEFICIENCY ANEMIA TYPE: ICD-10-CM

## 2025-01-19 DIAGNOSIS — N18.5 BENIGN HYPERTENSION WITH CKD (CHRONIC KIDNEY DISEASE) STAGE V (HCC): ICD-10-CM

## 2025-01-19 DIAGNOSIS — N25.81 SECONDARY HYPERPARATHYROIDISM OF RENAL ORIGIN (HCC): ICD-10-CM

## 2025-01-19 DIAGNOSIS — I10 HYPERTENSION, UNSPECIFIED TYPE: ICD-10-CM

## 2025-01-19 DIAGNOSIS — E83.9 CHRONIC KIDNEY DISEASE-MINERAL AND BONE DISORDER: ICD-10-CM

## 2025-01-21 ENCOUNTER — TELEPHONE (OUTPATIENT)
Age: 30
End: 2025-01-21

## 2025-01-21 RX ORDER — SEVELAMER CARBONATE 800 MG/1
800 TABLET, FILM COATED ORAL
Qty: 90 TABLET | Refills: 0 | Status: SHIPPED | OUTPATIENT
Start: 2025-01-21

## 2025-01-21 RX ORDER — CALCITRIOL 0.5 UG/1
0.5 CAPSULE, LIQUID FILLED ORAL DAILY
Qty: 30 CAPSULE | Refills: 5 | Status: SHIPPED | OUTPATIENT
Start: 2025-01-21

## 2025-01-21 RX ORDER — AMLODIPINE BESYLATE 5 MG/1
5 TABLET ORAL 2 TIMES DAILY
Qty: 60 TABLET | Refills: 5 | Status: SHIPPED | OUTPATIENT
Start: 2025-01-21

## 2025-01-21 NOTE — TELEPHONE ENCOUNTER
Izaiah from [facility] called stating the patient has been relying on her albuterol more and has been running our before its due. He mentioned she is still using Dulera as prescribed, but wanted the provider to review her asthma therapy to see if more treatment would be needed. Please advise back to further assist.

## 2025-01-28 ENCOUNTER — HOSPITAL ENCOUNTER (OUTPATIENT)
Dept: INTERVENTIONAL RADIOLOGY/VASCULAR | Facility: HOSPITAL | Age: 30
Discharge: HOME | End: 2025-01-28
Attending: RADIOLOGY
Payer: MEDICARE

## 2025-01-28 VITALS
RESPIRATION RATE: 18 BRPM | DIASTOLIC BLOOD PRESSURE: 93 MMHG | SYSTOLIC BLOOD PRESSURE: 151 MMHG | HEART RATE: 74 BPM | TEMPERATURE: 97 F | OXYGEN SATURATION: 100 %

## 2025-01-28 DIAGNOSIS — J30.81 CAT ALLERGY, AIRBORNE: ICD-10-CM

## 2025-01-28 DIAGNOSIS — J45.40 MODERATE PERSISTENT ASTHMA WITHOUT COMPLICATION: ICD-10-CM

## 2025-01-28 DIAGNOSIS — J45.901 ASTHMA EXACERBATION: ICD-10-CM

## 2025-01-28 DIAGNOSIS — E83.39 HYPERPHOSPHATEMIA: ICD-10-CM

## 2025-01-28 DIAGNOSIS — N18.6 ESRD ON DIALYSIS (HCC): ICD-10-CM

## 2025-01-28 DIAGNOSIS — Z99.2 ESRD ON DIALYSIS (HCC): ICD-10-CM

## 2025-01-28 PROCEDURE — 36902 INTRO CATH DIALYSIS CIRCUIT: CPT

## 2025-01-28 PROCEDURE — C1725 CATH, TRANSLUMIN NON-LASER: HCPCS

## 2025-01-28 PROCEDURE — C1769 GUIDE WIRE: HCPCS

## 2025-01-28 PROCEDURE — C1894 INTRO/SHEATH, NON-LASER: HCPCS

## 2025-01-28 PROCEDURE — 99152 MOD SED SAME PHYS/QHP 5/>YRS: CPT | Performed by: RADIOLOGY

## 2025-01-28 PROCEDURE — 36902 INTRO CATH DIALYSIS CIRCUIT: CPT | Performed by: RADIOLOGY

## 2025-01-28 PROCEDURE — 76937 US GUIDE VASCULAR ACCESS: CPT | Performed by: RADIOLOGY

## 2025-01-28 PROCEDURE — 99153 MOD SED SAME PHYS/QHP EA: CPT

## 2025-01-28 PROCEDURE — 99152 MOD SED SAME PHYS/QHP 5/>YRS: CPT

## 2025-01-28 PROCEDURE — C1887 CATHETER, GUIDING: HCPCS

## 2025-01-28 RX ORDER — FENTANYL CITRATE 50 UG/ML
INJECTION, SOLUTION INTRAMUSCULAR; INTRAVENOUS AS NEEDED
Status: COMPLETED | OUTPATIENT
Start: 2025-01-28 | End: 2025-01-28

## 2025-01-28 RX ORDER — LORATADINE 10 MG/1
10 TABLET ORAL DAILY
Qty: 90 TABLET | Refills: 1 | Status: SHIPPED | OUTPATIENT
Start: 2025-01-28

## 2025-01-28 RX ORDER — MIDAZOLAM HYDROCHLORIDE 2 MG/2ML
INJECTION, SOLUTION INTRAMUSCULAR; INTRAVENOUS AS NEEDED
Status: COMPLETED | OUTPATIENT
Start: 2025-01-28 | End: 2025-01-28

## 2025-01-28 RX ADMIN — IOHEXOL 80 ML: 350 INJECTION, SOLUTION INTRAVENOUS at 14:15

## 2025-01-28 RX ADMIN — MIDAZOLAM 1 MG: 1 INJECTION INTRAMUSCULAR; INTRAVENOUS at 13:39

## 2025-01-28 RX ADMIN — FENTANYL CITRATE 50 MCG: 50 INJECTION, SOLUTION INTRAMUSCULAR; INTRAVENOUS at 13:39

## 2025-01-28 RX ADMIN — MIDAZOLAM 1 MG: 1 INJECTION INTRAMUSCULAR; INTRAVENOUS at 13:29

## 2025-01-28 RX ADMIN — FENTANYL CITRATE 50 MCG: 50 INJECTION, SOLUTION INTRAMUSCULAR; INTRAVENOUS at 13:29

## 2025-01-28 NOTE — H&P
Interventional Radiology  History and Physical 1/28/2025     Raven Simeon   1995   [id number]    Assessment/Plan:  R BCF with pain during dialysis. Here for fistulagram.    Problem List Items Addressed This Visit          Genitourinary    ESRD on dialysis (HCC)    Relevant Orders    IR AV fistulagram/graftogram          Subjective:     Patient ID: Raven Simeon is a 29 y.o. female.    History of Present Illness  29 y F w/ ESRD dialyzed via R BCF with recurrent pain of the dialysis site during dialysis. No prolonged bleeding, swelling or wounds. Occasional hand coldness. Does say they occasionally pull clots.     Review of Systems   All other systems reviewed and are negative.        Past Medical History:   Diagnosis Date    Asthma     Chronic kidney disease     Eczema     Headache     History of transfusion     Hypertension     Increased anion gap metabolic acidosis 02/28/2022    Wears glasses         Past Surgical History:   Procedure Laterality Date    CHOLECYSTECTOMY      CT NEEDLE BIOPSY KIDNEY  1/29/2020    IR AV FISTULAGRAM/GRAFTOGRAM  11/13/2024    IR TUNNELED DIALYSIS CATHETER PLACEMENT  6/1/2024    IR TUNNELED DIALYSIS CATHETER REMOVAL  11/26/2024    KELOID EXCISION Left 3/30/2021    Procedure: POSTERIOR EAR EXCISION KELOID, FLAP RECONSTRUCTION;  Surgeon: Beka Hugo MD;  [facility]: [facility];  Service: Plastics    MD ARTERIOVENOUS ANASTOMOSIS OPEN DIRECT Right 8/27/2024    Procedure: CREATION FISTULA ARTERIOVENOUS (AV);  Surgeon: Marcus Espitia MD;  [facility];  Service: Vascular        Social History     Tobacco Use   Smoking Status Never   Smokeless Tobacco Never        Social History     Substance and Sexual Activity   Alcohol Use Not Currently    Comment: occassionally on social events        Social History     Substance and Sexual Activity   Drug Use No        Allergies   Allergen Reactions    Seasonal Ic [Cholestatin] Itching    Wheat Bran - Food  Allergy Itching       Current Outpatient Medications   Medication Sig Dispense Refill    acetaminophen (TYLENOL) 325 mg tablet Take 2 tablets (650 mg total) by mouth every 6 (six) hours as needed for mild pain or headaches 30 tablet 0    albuterol (ProAir HFA) 90 mcg/act inhaler Inhale 2 puffs every 4 (four) hours as needed for wheezing 6.7 g 0    amLODIPine (NORVASC) 5 mg tablet TAKE 1 TABLET BY MOUTH TWICE A DAY 60 tablet 5    calcitriol (ROCALTROL) 0.5 MCG capsule TAKE 1 CAPSULE BY MOUTH DAILY. 30 capsule 5    cyanocobalamin (VITAMIN B-12) 100 mcg tablet       cyanocobalamin (VITAMIN B-12) 1000 MCG tablet Take 1 tablet (1,000 mcg total) by mouth daily 30 tablet 5    fluticasone (Flonase Allergy Relief) 50 mcg/act nasal spray 1 spray into each nostril daily 15.8 mL 1    fluticasone (FLONASE) 50 mcg/act nasal spray 1 spray into each nostril daily 9.9 mL 0    labetalol (NORMODYNE) 100 mg tablet Take 2 tablets (200 mg total) by mouth 2 (two) times a day 120 tablet 5    methocarbamol (ROBAXIN) 500 mg tablet Take 1 tablet (500 mg total) by mouth 3 (three) times a day 21 tablet 0    predniSONE 10 mg tablet 6 Tabs day 1, 5 tabs day 2, 4 tabs day 3, 3 tabs day 4, 2 tabs day 5, 1 tab day 6 21 tablet 0    sevelamer carbonate (RENVELA) 800 mg tablet TAKE 1 TABLET BY MOUTH THREE TIMES A DAY WITH MEALS 90 tablet 0    Sucroferric Oxyhydroxide 500 MG CHEW Chew 1 tablet (500 mg total) 3 (three) times a day with meals 90 tablet 3    torsemide (DEMADEX) 100 mg tablet Take 1 tablet (100 mg total) by mouth daily 90 tablet 2    albuterol (2.5 mg/3 mL) 0.083 % nebulizer solution Take 3 mL (2.5 mg total) by nebulization every 6 (six) hours as needed for wheezing or shortness of breath 30 mL 0    brompheniramine-pseudoephedrine-DM 30-2-10 MG/5ML syrup Take 5 mL by mouth 4 (four) times a day as needed for cough or congestion 120 mL 0    etonogestrel (NEXPLANON) subdermal implant 68 mg by Subdermal route once      Lido-Capsaicin-Men-Methyl  "Frank (Medi-Patch-Lidocaine) 0.5-0.035-5-20 % PTCH Apply 1 patch topically daily as needed (back pain) 30 patch 2    lidocaine (LIDODERM) 5 % Apply 1 patch topically over 12 hours every 24 hours for 10 doses Remove & Discard patch within 12 hours or as directed by MD      loratadine (CLARITIN) 10 mg tablet Take 1 tablet (10 mg total) by mouth daily 90 tablet 1    mometasone-formoterol (DULERA) 100-5 MCG/ACT inhaler Inhale 2 puffs 2 (two) times a day Rinse mouth after use. 39 g 3     No current facility-administered medications for this encounter.          Objective:    Vitals:    01/28/25 1206   BP: 144/94   BP Location: Left arm   Pulse: 71   Resp: 16   Temp: (!) 97.2 °F (36.2 °C)   TempSrc: Temporal   SpO2: 100%        Physical Exam  Constitutional:       Appearance: Normal appearance.   HENT:      Head: Normocephalic.   Eyes:      Pupils: Pupils are equal, round, and reactive to light.   Cardiovascular:      Rate and Rhythm: Normal rate.      Comments: Pulsatile thrill   Pulmonary:      Effort: Pulmonary effort is normal.   Abdominal:      General: Abdomen is flat.   Musculoskeletal:         General: Normal range of motion.      Cervical back: Normal range of motion.   Skin:     General: Skin is warm.   Neurological:      Mental Status: She is alert and oriented to person, place, and time.   Psychiatric:         Mood and Affect: Mood normal.           No results found for: \"BNP\"   Lab Results   Component Value Date    WBC 7.82 07/06/2024    HGB 7.3 (L) 07/06/2024    HCT 22.7 (L) 07/06/2024    MCV 78 (L) 07/06/2024     07/06/2024     Lab Results   Component Value Date    INR 1.10 06/02/2024    INR 1.09 01/26/2020    INR 1.00 01/23/2020    PROTIME 14.9 (H) 06/02/2024    PROTIME 13.5 01/26/2020    PROTIME 12.6 01/23/2020     Lab Results   Component Value Date    PTT 81 (H) 01/24/2020         I have personally reviewed pertinent imaging and laboratory results.     Code Status: Prior  Advance Directive and Living " Will:      Power of :    POLST:      This text is generated with voice recognition software. There may be translation, syntax,  or grammatical errors. If you have any questions, please contact the dictating provider.

## 2025-01-28 NOTE — NURSING NOTE
Awake and alert. Denies pain. Woggles (2) and dressing clean, dry and intact to right arm. Positive radial pulse.

## 2025-01-28 NOTE — DISCHARGE INSTRUCTIONS
Fistulagram   WHAT YOU NEED TO KNOW:   Your arm or leg my  be sore, swollen, and bruised after the procedure. This is normal and should get better in a few days.     DISCHARGE INSTRUCTIONS:     Contact Interventional Radiology at [phone] and follow prompts if you experience any:    You have a fever or chills.    Your puncture site is red, swollen, or draining pus.    You have nausea or are vomiting.    Your skin is itchy, swollen, or you have a rash.    You cannot feel a thrill over your graft or fistula.     You have questions or concerns about your condition or care.    Seek care immediately if:     You have bleeding that does not stop after 10 minutes of holding firm, direct pressure over the puncture site.    Blood soaks through your bandage.    Your hand or foot closest to the graft or fistula feels cold, painful, or numb.     Your hand or foot closest to the graft or fistula is pale or blue.     You have trouble moving your arm or leg closest to the graft or fistula.     Your bruise suddenly gets bigger.    Care for your wound as directed:  Remove the bandage in 4 to 6 hours or as         directed. Wash the area once a day with soap and water. Gently pat the area dry.     Apply firm, steady pressure to the puncture site if it bleeds.  Use a clean gauze or towel to hold pressure for 10 to 15 minutes. Call 911 if you cannot stop the bleeding or the bleeding gets heavier.     Feel for a thrill once a day or as directed.  Place your index and second finger over your fistula or graft as directed. You should feel a vibration. The vibration means that blood is flowing through your graft or fistula correctly.     Rest your arm or leg as directed.  Do not lift anything heavier than 5 pounds or do strenuous activity for 24 hours.     Prevent damage to your graft or fistula.  Do not wear tight-fitting clothing over your graft or fistula. Do not wear tight jewelry on the arm or leg with the graft or fistula. Tell  healthcare providers not to do, IVs, blood draws, and blood pressure readings in the arm with your graft or fistula. Do not allow flu shots or vaccinations in your arm with your graft or fistula.    Follow up with your healthcare provider as directed      Procedural Sedation   WHAT YOU NEED TO KNOW:   Procedural sedation is medicine used during procedures to help you feel relaxed and calm. You will remember little to none of the procedure. After sedation you may feel tired, weak, or unsteady on your feet. You may also have trouble concentrating or short-term memory loss. These symptoms should go away in 24 hours or less.   DISCHARGE INSTRUCTIONS:   Call 911 or have someone else call for any of the following:   You have sudden trouble breathing.     You cannot be woken.     Contact Interventional Radiology at [phone]   [facility] PATIENTS: Contact Interventional Radiology at [phone] [place] PATIENTS: Contact Interventional Radiology at [phone]) if any of the following occur:      You have a severe headache or dizziness.     Your heart is beating faster than usual.    You have a fever or chills.     Your skin is itchy, swollen, or you have a rash.     You have nausea or are vomiting for more than 8 hours after the procedure.      You have questions or concerns about your condition or care.  Self-care:   Have someone stay with you for 24 hours. This person can drive you to errands and help you do things around the house. This person can also watch for problems.      Rest and do quiet activities for 24 hours. Do not exercise, ride a bike, or play sports. Stand up slowly to prevent dizziness and falls. Take short walks around the house with another person. Slowly return to your usual activities the next day.      Do not drive or use dangerous machines or tools for 24 hours. You may injure yourself or others. Examples include a lawnmower, saw, or drill. Do not return to work for 24 hours if you use dangerous  machines or tools for work.      Do not make important decisions for 24 hours. For example, do not sign important papers or invest money.      Drink liquids as directed. Liquids help flush the sedation medicine out of your body. Ask how much liquid to drink each day and which liquids are best for you.      Eat small, frequent meals to prevent nausea and vomiting. Start with clear liquids such as juice or broth. If you do not vomit after clear liquids, you can eat your usual foods.      Do not drink alcohol or take medicines that make you drowsy. This includes medicines that help you sleep and anxiety medicines. Ask your healthcare provider if it is safe for you to take pain medicine.  Follow up with your healthcare provider as directed: Write down your questions so you remember to ask them during your visits.

## 2025-01-28 NOTE — BRIEF OP NOTE (RAD/CATH)
IR AV FISTULAGRAM/GRAFTOGRAM  Procedure Note    PATIENT NAME: Raven Simeon  : 1995  MRN: [id number]     Pre-op Diagnosis:   1. ESRD on dialysis (HCC)      Post-op Diagnosis:   1. ESRD on dialysis (HCC)        Surgeon:   Onur Caldeorn DO  Assistants:     No qualified resident was available.    Estimated Blood Loss: No  Findings:   R BCF with no discrete outflow stenosis noting a fold in the mid cephalic vein. 10 mm HPB PTA was performed over the fold which did show a waist which was effaced.     Unable to reflux across the JA region noting a few small collateral veins.     Retrograde access obtained, possible moderate grade AA stenosis treated with 5 mm POBA. Improved thrill following PTA.    Specimens: none    Complications:  none    Anesthesia: conscious sedation and local    Onur Calderon DO     Date: 2025  Time: 2:19 PM

## 2025-01-29 DIAGNOSIS — N18.30 BENIGN HYPERTENSION WITH CKD (CHRONIC KIDNEY DISEASE) STAGE III (HCC): ICD-10-CM

## 2025-01-29 DIAGNOSIS — I12.9 BENIGN HYPERTENSION WITH CKD (CHRONIC KIDNEY DISEASE) STAGE III (HCC): ICD-10-CM

## 2025-01-29 RX ORDER — LABETALOL 100 MG/1
200 TABLET, FILM COATED ORAL 2 TIMES DAILY
Qty: 120 TABLET | Refills: 5 | Status: SHIPPED | OUTPATIENT
Start: 2025-01-29

## 2025-01-30 ENCOUNTER — OFFICE VISIT (OUTPATIENT)
Dept: FAMILY MEDICINE CLINIC | Facility: CLINIC | Age: 30
End: 2025-01-30
Payer: MEDICARE

## 2025-01-30 VITALS
SYSTOLIC BLOOD PRESSURE: 151 MMHG | HEIGHT: 63 IN | OXYGEN SATURATION: 100 % | DIASTOLIC BLOOD PRESSURE: 74 MMHG | TEMPERATURE: 97.7 F | BODY MASS INDEX: 43.41 KG/M2 | HEART RATE: 87 BPM | WEIGHT: 245 LBS

## 2025-01-30 DIAGNOSIS — T78.40XD ALLERGIC REACTION, SUBSEQUENT ENCOUNTER: ICD-10-CM

## 2025-01-30 DIAGNOSIS — Z91.018 ALLERGY TO WHEAT: ICD-10-CM

## 2025-01-30 DIAGNOSIS — L91.0 KELOID: ICD-10-CM

## 2025-01-30 DIAGNOSIS — J45.40 MODERATE PERSISTENT ASTHMA WITHOUT COMPLICATION: Primary | ICD-10-CM

## 2025-01-30 PROCEDURE — 99213 OFFICE O/P EST LOW 20 MIN: CPT

## 2025-01-30 RX ORDER — MONTELUKAST SODIUM 10 MG/1
10 TABLET ORAL
Qty: 30 TABLET | Refills: 2 | Status: SHIPPED | OUTPATIENT
Start: 2025-01-30

## 2025-01-30 RX ORDER — AMOXICILLIN 500 MG/1
500 CAPSULE ORAL 3 TIMES DAILY
COMMUNITY
Start: 2025-01-21

## 2025-01-30 RX ORDER — ALBUTEROL SULFATE 90 UG/1
2 INHALANT RESPIRATORY (INHALATION) EVERY 4 HOURS PRN
Qty: 6.7 G | Refills: 2 | Status: SHIPPED | OUTPATIENT
Start: 2025-01-30

## 2025-01-30 RX ORDER — BUDESONIDE AND FORMOTEROL FUMARATE DIHYDRATE 160; 4.5 UG/1; UG/1
2 AEROSOL RESPIRATORY (INHALATION) 2 TIMES DAILY
Qty: 10.2 G | Refills: 1 | Status: SHIPPED | OUTPATIENT
Start: 2025-01-30

## 2025-01-30 NOTE — ASSESSMENT & PLAN NOTE
She notes allergies, including to wheat.  She notes that she has broken out in hives when touching wheat bread in the past.  She would like referral to allergy.  Will refer at this time.  Orders:    Ambulatory Referral to Allergy; Future

## 2025-01-30 NOTE — ASSESSMENT & PLAN NOTE
Generally poorly controlled, she notes often using her albuterol inhaler multiple times per day.  She also notes compliance with Dulera daily, but does not feel that it has been helpful.  She notes that symptoms are worse this time of year when exposed to cold air, which is an irritant for her.  Also notes that pets are an irritant, notes that she frequently experiences coughing as well when exposed to pets.  No history of PFTs in the past.    Plan  Will get PFTs  Add Singulair for better symptomatic control  Patient may benefit from adjusting controller medication to LABA/LAMA/ICS in the future  Would consider pulmonology referral in the future if symptoms continue to be poorly controlled  Follow-up in 6 weeks to reassess symptoms  Orders:    Complete PFT with post bronchodilator; Future    montelukast (SINGULAIR) 10 mg tablet; Take 1 tablet (10 mg total) by mouth daily at bedtime

## 2025-01-30 NOTE — PROGRESS NOTES
Name: Raven Simeon      : 1995      MRN: [id number]  Encounter Provider: Roberto Britt DO  Encounter Date: 2025   Encounter department: [facility]  :  Assessment & Plan  Moderate persistent asthma without complication  Generally poorly controlled, she notes often using her albuterol inhaler multiple times per day.  She also notes compliance with Dulera daily, but does not feel that it has been helpful.  She notes that symptoms are worse this time of year when exposed to cold air, which is an irritant for her.  Also notes that pets are an irritant, notes that she frequently experiences coughing as well when exposed to pets.  No history of PFTs in the past.    Plan  Will get PFTs  Add Singulair for better symptomatic control  Patient may benefit from adjusting controller medication to LABA/LAMA/ICS in the future  Would consider pulmonology referral in the future if symptoms continue to be poorly controlled  Follow-up in 6 weeks to reassess symptoms  Orders:    Complete PFT with post bronchodilator; Future    montelukast (SINGULAIR) 10 mg tablet; Take 1 tablet (10 mg total) by mouth daily at bedtime    Allergic reaction, subsequent encounter  She notes allergies, including to wheat.  She notes that she has broken out in hives when touching wheat bread in the past.  She would like referral to allergy.  Will refer at this time.  Orders:    Ambulatory Referral to Allergy; Future    Allergy to wheat  See above  Orders:    Ambulatory Referral to Allergy; Future    Keloid    Orders:    Ambulatory Referral to Dermatology; Future           History of Present Illness   HPI    29 year old F presents for asthma follow up. She notes that she needs albuterol multiple times daily, also usind Dulera daily but states that is does not help.  She notes that symptoms are often worse due to to the cold at this time here, also notes that symptoms worsen around pets.  She notes frequent coughing  "when she experiences episodes of shortness of breath as well.  Also notes previously having broken out in hives when touching bread, would like a referral to allergy to evaluate wheat allergy.  Also notes keloid scar left earlobe.    Review of Systems   Constitutional:  Negative for appetite change and fever.   HENT:  Negative for trouble swallowing.    Respiratory:  Positive for chest tightness and shortness of breath.    Cardiovascular:  Negative for chest pain.   Gastrointestinal:  Negative for nausea and vomiting.   Allergic/Immunologic: Positive for food allergies.       Objective   /74 (BP Location: Left arm, Patient Position: Sitting, Cuff Size: Large)   Pulse 87   Temp 97.7 °F (36.5 °C) (Temporal)   Ht 5' 3.39\" (1.61 m)   Wt 111 kg (245 lb)   SpO2 100%   BMI 42.87 kg/m²      Physical Exam  Constitutional:       General: She is not in acute distress.     Appearance: She is obese. She is not toxic-appearing.   HENT:      Head: Normocephalic and atraumatic.      Ears:      Comments: Large keloid left earlobe  Eyes:      General: No scleral icterus.     Conjunctiva/sclera: Conjunctivae normal.   Cardiovascular:      Rate and Rhythm: Normal rate and regular rhythm.   Pulmonary:      Effort: Pulmonary effort is normal.      Breath sounds: No wheezing, rhonchi or rales.   Skin:     General: Skin is warm and dry.   Neurological:      Mental Status: She is alert and oriented to person, place, and time.      Gait: Gait normal.   Psychiatric:         Mood and Affect: Mood normal.         Behavior: Behavior normal.         Thought Content: Thought content normal.         Judgment: Judgment normal.         "

## 2025-02-03 ENCOUNTER — TELEPHONE (OUTPATIENT)
Age: 30
End: 2025-02-03

## 2025-02-03 NOTE — TELEPHONE ENCOUNTER
PA for Budesonide-formoterol (Symbicort) 160-4.5 mcg/act inhaler SUBMITTED to [facility]    via    [x]CMM-KEY: [id number]  []Surescripts-Case ID #   []Availity-Auth ID #  []Faxed to plan  []Other website   []Phone call Case ID #     [x]PA sent as URGENT    All office notes, labs and other pertaining documents and studies sent. Clinical questions answered. Awaiting determination from insurance company.     Turnaround time for your insurance to make a decision on your Prior Authorization can take 7-21 business days.

## 2025-02-07 ENCOUNTER — TELEPHONE (OUTPATIENT)
Age: 30
End: 2025-02-07

## 2025-02-07 NOTE — TELEPHONE ENCOUNTER
Caller: Raven Simeon    Doctor: Dr. Espitia    Reason for call: Patient states that MARLENA went to the wrong address to pick her up for her appointment today with [facility] in [place] at 2:30. She states they went to [address] [facility] instead of [address] [facility]. I spoke with the office and they will call her to reschedule. She will also need to have a LYFT ride scheduled for her next appointment. Patient voiced understanding.    Call back#: [phone]

## 2025-02-13 NOTE — TELEPHONE ENCOUNTER
PA for Budesonide-formoterol (Symbicort) 160-4.5 mcg/act inhaler NOT REQUIRED     Reason (screenshot if applicable)          Patient advised by          [] MyChart Message  [x] Phone call- Patient verbalized understanding  []LMOM  []L/M to call office as no active Communication consent on file  []Unable to leave detailed message as VM not approved on Communication consent       Pharmacy advised by    []Fax  [x]Phone call

## 2025-03-03 DIAGNOSIS — J45.40 MODERATE PERSISTENT ASTHMA WITHOUT COMPLICATION: ICD-10-CM

## 2025-03-04 ENCOUNTER — TELEPHONE (OUTPATIENT)
Age: 30
End: 2025-03-04

## 2025-03-04 RX ORDER — BUDESONIDE AND FORMOTEROL FUMARATE DIHYDRATE 160; 4.5 UG/1; UG/1
2 AEROSOL RESPIRATORY (INHALATION) 2 TIMES DAILY
Qty: 10.2 G | Refills: 1 | Status: SHIPPED | OUTPATIENT
Start: 2025-03-04 | End: 2025-03-13 | Stop reason: ALTCHOICE

## 2025-03-06 DIAGNOSIS — N18.6 BENIGN HYPERTENSION WITH ESRD (END-STAGE RENAL DISEASE) (HCC): ICD-10-CM

## 2025-03-06 DIAGNOSIS — J45.40 MODERATE PERSISTENT ASTHMA WITHOUT COMPLICATION: ICD-10-CM

## 2025-03-06 DIAGNOSIS — J45.901 ASTHMA EXACERBATION: ICD-10-CM

## 2025-03-06 DIAGNOSIS — I12.0 BENIGN HYPERTENSION WITH ESRD (END-STAGE RENAL DISEASE) (HCC): ICD-10-CM

## 2025-03-06 RX ORDER — TORSEMIDE 100 MG/1
100 TABLET ORAL DAILY
Qty: 90 TABLET | Refills: 1 | Status: SHIPPED | OUTPATIENT
Start: 2025-03-06

## 2025-03-07 DIAGNOSIS — N18.6 ESRD ON HEMODIALYSIS (HCC): Primary | ICD-10-CM

## 2025-03-07 DIAGNOSIS — Z99.2 ESRD ON HEMODIALYSIS (HCC): Primary | ICD-10-CM

## 2025-03-07 RX ORDER — ASCORBIC ACID, THIAMINE MONONITRATE,RIBOFLAVIN, NIACINAMIDE, PYRIDOXINE HYDROCHLORIDE, FOLIC ACID, CYANOCOBALAMIN, BIOTIN, CALCIUM PANTOTHENATE, 100; 1.5; 1.7; 20; 10; 1; 6000; 150000; 5 MG/1; MG/1; MG/1; MG/1; MG/1; MG/1; UG/1; UG/1; MG/1
1 CAPSULE, LIQUID FILLED ORAL
Qty: 90 CAPSULE | Refills: 2 | Status: SHIPPED | OUTPATIENT
Start: 2025-03-07

## 2025-03-07 RX ORDER — ALBUTEROL SULFATE 90 UG/1
2 INHALANT RESPIRATORY (INHALATION) EVERY 4 HOURS PRN
Qty: 6.7 G | Refills: 5 | Status: SHIPPED | OUTPATIENT
Start: 2025-03-07

## 2025-03-07 RX ORDER — MONTELUKAST SODIUM 10 MG/1
10 TABLET ORAL
Qty: 30 TABLET | Refills: 5 | Status: SHIPPED | OUTPATIENT
Start: 2025-03-07

## 2025-03-07 RX ORDER — BUDESONIDE AND FORMOTEROL FUMARATE DIHYDRATE 160; 4.5 UG/1; UG/1
2 AEROSOL RESPIRATORY (INHALATION) 2 TIMES DAILY
Qty: 10.2 G | Refills: 0 | OUTPATIENT
Start: 2025-03-07

## 2025-03-07 RX ORDER — FERRIC CITRATE 210 MG/1
2 TABLET, COATED ORAL
Qty: 540 TABLET | Refills: 2 | Status: SHIPPED | OUTPATIENT
Start: 2025-03-07

## 2025-03-11 ENCOUNTER — APPOINTMENT (OUTPATIENT)
Dept: RADIOLOGY | Age: 30
End: 2025-03-11
Payer: MEDICARE

## 2025-03-11 ENCOUNTER — ANNUAL EXAM (OUTPATIENT)
Dept: OBGYN CLINIC | Facility: CLINIC | Age: 30
End: 2025-03-11
Payer: MEDICARE

## 2025-03-11 ENCOUNTER — OFFICE VISIT (OUTPATIENT)
Dept: URGENT CARE | Age: 30
End: 2025-03-11
Payer: MEDICARE

## 2025-03-11 VITALS
WEIGHT: 245.6 LBS | OXYGEN SATURATION: 100 % | HEART RATE: 78 BPM | SYSTOLIC BLOOD PRESSURE: 124 MMHG | RESPIRATION RATE: 20 BRPM | BODY MASS INDEX: 43.51 KG/M2 | DIASTOLIC BLOOD PRESSURE: 84 MMHG | TEMPERATURE: 98.2 F

## 2025-03-11 VITALS
SYSTOLIC BLOOD PRESSURE: 146 MMHG | HEIGHT: 63 IN | DIASTOLIC BLOOD PRESSURE: 82 MMHG | WEIGHT: 243.6 LBS | BODY MASS INDEX: 43.16 KG/M2

## 2025-03-11 DIAGNOSIS — J45.901 ASTHMA EXACERBATION: ICD-10-CM

## 2025-03-11 DIAGNOSIS — I12.9 BENIGN HYPERTENSION WITH CKD (CHRONIC KIDNEY DISEASE) STAGE I: ICD-10-CM

## 2025-03-11 DIAGNOSIS — T14.90XA INJURY: ICD-10-CM

## 2025-03-11 DIAGNOSIS — N18.1 BENIGN HYPERTENSION WITH CKD (CHRONIC KIDNEY DISEASE) STAGE I: ICD-10-CM

## 2025-03-11 DIAGNOSIS — J45.40 MODERATE PERSISTENT ASTHMA WITHOUT COMPLICATION: ICD-10-CM

## 2025-03-11 DIAGNOSIS — S60.211A CONTUSION OF RIGHT WRIST, INITIAL ENCOUNTER: Primary | ICD-10-CM

## 2025-03-11 DIAGNOSIS — N18.6 ESRD (END STAGE RENAL DISEASE) (HCC): ICD-10-CM

## 2025-03-11 DIAGNOSIS — Z12.4 CERVICAL CANCER SCREENING: ICD-10-CM

## 2025-03-11 DIAGNOSIS — Z01.419 ENCNTR FOR GYN EXAM (GENERAL) (ROUTINE) W/O ABN FINDINGS: Primary | ICD-10-CM

## 2025-03-11 DIAGNOSIS — D63.8 ANEMIA, CHRONIC DISEASE: ICD-10-CM

## 2025-03-11 DIAGNOSIS — Z01.818 PRE-TRANSPLANT EVALUATION FOR KIDNEY TRANSPLANT: ICD-10-CM

## 2025-03-11 DIAGNOSIS — Z11.3 SCREEN FOR STD (SEXUALLY TRANSMITTED DISEASE): ICD-10-CM

## 2025-03-11 DIAGNOSIS — Z01.818 ENCOUNTER FOR PREOPERATIVE EVALUATION OF LIVING DONOR OF KIDNEY: ICD-10-CM
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
## 2025-03-11 DIAGNOSIS — N18.4 CKD (CHRONIC KIDNEY DISEASE), STAGE IV (HCC): ICD-10-CM

## 2025-03-11 PROCEDURE — 87591 N.GONORRHOEAE DNA AMP PROB: CPT | Performed by: OBSTETRICS & GYNECOLOGY

## 2025-03-11 PROCEDURE — 99395 PREV VISIT EST AGE 18-39: CPT | Performed by: OBSTETRICS & GYNECOLOGY

## 2025-03-11 PROCEDURE — 87491 CHLMYD TRACH DNA AMP PROBE: CPT | Performed by: OBSTETRICS & GYNECOLOGY

## 2025-03-11 PROCEDURE — G0145 SCR C/V CYTO,THINLAYER,RESCR: HCPCS | Performed by: OBSTETRICS & GYNECOLOGY

## 2025-03-11 PROCEDURE — 73110 X-RAY EXAM OF WRIST: CPT

## 2025-03-11 PROCEDURE — 99213 OFFICE O/P EST LOW 20 MIN: CPT | Performed by: PHYSICIAN ASSISTANT

## 2025-03-11 RX ORDER — MONTELUKAST SODIUM 10 MG/1
10 TABLET ORAL
Qty: 30 TABLET | Refills: 0 | OUTPATIENT
Start: 2025-03-11

## 2025-03-11 RX ORDER — ALBUTEROL SULFATE 90 UG/1
2 INHALANT RESPIRATORY (INHALATION) EVERY 4 HOURS PRN
Qty: 6.7 G | Refills: 0 | OUTPATIENT
Start: 2025-03-11

## 2025-03-11 NOTE — PROGRESS NOTES
Assessment        Diagnoses and all orders for this visit:    Encntr for gyn exam (general) (routine) w/o abn findings    Anemia, chronic disease  -     Ambulatory Referral to Obstetrics / Gynecology    Benign hypertension with CKD (chronic kidney disease) stage I  -     Ambulatory Referral to Obstetrics / Gynecology    ESRD (end stage renal disease) (HCC)  -     Ambulatory Referral to Obstetrics / Gynecology    Encounter for preoperative evaluation of living donor of kidney  -     Ambulatory Referral to Obstetrics / Gynecology    CKD (chronic kidney disease), stage IV (HCC)  -     Ambulatory Referral to Obstetrics / Gynecology    Pre-transplant evaluation for kidney transplant  -     Ambulatory Referral to Obstetrics / Gynecology    Cervical cancer screening  -     Liquid-based pap, screening    Screen for STD (sexually transmitted disease)  -     Chlamydia/GC amplified DNA by PCR             Plan      All questions answered.  Await pap smear results.  Chlamydia specimen.  Contraception: Nexplanon.  Educational material distributed.  Follow up in 1 year.  Follow up as needed.  GC specimen.  Pap smear.       Subjective      Raven Simeon is a 29 y.o. female who presents for annual exam.      Chief Complaint   Patient presents with    Gynecologic Exam     Yearly     Requests PAP due to needing a kidney transplant  She is not sexually active  She doesn't think she has been sexually active, sates she doesn't remmeber since she has a concussion    Nexplanon 24      Last Pap: 2023  Last mammogram: Not on file  Colorectal cancer screening: Cologuard: Not on file Colonoscopy: Not on file   DEXA: Not on file     Current contraception: abstinence  History of abnormal Pap smear: no  History of abnormal Pap smear: no  History of abnormal mammogram: no  Family history of uterine or ovarian cancer: no  Family history of breast cancer: no  Family history of colon cancer: no    OB History    Para Term   AB Living   0 0 0 0 0 0   SAB IAB Ectopic Multiple Live Births   0 0 0 0 0       Menstrual History:  OB History          0    Para   0    Term   0       0    AB   0    Living   0         SAB   0    IAB   0    Ectopic   0    Multiple   0    Live Births   0                Menarche age: 17  No LMP recorded (lmp unknown).       Social History     Substance and Sexual Activity   Sexual Activity Not on file          Past Medical History:   Diagnosis Date    Asthma     Chronic kidney disease     Eczema     Headache     History of transfusion     Hypertension     Increased anion gap metabolic acidosis 2022    Wears glasses      Past Surgical History:   Procedure Laterality Date    CHOLECYSTECTOMY      CT NEEDLE BIOPSY KIDNEY  2020    IR AV FISTULAGRAM/GRAFTOGRAM  2024    IR AV FISTULAGRAM/GRAFTOGRAM  2025    IR TUNNELED DIALYSIS CATHETER PLACEMENT  2024    IR TUNNELED DIALYSIS CATHETER REMOVAL  2024    KELOID EXCISION Left 3/30/2021    Procedure: POSTERIOR EAR EXCISION KELOID, FLAP RECONSTRUCTION;  Surgeon: Beka Hugo MD;  [facility];  Service: Plastics    GA ARTERIOVENOUS ANASTOMOSIS OPEN DIRECT Right 2024    Procedure: CREATION FISTULA ARTERIOVENOUS (AV);  Surgeon: Marcus Espitia MD;  [facility];  Service: Vascular     Family History   Problem Relation Age of Onset    Asthma Mother     No Known Problems Father        Social History     Tobacco Use    Smoking status: Never    Smokeless tobacco: Never   Vaping Use    Vaping status: Never Used   Substance Use Topics    Alcohol use: Not Currently     Comment: occassionally on social events    Drug use: No          Current Outpatient Medications:     acetaminophen (TYLENOL) 325 mg tablet, Take 2 tablets (650 mg total) by mouth every 6 (six) hours as needed for mild pain or headaches, Disp: 30 tablet, Rfl: 0    albuterol (2.5 mg/3 mL) 0.083 % nebulizer solution, Take 3 mL (2.5 mg  total) by nebulization every 6 (six) hours as needed for wheezing or shortness of breath, Disp: 30 mL, Rfl: 0    albuterol (ProAir HFA) 90 mcg/act inhaler, Inhale 2 puffs every 4 (four) hours as needed for wheezing, Disp: 6.7 g, Rfl: 5    amLODIPine (NORVASC) 5 mg tablet, TAKE 1 TABLET BY MOUTH TWICE A DAY, Disp: 60 tablet, Rfl: 5    amoxicillin (AMOXIL) 500 mg capsule, Take 500 mg by mouth 3 (three) times a day, Disp: , Rfl:     brompheniramine-pseudoephedrine-DM 30-2-10 MG/5ML syrup, Take 5 mL by mouth 4 (four) times a day as needed for cough or congestion, Disp: 120 mL, Rfl: 0    budesonide-formoterol (Symbicort) 160-4.5 mcg/act inhaler, Inhale 2 puffs 2 (two) times a day, Disp: 10.2 g, Rfl: 1    calcitriol (ROCALTROL) 0.5 MCG capsule, TAKE 1 CAPSULE BY MOUTH DAILY., Disp: 30 capsule, Rfl: 5    cyanocobalamin (VITAMIN B-12) 100 mcg tablet, , Disp: , Rfl:     cyanocobalamin (VITAMIN B-12) 1000 MCG tablet, Take 1 tablet (1,000 mcg total) by mouth daily, Disp: 30 tablet, Rfl: 5    Ferric Citrate (Auryxia) 1  MG(Fe) TABS, Take 2 tablets (2 g total) by mouth 3 (three) times a day with meals, Disp: 540 tablet, Rfl: 2    fluticasone (Flonase Allergy Relief) 50 mcg/act nasal spray, 1 spray into each nostril daily, Disp: 15.8 mL, Rfl: 1    fluticasone (FLONASE) 50 mcg/act nasal spray, 1 spray into each nostril daily, Disp: 9.9 mL, Rfl: 0    labetalol (NORMODYNE) 100 mg tablet, TAKE 2 TABLETS BY MOUTH 2 TIMES A DAY., Disp: 120 tablet, Rfl: 5    loratadine (CLARITIN) 10 mg tablet, Take 1 tablet (10 mg total) by mouth daily, Disp: 90 tablet, Rfl: 1    methocarbamol (ROBAXIN) 500 mg tablet, Take 1 tablet (500 mg total) by mouth 3 (three) times a day, Disp: 21 tablet, Rfl: 0    montelukast (SINGULAIR) 10 mg tablet, Take 1 tablet (10 mg total) by mouth daily at bedtime, Disp: 30 tablet, Rfl: 5    predniSONE 10 mg tablet, 6 Tabs day 1, 5 tabs day 2, 4 tabs day 3, 3 tabs day 4, 2 tabs day 5, 1 tab day 6, Disp: 21 tablet,  "Rfl: 0    sevelamer carbonate (RENVELA) 800 mg tablet, TAKE 1 TABLET BY MOUTH THREE TIMES A DAY WITH MEALS, Disp: 90 tablet, Rfl: 0    torsemide (DEMADEX) 100 mg tablet, Take 1 tablet (100 mg total) by mouth daily, Disp: 90 tablet, Rfl: 1    vit B complex-vit C-folic acid (Renal Caps) 1 mg, Take 1 capsule by mouth daily with dinner, Disp: 90 capsule, Rfl: 2    etonogestrel (NEXPLANON) subdermal implant, 68 mg by Subdermal route once (Patient not taking: Reported on 3/11/2025), Disp: , Rfl:     Lido-Capsaicin-Men-Methyl Sal (Medi-Patch-Lidocaine) 0.5-0.035-5-20 % PTCH, Apply 1 patch topically daily as needed (back pain) (Patient not taking: Reported on 3/11/2025), Disp: 30 patch, Rfl: 2    lidocaine (LIDODERM) 5 %, Apply 1 patch topically over 12 hours every 24 hours for 10 doses Remove & Discard patch within 12 hours or as directed by MD, Disp: , Rfl:     Allergies   Allergen Reactions    Seasonal Ic [Cholestatin] Itching    Wheat Bran - Food Allergy Itching           Review of Systems   Constitutional: Negative.    HENT: Negative.     Eyes: Negative.    Respiratory: Negative.     Cardiovascular: Negative.    Gastrointestinal: Negative.    Endocrine: Negative.    Genitourinary:         As noted in HPI   Musculoskeletal: Negative.    Skin: Negative.    Allergic/Immunologic: Negative.    Neurological: Negative.    Hematological: Negative.    Psychiatric/Behavioral: Negative.         /82 (BP Location: Right arm, Patient Position: Sitting, Cuff Size: Standard)   Ht 5' 3\" (1.6 m)   Wt 110 kg (243 lb 9.6 oz)   LMP  (LMP Unknown)   BMI 43.15 kg/m²         Physical Exam  Constitutional:       Appearance: She is well-developed.   Genitourinary:      Vulva, bladder and rectum normal.      No lesions in the vagina.      Genitourinary Comments:         Right Labia: No rash, tenderness, lesions, skin changes or Bartholin's cyst.     Left Labia: No tenderness, lesions, skin changes, Bartholin's cyst or rash.     No " inguinal adenopathy present in the right or left side.     No vaginal discharge, tenderness or bleeding.      No vaginal prolapse present.     No vaginal atrophy present.       Right Adnexa: not tender, not full and no mass present.     Left Adnexa: not tender, not full and no mass present.     No cervical motion tenderness, friability, lesion or polyp.      Uterus is not enlarged or tender.      Pelvic exam was performed with patient in the lithotomy position.   Rectum:      No external hemorrhoid.   Breasts:     Right: No mass, nipple discharge, skin change or tenderness.      Left: No mass, nipple discharge, skin change or tenderness.   HENT:      Head: Normocephalic.      Nose: Nose normal.   Eyes:      Conjunctiva/sclera: Conjunctivae normal.   Neck:      Thyroid: No thyromegaly.   Cardiovascular:      Rate and Rhythm: Normal rate and regular rhythm.      Heart sounds: Normal heart sounds. No murmur heard.  Pulmonary:      Effort: Pulmonary effort is normal. No respiratory distress.      Breath sounds: Normal breath sounds. No wheezing or rales.   Abdominal:      General: There is no distension.      Palpations: Abdomen is soft. There is no mass.      Tenderness: There is no abdominal tenderness. There is no guarding or rebound.   Musculoskeletal:         General: No tenderness.      Cervical back: Neck supple. No muscular tenderness.   Lymphadenopathy:      Cervical: No cervical adenopathy.      Lower Body: No right inguinal adenopathy. No left inguinal adenopathy.   Neurological:      Mental Status: She is alert and oriented to person, place, and time.   Skin:     General: Skin is warm and dry.   Psychiatric:         Mood and Affect: Mood normal.         Behavior: Behavior normal.   Vitals and nursing note reviewed.       Nexplanon L arm palpated        Future Appointments   Date Time Provider Department Center   3/13/2025  3:40 PM DO Juan Brown  [facility]   3/18/2025 12:30 PM EA CT 1 EA CT EA  [facility]   3/18/2025  1:00 PM EA ECHO 2 EA CAR NI EA [facility]   12/9/2025  3:30 PM Aaron Mac MD NEPH [facility]

## 2025-03-12 NOTE — PROGRESS NOTES
[facility]        NAME: Raven Simeon is a 29 y.o. female  : 1995    MRN: [id number]  DATE: 2025  TIME: 8:44 PM      Assessment and Plan     Contusion of right wrist, initial encounter [S60.211A]  1. Contusion of right wrist, initial encounter        2. Injury  XR wrist 3+ vw right      3. Hit by object, initial encounter            POC Testing Results        Note:       Patient Instructions     Patient Instructions    Please ice for 20 minutes at least four times daily and keep injury elevated as much as possible until symptoms improve.     Please perform gentle range of motion four times daily       Follow up with primary care provider.   Go to ER if symptoms worsen.    Chief Complaint     Chief Complaint   Patient presents with    Hand Injury    Wrist Injury     Patient states she was going to open car door and she got hurt  with metal door hurting right hand and wrist . Also went to lift her mother out the car and her right hand and wrist got worse it happen 2 days ago.         History of Present Illness     Pt reports hitting her right wrist on the frame of the car door causing her right wrist pain x 2 days. She reports prior fracture as a teenager to the right wrist. She reports pain over radial aspect of right wrist. She denies numbness/tinging.    Hand Injury   Pertinent negatives include no numbness.       Review of Systems     Review of Systems   Constitutional:  Negative for fever.   Musculoskeletal:  Positive for arthralgias and joint swelling.   Skin:  Negative for rash and wound.   Neurological:  Negative for syncope and numbness.         Current Medications       Current Outpatient Medications:     acetaminophen (TYLENOL) 325 mg tablet, Take 2 tablets (650 mg total) by mouth every 6 (six) hours as needed for mild pain or headaches, Disp: 30 tablet, Rfl: 0    albuterol (2.5 mg/3 mL) 0.083 % nebulizer solution, Take 3 mL (2.5 mg total) by nebulization every 6 (six)  hours as needed for wheezing or shortness of breath, Disp: 30 mL, Rfl: 0    albuterol (ProAir HFA) 90 mcg/act inhaler, Inhale 2 puffs every 4 (four) hours as needed for wheezing, Disp: 6.7 g, Rfl: 5    amLODIPine (NORVASC) 5 mg tablet, TAKE 1 TABLET BY MOUTH TWICE A DAY, Disp: 60 tablet, Rfl: 5    amoxicillin (AMOXIL) 500 mg capsule, Take 500 mg by mouth 3 (three) times a day, Disp: , Rfl:     brompheniramine-pseudoephedrine-DM 30-2-10 MG/5ML syrup, Take 5 mL by mouth 4 (four) times a day as needed for cough or congestion, Disp: 120 mL, Rfl: 0    budesonide-formoterol (Symbicort) 160-4.5 mcg/act inhaler, Inhale 2 puffs 2 (two) times a day, Disp: 10.2 g, Rfl: 1    calcitriol (ROCALTROL) 0.5 MCG capsule, TAKE 1 CAPSULE BY MOUTH DAILY., Disp: 30 capsule, Rfl: 5    cyanocobalamin (VITAMIN B-12) 100 mcg tablet, , Disp: , Rfl:     cyanocobalamin (VITAMIN B-12) 1000 MCG tablet, Take 1 tablet (1,000 mcg total) by mouth daily, Disp: 30 tablet, Rfl: 5    etonogestrel (NEXPLANON) subdermal implant, 68 mg by Subdermal route once (Patient not taking: Reported on 3/11/2025), Disp: , Rfl:     Ferric Citrate (Auryxia) 1  MG(Fe) TABS, Take 2 tablets (2 g total) by mouth 3 (three) times a day with meals, Disp: 540 tablet, Rfl: 2    fluticasone (Flonase Allergy Relief) 50 mcg/act nasal spray, 1 spray into each nostril daily, Disp: 15.8 mL, Rfl: 1    fluticasone (FLONASE) 50 mcg/act nasal spray, 1 spray into each nostril daily, Disp: 9.9 mL, Rfl: 0    labetalol (NORMODYNE) 100 mg tablet, TAKE 2 TABLETS BY MOUTH 2 TIMES A DAY., Disp: 120 tablet, Rfl: 5    Lido-Capsaicin-Men-Methyl Sal (Medi-Patch-Lidocaine) 0.5-0.035-5-20 % PTCH, Apply 1 patch topically daily as needed (back pain) (Patient not taking: Reported on 3/11/2025), Disp: 30 patch, Rfl: 2    lidocaine (LIDODERM) 5 %, Apply 1 patch topically over 12 hours every 24 hours for 10 doses Remove & Discard patch within 12 hours or as directed by MD, Disp: , Rfl:     loratadine  (CLARITIN) 10 mg tablet, Take 1 tablet (10 mg total) by mouth daily, Disp: 90 tablet, Rfl: 1    methocarbamol (ROBAXIN) 500 mg tablet, Take 1 tablet (500 mg total) by mouth 3 (three) times a day, Disp: 21 tablet, Rfl: 0    montelukast (SINGULAIR) 10 mg tablet, Take 1 tablet (10 mg total) by mouth daily at bedtime, Disp: 30 tablet, Rfl: 5    predniSONE 10 mg tablet, 6 Tabs day 1, 5 tabs day 2, 4 tabs day 3, 3 tabs day 4, 2 tabs day 5, 1 tab day 6, Disp: 21 tablet, Rfl: 0    sevelamer carbonate (RENVELA) 800 mg tablet, TAKE 1 TABLET BY MOUTH THREE TIMES A DAY WITH MEALS, Disp: 90 tablet, Rfl: 0    torsemide (DEMADEX) 100 mg tablet, Take 1 tablet (100 mg total) by mouth daily, Disp: 90 tablet, Rfl: 1    vit B complex-vit C-folic acid (Renal Caps) 1 mg, Take 1 capsule by mouth daily with dinner, Disp: 90 capsule, Rfl: 2    Current Allergies     Allergies as of 03/11/2025 - Reviewed 03/11/2025   Allergen Reaction Noted    Seasonal ic [cholestatin] Itching 04/14/2023    Wheat bran - food allergy Itching 12/19/2019              The following portions of the patient's history were reviewed and updated as appropriate: allergies, current medications, past family history, past medical history, past social history, past surgical history, and problem list.     Past Medical History:   Diagnosis Date    Asthma     Chronic kidney disease     Eczema     Headache     History of transfusion     Hypertension     Increased anion gap metabolic acidosis 02/28/2022    Wears glasses        Past Surgical History:   Procedure Laterality Date    CHOLECYSTECTOMY      CT NEEDLE BIOPSY KIDNEY  1/29/2020    IR AV FISTULAGRAM/GRAFTOGRAM  11/13/2024    IR AV FISTULAGRAM/GRAFTOGRAM  1/28/2025    IR TUNNELED DIALYSIS CATHETER PLACEMENT  6/1/2024    IR TUNNELED DIALYSIS CATHETER REMOVAL  11/26/2024    KELOID EXCISION Left 3/30/2021    Procedure: POSTERIOR EAR EXCISION KELOID, FLAP RECONSTRUCTION;  Surgeon: Beka Hugo MD;  [facility];   Service: Plastics    AK ARTERIOVENOUS ANASTOMOSIS OPEN DIRECT Right 8/27/2024    Procedure: CREATION FISTULA ARTERIOVENOUS (AV);  Surgeon: Marcus Espitia MD;  [facility];  Service: Vascular       Family History   Problem Relation Age of Onset    Asthma Mother     No Known Problems Father          Medications have been verified.        Objective     /84   Pulse 78   Temp 98.2 °F (36.8 °C)   Resp 20   Wt 111 kg (245 lb 9.6 oz)   LMP  (LMP Unknown)   SpO2 100%   BMI 43.51 kg/m²   No LMP recorded (lmp unknown).         Physical Exam     Physical Exam  Vitals and nursing note reviewed.   Constitutional:       General: She is not in acute distress.     Appearance: Normal appearance. She is not ill-appearing, toxic-appearing or diaphoretic.   HENT:      Head: Normocephalic and atraumatic.   Eyes:      Conjunctiva/sclera: Conjunctivae normal.   Cardiovascular:      Rate and Rhythm: Normal rate and regular rhythm.      Pulses: Normal pulses.   Pulmonary:      Effort: Pulmonary effort is normal.   Musculoskeletal:      Right wrist: Tenderness and bony tenderness present. No swelling, deformity, effusion, lacerations, snuff box tenderness or crepitus. Decreased range of motion. Normal pulse.      Right hand: Normal.      Comments: Pt is tender over right radial aspect of wrist, has reduced dorsiflexion of right wrist at about 45 degrees, 4/5  strength   Skin:     General: Skin is warm and dry.      Capillary Refill: Capillary refill takes less than 2 seconds.   Neurological:      General: No focal deficit present.      Mental Status: She is alert and oriented to person, place, and time. Mental status is at baseline.   Psychiatric:         Mood and Affect: Mood normal.         Behavior: Behavior normal.         Thought Content: Thought content normal.         Judgment: Judgment normal.

## 2025-03-13 ENCOUNTER — RESULTS FOLLOW-UP (OUTPATIENT)
Dept: OBGYN CLINIC | Facility: CLINIC | Age: 30
End: 2025-03-13

## 2025-03-13 ENCOUNTER — OFFICE VISIT (OUTPATIENT)
Dept: FAMILY MEDICINE CLINIC | Facility: CLINIC | Age: 30
End: 2025-03-13

## 2025-03-13 VITALS
DIASTOLIC BLOOD PRESSURE: 71 MMHG | BODY MASS INDEX: 43.59 KG/M2 | SYSTOLIC BLOOD PRESSURE: 145 MMHG | OXYGEN SATURATION: 100 % | HEART RATE: 83 BPM | HEIGHT: 63 IN | WEIGHT: 246 LBS

## 2025-03-13 DIAGNOSIS — J45.40 MODERATE PERSISTENT ASTHMA WITHOUT COMPLICATION: Primary | ICD-10-CM

## 2025-03-13 LAB
C TRACH DNA SPEC QL NAA+PROBE: NEGATIVE
N GONORRHOEA DNA SPEC QL NAA+PROBE: NEGATIVE

## 2025-03-13 NOTE — ASSESSMENT & PLAN NOTE
Moderate improvement shown after adding daily Singulair to prior regimen of Symbicort 2 puffs daily and albuterol as needed.  Now using rescue inhaler roughly 1 time per day versus multiple times per day.  Some chest tightness noted with exertion.    Plan  Given need for daily rescue inhaler use will change controller from Symbicort to Trelegy daily  Continue Singulair daily  Continue albuterol rescue inhaler as needed  Follow-up 6 weeks to recheck on symptoms  Given number for central scheduling to schedule PFTs  Orders:    fluticasone-umeclidinium-vilanterol 100-62.5-25 mcg/actuation inhaler; Inhale 1 puff daily Rinse mouth after use.

## 2025-03-13 NOTE — PROGRESS NOTES
Name: Raven Simeon      : 1995      MRN: [id number]  Encounter Provider: Roberto Britt DO  Encounter Date: 3/13/2025   Encounter department: [facility]  :  Assessment & Plan  Moderate persistent asthma without complication  Moderate improvement shown after adding daily Singulair to prior regimen of Symbicort 2 puffs daily and albuterol as needed.  Now using rescue inhaler roughly 1 time per day versus multiple times per day.  Some chest tightness noted with exertion.    Plan  Given need for daily rescue inhaler use will change controller from Symbicort to Trelegy daily  Continue Singulair daily  Continue albuterol rescue inhaler as needed  Follow-up 6 weeks to recheck on symptoms  Given number for central scheduling to schedule PFTs  Orders:    fluticasone-umeclidinium-vilanterol 100-62.5-25 mcg/actuation inhaler; Inhale 1 puff daily Rinse mouth after use.           History of Present Illness   HPI    29-year-old female with history of ESRD on HD, hypertension, asthma, presents for follow-up on asthma.  At last visit  Singulair was added to her previous regimen of Symbicort twice daily and albuterol as needed.  She was also ordered repeat PFTs at that time, which she has not yet had a chance to schedule  Today she notes that she feels her breathing is slightly better since starting Singulair but she still has some chest tightness with exertion. No significant coughing. No wheezing. She reports using albuterol prn roughly once per day, usually after dropping her sister off at the bus when it's cold. Also bothered by the smell of smoke on clothing.      Review of Systems   Constitutional:  Negative for activity change, appetite change, chills and fever.   Respiratory:  Positive for chest tightness (occasional) and shortness of breath (occasional). Negative for cough and wheezing.    Cardiovascular:  Negative for chest pain.       Objective   LMP  (LMP Unknown)      Physical  Exam  Constitutional:       General: She is not in acute distress.     Appearance: She is not toxic-appearing.   HENT:      Head: Normocephalic and atraumatic.      Nose: Nose normal.      Mouth/Throat:      Pharynx: Oropharynx is clear.   Eyes:      General: No scleral icterus.     Conjunctiva/sclera: Conjunctivae normal.   Cardiovascular:      Rate and Rhythm: Normal rate.      Heart sounds: Normal heart sounds.   Pulmonary:      Effort: Pulmonary effort is normal.      Breath sounds: Normal breath sounds. No wheezing, rhonchi or rales.   Skin:     General: Skin is warm and dry.   Neurological:      Mental Status: She is alert and oriented to person, place, and time.   Psychiatric:         Mood and Affect: Mood normal.         Behavior: Behavior normal.         Thought Content: Thought content normal.         Judgment: Judgment normal.

## 2025-03-14 ENCOUNTER — APPOINTMENT (EMERGENCY)
Dept: RADIOLOGY | Facility: HOSPITAL | Age: 30
End: 2025-03-14
Payer: MEDICARE

## 2025-03-14 ENCOUNTER — HOSPITAL ENCOUNTER (EMERGENCY)
Facility: HOSPITAL | Age: 30
Discharge: HOME | End: 2025-03-14
Attending: EMERGENCY MEDICINE
Payer: MEDICARE

## 2025-03-14 VITALS
SYSTOLIC BLOOD PRESSURE: 141 MMHG | OXYGEN SATURATION: 100 % | RESPIRATION RATE: 24 BRPM | TEMPERATURE: 98.6 F | HEART RATE: 68 BPM | DIASTOLIC BLOOD PRESSURE: 65 MMHG

## 2025-03-14 DIAGNOSIS — J45.901 ASTHMA EXACERBATION: Primary | ICD-10-CM

## 2025-03-14 PROCEDURE — 94644 CONT INHLJ TX 1ST HOUR: CPT

## 2025-03-14 PROCEDURE — 71045 X-RAY EXAM CHEST 1 VIEW: CPT

## 2025-03-14 PROCEDURE — 94760 N-INVAS EAR/PLS OXIMETRY 1: CPT

## 2025-03-14 PROCEDURE — 94640 AIRWAY INHALATION TREATMENT: CPT

## 2025-03-14 PROCEDURE — 93005 ELECTROCARDIOGRAM TRACING: CPT

## 2025-03-14 PROCEDURE — 99284 EMERGENCY DEPT VISIT MOD MDM: CPT | Performed by: EMERGENCY MEDICINE

## 2025-03-14 PROCEDURE — 99285 EMERGENCY DEPT VISIT HI MDM: CPT

## 2025-03-14 RX ORDER — ALBUTEROL SULFATE 5 MG/ML
10 SOLUTION RESPIRATORY (INHALATION) ONCE
Status: COMPLETED | OUTPATIENT
Start: 2025-03-14 | End: 2025-03-14

## 2025-03-14 RX ORDER — DEXAMETHASONE 4 MG/1
6 TABLET ORAL ONCE
Status: COMPLETED | OUTPATIENT
Start: 2025-03-14 | End: 2025-03-14

## 2025-03-14 RX ORDER — DEXAMETHASONE 6 MG/1
6 TABLET ORAL ONCE
Qty: 1 TABLET | Refills: 0 | Status: SHIPPED | OUTPATIENT
Start: 2025-03-17 | End: 2025-03-17

## 2025-03-14 RX ORDER — SODIUM CHLORIDE FOR INHALATION 0.9 %
12 VIAL, NEBULIZER (ML) INHALATION ONCE
Status: COMPLETED | OUTPATIENT
Start: 2025-03-14 | End: 2025-03-14

## 2025-03-14 RX ORDER — ALBUTEROL SULFATE 2.5 MG/3ML
2 SOLUTION RESPIRATORY (INHALATION) ONCE
Status: COMPLETED | OUTPATIENT
Start: 2025-03-14 | End: 2025-03-14

## 2025-03-14 RX ADMIN — IPRATROPIUM BROMIDE 1 MG: 0.5 SOLUTION RESPIRATORY (INHALATION) at 13:58

## 2025-03-14 RX ADMIN — ALBUTEROL SULFATE 10 MG: 2.5 SOLUTION RESPIRATORY (INHALATION) at 13:58

## 2025-03-14 RX ADMIN — DEXAMETHASONE 6 MG: 4 TABLET ORAL at 14:55

## 2025-03-14 RX ADMIN — ISODIUM CHLORIDE 12 ML: 0.03 SOLUTION RESPIRATORY (INHALATION) at 13:59

## 2025-03-14 NOTE — ED PROVIDER NOTES
Time reflects when diagnosis was documented in both MDM as applicable and the Disposition within this note       Time User Action Codes Description Comment    3/14/2025  3:25 PM Juan Lozoya [J45.901] Asthma exacerbation           ED Disposition       ED Disposition   Discharge    Condition   Stable    Date/Time   Fri Mar 14, 2025  3:25 PM    Comment   Raven Simeon discharge to home/self care.                   Assessment & Plan       Medical Decision Making  29-year-old female with past medical history of asthma as well as ESRD requiring dialysis presents from dialysis after acute asthma exacerbation secondary to second hand smoke.  Patient received nebulizer treatment and ambulance prior to arrival with improvement of symptoms.  Patient seen and examined with no appreciable wheezing, however tachypnea present.  Differential diagnosis includes but is not limited to asthma exacerbation, pneumothorax less likely CHF.  Chest x-ray NAD.  Improved with heart neb.  Patient provided with Decadron as well as Rx for the same.  Strict return precaution provided. Patient appears well, nontoxic, agrees with plan of care at this time.  Answered all questions.  In light of this, patient would benefit from outpatient follow-up.    Amount and/or Complexity of Data Reviewed  Radiology: ordered and independent interpretation performed.    Risk  Prescription drug management.        ED Course as of 03/14/25 1632   Fri Mar 14, 2025   1440 Patient reassessed, notes significant improvement of both chest tightness as well as shortness of breath with heart neb.       Medications   albuterol (FOR EMS ONLY) (2.5 mg/3 mL) 0.083 % inhalation solution 5 mg (0 mg Does not apply Given to EMS 3/14/25 1400)   albuterol inhalation solution 10 mg (10 mg Nebulization Given 3/14/25 1358)   ipratropium (ATROVENT) 0.02 % inhalation solution 1 mg (1 mg Nebulization Given 3/14/25 1358)   sodium chloride 0.9 % inhalation solution 12 mL  (12 mL Nebulization Given 3/14/25 9505)   dexamethasone (DECADRON) tablet 6 mg (6 mg Oral Given 3/14/25 5042)       ED Risk Strat Scores                                                History of Present Illness       Chief Complaint   Patient presents with    Shortness of Breath     Coming from dialysis, had two hours of HD. +SOB after smelling cigarettes in facility. +Asthma and is sensitive to smells. 100% RA & received 1 albuterol tx       Past Medical History:   Diagnosis Date    Asthma     Chronic kidney disease     Eczema     Headache     History of transfusion     Hypertension     Increased anion gap metabolic acidosis 02/28/2022    Wears glasses       Past Surgical History:   Procedure Laterality Date    CHOLECYSTECTOMY      CT NEEDLE BIOPSY KIDNEY  1/29/2020    IR AV FISTULAGRAM/GRAFTOGRAM  11/13/2024    IR AV FISTULAGRAM/GRAFTOGRAM  1/28/2025    IR TUNNELED DIALYSIS CATHETER PLACEMENT  6/1/2024    IR TUNNELED DIALYSIS CATHETER REMOVAL  11/26/2024    KELOID EXCISION Left 3/30/2021    Procedure: POSTERIOR EAR EXCISION KELOID, FLAP RECONSTRUCTION;  Surgeon: Beka Hugo MD;  Location: [facility];  Service: Plastics    NE ARTERIOVENOUS ANASTOMOSIS OPEN DIRECT Right 8/27/2024    Procedure: CREATION FISTULA ARTERIOVENOUS (AV);  Surgeon: Marcus Espitia MD;  Location: [facility];  Service: Vascular      Family History   Problem Relation Age of Onset    Asthma Mother     No Known Problems Father       Social History     Tobacco Use    Smoking status: Never    Smokeless tobacco: Never   Vaping Use    Vaping status: Never Used   Substance Use Topics    Alcohol use: Not Currently     Comment: occassionally on social events    Drug use: No      E-Cigarette/Vaping    E-Cigarette Use Never User       E-Cigarette/Vaping Substances    Nicotine No     THC No     CBD No     Flavoring No     Other No     Unknown No       I have reviewed and agree with the history as documented.     (Raven Simeon)  "Raven Simeon is a 29 y.o. female     They presented to the emergency department on March 14, 2025. Patient presents with:  Shortness of Breath: Coming from dialysis, had two hours of HD. +SOB after smelling cigarettes in facility. +Asthma and is sensitive to smells. 100% RA & received 1 albuterol tx.    The patient states that she has been on dialysis since June 2023, is unsure of the exact reason she required dialysis stating that it is \"renal\".  Patient notes that she has a history of asthma, at dialysis she was smelling smoke cigarette smoke which she believes to other individuals receiving dialysis who had been smoking outside.  Patient notes she began having chest tightness, and difficulty breathing, attempted to take her inhaler however did not have relief, and called 911, stopped dialysis (received approximately 2 hours), and was brought to the emergency department by EMS for evaluation.  Patient received nebulizer treatment en route with improvement of symptoms.  Patient denies fever, chills, abdominal pain, nausea, vomiting, or any other complaint at this time.              Review of Systems   Constitutional:  Negative for chills and fever.   HENT:  Negative for ear pain and sore throat.    Eyes:  Negative for pain and visual disturbance.   Respiratory:  Positive for chest tightness and shortness of breath. Negative for cough.    Cardiovascular:  Negative for chest pain and palpitations.   Gastrointestinal:  Negative for abdominal pain and vomiting.   Genitourinary:  Negative for dysuria and hematuria.   Musculoskeletal:  Negative for arthralgias and back pain.   Skin:  Negative for color change and rash.   Neurological:  Negative for seizures and syncope.   All other systems reviewed and are negative.          Objective       ED Triage Vitals   Temperature Pulse Blood Pressure Respirations SpO2 Patient Position - Orthostatic VS   03/14/25 1344 03/14/25 1339 03/14/25 1344 03/14/25 1339 03/14/25 1339 " 03/14/25 1344   98.6 °F (37 °C) 80 156/89 (!) 30 100 % Lying      Temp Source Heart Rate Source BP Location FiO2 (%) Pain Score    03/14/25 1344 03/14/25 1339 03/14/25 1344 -- --    Axillary Monitor Left arm        Vitals      Date and Time Temp Pulse SpO2 Resp BP Pain Score FACES Pain Rating User   03/14/25 1438 -- 68 100 % 24 141/65 -- -- KO   03/14/25 1359 -- -- 100 % -- -- -- -- BD   03/14/25 1344 98.6 °F (37 °C) -- -- -- 156/89 -- -- HR   03/14/25 1339 -- 80 100 % 30 -- -- -- KM            Physical Exam  Vitals and nursing note reviewed.   Constitutional:       General: She is in acute distress (mild).      Appearance: Normal appearance. She is obese.   HENT:      Head: Normocephalic and atraumatic.      Right Ear: External ear normal.      Left Ear: External ear normal.      Nose: Nose normal.      Mouth/Throat:      Mouth: Mucous membranes are moist.   Eyes:      Conjunctiva/sclera: Conjunctivae normal.   Cardiovascular:      Rate and Rhythm: Normal rate and regular rhythm.   Pulmonary:      Effort: No respiratory distress.      Breath sounds: Normal breath sounds. No stridor. No wheezing, rhonchi or rales.   Chest:      Chest wall: No tenderness.   Abdominal:      General: Abdomen is flat. Bowel sounds are normal.      Tenderness: There is no abdominal tenderness. There is no guarding or rebound.   Musculoskeletal:         General: Normal range of motion.      Cervical back: Normal range of motion.   Skin:     General: Skin is warm and dry.      Capillary Refill: Capillary refill takes less than 2 seconds.   Neurological:      Mental Status: She is alert. Mental status is at baseline.   Psychiatric:         Mood and Affect: Mood normal.         Results Reviewed       None            XR chest 1 view portable   ED Interpretation by Juan Lozoya MD (03/14 [id number])   No acute cardio-pulmonary disease.  Independently interpreted by myself.          Final Interpretation by Ariel Hudson MD (03/14 7960)       No acute cardiopulmonary disease.            Workstation performed: [id number]             ECG 12 Lead Documentation Only    Date/Time: 3/14/2025 2:11 PM    Performed by: Juan Lozoya MD  Authorized by: Juan Lzooya MD    ECG reviewed by me, the ED Provider: yes    Patient location:  ED  Previous ECG:     Previous ECG:  Compared to current    Similarity:  No change  Interpretation:     Interpretation: normal    Rate:     ECG rate:  71    ECG rate assessment: normal    Rhythm:     Rhythm: sinus rhythm    Ectopy:     Ectopy: none    QRS:     QRS axis:  Normal    QRS intervals:  Normal  Conduction:     Conduction: normal    ST segments:     ST segments:  Normal  T waves:     T waves: normal    Comments:      Normal sinus rhythm at 71 bpm, no PAC, no PVC, no acute ischemic changes.      ED Medication and Procedure Management   Prior to Admission Medications   Prescriptions Last Dose Informant Patient Reported? Taking?   Ferric Citrate (Auryxia) 1  MG(Fe) TABS   No No   Sig: Take 2 tablets (2 g total) by mouth 3 (three) times a day with meals   Lido-Capsaicin-Men-Methyl Sal (Medi-Patch-Lidocaine) 0.5-0.035-5-20 % PTCH  Self No No   Sig: Apply 1 patch topically daily as needed (back pain)   Patient not taking: Reported on 3/11/2025   acetaminophen (TYLENOL) 325 mg tablet  Self No No   Sig: Take 2 tablets (650 mg total) by mouth every 6 (six) hours as needed for mild pain or headaches   albuterol (2.5 mg/3 mL) 0.083 % nebulizer solution  Self No No   Sig: Take 3 mL (2.5 mg total) by nebulization every 6 (six) hours as needed for wheezing or shortness of breath   albuterol (ProAir HFA) 90 mcg/act inhaler   No No   Sig: Inhale 2 puffs every 4 (four) hours as needed for wheezing   amLODIPine (NORVASC) 5 mg tablet   No No   Sig: TAKE 1 TABLET BY MOUTH TWICE A DAY   amoxicillin (AMOXIL) 500 mg capsule   Yes No   Sig: Take 500 mg by mouth 3 (three) times a day   brompheniramine-pseudoephedrine-DM  30-2-10 MG/5ML syrup   No No   Sig: Take 5 mL by mouth 4 (four) times a day as needed for cough or congestion   calcitriol (ROCALTROL) 0.5 MCG capsule   No No   Sig: TAKE 1 CAPSULE BY MOUTH DAILY.   cyanocobalamin (VITAMIN B-12) 100 mcg tablet  Self Yes No   cyanocobalamin (VITAMIN B-12) 1000 MCG tablet  Self No No   Sig: Take 1 tablet (1,000 mcg total) by mouth daily   etonogestrel (NEXPLANON) subdermal implant  Self Yes No   Si mg by Subdermal route once   Patient not taking: Reported on 3/11/2025   fluticasone (FLONASE) 50 mcg/act nasal spray   No No   Si spray into each nostril daily   fluticasone (Flonase Allergy Relief) 50 mcg/act nasal spray  Self No No   Si spray into each nostril daily   fluticasone-umeclidinium-vilanterol 100-62.5-25 mcg/actuation inhaler   No No   Sig: Inhale 1 puff daily Rinse mouth after use.   labetalol (NORMODYNE) 100 mg tablet   No No   Sig: TAKE 2 TABLETS BY MOUTH 2 TIMES A DAY.   lidocaine (LIDODERM) 5 %   No No   Sig: Apply 1 patch topically over 12 hours every 24 hours for 10 doses Remove & Discard patch within 12 hours or as directed by MD   loratadine (CLARITIN) 10 mg tablet   No No   Sig: Take 1 tablet (10 mg total) by mouth daily   methocarbamol (ROBAXIN) 500 mg tablet   No No   Sig: Take 1 tablet (500 mg total) by mouth 3 (three) times a day   montelukast (SINGULAIR) 10 mg tablet   No No   Sig: Take 1 tablet (10 mg total) by mouth daily at bedtime   predniSONE 10 mg tablet   No No   Si Tabs day 1, 5 tabs day 2, 4 tabs day 3, 3 tabs day 4, 2 tabs day 5, 1 tab day 6   sevelamer carbonate (RENVELA) 800 mg tablet   No No   Sig: TAKE 1 TABLET BY MOUTH THREE TIMES A DAY WITH MEALS   torsemide (DEMADEX) 100 mg tablet   No No   Sig: Take 1 tablet (100 mg total) by mouth daily   vit B complex-vit C-folic acid (Renal Caps) 1 mg   No No   Sig: Take 1 capsule by mouth daily with dinner      Facility-Administered Medications: None     Discharge Medication List as of  3/14/2025  3:27 PM        START taking these medications    Details   dexamethasone (DECADRON) 6 mg tablet Take 1 tablet (6 mg total) by mouth 1 (one) time for 1 dose Do not start before March 17, 2025., Starting Mon 3/17/2025, Normal           CONTINUE these medications which have NOT CHANGED    Details   acetaminophen (TYLENOL) 325 mg tablet Take 2 tablets (650 mg total) by mouth every 6 (six) hours as needed for mild pain or headaches, Starting Sun 1/26/2020, No Print      albuterol (2.5 mg/3 mL) 0.083 % nebulizer solution Take 3 mL (2.5 mg total) by nebulization every 6 (six) hours as needed for wheezing or shortness of breath, Starting Tue 7/18/2023, Normal      albuterol (ProAir HFA) 90 mcg/act inhaler Inhale 2 puffs every 4 (four) hours as needed for wheezing, Starting Fri 3/7/2025, Normal      amLODIPine (NORVASC) 5 mg tablet TAKE 1 TABLET BY MOUTH TWICE A DAY, Starting Tue 1/21/2025, Normal      amoxicillin (AMOXIL) 500 mg capsule Take 500 mg by mouth 3 (three) times a day, Starting Tue 1/21/2025, Historical Med      brompheniramine-pseudoephedrine-DM 30-2-10 MG/5ML syrup Take 5 mL by mouth 4 (four) times a day as needed for cough or congestion, Starting Thu 1/2/2025, Normal      calcitriol (ROCALTROL) 0.5 MCG capsule TAKE 1 CAPSULE BY MOUTH DAILY., Starting Tue 1/21/2025, Normal      !! cyanocobalamin (VITAMIN B-12) 100 mcg tablet Historical Med      !! cyanocobalamin (VITAMIN B-12) 1000 MCG tablet Take 1 tablet (1,000 mcg total) by mouth daily, Starting Tue 11/5/2024, Normal      etonogestrel (NEXPLANON) subdermal implant 68 mg by Subdermal route once, Historical Med      Ferric Citrate (Auryxia) 1  MG(Fe) TABS Take 2 tablets (2 g total) by mouth 3 (three) times a day with meals, Starting Fri 3/7/2025, Normal      !! fluticasone (Flonase Allergy Relief) 50 mcg/act nasal spray 1 spray into each nostril daily, Starting Wed 10/16/2024, Normal      !! fluticasone (FLONASE) 50 mcg/act nasal spray 1 spray  into each nostril daily, Starting Thu 1/2/2025, Normal      fluticasone-umeclidinium-vilanterol 100-62.5-25 mcg/actuation inhaler Inhale 1 puff daily Rinse mouth after use., Starting Thu 3/13/2025, Normal      labetalol (NORMODYNE) 100 mg tablet TAKE 2 TABLETS BY MOUTH 2 TIMES A DAY., Starting Wed 1/29/2025, Normal      Lido-Capsaicin-Men-Methyl Sal (Medi-Patch-Lidocaine) 0.5-0.035-5-20 % PTCH Apply 1 patch topically daily as needed (back pain), Starting Tue 6/25/2024, Normal      lidocaine (LIDODERM) 5 % Apply 1 patch topically over 12 hours every 24 hours for 10 doses Remove & Discard patch within 12 hours or as directed by MD, Starting Thu 6/13/2024, Until Tue 6/25/2024, No Print      loratadine (CLARITIN) 10 mg tablet Take 1 tablet (10 mg total) by mouth daily, Starting Tue 1/28/2025, Normal      methocarbamol (ROBAXIN) 500 mg tablet Take 1 tablet (500 mg total) by mouth 3 (three) times a day, Starting Wed 1/8/2025, Normal      montelukast (SINGULAIR) 10 mg tablet Take 1 tablet (10 mg total) by mouth daily at bedtime, Starting Fri 3/7/2025, Normal      predniSONE 10 mg tablet 6 Tabs day 1, 5 tabs day 2, 4 tabs day 3, 3 tabs day 4, 2 tabs day 5, 1 tab day 6, Normal      sevelamer carbonate (RENVELA) 800 mg tablet TAKE 1 TABLET BY MOUTH THREE TIMES A DAY WITH MEALS, Starting Tue 1/21/2025, Normal      torsemide (DEMADEX) 100 mg tablet Take 1 tablet (100 mg total) by mouth daily, Starting Thu 3/6/2025, Normal      vit B complex-vit C-folic acid (Renal Caps) 1 mg Take 1 capsule by mouth daily with dinner, Starting Fri 3/7/2025, Normal       !! - Potential duplicate medications found. Please discuss with provider.        No discharge procedures on file.  ED SEPSIS DOCUMENTATION   Time reflects when diagnosis was documented in both MDM as applicable and the Disposition within this note       Time User Action Codes Description Comment    3/14/2025  3:25 PM Juan Lozoya Add [J45.901] Asthma exacerbation                   Juan Lozoya MD  03/14/25 2331

## 2025-03-14 NOTE — ED ATTENDING ATTESTATION
3/14/2025  IAbhishek DO, saw and evaluated the patient. I have discussed the patient with the resident/non-physician practitioner and agree with the resident's/non-physician practitioner's findings, Plan of Care, and MDM as documented in the resident's/non-physician practitioner's note, except where noted. All available labs and Radiology studies were reviewed.  I was present for key portions of any procedure(s) performed by the resident/non-physician practitioner and I was immediately available to provide assistance.       At this point I agree with the current assessment done in the Emergency Department.  I have conducted an independent evaluation of this patient a history and physical is as follows:    27 yo F coming in for shortness of breath and wheezing, she was detention through her dialysis session (2 hours) and had to stop due to her SOB. She states she was exposed to cigarette smoke while walking into the dialysis center today, which she feels triggered an asthma attack. Came into the ED by EMS with nebulizer treatment in process. States feeling a bit better with it now. Symptoms feel like an asthma attack to her.    PE:  The patient is well appearing, non-toxic, in NAD. Head: normocephalic, atraumatic. HEENT: mucous membranes moist.  Lungs: she is tachypneic, fair air movement b/l, no resp distress. Heart: RRR. No M/R/G. Abdomen: NT, ND, no R/R/G. Neuro: CN2-12 intact, GCS 15. Normal strength and sensation, normal speech and gait. Cap refill < 2 sec, skin warm and dry. No rashes or lesions.    Neb treatments given with improvement in symptoms and wheezing. Stable for d/c home - asthma exacerbation due to cigarette smoke.          ED Course         Critical Care Time  Procedures

## 2025-03-14 NOTE — DISCHARGE INSTRUCTIONS
Please follow up with your primary care provider concerning your visit today within the next 1-3 days.  Please return to the Emergency Department for any other concerns or worsening symptoms.

## 2025-03-16 LAB
ATRIAL RATE: 71 BPM
P AXIS: 3 DEGREES
PR INTERVAL: 138 MS
QRS AXIS: 36 DEGREES
QRSD INTERVAL: 84 MS
QT INTERVAL: 452 MS
QTC INTERVAL: 491 MS
T WAVE AXIS: 51 DEGREES
VENTRICULAR RATE: 71 BPM

## 2025-03-16 PROCEDURE — 93010 ELECTROCARDIOGRAM REPORT: CPT | Performed by: INTERNAL MEDICINE

## 2025-03-17 ENCOUNTER — VBI (OUTPATIENT)
Dept: FAMILY MEDICINE CLINIC | Facility: CLINIC | Age: 30
End: 2025-03-17

## 2025-03-17 DIAGNOSIS — M54.9 BACK PAIN: ICD-10-CM
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
## 2025-03-17 DIAGNOSIS — M54.2 NECK PAIN: ICD-10-CM

## 2025-03-17 LAB
LAB AP GYN PRIMARY INTERPRETATION: NORMAL
Lab: NORMAL
PATH INTERP SPEC-IMP: NORMAL

## 2025-03-17 RX ORDER — LIDOCAINE 50 MG/G
1 PATCH TOPICAL EVERY 24 HOURS
Qty: 10 PATCH | Refills: 0 | Status: SHIPPED | OUTPATIENT
Start: 2025-03-17 | End: 2025-03-27

## 2025-03-17 NOTE — TELEPHONE ENCOUNTER
03/17/25 11:39 AM    Patient contacted post ED visit, first outreach attempt made. Message was left for patient to return a call to the [facility] Department at [facility]: Phone [phone].    Thank you.  [place]ana Johnson MA  [facility]

## 2025-03-18 ENCOUNTER — HOSPITAL ENCOUNTER (OUTPATIENT)
Dept: CT IMAGING | Facility: HOSPITAL | Age: 30
Discharge: HOME | End: 2025-03-18
Attending: INTERNAL MEDICINE
Payer: MEDICARE

## 2025-03-18 ENCOUNTER — HOSPITAL ENCOUNTER (OUTPATIENT)
Dept: NON INVASIVE DIAGNOSTICS | Facility: HOSPITAL | Age: 30
Discharge: HOME | End: 2025-03-18
Attending: INTERNAL MEDICINE
Payer: MEDICARE

## 2025-03-18 VITALS
SYSTOLIC BLOOD PRESSURE: 141 MMHG | DIASTOLIC BLOOD PRESSURE: 65 MMHG | HEART RATE: 68 BPM | WEIGHT: 246 LBS | BODY MASS INDEX: 43.59 KG/M2 | HEIGHT: 63 IN

## 2025-03-18 DIAGNOSIS — N18.1 BENIGN HYPERTENSION WITH CKD (CHRONIC KIDNEY DISEASE) STAGE I: ICD-10-CM

## 2025-03-18 DIAGNOSIS — D63.8 ANEMIA, CHRONIC DISEASE: ICD-10-CM

## 2025-03-18 DIAGNOSIS — I12.9 BENIGN HYPERTENSION WITH CKD (CHRONIC KIDNEY DISEASE) STAGE I: ICD-10-CM

## 2025-03-18 DIAGNOSIS — M54.2 NECK PAIN: ICD-10-CM

## 2025-03-18 DIAGNOSIS — N18.4 CKD (CHRONIC KIDNEY DISEASE), STAGE IV (HCC): ICD-10-CM

## 2025-03-18 DIAGNOSIS — Z01.818 ENCOUNTER FOR PREOPERATIVE EVALUATION OF LIVING DONOR OF KIDNEY: ICD-10-CM

## 2025-03-18 DIAGNOSIS — Z01.818 PRE-TRANSPLANT EVALUATION FOR KIDNEY TRANSPLANT: ICD-10-CM

## 2025-03-18 DIAGNOSIS — N18.6 ESRD (END STAGE RENAL DISEASE) (HCC): ICD-10-CM
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
## 2025-03-18 DIAGNOSIS — M54.9 BACK PAIN: ICD-10-CM

## 2025-03-18 LAB
AORTIC ROOT: 2.7 CM
ASCENDING AORTA: 2.7 CM
BSA FOR ECHO PROCEDURE: 2.11 M2
E WAVE DECELERATION TIME: 322 MS
E/A RATIO: 0.97
FRACTIONAL SHORTENING: 30 (ref 28–44)
INTERVENTRICULAR SEPTUM IN DIASTOLE (PARASTERNAL SHORT AXIS VIEW): 1.2 CM
INTERVENTRICULAR SEPTUM: 1.2 CM (ref 0.6–1.1)
LAAS-AP2: 20.9 CM2
LAAS-AP4: 23.1 CM2
LEFT ATRIUM SIZE: 4.3 CM
LEFT ATRIUM VOLUME (MOD BIPLANE): 71 ML
LEFT ATRIUM VOLUME INDEX (MOD BIPLANE): 33.6 ML/M2
LEFT INTERNAL DIMENSION IN SYSTOLE: 3.7 CM (ref 2.1–4)
LEFT VENTRICULAR INTERNAL DIMENSION IN DIASTOLE: 5.3 CM (ref 3.5–6)
LEFT VENTRICULAR POSTERIOR WALL IN END DIASTOLE: 1 CM
LEFT VENTRICULAR STROKE VOLUME: 76 ML
LV EF US.2D.A4C+ESTIMATED: 67 %
LVSV (TEICH): 76 ML
MV E'TISSUE VEL-SEP: 8 CM/S
MV PEAK A VEL: 1.05 M/S
MV PEAK E VEL: 102 CM/S
RA PRESSURE ESTIMATED: 3 MMHG
RIGHT ATRIAL 2D VOLUME: 37 ML
RIGHT ATRIUM AREA SYSTOLE A4C: 14.3 CM2
RIGHT VENTRICLE ID DIMENSION: 3.4 CM
RV PSP: 31 MMHG
SL CV LEFT ATRIUM LENGTH A2C: 5.5 CM
SL CV LV EF: 60
SL CV PED ECHO LEFT VENTRICLE DIASTOLIC VOLUME (MOD BIPLANE) 2D: 133 ML
SL CV PED ECHO LEFT VENTRICLE SYSTOLIC VOLUME (MOD BIPLANE) 2D: 57 ML
TR MAX PG: 28 MMHG
TR PEAK VELOCITY: 2.6 M/S
TRICUSPID ANNULAR PLANE SYSTOLIC EXCURSION: 3.4 CM
TRICUSPID VALVE PEAK REGURGITATION VELOCITY: 2.64 M/S

## 2025-03-18 PROCEDURE — 74176 CT ABD & PELVIS W/O CONTRAST: CPT

## 2025-03-18 PROCEDURE — 93306 TTE W/DOPPLER COMPLETE: CPT | Performed by: INTERNAL MEDICINE

## 2025-03-18 PROCEDURE — 93306 TTE W/DOPPLER COMPLETE: CPT

## 2025-03-18 NOTE — TELEPHONE ENCOUNTER
03/18/25 11:40 AM    Patient contacted post ED visit, [facility] department spoke with patient/caregiver and outreach was successful.    Thank you.  [place] Johnson MA  [facility]

## 2025-03-19 RX ORDER — LIDOCAINE 50 MG/G
1 PATCH TOPICAL EVERY 24 HOURS
Qty: 10 PATCH | Refills: 0 | OUTPATIENT
Start: 2025-03-19 | End: 2025-03-29

## 2025-03-30 ENCOUNTER — APPOINTMENT (EMERGENCY)
Dept: RADIOLOGY | Facility: HOSPITAL | Age: 30
End: 2025-03-30
Payer: MEDICARE

## 2025-03-30 ENCOUNTER — HOSPITAL ENCOUNTER (EMERGENCY)
Facility: HOSPITAL | Age: 30
Discharge: HOME | End: 2025-03-30
Attending: EMERGENCY MEDICINE
Payer: MEDICARE

## 2025-03-30 VITALS
HEART RATE: 79 BPM | RESPIRATION RATE: 18 BRPM | SYSTOLIC BLOOD PRESSURE: 172 MMHG | DIASTOLIC BLOOD PRESSURE: 98 MMHG | TEMPERATURE: 98.8 F | OXYGEN SATURATION: 100 %

## 2025-03-30 DIAGNOSIS — M25.531 RIGHT WRIST PAIN: Primary | ICD-10-CM

## 2025-03-30 DIAGNOSIS — J45.901 ASTHMA EXACERBATION: ICD-10-CM

## 2025-03-30 LAB
ALBUMIN SERPL BCG-MCNC: 4.5 G/DL (ref 3.5–5)
ALP SERPL-CCNC: 56 U/L (ref 34–104)
ALT SERPL W P-5'-P-CCNC: 11 U/L (ref 7–52)
ANION GAP SERPL CALCULATED.3IONS-SCNC: 14 MMOL/L (ref 4–13)
APTT PPP: 28 SECONDS (ref 23–34)
AST SERPL W P-5'-P-CCNC: 35 U/L (ref 13–39)
BASOPHILS # BLD AUTO: 0.04 THOUSANDS/ÂΜL (ref 0–0.1)
BASOPHILS NFR BLD AUTO: 1 % (ref 0–1)
BILIRUB SERPL-MCNC: 0.41 MG/DL (ref 0.2–1)
BUN SERPL-MCNC: 30 MG/DL (ref 5–25)
CALCIUM SERPL-MCNC: 10.4 MG/DL (ref 8.4–10.2)
CHLORIDE SERPL-SCNC: 93 MMOL/L (ref 96–108)
CO2 SERPL-SCNC: 23 MMOL/L (ref 21–32)
CREAT SERPL-MCNC: 13.35 MG/DL (ref 0.6–1.3)
CRP SERPL QL: 10.9 MG/L
EOSINOPHIL # BLD AUTO: 0.34 THOUSAND/ÂΜL (ref 0–0.61)
EOSINOPHIL NFR BLD AUTO: 5 % (ref 0–6)
ERYTHROCYTE [DISTWIDTH] IN BLOOD BY AUTOMATED COUNT: 18.4 % (ref 11.6–15.1)
ERYTHROCYTE [SEDIMENTATION RATE] IN BLOOD: 29 MM/HOUR (ref 0–19)
GFR SERPL CREATININE-BSD FRML MDRD: 3 ML/MIN/1.73SQ M
GLUCOSE SERPL-MCNC: 76 MG/DL (ref 65–140)
HCT VFR BLD AUTO: 35.7 % (ref 34.8–46.1)
HGB BLD-MCNC: 11.1 G/DL (ref 11.5–15.4)
IMM GRANULOCYTES # BLD AUTO: 0.02 THOUSAND/UL (ref 0–0.2)
IMM GRANULOCYTES NFR BLD AUTO: 0 % (ref 0–2)
INR PPP: 0.93 (ref 0.85–1.19)
LACTATE SERPL-SCNC: 0.5 MMOL/L (ref 0.5–2)
LYMPHOCYTES # BLD AUTO: 1.54 THOUSANDS/ÂΜL (ref 0.6–4.47)
LYMPHOCYTES NFR BLD AUTO: 22 % (ref 14–44)
MCH RBC QN AUTO: 23.7 PG (ref 26.8–34.3)
MCHC RBC AUTO-ENTMCNC: 31.1 G/DL (ref 31.4–37.4)
MCV RBC AUTO: 76 FL (ref 82–98)
MONOCYTES # BLD AUTO: 0.65 THOUSAND/ÂΜL (ref 0.17–1.22)
MONOCYTES NFR BLD AUTO: 9 % (ref 4–12)
NEUTROPHILS # BLD AUTO: 4.52 THOUSANDS/ÂΜL (ref 1.85–7.62)
NEUTS SEG NFR BLD AUTO: 63 % (ref 43–75)
NRBC BLD AUTO-RTO: 0 /100 WBCS
PLATELET # BLD AUTO: 292 THOUSANDS/UL (ref 149–390)
PMV BLD AUTO: 10.5 FL (ref 8.9–12.7)
POTASSIUM SERPL-SCNC: 4.7 MMOL/L (ref 3.5–5.3)
PROT SERPL-MCNC: 7.3 G/DL (ref 6.4–8.4)
PROTHROMBIN TIME: 13.2 SECONDS (ref 12.3–15)
RBC # BLD AUTO: 4.68 MILLION/UL (ref 3.81–5.12)
SODIUM SERPL-SCNC: 130 MMOL/L (ref 135–147)
WBC # BLD AUTO: 7.11 THOUSAND/UL (ref 4.31–10.16)

## 2025-03-30 PROCEDURE — 99283 EMERGENCY DEPT VISIT LOW MDM: CPT

## 2025-03-30 PROCEDURE — 85652 RBC SED RATE AUTOMATED: CPT

## 2025-03-30 PROCEDURE — 99284 EMERGENCY DEPT VISIT MOD MDM: CPT | Performed by: EMERGENCY MEDICINE

## 2025-03-30 PROCEDURE — 86140 C-REACTIVE PROTEIN: CPT

## 2025-03-30 PROCEDURE — 85610 PROTHROMBIN TIME: CPT

## 2025-03-30 PROCEDURE — 85730 THROMBOPLASTIN TIME PARTIAL: CPT

## 2025-03-30 PROCEDURE — 73090 X-RAY EXAM OF FOREARM: CPT

## 2025-03-30 PROCEDURE — 73110 X-RAY EXAM OF WRIST: CPT

## 2025-03-30 PROCEDURE — 87040 BLOOD CULTURE FOR BACTERIA: CPT

## 2025-03-30 PROCEDURE — 85025 COMPLETE CBC W/AUTO DIFF WBC: CPT

## 2025-03-30 PROCEDURE — 83605 ASSAY OF LACTIC ACID: CPT

## 2025-03-30 PROCEDURE — 36415 COLL VENOUS BLD VENIPUNCTURE: CPT

## 2025-03-30 PROCEDURE — 80053 COMPREHEN METABOLIC PANEL: CPT

## 2025-03-30 RX ORDER — OXYCODONE HYDROCHLORIDE 5 MG/1
5 TABLET ORAL ONCE
Refills: 0 | Status: COMPLETED | OUTPATIENT
Start: 2025-03-30 | End: 2025-03-30

## 2025-03-30 RX ORDER — LIDOCAINE HYDROCHLORIDE 20 MG/ML
5 INJECTION, SOLUTION EPIDURAL; INFILTRATION; INTRACAUDAL; PERINEURAL ONCE
Status: COMPLETED | OUTPATIENT
Start: 2025-03-30 | End: 2025-03-30

## 2025-03-30 RX ADMIN — OXYCODONE HYDROCHLORIDE 5 MG: 5 TABLET ORAL at 18:52

## 2025-03-30 RX ADMIN — LIDOCAINE HYDROCHLORIDE 5 ML: 20 INJECTION, SOLUTION EPIDURAL; INFILTRATION; INTRACAUDAL at 19:48

## 2025-03-30 NOTE — Clinical Note
Raven Simeon was seen and treated in our emergency department on 3/30/2025.                Diagnosis:     Raven  may return to work on return date.    She may return on this date: 04/01/2025         If you have any questions or concerns, please don't hesitate to call.      Francie Eldridge MD    ______________________________           _______________          _______________  Hospital Representative                              Date                                Time

## 2025-03-31 ENCOUNTER — VBI (OUTPATIENT)
Dept: FAMILY MEDICINE CLINIC | Facility: CLINIC | Age: 30
End: 2025-03-31

## 2025-03-31 NOTE — TELEPHONE ENCOUNTER
03/31/25 9:46 AM    Patient contacted post ED visit, [facility] department spoke with patient/caregiver and outreach was successful.    Thank you.  [place] Johnson MA  [facility]

## 2025-03-31 NOTE — ED ATTENDING ATTESTATION
3/30/2025  IZuleika DO, saw and evaluated the patient. I have discussed the patient with the resident/non-physician practitioner and agree with the resident's/non-physician practitioner's findings, Plan of Care, and MDM as documented in the resident's/non-physician practitioner's note, except where noted. All available labs and Radiology studies were reviewed.  I was present for key portions of any procedure(s) performed by the resident/non-physician practitioner and I was immediately available to provide assistance.       At this point I agree with the current assessment done in the Emergency Department.  I have conducted an independent evaluation of this patient a history and physical is as follows:    ED Course   29-year-old female presents the emergency department for evaluation of severe right wrist pain.  Patient states that the pain initially started approximately 2 weeks ago when she fell into a car door jam with the hand outstretched.  She was evaluated and had an x-ray at that time that was negative for fracture.  Patient states that the pain has gradually increased in intensity and she now has severe pain when she attempts to flex or extend or supinate the wrist.  The pain is localized to the wrist but radiates to the thumb and into the elbow.  She reports reduced ability to flex and extend due to stiffness and pain.  She denies associated fevers or chills.  Of note the patient does have a right upper extremity AV fistula.  She reports having chronic dysesthesia in the proximal forearm since having the AV fistula placed.  Patient denies any skin changes or redness.    Past medical history: Asthma, end-stage renal disease on hemodialysis, anemia  Physical exam: Patient is in moderate distress pacing in the room due to discomfort.  Pupils are equal and reactive.  Right upper extremity AV fistula with positive thrill.  Heart is regular rate and rhythm.  Lungs have decreased breath sounds at both bases.   Abdomen is soft and nontender.  The right wrist has mild edema.  No overlying warmth or redness.  Patient has exquisite pain with palpation of the wrist and decreased ability to flex and extend the wrist.  Supination is exquisitely painful.  Palpable radial and ulnar pulses present.  Normal capillary refill of the fingers.    Assessment/plan: 29-year-old female presents with progressively worsening right wrist pain.  Patient appears to have pain out of proportion to exam.  Concern for possible septic arthritis due to worsening pain.  Patient could be considered immunocompromise due to her history of end-stage renal disease on hemodialysis.  Will attempt to obtain synovial fluid from joint.  Will check inflammatory markers.  Treat pain, reassess.    Update: Less than 1 cc of synovial fluid was obtained - not enough specimen to send for testing.  Case was discussed with hand surgeon on-call Dr. Talavera who recommends splinting patient and having her following up in hand clinic tomorrow.  He has low suspicion for septic joint due to dry tap and no fever.  Patient placed in splint and provided with pain medication.  She was instructed to follow-up with hand surgery.  Will collect tomorrow for appointment.  Discussed signs and symptoms to return to the emergency department.  Critical Care Time  Procedures

## 2025-04-01 RX ORDER — ALBUTEROL SULFATE 90 UG/1
2 INHALANT RESPIRATORY (INHALATION) EVERY 4 HOURS PRN
Qty: 6.7 G | Refills: 5 | OUTPATIENT
Start: 2025-04-01

## 2025-04-04 LAB
BACTERIA BLD CULT: NORMAL
BACTERIA BLD CULT: NORMAL

## 2025-04-07 DIAGNOSIS — J45.40 MODERATE PERSISTENT ASTHMA WITHOUT COMPLICATION: ICD-10-CM

## 2025-04-09 DIAGNOSIS — J06.9 UPPER RESPIRATORY TRACT INFECTION, UNSPECIFIED TYPE: ICD-10-CM

## 2025-04-09 RX ORDER — MONTELUKAST SODIUM 10 MG/1
10 TABLET ORAL
Qty: 30 TABLET | Refills: 0 | OUTPATIENT
Start: 2025-04-09

## 2025-04-10 RX ORDER — FLUTICASONE PROPIONATE 50 MCG
SPRAY, SUSPENSION (ML) NASAL
Qty: 16 ML | OUTPATIENT
Start: 2025-04-10

## 2025-04-23 DIAGNOSIS — R21 RASH: Primary | ICD-10-CM

## 2025-04-23 NOTE — PROGRESS NOTES
Patient with diffuse over access  Resistant different types of tape will place Derm evaluation for potential medication

## 2025-04-25 DIAGNOSIS — J45.901 ASTHMA EXACERBATION: ICD-10-CM

## 2025-04-25 RX ORDER — ALBUTEROL SULFATE 90 UG/1
2 INHALANT RESPIRATORY (INHALATION) EVERY 4 HOURS PRN
Qty: 6.7 G | Refills: 5 | Status: SHIPPED | OUTPATIENT
Start: 2025-04-25

## 2025-04-28 DIAGNOSIS — J45.40 MODERATE PERSISTENT ASTHMA WITHOUT COMPLICATION: ICD-10-CM

## 2025-04-28 DIAGNOSIS — J45.901 ASTHMA EXACERBATION: ICD-10-CM

## 2025-04-28 DIAGNOSIS — J30.81 CAT ALLERGY, AIRBORNE: ICD-10-CM

## 2025-04-28 RX ORDER — LORATADINE 10 MG/1
10 TABLET ORAL DAILY
Qty: 30 TABLET | Refills: 5 | Status: SHIPPED | OUTPATIENT
Start: 2025-04-28

## 2025-04-28 RX ORDER — ALBUTEROL SULFATE 90 UG/1
2 INHALANT RESPIRATORY (INHALATION) EVERY 4 HOURS PRN
Qty: 6.7 G | Refills: 0 | OUTPATIENT
Start: 2025-04-28

## 2025-04-28 RX ORDER — MONTELUKAST SODIUM 10 MG/1
10 TABLET ORAL
Qty: 30 TABLET | Refills: 0 | OUTPATIENT
Start: 2025-04-28

## 2025-05-01 ENCOUNTER — APPOINTMENT (OUTPATIENT)
Dept: RADIOLOGY | Facility: AMBULARY SURGERY CENTER | Age: 30
End: 2025-05-01
Attending: STUDENT IN AN ORGANIZED HEALTH CARE EDUCATION/TRAINING PROGRAM
Payer: MEDICARE

## 2025-05-01 ENCOUNTER — OFFICE VISIT (OUTPATIENT)
Dept: OBGYN CLINIC | Facility: CLINIC | Age: 30
End: 2025-05-01
Attending: EMERGENCY MEDICINE
Payer: MEDICARE

## 2025-05-01 DIAGNOSIS — S69.91XA INJURY OF RIGHT WRIST, INITIAL ENCOUNTER: Primary | ICD-10-CM

## 2025-05-01 DIAGNOSIS — M25.531 RIGHT WRIST PAIN: ICD-10-CM

## 2025-05-01 PROCEDURE — 73110 X-RAY EXAM OF WRIST: CPT

## 2025-05-01 PROCEDURE — 99213 OFFICE O/P EST LOW 20 MIN: CPT | Performed by: STUDENT IN AN ORGANIZED HEALTH CARE EDUCATION/TRAINING PROGRAM

## 2025-05-01 NOTE — PROGRESS NOTES
ORTHOPAEDIC HAND, WRIST, AND ELBOW OFFICE  VISIT      ASSESSMENT/PLAN:      Assessment & Plan  Injury of right wrist, initial encounter  X-rays were reviewed in the office today which are negative for any fractures or dislocations. Treatment options were discussed in the form of formal therapy to work on motion. We also discussed ordering a MRI however, this will likely not change treatment plan. The patient elected to proceed with both. A MRI of her right wrist was ordered. A referral was also provided to formal therapy. She was instructed to wean out of the brace. I will call her with the MRI results.   Orders:    Ambulatory Referral to PT/OT Hand Therapy; Future    MRI wrist right wo contrast; Future    Right wrist pain    Orders:    Ambulatory Referral to Orthopedic Surgery    XR wrist 3+ vw right; Future        Follow Up:  8 weeks      To Do Next Visit:  Re-evaluation of current issue          Jose Ritchie MD  Attending, Orthopaedic Surgery  Hand, Wrist, and Elbow Surgery  [facility]    ______________________________________________________________________________________________    CHIEF COMPLAINT:  Chief Complaint   Patient presents with    Right Wrist - Pain       SUBJECTIVE:  Patient is a 29 y.o. RHD female who presents today for evaluation and treatment of right wrist pain. The patient states on in March she was helping her mom out of the car when she went to reach to grab her and she hit the palmar aspect of her hand on the car frame. She noted immediate pain. She presented to the ED on 3/30/25 where x-rays were taken. She has been using a wrist brace. She notes pain to the radial aspect of her wrist. She notes cracking with motion.      Occupation: disabled due to end stage kidney disease      I have personally reviewed all the relevant PMH, PSH, SH, FH, Medications and allergies      PAST MEDICAL HISTORY:  Past Medical History:   Diagnosis Date    Asthma     Chronic kidney  disease     Eczema     Headache     History of transfusion     Hypertension     Increased anion gap metabolic acidosis 02/28/2022    Wears glasses        PAST SURGICAL HISTORY:  Past Surgical History:   Procedure Laterality Date    CHOLECYSTECTOMY      CT NEEDLE BIOPSY KIDNEY  1/29/2020    IR AV FISTULAGRAM/GRAFTOGRAM  11/13/2024    IR AV FISTULAGRAM/GRAFTOGRAM  1/28/2025    IR TUNNELED DIALYSIS CATHETER PLACEMENT  6/1/2024    IR TUNNELED DIALYSIS CATHETER REMOVAL  11/26/2024    KELOID EXCISION Left 3/30/2021    Procedure: POSTERIOR EAR EXCISION KELOID, FLAP RECONSTRUCTION;  Surgeon: Beka Hugo MD;  Location: [facility];  Service: Plastics    TX ARTERIOVENOUS ANASTOMOSIS OPEN DIRECT Right 8/27/2024    Procedure: CREATION FISTULA ARTERIOVENOUS (AV);  Surgeon: Marcus Espitia MD;  Location: [facility];  Service: Vascular       FAMILY HISTORY:  Family History   Problem Relation Age of Onset    Asthma Mother     No Known Problems Father        SOCIAL HISTORY:  Social History     Tobacco Use    Smoking status: Never    Smokeless tobacco: Never   Vaping Use    Vaping status: Never Used   Substance Use Topics    Alcohol use: Not Currently     Comment: occassionally on social events    Drug use: No       MEDICATIONS:    Current Outpatient Medications:     acetaminophen (TYLENOL) 325 mg tablet, Take 2 tablets (650 mg total) by mouth every 6 (six) hours as needed for mild pain or headaches, Disp: 30 tablet, Rfl: 0    albuterol (2.5 mg/3 mL) 0.083 % nebulizer solution, Take 3 mL (2.5 mg total) by nebulization every 6 (six) hours as needed for wheezing or shortness of breath, Disp: 30 mL, Rfl: 0    albuterol (ProAir HFA) 90 mcg/act inhaler, Inhale 2 puffs every 4 (four) hours as needed for wheezing, Disp: 6.7 g, Rfl: 5    amLODIPine (NORVASC) 5 mg tablet, TAKE 1 TABLET BY MOUTH TWICE A DAY, Disp: 60 tablet, Rfl: 5    amoxicillin (AMOXIL) 500 mg capsule, Take 500 mg by mouth 3 (three) times a day, Disp: ,  Rfl:     brompheniramine-pseudoephedrine-DM 30-2-10 MG/5ML syrup, Take 5 mL by mouth 4 (four) times a day as needed for cough or congestion, Disp: 120 mL, Rfl: 0    calcitriol (ROCALTROL) 0.5 MCG capsule, TAKE 1 CAPSULE BY MOUTH DAILY., Disp: 30 capsule, Rfl: 5    cyanocobalamin (VITAMIN B-12) 100 mcg tablet, , Disp: , Rfl:     cyanocobalamin (VITAMIN B-12) 1000 MCG tablet, Take 1 tablet (1,000 mcg total) by mouth daily, Disp: 30 tablet, Rfl: 5    Ferric Citrate (Auryxia) 1  MG(Fe) TABS, Take 2 tablets (2 g total) by mouth 3 (three) times a day with meals, Disp: 540 tablet, Rfl: 2    fluticasone (Flonase Allergy Relief) 50 mcg/act nasal spray, 1 spray into each nostril daily, Disp: 15.8 mL, Rfl: 1    fluticasone (FLONASE) 50 mcg/act nasal spray, 1 spray into each nostril daily, Disp: 9.9 mL, Rfl: 0    fluticasone-umeclidinium-vilanterol 100-62.5-25 mcg/actuation inhaler, Inhale 1 puff daily Rinse mouth after use., Disp: 1 each, Rfl: 5    labetalol (NORMODYNE) 100 mg tablet, TAKE 2 TABLETS BY MOUTH 2 TIMES A DAY., Disp: 120 tablet, Rfl: 5    loratadine (CLARITIN) 10 mg tablet, Take 1 tablet (10 mg total) by mouth daily, Disp: 30 tablet, Rfl: 5    methocarbamol (ROBAXIN) 500 mg tablet, Take 1 tablet (500 mg total) by mouth 3 (three) times a day, Disp: 21 tablet, Rfl: 0    montelukast (SINGULAIR) 10 mg tablet, Take 1 tablet (10 mg total) by mouth daily at bedtime, Disp: 30 tablet, Rfl: 5    predniSONE 10 mg tablet, 6 Tabs day 1, 5 tabs day 2, 4 tabs day 3, 3 tabs day 4, 2 tabs day 5, 1 tab day 6, Disp: 21 tablet, Rfl: 0    sevelamer carbonate (RENVELA) 800 mg tablet, TAKE 1 TABLET BY MOUTH THREE TIMES A DAY WITH MEALS, Disp: 90 tablet, Rfl: 0    torsemide (DEMADEX) 100 mg tablet, Take 1 tablet (100 mg total) by mouth daily, Disp: 90 tablet, Rfl: 1    vit B complex-vit C-folic acid (Renal Caps) 1 mg, Take 1 capsule by mouth daily with dinner, Disp: 90 capsule, Rfl: 2    etonogestrel (NEXPLANON) subdermal implant,  68 mg by Subdermal route once (Patient not taking: Reported on 3/11/2025), Disp: , Rfl:     Lido-Capsaicin-Men-Methyl Sal (Medi-Patch-Lidocaine) 0.5-0.035-5-20 % PTCH, Apply 1 patch topically daily as needed (back pain) (Patient not taking: Reported on 3/11/2025), Disp: 30 patch, Rfl: 2    lidocaine (LIDODERM) 5 %, Apply 1 patch topically over 12 hours every 24 hours for 10 doses Remove & Discard patch within 12 hours or as directed by MD, Disp: 10 patch, Rfl: 0    ALLERGIES:  Allergies   Allergen Reactions    Seasonal Ic [Cholestatin] Itching    Wheat Bran - Food Allergy Itching           REVIEW OF SYSTEMS:  Musculoskeletal:        As noted in HPI.   All other systems reviewed and are negative.    VITALS:  There were no vitals filed for this visit.    LABS:  HgA1c:   Lab Results   Component Value Date    HGBA1C 5.3 11/13/2021     BMP:   Lab Results   Component Value Date    GLUCOSE 86 06/12/2015    CALCIUM 10.4 (H) 03/30/2025     06/12/2015    K 3.7 04/23/2025    CO2 23 03/30/2025    CL 93 (L) 03/30/2025    BUN 30 (H) 03/30/2025    CREATININE 13.35 (H) 03/30/2025       _____________________________________________________  PHYSICAL EXAMINATION:  General: Well developed and well nourished, alert & oriented x 3, appears comfortable  Psychiatric: Normal  HEENT: Normocephalic, Atraumatic Trachea Midline, No torticollis  Pulmonary: No audible wheezing or respiratory distress   Abdomen/GI: Non tender, non distended   Cardiovascular: No pitting edema, 2+ radial pulse   Skin: No masses, erythema, lacerations, fluctation, ulcerations  Neurovascular: Sensation Intact to the Median, Ulnar, Radial Nerve, Motor Intact to the Median, Ulnar, Radial Nerve, and Pulses Intact  Musculoskeletal: Normal, except as noted in detailed exam and in HPI.      MUSCULOSKELETAL EXAMINATION:  Right wrist  NTTP radial styloid  NTTP lunate  TTP scaphoid  NTTP TFCC  NTTP ECU  Flexion 65  Ext  55  ___________________________________________________  STUDIES REVIEWED:  Xrays of the right wrist were reviewed and independently interpreted in PACS by Dr. Ritchie and demonstrate ulnar minus variance.           PROCEDURES PERFORMED:  Procedures  No Procedures performed today    _____________________________________________________      Scribe Attestation      I,:  Carmen Sarkar MA am acting as a scribe while in the presence of the attending physician.:       I,:  Jose Ritchie MD personally performed the services described in this documentation    as scribed in my presence.:

## 2025-05-04 DIAGNOSIS — J30.81 CAT ALLERGY, AIRBORNE: ICD-10-CM

## 2025-05-04 RX ORDER — FLUTICASONE PROPIONATE 50 MCG
1 SPRAY, SUSPENSION (ML) NASAL DAILY
Qty: 15.8 ML | Refills: 0 | Status: SHIPPED | OUTPATIENT
Start: 2025-05-04

## 2025-05-04 RX ORDER — LORATADINE 10 MG/1
10 TABLET ORAL DAILY
Qty: 30 TABLET | Refills: 0 | OUTPATIENT
Start: 2025-05-04

## 2025-05-07 DIAGNOSIS — N25.81 SECONDARY HYPERPARATHYROIDISM OF RENAL ORIGIN (HCC): ICD-10-CM

## 2025-05-08 ENCOUNTER — OFFICE VISIT (OUTPATIENT)
Dept: FAMILY MEDICINE CLINIC | Facility: CLINIC | Age: 30
End: 2025-05-08
Payer: MEDICARE

## 2025-05-08 VITALS
BODY MASS INDEX: 44.65 KG/M2 | OXYGEN SATURATION: 99 % | SYSTOLIC BLOOD PRESSURE: 156 MMHG | HEIGHT: 63 IN | DIASTOLIC BLOOD PRESSURE: 74 MMHG | TEMPERATURE: 98 F | WEIGHT: 252 LBS | HEART RATE: 74 BPM

## 2025-05-08 DIAGNOSIS — J45.40 MODERATE PERSISTENT ASTHMA WITHOUT COMPLICATION: Primary | ICD-10-CM

## 2025-05-08 PROCEDURE — 99213 OFFICE O/P EST LOW 20 MIN: CPT

## 2025-05-08 RX ORDER — CALCITRIOL 0.5 UG/1
0.5 CAPSULE, LIQUID FILLED ORAL DAILY
Qty: 30 CAPSULE | Refills: 5 | Status: SHIPPED | OUTPATIENT
Start: 2025-05-08

## 2025-05-08 NOTE — PROGRESS NOTES
Name: Raven Woodson      : 1995      MRN: [id number]  Encounter Provider: Roberto Britt DO  Encounter Date: 2025   Encounter department: [facility]  :  Assessment & Plan  Moderate persistent asthma without complication  Doing better on fluticasone/umeclidinium/vilanterol as well as singulair daily, down to needing albuterol roughly 2 times per week, which is a significant improvement from prior.  Her mother has voiced some concern that Singulair is leading to increased irritability, however Raven thinks this is more likely due to other relationship factors.    Plan  Discussed monitoring closely versus taking a holiday from Singulair to see how both mood and asthma symptom control are affected  After discussion with her and her mom, will trial holiday from Singulair, monitor how above symptoms are affected, will leave medication on the active list at this time in case she feels the need to restart  Follow-up in 6 weeks to reassess              History of Present Illness   HPI    29-year-old female presents with her mother for asthma follow-up.  Today her mother is concerned that Raven has been more irritable/aggressive since starting singulair several months ago, however Raven disagrees with this and states that Singulair is helping her breathing. She notes that she feels like she feels like a few times that she has become irritated by her mom putting pressure on her, asking too many things at once. She notes using rescue inhaler ~2 times per week.     Review of Systems   Constitutional:  Negative for appetite change and fever.   Respiratory:  Positive for shortness of breath (rare, relieved with rescue inhaler).    Cardiovascular:  Negative for chest pain.   Gastrointestinal:  Negative for abdominal pain and nausea.   Psychiatric/Behavioral:  Negative for confusion.         Some increased irritability recently       Objective   /74 (BP Location: Left arm, Patient  "Position: Sitting, Cuff Size: Large)   Pulse 74   Temp 98 °F (36.7 °C) (Temporal)   Ht 5' 3\" (1.6 m)   Wt 114 kg (252 lb)   SpO2 99%   BMI 44.64 kg/m²      Physical Exam  Constitutional:       General: She is not in acute distress.     Appearance: She is not toxic-appearing.     Eyes:      General: No scleral icterus.     Conjunctiva/sclera: Conjunctivae normal.       Cardiovascular:      Rate and Rhythm: Normal rate and regular rhythm.   Pulmonary:      Effort: Pulmonary effort is normal.      Breath sounds: Normal breath sounds.   Abdominal:      Palpations: Abdomen is soft.     Skin:     General: Skin is warm and dry.     Neurological:      Mental Status: She is alert and oriented to person, place, and time.     Psychiatric:         Mood and Affect: Mood normal.         Behavior: Behavior normal.         Thought Content: Thought content normal.         Judgment: Judgment normal.         "

## 2025-05-14 ENCOUNTER — HOSPITAL ENCOUNTER (EMERGENCY)
Facility: HOSPITAL | Age: 30
Discharge: HOME | End: 2025-05-14
Attending: EMERGENCY MEDICINE
Payer: MEDICARE

## 2025-05-14 VITALS
RESPIRATION RATE: 18 BRPM | HEART RATE: 85 BPM | OXYGEN SATURATION: 100 % | DIASTOLIC BLOOD PRESSURE: 82 MMHG | SYSTOLIC BLOOD PRESSURE: 156 MMHG

## 2025-05-14 DIAGNOSIS — K08.89 DENTALGIA: Primary | ICD-10-CM

## 2025-05-14 PROCEDURE — 99282 EMERGENCY DEPT VISIT SF MDM: CPT

## 2025-05-14 PROCEDURE — 99284 EMERGENCY DEPT VISIT MOD MDM: CPT

## 2025-05-14 RX ORDER — CLINDAMYCIN HYDROCHLORIDE 150 MG/1
450 CAPSULE ORAL 3 TIMES DAILY
Qty: 63 CAPSULE | Refills: 0 | Status: SHIPPED | OUTPATIENT
Start: 2025-05-14 | End: 2025-05-21

## 2025-05-14 RX ORDER — TRAMADOL HYDROCHLORIDE 50 MG/1
50 TABLET ORAL EVERY 12 HOURS
Qty: 10 TABLET | Refills: 0 | Status: SHIPPED | OUTPATIENT
Start: 2025-05-14 | End: 2025-05-19

## 2025-05-14 RX ORDER — TRAMADOL HYDROCHLORIDE 50 MG/1
50 TABLET ORAL ONCE
Status: COMPLETED | OUTPATIENT
Start: 2025-05-14 | End: 2025-05-14

## 2025-05-14 RX ORDER — CLINDAMYCIN HYDROCHLORIDE 150 MG/1
450 CAPSULE ORAL ONCE
Status: COMPLETED | OUTPATIENT
Start: 2025-05-14 | End: 2025-05-14

## 2025-05-14 RX ADMIN — TRAMADOL HYDROCHLORIDE 50 MG: 50 TABLET, COATED ORAL at 04:11

## 2025-05-14 RX ADMIN — CLINDAMYCIN HYDROCHLORIDE 450 MG: 150 CAPSULE ORAL at 04:12

## 2025-05-14 NOTE — DISCHARGE INSTRUCTIONS
Follow up with dentist within 1 week.  Clindamycin as prescribed.  Tylenol 1,000 mg every 8 hours as needed for pain.  Tramadol as prescribed for severe breakthrough pain that does not respond to Tylenol.  Return to ER with worsening abdominal pain, facial swelling, difficulty swallowing, or any other concerning symptoms.

## 2025-05-14 NOTE — ED PROVIDER NOTES
Time reflects when diagnosis was documented in both MDM as applicable and the Disposition within this note       Time User Action Codes Description Comment    5/14/2025  5:05 AM Max Osullivan Add [K08.89] Dentalgia           ED Disposition       ED Disposition   Discharge    Condition   Stable    Date/Time   Wed May 14, 2025  5:05 AM    Comment   Raven Stockton Chintan discharge to home/self care.                   Assessment & Plan       Medical Decision Making  29 year old female with ESRD on dialysis presenting with left sided upper and lower dental pain. Patient states she was seen a few days prior for dental pain and was recommended Tylenol for pain. Patient states her pain has worsened, prompting her to come in for evaluation. She took 1,000 mg of Tylenol PTA with minimal relief. She denies fevers, URI symptoms, trismus, difficulty swallowing, voice change, changes in appetite, chest pain, SOB, abdominal pain, urinary symptoms, paresthesias, or visual changes. On exam, poor dentition noted. No obvious dental abscess. No gingival swelling, no trismus, no hot potato voice.    DDX including but not limited to: dental infection, dental caries, dental abscess, thelma's angina, peritonsillar abscess    Exam reassuring, suspect dental infection. Will prescribe clindamycin for dental infection. As patient is on dialysis, advised against NSAIDS, patient prefers no major opioids. Discussed tramadol with patient thoroughly and patient is agreeable to try for dental pain. Patient's pain significantly improved following tramadol in ED. Prescribed clindamycin and low dose tramadol for severe breakthrough pain. Advised to continue taking Tylenol for pain. Advised to follow up with dentist, patient has appointment in 5 days. Prior to discharge, discharge instructions were discussed with patient at bedside. Patient was provided both verbal and written instructions. Patient is understanding of the discharge instructions and is  agreeable to plan of care. Return precautions were discussed with patient bedside, patient verbalized understanding of signs and symptoms that would necessitate return to the ED. All questions were answered. Patient was comfortable with the plan of care and discharged to home.       Problems Addressed:  Dentalgia: acute illness or injury    Risk  Prescription drug management.             Medications   clindamycin (CLEOCIN) capsule 450 mg (450 mg Oral Given 5/14/25 0412)   traMADol (ULTRAM) tablet 50 mg (50 mg Oral Given 5/14/25 0411)       ED Risk Strat Scores                    No data recorded        SBIRT 22yo+      Flowsheet Row Most Recent Value   Initial Alcohol Screen: US AUDIT-C     1. How often do you have a drink containing alcohol? 0 Filed at: 05/14/2025 0328   2. How many drinks containing alcohol do you have on a typical day you are drinking?  0 Filed at: 05/14/2025 0328   3a. Male UNDER 65: How often do you have five or more drinks on one occasion? 0 Filed at: 05/14/2025 0328   3b. FEMALE Any Age, or MALE 65+: How often do you have 4 or more drinks on one occassion? 0 Filed at: 05/14/2025 0328   Audit-C Score 0 Filed at: 05/14/2025 0328   CELY: How many times in the past year have you...    Used an illegal drug or used a prescription medication for non-medical reasons? Never Filed at: 05/14/2025 0328                            History of Present Illness       Chief Complaint   Patient presents with    Dental Pain     Left sided upper and lower dental pain. Tylenol taken around 0300.       Past Medical History:   Diagnosis Date    Asthma     Chronic kidney disease     Eczema     Headache     History of transfusion     Hypertension     Increased anion gap metabolic acidosis 02/28/2022    Wears glasses       Past Surgical History:   Procedure Laterality Date    CHOLECYSTECTOMY      CT NEEDLE BIOPSY KIDNEY  1/29/2020    IR AV FISTULAGRAM/GRAFTOGRAM  11/13/2024    IR AV FISTULAGRAM/GRAFTOGRAM  1/28/2025     IR TUNNELED DIALYSIS CATHETER PLACEMENT  6/1/2024    IR TUNNELED DIALYSIS CATHETER REMOVAL  11/26/2024    KELOID EXCISION Left 3/30/2021    Procedure: POSTERIOR EAR EXCISION KELOID, FLAP RECONSTRUCTION;  Surgeon: Beka Hugo MD;  Location: [facility];  Service: Plastics    OR ARTERIOVENOUS ANASTOMOSIS OPEN DIRECT Right 8/27/2024    Procedure: CREATION FISTULA ARTERIOVENOUS (AV);  Surgeon: Marcus Espitia MD;  Location: [facility];  Service: Vascular      Family History   Problem Relation Age of Onset    Asthma Mother     No Known Problems Father       Social History[1]   E-Cigarette/Vaping    E-Cigarette Use Never User       E-Cigarette/Vaping Substances    Nicotine No     THC No     CBD No     Flavoring No     Other No     Unknown No       I have reviewed and agree with the history as documented.     29 year old female with ESRD on dialysis presenting with left sided upper and lower dental pain. Patient states she was seen a few days prior for dental pain and was recommended Tylenol for pain. Patient states her pain has worsened, prompting her to come in for evaluation. She took 1,000 mg of Tylenol PTA with minimal relief. She denies fevers, URI symptoms, trismus, difficulty swallowing, voice change, changes in appetite, chest pain, SOB, abdominal pain, urinary symptoms, paresthesias, or visual changes. States she feels her dental pain is due to Velphoro.      Dental Pain  Associated symptoms: no congestion, no fever and no headaches        Review of Systems   Constitutional:  Negative for chills and fever.   HENT:  Positive for dental problem. Negative for congestion, sore throat, trouble swallowing and voice change.    Eyes:  Negative for visual disturbance.   Respiratory:  Negative for shortness of breath.    Cardiovascular:  Negative for chest pain.   Gastrointestinal:  Negative for abdominal pain, diarrhea, nausea and vomiting.   Genitourinary: Negative.    Musculoskeletal: Negative.     Neurological:  Negative for dizziness, weakness, light-headedness, numbness and headaches.   Psychiatric/Behavioral: Negative.             Objective       ED Triage Vitals   Temp Pulse Blood Pressure Respirations SpO2 Patient Position - Orthostatic VS   -- 05/14/25 0327 05/14/25 0327 05/14/25 0327 05/14/25 0327 05/14/25 0509    85 (!) 195/106 18 100 % Sitting      Temp src Heart Rate Source BP Location FiO2 (%) Pain Score    -- -- 05/14/25 0509 -- 05/14/25 0411      Left arm  10 - Worst Possible Pain      Vitals      Date and Time Temp Pulse SpO2 Resp BP Pain Score FACES Pain Rating User   05/14/25 0509 -- -- -- -- 156/82 -- -- IM   05/14/25 0411 -- -- -- -- -- 10 - Worst Possible Pain -- KD   05/14/25 0327 -- 85 100 % 18 195/106 -- -- JLZ            Physical Exam  Vitals reviewed.   Constitutional:       Appearance: Normal appearance. She is normal weight.   HENT:      Head: Normocephalic and atraumatic.      Nose: Nose normal.      Mouth/Throat:      Mouth: Mucous membranes are moist.      Dentition: Dental caries present. No dental abscesses or gum lesions.      Pharynx: Oropharynx is clear. Uvula midline. No uvula swelling.     Eyes:      Conjunctiva/sclera: Conjunctivae normal.       Cardiovascular:      Rate and Rhythm: Normal rate and regular rhythm.      Pulses: Normal pulses.      Heart sounds: Normal heart sounds.   Pulmonary:      Effort: Pulmonary effort is normal.      Breath sounds: Normal breath sounds.   Abdominal:      General: Abdomen is flat. Bowel sounds are normal. There is no distension.      Palpations: Abdomen is soft.      Tenderness: There is no abdominal tenderness. There is no guarding or rebound.     Musculoskeletal:         General: Normal range of motion.      Cervical back: Normal range of motion.     Skin:     General: Skin is warm and dry.      Capillary Refill: Capillary refill takes less than 2 seconds.     Neurological:      General: No focal deficit present.      Mental  Status: She is alert and oriented to person, place, and time. Mental status is at baseline.     Psychiatric:         Mood and Affect: Mood normal.         Behavior: Behavior normal.         Thought Content: Thought content normal.         Judgment: Judgment normal.         Results Reviewed       None            No orders to display       Procedures    ED Medication and Procedure Management   Prior to Admission Medications   Prescriptions Last Dose Informant Patient Reported? Taking?   Ferric Citrate (Auryxia) 1  MG(Fe) TABS   No No   Sig: Take 2 tablets (2 g total) by mouth 3 (three) times a day with meals   Lido-Capsaicin-Men-Methyl Sal (Medi-Patch-Lidocaine) 0.5-0.035-5-20 % PTCH  Self No No   Sig: Apply 1 patch topically daily as needed (back pain)   Patient not taking: Reported on 3/11/2025   acetaminophen (TYLENOL) 325 mg tablet  Self No No   Sig: Take 2 tablets (650 mg total) by mouth every 6 (six) hours as needed for mild pain or headaches   albuterol (2.5 mg/3 mL) 0.083 % nebulizer solution  Self No No   Sig: Take 3 mL (2.5 mg total) by nebulization every 6 (six) hours as needed for wheezing or shortness of breath   albuterol (ProAir HFA) 90 mcg/act inhaler   No No   Sig: Inhale 2 puffs every 4 (four) hours as needed for wheezing   amLODIPine (NORVASC) 5 mg tablet   No No   Sig: TAKE 1 TABLET BY MOUTH TWICE A DAY   amoxicillin (AMOXIL) 500 mg capsule   Yes No   Sig: Take 500 mg by mouth 3 (three) times a day   brompheniramine-pseudoephedrine-DM 30-2-10 MG/5ML syrup   No No   Sig: Take 5 mL by mouth 4 (four) times a day as needed for cough or congestion   calcitriol (ROCALTROL) 0.5 MCG capsule   No No   Sig: Take 1 capsule (0.5 mcg total) by mouth daily   cyanocobalamin (VITAMIN B-12) 100 mcg tablet  Self Yes No   cyanocobalamin (VITAMIN B-12) 1000 MCG tablet  Self No No   Sig: Take 1 tablet (1,000 mcg total) by mouth daily   etonogestrel (NEXPLANON) subdermal implant  Self Yes No   Si mg by  Subdermal route once   Patient not taking: Reported on 3/11/2025   fluticasone (FLONASE) 50 mcg/act nasal spray   No No   Si spray into each nostril daily   fluticasone (Flonase Allergy Relief) 50 mcg/act nasal spray   No No   Si spray into each nostril daily   fluticasone-umeclidinium-vilanterol 100-62.5-25 mcg/actuation inhaler   No No   Sig: Inhale 1 puff daily Rinse mouth after use.   labetalol (NORMODYNE) 100 mg tablet   No No   Sig: TAKE 2 TABLETS BY MOUTH 2 TIMES A DAY.   lidocaine (LIDODERM) 5 %   No No   Sig: Apply 1 patch topically over 12 hours every 24 hours for 10 doses Remove & Discard patch within 12 hours or as directed by MD   loratadine (CLARITIN) 10 mg tablet   No No   Sig: Take 1 tablet (10 mg total) by mouth daily   methocarbamol (ROBAXIN) 500 mg tablet   No No   Sig: Take 1 tablet (500 mg total) by mouth 3 (three) times a day   montelukast (SINGULAIR) 10 mg tablet   No No   Sig: Take 1 tablet (10 mg total) by mouth daily at bedtime   predniSONE 10 mg tablet   No No   Si Tabs day 1, 5 tabs day 2, 4 tabs day 3, 3 tabs day 4, 2 tabs day 5, 1 tab day 6   sevelamer carbonate (RENVELA) 800 mg tablet   No No   Sig: TAKE 1 TABLET BY MOUTH THREE TIMES A DAY WITH MEALS   torsemide (DEMADEX) 100 mg tablet   No No   Sig: Take 1 tablet (100 mg total) by mouth daily   vit B complex-vit C-folic acid (Renal Caps) 1 mg   No No   Sig: Take 1 capsule by mouth daily with dinner      Facility-Administered Medications: None     Discharge Medication List as of 2025  5:09 AM        START taking these medications    Details   clindamycin (CLEOCIN) 150 mg capsule Take 3 capsules (450 mg total) by mouth 3 (three) times a day for 7 days, Starting 2025, Until 2025, Normal      traMADol (Ultram) 50 mg tablet Take 1 tablet (50 mg total) by mouth every 12 (twelve) hours for 10 doses, Starting 2025, Until 2025, Normal           CONTINUE these medications which have NOT  CHANGED    Details   acetaminophen (TYLENOL) 325 mg tablet Take 2 tablets (650 mg total) by mouth every 6 (six) hours as needed for mild pain or headaches, Starting Sun 1/26/2020, No Print      albuterol (2.5 mg/3 mL) 0.083 % nebulizer solution Take 3 mL (2.5 mg total) by nebulization every 6 (six) hours as needed for wheezing or shortness of breath, Starting Tue 7/18/2023, Normal      albuterol (ProAir HFA) 90 mcg/act inhaler Inhale 2 puffs every 4 (four) hours as needed for wheezing, Starting Fri 4/25/2025, Normal      amLODIPine (NORVASC) 5 mg tablet TAKE 1 TABLET BY MOUTH TWICE A DAY, Starting Tue 1/21/2025, Normal      amoxicillin (AMOXIL) 500 mg capsule Take 500 mg by mouth 3 (three) times a day, Starting Tue 1/21/2025, Historical Med      brompheniramine-pseudoephedrine-DM 30-2-10 MG/5ML syrup Take 5 mL by mouth 4 (four) times a day as needed for cough or congestion, Starting Thu 1/2/2025, Normal      calcitriol (ROCALTROL) 0.5 MCG capsule Take 1 capsule (0.5 mcg total) by mouth daily, Starting Thu 5/8/2025, Normal      !! cyanocobalamin (VITAMIN B-12) 100 mcg tablet Historical Med      !! cyanocobalamin (VITAMIN B-12) 1000 MCG tablet Take 1 tablet (1,000 mcg total) by mouth daily, Starting Tue 11/5/2024, Normal      etonogestrel (NEXPLANON) subdermal implant 68 mg by Subdermal route once, Historical Med      Ferric Citrate (Auryxia) 1  MG(Fe) TABS Take 2 tablets (2 g total) by mouth 3 (three) times a day with meals, Starting Fri 3/7/2025, Normal      !! fluticasone (Flonase Allergy Relief) 50 mcg/act nasal spray 1 spray into each nostril daily, Starting Sun 5/4/2025, Normal      !! fluticasone (FLONASE) 50 mcg/act nasal spray 1 spray into each nostril daily, Starting Thu 1/2/2025, Normal      fluticasone-umeclidinium-vilanterol 100-62.5-25 mcg/actuation inhaler Inhale 1 puff daily Rinse mouth after use., Starting Thu 3/13/2025, Normal      labetalol (NORMODYNE) 100 mg tablet TAKE 2 TABLETS BY MOUTH 2  TIMES A DAY., Starting Wed 1/29/2025, Normal      Lido-Capsaicin-Men-Methyl Sal (Medi-Patch-Lidocaine) 0.5-0.035-5-20 % PTCH Apply 1 patch topically daily as needed (back pain), Starting Tue 6/25/2024, Normal      lidocaine (LIDODERM) 5 % Apply 1 patch topically over 12 hours every 24 hours for 10 doses Remove & Discard patch within 12 hours or as directed by MD, Starting Mon 3/17/2025, Until Thu 3/27/2025, Normal      loratadine (CLARITIN) 10 mg tablet Take 1 tablet (10 mg total) by mouth daily, Starting Mon 4/28/2025, Normal      methocarbamol (ROBAXIN) 500 mg tablet Take 1 tablet (500 mg total) by mouth 3 (three) times a day, Starting Wed 1/8/2025, Normal      montelukast (SINGULAIR) 10 mg tablet Take 1 tablet (10 mg total) by mouth daily at bedtime, Starting Fri 3/7/2025, Normal      predniSONE 10 mg tablet 6 Tabs day 1, 5 tabs day 2, 4 tabs day 3, 3 tabs day 4, 2 tabs day 5, 1 tab day 6, Normal      sevelamer carbonate (RENVELA) 800 mg tablet TAKE 1 TABLET BY MOUTH THREE TIMES A DAY WITH MEALS, Starting Tue 1/21/2025, Normal      torsemide (DEMADEX) 100 mg tablet Take 1 tablet (100 mg total) by mouth daily, Starting Thu 3/6/2025, Normal      vit B complex-vit C-folic acid (Renal Caps) 1 mg Take 1 capsule by mouth daily with dinner, Starting Fri 3/7/2025, Normal       !! - Potential duplicate medications found. Please discuss with provider.        No discharge procedures on file.  ED SEPSIS DOCUMENTATION   Time reflects when diagnosis was documented in both MDM as applicable and the Disposition within this note       Time User Action Codes Description Comment    5/14/2025  5:05 AM Max Osullivan Add [K08.89] Dentalgia                    [1]   Social History  Tobacco Use    Smoking status: Never    Smokeless tobacco: Never   Vaping Use    Vaping status: Never Used   Substance Use Topics    Alcohol use: Not Currently     Comment: occassionally on social events    Drug use: No        Max Osullivan PA-C  05/14/25  [id number]

## 2025-05-15 ENCOUNTER — VBI (OUTPATIENT)
Dept: FAMILY MEDICINE CLINIC | Facility: CLINIC | Age: 30
End: 2025-05-15

## 2025-05-15 ENCOUNTER — PATIENT OUTREACH (OUTPATIENT)
Dept: CASE MANAGEMENT | Facility: OTHER | Age: 30
End: 2025-05-15

## 2025-05-15 NOTE — TELEPHONE ENCOUNTER
05/15/25 9:59 AM    Patient contacted post ED visit, [facility] department spoke with patient/caregiver and outreach was successful.    Thank you.  Kathy Rubalcava MA  [facility]

## 2025-05-15 NOTE — ASSESSMENT & PLAN NOTE
Doing better on fluticasone/umeclidinium/vilanterol as well as singulair daily, down to needing albuterol roughly 2 times per week, which is a significant improvement from prior.  Her mother has voiced some concern that Singulair is leading to increased irritability, however Raven thinks this is more likely due to other relationship factors.    Plan  Discussed monitoring closely versus taking a holiday from Singulair to see how both mood and asthma symptom control are affected  After discussion with her and her mom, will trial holiday from Singulair, monitor how above symptoms are affected, will leave medication on the active list at this time in case she feels the need to restart  Follow-up in 6 weeks to reassess

## 2025-05-16 ENCOUNTER — DOCUMENTATION (OUTPATIENT)
Dept: NEPHROLOGY | Facility: CLINIC | Age: 30
End: 2025-05-16

## 2025-05-16 ENCOUNTER — PATIENT OUTREACH (OUTPATIENT)
Dept: CASE MANAGEMENT | Facility: OTHER | Age: 30
End: 2025-05-16

## 2025-05-16 DIAGNOSIS — L65.9 ALOPECIA: Primary | ICD-10-CM

## 2025-05-16 NOTE — PROGRESS NOTES
I spoke with Raven who reports she is doing well. She is taking her medications as directed.  She declines further outreach.

## 2025-05-16 NOTE — PROGRESS NOTES
IDT meeting with [facility] staff including Yuly suarez/Bonita dietitian/Smitha /clinical coordinator Thalia and myself and patient and her mom    Reviewed all problems per the patient and her mother    This is a partial summary:  IDT meeting held today. Patient and her mother, Mrs. Stockton attended to discuss patient's care. Patient and mother reported that they are upset that she was ordered velphoro which they feel caused patient to have teeth deterioration. RD verbalized regret that this happened to patient. Reviewed that teeth breakage was not a known side effect of this medication and that medication was stopped right away when patient told RD of this result in March. RD also reached out to [facility] to report this instance. Patient now states that she also feels that auryxia is causing hair loss. RD informed patient that hair loss is also not a known side effect of auryxia, but medication will be stopped and changed to calcium acetate per her preference. She has an appointment with dermatology in October to discuss this and concerns about rashes she has had which she attributed to tape used during dialysis treatment. She reports improvement in rash since tape has been changed to silk tape. Reviewed possible side effects of calcium acetate with patient and mother and printed calcium acetate package insert for them to review. RD to continue to follow with patient to monitor for tolerance.    I personally explained the following  1.  Definitely we will stop any medication that the patient and/or mom believe are affecting her in a negative way and that was the case per Bonita the dietitian immediately!    2.  We recommended dental evaluation right away per RD  3.  We also referred the patient to dermatology with the skin reaction which has resolved with silk tape, and of note we inform both patient and mom that we change tapes nonstop until we found a solution.  I also instructed them I will make  another referral to dermatology which I did today 5/16/2025 in regards to the alopecia.    4.  I explained the importance of being on a phosphate binder that is why we are trying different medications to control her phosphorus as this can cause many problems including calcification of her skin bones etc.  They understood and agreed.  We will attempt PhosLo/calcium acetate and watch for side effects in particular GI side effects such as constipation they are agreeable.    5.  The other issue was one of the patient's was smoking we instructed both the patient and her mom we are not in control of outside of the building our facility area but Yuly the  reached out immediately to the [facility] we are waiting his support.  We will continue to instruct the patient as best we can to avoid smoking.  They understood and appreciated this.    6.  Last issue: Was patient missing treatment because she has to babysit for her niece.  We instructed the great importance of coming to treatments as this will affect her health and have come up with a solution of 145 Monday Wednesday and Friday time and we will continue to work with her in this case.  Mother also added support to this and emphasized the importance to her daughter about coming to treatment    7.  We are all going to work to get her on a transplant list and have given both the patient and her mom different transplant centers besides [facility]    8.  I also told both the patient and her mom that we are all a team we will work with her very hard to keep her happy and safe!    They stated there appreciation for this!  The patient and her mom had no other issues

## 2025-06-03 ENCOUNTER — OFFICE VISIT (OUTPATIENT)
Dept: OBGYN CLINIC | Facility: CLINIC | Age: 30
End: 2025-06-03
Payer: MEDICARE

## 2025-06-03 DIAGNOSIS — N18.9 CHRONIC KIDNEY DISEASE-MINERAL AND BONE DISORDER: ICD-10-CM

## 2025-06-03 DIAGNOSIS — D50.9 IRON DEFICIENCY ANEMIA, UNSPECIFIED IRON DEFICIENCY ANEMIA TYPE: ICD-10-CM

## 2025-06-03 DIAGNOSIS — E83.39 HYPERPHOSPHATEMIA: ICD-10-CM

## 2025-06-03 DIAGNOSIS — R80.1 PERSISTENT PROTEINURIA: ICD-10-CM

## 2025-06-03 DIAGNOSIS — N18.5 CHRONIC KIDNEY DISEASE (CKD), ACTIVE MEDICAL MANAGEMENT WITHOUT DIALYSIS, STAGE 5 (HCC): ICD-10-CM

## 2025-06-03 DIAGNOSIS — N18.5 BENIGN HYPERTENSION WITH CKD (CHRONIC KIDNEY DISEASE) STAGE V (HCC): ICD-10-CM

## 2025-06-03 DIAGNOSIS — S69.91XD INJURY OF RIGHT WRIST, SUBSEQUENT ENCOUNTER: Primary | ICD-10-CM

## 2025-06-03 DIAGNOSIS — M89.9 CHRONIC KIDNEY DISEASE-MINERAL AND BONE DISORDER: ICD-10-CM

## 2025-06-03 DIAGNOSIS — E83.9 CHRONIC KIDNEY DISEASE-MINERAL AND BONE DISORDER: ICD-10-CM

## 2025-06-03 DIAGNOSIS — J45.901 ASTHMA EXACERBATION: ICD-10-CM

## 2025-06-03 DIAGNOSIS — N25.81 SECONDARY HYPERPARATHYROIDISM OF RENAL ORIGIN (HCC): ICD-10-CM

## 2025-06-03 DIAGNOSIS — I12.0 BENIGN HYPERTENSION WITH CKD (CHRONIC KIDNEY DISEASE) STAGE V (HCC): ICD-10-CM

## 2025-06-03 DIAGNOSIS — I10 HYPERTENSION, UNSPECIFIED TYPE: ICD-10-CM

## 2025-06-03 DIAGNOSIS — J45.40 MODERATE PERSISTENT ASTHMA WITHOUT COMPLICATION: ICD-10-CM

## 2025-06-03 PROCEDURE — 99213 OFFICE O/P EST LOW 20 MIN: CPT | Performed by: STUDENT IN AN ORGANIZED HEALTH CARE EDUCATION/TRAINING PROGRAM

## 2025-06-03 NOTE — PROGRESS NOTES
"ORTHOPAEDIC HAND, WRIST, AND ELBOW OFFICE  VISIT      ASSESSMENT/PLAN:      Assessment & Plan  Injury of right wrist, subsequent encounter  Anatomy of the condition/s as well as treatment options and expected outcomes were discussed. No definitive diagnosis, patient is globally tender.   Any pertinent imaging or lab results were reviewed with the patient.  I would recommend OT at this time, updated script was provided. We discussed the wrist MRI will likely not change plan of care but she may reschedule this at her convenience.   The patient verbalized understanding of exam findings and treatment plan.   The patient was given the opportunity to ask questions.  Questions were answered to the patient's satisfaction.  The patient decided to move forward with OT and rescheduling the MRI.  Follow up in 3 months for re-evaluation.   Orders:    Ambulatory Referral to PT/OT Hand Therapy; Future        Follow Up:  3 months       To Do Next Visit:  Re-evaluation of current issue      Jose Ritchie MD  Attending, Orthopaedic Surgery  Hand, Wrist, and Elbow Surgery  [facility]    ______________________________________________________________________________________________    CHIEF COMPLAINT:  Chief Complaint   Patient presents with    Right Wrist - Follow-up       SUBJECTIVE:  Patient is a 30 y.o. RHD female who presents today for follow up of a right wrist injury. The patient states in March she was helping her mom out of the car when she went to reach to grab her and she hit the palmar aspect of her hand on the car frame. She notes pain to the dorsal aspect of her wrist. She notes a \"cracking\" sensation with flexion and extension. She is no longer using the wrist brace. She tried a topical cream, which was not beneficial for her. She has not started OT and did not undergo the wrist MRI.       Occupation: disabled due to end stage kidney disease      I have personally reviewed all the relevant [facility], " PSH, SH, FH, Medications and allergies      PAST MEDICAL HISTORY:  Past Medical History[1]    PAST SURGICAL HISTORY:  Past Surgical History[2]    FAMILY HISTORY:  Family History[3]    SOCIAL HISTORY:  Social History[4]    MEDICATIONS:  Current Medications[5]    ALLERGIES:  Allergies[6]        REVIEW OF SYSTEMS:  Musculoskeletal:        As noted in HPI.   All other systems reviewed and are negative.    VITALS:  There were no vitals filed for this visit.    LABS:  HgA1c:   Lab Results   Component Value Date    HGBA1C 5.3 11/13/2021     BMP:   Lab Results   Component Value Date    GLUCOSE 86 06/12/2015    CALCIUM 10.4 (H) 03/30/2025     06/12/2015    K 3.7 04/23/2025    CO2 23 03/30/2025    CL 93 (L) 03/30/2025    BUN 30 (H) 03/30/2025    CREATININE 13.35 (H) 03/30/2025       _____________________________________________________  PHYSICAL EXAMINATION:  General: Well developed and well nourished, alert & oriented x 3, appears comfortable  Psychiatric: Normal  HEENT: Normocephalic, Atraumatic Trachea Midline, No torticollis  Pulmonary: No audible wheezing or respiratory distress   Abdomen/GI: Non tender, non distended   Cardiovascular: No pitting edema, 2+ radial pulse   Skin: No masses, erythema, lacerations, fluctation, ulcerations  Neurovascular: Sensation Intact to the Median, Ulnar, Radial Nerve, Motor Intact to the Median, Ulnar, Radial Nerve, and Pulses Intact  Musculoskeletal: Normal, except as noted in detailed exam and in HPI.      MUSCULOSKELETAL EXAMINATION:    Right wrist:    1st dorsal compartment tenderness  Mild distal radius tenderness   Scaphoid non tender to palpation   Lunate  tender to palpation   Thumb CMC tender to palpation   Full digital extension   Full composite fist   Pain with resisted wrist extension   Pain with resisted wrist flexion   Wrist extension 70 degrees   Wrist flexion 75 degrees     ___________________________________________________  STUDIES REVIEWED:  No new studies to  review         PROCEDURES PERFORMED:  Procedures  No Procedures performed today    _____________________________________________________      Scribe Attestation      I,:  Sandra Sarkar MA am acting as a scribe while in the presence of the attending physician.:       I,:  Jose Ritchie MD personally performed the services described in this documentation    as scribed in my presence.:                  [1]   Past Medical History:  Diagnosis Date    Asthma     Chronic kidney disease     Eczema     Headache     History of transfusion     Hypertension     Increased anion gap metabolic acidosis 02/28/2022    Wears glasses    [2]   Past Surgical History:  Procedure Laterality Date    CHOLECYSTECTOMY      CT NEEDLE BIOPSY KIDNEY  1/29/2020    IR AV FISTULAGRAM/GRAFTOGRAM  11/13/2024    IR AV FISTULAGRAM/GRAFTOGRAM  1/28/2025    IR TUNNELED DIALYSIS CATHETER PLACEMENT  6/1/2024    IR TUNNELED DIALYSIS CATHETER REMOVAL  11/26/2024    KELOID EXCISION Left 3/30/2021    Procedure: POSTERIOR EAR EXCISION KELOID, FLAP RECONSTRUCTION;  Surgeon: Beka Hugo MD;  Location: [facility];  Service: Plastics    MA ARTERIOVENOUS ANASTOMOSIS OPEN DIRECT Right 8/27/2024    Procedure: CREATION FISTULA ARTERIOVENOUS (AV);  Surgeon: Marcus Espitia MD;  Location: [facility];  Service: Vascular   [3]   Family History  Problem Relation Name Age of Onset    Asthma Mother      No Known Problems Father     [4]   Social History  Tobacco Use    Smoking status: Never    Smokeless tobacco: Never   Vaping Use    Vaping status: Never Used   Substance Use Topics    Alcohol use: Not Currently     Comment: occassionally on social events    Drug use: No   [5]   Current Outpatient Medications:     acetaminophen (TYLENOL) 325 mg tablet, Take 2 tablets (650 mg total) by mouth every 6 (six) hours as needed for mild pain or headaches, Disp: 30 tablet, Rfl: 0    albuterol (2.5 mg/3 mL) 0.083 % nebulizer solution, Take 3 mL (2.5 mg total)  by nebulization every 6 (six) hours as needed for wheezing or shortness of breath, Disp: 30 mL, Rfl: 0    albuterol (ProAir HFA) 90 mcg/act inhaler, Inhale 2 puffs every 4 (four) hours as needed for wheezing, Disp: 6.7 g, Rfl: 5    amLODIPine (NORVASC) 5 mg tablet, TAKE 1 TABLET BY MOUTH TWICE A DAY, Disp: 60 tablet, Rfl: 5    amoxicillin (AMOXIL) 500 mg capsule, Take 500 mg by mouth in the morning and 500 mg in the evening and 500 mg before bedtime., Disp: , Rfl:     brompheniramine-pseudoephedrine-DM 30-2-10 MG/5ML syrup, Take 5 mL by mouth 4 (four) times a day as needed for cough or congestion, Disp: 120 mL, Rfl: 0    calcitriol (ROCALTROL) 0.5 MCG capsule, Take 1 capsule (0.5 mcg total) by mouth daily, Disp: 30 capsule, Rfl: 5    cyanocobalamin (VITAMIN B-12) 100 mcg tablet, , Disp: , Rfl:     cyanocobalamin (VITAMIN B-12) 1000 MCG tablet, Take 1 tablet (1,000 mcg total) by mouth daily, Disp: 30 tablet, Rfl: 5    Ferric Citrate (Auryxia) 1  MG(Fe) TABS, Take 2 tablets (2 g total) by mouth 3 (three) times a day with meals, Disp: 540 tablet, Rfl: 2    fluticasone (Flonase Allergy Relief) 50 mcg/act nasal spray, 1 spray into each nostril daily, Disp: 15.8 mL, Rfl: 0    fluticasone (FLONASE) 50 mcg/act nasal spray, 1 spray into each nostril daily, Disp: 9.9 mL, Rfl: 0    fluticasone-umeclidinium-vilanterol 100-62.5-25 mcg/actuation inhaler, Inhale 1 puff daily Rinse mouth after use., Disp: 1 each, Rfl: 5    labetalol (NORMODYNE) 100 mg tablet, TAKE 2 TABLETS BY MOUTH 2 TIMES A DAY., Disp: 120 tablet, Rfl: 5    loratadine (CLARITIN) 10 mg tablet, Take 1 tablet (10 mg total) by mouth daily, Disp: 30 tablet, Rfl: 5    methocarbamol (ROBAXIN) 500 mg tablet, Take 1 tablet (500 mg total) by mouth 3 (three) times a day, Disp: 21 tablet, Rfl: 0    montelukast (SINGULAIR) 10 mg tablet, Take 1 tablet (10 mg total) by mouth daily at bedtime, Disp: 30 tablet, Rfl: 5    predniSONE 10 mg tablet, 6 Tabs day 1, 5 tabs day 2,  4 tabs day 3, 3 tabs day 4, 2 tabs day 5, 1 tab day 6, Disp: 21 tablet, Rfl: 0    sevelamer carbonate (RENVELA) 800 mg tablet, TAKE 1 TABLET BY MOUTH THREE TIMES A DAY WITH MEALS, Disp: 90 tablet, Rfl: 0    torsemide (DEMADEX) 100 mg tablet, Take 1 tablet (100 mg total) by mouth daily, Disp: 90 tablet, Rfl: 1    vit B complex-vit C-folic acid (Renal Caps) 1 mg, Take 1 capsule by mouth daily with dinner, Disp: 90 capsule, Rfl: 2    etonogestrel (NEXPLANON) subdermal implant, 68 mg by Subdermal route once (Patient not taking: Reported on 3/11/2025), Disp: , Rfl:     Lido-Capsaicin-Men-Methyl Sal (Medi-Patch-Lidocaine) 0.5-0.035-5-20 % PTCH, Apply 1 patch topically daily as needed (back pain) (Patient not taking: Reported on 3/11/2025), Disp: 30 patch, Rfl: 2    lidocaine (LIDODERM) 5 %, Apply 1 patch topically over 12 hours every 24 hours for 10 doses Remove & Discard patch within 12 hours or as directed by MD, Disp: 10 patch, Rfl: 0  [6]   Allergies  Allergen Reactions    Seasonal Ic [Cholestatin] Itching    Wheat Bran - Food Allergy Itching

## 2025-06-04 RX ORDER — SEVELAMER CARBONATE 800 MG/1
800 TABLET, FILM COATED ORAL
Qty: 90 TABLET | Refills: 0 | Status: SHIPPED | OUTPATIENT
Start: 2025-06-04 | End: 2025-06-04

## 2025-06-04 RX ORDER — AMLODIPINE BESYLATE 5 MG/1
5 TABLET ORAL 2 TIMES DAILY
Qty: 60 TABLET | Refills: 5 | Status: SHIPPED | OUTPATIENT
Start: 2025-06-04

## 2025-06-04 RX ORDER — ALBUTEROL SULFATE 90 UG/1
2 INHALANT RESPIRATORY (INHALATION) EVERY 4 HOURS PRN
Qty: 6.7 G | Refills: 0 | OUTPATIENT
Start: 2025-06-04

## 2025-06-17 ENCOUNTER — EVALUATION (OUTPATIENT)
Dept: OCCUPATIONAL THERAPY | Facility: CLINIC | Age: 30
End: 2025-06-17
Attending: STUDENT IN AN ORGANIZED HEALTH CARE EDUCATION/TRAINING PROGRAM
Payer: MEDICARE

## 2025-06-17 DIAGNOSIS — S69.91XD INJURY OF RIGHT WRIST, SUBSEQUENT ENCOUNTER: Primary | ICD-10-CM

## 2025-06-17 PROCEDURE — 97165 OT EVAL LOW COMPLEX 30 MIN: CPT

## 2025-06-17 NOTE — PROGRESS NOTES
OT Evaluation     Today's date: 2025  Patient name: Raven Woodson  : 1995  MRN: [id number]  Referring provider: Jose Ritchie MD  Dx:   Encounter Diagnosis     ICD-10-CM    1. Injury of right wrist, subsequent encounter  S69.91XD Ambulatory Referral to PT/OT Hand Therapy                     Assessment  Impairments: abnormal or restricted ROM, abnormal movement, activity intolerance, impaired physical strength, lacks appropriate home exercise program, pain with function and weight-bearing intolerance  Symptom irritability: low    Assessment details: Patient presents to initial evaluation due to an injury of the R wrist Patient initial injury occurred in March as the result of falling on the palmar aspect of her hand on a door frame. Patient went to the ED following the injury where x-rays displayed no evidence of a fracture but negative ulnar variance was noted. Patient initial appointment with orthopedics took place on 25 where x-rays confirmed no acute anomalities. She was referred for an MRI at this time, which she hasn't competed at this time. Patient presents with decreased wrist AROM in all planes secondary to pain. Pain is reported to be severe in intensity during general ROM and weightbearing. Patient reports no numbness or tinging in the affected upper extremity.  strength is decreased as compared to the contralateral side. Special tests including ulnotriquetral shear, TFCC press, TFCC load, and ECU synergy test points towards possible deficit due to negative ulnar variance. Patient was provided a custom HEP to address decreased ROM and to improve overall strength to address wrist stability. See below for a more detailed assessment.     Goals  STG:    Patient will increase wrist ROM in all planes by an arch of motion of >30 degrees in 4 weeks    Patient will report decreased pain by 1-2 grades in the wrist during functional movement in 4 weeks    LTG:    Patient will  "display ROM WFL in the wrist in 8 weeks or discharge    Patient will report resolution of pain in the wrist during all activities in 8 weeks    Patient will increase FOTO score by >20 points in 8 weeks or discharge    Plan  Patient would benefit from: custom splinting and OT eval  Referral necessary: No  Planned modality interventions: ultrasound, thermotherapy: paraffin bath, thermotherapy: hydrocollator packs and TENS    Planned therapy interventions: IASTM, joint mobilization, manual therapy, massage, orthotic fitting/training, patient education, strengthening, stretching, therapeutic activities, therapeutic exercise, home exercise program, functional ROM exercises, coordination and ADL retraining    Frequency: 2x week  Duration in weeks: 10  Plan of Care beginning date: 2025  Plan of Care expiration date: 2025  Treatment plan discussed with: patient  Plan details: Patient would benefit from skilled OP OT hand therapy services 2x per week for 8 weeks to increase ROM and return to full functional status.       Subjective Evaluation    History of Present Illness  Mechanism of injury: Mechanism: I tripped and hit the inside of the car frame with the palm of my hand    Subjective:  \"I tried to ice it when it first happened but it didn't do anything\". \"I went to the ED and they didn't see anything wrong\". \"I missed the MRI, but I want to re-schedule a new one\". \"It is no better\". \"The pain is mostly in the front\". \"When I write it hurts because I need to tilt\". \"I had a brace but he wanted me to wean off it\". \"The brace helped, but I couldn't write with it\". \"Lifting grocery bags hurts\". \"Ice and heat doesn't help it\".           Not a recurrent problem   Quality of life: good    Patient Goals  Patient goals for therapy: decreased pain, increased motion, increased strength, return to work and independence with ADLs/IADLs    Pain  Current pain ratin  At best pain ratin  At worst pain rating: " 8  Location: R wrist  Aggravating factors: lifting (writing, general motion, lifting, dressing)  Progression: no change    Social Support    Employment status: working  Hand dominance: right      Diagnostic Tests  X-ray: normal  Treatments  Previous treatment: immobilization  Current treatment: occupational therapy        Objective     Tenderness     Right Wrist/Hand   Tenderness in the TFCC and scaphoid.     Additional Tenderness Details  R:  + point tenderness at ulnar head    Neurological Testing     Sensation     Wrist/Hand   Left   Intact: light touch    Right   Intact: light touch    Active Range of Motion     Left Elbow   Normal active range of motion    Right Elbow   Normal active range of motion    Left Wrist   Wrist flexion: 80 degrees   Wrist extension: 80 degrees   Radial deviation: 25 degrees   Ulnar deviation: 30 degrees     Right Wrist   Wrist flexion: 75 degrees   Wrist extension: 75 degrees   Radial deviation: 25 degrees   Ulnar deviation: 25 degrees     Left Thumb   Kapandji score: 10 degrees      Right Thumb   Kapandji score: 10 degrees    Additional Active Range of Motion Details  B/L Full composite fists    Strength/Myotome Testing     Left Wrist/Hand   Wrist extension: 5  Wrist flexion: 5     (2nd hand position)     Trial 1: 17.4    Right Wrist/Hand   Wrist extension: 4-  Wrist flexion: 4-     (2nd hand position)     Trial 1: 5    Tests     Right Wrist/Hand   Positive ECU synergy test positive, TFCC load and ulnotriquetral shear.   Negative Concepcion's.     Additional Tests Details  R:  + Ice cream scoop test  + TFCC grind with crepitus    Swelling     Left Wrist/Hand   Circumference wrist: 15 cm    Right Wrist/Hand   Circumference wrist: 15.2 cm      Flowsheet Rows      Flowsheet Row Most Recent Value   PT/OT G-Codes    Current Score 46   Projected Score 63               Precautions: [place], LATEX ALLERGY      Manuals 6/17            [facility]             MME                                        Neuro Re-Ed                                                                                                        Ther Ex             HEP Wrist AROM all planes    Wrist isometrics            Wrist AAROM>PROM             Wrist Maze             Tb on wall             Wrist Isometrics             Flex bar                                       Ther Activity             [place]                                                                 Modalities             P

## 2025-06-19 ENCOUNTER — OFFICE VISIT (OUTPATIENT)
Dept: FAMILY MEDICINE CLINIC | Facility: CLINIC | Age: 30
End: 2025-06-19
Payer: MEDICARE

## 2025-06-19 VITALS
BODY MASS INDEX: 44.3 KG/M2 | HEART RATE: 94 BPM | HEIGHT: 63 IN | WEIGHT: 250 LBS | OXYGEN SATURATION: 99 % | TEMPERATURE: 98.3 F | SYSTOLIC BLOOD PRESSURE: 139 MMHG | DIASTOLIC BLOOD PRESSURE: 75 MMHG

## 2025-06-19 DIAGNOSIS — R21 RASH: Primary | ICD-10-CM

## 2025-06-19 DIAGNOSIS — R42 ORTHOSTATIC LIGHTHEADEDNESS: ICD-10-CM

## 2025-06-19 DIAGNOSIS — L29.9 ITCHING: ICD-10-CM

## 2025-06-19 DIAGNOSIS — J45.40 MODERATE PERSISTENT ASTHMA WITHOUT COMPLICATION: ICD-10-CM

## 2025-06-19 PROCEDURE — 99213 OFFICE O/P EST LOW 20 MIN: CPT

## 2025-06-19 RX ORDER — MUPIROCIN 2 %
OINTMENT (GRAM) TOPICAL 3 TIMES DAILY
Qty: 30 G | Refills: 0 | Status: SHIPPED | OUTPATIENT
Start: 2025-06-19

## 2025-06-19 RX ORDER — MUPIROCIN 2 %
OINTMENT (GRAM) TOPICAL 3 TIMES DAILY
Qty: 15 G | Refills: 0 | Status: SHIPPED | OUTPATIENT
Start: 2025-06-19 | End: 2025-06-19

## 2025-06-19 NOTE — PROGRESS NOTES
Name: Raven Woodson      : 1995      MRN: [id number]  Encounter Provider: Roberto Britt DO  Encounter Date: 2025   Encounter department: [facility]  :  Assessment & Plan  Rash  Itching  Rash on right arm around dialysis site, she and mom feel it is due to reaction from tape use during dialysis, although multiple types of tape have been tried.  She did have a similar rash around dialysis port that was previously in chest.  Rash is flat, diffuse, hyperpigmented, slightly dry.  Trialed hydrocortisone in the past without relief.  Using Neosporin on areas of skin breakdown due to scratching.  Has appointment with dermatology in October.  Significant itching noted.    Plan  Advised moisturization with thick moisturizer cream such as Eucerin or CeraVe a on a daily basis  Benadryl cream for itching  Bactroban given for areas of skin breakdown.  Advised to stop use of Neosporin.  Follow-up 1 month.  If symptoms have not improved would trial course of stronger steroid.    Orders:    diphenhydrAMINE (BENADRYL) 2 % cream; Apply topically 3 (three) times a day as needed for itching    mupirocin (BACTROBAN) 2 % ointment; Apply topically 3 (three) times a day    Orthostatic lightheadedness  She notes feeling lightheaded if she bends down to tie her shoes and stands up, however does not feel lightheaded if standing from normal sitting position.  Blood pressure borderline elevated in office today at 139/75.  She notes that the symptoms started around the time she started dialysis, although temporarily do not seem to be specifically related to dialysis treatments.  Advised that she speak with her nephrologist to see if they would recommend any changes.  Would avoid offending behaviors for now.       Moderate persistent asthma without complication  She states that she is doing well and her breathing feels better than before using fluticasone-umeclidinium/vilanterol inhaler daily as well as  "albuterol as needed.  Was previously on Singulair but stopped at last appointment due to concerns mom had for irritability.  Today Yeimy states that she feels her breathing is slightly better and her mood is about the same.  Mom feels like there has been improvement in her mood.  Will stop Singulair at this time given that it did not seem to be having a positive effect on breathing and there is a questionable negative effect on mood.              History of Present Illness   HPI    30-year-old female with history of asthma and stage V CKD on dialysis presents with mom for a follow-up appointment.  At last visit Singulair was held due to concerns mom had that it may be negatively affecting her mood.  Yeimy states that she feels the same both in regards to both breathing and mood. She also notes a rash on her arm and that she feels lightheaded whenever she bends down and stands up. No change on days when she gets dialysis. Symptoms resolve within a few moments. No LOC. Blurry vision does occur, no loss of vision.     Review of Systems   Constitutional:  Negative for activity change, chills, fatigue and fever.   Respiratory:  Negative for chest tightness, shortness of breath and wheezing.    Cardiovascular:  Negative for chest pain.   Gastrointestinal:  Negative for nausea and vomiting.   Skin:  Positive for rash (Right arm).   Neurological:  Positive for light-headedness (On standing after bending down). Negative for dizziness and weakness.       Objective   /75 (BP Location: Left arm, Patient Position: Sitting, Cuff Size: Large)   Pulse 94   Temp 98.3 °F (36.8 °C) (Temporal)   Ht 5' 3\" (1.6 m)   Wt 113 kg (250 lb)   SpO2 99%   BMI 44.29 kg/m²      Physical Exam  Constitutional:       General: She is not in acute distress.     Appearance: She is not toxic-appearing.     Eyes:      General: No scleral icterus.     Conjunctiva/sclera: Conjunctivae normal.       Cardiovascular:      Rate and Rhythm: Normal " rate.   Pulmonary:      Effort: Pulmonary effort is normal.     Skin:     General: Skin is warm and dry.      Comments: Flat hyperpigmented rash of converging macules on right arm, somewhat dry appearing.  Some excoriations present     Neurological:      Mental Status: She is alert and oriented to person, place, and time. Mental status is at baseline.      Gait: Gait normal.     Psychiatric:         Mood and Affect: Mood normal.         Behavior: Behavior normal.         Thought Content: Thought content normal.         Judgment: Judgment normal.

## 2025-06-19 NOTE — ASSESSMENT & PLAN NOTE
She states that she is doing well and her breathing feels better than before using fluticasone-umeclidinium/vilanterol inhaler daily as well as albuterol as needed.  Was previously on Singulair but stopped at last appointment due to concerns mom had for irritability.  Today Yeimy states that she feels her breathing is slightly better and her mood is about the same.  Mom feels like there has been improvement in her mood.  Will stop Singulair at this time given that it did not seem to be having a positive effect on breathing and there is a questionable negative effect on mood.

## 2025-06-20 ENCOUNTER — OFFICE VISIT (OUTPATIENT)
Dept: VASCULAR SURGERY | Facility: CLINIC | Age: 30
End: 2025-06-20
Payer: MEDICARE

## 2025-06-20 VITALS
RESPIRATION RATE: 18 BRPM | DIASTOLIC BLOOD PRESSURE: 88 MMHG | WEIGHT: 251 LBS | HEIGHT: 63 IN | BODY MASS INDEX: 44.47 KG/M2 | SYSTOLIC BLOOD PRESSURE: 134 MMHG | OXYGEN SATURATION: 99 % | HEART RATE: 79 BPM

## 2025-06-20 DIAGNOSIS — Z78.9 PROBLEM WITH VASCULAR ACCESS: ICD-10-CM

## 2025-06-20 DIAGNOSIS — Z99.2 ESRD ON DIALYSIS (HCC): Primary | ICD-10-CM

## 2025-06-20 DIAGNOSIS — N18.6 ESRD ON DIALYSIS (HCC): Primary | ICD-10-CM

## 2025-06-20 PROCEDURE — 99214 OFFICE O/P EST MOD 30 MIN: CPT | Performed by: SURGERY

## 2025-06-20 NOTE — PROGRESS NOTES
Name: Raven Woodson      : 1995      MRN: [id number]  Encounter Provider: Marcus Espitia MD  Encounter Date: 2025   [facility]  :  Assessment & Plan  Problem with vascular access    Orders:    Ambulatory Referral to Vascular Surgery    VAS Indirect Steal Assessment for Dialysis Access; Future    ESRD on dialysis (HCC)  Lab Results   Component Value Date    EGFR 3 2025    EGFR 3 2024    EGFR 3 2024    CREATININE 13.35 (H) 2025    CREATININE 13.36 (H) 2024    CREATININE 13.03 (H) 2024          30-year-old female with end-stage renal disease and mature right brachiocephalic fistula.  Previous steal studies negative in January with only mild changes with augmentation.  Fistulogram identified central stenosis thereafter.  At this point I recommend a repeat steal study for the patient's concerns of hand coolness on dialysis.  I have also encouraged her to use exercise resistance balls for her hand strength.      History of Present Illness   HPI  Raven Woodson is a 30 y.o. female who presents to the outpatient setting to discuss right upper extremity coolness in the hand during dialysis and at other times.  She states that her hand is always feeling cold on the right side.  She says at times during dialysis she can feel paresthesias and numbness in the hand.  She has attempted wearing a glove during her dialysis sessions without improvement.  She has undergone a steal assessment previously which demonstrated minimal augmentation with compression of her fistula.  On exam I do feel a palpable radial pulse in the right hand.  There is no temperature difference that I can identify on exam however she states that her hand currently feels cool.  I have suggested a repeat steal study because during the interval she has had a fistulogram with balloon angioplasty of her cephalic vein.    Patient states that she  "is having issues with at HD and she gets infiltrated. Pt is getting full HD sessions.         Review of Systems   Musculoskeletal:         Arm pain w/ HD   Neurological:  Positive for numbness.          Objective   /88 (BP Location: Left arm, Patient Position: Sitting)   Pulse 79   Resp 18   Ht 5' 3\" (1.6 m)   Wt 114 kg (251 lb)   SpO2 99%   BMI 44.46 kg/m²      Physical Exam  Vitals and nursing note reviewed.   Constitutional:       General: She is not in acute distress.     Appearance: She is well-developed.   HENT:      Head: Normocephalic and atraumatic.     Eyes:      Conjunctiva/sclera: Conjunctivae normal.       Cardiovascular:      Rate and Rhythm: Normal rate and regular rhythm.      Pulses:           Radial pulses are 1+ on the right side.      Heart sounds: No murmur heard.     Arteriovenous access: Right arteriovenous access is present.      Comments: Appearance of a well matured fistula with palpable thrill.  No identifiable coolness in the right hand currently.  Pulmonary:      Effort: Pulmonary effort is normal. No respiratory distress.      Breath sounds: Normal breath sounds.   Abdominal:      Palpations: Abdomen is soft.      Tenderness: There is no abdominal tenderness.     Musculoskeletal:         General: No swelling.      Cervical back: Neck supple.     Skin:     General: Skin is warm and dry.      Capillary Refill: Capillary refill takes less than 2 seconds.     Neurological:      Mental Status: She is alert.     Psychiatric:         Mood and Affect: Mood normal.           "

## 2025-06-20 NOTE — ASSESSMENT & PLAN NOTE
Lab Results   Component Value Date    EGFR 3 03/30/2025    EGFR 3 07/06/2024    EGFR 3 07/05/2024    CREATININE 13.35 (H) 03/30/2025    CREATININE 13.36 (H) 07/06/2024    CREATININE 13.03 (H) 07/05/2024          30-year-old female with end-stage renal disease and mature right brachiocephalic fistula.  Previous steal studies negative in January with only mild changes with augmentation.  Fistulogram identified central stenosis thereafter.  At this point I recommend a repeat steal study for the patient's concerns of hand coolness on dialysis.  I have also encouraged her to use exercise resistance balls for her hand strength.

## 2025-06-20 NOTE — LETTER
2025     Shruthi Seth MD MPH  [address] [facility] [id number]    Patient: Raven Woodson   YOB: 1995   Date of Visit: 2025       Dear Dr. Shruthi Seth MD MPH  Joselyn Reyes Bahamonde, MD Seleah BHarriet Woodson:    Thank you for referring Raven Woodson to me for evaluation. Below are my notes for this consultation.    If you have questions, please do not hesitate to call me. I look forward to following your patient along with you.         Sincerely,        Marcus Espitia MD        CC: Joselyn Reyes Bahamonde, MD Seleah SUKUMAR [place] Chintan Espitia MD  2025  9:19 AM  Sign when Signing Visit  Name: Raven Woodson      : 1995      MRN: [id number]  Encounter Provider: Marcus Espitia MD  Encounter Date: 2025   Encounter department: [facility]  :  Assessment & Plan  Problem with vascular access    Orders:  •  Ambulatory Referral to Vascular Surgery  •  VAS Indirect Steal Assessment for Dialysis Access; Future    ESRD on dialysis (HCC)  Lab Results   Component Value Date    EGFR 3 2025    EGFR 3 2024    EGFR 3 2024    CREATININE 13.35 (H) 2025    CREATININE 13.36 (H) 2024    CREATININE 13.03 (H) 2024          30-year-old female with end-stage renal disease and mature right brachiocephalic fistula.  Previous steal studies negative in January with only mild changes with augmentation.  Fistulogram identified central stenosis thereafter.  At this point I recommend a repeat steal study for the patient's concerns of hand coolness on dialysis.  I have also encouraged her to use exercise resistance balls for her hand strength.      History of Present Illness   HPI  Raven Woodson is a 30 y.o. female who presents to the outpatient setting to discuss right upper extremity coolness in the hand during dialysis and at other times.  She states  "that her hand is always feeling cold on the right side.  She says at times during dialysis she can feel paresthesias and numbness in the hand.  She has attempted wearing a glove during her dialysis sessions without improvement.  She has undergone a steal assessment previously which demonstrated minimal augmentation with compression of her fistula.  On exam I do feel a palpable radial pulse in the right hand.  There is no temperature difference that I can identify on exam however she states that her hand currently feels cool.  I have suggested a repeat steal study because during the interval she has had a fistulogram with balloon angioplasty of her cephalic vein.    Patient states that she is having issues with at HD and she gets infiltrated. Pt is getting full HD sessions.         Review of Systems   Musculoskeletal:         Arm pain w/ HD   Neurological:  Positive for numbness.          Objective   /88 (BP Location: Left arm, Patient Position: Sitting)   Pulse 79   Resp 18   Ht 5' 3\" (1.6 m)   Wt 114 kg (251 lb)   SpO2 99%   BMI 44.46 kg/m²      Physical Exam  Vitals and nursing note reviewed.   Constitutional:       General: She is not in acute distress.     Appearance: She is well-developed.   HENT:      Head: Normocephalic and atraumatic.     Eyes:      Conjunctiva/sclera: Conjunctivae normal.       Cardiovascular:      Rate and Rhythm: Normal rate and regular rhythm.      Pulses:           Radial pulses are 1+ on the right side.      Heart sounds: No murmur heard.     Arteriovenous access: Right arteriovenous access is present.      Comments: Appearance of a well matured fistula with palpable thrill.  No identifiable coolness in the right hand currently.  Pulmonary:      Effort: Pulmonary effort is normal. No respiratory distress.      Breath sounds: Normal breath sounds.   Abdominal:      Palpations: Abdomen is soft.      Tenderness: There is no abdominal tenderness.     Musculoskeletal:         " General: No swelling.      Cervical back: Neck supple.     Skin:     General: Skin is warm and dry.      Capillary Refill: Capillary refill takes less than 2 seconds.     Neurological:      Mental Status: She is alert.     Psychiatric:         Mood and Affect: Mood normal.

## 2025-06-20 NOTE — PATIENT INSTRUCTIONS
1. ESRD on dialysis (HCC)  Assessment & Plan:  Lab Results   Component Value Date    EGFR 3 03/30/2025    EGFR 3 07/06/2024    EGFR 3 07/05/2024    CREATININE 13.35 (H) 03/30/2025    CREATININE 13.36 (H) 07/06/2024    CREATININE 13.03 (H) 07/05/2024          30-year-old female with end-stage renal disease and mature right brachiocephalic fistula.  Previous steal studies negative in January with only mild changes with augmentation.  Fistulogram identified central stenosis thereafter.  At this point I recommend a repeat steal study for the patient's concerns of hand coolness on dialysis.  I have also encouraged her to use exercise resistance balls for her hand strength.  2. Problem with vascular access  -     Ambulatory Referral to Vascular Surgery  -     VAS Indirect Steal Assessment for Dialysis Access; Future; Expected date: 06/20/2025

## 2025-06-21 DIAGNOSIS — J45.901 ASTHMA EXACERBATION: ICD-10-CM

## 2025-06-22 RX ORDER — ALBUTEROL SULFATE 90 UG/1
2 INHALANT RESPIRATORY (INHALATION) EVERY 4 HOURS PRN
Qty: 6.7 G | Refills: 2 | Status: SHIPPED | OUTPATIENT
Start: 2025-06-22

## 2025-06-24 ENCOUNTER — OFFICE VISIT (OUTPATIENT)
Dept: OCCUPATIONAL THERAPY | Facility: CLINIC | Age: 30
End: 2025-06-24
Attending: STUDENT IN AN ORGANIZED HEALTH CARE EDUCATION/TRAINING PROGRAM
Payer: MEDICARE

## 2025-06-24 ENCOUNTER — HOSPITAL ENCOUNTER (OUTPATIENT)
Dept: NON INVASIVE DIAGNOSTICS | Facility: CLINIC | Age: 30
Discharge: HOME | End: 2025-06-24
Attending: SURGERY
Payer: MEDICARE

## 2025-06-24 DIAGNOSIS — S69.91XD INJURY OF RIGHT WRIST, SUBSEQUENT ENCOUNTER: Primary | ICD-10-CM

## 2025-06-24 DIAGNOSIS — Z78.9 PROBLEM WITH VASCULAR ACCESS: ICD-10-CM

## 2025-06-24 PROCEDURE — 97110 THERAPEUTIC EXERCISES: CPT

## 2025-06-24 NOTE — PROGRESS NOTES
Daily Note     Today's date: 2025  Patient name: Raven Woodson  : 1995  MRN: [id number]  Referring provider: Jose Ritchie MD  Dx:   Encounter Diagnosis     ICD-10-CM    1. Injury of right wrist, subsequent encounter  S69.91XD               Subjective: It hurts when I do the exercises.      Objective: See treatment diary below. Modified session. Pt came 30 min late.      Assessment: Tolerated treatment fair. Patient would benefit from continued OT      Plan: Continue per plan of care.      Precautions: [place], LATEX ALLERGY      Insurance Billing Rule ReEval POC expires Auth Status Total   Visits  Start date  Expiration date PT/OT + Visit Limit? Co-Insurance Misc    [employer] CMS  25  8 25 BOMN No                                                            Visit/Unit Tracking  AUTH Status:  Date                    Visits  Authed: 8 Used 1 1                    Remaining  7 6                             Manuals            IASTM  8'           MME                                       Neuro Re-Ed                                                                                                        Ther Ex             HEP Wrist AROM all planes    Wrist isometrics            Wrist AAROM>PROM  5'           Wrist Maze  5x           Tb on wall             Wrist Isometrics             Flex bar                                       Ther Activity             Keypegs  1x                                                               Modalities             MHP

## 2025-06-26 ENCOUNTER — OFFICE VISIT (OUTPATIENT)
Dept: OCCUPATIONAL THERAPY | Facility: CLINIC | Age: 30
End: 2025-06-26
Attending: STUDENT IN AN ORGANIZED HEALTH CARE EDUCATION/TRAINING PROGRAM
Payer: MEDICARE

## 2025-06-26 DIAGNOSIS — S69.91XD INJURY OF RIGHT WRIST, SUBSEQUENT ENCOUNTER: Primary | ICD-10-CM

## 2025-06-26 PROCEDURE — 93922 UPR/L XTREMITY ART 2 LEVELS: CPT | Performed by: SURGERY

## 2025-06-26 PROCEDURE — 97110 THERAPEUTIC EXERCISES: CPT

## 2025-06-26 PROCEDURE — 97140 MANUAL THERAPY 1/> REGIONS: CPT

## 2025-06-26 NOTE — PROGRESS NOTES
Daily Note     Today's date: 2025  Patient name: Raven Woodson  : 1995  MRN: [id number]  Referring provider: Jose Ritchie MD  Dx:   Encounter Diagnosis     ICD-10-CM    1. Injury of right wrist, subsequent encounter  S69.91XD               Subjective: It is still pretty sore all the time      Objective: See treatment diary below.       Assessment: Tolerated treatment fair. Overall, she had an increased activity tolerance. Incorporated additional exercises with modifications.       Plan: Continue per plan of care.      Precautions: [place], LATEX [employer]ERGY      Insurance Billing Rule ReEval POC expires Auth Status Total   Visits  Start date  Expiration date PT/OT + Visit Limit? Co-Insurance Misc    [employer] CMS  25  8 25 BOMN No                                                            Visit/Unit Tracking  AUTH Status:  Date                   Visits  Authed: 8 Used 1 1 1                   Remaining  7 6 5                            Manuals           IASTM  8' 8          MME                                       Neuro Re-Ed                                                                                                        Ther Ex             HEP Wrist AROM all planes    Wrist isometrics            Wrist AAROM>PROM  5' 3'          Wrist Maze  5x 5x          Tb on wall   5x          Wrist Isometrics             Flex bar   Y just to the R x 20          Dex balls   2'          Digiflex   Y iso x10    R comp x20          Ext web   Y x 20                                                 Ther Activity             Keypegs  1x 1x                                                              Modalities             MHP   5'

## 2025-07-03 ENCOUNTER — OFFICE VISIT (OUTPATIENT)
Dept: OCCUPATIONAL THERAPY | Facility: CLINIC | Age: 30
End: 2025-07-03
Attending: STUDENT IN AN ORGANIZED HEALTH CARE EDUCATION/TRAINING PROGRAM
Payer: MEDICARE

## 2025-07-03 DIAGNOSIS — S69.91XD INJURY OF RIGHT WRIST, SUBSEQUENT ENCOUNTER: Primary | ICD-10-CM

## 2025-07-03 PROCEDURE — 97140 MANUAL THERAPY 1/> REGIONS: CPT | Performed by: OCCUPATIONAL THERAPIST

## 2025-07-03 PROCEDURE — 97110 THERAPEUTIC EXERCISES: CPT | Performed by: OCCUPATIONAL THERAPIST

## 2025-07-03 NOTE — PROGRESS NOTES
Daily Note     Today's date: 7/3/2025  Patient name: Raven Woodson  : 1995  MRN: [id number]  Referring provider: Jose Ritchie MD  Dx:   Encounter Diagnosis     ICD-10-CM    1. Injury of right wrist, subsequent encounter  S69.91XD                 Subjective: Reports that she felt pain in the base of her thumb/thenar muscles when opening a juice bottle.       Objective: See treatment diary below.       Assessment: Tolerated treatment fair. Mild flexor tightness.       Plan: Continue per plan of care.      Precautions: [place], LATEX ALLERGY      Insurance Billing Rule ReEval POC expires Auth Status Total   Visits  Start date  Expiration date PT/OT + Visit Limit? Co-Insurance Misc    [employer] CMS  25  8 25 BOMN No                                                            Visit/Unit Tracking  AUTH Status:  Date  7/3                 Visits  Authed: 8 Used 1 1 1 1                  Remaining  7 6 5 4                           Manuals  7/3         IASTM  8' 8 8'         MME                                       Neuro Re-Ed                                                                                                        Ther Ex             HEP Wrist AROM all planes    Wrist isometrics            Wrist AAROM>PROM  5' 3' 3'         Wrist Maze  5x 5x 5x         Tb on wall   5x          Wrist Isometrics             Flex bar   Y just to the R x 20 Yellow 20x         Dex balls   2' 2'         Digiflex   Y iso x10    R comp x20 Y iso x10    R comp x20         Ext web   Y x 20 Yellow 20x                                                Ther Activity             Keypegs  1x 1x          [place] translation     1x                                                Modalities             MHP   5'

## 2025-07-08 DIAGNOSIS — J30.81 CAT ALLERGY, AIRBORNE: ICD-10-CM

## 2025-07-09 RX ORDER — FLUTICASONE PROPIONATE 50 MCG
SPRAY, SUSPENSION (ML) NASAL
Qty: 16 ML | Refills: 2 | Status: SHIPPED | OUTPATIENT
Start: 2025-07-09

## 2025-07-10 DIAGNOSIS — J30.81 CAT ALLERGY, AIRBORNE: ICD-10-CM

## 2025-07-10 DIAGNOSIS — J45.901 ASTHMA EXACERBATION: ICD-10-CM

## 2025-07-11 RX ORDER — LORATADINE 10 MG/1
10 TABLET ORAL DAILY
Qty: 30 TABLET | Refills: 5 | Status: SHIPPED | OUTPATIENT
Start: 2025-07-11

## 2025-07-14 RX ORDER — ALBUTEROL SULFATE 90 UG/1
2 INHALANT RESPIRATORY (INHALATION) EVERY 4 HOURS PRN
Qty: 6.7 G | Refills: 2 | Status: SHIPPED | OUTPATIENT
Start: 2025-07-14

## 2025-07-23 ENCOUNTER — APPOINTMENT (EMERGENCY)
Dept: RADIOLOGY | Facility: HOSPITAL | Age: 30
End: 2025-07-23
Payer: MEDICARE

## 2025-07-23 ENCOUNTER — HOSPITAL ENCOUNTER (EMERGENCY)
Facility: HOSPITAL | Age: 30
Discharge: HOME | End: 2025-07-24
Attending: EMERGENCY MEDICINE
Payer: MEDICARE

## 2025-07-23 VITALS
HEART RATE: 83 BPM | DIASTOLIC BLOOD PRESSURE: 83 MMHG | RESPIRATION RATE: 18 BRPM | TEMPERATURE: 98.3 F | SYSTOLIC BLOOD PRESSURE: 165 MMHG | OXYGEN SATURATION: 100 %

## 2025-07-23 DIAGNOSIS — U07.1 COVID-19: Primary | ICD-10-CM

## 2025-07-23 LAB
FLUAV AG UPPER RESP QL IA.RAPID: NEGATIVE
FLUBV AG UPPER RESP QL IA.RAPID: NEGATIVE
SARS-COV+SARS-COV-2 AG RESP QL IA.RAPID: POSITIVE

## 2025-07-23 PROCEDURE — 71045 X-RAY EXAM CHEST 1 VIEW: CPT

## 2025-07-23 PROCEDURE — 99284 EMERGENCY DEPT VISIT MOD MDM: CPT

## 2025-07-23 PROCEDURE — 87811 SARS-COV-2 COVID19 W/OPTIC: CPT

## 2025-07-23 PROCEDURE — 87804 INFLUENZA ASSAY W/OPTIC: CPT

## 2025-07-23 PROCEDURE — 99283 EMERGENCY DEPT VISIT LOW MDM: CPT

## 2025-07-23 RX ORDER — GUAIFENESIN 600 MG/1
600 TABLET, EXTENDED RELEASE ORAL ONCE
Status: DISCONTINUED | OUTPATIENT
Start: 2025-07-23 | End: 2025-07-23

## 2025-07-23 RX ORDER — LORATADINE 10 MG/1
10 TABLET ORAL DAILY
Qty: 30 TABLET | Refills: 0 | Status: SHIPPED | OUTPATIENT
Start: 2025-07-23 | End: 2025-08-22

## 2025-07-23 RX ORDER — FLUTICASONE PROPIONATE 50 MCG
1 SPRAY, SUSPENSION (ML) NASAL DAILY
Qty: 16 G | Refills: 0 | Status: SHIPPED | OUTPATIENT
Start: 2025-07-23 | End: 2025-08-22

## 2025-07-23 RX ORDER — LORATADINE 10 MG/1
10 TABLET ORAL ONCE
Status: COMPLETED | OUTPATIENT
Start: 2025-07-24 | End: 2025-07-24

## 2025-07-23 RX ORDER — ACETAMINOPHEN 325 MG/1
650 TABLET ORAL ONCE
Status: DISCONTINUED | OUTPATIENT
Start: 2025-07-23 | End: 2025-07-23

## 2025-07-23 RX ORDER — FLUTICASONE PROPIONATE 50 MCG
1 SPRAY, SUSPENSION (ML) NASAL DAILY
Status: DISCONTINUED | OUTPATIENT
Start: 2025-07-24 | End: 2025-07-23

## 2025-07-23 RX ORDER — FLUTICASONE PROPIONATE 50 MCG
1 SPRAY, SUSPENSION (ML) NASAL DAILY
Status: DISCONTINUED | OUTPATIENT
Start: 2025-07-24 | End: 2025-07-24 | Stop reason: HOSPADM

## 2025-07-24 DIAGNOSIS — R21 RASH: ICD-10-CM

## 2025-07-24 DIAGNOSIS — J45.40 MODERATE PERSISTENT ASTHMA WITHOUT COMPLICATION: ICD-10-CM

## 2025-07-24 DIAGNOSIS — L29.9 ITCHING: ICD-10-CM

## 2025-07-24 RX ADMIN — LORATADINE 10 MG: 10 TABLET ORAL at 00:05

## 2025-07-24 RX ADMIN — FLUTICASONE PROPIONATE 1 SPRAY: 50 SPRAY, METERED NASAL at 00:06

## 2025-07-24 NOTE — DISCHARGE INSTRUCTIONS
Please wear a mask to dialysis tomorrow, you tested positive for covid. Can take mucinex to help with phlegm and congestion in addition to taking tylenol for pain.

## 2025-07-24 NOTE — ED PROVIDER NOTES
Time reflects when diagnosis was documented in both MDM as applicable and the Disposition within this note       Time User Action Codes Description Comment    7/23/2025 10:52 PM Hannah Barrera Add [U07.1] COVID-19           ED Disposition       ED Disposition   Discharge    Condition   Stable    Date/Time   Wed Jul 23, 2025 10:51 PM    Comment   Raven PORRAS Mahin Woodson discharge to home/self care.                   Assessment & Plan       Medical Decision Making  Patient seen, examined and noted to have COVID-19.  Patient coming into the emergency department with cold-like symptoms.  States her mother and sister have similar symptoms at home.  Gets dialysis every Monday, Wednesday and Friday.  Called dialysis today and they requested she be evaluated prior to coming into dialysis tomorrow.  No signs of otitis media or pharyngitis on physical exam.  Lungs are clear to auscultation bilaterally.  Vitals are stable.  Flu COVID swab positive for COVID.  Discussed this with patient who is very concerned about getting dialysis after testing positive for COVID.  Patient is very upset as to why she has cold symptoms but has been diagnosed with COVID instead of a sinus infection.  Attempted to explain that patient has these symptoms that due to the COVID virus and how to manage the symptoms at home.  I advised that she wear a mask to dialysis.  Mother requesting a repeat COVID test.  I discussed with her that there is no utility in repeating the test, at this point patient's mother at bedside becoming very frustrated and asking to speak to my supervising physician.  Dr. Hampton had an additional in-depth discussion with patient and her family at bedside.  Sent Flonase and Claritin to the pharmacy for patient.  Encouraged primary care follow-up.    Differential diagnosis includes but is not limited to flu, COVID, other viral URI, pneumonia, allergies    Patient's labs notable for: Positive COVID swab    Imaging revealed:   No  focal pneumonias or acute cardiopulmonary abnormalities on chest x-ray per my read     Patient appears well, is nontoxic appearing and vitally stable.  In light of this patient would benefit from outpatient management.    Patient was rx'd: Flonase and Claritin    Patient at time of discharge well-appearing in no acute distress. All labs reviewed and utilized in the medical decision making process.    Amount and/or Complexity of Data Reviewed  Labs: ordered. Decision-making details documented in ED Course.  Radiology: ordered and independent interpretation performed.    Risk  OTC drugs.        ED Course as of 07/24/25 0445   Wed Jul 23, 2025   2248 SARS COV Rapid Antigen(!): Positive       Medications   loratadine (CLARITIN) tablet 10 mg (10 mg Oral Given 7/24/25 0005)       ED Risk Strat Scores                    No data recorded        SBIRT 20yo+      Flowsheet Row Most Recent Value   Initial Alcohol Screen: US AUDIT-C     1. How often do you have a drink containing alcohol? 0 Filed at: 07/23/2025 2343   2. How many drinks containing alcohol do you have on a typical day you are drinking?  0 Filed at: 07/23/2025 2343   3a. Male UNDER 65: How often do you have five or more drinks on one occasion? 0 Filed at: 07/23/2025 2343   3b. FEMALE Any Age, or MALE 65+: How often do you have 4 or more drinks on one occassion? 0 Filed at: 07/23/2025 2343   Audit-C Score 0 Filed at: 07/23/2025 2343   CELY: How many times in the past year have you...    Used an illegal drug or used a prescription medication for non-medical reasons? Never Filed at: 07/23/2025 2343                            History of Present Illness       Chief Complaint   Patient presents with    Cold Like Symptoms     Pt thinks she has a sinus infection. Everyone has URI at home.       Past Medical History[1]   Past Surgical History[2]   Family History[3]   Social History[4]   E-Cigarette/Vaping    E-Cigarette Use Never User       E-Cigarette/Vaping Substances     Nicotine No     THC No     CBD No     Flavoring No     Other No     Unknown No       I have reviewed and agree with the history as documented.     Raven Woodson is a 30 y.o. female with a PMH of ESRD on dialysis Monday, Wednesday, Friday and asthma presenting to the ED on July 24, 2025 with cold-like symptoms. Patient endorses that she thinks she has a sinus infection.  She notes that since Monday she has had postnasal drip and a productive cough.  She also notes that her mother and sibling are sick with similar symptoms. Patient denies fevers, chest pain, shortness of breath, wheezing, abdominal pain, nausea, vomiting, diarrhea or any other complaints this time.             Review of Systems   Constitutional:  Negative for chills, fatigue and fever.   HENT:  Positive for congestion and postnasal drip. Negative for ear pain, sinus pressure, sinus pain and sore throat.    Respiratory:  Negative for shortness of breath.    Cardiovascular:  Negative for chest pain.   Gastrointestinal:  Negative for abdominal pain, diarrhea, nausea and vomiting.   Musculoskeletal:  Negative for myalgias.   Neurological:  Negative for headaches.           Objective       ED Triage Vitals [07/23/25 2145]   Temperature Pulse Blood Pressure Respirations SpO2 Patient Position - Orthostatic VS   98.3 °F (36.8 °C) 83 165/83 18 100 % --      Temp Source Heart Rate Source BP Location FiO2 (%) Pain Score    Oral Monitor -- -- --      Vitals      Date and Time Temp Pulse SpO2 Resp BP Pain Score FACES Pain Rating User   07/23/25 2145 98.3 °F (36.8 °C) 83 100 % 18 165/83 -- -- [place]            Physical Exam  Constitutional:       General: She is not in acute distress.     Appearance: Normal appearance. She is normal weight. She is not ill-appearing.   HENT:      Right Ear: Tympanic membrane, ear canal and external ear normal. There is no impacted cerumen.      Left Ear: Tympanic membrane, ear canal and external ear normal. There is no  impacted cerumen.      Nose: Rhinorrhea present.      Mouth/Throat:      Pharynx: Oropharynx is clear. Postnasal drip present. No oropharyngeal exudate or posterior oropharyngeal erythema.     Cardiovascular:      Rate and Rhythm: Normal rate and regular rhythm.      Pulses: Normal pulses.      Heart sounds: Normal heart sounds. No murmur heard.     No friction rub. No gallop.   Pulmonary:      Effort: Pulmonary effort is normal. No respiratory distress.      Breath sounds: Normal breath sounds. No wheezing or rales.     Neurological:      Mental Status: She is alert.         Results Reviewed       Procedure Component Value Units Date/Time    FLU/COVID Rapid Antigen (30 min. TAT) - Preferred screening test in ED [[id number]]  (Abnormal) Collected: 07/23/25 2207    Lab Status: Final result Specimen: Nares from Nose Updated: 07/23/25 2237     SARS COV Rapid Antigen Positive     Influenza A Rapid Antigen Negative     Influenza B Rapid Antigen Negative    Narrative:      This test has been performed using the Quidel Kristin 2 FLU+SARS Antigen test under the Emergency Use Authorization (EUA). This test has been validated by the  and verified by the performing laboratory. The Kristin uses lateral flow immunofluorescent sandwich assay to detect SARS-COV, Influenza A and Influenza B Antigen.     The Quidel Kristin 2 SARS Antigen test does not differentiate between SARS-CoV and SARS-CoV-2.     Negative results are presumptive and may be confirmed with a molecular assay, if necessary, for patient management. Negative results do not rule out SARS-CoV-2 or influenza infection and should not be used as the sole basis for treatment or patient management decisions. A negative test result may occur if the level of antigen in a sample is below the limit of detection of this test.     Positive results are indicative of the presence of viral antigens, but do not rule out bacterial infection or co-infection with other viruses.      All test results should be used as an adjunct to clinical observations and other information available to the provider.    FOR PEDIATRIC PATIENTS - copy/paste COVID Guidelines URL to browser: [email or web address]            XR chest 1 view portable   ED Interpretation by Hannah Barrera PA-C (07/23 7140)   Image was independently interpreted by myself and showed no acute concerns of cardiopulmonary disease at this time.       Final Interpretation by Demarco Rodriguez MD (07/24 5829)      No acute cardiopulmonary disease.            Workstation performed: [id number]             Procedures    ED Medication and Procedure Management   Prior to Admission Medications   Prescriptions Last Dose Informant Patient Reported? Taking?   Ferric Citrate (Auryxia) 1  MG(Fe) TABS   No No   Sig: Take 2 tablets (2 g total) by mouth 3 (three) times a day with meals   Lido-Capsaicin-Men-Methyl Sal (Medi-Patch-Lidocaine) 0.5-0.035-5-20 % PTCH  Self No No   Sig: Apply 1 patch topically daily as needed (back pain)   Patient not taking: Reported on 3/11/2025   acetaminophen (TYLENOL) 325 mg tablet  Self No No   Sig: Take 2 tablets (650 mg total) by mouth every 6 (six) hours as needed for mild pain or headaches   albuterol (2.5 mg/3 mL) 0.083 % nebulizer solution  Self No No   Sig: Take 3 mL (2.5 mg total) by nebulization every 6 (six) hours as needed for wheezing or shortness of breath   albuterol (ProAir HFA) 90 mcg/act inhaler   No No   Sig: Inhale 2 puffs every 4 (four) hours as needed for wheezing   amLODIPine (NORVASC) 5 mg tablet   No No   Sig: Take 1 tablet (5 mg total) by mouth 2 (two) times a day   amoxicillin (AMOXIL) 500 mg capsule   Yes No   Sig: Take 500 mg by mouth in the morning and 500 mg in the evening and 500 mg before bedtime.   Patient not taking: Reported on 6/20/2025   brompheniramine-pseudoephedrine-DM 30-2-10 MG/5ML syrup   No No   Sig: Take 5 mL by mouth 4  (four) times a day as needed for cough or congestion   calcitriol (ROCALTROL) 0.5 MCG capsule   No No   Sig: Take 1 capsule (0.5 mcg total) by mouth daily   cyanocobalamin (VITAMIN B-12) 100 mcg tablet  Self Yes No   Patient not taking: Reported on 2025   cyanocobalamin (VITAMIN B-12) 1000 MCG tablet  Self No No   Sig: Take 1 tablet (1,000 mcg total) by mouth daily   Patient not taking: Reported on 2025   diphenhydrAMINE (BENADRYL) 2 % cream   No No   Sig: Apply topically 3 (three) times a day as needed for itching   etonogestrel (NEXPLANON) subdermal implant  Self Yes No   Si mg by Subdermal route once   fluticasone (FLONASE) 50 mcg/act nasal spray   No No   Si spray into each nostril daily   fluticasone (FLONASE) 50 mcg/act nasal spray   No No   Sig: SPRAY 1 SPRAY INTO EACH NOSTRIL EVERY DAY   fluticasone-umeclidinium-vilanterol 100-62.5-25 mcg/actuation inhaler   No No   Sig: Inhale 1 puff daily Rinse mouth after use.   labetalol (NORMODYNE) 100 mg tablet   No No   Sig: TAKE 2 TABLETS BY MOUTH 2 TIMES A DAY.   lidocaine (LIDODERM) 5 %   No No   Sig: Apply 1 patch topically over 12 hours every 24 hours for 10 doses Remove & Discard patch within 12 hours or as directed by MD   loratadine (CLARITIN) 10 mg tablet   No No   Sig: Take 1 tablet (10 mg total) by mouth daily   methocarbamol (ROBAXIN) 500 mg tablet   No No   Sig: Take 1 tablet (500 mg total) by mouth 3 (three) times a day   Patient not taking: Reported on 2025   mupirocin (BACTROBAN) 2 % ointment   No No   Sig: Apply topically 3 (three) times a day   predniSONE 10 mg tablet   No No   Si Tabs day 1, 5 tabs day 2, 4 tabs day 3, 3 tabs day 4, 2 tabs day 5, 1 tab day 6   torsemide (DEMADEX) 100 mg tablet   No No   Sig: Take 1 tablet (100 mg total) by mouth daily   Patient not taking: Reported on 2025   vit B complex-vit C-folic acid (Renal Caps) 1 mg   No No   Sig: Take 1 capsule by mouth daily with dinner   Patient not taking:  Reported on 6/19/2025      Facility-Administered Medications: None     Discharge Medication List as of 7/23/2025 11:52 PM        START taking these medications    Details   !! fluticasone (FLONASE) 50 mcg/act nasal spray 1 spray into each nostril daily, Starting Wed 7/23/2025, Until Fri 8/22/2025, Normal      !! loratadine (CLARITIN) 10 mg tablet Take 1 tablet (10 mg total) by mouth daily, Starting Wed 7/23/2025, Until Fri 8/22/2025, Normal       !! - Potential duplicate medications found. Please discuss with provider.        CONTINUE these medications which have NOT CHANGED    Details   acetaminophen (TYLENOL) 325 mg tablet Take 2 tablets (650 mg total) by mouth every 6 (six) hours as needed for mild pain or headaches, Starting Sun 1/26/2020, No Print      albuterol (2.5 mg/3 mL) 0.083 % nebulizer solution Take 3 mL (2.5 mg total) by nebulization every 6 (six) hours as needed for wheezing or shortness of breath, Starting Tue 7/18/2023, Normal      albuterol (ProAir HFA) 90 mcg/act inhaler Inhale 2 puffs every 4 (four) hours as needed for wheezing, Starting Mon 7/14/2025, Normal      amLODIPine (NORVASC) 5 mg tablet Take 1 tablet (5 mg total) by mouth 2 (two) times a day, Starting Wed 6/4/2025, Normal      amoxicillin (AMOXIL) 500 mg capsule Take 500 mg by mouth in the morning and 500 mg in the evening and 500 mg before bedtime., Starting Tue 1/21/2025, Historical Med      brompheniramine-pseudoephedrine-DM 30-2-10 MG/5ML syrup Take 5 mL by mouth 4 (four) times a day as needed for cough or congestion, Starting Thu 1/2/2025, Normal      calcitriol (ROCALTROL) 0.5 MCG capsule Take 1 capsule (0.5 mcg total) by mouth daily, Starting Thu 5/8/2025, Normal      !! cyanocobalamin (VITAMIN B-12) 100 mcg tablet Historical Med      !! cyanocobalamin (VITAMIN B-12) 1000 MCG tablet Take 1 tablet (1,000 mcg total) by mouth daily, Starting Tue 11/5/2024, Normal      diphenhydrAMINE (BENADRYL) 2 % cream Apply topically 3 (three)  times a day as needed for itching, Starting Thu 6/19/2025, Normal      etonogestrel (NEXPLANON) subdermal implant 68 mg by Subdermal route once, Historical Med      Ferric Citrate (Auryxia) 1  MG(Fe) TABS Take 2 tablets (2 g total) by mouth 3 (three) times a day with meals, Starting Fri 3/7/2025, Normal      !! fluticasone (FLONASE) 50 mcg/act nasal spray 1 spray into each nostril daily, Starting Thu 1/2/2025, Normal      !! fluticasone (FLONASE) 50 mcg/act nasal spray SPRAY 1 SPRAY INTO EACH NOSTRIL EVERY DAY, Normal      fluticasone-umeclidinium-vilanterol 100-62.5-25 mcg/actuation inhaler Inhale 1 puff daily Rinse mouth after use., Starting Thu 3/13/2025, Normal      labetalol (NORMODYNE) 100 mg tablet TAKE 2 TABLETS BY MOUTH 2 TIMES A DAY., Starting Wed 1/29/2025, Normal      Lido-Capsaicin-Men-Methyl Sal (Medi-Patch-Lidocaine) 0.5-0.035-5-20 % PTCH Apply 1 patch topically daily as needed (back pain), Starting Tue 6/25/2024, Normal      lidocaine (LIDODERM) 5 % Apply 1 patch topically over 12 hours every 24 hours for 10 doses Remove & Discard patch within 12 hours or as directed by MD, Starting Mon 3/17/2025, Until Thu 6/19/2025, Normal      !! loratadine (CLARITIN) 10 mg tablet Take 1 tablet (10 mg total) by mouth daily, Starting Fri 7/11/2025, Normal      methocarbamol (ROBAXIN) 500 mg tablet Take 1 tablet (500 mg total) by mouth 3 (three) times a day, Starting Wed 1/8/2025, Normal      mupirocin (BACTROBAN) 2 % ointment Apply topically 3 (three) times a day, Starting Thu 6/19/2025, Normal      predniSONE 10 mg tablet 6 Tabs day 1, 5 tabs day 2, 4 tabs day 3, 3 tabs day 4, 2 tabs day 5, 1 tab day 6, Normal      torsemide (DEMADEX) 100 mg tablet Take 1 tablet (100 mg total) by mouth daily, Starting Thu 3/6/2025, Normal      vit B complex-vit C-folic acid (Renal Caps) 1 mg Take 1 capsule by mouth daily with dinner, Starting Fri 3/7/2025, Normal       !! - Potential duplicate medications found. Please  discuss with provider.        No discharge procedures on file.  ED SEPSIS DOCUMENTATION   Time reflects when diagnosis was documented in both MDM as applicable and the Disposition within this note       Time User Action Codes Description Comment    7/23/2025 10:52 PM Hannah Barrera Add [U07.1] COVID-19                      [1]   Past Medical History:  Diagnosis Date    Asthma     Chronic kidney disease     Eczema     Headache     History of transfusion     Hypertension     Increased anion gap metabolic acidosis 02/28/2022    Wears glasses    [2]   Past Surgical History:  Procedure Laterality Date    CHOLECYSTECTOMY      CT NEEDLE BIOPSY KIDNEY  1/29/2020    IR AV FISTULAGRAM/GRAFTOGRAM  11/13/2024    IR AV FISTULAGRAM/GRAFTOGRAM  1/28/2025    IR TUNNELED DIALYSIS CATHETER PLACEMENT  6/1/2024    IR TUNNELED DIALYSIS CATHETER REMOVAL  11/26/2024    KELOID EXCISION Left 3/30/2021    Procedure: POSTERIOR EAR EXCISION KELOID, FLAP RECONSTRUCTION;  Surgeon: Beka Hugo MD;  Location: [facility];  Service: Plastics    OK ARTERIOVENOUS ANASTOMOSIS OPEN DIRECT Right 8/27/2024    Procedure: CREATION FISTULA ARTERIOVENOUS (AV);  Surgeon: Marcus Espitia MD;  Location: [facility];  Service: Vascular   [3]   Family History  Problem Relation Name Age of Onset    Asthma Mother      No Known Problems Father     [4]   Social History  Tobacco Use    Smoking status: Never    Smokeless tobacco: Never   Vaping Use    Vaping status: Never Used   Substance Use Topics    Alcohol use: Not Currently     Comment: occassionally on social events    Drug use: No        Hannah Barrera PA-C  07/24/25 1554

## 2025-07-24 NOTE — ED ATTENDING ATTESTATION
7/23/2025  IDino DO, saw and evaluated the patient. I have discussed the patient with the resident/non-physician practitioner and agree with the resident's/non-physician practitioner's findings, Plan of Care, and MDM as documented in the resident's/non-physician practitioner's note, except where noted. All available labs and Radiology studies were reviewed.  I was present for key portions of any procedure(s) performed by the resident/non-physician practitioner and I was immediately available to provide assistance.       At this point I agree with the current assessment done in the Emergency Department.  I have conducted an independent evaluation of this patient a history and physical is as follows: 30-year-old female, past medical history per chart, presenting to the emergency department with postnasal drip, cough times days.  Patient notes multiple family members at home with same.  Denies fever, chills, chest pain, shortness of breath, weakness.  Otherwise performing activities of daily living.      Was initially seen and evaluated by the physician assistant and it was requested that I present to the room as patient and mother with further questions.  Patient was concerned about the social implications of a positive COVID test as other individuals at dialysis, if they knew another patient is with illness, or disparaging.  Mother was requesting that the patient be retested.  I explained at length that the false negative rate for COVID testing is much higher than false positive so even if the test was by chance negative, it is more likely that it is a false negative due to false positive.  Additionally she does have signs and symptoms consistent with a viral URI and additionally multiple other family members have the same.  It is statistically much more likely that the patient does have a viral URI with COVID being one of the possible etiologies.  I explained with the COVID-positive testing, dialysis is  "also unlikely to change their management as far as precautions.  The mother and the patient also had questions as a pertains to cat exposure having that led to \"sinus infections\".  I explained that sinus infections are bacterial in nature and also unlikely spread between individuals rapidly in the home.  I also further defined their cat exposure and the mother was exposed to a cat few days before at a  and their cat exposure otherwise is a cat that intermittently presents outside the home but not in the home.  Given the fact that the mother has been removed from this cat and the patient has very limited and exposure to Only outside the home, unlikely this is the etiology however allergies in total may be involved in which case recommended Claritin and fluticasone to the patient which she states insurance is not able to improve at this time but I explained it is also available over-the-counter.    Vital signs stable.  Patient resting comfortably.  Patient deferred additional exam at this time.    MDM: 22-year-old female presenting to the emergency department with signs symptoms likely a viral URI with allergy is not excluded.  See original    For differential diagnosis which includes bacterial sinusitis, viral URI, and others.    I explained that repeat COVID testing would not change the trajectory of the patient's care.  Mother was dissatisfied with this answer.  I offered to inform the patient's nephrologist so that her dialysis team could be updated in privacy as a pertains to her presenting signs symptoms and testing here today which she declined.  I also reviewed Paxlovid with risks nausea vomiting diarrhea with current benefits limited which patient family reviewed.  Patient to continue supportive care. Reviewed all findings both relevant and incidental with the patient at bedside. Pt verbalized understanding of findings, neccesary follow up, return to ED precautions. Pt agreed to review today's findings " with their primary care provider. Pt non-toxic appearing upon discharge.       ED Course         Critical Care Time  Procedures

## 2025-07-25 ENCOUNTER — VBI (OUTPATIENT)
Dept: FAMILY MEDICINE CLINIC | Facility: CLINIC | Age: 30
End: 2025-07-25

## 2025-07-29 ENCOUNTER — TELEPHONE (OUTPATIENT)
Age: 30
End: 2025-07-29

## 2025-08-04 ENCOUNTER — TELEPHONE (OUTPATIENT)
Dept: OTHER | Facility: HOSPITAL | Age: 30
End: 2025-08-04

## 2025-08-04 DIAGNOSIS — R41.3 MEMORY LOSS: ICD-10-CM

## 2025-08-04 DIAGNOSIS — F07.81 POST CONCUSSION SYNDROME: Primary | ICD-10-CM

## 2025-08-14 ENCOUNTER — TELEMEDICINE (OUTPATIENT)
Dept: VASCULAR SURGERY | Facility: CLINIC | Age: 30
End: 2025-08-14
Payer: MEDICARE

## 2025-08-18 ENCOUNTER — DOCUMENTATION (OUTPATIENT)
Dept: VASCULAR SURGERY | Facility: CLINIC | Age: 30
End: 2025-08-18

## 2025-08-18 DIAGNOSIS — N18.30 BENIGN HYPERTENSION WITH CKD (CHRONIC KIDNEY DISEASE) STAGE III (HCC): ICD-10-CM

## 2025-08-18 DIAGNOSIS — I12.9 BENIGN HYPERTENSION WITH CKD (CHRONIC KIDNEY DISEASE) STAGE III (HCC): ICD-10-CM

## 2025-08-19 RX ORDER — LABETALOL 100 MG/1
200 TABLET, FILM COATED ORAL 2 TIMES DAILY
Qty: 120 TABLET | Refills: 5 | Status: SHIPPED | OUTPATIENT
Start: 2025-08-19

## (undated) DEVICE — DECANTER: Brand: UNBRANDED

## (undated) DEVICE — SURGICEL FIBRILLAR 1 X 2

## (undated) DEVICE — GLOVE SRG BIOGEL 7

## (undated) DEVICE — SUT MONOCRYL 4-0 PS-2 18 IN Y496G

## (undated) DEVICE — STRL BETHLEHEM A V FISTULA PK: Brand: CARDINAL HEALTH

## (undated) DEVICE — GLOVE SRG BIOGEL 6.5

## (undated) DEVICE — OCCLUSIVE GAUZE STRIP,3% BISMUTH TRIBROMOPHENATE IN PETROLATUM BLEND: Brand: XEROFORM

## (undated) DEVICE — NEEDLE 27 G X 1 1/4

## (undated) DEVICE — LIGHT HANDLE COVER SLEEVE DISP BLUE STELLAR

## (undated) DEVICE — PAD GROUNDING ADULT

## (undated) DEVICE — VESSEL CANNULA

## (undated) DEVICE — BETHLEHEM UNIVERSAL OUTPATIENT: Brand: CARDINAL HEALTH

## (undated) DEVICE — LIGACLIP MCA MULTIPLE CLIP APPLIERS, 20 SMALL CLIPS: Brand: LIGACLIP

## (undated) DEVICE — SUT SILK 5-0 P-3 18 IN 640G

## (undated) DEVICE — EXOFIN PRECISION PEN HIGH VISCOSITY TOPICAL SKIN ADHESIVE: Brand: EXOFIN PRECISION PEN, 1G

## (undated) DEVICE — TIBURON SPLIT SHEET: Brand: CONVERTORS

## (undated) DEVICE — PETRI DISH STERILE

## (undated) DEVICE — SUT MONOCRYL 3-0 SH 27 IN Y416H

## (undated) DEVICE — SUT SILK 2-0 18 IN A185H

## (undated) DEVICE — PENCIL ELECTROSURG E-Z CLEAN -0035H

## (undated) DEVICE — VIAL DECANTER

## (undated) DEVICE — GLOVE SRG BIOGEL 7.5

## (undated) DEVICE — GLOVE INDICATOR PI UNDERGLOVE SZ 8 BLUE

## (undated) DEVICE — SUT SILK 4-0 18 IN A183H

## (undated) DEVICE — PAD GROUNDING DUAL ADULT

## (undated) DEVICE — INTENDED FOR TISSUE SEPARATION, AND OTHER PROCEDURES THAT REQUIRE A SHARP SURGICAL BLADE TO PUNCTURE OR CUT.: Brand: BARD-PARKER ® CARBON RIB-BACK BLADES

## (undated) DEVICE — SUT PROLENE 5-0 C-1/C-1 24 IN 8725H

## (undated) DEVICE — SUT PROLENE 6-0 BV130 30 IN 8709H